# Patient Record
Sex: MALE | Race: WHITE | Employment: OTHER | URBAN - METROPOLITAN AREA
[De-identification: names, ages, dates, MRNs, and addresses within clinical notes are randomized per-mention and may not be internally consistent; named-entity substitution may affect disease eponyms.]

---
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
## 2017-01-03 ENCOUNTER — ANTI-COAG VISIT (OUTPATIENT)
Dept: CARDIOLOGY CLINIC | Age: 72
End: 2017-01-03

## 2017-01-03 LAB
INR BLD: 1.6
INR BLD: 1.6

## 2017-01-06 ENCOUNTER — ANTI-COAG VISIT (OUTPATIENT)
Dept: CARDIOLOGY CLINIC | Age: 72
End: 2017-01-06

## 2017-01-18 ENCOUNTER — ANTI-COAG VISIT (OUTPATIENT)
Dept: CARDIOLOGY CLINIC | Age: 72
End: 2017-01-18

## 2017-01-18 LAB — INR BLD: 1.8

## 2017-02-24 ENCOUNTER — OFFICE VISIT (OUTPATIENT)
Dept: CARDIOLOGY CLINIC | Age: 72
End: 2017-02-24

## 2017-02-24 VITALS
HEIGHT: 70 IN | HEART RATE: 49 BPM | SYSTOLIC BLOOD PRESSURE: 136 MMHG | DIASTOLIC BLOOD PRESSURE: 74 MMHG | WEIGHT: 222 LBS | BODY MASS INDEX: 31.78 KG/M2

## 2017-02-24 DIAGNOSIS — I48.0 PAROXYSMAL ATRIAL FIBRILLATION (HCC): Primary | Chronic | ICD-10-CM

## 2017-02-24 DIAGNOSIS — I10 ESSENTIAL HYPERTENSION: Chronic | ICD-10-CM

## 2017-02-24 DIAGNOSIS — R00.2 PALPITATIONS: ICD-10-CM

## 2017-02-24 DIAGNOSIS — R00.1 BRADYCARDIA: ICD-10-CM

## 2017-02-24 PROCEDURE — 1123F ACP DISCUSS/DSCN MKR DOCD: CPT | Performed by: INTERNAL MEDICINE

## 2017-02-24 PROCEDURE — 1036F TOBACCO NON-USER: CPT | Performed by: INTERNAL MEDICINE

## 2017-02-24 PROCEDURE — 3017F COLORECTAL CA SCREEN DOC REV: CPT | Performed by: INTERNAL MEDICINE

## 2017-02-24 PROCEDURE — G8484 FLU IMMUNIZE NO ADMIN: HCPCS | Performed by: INTERNAL MEDICINE

## 2017-02-24 PROCEDURE — G8598 ASA/ANTIPLAT THER USED: HCPCS | Performed by: INTERNAL MEDICINE

## 2017-02-24 PROCEDURE — 99214 OFFICE O/P EST MOD 30 MIN: CPT | Performed by: INTERNAL MEDICINE

## 2017-02-24 PROCEDURE — 4040F PNEUMOC VAC/ADMIN/RCVD: CPT | Performed by: INTERNAL MEDICINE

## 2017-02-24 PROCEDURE — 93000 ELECTROCARDIOGRAM COMPLETE: CPT | Performed by: INTERNAL MEDICINE

## 2017-02-24 PROCEDURE — G8417 CALC BMI ABV UP PARAM F/U: HCPCS | Performed by: INTERNAL MEDICINE

## 2017-02-24 PROCEDURE — G8427 DOCREV CUR MEDS BY ELIG CLIN: HCPCS | Performed by: INTERNAL MEDICINE

## 2017-02-24 RX ORDER — DOFETILIDE 0.5 MG/1
500 CAPSULE ORAL 2 TIMES DAILY
Qty: 180 CAPSULE | Refills: 2 | Status: SHIPPED | OUTPATIENT
Start: 2017-02-24 | End: 2017-08-28 | Stop reason: SDUPTHER

## 2017-02-24 RX ORDER — NADOLOL 20 MG/1
TABLET ORAL
Qty: 90 TABLET | Refills: 3
Start: 2017-02-24 | End: 2017-12-28 | Stop reason: SDUPTHER

## 2017-03-01 RX ORDER — WARFARIN SODIUM 5 MG/1
5 TABLET ORAL DAILY
Qty: 180 TABLET | Refills: 1 | Status: SHIPPED | OUTPATIENT
Start: 2017-03-01 | End: 2017-08-09 | Stop reason: SDUPTHER

## 2017-03-07 DIAGNOSIS — I25.10 CORONARY ARTERY DISEASE INVOLVING NATIVE CORONARY ARTERY OF NATIVE HEART WITHOUT ANGINA PECTORIS: ICD-10-CM

## 2017-03-07 RX ORDER — ATORVASTATIN CALCIUM 40 MG/1
TABLET, FILM COATED ORAL
Qty: 30 TABLET | Refills: 5 | Status: SHIPPED | OUTPATIENT
Start: 2017-03-07 | End: 2017-09-11 | Stop reason: SDUPTHER

## 2017-03-08 ENCOUNTER — ANTI-COAG VISIT (OUTPATIENT)
Dept: CARDIOLOGY CLINIC | Age: 72
End: 2017-03-08

## 2017-03-08 LAB — INR BLD: 1.5

## 2017-04-06 LAB — INR BLD: 1.6

## 2017-04-06 RX ORDER — LISINOPRIL 20 MG/1
TABLET ORAL
Qty: 90 TABLET | Refills: 3 | Status: SHIPPED | OUTPATIENT
Start: 2017-04-06 | End: 2017-10-20 | Stop reason: SDUPTHER

## 2017-04-07 ENCOUNTER — ANTI-COAG VISIT (OUTPATIENT)
Dept: CARDIOLOGY CLINIC | Age: 72
End: 2017-04-07

## 2017-05-05 ENCOUNTER — ANTI-COAG VISIT (OUTPATIENT)
Dept: CARDIOLOGY CLINIC | Age: 72
End: 2017-05-05

## 2017-05-05 LAB — INR BLD: 2.1

## 2017-05-16 ENCOUNTER — TELEPHONE (OUTPATIENT)
Dept: CARDIOLOGY CLINIC | Age: 72
End: 2017-05-16

## 2017-05-16 DIAGNOSIS — I48.91 ATRIAL FIBRILLATION, UNSPECIFIED TYPE (HCC): Primary | ICD-10-CM

## 2017-06-23 ENCOUNTER — ANTI-COAG VISIT (OUTPATIENT)
Dept: CARDIOLOGY CLINIC | Age: 72
End: 2017-06-23

## 2017-06-23 LAB — INR BLD: 1.8

## 2017-07-25 ENCOUNTER — OFFICE VISIT (OUTPATIENT)
Dept: CARDIOLOGY CLINIC | Age: 72
End: 2017-07-25

## 2017-07-25 VITALS
HEART RATE: 49 BPM | WEIGHT: 225 LBS | DIASTOLIC BLOOD PRESSURE: 70 MMHG | HEIGHT: 70 IN | BODY MASS INDEX: 32.21 KG/M2 | SYSTOLIC BLOOD PRESSURE: 130 MMHG

## 2017-07-25 DIAGNOSIS — I10 ESSENTIAL HYPERTENSION: Chronic | ICD-10-CM

## 2017-07-25 DIAGNOSIS — I48.91 ATRIAL FIBRILLATION, UNSPECIFIED TYPE (HCC): ICD-10-CM

## 2017-07-25 DIAGNOSIS — I48.0 PAROXYSMAL ATRIAL FIBRILLATION (HCC): Primary | Chronic | ICD-10-CM

## 2017-07-25 DIAGNOSIS — R07.9 CHEST PAIN AT REST: ICD-10-CM

## 2017-07-25 DIAGNOSIS — I25.10 CORONARY ARTERY DISEASE INVOLVING NATIVE CORONARY ARTERY OF NATIVE HEART WITHOUT ANGINA PECTORIS: Chronic | ICD-10-CM

## 2017-07-25 DIAGNOSIS — R00.2 PALPITATIONS: ICD-10-CM

## 2017-07-25 PROCEDURE — G8427 DOCREV CUR MEDS BY ELIG CLIN: HCPCS | Performed by: INTERNAL MEDICINE

## 2017-07-25 PROCEDURE — 93000 ELECTROCARDIOGRAM COMPLETE: CPT | Performed by: INTERNAL MEDICINE

## 2017-07-25 PROCEDURE — G8417 CALC BMI ABV UP PARAM F/U: HCPCS | Performed by: INTERNAL MEDICINE

## 2017-07-25 PROCEDURE — 1036F TOBACCO NON-USER: CPT | Performed by: INTERNAL MEDICINE

## 2017-07-25 PROCEDURE — G8598 ASA/ANTIPLAT THER USED: HCPCS | Performed by: INTERNAL MEDICINE

## 2017-07-25 PROCEDURE — 1123F ACP DISCUSS/DSCN MKR DOCD: CPT | Performed by: INTERNAL MEDICINE

## 2017-07-25 PROCEDURE — 4040F PNEUMOC VAC/ADMIN/RCVD: CPT | Performed by: INTERNAL MEDICINE

## 2017-07-25 PROCEDURE — 99213 OFFICE O/P EST LOW 20 MIN: CPT | Performed by: INTERNAL MEDICINE

## 2017-07-25 PROCEDURE — 3017F COLORECTAL CA SCREEN DOC REV: CPT | Performed by: INTERNAL MEDICINE

## 2017-08-07 RX ORDER — WARFARIN SODIUM 5 MG/1
TABLET ORAL
Qty: 180 TABLET | Refills: 1 | Status: CANCELLED | OUTPATIENT
Start: 2017-08-07

## 2017-08-09 ENCOUNTER — TELEPHONE (OUTPATIENT)
Dept: CARDIOLOGY CLINIC | Age: 72
End: 2017-08-09

## 2017-08-09 DIAGNOSIS — I48.0 PAROXYSMAL ATRIAL FIBRILLATION (HCC): Primary | Chronic | ICD-10-CM

## 2017-08-09 DIAGNOSIS — I25.10 CORONARY ARTERY DISEASE INVOLVING NATIVE CORONARY ARTERY OF NATIVE HEART WITHOUT ANGINA PECTORIS: Chronic | ICD-10-CM

## 2017-08-09 RX ORDER — WARFARIN SODIUM 5 MG/1
5 TABLET ORAL DAILY
Qty: 180 TABLET | Refills: 1 | Status: SHIPPED | OUTPATIENT
Start: 2017-08-09 | End: 2017-08-10 | Stop reason: SDUPTHER

## 2017-08-10 DIAGNOSIS — I25.10 CORONARY ARTERY DISEASE INVOLVING NATIVE CORONARY ARTERY OF NATIVE HEART WITHOUT ANGINA PECTORIS: Chronic | ICD-10-CM

## 2017-08-10 DIAGNOSIS — I48.0 PAROXYSMAL ATRIAL FIBRILLATION (HCC): Chronic | ICD-10-CM

## 2017-08-10 RX ORDER — WARFARIN SODIUM 5 MG/1
5 TABLET ORAL DAILY
Qty: 180 TABLET | Refills: 1 | Status: SHIPPED | OUTPATIENT
Start: 2017-08-10 | End: 2017-08-11 | Stop reason: SDUPTHER

## 2017-08-11 ENCOUNTER — TELEPHONE (OUTPATIENT)
Dept: CARDIOLOGY CLINIC | Age: 72
End: 2017-08-11

## 2017-08-11 DIAGNOSIS — I25.10 CORONARY ARTERY DISEASE INVOLVING NATIVE CORONARY ARTERY OF NATIVE HEART WITHOUT ANGINA PECTORIS: Chronic | ICD-10-CM

## 2017-08-11 DIAGNOSIS — I48.0 PAROXYSMAL ATRIAL FIBRILLATION (HCC): Chronic | ICD-10-CM

## 2017-08-11 RX ORDER — WARFARIN SODIUM 5 MG/1
5 TABLET ORAL DAILY
Qty: 180 TABLET | Refills: 1 | Status: SHIPPED | OUTPATIENT
Start: 2017-08-11 | End: 2017-08-11 | Stop reason: SDUPTHER

## 2017-08-11 RX ORDER — WARFARIN SODIUM 5 MG/1
TABLET ORAL
Qty: 180 TABLET | Refills: 1 | Status: SHIPPED | OUTPATIENT
Start: 2017-08-11 | End: 2018-01-11 | Stop reason: SDUPTHER

## 2017-08-15 ENCOUNTER — ANTI-COAG VISIT (OUTPATIENT)
Dept: CARDIOLOGY CLINIC | Age: 72
End: 2017-08-15

## 2017-08-15 LAB
INR BLD: 2.5
INR BLD: 2.5 (ref 0.8–1.2)
INR BLD: ABNORMAL

## 2017-08-28 ENCOUNTER — TELEPHONE (OUTPATIENT)
Dept: CARDIOLOGY CLINIC | Age: 72
End: 2017-08-28

## 2017-08-28 DIAGNOSIS — I25.10 CORONARY ARTERY DISEASE INVOLVING NATIVE CORONARY ARTERY OF NATIVE HEART WITHOUT ANGINA PECTORIS: Chronic | ICD-10-CM

## 2017-08-28 DIAGNOSIS — R07.9 CHEST PAIN, UNSPECIFIED TYPE: Primary | ICD-10-CM

## 2017-08-29 RX ORDER — DOFETILIDE 0.5 MG/1
CAPSULE ORAL
Qty: 180 CAPSULE | Refills: 2 | Status: ON HOLD | OUTPATIENT
Start: 2017-08-29 | End: 2017-10-17 | Stop reason: HOSPADM

## 2017-08-30 ENCOUNTER — HOSPITAL ENCOUNTER (OUTPATIENT)
Dept: NON INVASIVE DIAGNOSTICS | Age: 72
Discharge: HOME | End: 2017-08-30
Attending: INTERNAL MEDICINE | Admitting: INTERNAL MEDICINE

## 2017-08-30 DIAGNOSIS — R07.9 CHEST PAIN: ICD-10-CM

## 2017-08-30 LAB
EKG ATRIAL RATE: 47 BPM
EKG DIAGNOSIS: NORMAL
EKG P AXIS: 62 DEGREES
EKG P-R INTERVAL: 204 MS
EKG Q-T INTERVAL: 428 MS
EKG QRS DURATION: 96 MS
EKG QTC CALCULATION (BAZETT): 378 MS
EKG R AXIS: -66 DEGREES
EKG T AXIS: 55 DEGREES
EKG VENTRICULAR RATE: 47 BPM
LV EF: 50 %
LVEF MODALITY: NORMAL

## 2017-08-30 PROCEDURE — 93010 ELECTROCARDIOGRAM REPORT: CPT | Performed by: INTERNAL MEDICINE

## 2017-09-01 ENCOUNTER — TELEPHONE (OUTPATIENT)
Dept: CARDIOLOGY CLINIC | Age: 72
End: 2017-09-01

## 2017-09-11 DIAGNOSIS — I25.10 CORONARY ARTERY DISEASE INVOLVING NATIVE CORONARY ARTERY OF NATIVE HEART WITHOUT ANGINA PECTORIS: ICD-10-CM

## 2017-09-12 RX ORDER — ATORVASTATIN CALCIUM 40 MG/1
TABLET, FILM COATED ORAL
Qty: 30 TABLET | Refills: 4 | Status: SHIPPED | OUTPATIENT
Start: 2017-09-12 | End: 2017-11-20 | Stop reason: SDUPTHER

## 2017-10-02 ENCOUNTER — ANTI-COAG VISIT (OUTPATIENT)
Dept: CARDIOLOGY CLINIC | Age: 72
End: 2017-10-02

## 2017-10-02 LAB — INR BLD: 2

## 2017-10-04 ENCOUNTER — TELEPHONE (OUTPATIENT)
Dept: CARDIOLOGY CLINIC | Age: 72
End: 2017-10-04

## 2017-10-18 ENCOUNTER — TELEPHONE (OUTPATIENT)
Dept: CARDIOLOGY CLINIC | Age: 72
End: 2017-10-18

## 2017-10-18 NOTE — TELEPHONE ENCOUNTER
I spoke with pt and relayed message per [facility]. H shanda stated that the med was new and that he had mistakenly taken a whole tablet. I advised to take the half tomorrow and keep us apprised of and adverse side effects.

## 2017-10-18 NOTE — TELEPHONE ENCOUNTER
Spoke to patient. He was discharged from hospital yesterday. He woke up this morning feeling fine. He took 60 mg of isosorbide this morning on accident instead of 30 mg. He also took all of his other cardiac medications (lisinopril and corgard) About a half an hr after taking his meds his head started to feel a little funny. (Like a lightheadedness. ) His pulse is regular. /73 and HR 67 sitting. Standing /81 and HR 86. His HR normally runs in the 50's according to him. Only other medication he will be taking later this evening will be dilantin coumadin and lipitor. Explained that his lightheadedness is likely from extra medication. He should lay down and rest with feet elevated. Drink some fluids and recheck BP and HR in 1-1 1/2 hrs. If not feeling better or it worsens give us a call back.

## 2017-10-20 ENCOUNTER — OFFICE VISIT (OUTPATIENT)
Dept: CARDIOLOGY CLINIC | Age: 72
End: 2017-10-20

## 2017-10-20 VITALS
WEIGHT: 214 LBS | SYSTOLIC BLOOD PRESSURE: 108 MMHG | OXYGEN SATURATION: 98 % | DIASTOLIC BLOOD PRESSURE: 64 MMHG | BODY MASS INDEX: 30.64 KG/M2 | HEIGHT: 70 IN | HEART RATE: 64 BPM

## 2017-10-20 DIAGNOSIS — I25.10 CORONARY ARTERY DISEASE INVOLVING NATIVE CORONARY ARTERY OF NATIVE HEART WITHOUT ANGINA PECTORIS: Chronic | ICD-10-CM

## 2017-10-20 DIAGNOSIS — I10 ESSENTIAL HYPERTENSION: Chronic | ICD-10-CM

## 2017-10-20 DIAGNOSIS — I48.0 PAF (PAROXYSMAL ATRIAL FIBRILLATION) (HCC): Primary | Chronic | ICD-10-CM

## 2017-10-20 DIAGNOSIS — I47.29 NSVT (NONSUSTAINED VENTRICULAR TACHYCARDIA): ICD-10-CM

## 2017-10-20 DIAGNOSIS — E78.2 MIXED HYPERLIPIDEMIA: ICD-10-CM

## 2017-10-20 PROCEDURE — 1111F DSCHRG MED/CURRENT MED MERGE: CPT | Performed by: NURSE PRACTITIONER

## 2017-10-20 PROCEDURE — G8417 CALC BMI ABV UP PARAM F/U: HCPCS | Performed by: NURSE PRACTITIONER

## 2017-10-20 PROCEDURE — 1123F ACP DISCUSS/DSCN MKR DOCD: CPT | Performed by: NURSE PRACTITIONER

## 2017-10-20 PROCEDURE — G8598 ASA/ANTIPLAT THER USED: HCPCS | Performed by: NURSE PRACTITIONER

## 2017-10-20 PROCEDURE — G8484 FLU IMMUNIZE NO ADMIN: HCPCS | Performed by: NURSE PRACTITIONER

## 2017-10-20 PROCEDURE — 99213 OFFICE O/P EST LOW 20 MIN: CPT | Performed by: NURSE PRACTITIONER

## 2017-10-20 PROCEDURE — 4040F PNEUMOC VAC/ADMIN/RCVD: CPT | Performed by: NURSE PRACTITIONER

## 2017-10-20 PROCEDURE — G8427 DOCREV CUR MEDS BY ELIG CLIN: HCPCS | Performed by: NURSE PRACTITIONER

## 2017-10-20 PROCEDURE — 3017F COLORECTAL CA SCREEN DOC REV: CPT | Performed by: NURSE PRACTITIONER

## 2017-10-20 PROCEDURE — 1036F TOBACCO NON-USER: CPT | Performed by: NURSE PRACTITIONER

## 2017-10-20 RX ORDER — NITROGLYCERIN 400 UG/1
1 SPRAY ORAL EVERY 5 MIN PRN
Qty: 1 BOTTLE | Refills: 3 | Status: SHIPPED | OUTPATIENT
Start: 2017-10-20 | End: 2022-02-24

## 2017-10-20 RX ORDER — LISINOPRIL 20 MG/1
10 TABLET ORAL DAILY
Qty: 90 TABLET | Refills: 3 | Status: ON HOLD | OUTPATIENT
Start: 2017-10-20 | End: 2017-10-24 | Stop reason: HOSPADM

## 2017-10-20 RX ORDER — LISINOPRIL 20 MG/1
10 TABLET ORAL DAILY
Qty: 90 TABLET | Refills: 3
Start: 2017-10-20 | End: 2017-10-20 | Stop reason: SDUPTHER

## 2017-10-20 RX ORDER — NITROGLYCERIN 400 UG/1
1 SPRAY ORAL EVERY 5 MIN PRN
Qty: 1 BOTTLE | Refills: 3 | Status: SHIPPED | OUTPATIENT
Start: 2017-10-20 | End: 2017-10-20 | Stop reason: SDUPTHER

## 2017-10-20 NOTE — TELEPHONE ENCOUNTER
Pt calling he was just seen today with Jone Mckeon and his meds were sent to wrong pharmacy, resent to correct pharmacy

## 2017-10-20 NOTE — PROGRESS NOTES
[address]     Outpatient Follow Up Note    CHIEF COMPLAINT / HPI: Hospital Follow Up secondary to VF/ AF/ CAD/ HTN/ Hyperlipidemia    Hospital record has been reviewed  Hospital Course progressed as follows:   Hospital Course: Lesly Mathis was admitted With chest pain and ECG concerning for STEMI. He was taken to the cardiac cath lab and noted to have recent appearing occlusion of the SVG to the RCA which was unable to be opened with PCI. He also was noted to have an occluded LIMA. Sequential SVG to DT 1013 was patent with a 50% stenosis. The RCA is collateralized via left-to-right collaterals. During the course of PCI the temporary pacemaker which was placed prior to AngioJet attempted revascularizing the RCA graft triggered malignant ventricular arrhythmia requiring defibrillation. He was seen by electrophysiology with recommendation of LifeVest and probable future EP study. He underwent Myoview stress test which was negative for reversible ischemia. LV systolic function was moderately reduced at the time of cardiac cath but by the time of stress testing had improved into the normal range. Medical management of his CAD was recommended. Mp Blancas is 81 y.o. male who presents today for a routine follow up after a recent hospitalization related to the above mentioned issues. Subjective:   Since the time of discharge, the patient admits their symptoms have improved. Currently the patient denies significant chest pain. There is no associated STEVENS. The patient is not experiencing palpitations. These symptoms are improving over the last few days. Patient is currently wearing a life vest.  With regard to medication therapy the patient has been compliant with prescribed regimen. They have tolerated therapy to date.      Past Medical History:   Diagnosis Date    Arthritis     shoulders and knee    Atrial fib/flut     CAD (coronary artery disease)     Hyperlipidemia     Hypertension  Seizures ([address]     Sinus bradycardia      Social History:    History   Smoking Status    Former Smoker    Packs/day: 2.00    Years: 15.00    Quit date: 8/15/1990   Smokeless Tobacco    Never Used     Current Medications:  Current Outpatient Prescriptions   Medication Sig Dispense Refill    clopidogrel (PLAVIX) 75 MG tablet Take 1 tablet by mouth daily 30 tablet 3    isosorbide mononitrate (IMDUR) 60 MG extended release tablet Take 0.5 tablets by mouth daily 30 tablet 3    atorvastatin (LIPITOR) 40 MG tablet TAKE 1 TABLET BY MOUTH ONE TIME A DAY  30 tablet 4    warfarin (COUMADIN) 5 MG tablet One to two tabs daily as directed (Patient taking differently: Take 7.5 mg by mouth daily One to two tabs daily as directed) 180 tablet 1    nadolol (CORGARD) 20 MG tablet TAKE 1/2 TABLET BY MOUTH DAILY 90 tablet 3    phenytoin (DILANTIN) 100 MG ER capsule Take 1 capsule by mouth 2 times daily. Resume home dose 1 capsule 0    primidone (MYSOLINE) 250 MG tablet Take 250 mg by mouth 2 times daily.  diazepam (VALIUM) 5 MG tablet Take 5 mg by mouth 2 times daily.  nitroGLYCERIN (NITROLINGUAL) 0.4 MG/SPRAY 0.4 mg spray Place 1 spray under the tongue every 5 minutes as needed for Chest pain 1 Bottle 3    lisinopril (PRINIVIL;ZESTRIL) 20 MG tablet Take 0.5 tablets by mouth daily 90 tablet 3     No current facility-administered medications for this visit. REVIEW OF SYSTEMS:   CONSTITUTIONAL: No major weight gain or loss, fatigue, weakness, night sweats or fever. There's been no change in energy level, sleep pattern, or activity level. HEENT: No new vision difficulties or ringing in the ears. RESPIRATORY: No new SOB, PND, orthopnea or cough. CARDIOVASCULAR: See HPI  GI: No nausea, vomiting, diarrhea, constipation, abdominal pain or changes in bowel habits. : No urinary frequency, urgency, incontinence hematuria or dysuria. SKIN: No cyanosis or skin lesions.   MUSCULOSKELETAL: No new muscle or joint pain. NEUROLOGICAL: No syncope or TIA-like symptoms. PSYCHIATRIC: No anxiety, pain, insomnia or depression    Objective:   PHYSICAL EXAM:        VITALS:  /64 (Site: Left Arm, Position: Sitting, Cuff Size: Medium Adult)   Pulse 64   Ht 5' 10\" (1.778 m)   Wt 214 lb (97.1 kg)   SpO2 98%   BMI 30.71 kg/m²     CONSTITUTIONAL: Cooperative, no apparent distress, and appears well nourished / developed  NEUROLOGIC:  Awake and orientated to person, place and time. PSYCH: Calm affect. SKIN: Warm and dry. HEENT: Sclera non-icteric, normocephalic, neck supple, no elevation of JVP, normal carotid pulses with no bruits and thyroid normal size. LUNGS:  No increased work of breathing and clear to auscultation, no crackles or wheezing. CARDIOVASCULAR:  Regular rate and rhythm with no murmurs, gallops, rubs, or abnormal heart sounds, normal PMI. The apical impulses not displaced. Heart tones are crisp and normal. Cervical veins are not engorged                 JVP less than 8 cm H2O                                                                              The carotid upstroke is normal in amplitude and contour without delay or bruit    ABDOMEN:  Normal bowel sounds, non-distended and non-tender to palpation   EXT: No edema, no calf tenderness. Pulses are present bilaterally. Right groin site looks unremarkable.      DATA:    Lab Results   Component Value Date    ALT 14 10/13/2017    AST 15 10/13/2017    ALKPHOS 71 10/13/2017    BILITOT <0.2 10/13/2017     Lab Results   Component Value Date    CREATININE 1.1 10/17/2017    BUN 23 (H) 10/17/2017     (L) 10/17/2017    K 4.2 10/17/2017     10/17/2017    CO2 26 10/17/2017     No results found for: TSH, N4DFVHY, B9XXBQB, THYROIDAB  Lab Results   Component Value Date    WBC 10.3 10/17/2017    HGB 14.4 10/17/2017    HCT 43.0 10/17/2017    MCV 93.4 10/17/2017     10/17/2017     No components found for: CHLPL  Lab Results   Component Value Date into the right coronary artery patient developed malignant ventricular arrhythmia requiring defibrillation. The guide was probably removed due to concern about potential conus branch occlusion but there was recurrent V. fib requiring defibrillation again. The pacemaker was withdrawn. Amiodarone was given. EP was consulted. An attempt to place the temporary pacemaker with adjustments was performed again and unfortunately ventricular fibrillation was induced again. The patient was successfully defibrillated. It was decided to proceed with a jet thrombectomy without pacemaker which the patient tolerated reasonably well there was a period of transient bradycardia which responded without the need for intervention. Unfortunately flow could not be restored to the vein graft. Impression:  1. Severe native CAD  2. Occlusions of the LIMA graft to the LAD as well as the saphenous vein graft to the RCA  3. The LAD is filled via retrograde flow from the diagonal which is grafted  4. Patent sequential SVG to D2 and 0M3 but with moderate disease  5. Moderate LV systolic dysfunction  6. Ventricular arrhythmia which appears to be triggered by the temporary pacemaker possibly due to ischemic/injured myocardium     Plan:  1. Continue aspirin  2. Plavix  3. We'll need to resume anticoagulation with Coumadin at some point but would like EP input regarding that as well as recommendations regarding management of ventricular arrhythmia  4. Attempted medical management of his CAD but in terms of long-term planning will need a plan should failure of the sequential SVG occur     Discussed with multiple cardiologists including EP and also discussed extensively with the patient and family. Assessment:   Assessment and plan:      - VF in the setting of MI, and ischemic cardiomyopathy                        - EF 35% by LV gram.                         - On medical therapy.                          -  Follow up with EP in two weeks. - PAF                        - currently in regular rhythm, currently wearing a life vest.                        - D/C'd Tikosyn due to VT/VF                        - On  Plavix and coumadin. - Coronary artery disease  Cardiac angiogram: 10/13/17 : 1. Severe native CAD,  Occlusions of the LIMA graft to the LAD as well as the saphenous vein graft to the RCA, The LAD is filled via retrograde flow from the diagonal which is grafted, Patent sequential SVG to D2 and 0M3 but with moderate disease,  Moderate LV systolic dysfunction     Denies any chest pain at today's office visit              On Plavix, Lipitor, Imdur, nadolol, and Lisinopril    - Hypertension     Today's B/P- 108/4   continue current medications    - Hyperlipidemia  On  Lipitor  12/15: HDL: 116, LDL: 49    Patient  is stable since hospital discharge. Plan:   Continue current medications  Follow up with EP in two weeks    I have addressed the patient's cardiac risk factors and adjusted pharmacologic treatment as needed. In addition, I have reinforced the need for patient directed risk factor modification. Further evaluation will be based upon the patient's clinical course and testing results. All questions and concerns were addressed to the patient/family. Alternatives to  treatment were discussed. The patient  currently  is not smoking. The risks related to smoking were reviewed with the patient. Recommend maintaining a smoke-free lifestyle. Products available for smoking cessation were discussed. Thank you for allowing to us to participate in the care of Faith Hernandez CNP

## 2017-10-20 NOTE — LETTER
groin site looks unremarkable. DATA:    Lab Results   Component Value Date    ALT 14 10/13/2017    AST 15 10/13/2017    ALKPHOS 71 10/13/2017    BILITOT <0.2 10/13/2017     Lab Results   Component Value Date    CREATININE 1.1 10/17/2017    BUN 23 (H) 10/17/2017     (L) 10/17/2017    K 4.2 10/17/2017     10/17/2017    CO2 26 10/17/2017     No results found for: TSH, Q8DFIAJ, W1SVFPP, THYROIDAB  Lab Results   Component Value Date    WBC 10.3 10/17/2017    HGB 14.4 10/17/2017    HCT 43.0 10/17/2017    MCV 93.4 10/17/2017     10/17/2017     No components found for: CHLPL  Lab Results   Component Value Date    TRIG 116 12/02/2015    TRIG 186 (H) 10/14/2014    TRIG 149 10/07/2013     Lab Results   Component Value Date    HDL 49 (L) 12/02/2015    HDL 34 (L) 10/14/2014    HDL 41 10/07/2013     Lab Results   Component Value Date    LDLCALC 110 12/02/2015    LDLCALC 87 10/14/2014    LDLCALC 158 (H) 12/20/2012     Lab Results   Component Value Date    LABVLDL 37 10/14/2014    LABVLDL 30 12/20/2012    LABVLDL 50 12/13/2011     Radiology Review:  Pertinent images / reports were reviewed as a part of this visit and reveals the following:  Myoview stress test: 10/17/17  Conclusions    Summary  Medium sized anterior fixed defect of moderate intensity consistent with  infarction in the territory of the LAD . Small sized inferior fixed defect of moderate intensity consistent with  infarction in the territory of the RCA . No ischemia. Normal LV function. Overall findings represent a intermediate risk scan. Cardiac Cath 10/13/17:  Anatomy:   LM-normal  LAD-100% mid  D1-50% ostial  Cx-20% mid  OM3- 70% proximal  RCA-100% mid, codominant left to right collaterals  LIMA-LAD: 100% mid  Sequential SVG-D2 and 13:20% proximal, 50% mid.   The LAD fills via retrograde flow from D2  SVG-% proximal with suggestion of recent closure  LVEF- 35-40% with anterior hypokinesis Alternatives to  treatment were discussed. The patient  currently  is not smoking. The risks related to smoking were reviewed with the patient. Recommend maintaining a smoke-free lifestyle. Products available for smoking cessation were discussed. Thank you for allowing to us to participate in the care of Krupa Mejia. [address] [place] [address]         If you have questions, please do not hesitate to call me. I look forward to following Natali Alejandro along with you.     Sincerely,        Chirag Nickerson NP

## 2017-10-22 PROBLEM — R42 DIZZINESS: Status: ACTIVE | Noted: 2017-10-22

## 2017-10-22 NOTE — COMMUNICATION BODY
TRIG 116 12/02/2015    TRIG 186 (H) 10/14/2014    TRIG 149 10/07/2013     Lab Results   Component Value Date    HDL 49 (L) 12/02/2015    HDL 34 (L) 10/14/2014    HDL 41 10/07/2013     Lab Results   Component Value Date    LDLCALC 110 12/02/2015    LDLCALC 87 10/14/2014    LDLCALC 158 (H) 12/20/2012     Lab Results   Component Value Date    LABVLDL 37 10/14/2014    LABVLDL 30 12/20/2012    LABVLDL 50 12/13/2011     Radiology Review:  Pertinent images / reports were reviewed as a part of this visit and reveals the following:  Myoview stress test: 10/17/17  Conclusions    Summary  Medium sized anterior fixed defect of moderate intensity consistent with  infarction in the territory of the LAD . Small sized inferior fixed defect of moderate intensity consistent with  infarction in the territory of the RCA . No ischemia. Normal LV function. Overall findings represent a intermediate risk scan. Cardiac Cath 10/13/17:  Anatomy:   LM-normal  LAD-100% mid  D1-50% ostial  Cx-20% mid  OM3- 70% proximal  RCA-100% mid, codominant left to right collaterals  LIMA-LAD: 100% mid  Sequential SVG-D2 and 13:20% proximal, 50% mid. The LAD fills via retrograde flow from D2  SVG-% proximal with suggestion of recent closure  LVEF- 35-40% with anterior hypokinesis  PCI: SVG-RCA with attempted PCI that was unsuccessful. The graft was engaged him wired Pronto thrombectomy was used but without restoration of flow. This decided to attempt mechanical thrombectomy with AngioJet and therefore the 6 [place] guide was removed and upsized to a 7 [place] system. A 6 [place] sheath was placed percutaneously into the right comfortable vein for temporary pacemaker placement in anticipation of bradycardia with AngioJet particular since the right coronary distribution was suspected.   After placing the temporary pacemaker under fluoroscopy and threshold testing being performed and simultaneously the 7 [place] guider was unintentionally engaged into the right coronary artery patient developed malignant ventricular arrhythmia requiring defibrillation. The guide was probably removed due to concern about potential conus branch occlusion but there was recurrent V. fib requiring defibrillation again. The pacemaker was withdrawn. Amiodarone was given. EP was consulted. An attempt to place the temporary pacemaker with adjustments was performed again and unfortunately ventricular fibrillation was induced again. The patient was successfully defibrillated. It was decided to proceed with a jet thrombectomy without pacemaker which the patient tolerated reasonably well there was a period of transient bradycardia which responded without the need for intervention. Unfortunately flow could not be restored to the vein graft. Impression:  1. Severe native CAD  2. Occlusions of the LIMA graft to the LAD as well as the saphenous vein graft to the RCA  3. The LAD is filled via retrograde flow from the diagonal which is grafted  4. Patent sequential SVG to D2 and 0M3 but with moderate disease  5. Moderate LV systolic dysfunction  6. Ventricular arrhythmia which appears to be triggered by the temporary pacemaker possibly due to ischemic/injured myocardium     Plan:  1. Continue aspirin  2. Plavix  3. We'll need to resume anticoagulation with Coumadin at some point but would like EP input regarding that as well as recommendations regarding management of ventricular arrhythmia  4. Attempted medical management of his CAD but in terms of long-term planning will need a plan should failure of the sequential SVG occur     Discussed with multiple cardiologists including EP and also discussed extensively with the patient and family. Assessment:   Assessment and plan:      - VF in the setting of MI, and ischemic cardiomyopathy                        - EF 35% by LV gram.                         - On medical therapy.                          -   Follow up with EP

## 2017-10-25 ENCOUNTER — TELEPHONE (OUTPATIENT)
Dept: CARDIOLOGY CLINIC | Age: 72
End: 2017-10-25

## 2017-10-25 NOTE — TELEPHONE ENCOUNTER
----- Message from Agata Muñoz MD sent at 10/24/2017  9:47 PM EDT -----  Patient has been seen by me in hospital and need follow up with me in office. I am not sure why he has been scheduled for Dr. Kasey Manriquez. Correct the schedule and let me know the time of appointment and follow up.      Agata Muñoz MD, MPH  [facility]   Office: [phone]

## 2017-10-25 NOTE — TELEPHONE ENCOUNTER
His lisinopril rx that was called in last night when he left the hospital was called to the wrong pharmacy . Can the rx for Lisinopril be cancelled at [facility] in [place] and called to [facility] [address] in [address] [place] [address],[address]?  Also, [facility] [place] off [facility] chart as [facility] and replace it with [facility]

## 2017-10-26 DIAGNOSIS — Z79.01 LONG-TERM (CURRENT) USE OF ANTICOAGULANTS: ICD-10-CM

## 2017-10-26 DIAGNOSIS — I48.91 ATRIAL FIBRILLATION, UNSPECIFIED TYPE (HCC): Primary | ICD-10-CM

## 2017-10-26 RX ORDER — LISINOPRIL 5 MG/1
5 TABLET ORAL DAILY
Qty: 30 TABLET | Refills: 0 | Status: SHIPPED | OUTPATIENT
Start: 2017-10-26 | End: 2017-11-27 | Stop reason: SDUPTHER

## 2017-10-26 NOTE — TELEPHONE ENCOUNTER
meds sent to [facility] in [address] [place] [address],[address] chart[facility]

## 2017-10-27 ENCOUNTER — ANTI-COAG VISIT (OUTPATIENT)
Dept: CARDIOLOGY CLINIC | Age: 72
End: 2017-10-27

## 2017-10-27 LAB — INR BLD: 2.5

## 2017-10-30 ENCOUNTER — TELEPHONE (OUTPATIENT)
Dept: CARDIOLOGY CLINIC | Age: 72
End: 2017-10-30

## 2017-10-30 NOTE — TELEPHONE ENCOUNTER
I spoke with pt and he stated that when he gets the flutter - it will not  the beginning of the flutter. He stated that once he pushes the button it only picks up the tail end of the episode. Wanted to know about a Holter or an event monitor.  He mention short term moitor- is this doable with a life vest?

## 2017-10-30 NOTE — TELEPHONE ENCOUNTER
Holter not needed. Will see him in 6- 8 weeks and will proceed with EP study.      Lee Ann Burroughs MD, MPH  [place] [address]   Office: [phone]

## 2017-11-03 ENCOUNTER — ANTI-COAG VISIT (OUTPATIENT)
Dept: CARDIOLOGY CLINIC | Age: 72
End: 2017-11-03

## 2017-11-03 LAB — INR BLD: 3.2

## 2017-11-07 ENCOUNTER — OFFICE VISIT (OUTPATIENT)
Dept: CARDIOLOGY CLINIC | Age: 72
End: 2017-11-07

## 2017-11-07 VITALS
OXYGEN SATURATION: 97 % | HEIGHT: 70 IN | BODY MASS INDEX: 29.63 KG/M2 | DIASTOLIC BLOOD PRESSURE: 82 MMHG | WEIGHT: 207 LBS | SYSTOLIC BLOOD PRESSURE: 130 MMHG | HEART RATE: 58 BPM

## 2017-11-07 DIAGNOSIS — I50.22 CHRONIC SYSTOLIC HEART FAILURE (HCC): ICD-10-CM

## 2017-11-07 DIAGNOSIS — I25.5 ISCHEMIC CARDIOMYOPATHY: ICD-10-CM

## 2017-11-07 DIAGNOSIS — I25.810 CORONARY ARTERY DISEASE INVOLVING CORONARY BYPASS GRAFT OF NATIVE HEART WITHOUT ANGINA PECTORIS: ICD-10-CM

## 2017-11-07 DIAGNOSIS — I48.0 PAF (PAROXYSMAL ATRIAL FIBRILLATION) (HCC): Primary | Chronic | ICD-10-CM

## 2017-11-07 DIAGNOSIS — I49.01 VF (VENTRICULAR FIBRILLATION) (HCC): ICD-10-CM

## 2017-11-07 DIAGNOSIS — I47.29 NSVT (NONSUSTAINED VENTRICULAR TACHYCARDIA): ICD-10-CM

## 2017-11-07 PROCEDURE — G8417 CALC BMI ABV UP PARAM F/U: HCPCS | Performed by: INTERNAL MEDICINE

## 2017-11-07 PROCEDURE — 99214 OFFICE O/P EST MOD 30 MIN: CPT | Performed by: INTERNAL MEDICINE

## 2017-11-07 PROCEDURE — 1036F TOBACCO NON-USER: CPT | Performed by: INTERNAL MEDICINE

## 2017-11-07 PROCEDURE — G8427 DOCREV CUR MEDS BY ELIG CLIN: HCPCS | Performed by: INTERNAL MEDICINE

## 2017-11-07 PROCEDURE — 3017F COLORECTAL CA SCREEN DOC REV: CPT | Performed by: INTERNAL MEDICINE

## 2017-11-07 PROCEDURE — 93000 ELECTROCARDIOGRAM COMPLETE: CPT | Performed by: INTERNAL MEDICINE

## 2017-11-07 PROCEDURE — 1111F DSCHRG MED/CURRENT MED MERGE: CPT | Performed by: INTERNAL MEDICINE

## 2017-11-07 PROCEDURE — 4040F PNEUMOC VAC/ADMIN/RCVD: CPT | Performed by: INTERNAL MEDICINE

## 2017-11-07 PROCEDURE — 1123F ACP DISCUSS/DSCN MKR DOCD: CPT | Performed by: INTERNAL MEDICINE

## 2017-11-07 PROCEDURE — G8484 FLU IMMUNIZE NO ADMIN: HCPCS | Performed by: INTERNAL MEDICINE

## 2017-11-07 PROCEDURE — G8598 ASA/ANTIPLAT THER USED: HCPCS | Performed by: INTERNAL MEDICINE

## 2017-11-07 NOTE — LETTER
[address] Cardiology [facility]  [address]  [address] [address] [address]  Phone: [phone]  Fax: [phone]    Sasha Oliver MD        November 9, 2017     Gladis Zaman, [address], [address] [place] [address]    Patient: Mery Mathis  MR Number: [id number]  YOB: 1945  Date of Visit: 11/7/2017    Dear Dr. Gladis Zaman:    Below are the relevant portions of my assessment and plan of care. [employer]   Electrophysiology   Date: 11/7/2017  I had the privilege of visiting Emmy Estrada in the office. CC: Cardiomyopathy,   HPI: Mery Mathis is a 81 y.o. history of CAD s/p CABG in the past, paroxysmal atrial fibrillation, recent hospitalization for NSTEMI and elevated troponin on 10/13/2017 and was taken to the cath lab and noted to have occluded vein graft. He had two episodes of VF and had to be defibrillated. Later he had recurrent episodes of NSVT. His ejection fraction was reduced on initial cardiac cath. No options for revascularization. LIMA graft to the LAD as well as the saphenous vein graft to the RCA were occluded and revascularization was not successful.  He was on Tikosyn for PAF which was stopped. He could not tolerate Amiodarone in the past due to bradycardia. [facility], planning for EPS/risk stratification after his acute phase of MI. Interval History: Today he states he has occasional episodes of PAF (fluttering then fast) which lasts a few minutes and resolves spontaneously. He has postural dizziness on occasion.   He denies syncope or shock from his Life Vest.      Past Medical History:   Diagnosis Date    Arthritis     shoulders and knee    Atrial fib/flut     CAD (coronary artery disease)     Hyperlipidemia     Hypertension     Seizures (HCC)     Sinus bradycardia         Past Surgical History:   Procedure Laterality Date    CARDIAC SURGERY      , angioplasty 2007  CORONARY ANGIOPLASTY WITH STENT PLACEMENT      HERNIA REPAIR      WISDOM TOOTH EXTRACTION  April 2012       Allergies:  No Known Allergies    Social History:  Reviewed. reports that he quit smoking about 27 years ago. He has a 30.00 pack-year smoking history. He has never used smokeless tobacco. He reports that he does not drink alcohol or use drugs. Family History:  Reviewed. family history includes Heart Failure in his mother. Review of System:  All other systems reviewed and are negative except for that noted above. Pertinent negatives are:     · General: negative for fever, chills   · Ophthalmic ROS: negative for - eye pain or loss of vision  · ENT ROS: negative for - headaches, sore throat   · Respiratory: negative for - cough, sputum  · Cardiovascular: Reviewed in HPI  · Gastrointestinal: negative for - abdominal pain, diarrhea, N/V  · Hematology: negative for - bleeding, blood clots, bruising or jaundice  · Genito-Urinary:  negative for - Dysuria or incontinence  · Musculoskeletal: negative for - Joint swelling, muscle pain  · Neurological: negative for - confusion, headaches   · Psychiatric: No anxiety, no depression. · Dermatological: negative for - rash    Physical Examination:  Vitals:    11/07/17 0914   BP: 130/82   Pulse: 58   SpO2: 97%        · Constitutional: Oriented. No distress. · Head: Normocephalic and atraumatic. · Mouth/Throat: Oropharynx is clear and moist.   · Eyes: Conjunctivae normal. EOM are normal.   · Neck: Neck supple. No rigidity. No JVD present. · Cardiovascular: Normal rate, regular rhythm, S1&S2. · Pulmonary/Chest: Bilateral respiratory sounds. No wheezes, No rhonchi. · Abdominal: Soft. Bowel sounds present. No distension, No tenderness. · Musculoskeletal: No tenderness. No edema    · Lymphadenopathy: Has no cervical adenopathy. · Neurological: Alert and oriented.  Cranial nerve appears intact, No Gross deficit place the temporary pacemaker with adjustments was performed again and unfortunately ventricular fibrillation was induced again. The patient was successfully defibrillated. It was decided to proceed with a jet thrombectomy without pacemaker which the patient tolerated reasonably well there was a period of transient bradycardia which responded without the need for intervention. Unfortunately flow could not be restored to the vein graft.     Impression:  1. Severe native CAD  2. Occlusions of the LIMA graft to the LAD as well as the saphenous vein graft to the RCA  3. The LAD is filled via retrograde flow from the diagonal which is grafted  4. Patent sequential SVG to D2 and 0M3 but with moderate disease  5. Moderate LV systolic dysfunction  6. Ventricular arrhythmia which appears to be triggered by the temporary pacemaker possibly due to ischemic/injured myocardium     Plan:  1. Continue aspirin  2. Plavix  3. We'll need to resume anticoagulation with Coumadin at some point but would like EP input regarding that as well as recommendations regarding management of ventricular arrhythmia  4.   Attempted medical management of his CAD but in terms of long-term planning will need a plan should failure of the sequential SVG occur    Medication:  Current Outpatient Prescriptions   Medication Sig Dispense Refill    lisinopril (PRINIVIL;ZESTRIL) 5 MG tablet Take 1 tablet by mouth daily 30 tablet 0    diazepam (VALIUM) 5 MG tablet Take 0.5 tablets by mouth daily 15 tablet 0    nitroGLYCERIN (NITROLINGUAL) 0.4 MG/SPRAY 0.4 mg spray Place 1 spray under the tongue every 5 minutes as needed for Chest pain 1 Bottle 3    clopidogrel (PLAVIX) 75 MG tablet Take 1 tablet by mouth daily 30 tablet 3    isosorbide mononitrate (IMDUR) 60 MG extended release tablet Take 0.5 tablets by mouth daily 30 tablet 3    atorvastatin (LIPITOR) 40 MG tablet TAKE 1 TABLET BY MOUTH ONE TIME A DAY  30 tablet 4  warfarin (COUMADIN) 5 MG tablet One to two tabs daily as directed (Patient taking differently: Take 7.5 mg by mouth daily One to two tabs daily as directed) 180 tablet 1    nadolol (CORGARD) 20 MG tablet TAKE 1/2 TABLET BY MOUTH DAILY 90 tablet 3    phenytoin (DILANTIN) 100 MG ER capsule Take 1 capsule by mouth 2 times daily. Resume home dose 1 capsule 0    primidone (MYSOLINE) 250 MG tablet Take 250 mg by mouth daily        No current facility-administered medications for this visit. Assessment and plan:     - Ischemic cardiomyopathy, NSVT and two episodes of VF requiring external defibrillation in the setting of NSTEMI    - Moderate LV dysfunction with EF of 40%. - Extensive CAD with no revascularization option.    - Had recurrent NSVT and had two VF episodes while in hospital.      - He is having LifeVest.      - Treatment options including EPS and possible AICD if VT induced were discussed with patient. Risks, benefits and alternative of each treatment options were explained. All questions answered. - The risks, benefits and alternatives of the procedure and AICD implantation were discussed with the patient. All question were answered. Printed information about AICD and decision aid were given. Patient was encouraged to review information given, and call back with any questions about risks, benefits and alternative of procedure. - Patient also has bradycardia and dizzy spells which limits his therapy for PAF too. If he needs AICD, will implant dual chamber AICD. - Plan for EPS per MUSTT trial protocol and if inducible VT, AICD will be implanted. - Paroxysmal atrial fibrillation               - Remains in sinus rhythm. - Off Tikosyn due to cardiomyopathy, and NSVT    - Could not tolerate Amiodarone before due to braydcardia. - has bradycardia at baseline with dizzy spells. - If recurrent Afib, ablation can be considered.

## 2017-11-07 NOTE — LETTER
[address]  EP Procedure Sheet        Buffy Daniels Day  1945    EP Procedures     Pacemaker implant X EP Study   X ICD implant - possible single chamber ICD  Atrial flutter ablation     Biv implant  Atrial fibrillation ablation    Generator Change  SVT ablation    Lead revision  VT ablation    Lead extraction +/- upgrade  AVN ablation    Loop implant  Cardioversion     Other:   LATESHA     Equipment    X Medtronic   NASIM Mapping System    [place]  [address] [address]  [employer]    [employer]  Laser Lead Extraction    Special Equipment       EP Procedures Scheduling Request    Time Requested  December    Specific Day    Anesthesia    CT surgery backup    Location [facility]     Pre-Procedure Labs / Imaging     PT/INR  Type & cross    CBC  Units PRBC    BMP/Mg  Units FFP    Venogram  CXR    Echo  Cardiac CTA for Pulmonary vein mapping     Patient Instructions    Stop Coumadin 3 days before procedure but continue Plavix. No food or drink for at least 8 hours prior to procedure. Okay to take other morning medications with small sips of water.

## 2017-11-07 NOTE — PATIENT INSTRUCTIONS
EP study possible ICD instructions:  1. Stop Coumadin 3 days before procedure but continue Plavix. 2.  No food or drink for at least 8 hours prior to procedure. 3.  Okay to take other morning medications with small sips of water. Patient Education        Learning About Catheter Ablation for Heart Rhythm Problems  What is catheter ablation? Catheter ablation is a procedure that treats heart rhythm problems. These problems include atrial fibrillation, supraventricular tachycardia (SVT), atrial flutter, and ventricular tachycardia. Your heart should have a strong, steady beat. That beat is controlled by the heart's electrical system. Sometimes that system misfires. This causes a heartbeat that is too fast and isn't steady. Catheter ablation is a way to get into your heart and fix the problem. Ablation is not surgery. How is catheter ablation done? Your doctor inserts thin tubes called catheters into a blood vessel in your groin, arm, or neck. Then your doctor feeds them into the heart. Wires in the catheters help the doctor find the problem areas. Then he or she uses the wires to send energy to destroy the tiny areas of heart tissue that are causing the problems. It may seem like a bad idea to destroy parts of your heart on purpose. But the areas that are destroyed are very tiny. They should not affect your heart's ability to do its job. You may be awake during the procedure. Or you may be asleep. The doctor will give you medicines to help you feel relaxed and to numb the areas where the catheters go in. You may feel a little uncomfortable, but you should not feel pain. This procedure usually takes 2 to 6 hours. In rare cases, it can take longer. You may stay overnight in the hospital. How long you stay in the hospital depends on the type of ablation you have. What can you expect after catheter ablation? Do not exercise hard or lift anything heavy for a week.  You will probably be able to go procedure. So talk to your doctor as soon as you can. · If you have an advance directive, let your doctor know. It may include a living will and a durable power of  for health care. Bring a copy to the hospital. If you don't have one, you may want to prepare one. It lets your doctor and loved ones know your health care wishes. Doctors advise that everyone prepare these papers before any type of surgery or procedure. What happens on the day of the procedure? · Follow the instructions exactly about when to stop eating and drinking. If you don't, your procedure may be canceled. If your doctor told you to take your medicines on the day of the procedure, take them with only a sip of water. · Take a bath or shower before you come in for your procedure. Do not apply lotions, perfumes, deodorants, or nail polish. · Take off all jewelry and piercings. And take out contact lenses, if you wear them. At the hospital or surgery center  · Bring a picture ID. · You will be kept comfortable and safe by your anesthesia provider. The anesthesia may make you sleep. Or it may just numb the area being worked on. · EPS by itself may take 1 to 3 hours. But if you also have ablation, the whole procedure may take 2 to 6 hours. · After the procedure, pressure will be applied to the area where the catheter was put in your blood vessel. Then the area may be covered with a bandage or a compression device. This will prevent bleeding. · Nurses will check your heart rate and blood pressure. The nurse will also check the catheter site for bleeding. · If the catheter was put in your groin, you will need to lie still and keep your leg straight for several hours. The nurse may put a weighted bag on your leg to keep it still. · If the catheter was put in your arm, you may be able to sit up and get out of bed right away. But you will need to keep your arm still for at least 1 hour.   · You may have a bruise or a small lump where the it will take for you to recover. But each person recovers at a different pace. Follow the steps below to get better as quickly as possible. How can you care for yourself at home? Activity  · For 1 week, do not lift anything that would make you strain. This may include heavy grocery bags and milk containers, a heavy briefcase or backpack, cat litter or dog food bags, a vacuum , or a child. · For 1 week, do not exercise hard or do any activity that could strain your blood vessels or the site where the catheters went into your body. · Ask your doctor when it is okay to have sex. · You may shower 24 to 48 hours after the procedure, if your doctor okays it. Pat the incision dry. Do not take a bath for 1 week, or until your doctor tells you it is okay. Diet  · You can eat your normal diet. If your stomach is upset, try bland, low-fat foods like plain rice, broiled chicken, toast, and yogurt. · Drink plenty of fluids (unless your doctor tells you not to). Medicines  · Your doctor will tell you if and when you can restart your medicines. He or she will also give you instructions about taking any new medicines. · If you take blood thinners, such as warfarin (Coumadin), clopidogrel (Plavix), or aspirin, be sure to talk to your doctor. He or she will tell you if and when to start taking those medicines again. Make sure that you understand exactly what your doctor wants you to do. · Ask your doctor if you can take acetaminophen (Tylenol) for pain. Do not take aspirin for 3 days, unless your doctor says it is okay. · Check with your doctor before you take aspirin or anti-inflammatory medicines to reduce pain and swelling. These include ibuprofen (Advil, Motrin) and naproxen (Aleve). · Make sure you know which heart medicines to continue and which ones to stop. Ask your doctor if you are not sure. Catheter site care  · You can remove your bandages the day after the procedure.   Follow-up care is a key part of your treatment and safety. Be sure to make and go to all appointments, and call your doctor if you are having problems. It's also a good idea to know your test results and keep a list of the medicines you take. When should you call for help? Call 911 anytime you think you may need emergency care. For example, call if:  · You passed out (lost consciousness). · You have severe trouble breathing. · You have sudden chest pain and shortness of breath, or you cough up blood. · You have a lot of bleeding from your catheter site. Call your doctor now or seek immediate medical care if:  · You have a fever over 100°F.  · You are bleeding from one of the areas where a catheter was put in.  · You have a fast-growing, painful lump at the catheter site. · You have painful swelling, or bruising larger than a credit card where a catheter was put in.  · You have signs of infection, such as:  ¨ Increased pain, swelling, warmth, or redness. ¨ Red streaks leading from the catheter site. ¨ Pus draining from the catheter site. ¨ A fever. · Your arm or leg is numb or hurts. · Your feet are very cold. · Your heart rhythm problem comes back. · You are dizzy or lightheaded, or you feel like you may faint. Watch closely for any changes in your health, and be sure to contact your doctor if:  · You have questions about the results of your procedure or your treatment plan. Where can you learn more? Go to https://[employer]Science.[employer]-DV. org and sign in to your i[employer] account. Enter [phone] in the [facility] box to learn more about \"Electrophysiology Study and Catheter Ablation: What to Expect at Home. \"     If you do not have an account, please click on the \"Sign Up Now\" link. Current as of: April 3, 2017  Content Version: 11.3  © 5634-8969 [employer]. Care instructions adapted under license by [facility] ([facility]).  If you have questions about a medical condition or this instruction, always ask your healthcare professional. [place] [address] any warranty or liability for your use of this information. Patient Education        Learning About ICDs (Implantable Cardioverter-Defibrillators)  What is an ICD (implantable cardioverter-defibrillator)? An ICD (implantable cardioverter-defibrillator) is a small, battery-powered device. It fixes serious changes in your heartbeat. ICDs are used in people who have had a life-threatening heart rhythm or are at high risk of having one. The ICD is placed inside the chest. It's attached to one or two wires (called leads) that go into the heart through a vein. How does an ICD work? An ICD is always checking your heart rate and rhythm. If the ICD detects a life-threatening, rapid heart rhythm, it tries to slow the rhythm to get it back to normal. If the bad rhythm doesn't stop, the ICD sends an electric shock to the heart. This restores a normal rhythm. The device then goes back to its watchful mode. An ICD also can fix a heart rate that is too fast or too slow. It does this without using a shock. It can send out electrical pulses to speed up a heart rate that is too slow. Or it can slow down a fast heart rate by matching the pace and bringing the heart rate back to normal.  Your doctor will check the ICD regularly to make sure it is working right and isn't causing any problems. Your doctor will also check the battery to see if it needs to be replaced. How is an ICD placed? Your doctor will put the ICD under the skin in your chest during minor surgery. You will likely have medicine to make you feel relaxed and sleepy during the surgery. Your doctor makes a small cut (incision) in your upper chest. He or she puts one or two leads (wires) in a vein and threads them to the heart. Your doctor connects the leads to the ICD and places it in your chest. Then the incision is closed. Your doctor also programs the ICD.   Most people spend the night in the hospital, just to make sure that the device is working and that there are no problems from the surgery. How does it feel to get a shock? The shock from an ICD hurts briefly. People feel it in different ways. It's been described as feeling like a punch in the chest. But the shock is a sign that the ICD is doing its job. It's there to save your life. You won't feel any pain if the ICD uses electrical pulses to fix a heart rate that is too fast or too slow. There's no way to know how often a shock might occur. It might never happen. Not knowing when or if a shock might happen may make you nervous. Knowing what to do if you get shocked can help. Ask your doctor for an action plan. This plan will guide you step-by-step if a shock happens. What else should you know? You can live a normal life with your ICD. Here are a few tips for living well with your ICD. · Avoid strong magnetic and electrical fields. These can keep your device from working right. Most office equipment and home appliances are safe to use. Check with your doctor about which things you should use with caution and which you should stay away from. · Be sure that any doctor, dentist, or other health professional you see knows that you have an ICD. · Always carry a card that tells what kind of device you have. Wear medical alert jewelry that says you have an ICD. You can buy this at most drugstores. · If you do get a shock, follow your action plan for what to do. · You can lead an active life with an ICD. Ask your doctor what sort of activity and intensity is safe for you. As you plan for your future and your end of life, be sure to include plans for your ICD. You can make the decision to turn off your ICD as part of the medical treatment you want at the end of life. Follow-up care is a key part of your treatment and safety. Be sure to make and go to all appointments, and call your doctor if you are having problems.  It's also a good idea to know Cardioverter-Defibrillator Placement: Before Your Procedure. \"     If you do not have an account, please click on the \"Sign Up Now\" link. Current as of: August 8, 2016  Content Version: 11.3  © 2006-2017 [employer]. Care instructions adapted under license by [facility] ([facility]). If you have questions about a medical condition or this instruction, always ask your healthcare professional. [address] any warranty or liability for your use of this information. Patient Education        Implantable Cardioverter-Defibrillator Placement: What to Expect at Home  Your Recovery    Implantable cardioverter-defibrillator (ICD) placement is surgery to put an ICD in your chest. An ICD is a small, battery-powered device that fixes life-threatening changes in your heartbeat. If the ICD detects a life-threatening rapid heart rhythm, it tries to slow the rhythm to get it back to normal. If the dangerous rhythm does not stop, the ICD sends an electric shock to the heart to restore a normal rhythm. The device then goes back to its watchful mode. Your chest may be sore where the doctor made the cut (incision) and put in the ICD. You also may have a bruise and mild swelling. These symptoms usually get better in 1 to 2 weeks. You may feel a hard ridge along the incision. This usually gets softer in the months after surgery. You probably will be able to see and feel the outline of the ICD under your skin. You will probably be able to go back to work or your usual routine 1 to 2 weeks after surgery. Your doctor will talk to you about how often you will need to have the ICD checked. When you have an ICD, it is important to avoid electrical devices that can stop your ICD from working right. Check with your doctor about what you need to stay away from, what you need to use with care, and what is okay to use.  You will need to stay away from things with strong magnetic and electrical fields such as MRI machines, welding equipment, and power generators. You can use a cell phone, but keep it at least 6 inches away from your ICD. You can safely use most household and office electronics such as kitchen appliances, electric power tools, and computers. This care sheet gives you a general idea about how long it will take for you to recover. But each person recovers at a different pace. Follow the steps below to get better as quickly as possible. How can you care for yourself at home? Activity  · Rest when you feel tired. Getting enough sleep will help you recover. · Try to walk each day. Start by walking a little more than you did the day before. Bit by bit, increase the amount you walk. Walking boosts blood flow and helps prevent pneumonia and constipation. · For 4 to 6 weeks:  ¨ Avoid activities that strain your chest or upper arm muscles. This includes pushing a  or vacuum, mopping floors, swimming, or swinging a golf club or tennis racquet. ¨ Do not raise your arm, on the side of your body where the ICD is located, above shoulder level. ¨ Avoid strenuous activities, such as bicycle riding, jogging, weight lifting, or heavy aerobic exercise. ¨ Avoid lifting anything that would make you strain. This may include heavy grocery bags and milk containers, a heavy briefcase or backpack, cat litter or dog food bags, or a child. · Ask your doctor when you can drive again. · You will probably need to take about 1 to 2 weeks off from work. It depends on the type of work you do and how you feel. · Ask your doctor when it is okay for you to have sex. Diet  · You can eat your normal diet. If your stomach is upset, try bland, low-fat foods like plain rice, broiled chicken, toast, and yogurt. · Drink plenty of fluids (unless your doctor tells you not to). Medicines  · Your doctor will tell you if and when you can restart your medicines.  He or she will also give you instructions about taking any new medicines. · If you take blood thinners, such as warfarin (Coumadin), clopidogrel (Plavix), or aspirin, be sure to talk to your doctor. He or she will tell you if and when to start taking those medicines again. Make sure that you understand exactly what your doctor wants you to do. · Take pain medicines exactly as directed. ¨ If the doctor gave you a prescription medicine for pain, take it as prescribed. ¨ If you are not taking a prescription pain medicine, ask your doctor if you can take an over-the-counter medicine. ¨ Do not take aspirin, ibuprofen (Advil, Motrin), naproxen (Aleve), or other nonsteroidal anti-inflammatory drugs (NSAIDs) unless your doctor says it is okay. · If you think your pain medicine is making you sick to your stomach:  ¨ Take your medicine after meals (unless your doctor has told you not to). ¨ Ask your doctor for a different pain medicine. · If your doctor prescribed antibiotics, take them as directed. Do not stop taking them just because you feel better. You need to take the full course of antibiotics. Incision care  · Keep the area clean and dry. · Ask your doctor when you can shower or take a bath. · If you have strips of tape on the incision, leave the tape on for a week or until it falls off. · Wash the area daily with warm, soapy water, and pat it dry. Don't use hydrogen peroxide or alcohol, which can slow healing. You may cover the area with a gauze bandage if it weeps or rubs against clothing. Exercise  · Check with your doctor before you start an exercise program. Your doctor may recommend that you work with a physical therapist to learn how to exercise safely with an ICD. Other instructions  · Carry your ICD identification card with you at all times. The card should include the ICD  and model information. · Wear medical alert jewelry that states you have an ICD. You can buy this at most drugsTabluses.   · Have your ICD checked as often as your doctor as:  ¨ Increased pain, swelling, warmth, or redness. ¨ Red streaks leading from the incision. ¨ Pus draining from the incision. ¨ A fever. · You have signs of a blood clot, such as:  ¨ Pain in your calf, back of the knee, thigh, or groin. ¨ Redness and swelling in your leg or groin. Watch closely for changes in your health, and be sure to contact your doctor if:  · You have any problems with your ICD. Where can you learn more? Go to https://[employer] MicropeCloudTraneb.[employer]. org and sign in to your [facility] ([facility]) account. Enter K184 in the [employer] box to learn more about \"Implantable Cardioverter-Defibrillator Placement: What to Expect at Home. \"     If you do not have an account, please click on the \"Sign Up Now\" link. Current as of: November 11, 2016  Content Version: 11.3  © 9905-7367 [employer]. Care instructions adapted under license by [facility] ([facility]). If you have questions about a medical condition or this instruction, always ask your healthcare professional. [place] [address] any warranty or liability for your use of this information.

## 2017-11-07 NOTE — PROGRESS NOTES
[facility]   Electrophysiology   Date: 11/7/2017  I had the privilege of visiting Mayra Obrien in the office. CC: Cardiomyopathy,   HPI: Shanti Mathis is a 81 y.o. history of CAD s/p CABG in the past, paroxysmal atrial fibrillation, recent hospitalization for NSTEMI and elevated troponin on 10/13/2017 and was taken to the cath lab and noted to have occluded vein graft. He had two episodes of VF and had to be defibrillated. Later he had recurrent episodes of NSVT. His ejection fraction was reduced on initial cardiac cath. No options for revascularization. LIMA graft to the LAD as well as the saphenous vein graft to the RCA were occluded and revascularization was not successful.  He was on Tikosyn for PAF which was stopped. He could not tolerate Amiodarone in the past due to bradycardia. He was discharged home with LifeVest, planning for EPS/risk stratification after his acute phase of MI. Interval History: Today he states he has occasional episodes of PAF (fluttering then fast) which lasts a few minutes and resolves spontaneously. He has postural dizziness on occasion. He denies syncope or shock from his Life Vest.      Past Medical History:   Diagnosis Date    Arthritis     shoulders and knee    Atrial fib/flut     CAD (coronary artery disease)     Hyperlipidemia     Hypertension     Seizures (HCC)     Sinus bradycardia         Past Surgical History:   Procedure Laterality Date    CARDIAC SURGERY      , angioplasty 2007    CORONARY ANGIOPLASTY WITH STENT PLACEMENT      HERNIA REPAIR      WISDOM TOOTH EXTRACTION  April 2012       Allergies:  No Known Allergies    Social History:  Reviewed. reports that he quit smoking about 27 years ago. He has a 30.00 pack-year smoking history. He has never used smokeless tobacco. He reports that he does not drink alcohol or use drugs. Family History:  Reviewed. family history includes Heart Failure in his mother.      Review of System:  All other systems reviewed and are negative except for that noted above. Pertinent negatives are:     · General: negative for fever, chills   · Ophthalmic ROS: negative for - eye pain or loss of vision  · ENT ROS: negative for - headaches, sore throat   · Respiratory: negative for - cough, sputum  · Cardiovascular: Reviewed in HPI  · Gastrointestinal: negative for - abdominal pain, diarrhea, N/V  · Hematology: negative for - bleeding, blood clots, bruising or jaundice  · Genito-Urinary:  negative for - Dysuria or incontinence  · Musculoskeletal: negative for - Joint swelling, muscle pain  · Neurological: negative for - confusion, headaches   · Psychiatric: No anxiety, no depression. · Dermatological: negative for - rash    Physical Examination:  Vitals:    11/07/17 0914   BP: 130/82   Pulse: 58   SpO2: 97%        · Constitutional: Oriented. No distress. · Head: Normocephalic and atraumatic. · Mouth/Throat: Oropharynx is clear and moist.   · Eyes: Conjunctivae normal. EOM are normal.   · Neck: Neck supple. No rigidity. No JVD present. · Cardiovascular: Normal rate, regular rhythm, S1&S2. · Pulmonary/Chest: Bilateral respiratory sounds. No wheezes, No rhonchi. · Abdominal: Soft. Bowel sounds present. No distension, No tenderness. · Musculoskeletal: No tenderness. No edema    · Lymphadenopathy: Has no cervical adenopathy. · Neurological: Alert and oriented. Cranial nerve appears intact, No Gross deficit   · Skin: Skin is warm and dry. No rash noted. · Psychiatric: Has a normal behavior     Labs:  Reviewed. ECG: Sinus bradycardia, anterolateral and inferior MI     Echo: 10/14/2017   -Global left ventricular function is mildly decreased with ejection fraction   estimated from 40 % to 45 %.    -Distal septal and apical hypokinesis noted.   -Mildly dilated left atrium.   -Mild to moderate mitral regurgitation is present.   -E/e'= 7.2 .   There is mild-to-moderate tricuspid regurgitation with RVSP estimated at 34   mmHg. 10/13/2017: Cardiac Cath 10/13/17:  Anatomy:   LM-normal  LAD-100% mid  D1-50% ostial  Cx-20% mid  OM3- 70% proximal  RCA-100% mid, codominant left to right collaterals  LIMA-LAD: 100% mid  Sequential SVG-D2 and 13:20% proximal, 50% mid. The LAD fills via retrograde flow from D2  SVG-% proximal with suggestion of recent closure  LVEF- 35-40% with anterior hypokinesis  PCI: SVG-RCA with attempted PCI that was unsuccessful. The graft was engaged him wired Pronto thrombectomy was used but without restoration of flow. This decided to attempt mechanical thrombectomy with AngioJet and therefore the 6 [place] guide was removed and upsized to a 7 [place] system. A 6 Malay sheath was placed percutaneously into the right comfortable vein for temporary pacemaker placement in anticipation of bradycardia with AngioJet particular since the right coronary distribution was suspected. After placing the temporary pacemaker under fluoroscopy and threshold testing being performed and simultaneously the 7 [place] guider was unintentionally engaged into the right coronary artery patient developed malignant ventricular arrhythmia requiring defibrillation. The guide was probably removed due to concern about potential conus branch occlusion but there was recurrent V. fib requiring defibrillation again. The pacemaker was withdrawn. Amiodarone was given. EP was consulted. An attempt to place the temporary pacemaker with adjustments was performed again and unfortunately ventricular fibrillation was induced again. The patient was successfully defibrillated. It was decided to proceed with a jet thrombectomy without pacemaker which the patient tolerated reasonably well there was a period of transient bradycardia which responded without the need for intervention. Unfortunately flow could not be restored to the vein graft.     Impression:  1. Severe native CAD  2.   Occlusions of the LIMA graft to the LAD as well as Heart [place]   Office: [phone]

## 2017-11-09 NOTE — COMMUNICATION BODY
34   mmHg. 10/13/2017: Cardiac Cath 10/13/17:  Anatomy:   LM-normal  LAD-100% mid  D1-50% ostial  Cx-20% mid  OM3- 70% proximal  RCA-100% mid, codominant left to right collaterals  LIMA-LAD: 100% mid  Sequential SVG-D2 and 13:20% proximal, 50% mid. The LAD fills via retrograde flow from D2  SVG-% proximal with suggestion of recent closure  LVEF- 35-40% with anterior hypokinesis  PCI: SVG-RCA with attempted PCI that was unsuccessful. The graft was engaged him wired Pronto thrombectomy was used but without restoration of flow. This decided to attempt mechanical thrombectomy with AngioJet and therefore the 6 [place] guide was removed and upsized to a 7 [place] system. A 6 Chinese sheath was placed percutaneously into the right comfortable vein for temporary pacemaker placement in anticipation of bradycardia with AngioJet particular since the right coronary distribution was suspected. After placing the temporary pacemaker under fluoroscopy and threshold testing being performed and simultaneously the 7 [place] guider was unintentionally engaged into the right coronary artery patient developed malignant ventricular arrhythmia requiring defibrillation. The guide was probably removed due to concern about potential conus branch occlusion but there was recurrent V. fib requiring defibrillation again. The pacemaker was withdrawn. Amiodarone was given. EP was consulted. An attempt to place the temporary pacemaker with adjustments was performed again and unfortunately ventricular fibrillation was induced again. The patient was successfully defibrillated. It was decided to proceed with a jet thrombectomy without pacemaker which the patient tolerated reasonably well there was a period of transient bradycardia which responded without the need for intervention. Unfortunately flow could not be restored to the vein graft.     Impression:  1. Severe native CAD  2.   Occlusions of the LIMA graft to the LAD as well as two episodes of VF requiring external defibrillation in the setting of NSTEMI    - Moderate LV dysfunction with EF of 40%. - Extensive CAD with no revascularization option.    - Had recurrent NSVT and had two VF episodes while in hospital.      - He is having LifeVest.      - Treatment options including EPS and possible AICD if VT induced were discussed with patient. Risks, benefits and alternative of each treatment options were explained. All questions answered. - The risks, benefits and alternatives of the procedure and AICD implantation were discussed with the patient. All question were answered. Printed information about AICD and decision aid were given. Patient was encouraged to review information given, and call back with any questions about risks, benefits and alternative of procedure. - Patient also has bradycardia and dizzy spells which limits his therapy for PAF too. If he needs AICD, will implant dual chamber AICD. - Plan for EPS per MUSTT trial protocol and if inducible VT, AICD will be implanted. - Paroxysmal atrial fibrillation               - Remains in sinus rhythm. - Off Tikosyn due to cardiomyopathy, and NSVT    - Could not tolerate Amiodarone before due to braydcardia. - has bradycardia at baseline with dizzy spells. - If recurrent Afib, ablation can be considered. - He is on Dilantin which interact with Xarelto. - CAD: history of MI. Recent cardiac cath with no revascularization.    - On Plavix and coumadin.    - On BB and ACE-I    - Lipitor. Thank you for allowing me to participate in the care of Surendra Later. Further evaluation will be based upon the patient's clinical course and testing results. All questions and concerns were addressed to the patient/family. Alternatives to my treatment were discussed. I have discussed the above stated plan and the patient verbalized understanding and agreed with the plan.     Naga Lopez MD, MPH  [facility]

## 2017-11-10 ENCOUNTER — ANTI-COAG VISIT (OUTPATIENT)
Dept: CARDIOLOGY CLINIC | Age: 72
End: 2017-11-10

## 2017-11-10 LAB — INR BLD: 2.1

## 2017-11-20 ENCOUNTER — OFFICE VISIT (OUTPATIENT)
Dept: CARDIOLOGY CLINIC | Age: 72
End: 2017-11-20

## 2017-11-20 VITALS
SYSTOLIC BLOOD PRESSURE: 134 MMHG | BODY MASS INDEX: 28.92 KG/M2 | HEART RATE: 56 BPM | WEIGHT: 202 LBS | DIASTOLIC BLOOD PRESSURE: 56 MMHG | HEIGHT: 70 IN

## 2017-11-20 DIAGNOSIS — I25.810 CORONARY ARTERY DISEASE INVOLVING CORONARY BYPASS GRAFT OF NATIVE HEART WITHOUT ANGINA PECTORIS: ICD-10-CM

## 2017-11-20 DIAGNOSIS — I48.0 PAF (PAROXYSMAL ATRIAL FIBRILLATION) (HCC): Primary | ICD-10-CM

## 2017-11-20 DIAGNOSIS — G47.33 OSA ON CPAP: ICD-10-CM

## 2017-11-20 DIAGNOSIS — Z99.89 OSA ON CPAP: ICD-10-CM

## 2017-11-20 DIAGNOSIS — E78.2 MIXED HYPERLIPIDEMIA: ICD-10-CM

## 2017-11-20 DIAGNOSIS — I25.10 CORONARY ARTERY DISEASE INVOLVING NATIVE CORONARY ARTERY OF NATIVE HEART WITHOUT ANGINA PECTORIS: ICD-10-CM

## 2017-11-20 DIAGNOSIS — I10 ESSENTIAL HYPERTENSION: ICD-10-CM

## 2017-11-20 PROCEDURE — 4040F PNEUMOC VAC/ADMIN/RCVD: CPT | Performed by: INTERNAL MEDICINE

## 2017-11-20 PROCEDURE — 99214 OFFICE O/P EST MOD 30 MIN: CPT | Performed by: INTERNAL MEDICINE

## 2017-11-20 PROCEDURE — 1036F TOBACCO NON-USER: CPT | Performed by: INTERNAL MEDICINE

## 2017-11-20 PROCEDURE — G8427 DOCREV CUR MEDS BY ELIG CLIN: HCPCS | Performed by: INTERNAL MEDICINE

## 2017-11-20 PROCEDURE — 1111F DSCHRG MED/CURRENT MED MERGE: CPT | Performed by: INTERNAL MEDICINE

## 2017-11-20 PROCEDURE — G8417 CALC BMI ABV UP PARAM F/U: HCPCS | Performed by: INTERNAL MEDICINE

## 2017-11-20 PROCEDURE — 1123F ACP DISCUSS/DSCN MKR DOCD: CPT | Performed by: INTERNAL MEDICINE

## 2017-11-20 PROCEDURE — G8598 ASA/ANTIPLAT THER USED: HCPCS | Performed by: INTERNAL MEDICINE

## 2017-11-20 PROCEDURE — G8484 FLU IMMUNIZE NO ADMIN: HCPCS | Performed by: INTERNAL MEDICINE

## 2017-11-20 PROCEDURE — 3017F COLORECTAL CA SCREEN DOC REV: CPT | Performed by: INTERNAL MEDICINE

## 2017-11-20 RX ORDER — ATORVASTATIN CALCIUM 80 MG/1
80 TABLET, FILM COATED ORAL DAILY
Qty: 90 TABLET | Refills: 3 | Status: SHIPPED | OUTPATIENT
Start: 2017-11-20 | End: 2018-01-09 | Stop reason: SDUPTHER

## 2017-11-20 NOTE — PROGRESS NOTES
MG tablet TAKE 1 TABLET BY MOUTH ONE TIME A DAY  30 tablet 4    warfarin (COUMADIN) 5 MG tablet One to two tabs daily as directed (Patient taking differently: Take 7.5 mg by mouth daily One to two tabs daily as directed) 180 tablet 1    nadolol (CORGARD) 20 MG tablet TAKE 1/2 TABLET BY MOUTH DAILY 90 tablet 3    phenytoin (DILANTIN) 100 MG ER capsule Take 1 capsule by mouth 2 times daily. Resume home dose 1 capsule 0    primidone (MYSOLINE) 250 MG tablet Take 250 mg by mouth daily        No current facility-administered medications for this visit. Allergies:  Review of patient's allergies indicates no known allergies. DATA:  Labs reviewed  Lab Results   Component Value Date    ALT 21 10/22/2017    AST 21 10/22/2017    ALKPHOS 94 10/22/2017    BILITOT 0.3 10/22/2017     Lab Results   Component Value Date    CREATININE 1.0 10/23/2017    BUN 14 10/23/2017     10/23/2017    K 4.3 10/23/2017     10/23/2017    CO2 25 10/23/2017     No results found for: TSH  Lab Results   Component Value Date    WBC 7.5 10/23/2017    HGB 15.0 10/23/2017    HCT 43.6 10/23/2017    MCV 91.2 10/23/2017     10/23/2017     No components found for: CHLPL  Lab Results   Component Value Date    TRIG 116 12/02/2015    TRIG 186 (H) 10/14/2014    TRIG 149 10/07/2013     Lab Results   Component Value Date    HDL 49 (L) 12/02/2015    HDL 34 (L) 10/14/2014    HDL 41 10/07/2013     Lab Results   Component Value Date    LDLCALC 110 12/02/2015    LDLCALC 87 10/14/2014    LDLCALC 158 (H) 12/20/2012     Lab Results   Component Value Date    LABVLDL 37 10/14/2014    LABVLDL 30 12/20/2012    LABVLDL 50 12/13/2011       Review of Systems:   · Constitutional: there has been no unanticipated weight loss. There's been no change in energy level, sleep pattern, or activity level. · Eyes: No visual changes or diplopia. No scleral icterus. · ENT: No Headaches or vertigo. No mouth sores or sore throat.     · Cardiovascular: Reviewed in HPI  · Respiratory: No cough or wheezing, no sputum production. No hemoptysis. · Gastrointestinal: No abdominal pain, appetite loss, blood in stools. No change in bowel or bladder habits. No hematemesis. · Genitourinary: No dysuria, trouble voiding, or hematuria. · Musculoskeletal:  No gait disturbance, weakness. · Integumentary: No rash or pruritis. · Neurological: No headache, diplopia, change in muscle strength, numbness or tingling. No change in gait, balance, coordination, mood, affect, memory, mentation, behavior. · Psychiatric: No anxiety, no depression. · Endocrine: No malaise, fatigue or temperature intolerance. No excessive thirst, fluid intake, or urination. No tremor. · Hematologic/Lymphatic: No abnormal bruising or bleeding, blood clots or swollen lymph nodes. Physical Examination:    VITALS:    BP (!) 134/56 (Site: Right Arm, Position: Sitting, Cuff Size: Large Adult)   Pulse 56   Ht 5' 10\" (1.778 m)   Wt 202 lb (91.6 kg)   BMI 28.98 kg/m²     CONSTITUTIONAL: Cooperative, no apparent distress, patient is Obese  NEUROLOGIC:  Awake and orientated to person, place and time. PSYCH: Calm affect. SKIN: Warm and dry. HEENT: Sclera non-icteric, normocephalic  NECK:  neck supple, no elevation of JVP, normal carotid pulses with no bruits and thyroid normal size. LUNGS:  No increased work of breathing and clear to auscultation, no crackles or wheezing  CARDIOVASCULAR:  Regular rate, irregular rhythm with no murmurs, gallops or rubs. Normal PMI. ABDOMEN:  Normal bowel sounds, non-distended and non-tender to palpation  EXT: No edema, no cyanosis. NM stress test 7/13/2016  Summary    Moderate sized anteroapical fixed defect consistent with infarction in the    territory of the LAD . No ischemia. Assessment:     1. Atrial fibrillation: Usually well controlled on Tikosyn. On Coumadin, checked thru the [facility] lab, dosed through our office. INR 2 12/2015.    Standing order

## 2017-11-20 NOTE — PATIENT INSTRUCTIONS
Increase lipitor to 80 mg daily  Fasting lipid liver panel 3 months  Follow up with Dr Juan Jose Garcia  Follow up with me in 6 months

## 2017-11-27 NOTE — TELEPHONE ENCOUNTER
Medication Refill    When was your last appointment with cardiology? 11/20/17  (if 1year or longer, please schedule an appointment)    Medication needing refilled:lisinopril (PRINIVIL;ZESTRIL) 5 MG tablet    Doseage of the medication: 5 mg    How are you taking this medication (QD, BID, TID, QID, PRN):Take 1 tablet by mouth daily - Oral    Patient want a 30 or 90 day supply called in:90    Which Pharmacy are we sending the medication to:   [facility] in Pending sale to [email or web address] number:     Pharmacy Fax number:

## 2017-11-28 RX ORDER — LISINOPRIL 5 MG/1
5 TABLET ORAL DAILY
Qty: 90 TABLET | Refills: 3 | Status: SHIPPED | OUTPATIENT
Start: 2017-11-28 | End: 2018-01-09 | Stop reason: SDUPTHER

## 2017-11-28 RX ORDER — LISINOPRIL 20 MG/1
TABLET ORAL
Qty: 90 TABLET | Refills: 0 | Status: ON HOLD | OUTPATIENT
Start: 2017-11-28 | End: 2017-12-18 | Stop reason: CLARIF

## 2017-12-01 ENCOUNTER — ANTI-COAG VISIT (OUTPATIENT)
Dept: CARDIOLOGY CLINIC | Age: 72
End: 2017-12-01

## 2017-12-01 LAB
INR BLD: 1.8
INR BLD: 1.8 (ref 0.8–1.2)
PROTHROMBIN TIME: 21.1 SECONDS (ref 11.7–14.2)

## 2017-12-04 ENCOUNTER — TELEPHONE (OUTPATIENT)
Dept: CARDIOLOGY CLINIC | Age: 72
End: 2017-12-04

## 2017-12-07 ENCOUNTER — TELEPHONE (OUTPATIENT)
Dept: CARDIOLOGY | Age: 72
End: 2017-12-07

## 2017-12-07 NOTE — TELEPHONE ENCOUNTER
I contacted Natali Peng and let him know that Dr Raúl Meade had reviewed his films and felt that they would best be performed at a high risk  Program  He suggested [facility] as the closest program.  Natali Alejandro would like the names and contact infor at [facility] and is meeting with Dr Cathy Galarza next week to consider redo surgery. I will obtain contact info from Dr Raúl Meade and relay it to the patient. PLEASE CANCEL THE 12/11/17 APPT BOB PENG HAS WITH DR ALLISON AND LET ME KNOW WHEN THIS IS DONE.

## 2017-12-08 NOTE — TELEPHONE ENCOUNTER
Pt calling stating he is upset that he has called with s/s and no response. He stated that Tracey Wilkes was telling him that he is in a-fib. I spoke with Dr Dali Salamacna  And he stated that alll he can do is sit. I told pt that if his s/s are that severe he may want to go to the ER. [address] [place] [address] advised that as long as pt is on anticoagulation he should be fine.  He stated that pt has an upcoming procedure and told me to forward message to [facility]

## 2017-12-11 NOTE — TELEPHONE ENCOUNTER
Spoke to Dr. Cherlynn Nissen about patient being in Atrial fibrillation. Dr. Cherlynn Nissen wants patient to continue warfarin (do not hold prior to EP study as previously discussed) as Dr. Cherlynn Nissen may also perform cardioversion before EP study if patient is still in Atrial fib. Patient verbalized understanding of information discussed.

## 2017-12-15 LAB
ALBUMIN SERPL-MCNC: 4.1 G/DL (ref 3.5–5)
ALBUMIN SERPL-MCNC: 4.1 G/DL (ref 3.5–5)
ALP BLD-CCNC: 81 IU/L (ref 40–129)
ALT SERPL-CCNC: 41 IU/L (ref 10–35)
AST SERPL-CCNC: 30 IU/L (ref 10–50)
BILIRUB SERPL-MCNC: 0.4 MG/DL (ref 0–1)
BILIRUBIN DIRECT: <0.2 MG/DL (ref 0–0.2)
BUN BLDV-MCNC: 13 MG/DL (ref 8–26)
CALCIUM SERPL-MCNC: 9 MG/DL (ref 8.8–10.1)
CHLORIDE BLD-SCNC: 101 MEQ/L (ref 101–111)
CHOLESTEROL, TOTAL: 144 MG/DL
CHOLESTEROL/HDL RATIO: 4.8 (ref 1.9–4.2)
CO2: 24 MEQ/L (ref 22–29)
CREAT SERPL-MCNC: 0.99 MG/DL (ref 0.64–1.27)
GFR AFRICAN AMERICAN: >60 ML/MIN/1.73 SQ METER
GFR NON-AFRICAN AMERICAN: >60 ML/MIN/1.73 SQ METER
GLUCOSE BLD-MCNC: 100 MG/DL (ref 70–99)
HDLC SERPL-MCNC: 30 MG/DL
LDL CHOLESTEROL CALCULATED: 90 MG/DL
LDL/HDL RATIO: 3 (ref 1–4)
PHOSPHORUS: 3.5 MG/DL (ref 2.7–4.5)
POTASSIUM SERPL-SCNC: 4.2 MEQ/L (ref 3.6–5.1)
PROSTATE SPECIFIC ANTIGEN: NORMAL NG/ML
SODIUM BLD-SCNC: 137 MEQ/L (ref 135–145)
TOTAL PROTEIN: 6.8 G/DL (ref 6.6–8.7)
TRIGL SERPL-MCNC: 121 MG/DL

## 2017-12-18 PROBLEM — I47.20 VT (VENTRICULAR TACHYCARDIA): Status: ACTIVE | Noted: 2017-12-18

## 2017-12-28 RX ORDER — NADOLOL 20 MG/1
TABLET ORAL
Qty: 90 TABLET | Refills: 3 | Status: SHIPPED | OUTPATIENT
Start: 2017-12-28 | End: 2018-01-09 | Stop reason: SDUPTHER

## 2017-12-28 RX ORDER — NADOLOL 20 MG/1
TABLET ORAL
Qty: 90 TABLET | Refills: 0 | OUTPATIENT
Start: 2017-12-28

## 2017-12-28 RX ORDER — NADOLOL 20 MG/1
TABLET ORAL
Qty: 90 TABLET | Refills: 3 | Status: SHIPPED | OUTPATIENT
Start: 2017-12-28 | End: 2018-01-09 | Stop reason: CLARIF

## 2017-12-28 NOTE — TELEPHONE ENCOUNTER
Medication Refill    When was your last appointment with cardiology? 11/20/17  (if 1year or longer, please schedule an appointment)    Medication needing refilled:nadolol (CORGARD) 20 MG tablet    Doseage of the medication:20 mg    How are you taking this medication (QD, BID, TID, QID, PRN):TAKE 1/2 TABLET BY MOUTH DAILY    Patient want a 30 or 90 day supply called in:90    Which Pharmacy are we sending the medication to:   [facility] number:  [phone]  Pharmacy Fax number:

## 2018-01-09 ENCOUNTER — PROCEDURE VISIT (OUTPATIENT)
Dept: CARDIOLOGY CLINIC | Age: 73
End: 2018-01-09

## 2018-01-09 ENCOUNTER — TELEPHONE (OUTPATIENT)
Dept: CARDIOLOGY CLINIC | Age: 73
End: 2018-01-09

## 2018-01-09 ENCOUNTER — OFFICE VISIT (OUTPATIENT)
Dept: CARDIOLOGY CLINIC | Age: 73
End: 2018-01-09

## 2018-01-09 VITALS
HEIGHT: 70 IN | BODY MASS INDEX: 27.06 KG/M2 | HEART RATE: 66 BPM | SYSTOLIC BLOOD PRESSURE: 102 MMHG | DIASTOLIC BLOOD PRESSURE: 62 MMHG | WEIGHT: 189 LBS

## 2018-01-09 DIAGNOSIS — I10 ESSENTIAL HYPERTENSION: ICD-10-CM

## 2018-01-09 DIAGNOSIS — I25.810 CORONARY ARTERY DISEASE INVOLVING CORONARY BYPASS GRAFT OF NATIVE HEART WITHOUT ANGINA PECTORIS: ICD-10-CM

## 2018-01-09 DIAGNOSIS — Z95.810 ICD (IMPLANTABLE CARDIOVERTER-DEFIBRILLATOR) IN PLACE: Primary | ICD-10-CM

## 2018-01-09 DIAGNOSIS — E78.2 MIXED HYPERLIPIDEMIA: ICD-10-CM

## 2018-01-09 DIAGNOSIS — I25.10 CORONARY ARTERY DISEASE INVOLVING NATIVE CORONARY ARTERY OF NATIVE HEART WITHOUT ANGINA PECTORIS: ICD-10-CM

## 2018-01-09 DIAGNOSIS — I25.5 ISCHEMIC CARDIOMYOPATHY: ICD-10-CM

## 2018-01-09 DIAGNOSIS — I48.0 PAROXYSMAL ATRIAL FIBRILLATION (HCC): Chronic | ICD-10-CM

## 2018-01-09 DIAGNOSIS — I25.5 ISCHEMIC CARDIOMYOPATHY: Primary | ICD-10-CM

## 2018-01-09 DIAGNOSIS — I48.0 PAF (PAROXYSMAL ATRIAL FIBRILLATION) (HCC): Primary | Chronic | ICD-10-CM

## 2018-01-09 PROCEDURE — 1036F TOBACCO NON-USER: CPT | Performed by: NURSE PRACTITIONER

## 2018-01-09 PROCEDURE — 93283 PRGRMG EVAL IMPLANTABLE DFB: CPT | Performed by: INTERNAL MEDICINE

## 2018-01-09 PROCEDURE — G8484 FLU IMMUNIZE NO ADMIN: HCPCS | Performed by: NURSE PRACTITIONER

## 2018-01-09 PROCEDURE — 99214 OFFICE O/P EST MOD 30 MIN: CPT | Performed by: NURSE PRACTITIONER

## 2018-01-09 PROCEDURE — 1123F ACP DISCUSS/DSCN MKR DOCD: CPT | Performed by: NURSE PRACTITIONER

## 2018-01-09 PROCEDURE — G8598 ASA/ANTIPLAT THER USED: HCPCS | Performed by: NURSE PRACTITIONER

## 2018-01-09 PROCEDURE — G8427 DOCREV CUR MEDS BY ELIG CLIN: HCPCS | Performed by: NURSE PRACTITIONER

## 2018-01-09 PROCEDURE — G8417 CALC BMI ABV UP PARAM F/U: HCPCS | Performed by: NURSE PRACTITIONER

## 2018-01-09 PROCEDURE — 3017F COLORECTAL CA SCREEN DOC REV: CPT | Performed by: NURSE PRACTITIONER

## 2018-01-09 PROCEDURE — 4040F PNEUMOC VAC/ADMIN/RCVD: CPT | Performed by: NURSE PRACTITIONER

## 2018-01-09 NOTE — COMMUNICATION BODY
[address]     Outpatient Follow Up Note    Karlos Mathis is 81 y.o. male who presents today with a history of CAD s/p CABG, NSTEMI Oct '17 with occl LIMA > LAD & SVG > RCA by cath (unsuccessful revascularization), VF x 2 requiring defibrillation with recurrence of NSVT (disch with Life Vest); PAF, HTN and hyperlipidemia. Interval hx: EP study with inducible VT/VF requiring external rescue defibrillation, s/p ICD placement as elective procedure for VF Oct '17    CHIEF COMPLAINT / HPI:  Follow Up secondary to coronary artery disease    Subjective:   He denies (d) significant chest pain. He has pain, sharp that comes and goes quickly. His anginal equivalent was nausea and a funny feeling in his head/dizzy. He continues to have similar feelings quite a bit everyday. There is no SOB/STEVENS but he hasn't done much. He walks around some but not quite as much as he once had (August '17). The patient denies orthopnea/PND. He sleeps using a CPAP. The patient does not have swelling. The patients weight is down 30# over the past couple of months at 189# a few days ago . The patient is experiencing palpitations feeling like he's going out of rhythm. Some days there are more than others lasting 3-4 minutes. It never last.  These symptoms show no change since the last OV. His BP runs 80/60 to 130/90. With regard to medication therapy the patient has been compliant with prescribed regimen. They have tolerated therapy to date.      Current Outpatient Prescriptions   Medication Sig Dispense Refill    nadolol (CORGARD) 20 MG tablet TAKE 1/2 TABLET BY MOUTH DAILY 90 tablet 3    lisinopril (PRINIVIL;ZESTRIL) 5 MG tablet Take 1 tablet by mouth daily 90 tablet 3    atorvastatin (LIPITOR) 80 MG tablet Take 1 tablet by mouth daily 90 tablet 3    diazepam (VALIUM) 5 MG tablet Take 0.5 tablets by mouth daily 15 tablet 0    clopidogrel (PLAVIX) 75 MG tablet Take 1 tablet by mouth daily 30 tablet 3    isosorbide mononitrate (IMDUR) 60 MG extended release tablet Take 0.5 tablets by mouth daily 30 tablet 3    warfarin (COUMADIN) 5 MG tablet One to two tabs daily as directed (Patient taking differently: Take 7.5 mg by mouth daily One to two tabs daily as directed) 180 tablet 1    phenytoin (DILANTIN) 100 MG ER capsule Take 1 capsule by mouth 2 times daily. Resume home dose (Patient taking differently: Take 100 mg by mouth 2 times daily ) 1 capsule 0    primidone (MYSOLINE) 250 MG tablet Take 250 mg by mouth daily       nitroGLYCERIN (NITROLINGUAL) 0.4 MG/SPRAY 0.4 mg spray Place 1 spray under the tongue every 5 minutes as needed for Chest pain 1 Bottle 3       Objective:   PHYSICAL EXAM:        Vitals:    01/09/18 1119 01/09/18 1141   BP: 86/62 102/62   Site: Right Arm Right Arm   Position: Sitting    Cuff Size: Medium Adult Medium Adult   Pulse: 66    Weight: 189 lb (85.7 kg)    Height: 5' 10\" (1.778 m)        VITALS:  BP 86/62 (Site: Right Arm, Position: Sitting, Cuff Size: Medium Adult)   Pulse 66   Ht 5' 10\" (1.778 m)   Wt 189 lb (85.7 kg)   BMI 27.12 kg/m²    CONSTITUTIONAL: Cooperative, no apparent distress, and appears well nourished / developed  NEUROLOGIC:  Awake and orientated to person, place and time. PSYCH: Calm affect. SKIN: Warm and dry; left upper chest inc unremarkable. HEENT: Sclera non-icteric, normocephalic, neck supple, no elevation of JVP, normal carotid pulses with no bruits and thyroid normal size. LUNGS:  No increased work of breathing and clear to auscultation, no crackles or wheezing  CARDIOVASCULAR:  Regular rate 72 and rhythm with no murmurs, gallops, rubs, or abnormal heart sounds, normal PMI. The apical impulses not displaced  JVP less than 8 cm H2O  Heart tones are crisp and normal  Cervical veins are not engorged  The carotid upstroke is normal in amplitude and contour without delay or bruit  JVP is not elevated  ABDOMEN:  Normal bowel sounds, non-distended and non-tender to palpation  EXT: No

## 2018-01-09 NOTE — PROGRESS NOTES
tablet TAKE 1/2 TABLET BY MOUTH DAILY 90 tablet 3    lisinopril (PRINIVIL;ZESTRIL) 5 MG tablet Take 1 tablet by mouth daily 90 tablet 3    atorvastatin (LIPITOR) 80 MG tablet Take 1 tablet by mouth daily 90 tablet 3    diazepam (VALIUM) 5 MG tablet Take 0.5 tablets by mouth daily 15 tablet 0    clopidogrel (PLAVIX) 75 MG tablet Take 1 tablet by mouth daily 30 tablet 3    isosorbide mononitrate (IMDUR) 60 MG extended release tablet Take 0.5 tablets by mouth daily 30 tablet 3    warfarin (COUMADIN) 5 MG tablet One to two tabs daily as directed (Patient taking differently: Take 7.5 mg by mouth daily One to two tabs daily as directed) 180 tablet 1    phenytoin (DILANTIN) 100 MG ER capsule Take 1 capsule by mouth 2 times daily. Resume home dose (Patient taking differently: Take 100 mg by mouth 2 times daily ) 1 capsule 0    primidone (MYSOLINE) 250 MG tablet Take 250 mg by mouth daily       nitroGLYCERIN (NITROLINGUAL) 0.4 MG/SPRAY 0.4 mg spray Place 1 spray under the tongue every 5 minutes as needed for Chest pain 1 Bottle 3     No current facility-administered medications for this visit. REVIEW OF SYSTEMS:    CONSTITUTIONAL: No major weight gain or loss, fatigue, weakness, night sweats or fever. HEENT: No new vision difficulties or ringing in the ears. RESPIRATORY: No new SOB, PND, orthopnea or cough. CARDIOVASCULAR: See HPI  GI: No nausea, vomiting, diarrhea, constipation, abdominal pain or changes in bowel habits. : No urinary frequency, urgency, incontinence hematuria or dysuria. SKIN: No cyanosis or skin lesions. MUSCULOSKELETAL: No new muscle or joint pain. NEUROLOGICAL: No syncope or TIA-like symptoms.   PSYCHIATRIC: No anxiety, pain, insomnia or depression    Objective:   PHYSICAL EXAM:        Vitals:    01/09/18 1119 01/09/18 1141   BP: 86/62 102/62   Site: Right Arm Right Arm   Position: Sitting    Cuff Size: Medium Adult Medium Adult   Pulse: 66    Weight: 189 lb (85.7 kg)    Height: 5' 10\" (1.778 m)        VITALS:  BP 86/62 (Site: Right Arm, Position: Sitting, Cuff Size: Medium Adult)   Pulse 66   Ht 5' 10\" (1.778 m)   Wt 189 lb (85.7 kg)   BMI 27.12 kg/m²   CONSTITUTIONAL: Cooperative, no apparent distress, and appears well nourished / developed  NEUROLOGIC:  Awake and orientated to person, place and time. PSYCH: Calm affect. SKIN: Warm and dry; left upper chest inc unremarkable. HEENT: Sclera non-icteric, normocephalic, neck supple, no elevation of JVP, normal carotid pulses with no bruits and thyroid normal size. LUNGS:  No increased work of breathing and clear to auscultation, no crackles or wheezing  CARDIOVASCULAR:  Regular rate 72 and rhythm with no murmurs, gallops, rubs, or abnormal heart sounds, normal PMI. The apical impulses not displaced  JVP less than 8 cm H2O  Heart tones are crisp and normal  Cervical veins are not engorged  The carotid upstroke is normal in amplitude and contour without delay or bruit  JVP is not elevated  ABDOMEN:  Normal bowel sounds, non-distended and non-tender to palpation  EXT: No edema, no calf tenderness. Pulses are present bilaterally.     DATA:    Lab Results   Component Value Date    ALT 41 (H) 12/15/2017    AST 30 12/15/2017    ALKPHOS 81 12/15/2017    BILITOT 0.4 12/15/2017     Lab Results   Component Value Date    CREATININE 0.8 12/19/2017    BUN 24 (H) 12/19/2017     12/19/2017    K 3.9 12/19/2017     12/19/2017    CO2 24 12/19/2017     No results found for: TSH, V6SKERA, D2NDBOK, THYROIDAB  Lab Results   Component Value Date    WBC 9.4 12/19/2017    HGB 14.4 12/19/2017    HCT 43.5 12/19/2017    MCV 91.5 12/19/2017     12/19/2017     No components found for: CHLPL  Lab Results   Component Value Date    TRIG 121 12/15/2017    TRIG 116 12/02/2015    TRIG 186 (H) 10/14/2014     Lab Results   Component Value Date    HDL 30 (L) 12/15/2017    HDL 49 (L) 12/02/2015    HDL 34 (L) 10/14/2014     Lab Results   Component Value Date APRN    Documentation of today's visit sent to PCP

## 2018-01-10 RX ORDER — ATORVASTATIN CALCIUM 80 MG/1
80 TABLET, FILM COATED ORAL DAILY
Qty: 90 TABLET | Refills: 3 | Status: SHIPPED | OUTPATIENT
Start: 2018-01-10 | End: 2019-05-22 | Stop reason: SDUPTHER

## 2018-01-10 RX ORDER — ISOSORBIDE MONONITRATE 60 MG/1
30 TABLET, EXTENDED RELEASE ORAL DAILY
Qty: 45 TABLET | Refills: 3 | Status: ON HOLD | OUTPATIENT
Start: 2018-01-10 | End: 2018-02-17

## 2018-01-10 RX ORDER — CLOPIDOGREL BISULFATE 75 MG/1
75 TABLET ORAL DAILY
Qty: 90 TABLET | Refills: 3 | Status: SHIPPED | OUTPATIENT
Start: 2018-01-10 | End: 2018-12-13 | Stop reason: SDUPTHER

## 2018-01-10 RX ORDER — LISINOPRIL 5 MG/1
5 TABLET ORAL DAILY
Qty: 90 TABLET | Refills: 3 | Status: ON HOLD | OUTPATIENT
Start: 2018-01-10 | End: 2018-02-17

## 2018-01-10 RX ORDER — NADOLOL 20 MG/1
TABLET ORAL
Qty: 45 TABLET | Refills: 3 | Status: ON HOLD | OUTPATIENT
Start: 2018-01-10 | End: 2018-02-17 | Stop reason: HOSPADM

## 2018-01-11 ENCOUNTER — OFFICE VISIT (OUTPATIENT)
Dept: CARDIOTHORACIC SURGERY | Age: 73
End: 2018-01-11

## 2018-01-11 ENCOUNTER — ANTI-COAG VISIT (OUTPATIENT)
Dept: CARDIOLOGY CLINIC | Age: 73
End: 2018-01-11

## 2018-01-11 ENCOUNTER — TELEPHONE (OUTPATIENT)
Dept: CARDIOLOGY CLINIC | Age: 73
End: 2018-01-11

## 2018-01-11 VITALS
SYSTOLIC BLOOD PRESSURE: 102 MMHG | TEMPERATURE: 98.2 F | OXYGEN SATURATION: 96 % | WEIGHT: 190 LBS | BODY MASS INDEX: 27.2 KG/M2 | DIASTOLIC BLOOD PRESSURE: 72 MMHG | HEART RATE: 66 BPM | HEIGHT: 70 IN

## 2018-01-11 DIAGNOSIS — I25.10 CORONARY ARTERY DISEASE INVOLVING NATIVE CORONARY ARTERY OF NATIVE HEART WITHOUT ANGINA PECTORIS: Chronic | ICD-10-CM

## 2018-01-11 DIAGNOSIS — I25.118 CORONARY ARTERY DISEASE OF NATIVE HEART WITH STABLE ANGINA PECTORIS, UNSPECIFIED VESSEL OR LESION TYPE (HCC): Primary | ICD-10-CM

## 2018-01-11 DIAGNOSIS — I48.0 PAROXYSMAL ATRIAL FIBRILLATION (HCC): Chronic | ICD-10-CM

## 2018-01-11 LAB
INR BLD: 2.2
INR BLD: 2.2 (ref 0.8–1.2)
INR BLD: ABNORMAL

## 2018-01-11 PROCEDURE — 99202 OFFICE O/P NEW SF 15 MIN: CPT | Performed by: THORACIC SURGERY (CARDIOTHORACIC VASCULAR SURGERY)

## 2018-01-11 RX ORDER — WARFARIN SODIUM 5 MG/1
7.5 TABLET ORAL DAILY
Qty: 180 TABLET | Refills: 0 | Status: SHIPPED | OUTPATIENT
Start: 2018-01-11 | End: 2018-04-09 | Stop reason: SDUPTHER

## 2018-01-11 NOTE — PROGRESS NOTES
Mr. Fito Dan is here to discuss options for CAD, s/p CABG 2007 by Dr. Dipak Donald at [facility]    August 2017, dizzy, nauseated,  Underwent stress test.  Oct 2017 woke with CP, attempt to open reoccluded vessel, went into V-Fib  IVCD 12/18/17    Cardiologsit:  Dr. Chen Orellana + Dr. Luna Fabian,  Dr. Marylu Rosenberg  PCP: Dr. Edward Bravo    Review of Systems:  Constitutional:  No night sweats no, headaches, weight loss 30 lbs in last 3 months via eating healthier  Eyes:  No glaucoma, cataracts. + for glasses  ENMT:  No nosebleeds, deviated septum. [place] has hearing aids, goes twice per year  Cardiac:  + chronic A-fib,  + NSTEMI  Vascular:  No claudication, varicosities. GI:  No PUD, heartburn. :  Passed kidney stones, frequent UTIs  Musculoskeletal:  No arthritis shoulders and knees, gout. Respiratory:  No SOB, emphysema, asthma.sleep apnea uses CPAP at night  Integumentary:  No dermatitis, itching, rash. Neurological:  No stroke, TIAs, hx  seizures. Psychiatric:  No depression, anxiety. Endocrine: No diabetes, thyroid issues. Hematologic:  No bleeding, easy bruising due to warfarin  Immunologic:  No known cancer, steroid therapies.

## 2018-01-11 NOTE — TELEPHONE ENCOUNTER
Medication Refill    When was your last appointment with cardiology?01/11/18   (if 1year or longer, please schedule an appointment)    Medication needing refilled:warfarin (COUMADIN) 5 MG tablet    Doseage of the medication:    How are you taking this medication (QD, BID, TID, QID, PRN):    Patient want a 30 or 90 day supply called in:90    Which Pharmacy are we sending the medication to: [address] [place] [address], [address] [facility] [phone] [facility] [phone]    Pharmacy Phone number:    Pharmacy Fax number:

## 2018-01-29 ENCOUNTER — TELEPHONE (OUTPATIENT)
Dept: CARDIOLOGY CLINIC | Age: 73
End: 2018-01-29

## 2018-01-29 NOTE — TELEPHONE ENCOUNTER
Pt calling, tried to call to do transmission and was told he is not \"set up\" needs to know what to do?  Pls call to advise Thank you

## 2018-02-08 ENCOUNTER — TELEPHONE (OUTPATIENT)
Dept: CARDIOLOGY CLINIC | Age: 73
End: 2018-02-08

## 2018-02-08 NOTE — TELEPHONE ENCOUNTER
Patient would like to get an order for a lipid test faxed to the [facility] [place] [facility] lab on [facility] . Please call pt to let him know this was done .

## 2018-02-09 NOTE — TELEPHONE ENCOUNTER
Left message that he had lipids done on 12/15, and does not need them at this time.  Instructed to call back if he has any further quiestions

## 2018-02-15 PROBLEM — I48.91 ATRIAL FIBRILLATION WITH RAPID VENTRICULAR RESPONSE (HCC): Status: ACTIVE | Noted: 2018-02-15

## 2018-02-19 ENCOUNTER — TELEPHONE (OUTPATIENT)
Dept: CARDIOLOGY CLINIC | Age: 73
End: 2018-02-19

## 2018-02-19 NOTE — TELEPHONE ENCOUNTER
Pt returned my call. Requests that notes from 8/2017 to Oct 2017 be faxed as well.  33 pages faxed to [phone] ; fax conf # [phone] @ [phone]

## 2018-02-19 NOTE — TELEPHONE ENCOUNTER
LM for patient to call to move appt sooner than end of march per [facility] .  Make appt end of feb first of march

## 2018-02-19 NOTE — TELEPHONE ENCOUNTER
Printed off pertinent info x 42 pgs to [phone];fax conf # [phone] @ [phone]  lmor with spouse as above. To call if any questions.

## 2018-02-19 NOTE — TELEPHONE ENCOUNTER
Has appt this Thursday at the [facility] and patient is asking that records be faxed to [phone].  His case number needs to be on the records #[id number]

## 2018-02-20 ENCOUNTER — TELEPHONE (OUTPATIENT)
Dept: CARDIOLOGY CLINIC | Age: 73
End: 2018-02-20

## 2018-03-06 ENCOUNTER — OFFICE VISIT (OUTPATIENT)
Dept: CARDIOLOGY CLINIC | Age: 73
End: 2018-03-06

## 2018-03-06 ENCOUNTER — PROCEDURE VISIT (OUTPATIENT)
Dept: CARDIOLOGY CLINIC | Age: 73
End: 2018-03-06

## 2018-03-06 VITALS
HEIGHT: 70 IN | HEART RATE: 65 BPM | OXYGEN SATURATION: 95 % | WEIGHT: 185 LBS | BODY MASS INDEX: 26.48 KG/M2 | DIASTOLIC BLOOD PRESSURE: 72 MMHG | SYSTOLIC BLOOD PRESSURE: 120 MMHG

## 2018-03-06 DIAGNOSIS — Z95.810 ICD (IMPLANTABLE CARDIOVERTER-DEFIBRILLATOR) IN PLACE: ICD-10-CM

## 2018-03-06 DIAGNOSIS — R06.02 SOB (SHORTNESS OF BREATH): ICD-10-CM

## 2018-03-06 DIAGNOSIS — I25.5 ISCHEMIC CARDIOMYOPATHY: ICD-10-CM

## 2018-03-06 DIAGNOSIS — I10 ESSENTIAL HYPERTENSION: ICD-10-CM

## 2018-03-06 DIAGNOSIS — I25.5 ISCHEMIC CARDIOMYOPATHY: Primary | ICD-10-CM

## 2018-03-06 DIAGNOSIS — I48.0 PAF (PAROXYSMAL ATRIAL FIBRILLATION) (HCC): Chronic | ICD-10-CM

## 2018-03-06 DIAGNOSIS — I25.10 CORONARY ARTERY DISEASE INVOLVING NATIVE CORONARY ARTERY OF NATIVE HEART WITHOUT ANGINA PECTORIS: ICD-10-CM

## 2018-03-06 PROCEDURE — 3017F COLORECTAL CA SCREEN DOC REV: CPT | Performed by: NURSE PRACTITIONER

## 2018-03-06 PROCEDURE — 99214 OFFICE O/P EST MOD 30 MIN: CPT | Performed by: NURSE PRACTITIONER

## 2018-03-06 PROCEDURE — 1111F DSCHRG MED/CURRENT MED MERGE: CPT | Performed by: NURSE PRACTITIONER

## 2018-03-06 PROCEDURE — 93283 PRGRMG EVAL IMPLANTABLE DFB: CPT | Performed by: INTERNAL MEDICINE

## 2018-03-06 PROCEDURE — G8598 ASA/ANTIPLAT THER USED: HCPCS | Performed by: NURSE PRACTITIONER

## 2018-03-06 PROCEDURE — 4040F PNEUMOC VAC/ADMIN/RCVD: CPT | Performed by: NURSE PRACTITIONER

## 2018-03-06 PROCEDURE — G8417 CALC BMI ABV UP PARAM F/U: HCPCS | Performed by: NURSE PRACTITIONER

## 2018-03-06 PROCEDURE — 1036F TOBACCO NON-USER: CPT | Performed by: NURSE PRACTITIONER

## 2018-03-06 PROCEDURE — G8484 FLU IMMUNIZE NO ADMIN: HCPCS | Performed by: NURSE PRACTITIONER

## 2018-03-06 PROCEDURE — G8427 DOCREV CUR MEDS BY ELIG CLIN: HCPCS | Performed by: NURSE PRACTITIONER

## 2018-03-06 PROCEDURE — 1123F ACP DISCUSS/DSCN MKR DOCD: CPT | Performed by: NURSE PRACTITIONER

## 2018-03-06 NOTE — PROGRESS NOTES
[facility]     Outpatient Follow Up Note    Madison Mathis is 81 y.o. male who presents today with a history of CAD s/p CABG, NSTEMI Oct '17 with occl LIMA > LAD & SVG > RCA by cath (unsuccessful revascularization), VF x 2 requiring defibrillation with recurrence of NSVT (disch with Life Vest); PAF, LV dysfunction EF 40-45%, HTN and hyperlipidemia. EP study with inducible VT/VF requiring external rescue defibrillation, s/p ICD placement as elective procedure for VF Oct '17    Interval hx: 2/15/18: admitted for Atrial fibrillation with RVR s/p DCCV. He was seen by electrophysiology, and his pacemaker/ICD were adjusted. He was restarted on sotalol. He had some hypotension with his multiple cardiac medications, which were adjusted, and he was observed overnight for improvement in his blood pressure. He was discharged home in stable condition. Interval hx: 2/22/18: evaluated by Dr. Nishant Clark / [facility]. A PET/stress to assess for ischemia and if large areas will need to re-evaluate for revascularization (scheduled for 3/28). Films to be reviewed and f/u in 3 months    CHIEF COMPLAINT / HPI:  Follow Up secondary to coronary artery disease    Subjective:   He expresses concerns with his pacemaker rate setting. Sitting quietly or driving his HR can go up to 90 (noticed after going over a few bumps in the road). He can brush his teeth using either arm and his HR increases and is different bilaterally, ie 60 in one arm and 90 in the other. Every once in a while his heart feels tight which he attributes to changes in his heart rate. He hasn't been doing much since his heart rate goes high. His angina equivalent was nausea and has a pressure in his head. He's had recurrences 4-5 x / week sometimes worse than others. It lasts 1-4 minutes. It comes on irregardless of activities. He's trying to exercise using a stationary bike. He takes his time walking. At times he needs to take a deep breath.  When capsule Take 1 capsule by mouth 2 times daily. Resume home dose (Patient taking differently: Take 100 mg by mouth 2 times daily ) 1 capsule 0    primidone (MYSOLINE) 250 MG tablet Take 250 mg by mouth daily       lisinopril (PRINIVIL;ZESTRIL) 5 MG tablet Take 1 tablet by mouth daily Do not take if blood pressure less than 115 90 tablet 3     No current facility-administered medications for this visit. REVIEW OF SYSTEMS:    CONSTITUTIONAL: No major weight gain or loss, fatigue, weakness, night sweats or fever. HEENT: No new vision difficulties or ringing in the ears. RESPIRATORY: No new SOB, PND, orthopnea or cough. CARDIOVASCULAR: See HPI  GI: No nausea, vomiting, diarrhea, constipation, abdominal pain or changes in bowel habits. : No urinary frequency, urgency, incontinence hematuria or dysuria. SKIN: No cyanosis or skin lesions. MUSCULOSKELETAL: No new muscle or joint pain. NEUROLOGICAL: No syncope or TIA-like symptoms. PSYCHIATRIC: No anxiety, pain, insomnia or depression    Objective:   PHYSICAL EXAM:        Vitals:    03/06/18 1309 03/06/18 1355   BP: 124/82 120/72   Pulse: 65    SpO2: 95%    Weight: 185 lb (83.9 kg)    Height: 5' 10\" (1.778 m)        VITALS:  /82   Pulse 65   Ht 5' 10\" (1.778 m)   Wt 185 lb (83.9 kg)   SpO2 95%   BMI 26.54 kg/m²   CONSTITUTIONAL: Cooperative, no apparent distress, and appears well nourished / developed  NEUROLOGIC:  Awake and orientated to person, place and time. PSYCH: Calm affect. SKIN: Warm and dry. HEENT: Sclera non-icteric, normocephalic, neck supple, no elevation of JVP, normal carotid pulses with no bruits and thyroid normal size. LUNGS:  No increased work of breathing and clear to auscultation, no crackles or wheezing  CARDIOVASCULAR:  Regular rate 72 and rhythm with no murmurs, gallops, rubs, or abnormal heart sounds, normal PMI. The apical impulses not displaced  JVP less than 8 cm H2O  Heart tones are crisp and normal  Cervical veins are not engorged  The carotid upstroke is normal in amplitude and contour without delay or bruit  JVP is not elevated  ABDOMEN:  Normal bowel sounds, non-distended and non-tender to palpation  EXT: No edema, no calf tenderness. Pulses are present bilaterally. DATA:    Lab Results   Component Value Date    CREATININE 0.9 02/16/2018    BUN 24 (H) 02/16/2018     02/16/2018    K 4.1 02/16/2018     02/16/2018    CO2 26 02/16/2018     No components found for: CHLPL  Lab Results   Component Value Date    TRIG 121 12/15/2017    TRIG 116 12/02/2015    TRIG 186 (H) 10/14/2014     Lab Results   Component Value Date    HDL 30 (L) 12/15/2017    HDL 49 (L) 12/02/2015    HDL 34 (L) 10/14/2014     Lab Results   Component Value Date    LDLCALC 90 12/15/2017    [address] 110 12/02/2015    [address] 87 10/14/2014      Radiology Review:  Pertinent images / reports were reviewed as a part of this visit and reveals the following:    Last Echo: Oct '17:  Summary   -Global left ventricular function is mildly decreased with ejection fraction estimated from 40 % to 45 %.  -Distal septal and apical hypokinesis noted.   -Mildly dilated left atrium.   -Mild to moderate mitral regurgitation is present.   -E/e'= 7.2 .   There is mild-to-moderate tricuspid regurgitation with RVSP estimated at 34 mmHg. Last Stress Test: Oct '17:  Summary    Medium sized anterior fixed defect of moderate intensity consistent with    infarction in the territory of the LAD .    Small sized inferior fixed defect of moderate intensity consistent with    infarction in the territory of the RCA .    No ischemia.    Normal LV function.    Overall findings represent a intermediate risk scan.       Stress Protocols        Resting ECG    Sinus bradycardia. Cardiac cath: Oct '17:  Impression:  1. Severe native CAD  2. Occlusions of the LIMA graft to the LAD as well as the saphenous vein graft to the RCA  3.   The LAD is filled via retrograde flow from the

## 2018-03-09 LAB
ALBUMIN SERPL-MCNC: 4.2 G/DL (ref 3.5–5)
ALP BLD-CCNC: 107 IU/L (ref 40–129)
ALT SERPL-CCNC: 41 IU/L (ref 10–50)
AST SERPL-CCNC: 31 IU/L (ref 10–50)
B-TYPE NATRIURETIC PEPTIDE: 252 PG/ML (ref 0–72)
BILIRUB SERPL-MCNC: 0.4 MG/DL (ref 0–1)
BUN BLDV-MCNC: 17 MG/DL (ref 8–26)
CALCIUM SERPL-MCNC: 9.2 MG/DL (ref 8.8–10.1)
CHLORIDE BLD-SCNC: 102 MEQ/L (ref 101–111)
CO2: 30 MEQ/L (ref 22–29)
CREAT SERPL-MCNC: 0.93 MG/DL (ref 0.64–1.27)
GFR AFRICAN AMERICAN: >60 ML/MIN/1.73 SQ METER
GFR NON-AFRICAN AMERICAN: >60 ML/MIN/1.73 SQ METER
GLUCOSE BLD-MCNC: 96 MG/DL (ref 70–99)
INR BLD: 2
POTASSIUM SERPL-SCNC: 3.8 MEQ/L (ref 3.6–5.1)
SODIUM BLD-SCNC: 140 MEQ/L (ref 135–145)
TOTAL PROTEIN: 6.9 G/DL (ref 6.6–8.7)

## 2018-03-12 ENCOUNTER — TELEPHONE (OUTPATIENT)
Dept: CARDIOLOGY CLINIC | Age: 73
End: 2018-03-12

## 2018-03-15 ENCOUNTER — ANTI-COAG VISIT (OUTPATIENT)
Dept: CARDIOLOGY CLINIC | Age: 73
End: 2018-03-15

## 2018-03-15 ENCOUNTER — TELEPHONE (OUTPATIENT)
Dept: CARDIOLOGY CLINIC | Age: 73
End: 2018-03-15

## 2018-03-19 ENCOUNTER — TELEPHONE (OUTPATIENT)
Dept: CARDIOLOGY CLINIC | Age: 73
End: 2018-03-19

## 2018-03-19 DIAGNOSIS — E78.2 MIXED HYPERLIPIDEMIA: ICD-10-CM

## 2018-03-19 DIAGNOSIS — I25.10 CORONARY ARTERY DISEASE INVOLVING NATIVE CORONARY ARTERY OF NATIVE HEART WITHOUT ANGINA PECTORIS: ICD-10-CM

## 2018-03-19 DIAGNOSIS — I48.0 PAF (PAROXYSMAL ATRIAL FIBRILLATION) (HCC): Chronic | ICD-10-CM

## 2018-03-19 RX ORDER — ISOSORBIDE MONONITRATE 30 MG/1
TABLET, EXTENDED RELEASE ORAL
Qty: 90 TABLET | Refills: 3 | Status: SHIPPED | OUTPATIENT
Start: 2018-03-19 | End: 2019-01-07 | Stop reason: SDUPTHER

## 2018-03-19 RX ORDER — ISOSORBIDE MONONITRATE 30 MG/1
30 TABLET, EXTENDED RELEASE ORAL DAILY
Qty: 90 TABLET | Refills: 3 | Status: SHIPPED | OUTPATIENT
Start: 2018-03-19 | End: 2018-03-19 | Stop reason: SDUPTHER

## 2018-03-19 RX ORDER — SOTALOL HYDROCHLORIDE 80 MG/1
80 TABLET ORAL 2 TIMES DAILY
Qty: 180 TABLET | Refills: 3 | Status: SHIPPED | OUTPATIENT
Start: 2018-03-19 | End: 2019-01-07 | Stop reason: SDUPTHER

## 2018-03-19 RX ORDER — SOTALOL HYDROCHLORIDE 80 MG/1
80 TABLET ORAL 2 TIMES DAILY
Qty: 60 TABLET | Refills: 5 | Status: SHIPPED | OUTPATIENT
Start: 2018-03-19 | End: 2018-03-19 | Stop reason: SDUPTHER

## 2018-03-19 NOTE — TELEPHONE ENCOUNTER
Left a message for the pt-the Sotalol 80 mg, # 60, one tab po BID, has been e-prescribed into [facility], [phone].

## 2018-03-19 NOTE — TELEPHONE ENCOUNTER
Medication Refill    When was your last appointment with cardiology?  (if 1year or longer, please schedule an appointment)    Medication needing refilled: sotalol (BETAPACE) 80 MG tablet     Doseage of the medication:    How are you taking this medication (QD, BID, TID, QID, PRN):  Take 1 tablet by mouth 2 times daily Do not take if your blood pressure is less than 90. Nadeem Poole  Patient taking differently: Take 80 mg by mouth 2 times daily     Patient want a 30 or 90 day supply called in:    Which Pharmacy are we sending the medication to: [id number] [facility] Phone number:    Pharmacy Fax number:

## 2018-04-02 ENCOUNTER — TELEPHONE (OUTPATIENT)
Dept: CARDIOLOGY CLINIC | Age: 73
End: 2018-04-02

## 2018-04-02 DIAGNOSIS — I25.10 CORONARY ARTERY DISEASE INVOLVING NATIVE CORONARY ARTERY OF NATIVE HEART WITHOUT ANGINA PECTORIS: Primary | Chronic | ICD-10-CM

## 2018-04-03 ENCOUNTER — OFFICE VISIT (OUTPATIENT)
Dept: CARDIOLOGY CLINIC | Age: 73
End: 2018-04-03

## 2018-04-03 ENCOUNTER — HOSPITAL ENCOUNTER (OUTPATIENT)
Dept: VASCULAR LAB | Age: 73
Discharge: HOME | End: 2018-04-03
Attending: INTERNAL MEDICINE | Admitting: INTERNAL MEDICINE

## 2018-04-03 VITALS
BODY MASS INDEX: 26.2 KG/M2 | HEIGHT: 70 IN | DIASTOLIC BLOOD PRESSURE: 54 MMHG | HEART RATE: 60 BPM | WEIGHT: 183 LBS | SYSTOLIC BLOOD PRESSURE: 122 MMHG

## 2018-04-03 DIAGNOSIS — L53.9 ERYTHEMA: ICD-10-CM

## 2018-04-03 DIAGNOSIS — Z95.810 ICD (IMPLANTABLE CARDIOVERTER-DEFIBRILLATOR) IN PLACE: ICD-10-CM

## 2018-04-03 DIAGNOSIS — I10 ESSENTIAL HYPERTENSION: ICD-10-CM

## 2018-04-03 DIAGNOSIS — R42 DIZZINESS: ICD-10-CM

## 2018-04-03 DIAGNOSIS — I25.10 CORONARY ARTERY DISEASE INVOLVING NATIVE CORONARY ARTERY OF NATIVE HEART WITHOUT ANGINA PECTORIS: Chronic | ICD-10-CM

## 2018-04-03 DIAGNOSIS — I48.0 PAF (PAROXYSMAL ATRIAL FIBRILLATION) (HCC): ICD-10-CM

## 2018-04-03 DIAGNOSIS — I25.10 ATHEROSCLEROTIC HEART DISEASE OF NATIVE CORONARY ARTERY WITHOUT ANGINA PECTORIS: ICD-10-CM

## 2018-04-03 DIAGNOSIS — I25.10 CORONARY ARTERY DISEASE INVOLVING NATIVE CORONARY ARTERY OF NATIVE HEART WITHOUT ANGINA PECTORIS: Primary | ICD-10-CM

## 2018-04-03 DIAGNOSIS — I25.5 ISCHEMIC CARDIOMYOPATHY: ICD-10-CM

## 2018-04-03 DIAGNOSIS — I87.2 VENOUS INSUFFICIENCY: ICD-10-CM

## 2018-04-03 DIAGNOSIS — R11.0 NAUSEA: ICD-10-CM

## 2018-04-03 DIAGNOSIS — L53.9 ERYTHEMA: Primary | ICD-10-CM

## 2018-04-03 DIAGNOSIS — R06.02 SOB (SHORTNESS OF BREATH): ICD-10-CM

## 2018-04-03 DIAGNOSIS — E78.2 MIXED HYPERLIPIDEMIA: ICD-10-CM

## 2018-04-03 PROBLEM — I48.91 ATRIAL FIBRILLATION WITH RVR (HCC): Chronic | Status: ACTIVE | Noted: 2018-02-15

## 2018-04-03 PROBLEM — I47.20 VT (VENTRICULAR TACHYCARDIA) (HCC): Chronic | Status: ACTIVE | Noted: 2017-12-18

## 2018-04-03 PROCEDURE — G8417 CALC BMI ABV UP PARAM F/U: HCPCS | Performed by: INTERNAL MEDICINE

## 2018-04-03 PROCEDURE — 99214 OFFICE O/P EST MOD 30 MIN: CPT | Performed by: INTERNAL MEDICINE

## 2018-04-03 PROCEDURE — 4040F PNEUMOC VAC/ADMIN/RCVD: CPT | Performed by: INTERNAL MEDICINE

## 2018-04-03 PROCEDURE — 1123F ACP DISCUSS/DSCN MKR DOCD: CPT | Performed by: INTERNAL MEDICINE

## 2018-04-03 PROCEDURE — 3017F COLORECTAL CA SCREEN DOC REV: CPT | Performed by: INTERNAL MEDICINE

## 2018-04-03 PROCEDURE — 1036F TOBACCO NON-USER: CPT | Performed by: INTERNAL MEDICINE

## 2018-04-03 PROCEDURE — G8598 ASA/ANTIPLAT THER USED: HCPCS | Performed by: INTERNAL MEDICINE

## 2018-04-03 PROCEDURE — G8427 DOCREV CUR MEDS BY ELIG CLIN: HCPCS | Performed by: INTERNAL MEDICINE

## 2018-04-04 ENCOUNTER — TELEPHONE (OUTPATIENT)
Dept: CARDIOLOGY CLINIC | Age: 73
End: 2018-04-04

## 2018-04-06 ENCOUNTER — ANTI-COAG VISIT (OUTPATIENT)
Dept: CARDIOLOGY CLINIC | Age: 73
End: 2018-04-06

## 2018-04-06 LAB
INR BLD: 2.1
INR BLD: 2.1 (ref 0.8–1.2)
INR BLD: ABNORMAL

## 2018-04-09 DIAGNOSIS — I48.0 PAROXYSMAL ATRIAL FIBRILLATION (HCC): Chronic | ICD-10-CM

## 2018-04-09 DIAGNOSIS — I25.10 CORONARY ARTERY DISEASE INVOLVING NATIVE CORONARY ARTERY OF NATIVE HEART WITHOUT ANGINA PECTORIS: Chronic | ICD-10-CM

## 2018-04-10 ENCOUNTER — PROCEDURE VISIT (OUTPATIENT)
Dept: CARDIOLOGY CLINIC | Age: 73
End: 2018-04-10

## 2018-04-10 DIAGNOSIS — Z95.810 ICD (IMPLANTABLE CARDIOVERTER-DEFIBRILLATOR) IN PLACE: ICD-10-CM

## 2018-04-10 DIAGNOSIS — I25.5 ISCHEMIC CARDIOMYOPATHY: Chronic | ICD-10-CM

## 2018-04-10 PROCEDURE — 93283 PRGRMG EVAL IMPLANTABLE DFB: CPT | Performed by: INTERNAL MEDICINE

## 2018-04-10 RX ORDER — WARFARIN SODIUM 5 MG/1
TABLET ORAL
Qty: 180 TABLET | Refills: 0 | Status: SHIPPED | OUTPATIENT
Start: 2018-04-10 | End: 2018-04-11 | Stop reason: SDUPTHER

## 2018-04-11 DIAGNOSIS — I25.10 CORONARY ARTERY DISEASE INVOLVING NATIVE CORONARY ARTERY OF NATIVE HEART WITHOUT ANGINA PECTORIS: Chronic | ICD-10-CM

## 2018-04-11 DIAGNOSIS — I48.0 PAROXYSMAL ATRIAL FIBRILLATION (HCC): Chronic | ICD-10-CM

## 2018-04-11 RX ORDER — WARFARIN SODIUM 5 MG/1
5 TABLET ORAL DAILY
Qty: 180 TABLET | Refills: 3 | Status: ON HOLD | OUTPATIENT
Start: 2018-04-11 | End: 2019-01-25 | Stop reason: HOSPADM

## 2018-04-27 ENCOUNTER — TELEPHONE (OUTPATIENT)
Dept: CARDIOLOGY CLINIC | Age: 73
End: 2018-04-27

## 2018-04-27 DIAGNOSIS — E78.2 MIXED HYPERLIPIDEMIA: Chronic | ICD-10-CM

## 2018-04-27 DIAGNOSIS — I25.10 CORONARY ARTERY DISEASE INVOLVING NATIVE CORONARY ARTERY OF NATIVE HEART WITHOUT ANGINA PECTORIS: Primary | Chronic | ICD-10-CM

## 2018-05-04 ENCOUNTER — ANTI-COAG VISIT (OUTPATIENT)
Dept: CARDIOLOGY CLINIC | Age: 73
End: 2018-05-04

## 2018-05-04 LAB
CHOLESTEROL, TOTAL: 143 MG/DL
CHOLESTEROL/HDL RATIO: 3.7 (ref 1.9–4.2)
HDLC SERPL-MCNC: 39 MG/DL
INR BLD: 2.1
INR BLD: 2.1 (ref 0.8–1.2)
INR BLD: ABNORMAL
LDL CHOLESTEROL CALCULATED: 85 MG/DL
LDL/HDL RATIO: 2.2 (ref 1–4)
TRIGL SERPL-MCNC: 96 MG/DL

## 2018-05-18 ENCOUNTER — TELEPHONE (OUTPATIENT)
Dept: CARDIOLOGY CLINIC | Age: 73
End: 2018-05-18

## 2018-05-24 ENCOUNTER — OFFICE VISIT (OUTPATIENT)
Dept: CARDIOLOGY CLINIC | Age: 73
End: 2018-05-24

## 2018-05-24 VITALS
SYSTOLIC BLOOD PRESSURE: 120 MMHG | HEIGHT: 70 IN | DIASTOLIC BLOOD PRESSURE: 70 MMHG | WEIGHT: 177 LBS | BODY MASS INDEX: 25.34 KG/M2 | HEART RATE: 45 BPM

## 2018-05-24 DIAGNOSIS — I10 ESSENTIAL HYPERTENSION: ICD-10-CM

## 2018-05-24 DIAGNOSIS — I25.10 CORONARY ARTERY DISEASE INVOLVING NATIVE CORONARY ARTERY OF NATIVE HEART WITHOUT ANGINA PECTORIS: ICD-10-CM

## 2018-05-24 DIAGNOSIS — I48.0 PAF (PAROXYSMAL ATRIAL FIBRILLATION) (HCC): Primary | Chronic | ICD-10-CM

## 2018-05-24 PROCEDURE — 1123F ACP DISCUSS/DSCN MKR DOCD: CPT | Performed by: NURSE PRACTITIONER

## 2018-05-24 PROCEDURE — 99214 OFFICE O/P EST MOD 30 MIN: CPT | Performed by: NURSE PRACTITIONER

## 2018-05-24 PROCEDURE — G8417 CALC BMI ABV UP PARAM F/U: HCPCS | Performed by: NURSE PRACTITIONER

## 2018-05-24 PROCEDURE — 93000 ELECTROCARDIOGRAM COMPLETE: CPT | Performed by: NURSE PRACTITIONER

## 2018-05-24 PROCEDURE — G8598 ASA/ANTIPLAT THER USED: HCPCS | Performed by: NURSE PRACTITIONER

## 2018-05-24 PROCEDURE — 1036F TOBACCO NON-USER: CPT | Performed by: NURSE PRACTITIONER

## 2018-05-24 PROCEDURE — 4040F PNEUMOC VAC/ADMIN/RCVD: CPT | Performed by: NURSE PRACTITIONER

## 2018-05-24 PROCEDURE — G8427 DOCREV CUR MEDS BY ELIG CLIN: HCPCS | Performed by: NURSE PRACTITIONER

## 2018-05-24 PROCEDURE — 3017F COLORECTAL CA SCREEN DOC REV: CPT | Performed by: NURSE PRACTITIONER

## 2018-06-11 ENCOUNTER — ANTI-COAG VISIT (OUTPATIENT)
Dept: CARDIOLOGY CLINIC | Age: 73
End: 2018-06-11

## 2018-06-11 DIAGNOSIS — I48.91 ATRIAL FIBRILLATION, UNSPECIFIED TYPE (HCC): ICD-10-CM

## 2018-06-11 LAB — INR BLD: 1.9

## 2018-06-18 DIAGNOSIS — I48.0 PAROXYSMAL ATRIAL FIBRILLATION (HCC): Chronic | ICD-10-CM

## 2018-06-18 DIAGNOSIS — I25.10 CORONARY ARTERY DISEASE INVOLVING NATIVE CORONARY ARTERY OF NATIVE HEART WITHOUT ANGINA PECTORIS: Chronic | ICD-10-CM

## 2018-06-19 RX ORDER — WARFARIN SODIUM 5 MG/1
TABLET ORAL
Qty: 180 TABLET | Refills: 0 | Status: SHIPPED | OUTPATIENT
Start: 2018-06-19 | End: 2020-01-21 | Stop reason: SDUPTHER

## 2018-07-10 ENCOUNTER — NURSE ONLY (OUTPATIENT)
Dept: CARDIOLOGY CLINIC | Age: 73
End: 2018-07-10

## 2018-07-10 DIAGNOSIS — Z95.810 ICD (IMPLANTABLE CARDIOVERTER-DEFIBRILLATOR) IN PLACE: Primary | ICD-10-CM

## 2018-07-10 DIAGNOSIS — I49.01 VF (VENTRICULAR FIBRILLATION) (HCC): ICD-10-CM

## 2018-07-10 PROCEDURE — 93295 DEV INTERROG REMOTE 1/2/MLT: CPT | Performed by: INTERNAL MEDICINE

## 2018-07-10 PROCEDURE — 93296 REM INTERROG EVL PM/IDS: CPT | Performed by: INTERNAL MEDICINE

## 2018-07-17 ENCOUNTER — ANTI-COAG VISIT (OUTPATIENT)
Dept: CARDIOLOGY CLINIC | Age: 73
End: 2018-07-17

## 2018-07-17 DIAGNOSIS — I48.91 ATRIAL FIBRILLATION, UNSPECIFIED TYPE (HCC): ICD-10-CM

## 2018-07-17 LAB — INR BLD: 1.8

## 2018-07-26 ENCOUNTER — TELEPHONE (OUTPATIENT)
Dept: CARDIOLOGY CLINIC | Age: 73
End: 2018-07-26

## 2018-07-26 NOTE — TELEPHONE ENCOUNTER
CARDIAC CLEARANCE     What type of procedure are you having? Angiogram     Which physician is performing your procedure? DR. Susana Russell    When is your procedure scheduled for? 8/1/2018    Where are you having this procedure? [facility]     Are you taking Blood Thinners? If so what? (Name/dose/frequesncy)warfarin (COUMADIN) 5 MG tablet   Take 1 tablet by mouth daily       Does the surgeon want you to stop your blood thinner? If so for how long?  3 days before procedure     Phone Number and Contact Name for Physicians office: [phone]    Fax number to send information:

## 2018-08-03 ENCOUNTER — OFFICE VISIT (OUTPATIENT)
Dept: CARDIOLOGY CLINIC | Age: 73
End: 2018-08-03

## 2018-08-03 VITALS
HEIGHT: 70 IN | BODY MASS INDEX: 24.05 KG/M2 | HEART RATE: 48 BPM | SYSTOLIC BLOOD PRESSURE: 134 MMHG | DIASTOLIC BLOOD PRESSURE: 80 MMHG | WEIGHT: 168 LBS

## 2018-08-03 DIAGNOSIS — I10 ESSENTIAL HYPERTENSION: Chronic | ICD-10-CM

## 2018-08-03 DIAGNOSIS — I25.5 ISCHEMIC CARDIOMYOPATHY: Chronic | ICD-10-CM

## 2018-08-03 DIAGNOSIS — I47.29 NSVT (NONSUSTAINED VENTRICULAR TACHYCARDIA): ICD-10-CM

## 2018-08-03 DIAGNOSIS — I48.91 ATRIAL FIBRILLATION, UNSPECIFIED TYPE (HCC): Chronic | ICD-10-CM

## 2018-08-03 DIAGNOSIS — G47.33 OSA ON CPAP: ICD-10-CM

## 2018-08-03 DIAGNOSIS — Z99.89 OSA ON CPAP: ICD-10-CM

## 2018-08-03 DIAGNOSIS — R06.02 SHORTNESS OF BREATH: Chronic | ICD-10-CM

## 2018-08-03 DIAGNOSIS — I25.10 CORONARY ARTERY DISEASE INVOLVING NATIVE CORONARY ARTERY OF NATIVE HEART WITHOUT ANGINA PECTORIS: Chronic | ICD-10-CM

## 2018-08-03 DIAGNOSIS — I47.20 VT (VENTRICULAR TACHYCARDIA): Primary | ICD-10-CM

## 2018-08-03 DIAGNOSIS — E78.2 MIXED HYPERLIPIDEMIA: Chronic | ICD-10-CM

## 2018-08-03 DIAGNOSIS — I25.810 CORONARY ARTERY DISEASE INVOLVING AUTOLOGOUS ARTERY CORONARY BYPASS GRAFT WITHOUT ANGINA PECTORIS: ICD-10-CM

## 2018-08-03 PROCEDURE — 3017F COLORECTAL CA SCREEN DOC REV: CPT | Performed by: INTERNAL MEDICINE

## 2018-08-03 PROCEDURE — 1101F PT FALLS ASSESS-DOCD LE1/YR: CPT | Performed by: INTERNAL MEDICINE

## 2018-08-03 PROCEDURE — 1036F TOBACCO NON-USER: CPT | Performed by: INTERNAL MEDICINE

## 2018-08-03 PROCEDURE — G8420 CALC BMI NORM PARAMETERS: HCPCS | Performed by: INTERNAL MEDICINE

## 2018-08-03 PROCEDURE — 1123F ACP DISCUSS/DSCN MKR DOCD: CPT | Performed by: INTERNAL MEDICINE

## 2018-08-03 PROCEDURE — 99214 OFFICE O/P EST MOD 30 MIN: CPT | Performed by: INTERNAL MEDICINE

## 2018-08-03 PROCEDURE — G8598 ASA/ANTIPLAT THER USED: HCPCS | Performed by: INTERNAL MEDICINE

## 2018-08-03 PROCEDURE — G8427 DOCREV CUR MEDS BY ELIG CLIN: HCPCS | Performed by: INTERNAL MEDICINE

## 2018-08-03 PROCEDURE — 4040F PNEUMOC VAC/ADMIN/RCVD: CPT | Performed by: INTERNAL MEDICINE

## 2018-08-03 RX ORDER — EZETIMIBE 10 MG/1
10 TABLET ORAL DAILY
Qty: 30 TABLET | Refills: 11 | Status: SHIPPED | OUTPATIENT
Start: 2018-08-03 | End: 2018-08-03 | Stop reason: SDUPTHER

## 2018-08-03 RX ORDER — EZETIMIBE 10 MG/1
10 TABLET ORAL DAILY
Qty: 30 TABLET | Refills: 11 | Status: SHIPPED | OUTPATIENT
Start: 2018-08-03 | End: 2019-01-07 | Stop reason: SDUPTHER

## 2018-08-03 NOTE — PROGRESS NOTES
12/02/2015     Lab Results   Component Value Date    HDL 39 (L) 05/04/2018    HDL 30 (L) 12/15/2017    HDL 49 (L) 12/02/2015     Lab Results   Component Value Date    LDLCALC 85 05/04/2018    LDLCALC 90 12/15/2017    LDLCALC 110 12/02/2015     Lab Results   Component Value Date    LABVLDL 37 10/14/2014    LABVLDL 30 12/20/2012    LABVLDL 50 12/13/2011       Review of Systems:   · Constitutional: there has been no unanticipated weight loss. There's been no change in energy level, sleep pattern, or activity level. · Eyes: No visual changes or diplopia. No scleral icterus. · ENT: No Headaches or vertigo. No mouth sores or sore throat. · Cardiovascular: Reviewed in HPI  · Respiratory: No cough or wheezing, no sputum production. No hemoptysis. · Gastrointestinal: No abdominal pain, appetite loss, blood in stools. No change in bowel or bladder habits. No hematemesis. · Genitourinary: No dysuria, trouble voiding, or hematuria. · Musculoskeletal:  No gait disturbance, weakness. · Integumentary: No rash or pruritis. · Neurological: No headache, diplopia, change in muscle strength, numbness or tingling. No change in gait, balance, coordination, mood, affect, memory, mentation, behavior. · Psychiatric: No anxiety, no depression. · Endocrine: No malaise, fatigue or temperature intolerance. No excessive thirst, fluid intake, or urination. No tremor. · Hematologic/Lymphatic: No abnormal bruising or bleeding, blood clots or swollen lymph nodes. Physical Examination:    VITALS:    /80 (Site: Left Arm, Position: Sitting, Cuff Size: Medium Adult)   Pulse (!) 48   Ht 5' 10\" (1.778 m)   Wt 168 lb (76.2 kg)   BMI 24.11 kg/m²     CONSTITUTIONAL: Cooperative, no apparent distress, patient is Obese  NEUROLOGIC:  Awake and orientated to person, place and time. PSYCH: Calm affect. SKIN: Warm and dry.   HEENT: Sclera non-icteric, normocephalic  NECK:  neck supple, no elevation of JVP, normal carotid pulses months  Follow  Up in 3-4 months      Thank you for allowing me to participate in the care of Juan A Mathis. Festus Carter M.D., [facility]      Scribe Attestation:  I, Elizabeth Viveros, am scribing for and in the presence of Jay Cornejo MD.   Loretta Mariscal 08/03/18 2:49 PM   Provider Mat Oconnell is working as a scribe for and in the presence of cydney Cornejo MD). Working as a scribe, Elizabeth Viveros may have prepopulated components of this note with my historical  intellectual property under my direct supervision. Any additions to this intellectual property were performed in my presence and at my direction. Furthermore, the content and accuracy of this note have been reviewed by cydney Cornejo MD).

## 2018-08-15 ENCOUNTER — TELEPHONE (OUTPATIENT)
Dept: CARDIOLOGY CLINIC | Age: 73
End: 2018-08-15

## 2018-08-15 NOTE — TELEPHONE ENCOUNTER
Pt calling would like a pacemaker summary for the [facility]. Please fax to [phone]!

## 2018-09-10 ENCOUNTER — ANTI-COAG VISIT (OUTPATIENT)
Dept: CARDIOLOGY CLINIC | Age: 73
End: 2018-09-10

## 2018-09-10 LAB — INR BLD: 1.8

## 2018-09-14 ENCOUNTER — TELEPHONE (OUTPATIENT)
Dept: CARDIOLOGY CLINIC | Age: 73
End: 2018-09-14

## 2018-09-19 DIAGNOSIS — I25.10 CORONARY ARTERY DISEASE INVOLVING NATIVE CORONARY ARTERY OF NATIVE HEART WITHOUT ANGINA PECTORIS: Chronic | ICD-10-CM

## 2018-09-19 DIAGNOSIS — I48.0 PAROXYSMAL ATRIAL FIBRILLATION (HCC): Chronic | ICD-10-CM

## 2018-09-19 RX ORDER — WARFARIN SODIUM 5 MG/1
TABLET ORAL
Qty: 180 TABLET | Refills: 3 | Status: SHIPPED | OUTPATIENT
Start: 2018-09-19 | End: 2019-01-07 | Stop reason: SDUPTHER

## 2018-09-25 ENCOUNTER — ANTI-COAG VISIT (OUTPATIENT)
Dept: CARDIOLOGY CLINIC | Age: 73
End: 2018-09-25

## 2018-09-25 DIAGNOSIS — I48.91 ATRIAL FIBRILLATION, UNSPECIFIED TYPE (HCC): ICD-10-CM

## 2018-10-16 ENCOUNTER — NURSE ONLY (OUTPATIENT)
Dept: CARDIOLOGY CLINIC | Age: 73
End: 2018-10-16
Payer: MEDICARE

## 2018-10-16 DIAGNOSIS — I25.5 ISCHEMIC CARDIOMYOPATHY: Chronic | ICD-10-CM

## 2018-10-16 DIAGNOSIS — Z95.810 ICD (IMPLANTABLE CARDIOVERTER-DEFIBRILLATOR) IN PLACE: ICD-10-CM

## 2018-10-16 PROCEDURE — 93296 REM INTERROG EVL PM/IDS: CPT | Performed by: INTERNAL MEDICINE

## 2018-10-16 PROCEDURE — 93295 DEV INTERROG REMOTE 1/2/MLT: CPT | Performed by: INTERNAL MEDICINE

## 2018-11-05 DIAGNOSIS — I25.10 CORONARY ARTERY DISEASE INVOLVING NATIVE CORONARY ARTERY OF NATIVE HEART WITHOUT ANGINA PECTORIS: ICD-10-CM

## 2018-11-05 RX ORDER — ATORVASTATIN CALCIUM 80 MG/1
TABLET, FILM COATED ORAL
Qty: 90 TABLET | Refills: 2 | Status: ON HOLD | OUTPATIENT
Start: 2018-11-05 | End: 2019-01-25 | Stop reason: HOSPADM

## 2018-11-09 ENCOUNTER — ANTI-COAG VISIT (OUTPATIENT)
Dept: CARDIOLOGY CLINIC | Age: 73
End: 2018-11-09

## 2018-11-09 LAB — INR BLD: 3.1

## 2018-11-12 ENCOUNTER — ANTI-COAG VISIT (OUTPATIENT)
Dept: CARDIOLOGY CLINIC | Age: 73
End: 2018-11-12

## 2018-11-12 ENCOUNTER — TELEPHONE (OUTPATIENT)
Dept: CARDIOLOGY CLINIC | Age: 73
End: 2018-11-12

## 2018-11-12 NOTE — TELEPHONE ENCOUNTER
Pt calling went out of rhythm over the weekend and wants to have a CV done today if possible? Pt INR on Fri was 3.1.  Pls call to advise Thank you

## 2018-11-13 ENCOUNTER — HOSPITAL ENCOUNTER (OUTPATIENT)
Dept: CARDIAC CATH/INVASIVE PROCEDURES | Age: 73
Discharge: HOME | End: 2018-11-13
Attending: INTERNAL MEDICINE | Admitting: INTERNAL MEDICINE
Payer: MEDICARE

## 2018-11-13 VITALS — HEIGHT: 70 IN | BODY MASS INDEX: 24.34 KG/M2 | WEIGHT: 170 LBS

## 2018-11-13 LAB
EKG ATRIAL RATE: 51 BPM
EKG ATRIAL RATE: 86 BPM
EKG ATRIAL RATE: 97 BPM
EKG DIAGNOSIS: NORMAL
EKG P AXIS: 48 DEGREES
EKG P AXIS: 98 DEGREES
EKG P-R INTERVAL: 182 MS
EKG P-R INTERVAL: 188 MS
EKG Q-T INTERVAL: 342 MS
EKG Q-T INTERVAL: 410 MS
EKG Q-T INTERVAL: 446 MS
EKG QRS DURATION: 82 MS
EKG QRS DURATION: 82 MS
EKG QRS DURATION: 86 MS
EKG QTC CALCULATION (BAZETT): 411 MS
EKG QTC CALCULATION (BAZETT): 419 MS
EKG QTC CALCULATION (BAZETT): 434 MS
EKG R AXIS: -1 DEGREES
EKG R AXIS: -32 DEGREES
EKG R AXIS: -36 DEGREES
EKG T AXIS: 29 DEGREES
EKG T AXIS: 33 DEGREES
EKG T AXIS: 81 DEGREES
EKG VENTRICULAR RATE: 51 BPM
EKG VENTRICULAR RATE: 63 BPM
EKG VENTRICULAR RATE: 97 BPM
GFR AFRICAN AMERICAN: >60
GFR NON-AFRICAN AMERICAN: >60
GLUCOSE BLD-MCNC: 94 MG/DL (ref 70–99)
INR BLD: 3 (ref 0.85–1.15)
PERFORMED ON: ABNORMAL
POC BUN: 23 MG/DL (ref 7–18)
POC CREATININE: 1 MG/DL (ref 0.8–1.3)
POC HEMATOCRIT: 42 % (ref 41–53)
POC POTASSIUM: 4.3 MMOL/L (ref 3.5–5.1)
POC SAMPLE TYPE: ABNORMAL
POC SODIUM: 141 MMOL/L (ref 136–145)

## 2018-11-13 PROCEDURE — 7100000010 HC PHASE II RECOVERY - FIRST 15 MIN

## 2018-11-13 PROCEDURE — 2500000003 HC RX 250 WO HCPCS

## 2018-11-13 PROCEDURE — 84520 ASSAY OF UREA NITROGEN: CPT

## 2018-11-13 PROCEDURE — 7100000011 HC PHASE II RECOVERY - ADDTL 15 MIN

## 2018-11-13 PROCEDURE — 92960 CARDIOVERSION ELECTRIC EXT: CPT | Performed by: INTERNAL MEDICINE

## 2018-11-13 PROCEDURE — 85014 HEMATOCRIT: CPT

## 2018-11-13 PROCEDURE — 93010 ELECTROCARDIOGRAM REPORT: CPT | Performed by: INTERNAL MEDICINE

## 2018-11-13 PROCEDURE — 85610 PROTHROMBIN TIME: CPT

## 2018-11-13 PROCEDURE — 84295 ASSAY OF SERUM SODIUM: CPT

## 2018-11-13 PROCEDURE — 82947 ASSAY GLUCOSE BLOOD QUANT: CPT

## 2018-11-13 PROCEDURE — 84132 ASSAY OF SERUM POTASSIUM: CPT

## 2018-11-13 PROCEDURE — 93005 ELECTROCARDIOGRAM TRACING: CPT | Performed by: INTERNAL MEDICINE

## 2018-11-13 PROCEDURE — 82565 ASSAY OF CREATININE: CPT

## 2018-11-13 NOTE — H&P
[address]   Electrophysiology   Date: 11/13/2018  I had the privilege of visiting Neva Bence Day in the office. CC: Cardiomyopathy,   HPI: Neva Bence Day is a 72 y.o. history of CAD s/p CABG in the past, paroxysmal atrial fibrillation, recent hospitalization for NSTEMI and elevated troponin on 10/13/2017 and was taken to the cath lab and noted to have occluded vein graft. He had two episodes of VF and had to be defibrillated. Later he had recurrent episodes of NSVT. His ejection fraction was reduced on initial cardiac cath. No options for revascularization. LIMA graft to the LAD as well as the saphenous vein graft to the RCA were occluded and revascularization was not successful.  He was on Tikosyn for PAF which was stopped. He could not tolerate Amiodarone in the past due to bradycardia. [facility], planning for EPS/risk stratification after his acute phase of MI. He is now following with [facility] and plan is for CABG there. He is here for cardioversion. Has been in afib for the past few days. Past Medical History:   Diagnosis Date    Arthritis     shoulders and knee    Atrial fib/flut     CAD (coronary artery disease)     Hyperlipidemia     Hypertension     Seizures ([address]     last seizures many years ago, 10 years ago    Sinus bradycardia         Past Surgical History:   Procedure Laterality Date    CARDIAC SURGERY      , angioplasty 2007    CORONARY ANGIOPLASTY WITH STENT PLACEMENT  1997    HERNIA REPAIR Right     done 72 sam ago    PACEMAKER PLACEMENT  12/18/2017    WISDOM TOOTH EXTRACTION  April 2012       Allergies:  No Known Allergies    Social History:  Reviewed. reports that he quit smoking about 28 years ago. He has a 30.00 pack-year smoking history. He has never used smokeless tobacco. He reports that he does not use drugs. Family History:  Reviewed. family history includes Heart Failure in his mother.      Review of System:  All other the saphenous vein graft to the RCA  3. The LAD is filled via retrograde flow from the diagonal which is grafted  4. Patent sequential SVG to D2 and 0M3 but with moderate disease  5. Moderate LV systolic dysfunction  6. Ventricular arrhythmia which appears to be triggered by the temporary pacemaker possibly due to ischemic/injured myocardium     Plan:  1. Continue aspirin  2. Plavix  3. We'll need to resume anticoagulation with Coumadin at some point but would like EP input regarding that as well as recommendations regarding management of ventricular arrhythmia  4. Attempted medical management of his CAD but in terms of long-term planning will need a plan should failure of the sequential SVG occur    Medication:  No current facility-administered medications for this encounter. Assessment and plan:     - Persistent atrial fibrillation:    Discussed cardioversion with patient. Risks, benefits and alternative of procedure were explained. All questions answered. Patient understood and would like to proceed. Would like to proceed. - Ischemic cardiomyopathy   S/p  ICD       - CAD: history of MI. Recent cardiac cath with no revascularization.    - On Plavix and coumadin.    - On BB and ACE-I    - Lipitor. Thank you for allowing me to participate in the care of Ofelia Baum. Further evaluation will be based upon the patient's clinical course and testing results. All questions and concerns were addressed to the patient/family. Alternatives to my treatment were discussed. I have discussed the above stated plan and the patient verbalized understanding and agreed with the plan.     Dena Paredes MD, MPH  [facility]   Office: [phone]

## 2018-11-13 NOTE — PRE SEDATION
Sedation Pre-Procedure Note    Patient Name: Sharma Holstein Day   YOB: 1945  Room/Bed: Cath/NONE  Medical Record Number: [id number]  Date: 11/13/2018   Time: 1:11 PM       Indication:  Atrial fibrillation     Consent: I have discussed with the patient and/or the patient representative the indication, alternatives, and the possible risks and/or complications of the planned procedure and the anesthesia methods. The patient and/or patient representative appear to understand and agree to proceed. Vital Signs: There were no vitals filed for this visit. Past Medical History:   has a past medical history of Arthritis; Atrial fib/flut; CAD (coronary artery disease); Hyperlipidemia; Hypertension; Seizures ([facility] [address] and Sinus bradycardia. Past Surgical History:   has a past surgical history that includes hernia repair (Right); Coronary angioplasty with stent (1997); [place] tooth extraction (April 2012); Cardiac surgery; and pacemaker placement (12/18/2017). Medications:   Scheduled Meds:   Continuous Infusions:   PRN Meds:   Home Meds:   Prior to Admission medications    Medication Sig Start Date End Date Taking?  Authorizing Provider   ezetimibe (ZETIA) 10 MG tablet Take 1 tablet by mouth daily 8/3/18  Yes Yan Das MD   warfarin (COUMADIN) 5 MG tablet Take 1 tablet by mouth daily 4/11/18  Yes Yan Das MD   sotalol (BETAPACE) 80 MG tablet Take 1 tablet by mouth 2 times daily 3/19/18  Yes Yan Das MD   isosorbide mononitrate (IMDUR) 30 MG extended release tablet If BP <105, do not take, otherwise one QD. 3/19/18  Yes Yan Das MD   atorvastatin (LIPITOR) 80 MG tablet Take 1 tablet by mouth daily 1/10/18  Yes BETO Freeman CNP   clopidogrel (PLAVIX) 75 MG tablet Take 1 tablet by mouth daily 1/10/18  Yes BETO Freeman CNP   atorvastatin (LIPITOR) 80 MG tablet TAKE 1 TABLET BY MOUTH ONE TIME DAILY  11/5/18   Yan Das MD   warfarin (COUMADIN) 5 MG tablet

## 2018-11-21 ENCOUNTER — TELEPHONE (OUTPATIENT)
Dept: CARDIOLOGY CLINIC | Age: 73
End: 2018-11-21

## 2018-11-21 DIAGNOSIS — I48.0 PAF (PAROXYSMAL ATRIAL FIBRILLATION) (HCC): Primary | Chronic | ICD-10-CM

## 2018-11-21 DIAGNOSIS — I47.29 NSVT (NONSUSTAINED VENTRICULAR TACHYCARDIA): ICD-10-CM

## 2018-11-21 DIAGNOSIS — I48.91 ATRIAL FIBRILLATION, UNSPECIFIED TYPE (HCC): Chronic | ICD-10-CM

## 2018-11-21 NOTE — TELEPHONE ENCOUNTER
Pt calling needs INR lab orders faxed to [facility] [phone].  Pls call to advise Thank you

## 2018-11-26 ENCOUNTER — ANTI-COAG VISIT (OUTPATIENT)
Dept: CARDIOLOGY CLINIC | Age: 73
End: 2018-11-26

## 2018-11-26 LAB — INR BLD: 2.3

## 2018-12-11 ENCOUNTER — TELEPHONE (OUTPATIENT)
Dept: CARDIOLOGY CLINIC | Age: 73
End: 2018-12-11

## 2018-12-11 DIAGNOSIS — I25.10 CORONARY ARTERY DISEASE INVOLVING NATIVE CORONARY ARTERY OF NATIVE HEART WITHOUT ANGINA PECTORIS: Chronic | ICD-10-CM

## 2018-12-11 DIAGNOSIS — I48.0 PAF (PAROXYSMAL ATRIAL FIBRILLATION) (HCC): Chronic | ICD-10-CM

## 2018-12-11 DIAGNOSIS — E78.2 MIXED HYPERLIPIDEMIA: Chronic | ICD-10-CM

## 2018-12-11 DIAGNOSIS — I25.5 ISCHEMIC CARDIOMYOPATHY: Primary | Chronic | ICD-10-CM

## 2018-12-11 DIAGNOSIS — I10 ESSENTIAL HYPERTENSION: Chronic | ICD-10-CM

## 2018-12-13 DIAGNOSIS — I25.10 CORONARY ARTERY DISEASE INVOLVING NATIVE CORONARY ARTERY OF NATIVE HEART WITHOUT ANGINA PECTORIS: ICD-10-CM

## 2018-12-13 DIAGNOSIS — E78.2 MIXED HYPERLIPIDEMIA: ICD-10-CM

## 2018-12-13 DIAGNOSIS — I48.0 PAF (PAROXYSMAL ATRIAL FIBRILLATION) (HCC): Chronic | ICD-10-CM

## 2018-12-17 ENCOUNTER — TELEPHONE (OUTPATIENT)
Dept: CARDIOLOGY CLINIC | Age: 73
End: 2018-12-17

## 2018-12-17 DIAGNOSIS — I25.10 CORONARY ARTERY DISEASE INVOLVING NATIVE CORONARY ARTERY OF NATIVE HEART WITHOUT ANGINA PECTORIS: Primary | Chronic | ICD-10-CM

## 2018-12-17 DIAGNOSIS — E78.2 MIXED HYPERLIPIDEMIA: Chronic | ICD-10-CM

## 2018-12-17 RX ORDER — CLOPIDOGREL BISULFATE 75 MG/1
TABLET ORAL
Qty: 90 TABLET | Refills: 3 | Status: SHIPPED | OUTPATIENT
Start: 2018-12-17 | End: 2019-02-14 | Stop reason: ALTCHOICE

## 2019-01-07 DIAGNOSIS — I48.0 PAROXYSMAL ATRIAL FIBRILLATION (HCC): Chronic | ICD-10-CM

## 2019-01-07 DIAGNOSIS — E78.2 MIXED HYPERLIPIDEMIA: ICD-10-CM

## 2019-01-07 DIAGNOSIS — I48.0 PAF (PAROXYSMAL ATRIAL FIBRILLATION) (HCC): Chronic | ICD-10-CM

## 2019-01-07 DIAGNOSIS — I25.10 CORONARY ARTERY DISEASE INVOLVING NATIVE CORONARY ARTERY OF NATIVE HEART WITHOUT ANGINA PECTORIS: Chronic | ICD-10-CM

## 2019-01-07 RX ORDER — ISOSORBIDE MONONITRATE 30 MG/1
TABLET, EXTENDED RELEASE ORAL
Qty: 90 TABLET | Refills: 3 | Status: ON HOLD | OUTPATIENT
Start: 2019-01-07 | End: 2019-01-25 | Stop reason: HOSPADM

## 2019-01-07 RX ORDER — EZETIMIBE 10 MG/1
10 TABLET ORAL DAILY
Qty: 30 TABLET | Refills: 11 | Status: SHIPPED | OUTPATIENT
Start: 2019-01-07 | End: 2019-05-22 | Stop reason: SDUPTHER

## 2019-01-07 RX ORDER — WARFARIN SODIUM 5 MG/1
TABLET ORAL
Qty: 180 TABLET | Refills: 3 | Status: ON HOLD | OUTPATIENT
Start: 2019-01-07 | End: 2019-01-25 | Stop reason: HOSPADM

## 2019-01-07 RX ORDER — SOTALOL HYDROCHLORIDE 80 MG/1
80 TABLET ORAL 2 TIMES DAILY
Qty: 180 TABLET | Refills: 3 | Status: ON HOLD | OUTPATIENT
Start: 2019-01-07 | End: 2019-01-25 | Stop reason: HOSPADM

## 2019-01-21 ENCOUNTER — ANTI-COAG VISIT (OUTPATIENT)
Dept: CARDIOLOGY CLINIC | Age: 74
End: 2019-01-21

## 2019-01-21 LAB — INR BLD: 3.5

## 2019-01-22 ENCOUNTER — NURSE ONLY (OUTPATIENT)
Dept: CARDIOLOGY CLINIC | Age: 74
End: 2019-01-22
Payer: MEDICARE

## 2019-01-22 DIAGNOSIS — Z95.810 ICD (IMPLANTABLE CARDIOVERTER-DEFIBRILLATOR) IN PLACE: ICD-10-CM

## 2019-01-22 DIAGNOSIS — I25.5 ISCHEMIC CARDIOMYOPATHY: Chronic | ICD-10-CM

## 2019-01-22 PROCEDURE — 93295 DEV INTERROG REMOTE 1/2/MLT: CPT | Performed by: INTERNAL MEDICINE

## 2019-01-22 PROCEDURE — 93296 REM INTERROG EVL PM/IDS: CPT | Performed by: INTERNAL MEDICINE

## 2019-01-24 ENCOUNTER — HOSPITAL ENCOUNTER (OUTPATIENT)
Age: 74
Setting detail: OBSERVATION
LOS: 1 days | Discharge: HOME | End: 2019-01-25
Attending: EMERGENCY MEDICINE | Admitting: INTERNAL MEDICINE
Payer: MEDICARE

## 2019-01-24 ENCOUNTER — APPOINTMENT (OUTPATIENT)
Dept: GENERAL RADIOLOGY | Age: 74
End: 2019-01-24
Payer: MEDICARE

## 2019-01-24 DIAGNOSIS — I48.91 ATRIAL FIBRILLATION, UNSPECIFIED TYPE (HCC): ICD-10-CM

## 2019-01-24 DIAGNOSIS — I25.10 CORONARY ARTERY DISEASE INVOLVING NATIVE CORONARY ARTERY OF NATIVE HEART WITHOUT ANGINA PECTORIS: Chronic | ICD-10-CM

## 2019-01-24 DIAGNOSIS — I48.0 PAROXYSMAL ATRIAL FIBRILLATION (HCC): Chronic | ICD-10-CM

## 2019-01-24 DIAGNOSIS — J90 PLEURAL EFFUSION: ICD-10-CM

## 2019-01-24 DIAGNOSIS — R77.8 ELEVATED TROPONIN: ICD-10-CM

## 2019-01-24 DIAGNOSIS — R00.2 PALPITATIONS: Primary | ICD-10-CM

## 2019-01-24 PROCEDURE — 99285 EMERGENCY DEPT VISIT HI MDM: CPT

## 2019-01-24 PROCEDURE — 93005 ELECTROCARDIOGRAM TRACING: CPT | Performed by: PHYSICIAN ASSISTANT

## 2019-01-24 PROCEDURE — 71045 X-RAY EXAM CHEST 1 VIEW: CPT

## 2019-01-24 RX ORDER — FUROSEMIDE 20 MG/1
20 TABLET ORAL DAILY
COMMUNITY
End: 2019-02-04 | Stop reason: SDUPTHER

## 2019-01-24 RX ORDER — AMIODARONE HYDROCHLORIDE 200 MG/1
200 TABLET ORAL 3 TIMES DAILY
Status: ON HOLD | COMMUNITY
End: 2019-01-25 | Stop reason: HOSPADM

## 2019-01-24 RX ORDER — SENNA AND DOCUSATE SODIUM 50; 8.6 MG/1; MG/1
1 TABLET, FILM COATED ORAL DAILY
COMMUNITY
End: 2019-02-13 | Stop reason: SDUPTHER

## 2019-01-24 RX ORDER — POTASSIUM CITRATE 10 MEQ/1
10 TABLET, EXTENDED RELEASE ORAL
COMMUNITY
End: 2019-02-14 | Stop reason: ALTCHOICE

## 2019-01-25 VITALS
RESPIRATION RATE: 18 BRPM | TEMPERATURE: 98.5 F | WEIGHT: 175 LBS | BODY MASS INDEX: 25.05 KG/M2 | DIASTOLIC BLOOD PRESSURE: 64 MMHG | HEIGHT: 70 IN | SYSTOLIC BLOOD PRESSURE: 103 MMHG | OXYGEN SATURATION: 96 % | HEART RATE: 73 BPM

## 2019-01-25 LAB
A/G RATIO: 1.2 (ref 1.1–2.2)
ALBUMIN SERPL-MCNC: 3.5 G/DL (ref 3.4–5)
ALP BLD-CCNC: 122 U/L (ref 40–129)
ALT SERPL-CCNC: 57 U/L (ref 10–40)
ANION GAP SERPL CALCULATED.3IONS-SCNC: 11 MMOL/L (ref 3–16)
AST SERPL-CCNC: 33 U/L (ref 15–37)
BASOPHILS ABSOLUTE: 0 K/UL (ref 0–0.2)
BASOPHILS RELATIVE PERCENT: 0.5 %
BILIRUB SERPL-MCNC: 0.3 MG/DL (ref 0–1)
BUN BLDV-MCNC: 25 MG/DL (ref 7–20)
CALCIUM SERPL-MCNC: 8.6 MG/DL (ref 8.3–10.6)
CHLORIDE BLD-SCNC: 102 MMOL/L (ref 99–110)
CO2: 24 MMOL/L (ref 21–32)
CREAT SERPL-MCNC: 1 MG/DL (ref 0.8–1.3)
EKG ATRIAL RATE: 92 BPM
EKG DIAGNOSIS: NORMAL
EKG Q-T INTERVAL: 300 MS
EKG QRS DURATION: 86 MS
EKG QTC CALCULATION (BAZETT): 385 MS
EKG R AXIS: -4 DEGREES
EKG T AXIS: 167 DEGREES
EKG VENTRICULAR RATE: 99 BPM
EOSINOPHILS ABSOLUTE: 0.3 K/UL (ref 0–0.6)
EOSINOPHILS RELATIVE PERCENT: 2.9 %
GFR AFRICAN AMERICAN: >60
GFR NON-AFRICAN AMERICAN: >60
GLOBULIN: 3 G/DL
GLUCOSE BLD-MCNC: 112 MG/DL (ref 70–99)
HCT VFR BLD CALC: 36.8 % (ref 40.5–52.5)
HEMOGLOBIN: 12 G/DL (ref 13.5–17.5)
INR BLD: 3.42 (ref 0.86–1.14)
INR BLD: 3.6 (ref 0.86–1.14)
LYMPHOCYTES ABSOLUTE: 1.3 K/UL (ref 1–5.1)
LYMPHOCYTES RELATIVE PERCENT: 13 %
MCH RBC QN AUTO: 30.2 PG (ref 26–34)
MCHC RBC AUTO-ENTMCNC: 32.5 G/DL (ref 31–36)
MCV RBC AUTO: 92.9 FL (ref 80–100)
MONOCYTES ABSOLUTE: 0.9 K/UL (ref 0–1.3)
MONOCYTES RELATIVE PERCENT: 8.9 %
NEUTROPHILS ABSOLUTE: 7.6 K/UL (ref 1.7–7.7)
NEUTROPHILS RELATIVE PERCENT: 74.7 %
PDW BLD-RTO: 14.3 % (ref 12.4–15.4)
PLATELET # BLD: 604 K/UL (ref 135–450)
PMV BLD AUTO: 7.3 FL (ref 5–10.5)
POTASSIUM REFLEX MAGNESIUM: 4.7 MMOL/L (ref 3.5–5.1)
PRO-BNP: 1224 PG/ML (ref 0–124)
PROTHROMBIN TIME: 39 SEC (ref 9.8–13)
PROTHROMBIN TIME: 41 SEC (ref 9.8–13)
RBC # BLD: 3.96 M/UL (ref 4.2–5.9)
SODIUM BLD-SCNC: 137 MMOL/L (ref 136–145)
TOTAL PROTEIN: 6.5 G/DL (ref 6.4–8.2)
TROPONIN: 0.05 NG/ML
WBC # BLD: 10.1 K/UL (ref 4–11)

## 2019-01-25 PROCEDURE — 93010 ELECTROCARDIOGRAM REPORT: CPT | Performed by: INTERNAL MEDICINE

## 2019-01-25 PROCEDURE — 2500000003 HC RX 250 WO HCPCS

## 2019-01-25 PROCEDURE — 80053 COMPREHEN METABOLIC PANEL: CPT

## 2019-01-25 PROCEDURE — 83880 ASSAY OF NATRIURETIC PEPTIDE: CPT

## 2019-01-25 PROCEDURE — 2580000003 HC RX 258: Performed by: INTERNAL MEDICINE

## 2019-01-25 PROCEDURE — 85610 PROTHROMBIN TIME: CPT

## 2019-01-25 PROCEDURE — 92960 CARDIOVERSION ELECTRIC EXT: CPT | Performed by: INTERNAL MEDICINE

## 2019-01-25 PROCEDURE — 6370000000 HC RX 637 (ALT 250 FOR IP): Performed by: INTERNAL MEDICINE

## 2019-01-25 PROCEDURE — G0378 HOSPITAL OBSERVATION PER HR: HCPCS

## 2019-01-25 PROCEDURE — 85025 COMPLETE CBC W/AUTO DIFF WBC: CPT

## 2019-01-25 PROCEDURE — 6370000000 HC RX 637 (ALT 250 FOR IP): Performed by: EMERGENCY MEDICINE

## 2019-01-25 PROCEDURE — 84484 ASSAY OF TROPONIN QUANT: CPT

## 2019-01-25 PROCEDURE — 6360000002 HC RX W HCPCS: Performed by: INTERNAL MEDICINE

## 2019-01-25 RX ORDER — METOPROLOL SUCCINATE 25 MG/1
25 TABLET, EXTENDED RELEASE ORAL DAILY
Qty: 30 TABLET | Refills: 3 | Status: SHIPPED | OUTPATIENT
Start: 2019-01-26 | End: 2019-02-20 | Stop reason: SDUPTHER

## 2019-01-25 RX ORDER — SODIUM CHLORIDE 0.9 % (FLUSH) 0.9 %
10 SYRINGE (ML) INJECTION EVERY 12 HOURS SCHEDULED
Status: DISCONTINUED | OUTPATIENT
Start: 2019-01-25 | End: 2019-01-25 | Stop reason: HOSPADM

## 2019-01-25 RX ORDER — ASPIRIN 81 MG/1
324 TABLET, CHEWABLE ORAL ONCE
Status: COMPLETED | OUTPATIENT
Start: 2019-01-25 | End: 2019-01-25

## 2019-01-25 RX ORDER — MORPHINE SULFATE 4 MG/ML
4 INJECTION, SOLUTION INTRAMUSCULAR; INTRAVENOUS ONCE
Status: DISCONTINUED | OUTPATIENT
Start: 2019-01-25 | End: 2019-01-25

## 2019-01-25 RX ORDER — DIAZEPAM 5 MG/1
2.5 TABLET ORAL DAILY
Status: DISCONTINUED | OUTPATIENT
Start: 2019-01-25 | End: 2019-01-25 | Stop reason: HOSPADM

## 2019-01-25 RX ORDER — POTASSIUM CITRATE 10 MEQ/1
10 TABLET, EXTENDED RELEASE ORAL
Status: DISCONTINUED | OUTPATIENT
Start: 2019-01-25 | End: 2019-01-25 | Stop reason: HOSPADM

## 2019-01-25 RX ORDER — AMIODARONE HYDROCHLORIDE 200 MG/1
200 TABLET ORAL DAILY
Status: DISCONTINUED | OUTPATIENT
Start: 2019-01-25 | End: 2019-01-25 | Stop reason: HOSPADM

## 2019-01-25 RX ORDER — SODIUM CHLORIDE 0.9 % (FLUSH) 0.9 %
10 SYRINGE (ML) INJECTION PRN
Status: DISCONTINUED | OUTPATIENT
Start: 2019-01-25 | End: 2019-01-25 | Stop reason: HOSPADM

## 2019-01-25 RX ORDER — AMIODARONE HYDROCHLORIDE 200 MG/1
200 TABLET ORAL DAILY
Qty: 30 TABLET | Refills: 3 | Status: SHIPPED | OUTPATIENT
Start: 2019-01-26 | End: 2019-02-20 | Stop reason: SDUPTHER

## 2019-01-25 RX ORDER — FUROSEMIDE 20 MG/1
20 TABLET ORAL DAILY
Status: DISCONTINUED | OUTPATIENT
Start: 2019-01-25 | End: 2019-01-25 | Stop reason: HOSPADM

## 2019-01-25 RX ORDER — WARFARIN SODIUM 2.5 MG/1
2.5 TABLET ORAL
Status: DISCONTINUED | OUTPATIENT
Start: 2019-01-28 | End: 2019-01-25 | Stop reason: HOSPADM

## 2019-01-25 RX ORDER — ONDANSETRON 2 MG/ML
4 INJECTION INTRAMUSCULAR; INTRAVENOUS EVERY 6 HOURS PRN
Status: DISCONTINUED | OUTPATIENT
Start: 2019-01-25 | End: 2019-01-25

## 2019-01-25 RX ORDER — METOPROLOL SUCCINATE 25 MG/1
25 TABLET, EXTENDED RELEASE ORAL DAILY
Status: DISCONTINUED | OUTPATIENT
Start: 2019-01-25 | End: 2019-01-25 | Stop reason: HOSPADM

## 2019-01-25 RX ORDER — PHENYTOIN SODIUM 100 MG/1
100 CAPSULE, EXTENDED RELEASE ORAL 2 TIMES DAILY
Status: DISCONTINUED | OUTPATIENT
Start: 2019-01-25 | End: 2019-01-25 | Stop reason: HOSPADM

## 2019-01-25 RX ORDER — WARFARIN SODIUM 5 MG/1
5 TABLET ORAL
Status: DISCONTINUED | OUTPATIENT
Start: 2019-01-26 | End: 2019-01-25 | Stop reason: HOSPADM

## 2019-01-25 RX ORDER — ATORVASTATIN CALCIUM 80 MG/1
80 TABLET, FILM COATED ORAL NIGHTLY
Status: DISCONTINUED | OUTPATIENT
Start: 2019-01-25 | End: 2019-01-25 | Stop reason: HOSPADM

## 2019-01-25 RX ADMIN — DIAZEPAM 2.5 MG: 5 TABLET ORAL at 09:44

## 2019-01-25 RX ADMIN — AMIODARONE HYDROCHLORIDE 150 MG: 50 INJECTION, SOLUTION INTRAVENOUS at 01:13

## 2019-01-25 RX ADMIN — POTASSIUM CITRATE 10 MEQ: 10 TABLET ORAL at 13:09

## 2019-01-25 RX ADMIN — ASPIRIN 81 MG 324 MG: 81 TABLET ORAL at 01:05

## 2019-01-25 RX ADMIN — AMIODARONE HYDROCHLORIDE 1 MG/MIN: 50 INJECTION, SOLUTION INTRAVENOUS at 01:29

## 2019-01-25 RX ADMIN — FUROSEMIDE 20 MG: 20 TABLET ORAL at 13:08

## 2019-01-25 RX ADMIN — Medication 10 ML: at 09:44

## 2019-01-25 RX ADMIN — METOPROLOL SUCCINATE 25 MG: 25 TABLET, FILM COATED, EXTENDED RELEASE ORAL at 09:42

## 2019-01-25 RX ADMIN — PHENYTOIN SODIUM 100 MG: 100 CAPSULE ORAL at 09:42

## 2019-01-25 RX ADMIN — AMIODARONE HYDROCHLORIDE 200 MG: 200 TABLET ORAL at 09:42

## 2019-01-25 ASSESSMENT — ENCOUNTER SYMPTOMS
SHORTNESS OF BREATH: 0
VOMITING: 0
COUGH: 0
BACK PAIN: 0
CONSTIPATION: 0
NAUSEA: 0
COLOR CHANGE: 0
DIARRHEA: 0
ABDOMINAL PAIN: 0

## 2019-01-25 ASSESSMENT — PAIN SCALES - GENERAL: PAINLEVEL_OUTOF10: 0

## 2019-01-28 ENCOUNTER — TELEPHONE (OUTPATIENT)
Dept: CARDIOLOGY CLINIC | Age: 74
End: 2019-01-28

## 2019-01-28 ENCOUNTER — ANTI-COAG VISIT (OUTPATIENT)
Dept: CARDIOLOGY CLINIC | Age: 74
End: 2019-01-28

## 2019-01-28 LAB — INR BLD: 4.1

## 2019-01-31 ENCOUNTER — TELEPHONE (OUTPATIENT)
Dept: CARDIOLOGY CLINIC | Age: 74
End: 2019-01-31

## 2019-01-31 DIAGNOSIS — I25.10 CORONARY ARTERY DISEASE INVOLVING NATIVE CORONARY ARTERY OF NATIVE HEART WITHOUT ANGINA PECTORIS: Chronic | ICD-10-CM

## 2019-01-31 DIAGNOSIS — R00.2 PALPITATIONS: ICD-10-CM

## 2019-01-31 DIAGNOSIS — Z95.810 S/P IMPLANTATION OF AUTOMATIC CARDIOVERTER/DEFIBRILLATOR (AICD): Chronic | ICD-10-CM

## 2019-01-31 DIAGNOSIS — I48.0 PAF (PAROXYSMAL ATRIAL FIBRILLATION) (HCC): Primary | Chronic | ICD-10-CM

## 2019-02-01 ENCOUNTER — ANTI-COAG VISIT (OUTPATIENT)
Dept: CARDIOLOGY CLINIC | Age: 74
End: 2019-02-01

## 2019-02-01 LAB — INR BLD: 3.7

## 2019-02-04 ENCOUNTER — TELEPHONE (OUTPATIENT)
Dept: CARDIOLOGY CLINIC | Age: 74
End: 2019-02-04

## 2019-02-04 ENCOUNTER — OFFICE VISIT (OUTPATIENT)
Dept: CARDIOLOGY CLINIC | Age: 74
End: 2019-02-04
Payer: MEDICARE

## 2019-02-04 ENCOUNTER — NURSE ONLY (OUTPATIENT)
Dept: CARDIOLOGY CLINIC | Age: 74
End: 2019-02-04
Payer: MEDICARE

## 2019-02-04 VITALS
HEIGHT: 70 IN | SYSTOLIC BLOOD PRESSURE: 130 MMHG | DIASTOLIC BLOOD PRESSURE: 66 MMHG | BODY MASS INDEX: 24.62 KG/M2 | HEART RATE: 86 BPM | WEIGHT: 172 LBS

## 2019-02-04 DIAGNOSIS — I25.5 ISCHEMIC CARDIOMYOPATHY: Primary | Chronic | ICD-10-CM

## 2019-02-04 DIAGNOSIS — I25.810 CORONARY ARTERY DISEASE INVOLVING CORONARY BYPASS GRAFT OF NATIVE HEART WITHOUT ANGINA PECTORIS: ICD-10-CM

## 2019-02-04 DIAGNOSIS — I48.19 PERSISTENT ATRIAL FIBRILLATION (HCC): Chronic | ICD-10-CM

## 2019-02-04 DIAGNOSIS — E78.2 MIXED HYPERLIPIDEMIA: Chronic | ICD-10-CM

## 2019-02-04 DIAGNOSIS — I25.5 ISCHEMIC CARDIOMYOPATHY: Chronic | ICD-10-CM

## 2019-02-04 DIAGNOSIS — I10 ESSENTIAL HYPERTENSION: Chronic | ICD-10-CM

## 2019-02-04 DIAGNOSIS — Z95.810 ICD (IMPLANTABLE CARDIOVERTER-DEFIBRILLATOR) IN PLACE: ICD-10-CM

## 2019-02-04 DIAGNOSIS — R06.02 SHORTNESS OF BREATH: Chronic | ICD-10-CM

## 2019-02-04 PROCEDURE — G8598 ASA/ANTIPLAT THER USED: HCPCS | Performed by: NURSE PRACTITIONER

## 2019-02-04 PROCEDURE — 93000 ELECTROCARDIOGRAM COMPLETE: CPT | Performed by: NURSE PRACTITIONER

## 2019-02-04 PROCEDURE — G8427 DOCREV CUR MEDS BY ELIG CLIN: HCPCS | Performed by: NURSE PRACTITIONER

## 2019-02-04 PROCEDURE — 1123F ACP DISCUSS/DSCN MKR DOCD: CPT | Performed by: NURSE PRACTITIONER

## 2019-02-04 PROCEDURE — G8420 CALC BMI NORM PARAMETERS: HCPCS | Performed by: NURSE PRACTITIONER

## 2019-02-04 PROCEDURE — 99214 OFFICE O/P EST MOD 30 MIN: CPT | Performed by: NURSE PRACTITIONER

## 2019-02-04 PROCEDURE — 93283 PRGRMG EVAL IMPLANTABLE DFB: CPT | Performed by: INTERNAL MEDICINE

## 2019-02-04 PROCEDURE — 1101F PT FALLS ASSESS-DOCD LE1/YR: CPT | Performed by: NURSE PRACTITIONER

## 2019-02-04 PROCEDURE — 1111F DSCHRG MED/CURRENT MED MERGE: CPT | Performed by: NURSE PRACTITIONER

## 2019-02-04 PROCEDURE — G8484 FLU IMMUNIZE NO ADMIN: HCPCS | Performed by: NURSE PRACTITIONER

## 2019-02-04 PROCEDURE — 3017F COLORECTAL CA SCREEN DOC REV: CPT | Performed by: NURSE PRACTITIONER

## 2019-02-04 PROCEDURE — 1036F TOBACCO NON-USER: CPT | Performed by: NURSE PRACTITIONER

## 2019-02-04 PROCEDURE — 4040F PNEUMOC VAC/ADMIN/RCVD: CPT | Performed by: NURSE PRACTITIONER

## 2019-02-04 RX ORDER — FUROSEMIDE 20 MG/1
20 TABLET ORAL DAILY
Qty: 60 TABLET | Refills: 3 | Status: SHIPPED | OUTPATIENT
Start: 2019-02-04 | End: 2019-03-04 | Stop reason: SDUPTHER

## 2019-02-04 RX ORDER — POTASSIUM CHLORIDE 20 MEQ/1
20 TABLET, EXTENDED RELEASE ORAL DAILY
Qty: 30 TABLET | Refills: 3 | Status: SHIPPED | OUTPATIENT
Start: 2019-02-04 | End: 2019-05-22

## 2019-02-07 ENCOUNTER — ANTI-COAG VISIT (OUTPATIENT)
Dept: CARDIOLOGY CLINIC | Age: 74
End: 2019-02-07

## 2019-02-07 LAB — INR BLD: 3

## 2019-02-13 RX ORDER — SENNA AND DOCUSATE SODIUM 50; 8.6 MG/1; MG/1
1 TABLET, FILM COATED ORAL DAILY
Qty: 90 TABLET | Refills: 3 | Status: SHIPPED | OUTPATIENT
Start: 2019-02-13 | End: 2019-08-08 | Stop reason: ALTCHOICE

## 2019-02-14 ENCOUNTER — OFFICE VISIT (OUTPATIENT)
Dept: CARDIOLOGY CLINIC | Age: 74
End: 2019-02-14
Payer: MEDICARE

## 2019-02-14 VITALS
HEIGHT: 70 IN | DIASTOLIC BLOOD PRESSURE: 72 MMHG | HEART RATE: 63 BPM | SYSTOLIC BLOOD PRESSURE: 122 MMHG | WEIGHT: 170 LBS | OXYGEN SATURATION: 94 % | BODY MASS INDEX: 24.34 KG/M2

## 2019-02-14 DIAGNOSIS — E78.2 MIXED HYPERLIPIDEMIA: Chronic | ICD-10-CM

## 2019-02-14 DIAGNOSIS — I25.84 CORONARY ATHEROSCLEROSIS DUE TO CALCIFIED CORONARY LESION (CODE): ICD-10-CM

## 2019-02-14 DIAGNOSIS — I25.810 CORONARY ARTERY DISEASE INVOLVING AUTOLOGOUS ARTERY CORONARY BYPASS GRAFT WITHOUT ANGINA PECTORIS: Primary | ICD-10-CM

## 2019-02-14 DIAGNOSIS — I50.9 CONGESTIVE HEART FAILURE, UNSPECIFIED HF CHRONICITY, UNSPECIFIED HEART FAILURE TYPE (HCC): ICD-10-CM

## 2019-02-14 DIAGNOSIS — I10 ESSENTIAL HYPERTENSION: Chronic | ICD-10-CM

## 2019-02-14 DIAGNOSIS — I48.0 PAF (PAROXYSMAL ATRIAL FIBRILLATION) (HCC): Chronic | ICD-10-CM

## 2019-02-14 PROCEDURE — G8484 FLU IMMUNIZE NO ADMIN: HCPCS | Performed by: INTERNAL MEDICINE

## 2019-02-14 PROCEDURE — 3017F COLORECTAL CA SCREEN DOC REV: CPT | Performed by: INTERNAL MEDICINE

## 2019-02-14 PROCEDURE — G8598 ASA/ANTIPLAT THER USED: HCPCS | Performed by: INTERNAL MEDICINE

## 2019-02-14 PROCEDURE — 1036F TOBACCO NON-USER: CPT | Performed by: INTERNAL MEDICINE

## 2019-02-14 PROCEDURE — 1111F DSCHRG MED/CURRENT MED MERGE: CPT | Performed by: INTERNAL MEDICINE

## 2019-02-14 PROCEDURE — G8420 CALC BMI NORM PARAMETERS: HCPCS | Performed by: INTERNAL MEDICINE

## 2019-02-14 PROCEDURE — 99214 OFFICE O/P EST MOD 30 MIN: CPT | Performed by: INTERNAL MEDICINE

## 2019-02-14 PROCEDURE — 1101F PT FALLS ASSESS-DOCD LE1/YR: CPT | Performed by: INTERNAL MEDICINE

## 2019-02-14 PROCEDURE — 1123F ACP DISCUSS/DSCN MKR DOCD: CPT | Performed by: INTERNAL MEDICINE

## 2019-02-14 PROCEDURE — G8427 DOCREV CUR MEDS BY ELIG CLIN: HCPCS | Performed by: INTERNAL MEDICINE

## 2019-02-14 PROCEDURE — 4040F PNEUMOC VAC/ADMIN/RCVD: CPT | Performed by: INTERNAL MEDICINE

## 2019-02-15 ENCOUNTER — ANTI-COAG VISIT (OUTPATIENT)
Dept: CARDIOLOGY CLINIC | Age: 74
End: 2019-02-15

## 2019-02-15 LAB
ALBUMIN SERPL-MCNC: 3.9 G/DL (ref 3.5–5)
ANION GAP SERPL CALCULATED.3IONS-SCNC: 13 MMOL/L (ref 6–18)
BUN BLDV-MCNC: 24 MG/DL (ref 8–26)
CALCIUM SERPL-MCNC: 9.2 MG/DL (ref 8.8–10.1)
CHLORIDE BLD-SCNC: 102 MEQ/L (ref 101–111)
CO2: 29 MMOL/L (ref 22–29)
CREAT SERPL-MCNC: 1.03 MG/DL (ref 0.64–1.27)
GFR AFRICAN AMERICAN: 82 ML/MIN/1.73 M2
GFR NON-AFRICAN AMERICAN: 71 ML/MIN/1.73 M2
GLUCOSE BLD-MCNC: 82 MG/DL (ref 70–99)
INR BLD: 3.8
PHOSPHORUS: 3.8 MG/DL (ref 2.7–4.5)
POTASSIUM SERPL-SCNC: 4.5 MEQ/L (ref 3.6–5.1)
SODIUM BLD-SCNC: 139 MEQ/L (ref 135–145)

## 2019-02-21 RX ORDER — AMIODARONE HYDROCHLORIDE 200 MG/1
200 TABLET ORAL DAILY
Qty: 90 TABLET | Refills: 3 | Status: SHIPPED | OUTPATIENT
Start: 2019-02-21 | End: 2019-05-21

## 2019-02-21 RX ORDER — METOPROLOL SUCCINATE 25 MG/1
25 TABLET, EXTENDED RELEASE ORAL DAILY
Qty: 90 TABLET | Refills: 3 | Status: SHIPPED | OUTPATIENT
Start: 2019-02-21 | End: 2019-05-22 | Stop reason: SDUPTHER

## 2019-02-22 ENCOUNTER — ANTI-COAG VISIT (OUTPATIENT)
Dept: CARDIOLOGY CLINIC | Age: 74
End: 2019-02-22

## 2019-02-22 LAB — INR BLD: 3.7

## 2019-02-28 ENCOUNTER — TELEPHONE (OUTPATIENT)
Dept: CARDIOLOGY CLINIC | Age: 74
End: 2019-02-28

## 2019-02-28 ENCOUNTER — HOSPITAL ENCOUNTER (OUTPATIENT)
Dept: GENERAL RADIOLOGY | Age: 74
Discharge: HOME | End: 2019-02-28
Payer: MEDICARE

## 2019-02-28 ENCOUNTER — HOSPITAL ENCOUNTER (OUTPATIENT)
Age: 74
Discharge: HOME | End: 2019-02-28
Payer: MEDICARE

## 2019-02-28 DIAGNOSIS — I50.9 CONGESTIVE HEART FAILURE, UNSPECIFIED HF CHRONICITY, UNSPECIFIED HEART FAILURE TYPE (HCC): ICD-10-CM

## 2019-02-28 PROCEDURE — 71046 X-RAY EXAM CHEST 2 VIEWS: CPT

## 2019-03-01 ENCOUNTER — ANTI-COAG VISIT (OUTPATIENT)
Dept: CARDIOLOGY CLINIC | Age: 74
End: 2019-03-01

## 2019-03-01 LAB
INR BLD: 3.3
INR BLD: 3.3 (ref 0.8–1.2)
INR BLD: ABNORMAL

## 2019-03-04 ENCOUNTER — TELEPHONE (OUTPATIENT)
Dept: CARDIOLOGY CLINIC | Age: 74
End: 2019-03-04

## 2019-03-04 RX ORDER — FUROSEMIDE 20 MG/1
20 TABLET ORAL 2 TIMES DAILY
Qty: 180 TABLET | Refills: 1 | Status: SHIPPED | OUTPATIENT
Start: 2019-03-04 | End: 2019-05-22

## 2019-03-07 ENCOUNTER — HOSPITAL ENCOUNTER (OUTPATIENT)
Dept: NON INVASIVE DIAGNOSTICS | Age: 74
Discharge: HOME | End: 2019-03-07
Payer: MEDICARE

## 2019-03-07 PROCEDURE — 93017 CV STRESS TEST TRACING ONLY: CPT | Performed by: INTERNAL MEDICINE

## 2019-03-08 ENCOUNTER — ANTI-COAG VISIT (OUTPATIENT)
Dept: CARDIOLOGY CLINIC | Age: 74
End: 2019-03-08

## 2019-03-08 LAB
INR BLD: 3.6
INR BLD: 3.6 (ref 0.8–1.2)
INR BLD: ABNORMAL

## 2019-03-18 ENCOUNTER — ANTI-COAG VISIT (OUTPATIENT)
Dept: CARDIOLOGY CLINIC | Age: 74
End: 2019-03-18

## 2019-03-18 LAB
INR BLD: 3.2 (ref 0.8–1.2)
INR BLD: ABNORMAL

## 2019-03-22 ENCOUNTER — ANTI-COAG VISIT (OUTPATIENT)
Dept: CARDIOLOGY CLINIC | Age: 74
End: 2019-03-22

## 2019-03-22 LAB
INR BLD: 2.6
INR BLD: 2.6 (ref 0.8–1.2)
INR BLD: ABNORMAL

## 2019-03-27 ENCOUNTER — TELEPHONE (OUTPATIENT)
Dept: CARDIOLOGY CLINIC | Age: 74
End: 2019-03-27

## 2019-03-27 DIAGNOSIS — Z79.01 LONG TERM (CURRENT) USE OF ANTICOAGULANTS: ICD-10-CM

## 2019-03-27 DIAGNOSIS — I48.91 ATRIAL FIBRILLATION, UNSPECIFIED TYPE (HCC): Primary | ICD-10-CM

## 2019-03-28 RX ORDER — WARFARIN SODIUM 2 MG/1
2 TABLET ORAL DAILY
Qty: 90 TABLET | Refills: 3 | Status: SHIPPED | OUTPATIENT
Start: 2019-03-28 | End: 2019-08-08 | Stop reason: DRUGHIGH

## 2019-04-05 ENCOUNTER — ANTI-COAG VISIT (OUTPATIENT)
Dept: CARDIOLOGY CLINIC | Age: 74
End: 2019-04-05

## 2019-04-05 LAB
INR BLD: 1.5
INR BLD: 1.5 (ref 0.8–1.2)
INR BLD: 1.6 (ref 0.8–1.2)
INR BLD: ABNORMAL
INR BLD: ABNORMAL

## 2019-04-19 ENCOUNTER — ANTI-COAG VISIT (OUTPATIENT)
Dept: CARDIOLOGY CLINIC | Age: 74
End: 2019-04-19

## 2019-04-19 LAB
INR BLD: 1.9
INR BLD: 1.9 (ref 0.8–1.2)
INR BLD: ABNORMAL

## 2019-05-03 ENCOUNTER — ANTI-COAG VISIT (OUTPATIENT)
Dept: CARDIOLOGY CLINIC | Age: 74
End: 2019-05-03

## 2019-05-03 ENCOUNTER — TELEPHONE (OUTPATIENT)
Dept: CARDIOLOGY CLINIC | Age: 74
End: 2019-05-03

## 2019-05-03 DIAGNOSIS — I48.91 ATRIAL FIBRILLATION, UNSPECIFIED TYPE (HCC): ICD-10-CM

## 2019-05-03 LAB
INR BLD: 1.5
INR BLD: 1.5
INR BLD: 1.5 (ref 0.8–1.2)
INR BLD: ABNORMAL

## 2019-05-03 NOTE — TELEPHONE ENCOUNTER
Pt calling, he feels like he is building up with fluid and wants to have a chest xray and also wants lab work to check his potassium level. Pls call to advise. Thank you.

## 2019-05-03 NOTE — TELEPHONE ENCOUNTER
Call placed to patient who was not available to speak with.  When he call back we will need to get his weigh, and which part of the body is swelling legs, feet etc.

## 2019-05-06 ENCOUNTER — TELEPHONE (OUTPATIENT)
Dept: CARDIOLOGY CLINIC | Age: 74
End: 2019-05-06

## 2019-05-06 NOTE — TELEPHONE ENCOUNTER
Patient called back he denies any swelling than the norm. He denies any chest pain or sob. His current weight is 176lb. Please advise for he did ask about having an Xray.

## 2019-05-16 ENCOUNTER — HOSPITAL ENCOUNTER (OUTPATIENT)
Age: 74
Discharge: HOME | End: 2019-05-16
Payer: MEDICARE

## 2019-05-16 DIAGNOSIS — I48.91 ATRIAL FIBRILLATION, UNSPECIFIED TYPE (HCC): ICD-10-CM

## 2019-05-16 DIAGNOSIS — Z79.01 LONG TERM (CURRENT) USE OF ANTICOAGULANTS: ICD-10-CM

## 2019-05-16 LAB
INR BLD: 1.8 (ref 0.86–1.14)
PROTHROMBIN TIME: 20.5 SEC (ref 9.8–13)

## 2019-05-16 PROCEDURE — 36415 COLL VENOUS BLD VENIPUNCTURE: CPT

## 2019-05-16 PROCEDURE — 85610 PROTHROMBIN TIME: CPT

## 2019-05-20 NOTE — PROGRESS NOTES
[address]   Electrophysiology   Date: 5/21/2019  I had the privilege of visiting Halie Mathis in the office. CC: Cardiomyopathy,   HPI: Halie Mathis is a 72 y.o. history of CAD s/p CABG in the past, paroxysmal atrial fibrillation, NSTEMI 10/13/2017     He was on Tikosyn for PAF which was stopped. He could not tolerate Amiodarone in the past due to bradycardia. 12/18/17 EPS with inducible VT/VF then implantation of AICD    Redo CABG at [facility] with 1/8/2019: Redo CABGx4 (free IAN-LAD, SVG-Diag, SVG-OM, SVG-PDA), LA maze, [place] Ligation,     OR course noted to have RV dysfunction requiring multiple pressors and also developed coagulopathy, eventually discharged. He was started back on Amiodarone at [facility]. Multiple cardioversion in the past for atrial fibrillation. Hospitalized with afib and had cardioversion on 1/25/2019. Interval History: Today Davey Huffman says he is feeling well from a cardiac standpoint. He has not had any episodes of atrial fibrillation since February 11th 2019. Past Medical History:   Diagnosis Date    Arthritis     shoulders and knee    Atrial fib/flut     CAD (coronary artery disease)     Hyperlipidemia     Hypertension     Seizures ([address]     last seizures many years ago, 10 years ago    Sinus bradycardia         Past Surgical History:   Procedure Laterality Date    CARDIAC SURGERY      , angioplasty 2007    CORONARY ANGIOPLASTY WITH STENT PLACEMENT  1997    HERNIA REPAIR Right     done 72 sam ago    PACEMAKER PLACEMENT  12/18/2017    WISDOM TOOTH EXTRACTION  April 2012       Allergies:  No Known Allergies    Social History:  Reviewed. reports that he quit smoking about 28 years ago. He has a 30.00 pack-year smoking history. He has never used smokeless tobacco. He reports that he does not drink alcohol or use drugs. Family History:  Reviewed. family history includes Heart Failure in his mother.      Review of System:  All other systems reviewed and are negative except for that noted above. Pertinent negatives are:     · General: negative for fever, chills   · Ophthalmic ROS: negative for - eye pain or loss of vision  · ENT ROS: negative for - headaches, sore throat   · Respiratory: negative for - cough, sputum  · Cardiovascular: Reviewed in HPI  · Gastrointestinal: negative for - abdominal pain, diarrhea, N/V  · Hematology: negative for - bleeding, blood clots, bruising or jaundice  · Genito-Urinary:  negative for - Dysuria or incontinence  · Musculoskeletal: negative for - Joint swelling, muscle pain  · Neurological: negative for - confusion, headaches   · Psychiatric: No anxiety, no depression. · Dermatological: negative for - rash    Physical Examination:  Vitals:    19 1314   BP: 102/61   Pulse: 52      · Constitutional: Oriented. No distress. · Head: Normocephalic and atraumatic. · Mouth/Throat: Oropharynx is clear and moist.   · Eyes: Conjunctivae normal. EOM are normal.   · Neck: Neck supple. No JVD present. · Cardiovascular: Normal rate, regular rhythm, S1&S2. · Pulmonary/Chest: Bilateral respiratory sounds. No rhonchi. · Abdominal: Soft. No tenderness. · Musculoskeletal: No tenderness. No edema    · Lymphadenopathy: Has no cervical adenopathy. · Neurological: Alert and oriented. Follows command, No Gross deficit   · Skin: Skin is warm, No rash noted. · Psychiatric: Has a normal behavior     Labs:  Reviewed. Lab Results   Component Value Date    TSHREFLEX 1.69 2018    CREATININE 1.03 02/15/2019    CREATININE 1.0 2019    AST 33 2019    ALT 57 2019       EC19  Sinus Bradycardia     Echo: 10/14/2017   -Global left ventricular function is mildly decreased with ejection fraction   estimated from 40 % to 45 %.    -Distal septal and apical hypokinesis noted.   -Mildly dilated left atrium.   -Mild to moderate mitral regurgitation is present.   -E/e'= 7.2 .   There is mild-to-moderate tricuspid regurgitation with RVSP estimated at 34   mmHg. 10/13/2017: Cardiac Cath 10/13/17:  Anatomy:   LM-normal  LAD-100% mid  D1-50% ostial  Cx-20% mid  OM3- 70% proximal  RCA-100% mid, codominant left to right collaterals  LIMA-LAD: 100% mid  Sequential SVG-D2 and 13:20% proximal, 50% mid. The LAD fills via retrograde flow from D2  SVG-% proximal with suggestion of recent closure  LVEF- 35-40% with anterior hypokinesis  PCI: SVG-RCA with attempted PCI that was unsuccessful. The graft was engaged him wired Pronto thrombectomy was used but without restoration of flow. This decided to attempt mechanical thrombectomy with AngioJet and therefore the 6 [place] guide was removed and upsized to a 7 [place] system. A 6 [place] sheath was placed percutaneously into the right comfortable vein for temporary pacemaker placement in anticipation of bradycardia with AngioJet particular since the right coronary distribution was suspected. After placing the temporary pacemaker under fluoroscopy and threshold testing being performed and simultaneously the 7 [place] guider was unintentionally engaged into the right coronary artery patient developed malignant ventricular arrhythmia requiring defibrillation. The guide was probably removed due to concern about potential conus branch occlusion but there was recurrent V. fib requiring defibrillation again. The pacemaker was withdrawn. Amiodarone was given. EP was consulted. An attempt to place the temporary pacemaker with adjustments was performed again and unfortunately ventricular fibrillation was induced again. The patient was successfully defibrillated. It was decided to proceed with a jet thrombectomy without pacemaker which the patient tolerated reasonably well there was a period of transient bradycardia which responded without the need for intervention. Unfortunately flow could not be restored to the vein graft.     Impression:  1.   Severe needed for Chest pain 1 Bottle 3    phenytoin (DILANTIN) 100 MG ER capsule Take 1 capsule by mouth 2 times daily. Resume home dose 1 capsule 0    primidone (MYSOLINE) 250 MG tablet Take 250 mg by mouth daily        No current facility-administered medications for this visit. Assessment and plan:     - Paroxysmal atrial fibrillation:    Recurrent episodes of paroxysmal atrial fibrillation. Today he is in sinus rhythm. Tolerates amiodarone well. He is a status post maze surgery and left atrial appendage ligation at [facility] om 1/8/2019      Would like to discontinue his amiodarone therapy. Discussed alternative therapies including ablation of atrial fibrillation. Reduce amiodarone 100 mg daily. Follow-up AST/ALT/TSH. I have discussed side effects of amiodarone with him in detail. If recurrent atrial fibrillation he will consider ablation. Afib burden 5.6%     - Ischemic cardiomyopathy, NSVT and two episodes of VF    12/18/17 EPS with inducible VT/VF then implantation of AICD   Aggressive medical therapy   Lipitor 80 mg daily   Toprol-XL 25 g daily       -AICD   The CIED was interrogated and programmed and I supervised and reviewed all the data. All findings and changes are in device interrogation sheat and reflect my personal interpretation and changes and is scanned to [employer]. Todays interrogation     Apaced 1.1%   Follow up with device clinic.          - CAD: history of MI. S/p re-do CABG [facility]    - On BB and Lipitor. Thank you for allowing me to participate in the care of Elizabethann Curling. Further evaluation will be based upon the patient's clinical course and testing results. All questions and concerns were addressed to the patient/family. Alternatives to my treatment were discussed. I have discussed the above stated plan and the patient verbalized understanding and agreed with the plan.     Kristal Madrid MD, MPH  [facility]   Office: ([phone]) Trace attestation: This note was scribed in the presence of Azeb Mayo MD by Leny Ruggiero RN  Physician Attestation: I, Dr. Azeb Mayo, confirm that the scribe's documentation has been prepared under my direction and personally reviewed by me in its entirety. I also confirm that the note above accurately reflects all work, treatment, procedures, and medical decision making performed by me.

## 2019-05-21 ENCOUNTER — PROCEDURE VISIT (OUTPATIENT)
Dept: CARDIOLOGY CLINIC | Age: 74
End: 2019-05-21
Payer: MEDICARE

## 2019-05-21 ENCOUNTER — OFFICE VISIT (OUTPATIENT)
Dept: CARDIOLOGY CLINIC | Age: 74
End: 2019-05-21
Payer: MEDICARE

## 2019-05-21 VITALS
HEART RATE: 52 BPM | HEIGHT: 70 IN | WEIGHT: 178.12 LBS | BODY MASS INDEX: 25.5 KG/M2 | DIASTOLIC BLOOD PRESSURE: 61 MMHG | SYSTOLIC BLOOD PRESSURE: 102 MMHG

## 2019-05-21 DIAGNOSIS — I25.5 ISCHEMIC CARDIOMYOPATHY: Chronic | ICD-10-CM

## 2019-05-21 DIAGNOSIS — R00.2 PALPITATIONS: ICD-10-CM

## 2019-05-21 DIAGNOSIS — I25.10 CORONARY ARTERY DISEASE INVOLVING NATIVE CORONARY ARTERY OF NATIVE HEART WITHOUT ANGINA PECTORIS: Chronic | ICD-10-CM

## 2019-05-21 DIAGNOSIS — Z95.810 S/P IMPLANTATION OF AUTOMATIC CARDIOVERTER/DEFIBRILLATOR (AICD): Chronic | ICD-10-CM

## 2019-05-21 DIAGNOSIS — Z95.810 ICD (IMPLANTABLE CARDIOVERTER-DEFIBRILLATOR) IN PLACE: ICD-10-CM

## 2019-05-21 DIAGNOSIS — I48.0 PAF (PAROXYSMAL ATRIAL FIBRILLATION) (HCC): Primary | Chronic | ICD-10-CM

## 2019-05-21 DIAGNOSIS — R00.1 BRADYCARDIA: ICD-10-CM

## 2019-05-21 PROCEDURE — 93283 PRGRMG EVAL IMPLANTABLE DFB: CPT | Performed by: INTERNAL MEDICINE

## 2019-05-21 PROCEDURE — G8427 DOCREV CUR MEDS BY ELIG CLIN: HCPCS | Performed by: INTERNAL MEDICINE

## 2019-05-21 PROCEDURE — 3017F COLORECTAL CA SCREEN DOC REV: CPT | Performed by: INTERNAL MEDICINE

## 2019-05-21 PROCEDURE — G8417 CALC BMI ABV UP PARAM F/U: HCPCS | Performed by: INTERNAL MEDICINE

## 2019-05-21 PROCEDURE — 99214 OFFICE O/P EST MOD 30 MIN: CPT | Performed by: INTERNAL MEDICINE

## 2019-05-21 PROCEDURE — G8598 ASA/ANTIPLAT THER USED: HCPCS | Performed by: INTERNAL MEDICINE

## 2019-05-21 PROCEDURE — 1036F TOBACCO NON-USER: CPT | Performed by: INTERNAL MEDICINE

## 2019-05-21 PROCEDURE — 1123F ACP DISCUSS/DSCN MKR DOCD: CPT | Performed by: INTERNAL MEDICINE

## 2019-05-21 PROCEDURE — 93000 ELECTROCARDIOGRAM COMPLETE: CPT | Performed by: INTERNAL MEDICINE

## 2019-05-21 PROCEDURE — 4040F PNEUMOC VAC/ADMIN/RCVD: CPT | Performed by: INTERNAL MEDICINE

## 2019-05-21 RX ORDER — AMIODARONE HYDROCHLORIDE 200 MG/1
100 TABLET ORAL DAILY
Qty: 90 TABLET | Refills: 3
Start: 2019-05-21 | End: 2020-01-16 | Stop reason: SDUPTHER

## 2019-05-22 ENCOUNTER — OFFICE VISIT (OUTPATIENT)
Dept: CARDIOLOGY CLINIC | Age: 74
End: 2019-05-22
Payer: MEDICARE

## 2019-05-22 VITALS
OXYGEN SATURATION: 98 % | WEIGHT: 176.5 LBS | HEART RATE: 60 BPM | BODY MASS INDEX: 25.27 KG/M2 | DIASTOLIC BLOOD PRESSURE: 72 MMHG | SYSTOLIC BLOOD PRESSURE: 122 MMHG | HEIGHT: 70 IN

## 2019-05-22 DIAGNOSIS — E78.2 MIXED HYPERLIPIDEMIA: ICD-10-CM

## 2019-05-22 DIAGNOSIS — I50.22 CHRONIC SYSTOLIC HEART FAILURE (HCC): ICD-10-CM

## 2019-05-22 DIAGNOSIS — I48.0 PAF (PAROXYSMAL ATRIAL FIBRILLATION) (HCC): Chronic | ICD-10-CM

## 2019-05-22 DIAGNOSIS — I48.91 ATRIAL FIBRILLATION, UNSPECIFIED TYPE (HCC): ICD-10-CM

## 2019-05-22 DIAGNOSIS — I25.10 CORONARY ARTERY DISEASE INVOLVING NATIVE CORONARY ARTERY OF NATIVE HEART WITHOUT ANGINA PECTORIS: Primary | ICD-10-CM

## 2019-05-22 DIAGNOSIS — E78.5 HYPERLIPIDEMIA, UNSPECIFIED HYPERLIPIDEMIA TYPE: ICD-10-CM

## 2019-05-22 DIAGNOSIS — I10 ESSENTIAL HYPERTENSION: ICD-10-CM

## 2019-05-22 DIAGNOSIS — I47.20 VT (VENTRICULAR TACHYCARDIA): ICD-10-CM

## 2019-05-22 PROCEDURE — G8598 ASA/ANTIPLAT THER USED: HCPCS | Performed by: INTERNAL MEDICINE

## 2019-05-22 PROCEDURE — G8427 DOCREV CUR MEDS BY ELIG CLIN: HCPCS | Performed by: INTERNAL MEDICINE

## 2019-05-22 PROCEDURE — G8417 CALC BMI ABV UP PARAM F/U: HCPCS | Performed by: INTERNAL MEDICINE

## 2019-05-22 PROCEDURE — 1036F TOBACCO NON-USER: CPT | Performed by: INTERNAL MEDICINE

## 2019-05-22 PROCEDURE — 4040F PNEUMOC VAC/ADMIN/RCVD: CPT | Performed by: INTERNAL MEDICINE

## 2019-05-22 PROCEDURE — 3017F COLORECTAL CA SCREEN DOC REV: CPT | Performed by: INTERNAL MEDICINE

## 2019-05-22 PROCEDURE — 1123F ACP DISCUSS/DSCN MKR DOCD: CPT | Performed by: INTERNAL MEDICINE

## 2019-05-22 PROCEDURE — 99213 OFFICE O/P EST LOW 20 MIN: CPT | Performed by: INTERNAL MEDICINE

## 2019-05-22 RX ORDER — POTASSIUM CHLORIDE 20 MEQ/1
20 TABLET, EXTENDED RELEASE ORAL PRN
Qty: 30 TABLET | Refills: 3 | Status: SHIPPED | OUTPATIENT
Start: 2019-05-22 | End: 2019-08-08 | Stop reason: ALTCHOICE

## 2019-05-22 RX ORDER — AMOXICILLIN 500 MG/1
500 CAPSULE ORAL SEE ADMIN INSTRUCTIONS
Qty: 20 CAPSULE | Refills: 0 | Status: ON HOLD | OUTPATIENT
Start: 2019-05-22 | End: 2019-08-10 | Stop reason: HOSPADM

## 2019-05-22 RX ORDER — ATORVASTATIN CALCIUM 80 MG/1
80 TABLET, FILM COATED ORAL DAILY
Qty: 90 TABLET | Refills: 3 | Status: SHIPPED | OUTPATIENT
Start: 2019-05-22 | End: 2019-11-27 | Stop reason: SDUPTHER

## 2019-05-22 RX ORDER — METOPROLOL SUCCINATE 25 MG/1
25 TABLET, EXTENDED RELEASE ORAL DAILY
Qty: 90 TABLET | Refills: 3 | Status: ON HOLD | OUTPATIENT
Start: 2019-05-22 | End: 2019-08-10 | Stop reason: HOSPADM

## 2019-05-22 RX ORDER — FUROSEMIDE 20 MG/1
20 TABLET ORAL SEE ADMIN INSTRUCTIONS
Qty: 30 TABLET | Refills: 3 | Status: SHIPPED | OUTPATIENT
Start: 2019-05-22 | End: 2019-08-08 | Stop reason: ALTCHOICE

## 2019-05-22 RX ORDER — EZETIMIBE 10 MG/1
10 TABLET ORAL DAILY
Qty: 90 TABLET | Refills: 3 | Status: SHIPPED | OUTPATIENT
Start: 2019-05-22 | End: 2020-01-03

## 2019-05-22 NOTE — PROGRESS NOTES
[facility]   Cardiac Followup    Referring Provider:  No primary care provider on file. Chief Complaint   Patient presents with    Atrial Fibrillation    Coronary Artery Disease    Cardiomyopathy    Palpitations       History of Present Illness:  Mr Mathis is seen today as a follow up for known CAD, htn,hchol, and AF. He is here today with his wife. He has no exertional or resting chest pain,  Sob palpitations or dizziness. No syncope. Currently laying pavers, without change in exercise tolerance. Would like to come off of lasix if possible    Patient is compliant  with medication and is tolerating them well without side effects      Past Medical History:   has a past medical history of Arthritis, Atrial fib/flut, CAD (coronary artery disease), Hyperlipidemia, Hypertension, Seizures ([address] and Sinus bradycardia. Surgical History:   has a past surgical history that includes hernia repair (Right); Coronary angioplasty with stent (1997); [place] tooth extraction (April 2012); Cardiac surgery; and pacemaker placement (12/18/2017). Social History:   reports that he quit smoking about 28 years ago. He has a 30.00 pack-year smoking history. He has never used smokeless tobacco. He reports that he does not drink alcohol or use drugs. Family History:  family history includes Heart Failure in his mother.      Current Outpatient Medications   Medication Sig Dispense Refill    amiodarone (CORDARONE) 200 MG tablet Take 0.5 tablets by mouth daily 90 tablet 3    warfarin (COUMADIN) 2 MG tablet Take 1 tablet by mouth daily 90 tablet 3    furosemide (LASIX) 20 MG tablet Take 1 tablet by mouth 2 times daily 180 tablet 1    metoprolol succinate (TOPROL XL) 25 MG extended release tablet Take 1 tablet by mouth daily 90 tablet 3    sennosides-docusate sodium (SENOKOT-S) 8.6-50 MG tablet Take 1 tablet by mouth daily 90 tablet 3    potassium chloride (KLOR-CON M) 20 MEQ extended release tablet Take 1 tablet by mouth daily 30 tablet 3    ezetimibe (ZETIA) 10 MG tablet Take 1 tablet by mouth daily 30 tablet 11    warfarin (COUMADIN) 5 MG tablet TAKE ONE TO TWO TABLETS BY MOUTH ONCE DAILY AS DIRECTED 180 tablet 0    atorvastatin (LIPITOR) 80 MG tablet Take 1 tablet by mouth daily 90 tablet 3    diazepam (VALIUM) 5 MG tablet Take 0.5 tablets by mouth daily (Patient taking differently: Take 2.5 mg by mouth 2 times daily. ) 15 tablet 0    nitroGLYCERIN (NITROLINGUAL) 0.4 MG/SPRAY 0.4 mg spray Place 1 spray under the tongue every 5 minutes as needed for Chest pain 1 Bottle 3    phenytoin (DILANTIN) 100 MG ER capsule Take 1 capsule by mouth 2 times daily. Resume home dose 1 capsule 0    primidone (MYSOLINE) 250 MG tablet Take 250 mg by mouth daily        No current facility-administered medications for this visit. Allergies:  Patient has no known allergies.      DATA:  Labs reviewed  Lab Results   Component Value Date    ALT 57 (H) 01/25/2019    AST 33 01/25/2019    ALKPHOS 122 01/25/2019    BILITOT 0.3 01/25/2019     Lab Results   Component Value Date    CREATININE 1.03 02/15/2019    BUN 24 02/15/2019     02/15/2019    K 4.5 02/15/2019     02/15/2019    CO2 29 02/15/2019     No results found for: TSH  Lab Results   Component Value Date    WBC 10.1 01/25/2019    HGB 12.0 (L) 01/25/2019    HCT 36.8 (L) 01/25/2019    MCV 92.9 01/25/2019     (H) 01/25/2019     No components found for: CHLPL  Lab Results   Component Value Date    TRIG 63 12/12/2018    TRIG 75 09/25/2018    TRIG 96 05/04/2018     Lab Results   Component Value Date    HDL 42 (L) 12/12/2018    HDL 41 (L) 09/25/2018    HDL 39 (L) 05/04/2018     Lab Results   Component Value Date    LDLCALC 69 12/12/2018    LDLCALC 67 09/25/2018    LDLCALC 85 05/04/2018     Lab Results   Component Value Date    LABVLDL 37 10/14/2014    LABVLDL 30 12/20/2012    LABVLDL 50 12/13/2011       Review of Systems:   · Constitutional: there has been no Noted  Neurological/Psychiatric:  Alert and oriented in all spheres  Moves all extremities well  Exhibits normal gait balance and coordination  No abnormalities of mood, affect, memory    All testing and labs listed below were personally reviewed. PFT--mild obstruction  2/6/16 ct of abd--no AAA  6/12   Carotid dopplers--1-15% bilateral    1/16/19 echo  Exam indication: Sustained atrial fibrillation  - The left ventricle is normal in size. Left ventricular systolic function is   mildly decreased. EF = 50 ± 5% (visual est.)  - The right ventricle is normal in size. Right ventricular systolic function is   normal.  - The left atrial cavity is dilated. The left atrial appendage is s/p surgical   ligation. There is no residual thrombus. - There is mild (1+ - 2+) late systolic mitral valve regurgitation.  - There is a inconsequential residual post operative hematoma in the anterior   pericardial space adjacent to the RV.  - Exam was compared with the prior  echocardiographic exam performed on   1/8/2019. Mitral regurgitation is more prominent on today's exam. TR less   prominent. Otherwise similar findings rpeorted. 2/7/19 chest xray--Stable moderate left and small right pleural effusion with underlying atelectasis plus/minus pneumonia.     Assessment:   CAD-  s/p CABG (x4 '07 LIMA to LAD, SVG to RCA, SVG to D2, SVG to OM3),   1/8/19 redo CABG at [facility]  (free IAN-LAD, SVG-Diag, SVG-OM, SVG-PDA  Stable  Tolerated bb asa statin      htn-  -/72 (Site: Left Upper Arm, Position: Sitting, Cuff Size: Large Adult)   Pulse 60   Ht 5' 10\" (1.778 m)   Wt 176 lb 8 oz (80.1 kg)   SpO2 98%   BMI 25.33 kg/m²    Stable, tolerates bb    hchol-  -12/12/18 tc 124   hdl 42 ldl 69  Tri 63  Alt 57 ast 33  Will repeat  tolerate lipitor 80 mg and zetia 10 mg On 400   mg coenzyme  q 10     chf-  -  9/14/18 LATESHA EF 50%  1/16/19 LATESHA EF 50%  1/25/19 BNP 1224  No edema  Can take lasix and potassium only as needed for

## 2019-05-31 ENCOUNTER — ANTI-COAG VISIT (OUTPATIENT)
Dept: CARDIOLOGY CLINIC | Age: 74
End: 2019-05-31

## 2019-05-31 LAB
INR BLD: 1.8
INR BLD: 1.8 (ref 0.8–1.2)
INR BLD: ABNORMAL

## 2019-06-03 ENCOUNTER — ANTI-COAG VISIT (OUTPATIENT)
Dept: CARDIOLOGY CLINIC | Age: 74
End: 2019-06-03

## 2019-06-03 DIAGNOSIS — I48.91 ATRIAL FIBRILLATION, UNSPECIFIED TYPE (HCC): ICD-10-CM

## 2019-06-03 LAB — INR BLD: 1.8

## 2019-06-07 LAB
INR BLD: 2.8
INR BLD: 2.8 (ref 0.8–1.2)
INR BLD: ABNORMAL

## 2019-06-10 ENCOUNTER — ANTI-COAG VISIT (OUTPATIENT)
Dept: CARDIOLOGY CLINIC | Age: 74
End: 2019-06-10

## 2019-07-10 ENCOUNTER — HOSPITAL ENCOUNTER (OUTPATIENT)
Age: 74
Discharge: HOME | End: 2019-07-10
Payer: MEDICARE

## 2019-07-10 DIAGNOSIS — E78.2 MIXED HYPERLIPIDEMIA: Chronic | ICD-10-CM

## 2019-07-10 DIAGNOSIS — I25.10 CORONARY ARTERY DISEASE INVOLVING NATIVE CORONARY ARTERY OF NATIVE HEART WITHOUT ANGINA PECTORIS: Chronic | ICD-10-CM

## 2019-07-10 LAB
ALBUMIN SERPL-MCNC: 4.1 G/DL (ref 3.4–5)
ALP BLD-CCNC: 81 U/L (ref 40–129)
ALT SERPL-CCNC: 49 U/L (ref 10–40)
AST SERPL-CCNC: 39 U/L (ref 15–37)
BILIRUB SERPL-MCNC: 0.4 MG/DL (ref 0–1)
BILIRUBIN DIRECT: <0.2 MG/DL (ref 0–0.3)
BILIRUBIN, INDIRECT: ABNORMAL MG/DL (ref 0–1)
TOTAL PROTEIN: 6.5 G/DL (ref 6.4–8.2)

## 2019-07-10 PROCEDURE — 36415 COLL VENOUS BLD VENIPUNCTURE: CPT

## 2019-07-10 PROCEDURE — 80076 HEPATIC FUNCTION PANEL: CPT

## 2019-07-12 ENCOUNTER — TELEPHONE (OUTPATIENT)
Dept: CARDIOLOGY CLINIC | Age: 74
End: 2019-07-12

## 2019-07-12 LAB
INR BLD: 2.1
INR BLD: 2.1 (ref 0.8–1.2)
PROTHROMBIN TIME: 23.8 SECONDS (ref 11.7–14.2)

## 2019-07-15 ENCOUNTER — ANTI-COAG VISIT (OUTPATIENT)
Dept: CARDIOLOGY CLINIC | Age: 74
End: 2019-07-15

## 2019-08-08 ENCOUNTER — APPOINTMENT (OUTPATIENT)
Dept: GENERAL RADIOLOGY | Age: 74
End: 2019-08-08
Payer: MEDICARE

## 2019-08-08 ENCOUNTER — HOSPITAL ENCOUNTER (OUTPATIENT)
Age: 74
Setting detail: OBSERVATION
Discharge: HOME | End: 2019-08-10
Attending: EMERGENCY MEDICINE | Admitting: INTERNAL MEDICINE
Payer: MEDICARE

## 2019-08-08 DIAGNOSIS — Z91.89 CARDIOVASCULAR RISK FACTOR: ICD-10-CM

## 2019-08-08 DIAGNOSIS — R06.02 SHORTNESS OF BREATH: Primary | ICD-10-CM

## 2019-08-08 PROBLEM — I20.1 VARIANT ANGINA (HCC): Status: ACTIVE | Noted: 2019-08-08

## 2019-08-08 LAB
A/G RATIO: 1.5 (ref 1.1–2.2)
ALBUMIN SERPL-MCNC: 4.1 G/DL (ref 3.4–5)
ALP BLD-CCNC: 93 U/L (ref 40–129)
ALT SERPL-CCNC: 30 U/L (ref 10–40)
ANION GAP SERPL CALCULATED.3IONS-SCNC: 9 MMOL/L (ref 3–16)
APTT: 38.1 SEC (ref 26–36)
AST SERPL-CCNC: 24 U/L (ref 15–37)
BASOPHILS ABSOLUTE: 0.1 K/UL (ref 0–0.2)
BASOPHILS RELATIVE PERCENT: 1.2 %
BILIRUB SERPL-MCNC: 0.3 MG/DL (ref 0–1)
BUN BLDV-MCNC: 19 MG/DL (ref 7–20)
CALCIUM SERPL-MCNC: 8.8 MG/DL (ref 8.3–10.6)
CHLORIDE BLD-SCNC: 105 MMOL/L (ref 99–110)
CO2: 27 MMOL/L (ref 21–32)
CREAT SERPL-MCNC: 1.1 MG/DL (ref 0.8–1.3)
EKG ATRIAL RATE: 61 BPM
EKG DIAGNOSIS: NORMAL
EKG P AXIS: 89 DEGREES
EKG P-R INTERVAL: 212 MS
EKG Q-T INTERVAL: 400 MS
EKG QRS DURATION: 90 MS
EKG QTC CALCULATION (BAZETT): 402 MS
EKG R AXIS: -46 DEGREES
EKG T AXIS: 57 DEGREES
EKG VENTRICULAR RATE: 61 BPM
EOSINOPHILS ABSOLUTE: 0.4 K/UL (ref 0–0.6)
EOSINOPHILS RELATIVE PERCENT: 5.6 %
GFR AFRICAN AMERICAN: >60
GFR NON-AFRICAN AMERICAN: >60
GLOBULIN: 2.7 G/DL
GLUCOSE BLD-MCNC: 95 MG/DL (ref 70–99)
HCT VFR BLD CALC: 42.8 % (ref 40.5–52.5)
HEMOGLOBIN: 14.1 G/DL (ref 13.5–17.5)
INR BLD: 1.98 (ref 0.86–1.14)
LIPASE: 28 U/L (ref 13–60)
LYMPHOCYTES ABSOLUTE: 1 K/UL (ref 1–5.1)
LYMPHOCYTES RELATIVE PERCENT: 15.7 %
MCH RBC QN AUTO: 30.4 PG (ref 26–34)
MCHC RBC AUTO-ENTMCNC: 32.9 G/DL (ref 31–36)
MCV RBC AUTO: 92.2 FL (ref 80–100)
MONOCYTES ABSOLUTE: 0.5 K/UL (ref 0–1.3)
MONOCYTES RELATIVE PERCENT: 7.1 %
NEUTROPHILS ABSOLUTE: 4.6 K/UL (ref 1.7–7.7)
NEUTROPHILS RELATIVE PERCENT: 70.4 %
PDW BLD-RTO: 14.5 % (ref 12.4–15.4)
PLATELET # BLD: 252 K/UL (ref 135–450)
PMV BLD AUTO: 8.5 FL (ref 5–10.5)
POTASSIUM REFLEX MAGNESIUM: 4.1 MMOL/L (ref 3.5–5.1)
PRO-BNP: 257 PG/ML (ref 0–124)
PROTHROMBIN TIME: 22.6 SEC (ref 9.8–13)
RBC # BLD: 4.65 M/UL (ref 4.2–5.9)
SODIUM BLD-SCNC: 141 MMOL/L (ref 136–145)
TOTAL PROTEIN: 6.8 G/DL (ref 6.4–8.2)
TROPONIN: <0.01 NG/ML
TROPONIN: <0.01 NG/ML
WBC # BLD: 6.6 K/UL (ref 4–11)

## 2019-08-08 PROCEDURE — 80299 QUANTITATIVE ASSAY DRUG: CPT

## 2019-08-08 PROCEDURE — G0378 HOSPITAL OBSERVATION PER HR: HCPCS

## 2019-08-08 PROCEDURE — 36415 COLL VENOUS BLD VENIPUNCTURE: CPT

## 2019-08-08 PROCEDURE — 93010 ELECTROCARDIOGRAM REPORT: CPT | Performed by: INTERNAL MEDICINE

## 2019-08-08 PROCEDURE — 80053 COMPREHEN METABOLIC PANEL: CPT

## 2019-08-08 PROCEDURE — 93005 ELECTROCARDIOGRAM TRACING: CPT | Performed by: EMERGENCY MEDICINE

## 2019-08-08 PROCEDURE — 83880 ASSAY OF NATRIURETIC PEPTIDE: CPT

## 2019-08-08 PROCEDURE — 6370000000 HC RX 637 (ALT 250 FOR IP): Performed by: INTERNAL MEDICINE

## 2019-08-08 PROCEDURE — 84484 ASSAY OF TROPONIN QUANT: CPT

## 2019-08-08 PROCEDURE — 85025 COMPLETE CBC W/AUTO DIFF WBC: CPT

## 2019-08-08 PROCEDURE — 85730 THROMBOPLASTIN TIME PARTIAL: CPT

## 2019-08-08 PROCEDURE — 99285 EMERGENCY DEPT VISIT HI MDM: CPT

## 2019-08-08 PROCEDURE — 85610 PROTHROMBIN TIME: CPT

## 2019-08-08 PROCEDURE — 83690 ASSAY OF LIPASE: CPT

## 2019-08-08 PROCEDURE — 71045 X-RAY EXAM CHEST 1 VIEW: CPT

## 2019-08-08 RX ORDER — PRIMIDONE 250 MG/1
250 TABLET ORAL EVERY EVENING
Status: DISCONTINUED | OUTPATIENT
Start: 2019-08-08 | End: 2019-08-10 | Stop reason: HOSPADM

## 2019-08-08 RX ORDER — EZETIMIBE 10 MG/1
10 TABLET ORAL DAILY
Status: DISCONTINUED | OUTPATIENT
Start: 2019-08-08 | End: 2019-08-10 | Stop reason: RX

## 2019-08-08 RX ORDER — ATORVASTATIN CALCIUM 80 MG/1
80 TABLET, FILM COATED ORAL EVERY EVENING
Status: DISCONTINUED | OUTPATIENT
Start: 2019-08-08 | End: 2019-08-10 | Stop reason: HOSPADM

## 2019-08-08 RX ORDER — ASPIRIN 81 MG/1
81 TABLET, CHEWABLE ORAL DAILY
Status: DISCONTINUED | OUTPATIENT
Start: 2019-08-09 | End: 2019-08-10 | Stop reason: HOSPADM

## 2019-08-08 RX ORDER — PHENYTOIN SODIUM 100 MG/1
100 CAPSULE, EXTENDED RELEASE ORAL 2 TIMES DAILY
Status: DISCONTINUED | OUTPATIENT
Start: 2019-08-08 | End: 2019-08-10 | Stop reason: HOSPADM

## 2019-08-08 RX ORDER — ASPIRIN 81 MG/1
81 TABLET, CHEWABLE ORAL DAILY
COMMUNITY
End: 2022-02-24 | Stop reason: SDUPTHER

## 2019-08-08 RX ORDER — NITROGLYCERIN 0.4 MG/1
0.4 TABLET SUBLINGUAL EVERY 5 MIN PRN
Status: DISCONTINUED | OUTPATIENT
Start: 2019-08-08 | End: 2019-08-10 | Stop reason: HOSPADM

## 2019-08-08 RX ORDER — AMOXICILLIN 250 MG/1
500 CAPSULE ORAL SEE ADMIN INSTRUCTIONS
Status: DISCONTINUED | OUTPATIENT
Start: 2019-08-08 | End: 2019-08-08 | Stop reason: ALTCHOICE

## 2019-08-08 RX ORDER — DIAZEPAM 5 MG/1
5 TABLET ORAL 2 TIMES DAILY PRN
Status: DISCONTINUED | OUTPATIENT
Start: 2019-08-08 | End: 2019-08-10 | Stop reason: HOSPADM

## 2019-08-08 RX ORDER — WARFARIN SODIUM 5 MG/1
5 TABLET ORAL
Status: DISCONTINUED | OUTPATIENT
Start: 2019-08-09 | End: 2019-08-10 | Stop reason: HOSPADM

## 2019-08-08 RX ORDER — METOPROLOL SUCCINATE 25 MG/1
25 TABLET, EXTENDED RELEASE ORAL DAILY
Status: DISCONTINUED | OUTPATIENT
Start: 2019-08-09 | End: 2019-08-10

## 2019-08-08 RX ORDER — WARFARIN SODIUM 2.5 MG/1
2.5 TABLET ORAL
Status: DISCONTINUED | OUTPATIENT
Start: 2019-08-08 | End: 2019-08-10 | Stop reason: HOSPADM

## 2019-08-08 RX ORDER — DIAZEPAM 5 MG/1
5 TABLET ORAL 2 TIMES DAILY
COMMUNITY

## 2019-08-08 RX ORDER — AMIODARONE HYDROCHLORIDE 200 MG/1
100 TABLET ORAL DAILY
Status: DISCONTINUED | OUTPATIENT
Start: 2019-08-09 | End: 2019-08-10 | Stop reason: HOSPADM

## 2019-08-08 RX ADMIN — DIAZEPAM 5 MG: 5 TABLET ORAL at 17:03

## 2019-08-08 RX ADMIN — ATORVASTATIN CALCIUM 80 MG: 80 TABLET, FILM COATED ORAL at 17:06

## 2019-08-08 RX ADMIN — PHENYTOIN SODIUM 100 MG: 100 CAPSULE ORAL at 20:14

## 2019-08-08 RX ADMIN — PRIMIDONE 250 MG: 250 TABLET ORAL at 17:03

## 2019-08-08 RX ADMIN — WARFARIN SODIUM 2.5 MG: 2.5 TABLET ORAL at 18:00

## 2019-08-08 ASSESSMENT — ENCOUNTER SYMPTOMS
SHORTNESS OF BREATH: 1
DIARRHEA: 0
ABDOMINAL PAIN: 0
NAUSEA: 0
VOMITING: 0
BLOOD IN STOOL: 0
BACK PAIN: 0

## 2019-08-08 ASSESSMENT — PAIN SCALES - GENERAL
PAINLEVEL_OUTOF10: 0
PAINLEVEL_OUTOF10: 0

## 2019-08-08 NOTE — ED NOTES
Kashif Tracey RN from 3A @ bedside. Report given. Pt transferred to floor via wheelchair.      Golden Buerger, RN  08/08/19 [id number]

## 2019-08-08 NOTE — ED NOTES
Pharmacy Medication History Note      List of current medications patient is taking is complete. Source of information: patient    Changes made to medication list:  Medications flagged for removal (include reason, ex. noncompliance):  N/A    Medications removed (include reason, ex. therapy complete or physician discontinued):  Diazepam- dose adjustment  Furosemide- discontinued  Potasium- discontinued    Medications added/doses adjusted:  Diazepam 5mg- BID prn  Aspirin 81mg- QD    Other notes (ex. Recent course of antibiotics, Coumadin dosing):  INR 2.5 3 weeks ago taking 5mg Monday Wednesday Friday 2.5mg all other days, monitored by [employer]  Denies use of other OTC or herbal medications. Last dose times updated. Ariana S[facility]    No current facility-administered medications on file prior to encounter. Current Outpatient Medications on File Prior to Encounter   Medication Sig Dispense Refill    aspirin 81 MG chewable tablet Take 81 mg by mouth daily      diazepam (VALIUM) 5 MG tablet Take 5 mg by mouth 2 times daily as needed for Anxiety. metoprolol succinate (TOPROL XL) 25 MG extended release tablet Take 1 tablet by mouth daily 90 tablet 3    ezetimibe (ZETIA) 10 MG tablet Take 1 tablet by mouth daily 90 tablet 3    atorvastatin (LIPITOR) 80 MG tablet Take 1 tablet by mouth daily 90 tablet 3    amoxicillin (AMOXIL) 500 MG capsule Take 1 capsule by mouth See Admin Instructions for 4 doses 4 tabs one hour prior dental work 20 capsule 0    amiodarone (CORDARONE) 200 MG tablet Take 0.5 tablets by mouth daily 90 tablet 3    warfarin (COUMADIN) 5 MG tablet TAKE ONE TO TWO TABLETS BY MOUTH ONCE DAILY AS DIRECTED 180 tablet 0    nitroGLYCERIN (NITROLINGUAL) 0.4 MG/SPRAY 0.4 mg spray Place 1 spray under the tongue every 5 minutes as needed for Chest pain 1 Bottle 3    phenytoin (DILANTIN) 100 MG ER capsule Take 1 capsule by mouth 2 times daily.  Resume home dose 1 capsule 0    primidone (MYSOLINE)

## 2019-08-08 NOTE — ED PROVIDER NOTES
hyperlipidemia      amoxicillin (AMOXIL) 500 MG capsule Take 1 capsule by mouth See Admin Instructions for 4 doses 4 tabs one hour prior dental work  Qty: 20 capsule, Refills: 0      amiodarone (CORDARONE) 200 MG tablet Take 0.5 tablets by mouth daily  Qty: 90 tablet, Refills: 3      warfarin (COUMADIN) 5 MG tablet TAKE ONE TO TWO TABLETS BY MOUTH ONCE DAILY AS DIRECTED  Qty: 180 tablet, Refills: 0    Associated Diagnoses: Paroxysmal atrial fibrillation ([address] Coronary artery disease involving native coronary artery of native heart without angina pectoris      nitroGLYCERIN (NITROLINGUAL) 0.4 MG/SPRAY 0.4 mg spray Place 1 spray under the tongue every 5 minutes as needed for Chest pain  Qty: 1 Bottle, Refills: 3      phenytoin (DILANTIN) 100 MG ER capsule Take 1 capsule by mouth 2 times daily. Resume home dose  Qty: 1 capsule, Refills: 0      primidone (MYSOLINE) 250 MG tablet Take 250 mg by mouth daily                ALLERGIES     Patient has no known allergies.     FAMILYHISTORY       Family History   Problem Relation Age of Onset    Heart Failure Mother     Heart Disease Neg Hx           SOCIAL HISTORY       Social History     Socioeconomic History    Marital status:      Spouse name: None    Number of children: None    Years of education: None    Highest education level: None   Occupational History    None   Social Needs    Financial resource strain: None    Food insecurity:     Worry: None     Inability: None    Transportation needs:     Medical: None     Non-medical: None   Tobacco Use    Smoking status: Former Smoker     Packs/day: 2.00     Years: 15.00     Pack years: 30.00     Last attempt to quit: 8/15/1990     Years since quittin.0    Smokeless tobacco: Never Used   Substance and Sexual Activity    Alcohol use: No    Drug use: No    Sexual activity: None   Lifestyle    Physical activity:     Days per week: None     Minutes per session: None    Stress: None   Relationships    Social connections:     Talks on phone: None     Gets together: None     Attends Denominational service: None     Active member of club or organization: None     Attends meetings of clubs or organizations: None     Relationship status: None    Intimate partner violence:     Fear of current or ex partner: None     Emotionally abused: None     Physically abused: None     Forced sexual activity: None   Other Topics Concern    None   Social History Narrative    None       SCREENINGS             PHYSICAL EXAM    (up to 7 for level 4, 8 or more for level 5)     ED Triage Vitals   BP Temp Temp src Pulse Resp SpO2 Height Weight   -- -- -- -- -- -- -- --       Physical Exam   Constitutional: He is oriented to person, place, and time. He appears well-developed and well-nourished. HENT:   Head: Atraumatic. Eyes: Right eye exhibits no discharge. Left eye exhibits no discharge. Neck: Normal range of motion. Cardiovascular: Regular rhythm and normal heart sounds. Exam reveals no gallop and no friction rub. No murmur heard. Slightly bradycardic   Pulmonary/Chest: Effort normal and breath sounds normal. No stridor. No respiratory distress. He has no wheezes. He has no rales. Abdominal: Soft. Bowel sounds are normal. He exhibits no distension and no mass. There is no tenderness. There is no rebound and no guarding. No hernia. Musculoskeletal: Normal range of motion. He exhibits no edema, tenderness or deformity. Neurological: He is alert and oriented to person, place, and time. No cranial nerve deficit. Skin: Skin is warm and dry. No rash noted. He is not diaphoretic. No erythema. Psychiatric: He has a normal mood and affect. His behavior is normal.   Nursing note and vitals reviewed.       DIAGNOSTIC RESULTS   LABS:    Labs Reviewed   BRAIN NATRIURETIC PEPTIDE - Abnormal; Notable for the following components:       Result Value    Pro- (*)     All other components within normal limits    Narrative: Performed at:  [facility] [address] [place], [address]   Phone [phone]   PROTIME-INR - Abnormal; Notable for the following components:    Protime 22.6 (*)     INR 1.98 (*)     All other components within normal limits    Narrative:     Performed at:  [facility] [address] [place], [address]   Phone [phone]   APTT - Abnormal; Notable for the following components:    aPTT 38.1 (*)     All other components within normal limits    Narrative:     Performed at:  [facility] [address], [address]   Phone [phone]   CBC WITH AUTO DIFFERENTIAL    Narrative:     Performed at:  [facility] [address] [employer], [address]   Phone [phone]   COMPREHENSIVE METABOLIC PANEL W/ REFLEX TO MG FOR LOW K    Narrative:     Performed at:  [facility] [address], [place]   Phone [phone]   LIPASE    Narrative:     Performed at:  [facility] [address], [place]   Phone [phone]   TROPONIN    Narrative:     Performed at:  [facility] [address] [employer], [place]   Phone [phone]   AMIODARONE LEVEL   TROPONIN   TROPONIN       All other labs were within normal range or not returned as of this dictation. EKG: All EKG's are interpreted by the Emergency Department Physician in the absence of a cardiologist.  Please see their note for interpretation of EKG.       RADIOLOGY:   Non-plain film images such as CT, Ultrasound and MRI are read by the radiologist. Plain radiographic images are visualized andpreliminarily interpreted by the  ED Provider with the below findings:        Interpretation perthe Radiologist below, if available at the time of this note:    XR CHEST PORTABLE   Final Result administration in time range)   primidone (MYSOLINE) tablet 250 mg (250 mg Oral Given 8/8/19 1703)   warfarin (COUMADIN) tablet 5 mg (has no administration in time range)   warfarin (COUMADIN) tablet 2.5 mg (has no administration in time range)       Patient presented with some shortness of breath with a heart rate in the 120s. he is anticoagulated on Coumadin he is anticoagulated on Coumadin with an INR of 1.98. Laboratory testing is unremarkable. Patient has a significant risk factors with his shortness of breath. His pacemaker was interrogated that did not show any events. Unsure of what caused his heart rate to go into the 120s. He also states that his pulse ox was in the 70s at home but he is not hypoxic here his entire stay here in the emergency department. Low suspicion for pulmonary embolus, aortic dissection, pneumonia, pneumothorax, acute coronary syndrome or other emergent etiology. Attending did discuss this with internal medicine. Patient was stable time of admission. FINAL IMPRESSION      1. Shortness of breath    2. Cardiovascular risk factor          DISPOSITION/PLAN   DISPOSITION Admitted 08/08/2019 02:51:16 PM      PATIENT REFERREDTO:  No follow-up provider specified.     DISCHARGE MEDICATIONS:  Current Discharge Medication List          DISCONTINUED MEDICATIONS:  Current Discharge Medication List                 (Please note that portions ofthis note were completed with a voice recognition program.  Efforts were made to edit the dictations but occasionally words are mis-transcribed.)    Lydia Varghese PA-C (electronically signed)           Lydia Varghese PA-C  08/08/19 [phone]

## 2019-08-09 ENCOUNTER — APPOINTMENT (OUTPATIENT)
Dept: CT IMAGING | Age: 74
End: 2019-08-09
Payer: MEDICARE

## 2019-08-09 PROBLEM — I48.91 ATRIAL FIBRILLATION WITH RVR (HCC): Status: RESOLVED | Noted: 2018-02-15 | Resolved: 2019-08-09

## 2019-08-09 LAB
APTT: 42 SEC (ref 26–36)
APTT: 61.1 SEC (ref 26–36)
D DIMER: 1204 NG/ML DDU (ref 0–229)
HCT VFR BLD CALC: 43.5 % (ref 40.5–52.5)
HEMOGLOBIN: 14.1 G/DL (ref 13.5–17.5)
INR BLD: 2.48 (ref 0.86–1.14)
LV EF: 35 %
LVEF MODALITY: NORMAL
MCH RBC QN AUTO: 30.1 PG (ref 26–34)
MCHC RBC AUTO-ENTMCNC: 32.4 G/DL (ref 31–36)
MCV RBC AUTO: 92.8 FL (ref 80–100)
PDW BLD-RTO: 14.6 % (ref 12.4–15.4)
PLATELET # BLD: 242 K/UL (ref 135–450)
PMV BLD AUTO: 8.6 FL (ref 5–10.5)
PROTHROMBIN TIME: 28.3 SEC (ref 9.8–13)
RBC # BLD: 4.7 M/UL (ref 4.2–5.9)
REASON FOR REJECTION: NORMAL
REJECTED TEST: NORMAL
TROPONIN: <0.01 NG/ML
TSH REFLEX: 4.11 UIU/ML (ref 0.27–4.2)
WBC # BLD: 8.7 K/UL (ref 4–11)

## 2019-08-09 PROCEDURE — 36415 COLL VENOUS BLD VENIPUNCTURE: CPT

## 2019-08-09 PROCEDURE — 96376 TX/PRO/DX INJ SAME DRUG ADON: CPT

## 2019-08-09 PROCEDURE — 85379 FIBRIN DEGRADATION QUANT: CPT

## 2019-08-09 PROCEDURE — 85730 THROMBOPLASTIN TIME PARTIAL: CPT

## 2019-08-09 PROCEDURE — 6370000000 HC RX 637 (ALT 250 FOR IP): Performed by: INTERNAL MEDICINE

## 2019-08-09 PROCEDURE — 6360000004 HC RX CONTRAST MEDICATION: Performed by: INTERNAL MEDICINE

## 2019-08-09 PROCEDURE — G0378 HOSPITAL OBSERVATION PER HR: HCPCS

## 2019-08-09 PROCEDURE — 6360000002 HC RX W HCPCS: Performed by: INTERNAL MEDICINE

## 2019-08-09 PROCEDURE — 85610 PROTHROMBIN TIME: CPT

## 2019-08-09 PROCEDURE — 93306 TTE W/DOPPLER COMPLETE: CPT

## 2019-08-09 PROCEDURE — 85027 COMPLETE CBC AUTOMATED: CPT

## 2019-08-09 PROCEDURE — 99215 OFFICE O/P EST HI 40 MIN: CPT | Performed by: INTERNAL MEDICINE

## 2019-08-09 PROCEDURE — 94760 N-INVAS EAR/PLS OXIMETRY 1: CPT

## 2019-08-09 PROCEDURE — 71260 CT THORAX DX C+: CPT

## 2019-08-09 PROCEDURE — 93017 CV STRESS TEST TRACING ONLY: CPT | Performed by: INTERNAL MEDICINE

## 2019-08-09 PROCEDURE — 96365 THER/PROPH/DIAG IV INF INIT: CPT

## 2019-08-09 PROCEDURE — 84443 ASSAY THYROID STIM HORMONE: CPT

## 2019-08-09 RX ORDER — HEPARIN SODIUM 10000 [USP'U]/100ML
18 INJECTION, SOLUTION INTRAVENOUS CONTINUOUS
Status: DISCONTINUED | OUTPATIENT
Start: 2019-08-09 | End: 2019-08-09

## 2019-08-09 RX ORDER — HEPARIN SODIUM 1000 [USP'U]/ML
80 INJECTION, SOLUTION INTRAVENOUS; SUBCUTANEOUS ONCE
Status: COMPLETED | OUTPATIENT
Start: 2019-08-09 | End: 2019-08-09

## 2019-08-09 RX ORDER — HEPARIN SODIUM 1000 [USP'U]/ML
40 INJECTION, SOLUTION INTRAVENOUS; SUBCUTANEOUS PRN
Status: DISCONTINUED | OUTPATIENT
Start: 2019-08-09 | End: 2019-08-09

## 2019-08-09 RX ORDER — DOCUSATE SODIUM 100 MG/1
100 CAPSULE, LIQUID FILLED ORAL 2 TIMES DAILY PRN
Status: DISCONTINUED | OUTPATIENT
Start: 2019-08-09 | End: 2019-08-10 | Stop reason: HOSPADM

## 2019-08-09 RX ORDER — HEPARIN SODIUM 1000 [USP'U]/ML
80 INJECTION, SOLUTION INTRAVENOUS; SUBCUTANEOUS PRN
Status: DISCONTINUED | OUTPATIENT
Start: 2019-08-09 | End: 2019-08-09

## 2019-08-09 RX ADMIN — IOPAMIDOL 75 ML: 755 INJECTION, SOLUTION INTRAVENOUS at 11:31

## 2019-08-09 RX ADMIN — ATORVASTATIN CALCIUM 80 MG: 80 TABLET, FILM COATED ORAL at 17:39

## 2019-08-09 RX ADMIN — AMIODARONE HYDROCHLORIDE 100 MG: 200 TABLET ORAL at 09:29

## 2019-08-09 RX ADMIN — ASPIRIN 81 MG 81 MG: 81 TABLET ORAL at 09:29

## 2019-08-09 RX ADMIN — HEPARIN SODIUM 6380 UNITS: 1000 INJECTION, SOLUTION INTRAVENOUS; SUBCUTANEOUS at 12:43

## 2019-08-09 RX ADMIN — DIAZEPAM 5 MG: 5 TABLET ORAL at 17:39

## 2019-08-09 RX ADMIN — DOCUSATE SODIUM 100 MG: 100 CAPSULE, LIQUID FILLED ORAL at 22:43

## 2019-08-09 RX ADMIN — HEPARIN SODIUM AND DEXTROSE 18 UNITS/KG/HR: 10000; 5 INJECTION INTRAVENOUS at 12:43

## 2019-08-09 RX ADMIN — WARFARIN SODIUM 5 MG: 5 TABLET ORAL at 17:39

## 2019-08-09 RX ADMIN — PRIMIDONE 250 MG: 250 TABLET ORAL at 17:39

## 2019-08-09 RX ADMIN — METOPROLOL SUCCINATE 25 MG: 25 TABLET, FILM COATED, EXTENDED RELEASE ORAL at 09:29

## 2019-08-09 RX ADMIN — PHENYTOIN SODIUM 100 MG: 100 CAPSULE ORAL at 21:40

## 2019-08-09 RX ADMIN — PHENYTOIN SODIUM 100 MG: 100 CAPSULE ORAL at 09:29

## 2019-08-09 ASSESSMENT — PAIN SCALES - GENERAL
PAINLEVEL_OUTOF10: 0
PAINLEVEL_OUTOF10: 0

## 2019-08-09 NOTE — PROGRESS NOTES
Patient instructed on Salazar Protocol Stress Test Procedure including possible side effects and adverse reactions. Verbalizes knowledge and understanding and denies having any questions. Patient unable to reach target heart on treadmill with Salazar Protocol and unable to go any further or any faster per Patient.

## 2019-08-09 NOTE — CONSULTS
[address]   Cardiac Consultation    Referring Provider:  No primary care provider on file. Chief Complaint   Patient presents with    Shortness of Breath     pt brought in by squad with c/o shortness of breath that started this morning. pt noted that his heart rate was up in the 120's when it happened. History of Present Illness:   73 yo with hx of diffuse CAD s/p redo CABG 1/2019 at [facility] presents with rapid HR and SOB for evaluation. Denies chest discomfort, SOB, change in exercise tolerance, palpitations or syncope. Has noted improvement in function since his redo surgery. On presentation he was in recurrent AF with a rate of 99 and subtherapeutic INR. Generally has a slow HR and is frequently following his pulse at home. He has not had recent PFTs or Echo. He states that his O2 sat was down to 70% at home though this could not be replicated in the ER. He is ion Amiodarone for control of his arrythmia. Past Medical History:   has a past medical history of Arthritis, Atrial fib/flut, CAD (coronary artery disease), Hyperlipidemia, Hypertension, Seizures ([address] and Sinus bradycardia. Surgical History:   has a past surgical history that includes hernia repair (Right); Coronary angioplasty with stent (1997); Fithian tooth extraction (April 2012); Cardiac surgery; and pacemaker placement (12/18/2017). Social History:   reports that he quit smoking about 29 years ago. He has a 30.00 pack-year smoking history. He has never used smokeless tobacco. He reports that he does not drink alcohol or use drugs. Family History:  family history includes Heart Failure in his mother. Home Medications:  See List    Allergies:  Patient has no known allergies. Review of Systems:   · Constitutional: there has been no unanticipated weight loss. There's been no change in energy level, sleep pattern, or activity level. · Eyes: No visual changes or diplopia.  No scleral

## 2019-08-09 NOTE — H&P
[address], [address]                              HISTORY AND PHYSICAL    PATIENT NAME: Griffin Olivia                        :        1945  MED REC NO:   [id number]                          ROOM:       3322  ACCOUNT NO:   [de-identified]                           ADMIT DATE: 2019  PROVIDER:     Joel Schultz MD    HISTORY OF PRESENT ILLNESS:  The patient is a 81-year-old white man came  to the emergency room with history of shortness of breath, palpitation,  and some chest tightness. This has been progressive over the last  couple days. No loss of consciousness. The patient has a known cardiac  disease. The patient had aortocoronary bypass just five months ago. PAST MEDICAL HISTORY:  Pertinent for paroxysmal atrial fibrillation,  coronary artery disease, essential hypertension, atrial fibrillation,  hyperlipidemia, ischemic cardiomyopathy, implantable ICD device,  shortness of breath, seizure disorder, recurrent palpitation, ST  elevation myocardial infarction, fairly recurrent and diffuse coronary  artery disease, vertebrobasilar insufficiency, obstructive sleep apnea,  ventricular tachyarrhythmia and ventricular fibrillation, chronic  systolic heart failure. PAST SURGICAL HISTORY:  Coronary angioplasty, wisdom tooth extraction,  pacemaker placement, ICD device placement, hernia repair. FAMILY HISTORY:  Mother  of heart failure. Father  of  natural causes. SOCIAL HISTORY:  He is a  man with four children. He quit  smoking 31 years ago after 20 years of smoking. He quit drinking also  30 plus years ago after several years. He was a heavy equipment  . No history of drug abuse. MEDICATIONS:  Amiodarone, amoxicillin, aspirin, Lipitor, thallium,  Zetia, Toprol, Nitrolingual spray, Dilantin, penicillin, Coumadin. ALLERGIES:  The patient has no known allergies.     REVIEW OF SYSTEMS:  Negative for loss of consciousness. No hematemesis. No melena. Denies any typical exertional angina, but did have some  vague chest tightness and palpitation with associated shortness of  breath. Denies any abdominal pain. No intermittent claudication. Does  have chronic musculoskeletal pain. No CNS symptoms. PHYSICAL EXAMINATION:  GENERAL:  Alert, awake, oriented x89-year-old white man looking  consistent with his stated age of somewhat younger. VITAL SIGNS:  Temperature 98.2, blood pressure 122/71, respirations 10,  heart rate 50. HEENT:  Oral mucosa dry. SKIN:  Warm and dry. NECK:  Supple. Faint carotid bruit. No jugular venous distention. No  lymphadenopathy. No thyromegaly. LUNGS:  Vesicular breath sounds. Fairly clear to auscultation. HEART:  Regular rate and rhythm. S1, S2, without any S3 or S4 gallop. ABDOMEN:  Soft and nontender. Bowel sounds present. EXTREMITIES:  Shows no cyanosis or edema. Distal pulsations are normal.  NEUROLOGIC:  There are no acute focal deficits. LABORATORY EVALUATION:  Shows sodium 141, potassium 4.1, chloride 105,  CO2 27, BUN 19, creatinine 1.1, anion gap is 9, blood sugar is 95,  calcium is 8.8. Total protein 8.8. ProBNP level 257. Troponin by the  time I am dictating this at least two sets are negative. Albumin is  4.1, globulin 2.7, alkaline phosphatase 93, AST and ALT 24 and 30. Hemoglobin and hematocrit is 14.1 and 42.8, platelet count _____. Coagulation profile within normal limits. PT of 22.6 and INR 1.98 which  is therapeutic. DIAGNOSTIC DATA:  EKG is normal sinus rhythm, no acute ischemic changes. Chest x-ray, no history of acute cardiopulmonary disease. ASSESSMENT:  Variant angina, palpitation, atherosclerotic heart disease,  hypertension, dizziness, chronic systolic heart failure, coronary artery  disease, atrial fibrillation, history of ventricular tachycardia,  obstructive sleep apnea, seizure disorder.     PLAN:

## 2019-08-10 VITALS
OXYGEN SATURATION: 98 % | HEIGHT: 70 IN | HEART RATE: 52 BPM | BODY MASS INDEX: 25.2 KG/M2 | WEIGHT: 176 LBS | DIASTOLIC BLOOD PRESSURE: 68 MMHG | TEMPERATURE: 97.3 F | SYSTOLIC BLOOD PRESSURE: 117 MMHG | RESPIRATION RATE: 18 BRPM

## 2019-08-10 LAB
INR BLD: 2.96 (ref 0.86–1.14)
PROTHROMBIN TIME: 33.8 SEC (ref 9.8–13)

## 2019-08-10 PROCEDURE — 36415 COLL VENOUS BLD VENIPUNCTURE: CPT

## 2019-08-10 PROCEDURE — 6370000000 HC RX 637 (ALT 250 FOR IP): Performed by: INTERNAL MEDICINE

## 2019-08-10 PROCEDURE — 85610 PROTHROMBIN TIME: CPT

## 2019-08-10 PROCEDURE — 99214 OFFICE O/P EST MOD 30 MIN: CPT | Performed by: INTERNAL MEDICINE

## 2019-08-10 PROCEDURE — G0378 HOSPITAL OBSERVATION PER HR: HCPCS

## 2019-08-10 RX ORDER — METOPROLOL SUCCINATE 50 MG/1
50 TABLET, EXTENDED RELEASE ORAL DAILY
Qty: 30 TABLET | Refills: 3 | Status: SHIPPED | OUTPATIENT
Start: 2019-08-11 | End: 2019-11-27 | Stop reason: SDUPTHER

## 2019-08-10 RX ORDER — PSEUDOEPHEDRINE HCL 30 MG
100 TABLET ORAL 2 TIMES DAILY PRN
COMMUNITY
Start: 2019-08-10 | End: 2022-02-24

## 2019-08-10 RX ORDER — METOPROLOL SUCCINATE 50 MG/1
50 TABLET, EXTENDED RELEASE ORAL DAILY
Status: DISCONTINUED | OUTPATIENT
Start: 2019-08-10 | End: 2019-08-10 | Stop reason: HOSPADM

## 2019-08-10 RX ADMIN — DOCUSATE SODIUM 100 MG: 100 CAPSULE, LIQUID FILLED ORAL at 08:54

## 2019-08-10 RX ADMIN — PHENYTOIN SODIUM 100 MG: 100 CAPSULE ORAL at 08:54

## 2019-08-10 RX ADMIN — AMIODARONE HYDROCHLORIDE 100 MG: 200 TABLET ORAL at 08:54

## 2019-08-10 RX ADMIN — METOPROLOL SUCCINATE 50 MG: 50 TABLET, FILM COATED, EXTENDED RELEASE ORAL at 08:54

## 2019-08-10 RX ADMIN — ASPIRIN 81 MG 81 MG: 81 TABLET ORAL at 08:54

## 2019-08-10 ASSESSMENT — PAIN SCALES - GENERAL
PAINLEVEL_OUTOF10: 0
PAINLEVEL_OUTOF10: 0

## 2019-08-10 NOTE — PROGRESS NOTES
Data- discharge order received, pt verbalized agreement to discharge, disposition to previous residence, no needs for HHC/DME. Action- discharge instructions prepared and given to pt, pt verbalized understanding. Medication information packet given r/t NEW and/or CHANGED prescriptions emphasizing name/purpose/side effects, pt verbalized understanding. Discharge instruction summary: Diet- cardiac, Activity- as debo, Primary Care Physician as follows: No primary care provider on file. None f/u appointment . Pt given contact info to Dr. Douglas Rivero, immunizations reviewed , prescription medications filled @ pts own pharmacy. Response- Pt belongings gathered, IV removed. Disposition is home (no HHC/DME needs), transported with son, taken to lobby via w/c w/ this RN, no complications.

## 2019-08-11 DIAGNOSIS — I25.10 CORONARY ARTERY DISEASE INVOLVING NATIVE CORONARY ARTERY OF NATIVE HEART WITHOUT ANGINA PECTORIS: ICD-10-CM

## 2019-08-11 LAB
AMIODARONE LEVEL: <0.3 UG/ML (ref 0.5–2)
DES-AMIOD: <0.3 UG/ML

## 2019-08-12 RX ORDER — POTASSIUM CHLORIDE 1500 MG/1
TABLET, EXTENDED RELEASE ORAL
Qty: 90 TABLET | Refills: 1 | OUTPATIENT
Start: 2019-08-12

## 2019-08-12 RX ORDER — FUROSEMIDE 20 MG/1
TABLET ORAL
Qty: 180 TABLET | Refills: 1 | OUTPATIENT
Start: 2019-08-12

## 2019-08-12 RX ORDER — ATORVASTATIN CALCIUM 80 MG/1
TABLET, FILM COATED ORAL
Qty: 90 TABLET | Refills: 1 | Status: SHIPPED | OUTPATIENT
Start: 2019-08-12 | End: 2020-01-16 | Stop reason: SDUPTHER

## 2019-08-27 ENCOUNTER — NURSE ONLY (OUTPATIENT)
Dept: CARDIOLOGY CLINIC | Age: 74
End: 2019-08-27
Payer: MEDICARE

## 2019-08-27 DIAGNOSIS — Z95.810 ICD (IMPLANTABLE CARDIOVERTER-DEFIBRILLATOR) IN PLACE: ICD-10-CM

## 2019-08-27 DIAGNOSIS — I25.5 ISCHEMIC CARDIOMYOPATHY: Chronic | ICD-10-CM

## 2019-08-27 DIAGNOSIS — I50.22 CHRONIC SYSTOLIC HEART FAILURE (HCC): ICD-10-CM

## 2019-08-27 PROCEDURE — 93296 REM INTERROG EVL PM/IDS: CPT | Performed by: INTERNAL MEDICINE

## 2019-08-27 PROCEDURE — 93295 DEV INTERROG REMOTE 1/2/MLT: CPT | Performed by: INTERNAL MEDICINE

## 2019-08-27 PROCEDURE — 93297 REM INTERROG DEV EVAL ICPMS: CPT | Performed by: INTERNAL MEDICINE

## 2019-08-29 ENCOUNTER — ANTI-COAG VISIT (OUTPATIENT)
Dept: CARDIOLOGY CLINIC | Age: 74
End: 2019-08-29

## 2019-08-29 LAB
INR BLD: 2.5
INR BLD: 2.5 (ref 0.8–1.2)
INR BLD: ABNORMAL

## 2019-09-09 RX ORDER — AMOXICILLIN 500 MG/1
CAPSULE ORAL
Qty: 20 CAPSULE | Refills: 0 | Status: SHIPPED | OUTPATIENT
Start: 2019-09-09 | End: 2020-10-05 | Stop reason: ALTCHOICE

## 2019-09-11 NOTE — PROGRESS NOTES
Patient seen , discharge dictated scripts given , arrangements made , LATOYA completed .  Discussed with nursing staff  And   If applicable ,  Discussed with  Patient's family , all questions answered and concerns addressed  When applicable

## 2019-10-02 RX ORDER — POTASSIUM CHLORIDE 1500 MG/1
TABLET, EXTENDED RELEASE ORAL
Qty: 90 TABLET | Refills: 0 | Status: SHIPPED | OUTPATIENT
Start: 2019-10-02 | End: 2020-01-15

## 2019-10-03 LAB — INR BLD: 1.8

## 2019-10-04 ENCOUNTER — ANTI-COAG VISIT (OUTPATIENT)
Dept: CARDIOLOGY CLINIC | Age: 74
End: 2019-10-04

## 2019-10-29 ENCOUNTER — ANTI-COAG VISIT (OUTPATIENT)
Dept: CARDIOLOGY CLINIC | Age: 74
End: 2019-10-29

## 2019-10-29 DIAGNOSIS — I48.91 ATRIAL FIBRILLATION, UNSPECIFIED TYPE (HCC): ICD-10-CM

## 2019-10-30 LAB — INR BLD: 1.4

## 2019-11-06 ENCOUNTER — TELEPHONE (OUTPATIENT)
Dept: CARDIOLOGY CLINIC | Age: 74
End: 2019-11-06

## 2019-11-06 ENCOUNTER — ANTI-COAG VISIT (OUTPATIENT)
Dept: CARDIOLOGY CLINIC | Age: 74
End: 2019-11-06

## 2019-11-06 LAB — INR BLD: 1.7

## 2019-11-11 RX ORDER — TAMSULOSIN HYDROCHLORIDE 0.4 MG/1
0.4 CAPSULE ORAL NIGHTLY
COMMUNITY
End: 2020-01-21

## 2019-11-13 ENCOUNTER — HOSPITAL ENCOUNTER (OUTPATIENT)
Age: 74
Setting detail: OUTPATIENT SURGERY
Discharge: HOME | End: 2019-11-13
Attending: UROLOGY | Admitting: UROLOGY
Payer: MEDICARE

## 2019-11-13 VITALS
SYSTOLIC BLOOD PRESSURE: 130 MMHG | HEART RATE: 49 BPM | WEIGHT: 182 LBS | DIASTOLIC BLOOD PRESSURE: 79 MMHG | TEMPERATURE: 98 F | BODY MASS INDEX: 26.05 KG/M2 | RESPIRATION RATE: 16 BRPM | HEIGHT: 70 IN | OXYGEN SATURATION: 99 %

## 2019-11-13 PROCEDURE — 6370000000 HC RX 637 (ALT 250 FOR IP): Performed by: UROLOGY

## 2019-11-13 PROCEDURE — 7100000010 HC PHASE II RECOVERY - FIRST 15 MIN: Performed by: UROLOGY

## 2019-11-13 PROCEDURE — 3600000014 HC SURGERY LEVEL 4 ADDTL 15MIN: Performed by: UROLOGY

## 2019-11-13 PROCEDURE — 3600000004 HC SURGERY LEVEL 4 BASE: Performed by: UROLOGY

## 2019-11-13 PROCEDURE — 2709999900 HC NON-CHARGEABLE SUPPLY: Performed by: UROLOGY

## 2019-11-13 PROCEDURE — 2580000003 HC RX 258: Performed by: UROLOGY

## 2019-11-13 RX ORDER — LIDOCAINE HYDROCHLORIDE 20 MG/ML
JELLY TOPICAL
Status: COMPLETED | OUTPATIENT
Start: 2019-11-13 | End: 2019-11-13

## 2019-11-13 RX ORDER — CEPHALEXIN 250 MG/1
500 CAPSULE ORAL ONCE
Status: COMPLETED | OUTPATIENT
Start: 2019-11-13 | End: 2019-11-13

## 2019-11-13 RX ORDER — MAGNESIUM HYDROXIDE 1200 MG/15ML
LIQUID ORAL
Status: COMPLETED | OUTPATIENT
Start: 2019-11-13 | End: 2019-11-13

## 2019-11-13 RX ADMIN — CEPHALEXIN 500 MG: 250 CAPSULE ORAL at 09:40

## 2019-11-14 ENCOUNTER — ANTI-COAG VISIT (OUTPATIENT)
Dept: CARDIOLOGY CLINIC | Age: 74
End: 2019-11-14

## 2019-11-14 LAB — INR BLD: 2.2

## 2019-11-20 ENCOUNTER — ANTI-COAG VISIT (OUTPATIENT)
Dept: CARDIOLOGY CLINIC | Age: 74
End: 2019-11-20

## 2019-11-20 LAB — INR BLD: 1.8

## 2019-11-27 ENCOUNTER — NURSE ONLY (OUTPATIENT)
Dept: CARDIOLOGY CLINIC | Age: 74
End: 2019-11-27
Payer: MEDICARE

## 2019-11-27 ENCOUNTER — OFFICE VISIT (OUTPATIENT)
Dept: CARDIOLOGY CLINIC | Age: 74
End: 2019-11-27
Payer: MEDICARE

## 2019-11-27 VITALS
OXYGEN SATURATION: 96 % | BODY MASS INDEX: 25.53 KG/M2 | SYSTOLIC BLOOD PRESSURE: 116 MMHG | WEIGHT: 178.3 LBS | DIASTOLIC BLOOD PRESSURE: 80 MMHG | HEIGHT: 70 IN | HEART RATE: 70 BPM

## 2019-11-27 DIAGNOSIS — Z95.810 S/P IMPLANTATION OF AUTOMATIC CARDIOVERTER/DEFIBRILLATOR (AICD): Chronic | ICD-10-CM

## 2019-11-27 DIAGNOSIS — I48.0 PAF (PAROXYSMAL ATRIAL FIBRILLATION) (HCC): Chronic | ICD-10-CM

## 2019-11-27 DIAGNOSIS — R00.2 PALPITATIONS: ICD-10-CM

## 2019-11-27 DIAGNOSIS — I25.5 ISCHEMIC CARDIOMYOPATHY: Chronic | ICD-10-CM

## 2019-11-27 DIAGNOSIS — I48.91 ATRIAL FIBRILLATION, UNSPECIFIED TYPE (HCC): Chronic | ICD-10-CM

## 2019-11-27 DIAGNOSIS — I10 ESSENTIAL HYPERTENSION: Chronic | ICD-10-CM

## 2019-11-27 DIAGNOSIS — E78.2 MIXED HYPERLIPIDEMIA: Chronic | ICD-10-CM

## 2019-11-27 DIAGNOSIS — I47.29 NSVT (NONSUSTAINED VENTRICULAR TACHYCARDIA): ICD-10-CM

## 2019-11-27 DIAGNOSIS — R06.02 SHORTNESS OF BREATH: Primary | Chronic | ICD-10-CM

## 2019-11-27 PROCEDURE — G8484 FLU IMMUNIZE NO ADMIN: HCPCS | Performed by: INTERNAL MEDICINE

## 2019-11-27 PROCEDURE — G8417 CALC BMI ABV UP PARAM F/U: HCPCS | Performed by: INTERNAL MEDICINE

## 2019-11-27 PROCEDURE — 1036F TOBACCO NON-USER: CPT | Performed by: INTERNAL MEDICINE

## 2019-11-27 PROCEDURE — G8427 DOCREV CUR MEDS BY ELIG CLIN: HCPCS | Performed by: INTERNAL MEDICINE

## 2019-11-27 PROCEDURE — G8598 ASA/ANTIPLAT THER USED: HCPCS | Performed by: INTERNAL MEDICINE

## 2019-11-27 PROCEDURE — 4040F PNEUMOC VAC/ADMIN/RCVD: CPT | Performed by: INTERNAL MEDICINE

## 2019-11-27 PROCEDURE — 3017F COLORECTAL CA SCREEN DOC REV: CPT | Performed by: INTERNAL MEDICINE

## 2019-11-27 PROCEDURE — 1123F ACP DISCUSS/DSCN MKR DOCD: CPT | Performed by: INTERNAL MEDICINE

## 2019-11-27 PROCEDURE — 99214 OFFICE O/P EST MOD 30 MIN: CPT | Performed by: INTERNAL MEDICINE

## 2019-11-27 RX ORDER — METOPROLOL SUCCINATE 50 MG/1
75 TABLET, EXTENDED RELEASE ORAL DAILY
Qty: 130 TABLET | Refills: 3 | Status: SHIPPED | OUTPATIENT
Start: 2019-11-27 | End: 2020-01-16 | Stop reason: SDUPTHER

## 2019-12-06 ENCOUNTER — ANTI-COAG VISIT (OUTPATIENT)
Dept: CARDIOLOGY CLINIC | Age: 74
End: 2019-12-06

## 2019-12-06 LAB — INR BLD: 2.1

## 2019-12-31 PROCEDURE — 93228 REMOTE 30 DAY ECG REV/REPORT: CPT | Performed by: INTERNAL MEDICINE

## 2020-01-03 RX ORDER — EZETIMIBE 10 MG/1
TABLET ORAL
Qty: 30 TABLET | Refills: 1 | Status: SHIPPED | OUTPATIENT
Start: 2020-01-03 | End: 2020-01-16 | Stop reason: SDUPTHER

## 2020-01-10 ENCOUNTER — ANTI-COAG VISIT (OUTPATIENT)
Dept: CARDIOLOGY CLINIC | Age: 75
End: 2020-01-10

## 2020-01-10 LAB
ALBUMIN SERPL-MCNC: 4 G/DL (ref 3.5–5)
ALBUMIN SERPL-MCNC: 4 G/DL (ref 3.5–5)
ALP BLD-CCNC: 100 IU/L (ref 40–129)
ALT SERPL-CCNC: 41 IU/L (ref 10–35)
ANION GAP SERPL CALCULATED.3IONS-SCNC: 11 MMOL/L (ref 6–18)
AST SERPL-CCNC: 29 IU/L (ref 10–50)
BILIRUB SERPL-MCNC: 0.3 MG/DL (ref 0–1)
BILIRUBIN DIRECT: <0.2 MG/DL (ref 0–0.2)
BUN BLDV-MCNC: 25 MG/DL (ref 8–26)
CALCIUM SERPL-MCNC: 9 MG/DL (ref 8.8–10.1)
CHLORIDE BLD-SCNC: 104 MEQ/L (ref 101–111)
CO2: 23 MMOL/L (ref 22–29)
CREAT SERPL-MCNC: 1.13 MG/DL (ref 0.64–1.27)
GFR AFRICAN AMERICAN: 73 ML/MIN/1.73 M2
GFR NON-AFRICAN AMERICAN: 63 ML/MIN/1.73 M2
GLUCOSE BLD-MCNC: 85 MG/DL (ref 70–99)
INR BLD: 2.6
MAGNESIUM: 1.9 MG/DL (ref 1.7–2.5)
PHOSPHORUS: 3.6 MG/DL (ref 2.7–4.5)
POTASSIUM SERPL-SCNC: 4.5 MEQ/L (ref 3.6–5.1)
SODIUM BLD-SCNC: 138 MEQ/L (ref 135–145)
TOTAL PROTEIN: 6.9 G/DL (ref 6.6–8.7)
TSH ULTRASENSITIVE: 3.26 (ref 0.27–4.2)

## 2020-01-13 ENCOUNTER — TELEPHONE (OUTPATIENT)
Dept: CARDIOLOGY CLINIC | Age: 75
End: 2020-01-13

## 2020-01-15 RX ORDER — POTASSIUM CHLORIDE 1500 MG/1
TABLET, EXTENDED RELEASE ORAL
Qty: 90 TABLET | Refills: 0 | Status: SHIPPED | OUTPATIENT
Start: 2020-01-15 | End: 2020-01-16 | Stop reason: SDUPTHER

## 2020-01-16 ENCOUNTER — TELEPHONE (OUTPATIENT)
Dept: CARDIOLOGY CLINIC | Age: 75
End: 2020-01-16

## 2020-01-16 NOTE — TELEPHONE ENCOUNTER
Update Pharmacy Change  Medication Refill    Medication needing refilled:  warfarin (COUMADIN) 5 MG tablet    ezetimibe (ZETIA) 10 MG tablet     amiodarone (CORDARONE) 200 MG tablet     metoprolol succinate (TOPROL XL) 50 MG extended release tablet     furosemide (LASIX) 20 MG tablet [[id number]]  DISCONTINUED-Says D/C'd but the pt is still taking. Please call to advise    atorvastatin (LIPITOR) 80 MG tablet     KLOR-CON M20 20 MEQ extended release tablet . This prescription was originally sent to [facility], but the pt's pharmacy changed. Doseage of the medication:     How are you taking this medication (QD, BID, TID, QID, PRN):    30 or 90 day supply called in: Pt requesting Qty 90 for all medications. Which Pharmacy are we sending the medication to?: [facility] DR JON SINGH [address] [facility]  [phone]      Medication Question/Concern    What is the name of the medication you need to speak with someone about? Doseage of the medication:    How are you taking this medication:    What issues/concerns are you having with this medication:      Medication Samples    Medication:    Doseage of the medication:    How are you taking this medication (QD, BID, TID, QID, PRN):     in the office or Mail to your home?

## 2020-01-17 RX ORDER — POTASSIUM CHLORIDE 20 MEQ/1
TABLET, EXTENDED RELEASE ORAL
Qty: 90 TABLET | Refills: 1 | Status: SHIPPED | OUTPATIENT
Start: 2020-01-17 | End: 2020-03-25 | Stop reason: SDUPTHER

## 2020-01-17 RX ORDER — AMIODARONE HYDROCHLORIDE 200 MG/1
100 TABLET ORAL DAILY
Qty: 90 TABLET | Refills: 1 | Status: SHIPPED | OUTPATIENT
Start: 2020-01-17 | End: 2020-08-21 | Stop reason: SDUPTHER

## 2020-01-17 RX ORDER — EZETIMIBE 10 MG/1
TABLET ORAL
Qty: 90 TABLET | Refills: 1 | Status: SHIPPED | OUTPATIENT
Start: 2020-01-17 | End: 2020-06-29 | Stop reason: SDUPTHER

## 2020-01-17 RX ORDER — ATORVASTATIN CALCIUM 80 MG/1
TABLET, FILM COATED ORAL
Qty: 90 TABLET | Refills: 1 | Status: SHIPPED | OUTPATIENT
Start: 2020-01-17 | End: 2020-06-29 | Stop reason: SDUPTHER

## 2020-01-17 RX ORDER — METOPROLOL SUCCINATE 50 MG/1
75 TABLET, EXTENDED RELEASE ORAL DAILY
Qty: 135 TABLET | Refills: 1 | Status: SHIPPED | OUTPATIENT
Start: 2020-01-17 | End: 2020-06-26 | Stop reason: SDUPTHER

## 2020-01-17 NOTE — PROGRESS NOTES
[address]   Electrophysiology   Date: 1/21/2020  I had the privilege of visiting Dee Mathis in the office. CC: Cardiomyopathy,   HPI: Dee Mathis is a 69 y.o. history of CAD s/p CABG in the past, paroxysmal atrial fibrillation, NSTEMI 10/13/2017     He was on Tikosyn for PAF which was stopped. He could not tolerate Amiodarone in the past due to bradycardia. 12/18/17 EPS with inducible VT/VF then implantation of AICD    Redo CABG at [facility] with 1/8/2019: Redo CABGx4 (free IAN-LAD, SVG-Diag, SVG-OM, SVG-PDA), LA maze, WESTON Ligation,     OR course noted to have RV dysfunction requiring multiple pressors and also developed coagulopathy, eventually discharged. He was started back on Amiodarone at [facility]. Multiple cardioversion in the past for atrial fibrillation. Hospitalized with afib and had cardioversion on 1/25/2019. Interval History: He felt like his HR was elevated with a rate of 120 a while ago. He has not had atrial fib since 4/2019 by device interrogation. He feels PVC's or skipping beats recently. He states his heart quivers at times, but no atrial fib present. He continues on Warfarin. He reported atrial fib during his hospitalization in August but was not rapid enough to be detected on his device.         Past Medical History:   Diagnosis Date    Arthritis     shoulders and knee    Atrial fib/flut     CAD (coronary artery disease)     Hyperlipidemia     Hypertension     Seizures ([address]     last seizures many years ago,     Sinus bradycardia         Past Surgical History:   Procedure Laterality Date    CARDIAC SURGERY      , angioplasty 2007    CORONARY ANGIOPLASTY WITH STENT PLACEMENT  1997    CORONARY ARTERY [facility]PASS GRAFT  01/2019    [facility]    CYSTOSCOPY N/A 11/13/2019    FLEXIBLE CYSTOSCOPY performed by Andrew Hamilton MD at [facility] 36 Right     done 65 sam ago   [facility]  12/18/2017    WISDOM TOOTH EXTRACTION  April 2012       Allergies:  No Known Allergies    Social History:  Reviewed. reports that he quit smoking about 31 years ago. He has a 30.00 pack-year smoking history. He has never used smokeless tobacco. He reports that he does not drink alcohol or use drugs. Family History:  Reviewed. family history includes Heart Failure in his mother. Review of System:  All other systems reviewed and are negative except for that noted above. Pertinent negatives are:     · General: negative for fever, chills   · Ophthalmic ROS: negative for - eye pain or loss of vision  · ENT ROS: negative for - headaches, sore throat   · Respiratory: negative for - cough, sputum  · Cardiovascular: Reviewed in HPI  · Gastrointestinal: negative for - abdominal pain, diarrhea, N/V  · Hematology: negative for - bleeding, blood clots, bruising or jaundice  · Genito-Urinary:  negative for - Dysuria or incontinence  · Musculoskeletal: negative for - Joint swelling, muscle pain  · Neurological: negative for - confusion, headaches   · Psychiatric: No anxiety, no depression. · Dermatological: negative for - rash    Physical Examination:  Vitals:    01/21/20 1429   BP: (!) 152/88   Pulse:    ·     · Constitutional: Oriented. No distress. · Head: Normocephalic and atraumatic. · Mouth/Throat: Oropharynx is clear and moist.   · Eyes: Conjunctivae normal. EOM are normal.   · Neck: Neck supple. No JVD present. · Cardiovascular: Normal rate, regular rhythm, S1&S2. · Pulmonary/Chest: Bilateral respiratory sounds. No rhonchi. · Abdominal: Soft. No tenderness. · Musculoskeletal: No tenderness. No edema    · Lymphadenopathy: Has no cervical adenopathy. · Neurological: Alert and oriented. Follows command, No Gross deficit   · Skin: Skin is warm, No rash noted. · Psychiatric: Has a normal behavior     Labs:  Reviewed.    Lab Results   Component Value Date    TSHREFLEX 4.11 08/09/2019    TSH 3.26 01/10/2020    CREATININE 1.13 pacemaker with adjustments was performed again and unfortunately ventricular fibrillation was induced again. The patient was successfully defibrillated. It was decided to proceed with a jet thrombectomy without pacemaker which the patient tolerated reasonably well there was a period of transient bradycardia which responded without the need for intervention. Unfortunately flow could not be restored to the vein graft.     Impression:  1. Severe native CAD  2. Occlusions of the LIMA graft to the LAD as well as the saphenous vein graft to the RCA  3. The LAD is filled via retrograde flow from the diagonal which is grafted  4. Patent sequential SVG to D2 and 0M3 but with moderate disease  5. Moderate LV systolic dysfunction  6. Ventricular arrhythmia which appears to be triggered by the temporary pacemaker possibly due to ischemic/injured myocardium     Plan:  1. Continue aspirin  2. Plavix  3. We'll need to resume anticoagulation with Coumadin at some point but would like EP input regarding that as well as recommendations regarding management of ventricular arrhythmia  4.   Attempted medical management of his CAD but in terms of long-term planning will need a plan should failure of the sequential SVG occur    Medication:  Current Outpatient Medications   Medication Sig Dispense Refill    furosemide (LASIX) 20 MG tablet Take 20 mg by mouth daily as needed (Edema)      potassium chloride (KLOR-CON M20) 20 MEQ extended release tablet TAKE 1 TABLET BY MOUTH AS NEEDED (AS NEEDED WITH LASIX FOR SWELLING) 90 tablet 1    ezetimibe (ZETIA) 10 MG tablet TAKE 1 TABLET BY MOUTH ONCE DAILY 90 tablet 1    metoprolol succinate (TOPROL XL) 50 MG extended release tablet Take 1.5 tablets by mouth daily 135 tablet 1    atorvastatin (LIPITOR) 80 MG tablet TAKE 1 TABLET BY MOUTH ONE TIME A DAY 90 tablet 1    amiodarone (CORDARONE) 200 MG tablet Take 0.5 tablets by mouth daily 90 tablet 1    amoxicillin (AMOXIL) 500 MG daily       -AICD   The CIED was interrogated and programmed and I supervised and reviewed all the data. All findings and changes are in device interrogation sheat and reflect my personal interpretation and changes and is scanned to Epic. - CAD: history of MI. S/p re-do CABG [facility]    - On BB and Lipitor. Decrease Amiodarone to 100mg QOD for 4 weeks then stop  PFT   Follow up in 6 months    Thank you for allowing me to participate in the care of Jesse Valles. Further evaluation will be based upon the patient's clinical course and testing results. All questions and concerns were addressed to the patient/family. Alternatives to my treatment were discussed. I have discussed the above stated plan and the patient verbalized understanding and agreed with the plan. Rhea Waldrop MD, MPH  [place] [address]   Office: [phone]       Physician Attestation: I, Dr. Rhea Waldrop, confirm that the scribe's documentation has been prepared under my direction and personally reviewed by me in its entirety. I also confirm that the note above accurately reflects all work, treatment, procedures, and medical decision making performed by me.

## 2020-01-21 ENCOUNTER — OFFICE VISIT (OUTPATIENT)
Dept: CARDIOLOGY CLINIC | Age: 75
End: 2020-01-21
Payer: MEDICARE

## 2020-01-21 ENCOUNTER — NURSE ONLY (OUTPATIENT)
Dept: CARDIOLOGY CLINIC | Age: 75
End: 2020-01-21
Payer: MEDICARE

## 2020-01-21 VITALS
BODY MASS INDEX: 26.48 KG/M2 | HEIGHT: 70 IN | DIASTOLIC BLOOD PRESSURE: 88 MMHG | WEIGHT: 185 LBS | HEART RATE: 58 BPM | SYSTOLIC BLOOD PRESSURE: 152 MMHG

## 2020-01-21 PROCEDURE — G8417 CALC BMI ABV UP PARAM F/U: HCPCS | Performed by: INTERNAL MEDICINE

## 2020-01-21 PROCEDURE — 93000 ELECTROCARDIOGRAM COMPLETE: CPT | Performed by: INTERNAL MEDICINE

## 2020-01-21 PROCEDURE — 93283 PRGRMG EVAL IMPLANTABLE DFB: CPT | Performed by: INTERNAL MEDICINE

## 2020-01-21 PROCEDURE — 99214 OFFICE O/P EST MOD 30 MIN: CPT | Performed by: INTERNAL MEDICINE

## 2020-01-21 PROCEDURE — 3017F COLORECTAL CA SCREEN DOC REV: CPT | Performed by: INTERNAL MEDICINE

## 2020-01-21 PROCEDURE — 1036F TOBACCO NON-USER: CPT | Performed by: INTERNAL MEDICINE

## 2020-01-21 PROCEDURE — G8427 DOCREV CUR MEDS BY ELIG CLIN: HCPCS | Performed by: INTERNAL MEDICINE

## 2020-01-21 PROCEDURE — G8484 FLU IMMUNIZE NO ADMIN: HCPCS | Performed by: INTERNAL MEDICINE

## 2020-01-21 PROCEDURE — 4040F PNEUMOC VAC/ADMIN/RCVD: CPT | Performed by: INTERNAL MEDICINE

## 2020-01-21 PROCEDURE — 1123F ACP DISCUSS/DSCN MKR DOCD: CPT | Performed by: INTERNAL MEDICINE

## 2020-01-21 RX ORDER — FUROSEMIDE 20 MG/1
20 TABLET ORAL DAILY PRN
Qty: 90 TABLET | Refills: 0 | Status: SHIPPED | OUTPATIENT
Start: 2020-01-21 | End: 2020-03-25 | Stop reason: SDUPTHER

## 2020-01-21 RX ORDER — WARFARIN SODIUM 5 MG/1
5 TABLET ORAL DAILY
Qty: 90 TABLET | Refills: 0 | Status: SHIPPED | OUTPATIENT
Start: 2020-01-21 | End: 2020-03-25 | Stop reason: SDUPTHER

## 2020-01-21 RX ORDER — FUROSEMIDE 20 MG/1
20 TABLET ORAL DAILY PRN
COMMUNITY
End: 2020-01-21 | Stop reason: SDUPTHER

## 2020-01-28 ENCOUNTER — HOSPITAL ENCOUNTER (OUTPATIENT)
Dept: CT IMAGING | Age: 75
Discharge: HOME | End: 2020-01-28
Payer: MEDICARE

## 2020-01-28 PROCEDURE — 6360000004 HC RX CONTRAST MEDICATION: Performed by: UROLOGY

## 2020-01-28 PROCEDURE — 74178 CT ABD&PLV WO CNTR FLWD CNTR: CPT

## 2020-01-28 RX ADMIN — IOPAMIDOL 120 ML: 755 INJECTION, SOLUTION INTRAVENOUS at 12:17

## 2020-01-29 ENCOUNTER — TELEPHONE (OUTPATIENT)
Dept: CARDIOLOGY CLINIC | Age: 75
End: 2020-01-29

## 2020-01-29 LAB
ALBUMIN SERPL-MCNC: 3.9 G/DL (ref 3.5–5)
ALP BLD-CCNC: 97 IU/L (ref 40–129)
ALT SERPL-CCNC: 36 IU/L (ref 10–35)
AST SERPL-CCNC: 27 IU/L (ref 10–50)
BILIRUB SERPL-MCNC: 0.3 MG/DL (ref 0–1)
BILIRUBIN DIRECT: <0.2 MG/DL (ref 0–0.2)
TOTAL PROTEIN: 6.6 G/DL (ref 6.6–8.7)

## 2020-01-29 NOTE — TELEPHONE ENCOUNTER
Needs order for INR faxed to [facility] (where he has been going the past few yrs) .  Please fax to [phone]

## 2020-01-29 NOTE — TELEPHONE ENCOUNTER
Spoke with patient states that he has been getting his PT/INR checked at the [place] urgent clinic the past 3 years and that this office has been dosing his coumadin last 1 was done on 1/10/20

## 2020-01-29 NOTE — TELEPHONE ENCOUNTER
Please advise - didn't see a standing order on file - pended order - please fill in and I will fax to number provided once signed off     Kate Delaney please advise - will you follow

## 2020-02-07 ENCOUNTER — TELEPHONE (OUTPATIENT)
Dept: CARDIOLOGY CLINIC | Age: 75
End: 2020-02-07

## 2020-02-07 NOTE — TELEPHONE ENCOUNTER
----- Message from Keily Nelson MD sent at 2/7/2020  1:38 PM EST -----  Schedule repeat liver enzymes in 6 months.

## 2020-02-07 NOTE — TELEPHONE ENCOUNTER
Call placed to patient who was not available for comment. [facility]. Patient encouraged to call with any concerns.

## 2020-02-13 ENCOUNTER — ANTI-COAG VISIT (OUTPATIENT)
Dept: CARDIOLOGY CLINIC | Age: 75
End: 2020-02-13

## 2020-02-13 LAB
INR BLD: 2.4
INR BLD: 2.4 (ref 0.8–1.2)
INR BLD: ABNORMAL

## 2020-03-25 RX ORDER — POTASSIUM CHLORIDE 20 MEQ/1
TABLET, EXTENDED RELEASE ORAL
Qty: 90 TABLET | Refills: 0 | Status: SHIPPED | OUTPATIENT
Start: 2020-03-25 | End: 2020-03-25 | Stop reason: SDUPTHER

## 2020-03-25 RX ORDER — WARFARIN SODIUM 5 MG/1
5 TABLET ORAL DAILY
Qty: 90 TABLET | Refills: 0 | Status: SHIPPED | OUTPATIENT
Start: 2020-03-25 | End: 2020-06-26 | Stop reason: SDUPTHER

## 2020-03-25 RX ORDER — WARFARIN SODIUM 5 MG/1
5 TABLET ORAL DAILY
Qty: 90 TABLET | Refills: 0 | Status: SHIPPED | OUTPATIENT
Start: 2020-03-25 | End: 2020-03-25 | Stop reason: SDUPTHER

## 2020-03-25 RX ORDER — POTASSIUM CHLORIDE 20 MEQ/1
TABLET, EXTENDED RELEASE ORAL
Qty: 90 TABLET | Refills: 0 | Status: SHIPPED | OUTPATIENT
Start: 2020-03-25 | End: 2020-06-29 | Stop reason: SDUPTHER

## 2020-03-25 RX ORDER — FUROSEMIDE 20 MG/1
20 TABLET ORAL DAILY PRN
Qty: 90 TABLET | Refills: 0 | Status: SHIPPED | OUTPATIENT
Start: 2020-03-25 | End: 2020-06-26 | Stop reason: SDUPTHER

## 2020-03-25 RX ORDER — FUROSEMIDE 20 MG/1
20 TABLET ORAL DAILY PRN
Qty: 90 TABLET | Refills: 0 | Status: SHIPPED | OUTPATIENT
Start: 2020-03-25 | End: 2020-03-25 | Stop reason: SDUPTHER

## 2020-03-25 NOTE — TELEPHONE ENCOUNTER
Pt called back stating these medications were sent to the wrong pharmacy. E prescribed all 3 meds to [facility] in [place].

## 2020-03-25 NOTE — TELEPHONE ENCOUNTER
Medication Refill    Medication needing refilled:  Lasix,  Warfarin,  potassium    Doseage of the medication:    How are you taking this medication (QD, BID, TID, QID, PRN):    30 or 90 day supply called in:   90 + refills    Which Pharmacy are we sending the [facility]

## 2020-04-17 ENCOUNTER — TELEPHONE (OUTPATIENT)
Dept: CARDIOLOGY CLINIC | Age: 75
End: 2020-04-17

## 2020-04-17 ENCOUNTER — ANTI-COAG VISIT (OUTPATIENT)
Dept: CARDIOLOGY CLINIC | Age: 75
End: 2020-04-17

## 2020-04-28 NOTE — TELEPHONE ENCOUNTER
Patient is having his PT/INR checked at a clinic in [facility]. Was in [place] until the end of march. Did not have it checked in [place]. Patient is very nervous about going to the clinic to have his PT/INR checked due to being around sick individuals. Our office does the dosing. He will call and see if he can get in to have it checked.

## 2020-05-11 ENCOUNTER — NURSE ONLY (OUTPATIENT)
Dept: CARDIOLOGY CLINIC | Age: 75
End: 2020-05-11
Payer: MEDICARE

## 2020-05-11 PROCEDURE — 93296 REM INTERROG EVL PM/IDS: CPT | Performed by: INTERNAL MEDICINE

## 2020-05-11 PROCEDURE — 93295 DEV INTERROG REMOTE 1/2/MLT: CPT | Performed by: INTERNAL MEDICINE

## 2020-05-22 LAB — INR BLD: 2.6

## 2020-05-26 ENCOUNTER — ANTI-COAG VISIT (OUTPATIENT)
Dept: CARDIOLOGY CLINIC | Age: 75
End: 2020-05-26
Payer: MEDICARE

## 2020-05-26 PROCEDURE — 93793 ANTICOAG MGMT PT WARFARIN: CPT | Performed by: NURSE PRACTITIONER

## 2020-05-26 NOTE — PROGRESS NOTES
ANTICOAGULATION MONITORING    Angi Bellow Day, 1945      Anticoagulation Indication(s):  Afib  ; hx of DCCV, s/p WESTON ligation  Referring Physician:   Dr. Tyler Miranda / Conner Kerr  Goal INR Range:  2.0 - 3.0  Duration of Anticoagulation Therapy:  Long term  Home or Lab Draw: Lab--[facility]  Product patient has at home: warfarin 5 mg    Recent INR Results:  Lab Results   Component Value Date    INR 2.6 (H) 05/22/2020    INR 2.60 05/22/2020    INR 2.4 (H) 02/13/2020    INR SEE BELOW 02/13/2020       INR Summary                          Warfarin regimen (mg)  Date INR A/P Sun Mon Tue Wed Thu Fri Sat Mg/wk                                                                                                                                                  5/22/20  2.6  at goal 5 5 5 5 5 5 5 35         Assessment/Plan:    Recent hospitalizations/HC visits None reported   Recent medication changes None reported   Medications taken regularly that may interact with warfarin or alter INR No significant drug interactions identified   Warfarin dose taken as prescribed Yes   Signs/symptoms of bleeding History of bleeding? no   Vitamin K intake Consistency of servings of green, leafy vegetables per week ? Yes   Recent vomiting/diarrhea/fever None reported   Changes in weight, activity, stress None reported   alcohol use Patient reports having 0 drinks per day   Upcoming surgeries or procedures None reported     Patient's INR was 2.6 range today, so patient was instructed to continue current regimen as above OR modify warfarin dose as above  Patient was also reminded to maintain consistent vitamin K intake and call with any bleeding, medication changes, or fever/vomiting/diarrhea.     Next INR check:  6/23/20    Laren Councilman, APRN-CNP

## 2020-06-26 RX ORDER — FUROSEMIDE 20 MG/1
20 TABLET ORAL DAILY PRN
Qty: 90 TABLET | Refills: 0 | Status: SHIPPED | OUTPATIENT
Start: 2020-06-26 | End: 2020-06-29 | Stop reason: SDUPTHER

## 2020-06-26 RX ORDER — METOPROLOL SUCCINATE 50 MG/1
75 TABLET, EXTENDED RELEASE ORAL DAILY
Qty: 135 TABLET | Refills: 1 | Status: SHIPPED | OUTPATIENT
Start: 2020-06-26 | End: 2020-06-29 | Stop reason: SDUPTHER

## 2020-06-26 RX ORDER — WARFARIN SODIUM 5 MG/1
5 TABLET ORAL DAILY
Qty: 90 TABLET | Refills: 0 | Status: SHIPPED | OUTPATIENT
Start: 2020-06-26 | End: 2020-06-29 | Stop reason: SDUPTHER

## 2020-06-26 NOTE — TELEPHONE ENCOUNTER
RX APPROVAL:      Refill:   Requested Prescriptions      No prescriptions requested or ordered in this encounter      Last OV: 1/21/2020   Last EKG:   Last Labs:   Lab Results   Component Value Date    GLUCOSE 85 01/10/2020    BUN 25 01/10/2020    CREATININE 1.13 01/10/2020    LABGLOM 63 01/10/2020    BCR 20.9 10/07/2013     01/10/2020    K 4.5 01/10/2020    K 4.1 08/08/2019     01/10/2020    CO2 23 01/10/2020    CALCIUM 9.0 01/10/2020     Lab Results   Component Value Date     01/10/2020     01/10/2020    CO2 23 01/10/2020    ANIONGAP 11 01/10/2020    GLUCOSE 85 01/10/2020    BUN 25 01/10/2020    CREATININE 1.13 01/10/2020    LABGLOM 63 01/10/2020    GFRAA 73 01/10/2020    GFRAA >60 12/21/2012    CALCIUM 9.0 01/10/2020    PROT 6.6 01/29/2020    LABALBU 3.9 01/29/2020    BILITOT 0.3 01/29/2020    ALKPHOS 97 01/29/2020    AST 27 01/29/2020    ALT 36 01/29/2020    GLOB 2.7 08/08/2019     Lab Results   Component Value Date    ALT 36 01/29/2020    AST 27 01/29/2020     Lab Results   Component Value Date    K 4.5 01/10/2020    K 4.1 08/08/2019       Plan and labs reviewed

## 2020-06-29 ENCOUNTER — TELEPHONE (OUTPATIENT)
Dept: CARDIOLOGY CLINIC | Age: 75
End: 2020-06-29

## 2020-06-29 RX ORDER — EZETIMIBE 10 MG/1
TABLET ORAL
Qty: 90 TABLET | Refills: 1 | Status: SHIPPED | OUTPATIENT
Start: 2020-06-29 | End: 2020-06-29 | Stop reason: SDUPTHER

## 2020-06-29 RX ORDER — FUROSEMIDE 20 MG/1
20 TABLET ORAL DAILY PRN
Qty: 90 TABLET | Refills: 0 | Status: SHIPPED | OUTPATIENT
Start: 2020-06-29 | End: 2020-09-17

## 2020-06-29 RX ORDER — EZETIMIBE 10 MG/1
TABLET ORAL
Qty: 90 TABLET | Refills: 1 | Status: SHIPPED | OUTPATIENT
Start: 2020-06-29 | End: 2020-12-15 | Stop reason: SDUPTHER

## 2020-06-29 RX ORDER — FUROSEMIDE 20 MG/1
20 TABLET ORAL DAILY PRN
Qty: 90 TABLET | Refills: 0 | Status: SHIPPED | OUTPATIENT
Start: 2020-06-29 | End: 2020-06-29 | Stop reason: SDUPTHER

## 2020-06-29 RX ORDER — WARFARIN SODIUM 5 MG/1
5 TABLET ORAL DAILY
Qty: 90 TABLET | Refills: 0 | Status: SHIPPED | OUTPATIENT
Start: 2020-06-29 | End: 2020-06-29 | Stop reason: SDUPTHER

## 2020-06-29 RX ORDER — ATORVASTATIN CALCIUM 80 MG/1
TABLET, FILM COATED ORAL
Qty: 90 TABLET | Refills: 1 | Status: SHIPPED | OUTPATIENT
Start: 2020-06-29 | End: 2020-12-15 | Stop reason: SDUPTHER

## 2020-06-29 RX ORDER — METOPROLOL SUCCINATE 50 MG/1
75 TABLET, EXTENDED RELEASE ORAL DAILY
Qty: 135 TABLET | Refills: 1 | Status: SHIPPED | OUTPATIENT
Start: 2020-06-29 | End: 2020-12-15 | Stop reason: SDUPTHER

## 2020-06-29 RX ORDER — POTASSIUM CHLORIDE 20 MEQ/1
TABLET, EXTENDED RELEASE ORAL
Qty: 90 TABLET | Refills: 0 | Status: SHIPPED | OUTPATIENT
Start: 2020-06-29 | End: 2020-12-15 | Stop reason: SDUPTHER

## 2020-06-29 RX ORDER — METOPROLOL SUCCINATE 50 MG/1
75 TABLET, EXTENDED RELEASE ORAL DAILY
Qty: 135 TABLET | Refills: 1 | Status: SHIPPED | OUTPATIENT
Start: 2020-06-29 | End: 2020-06-29 | Stop reason: SDUPTHER

## 2020-06-29 RX ORDER — WARFARIN SODIUM 5 MG/1
5 TABLET ORAL DAILY
Qty: 90 TABLET | Refills: 0 | Status: SHIPPED | OUTPATIENT
Start: 2020-06-29 | End: 2020-09-17 | Stop reason: SDUPTHER

## 2020-07-14 ENCOUNTER — ANTI-COAG VISIT (OUTPATIENT)
Dept: CARDIOLOGY CLINIC | Age: 75
End: 2020-07-14
Payer: MEDICARE

## 2020-07-14 LAB — INR BLD: 2.4

## 2020-07-14 PROCEDURE — 93793 ANTICOAG MGMT PT WARFARIN: CPT | Performed by: NURSE PRACTITIONER

## 2020-07-14 NOTE — PROGRESS NOTES
ANTICOAGULATION MONITORING    [place] Twyla Day, 1945      Anticoagulation Indication(s):  Afib  ; hx of DCCV, s/p WESTON ligation  Referring Physician:   Dr. Yanely Saravia / Suzette Sheets  Goal INR Range:  2.0 - 3.0  Duration of Anticoagulation Therapy:  Long term  Home or Lab Draw: Lab--[facility]  Product patient has at home: warfarin 5 mg    Recent INR Results:  Lab Results   Component Value Date    INR 2.4 (H) 07/14/2020    INR SEE BELOW 07/14/2020    INR 2.40 07/14/2020    INR 2.6 (H) 05/22/2020       INR Summary                          Warfarin regimen (mg)  Date INR A/P Sun Mon Tue Wed Thu Fri Sat Mg/wk                                                                                                                                     7/14/20 2.4 At goal 5 5 5 5 5 5 5 35   5/22/20  2.6  at goal 5 5 5 5 5 5 5 35         Assessment/Plan:    Recent hospitalizations/HC visits None reported   Recent medication changes None reported   Medications taken regularly that may interact with warfarin or alter INR No significant drug interactions identified   Warfarin dose taken as prescribed Yes   Signs/symptoms of bleeding History of bleeding? no   Vitamin K intake Consistency of servings of green, leafy vegetables per week ? Yes   Recent vomiting/diarrhea/fever None reported   Changes in weight, activity, stress None reported   alcohol use Patient reports having 0 drinks per day   Upcoming surgeries or procedures None reported     Patient's INR was in range today, so patient was instructed to continue current regimen as above OR modify warfarin dose as above  Patient was also reminded to maintain consistent vitamin K intake and call with any bleeding, medication changes, or fever/vomiting/diarrhea.     Next INR check:  4 weeks    BETO Fletcher-CNP

## 2020-08-10 ENCOUNTER — TELEPHONE (OUTPATIENT)
Dept: CARDIOLOGY CLINIC | Age: 75
End: 2020-08-10

## 2020-08-10 NOTE — TELEPHONE ENCOUNTER
We received remote transmission from patient's monitor at home. Transmission shows normal sensing and pacing function. Battery life 7.6 years. AP 22.3 %,  0.2 %. 1 AF episode noted on 8/7/2020 lasting 11 hours. See interrogation under cardiology tab in the [address] [place] [address] [address] field for more details.

## 2020-08-10 NOTE — TELEPHONE ENCOUNTER
Called pt. We reviewed his device interrogation. He has daily symptoms, which he describes as skipped beats. Pt has PVCs on his device check, but he also had symptoms when he was in AF for 11 hours on 8/7/20. He remains on amiodarone 100 mg daily. He never stopped taking it after his last OV with Dr. Lianna Castanon in January. Will increase amiodarone to 200 mg BID x2 weeks, then reduce to 200 mg daily to see if it helps his symptoms. Will schedule OV follow up with Dr. Lianna Castanon in next 2 months to discuss long term plan for PVC/AF.

## 2020-08-21 ENCOUNTER — TELEPHONE (OUTPATIENT)
Dept: CARDIOLOGY CLINIC | Age: 75
End: 2020-08-21

## 2020-08-21 RX ORDER — AMIODARONE HYDROCHLORIDE 200 MG/1
200 TABLET ORAL DAILY
Qty: 90 TABLET | Refills: 0 | Status: SHIPPED | OUTPATIENT
Start: 2020-08-21 | End: 2020-11-16 | Stop reason: SDUPTHER

## 2020-08-21 NOTE — TELEPHONE ENCOUNTER
His amiodarone was increased from 100mg QD to 400mg QD . Please call new rx with new directions [facility] [address] in [place] .  He is out of med

## 2020-08-21 NOTE — TELEPHONE ENCOUNTER
Spoke to the pt. He has three more days of 200 mg, BID. He then drops to 200 mg QD.  The script will say one QD, pt is aware how to take the Amiodarone

## 2020-08-21 NOTE — TELEPHONE ENCOUNTER
Called and left message on machine  With message below, Patient was told to call back if he has any questions.

## 2020-09-04 ENCOUNTER — ANTI-COAG VISIT (OUTPATIENT)
Dept: CARDIOLOGY CLINIC | Age: 75
End: 2020-09-04
Payer: MEDICARE

## 2020-09-04 ENCOUNTER — TELEPHONE (OUTPATIENT)
Dept: CARDIOLOGY CLINIC | Age: 75
End: 2020-09-04

## 2020-09-04 LAB — INR BLD: 5.2

## 2020-09-04 PROCEDURE — 93793 ANTICOAG MGMT PT WARFARIN: CPT | Performed by: NURSE PRACTITIONER

## 2020-09-04 NOTE — PROGRESS NOTES
ANTICOAGULATION MONITORING    Ether Shanti Day, 1945      Anticoagulation Indication(s):  Afib  ; hx of DCCV, s/p WESTON ligation  Referring Physician:   Dr. Shiela Castro / Adriana Tinsley  Goal INR Range:  2.0 - 3.0  Duration of Anticoagulation Therapy:  Long term  Home or Lab Draw: Lab--Cheo Davis  Product patient has at home: warfarin 5 mg    Recent INR Results:  Lab Results   Component Value Date    INR 5.20 09/04/2020    INR 2.4 (H) 07/14/2020    INR SEE BELOW 07/14/2020    INR 2.40 07/14/2020       INR Summary                          Warfarin regimen (mg)  Date INR A/P Sun Mon Tue Wed Thu Fri Sat Mg/wk                                                                                                                        9/4/20 5.2 High 2.5 2.5 5 5 5 Hold Hold 20   7/14/20 2.4 At goal 5 5 5 5 5 5 5 35   5/22/20  2.6  at goal 5 5 5 5 5 5 5 35         Assessment/Plan:    Recent hospitalizations/HC visits None reported   Recent medication changes Amiodarone increased   Medications taken regularly that may interact with warfarin or alter INR No significant drug interactions identified   Warfarin dose taken as prescribed Yes   Signs/symptoms of bleeding History of bleeding? no   Vitamin K intake Consistency of servings of green, leafy vegetables per week ? Yes   Recent vomiting/diarrhea/fever None reported   Changes in weight, activity, stress None reported   alcohol use Patient reports having 0 drinks per day   Upcoming surgeries or procedures None reported     Patient's INR was out of range today, so patient was instructed to hold coumadin 09/04 and 09/05, resume at 2.5mg on 09/06 and 09/07. Recheck INR 09/08  Patient was also reminded to maintain consistent vitamin K intake and call with any bleeding, medication changes, or fever/vomiting/diarrhea. Next INR check:  4 days      Addendum:  9/4/20  Reviewed assessment and plan.    INR is above goal, change current dose by holding coumadin 9/4 and 9/5 and resume at 2.5 mg Repeat INR in 4 days .    Patient/family instructed    Rose Mary Burton CNP

## 2020-09-04 NOTE — TELEPHONE ENCOUNTER
Coumadin/PT/INR    PT: 48.3    INR: 5.2      What dosage do you take daily? Person calling in results: Heriberto Ferreirafredis     Call back number?  [phone]    Where do you have your blood drawn?    (lab/HHRN/Home Monitor)

## 2020-09-04 NOTE — PATIENT INSTRUCTIONS
ANTICOAGULATION MONITORING    Ortiz Loosen Day, 1945      Anticoagulation Indication(s):  Afib  ; hx of DCCV, s/p WESTON ligation  Referring Physician:   Dr. Shanti Tao / Douglas Frye  Goal INR Range:  2.0 - 3.0  Duration of Anticoagulation Therapy:  Long term  Home or Lab Draw: Lab--Cheo Davis  Product patient has at home: warfarin 5 mg    Recent INR Results:  Lab Results   Component Value Date    INR 5.20 09/04/2020    INR 2.4 (H) 07/14/2020    INR SEE BELOW 07/14/2020    INR 2.40 07/14/2020       INR Summary                          Warfarin regimen (mg)  Date INR A/P Sun Mon Tue Wed Thu Fri Sat Mg/wk                                                                                                                        9/4/20 5.2 High           7/14/20 2.4 At goal 5 5 5 5 5 5 5 35   5/22/20  2.6  at goal 5 5 5 5 5 5 5 35         Assessment/Plan:    Recent hospitalizations/HC visits None reported   Recent medication changes Increase in Amiodarone   Medications taken regularly that may interact with warfarin or alter INR No significant drug interactions identified   Warfarin dose taken as prescribed Yes   Signs/symptoms of bleeding History of bleeding? no   Vitamin K intake Consistency of servings of green, leafy vegetables per week ? Yes   Recent vomiting/diarrhea/fever None reported   Changes in weight, activity, stress None reported   alcohol use Patient reports having 0 drinks per day   Upcoming surgeries or procedures None reported     Patient's INR was in range today, so patient was instructed to continue current regimen as above OR modify warfarin dose as above  Patient was also reminded to maintain consistent vitamin K intake and call with any bleeding, medication changes, or fever/vomiting/diarrhea.     Next INR check:  4 weeks    BETO Bond-CNP

## 2020-09-08 ENCOUNTER — ANTI-COAG VISIT (OUTPATIENT)
Dept: CARDIOLOGY CLINIC | Age: 75
End: 2020-09-08
Payer: MEDICARE

## 2020-09-08 PROCEDURE — 93793 ANTICOAG MGMT PT WARFARIN: CPT | Performed by: NURSE PRACTITIONER

## 2020-09-09 ENCOUNTER — OFFICE VISIT (OUTPATIENT)
Dept: CARDIOLOGY CLINIC | Age: 75
End: 2020-09-09
Payer: MEDICARE

## 2020-09-09 VITALS
SYSTOLIC BLOOD PRESSURE: 124 MMHG | TEMPERATURE: 97.7 F | HEART RATE: 56 BPM | WEIGHT: 184.8 LBS | HEIGHT: 70 IN | BODY MASS INDEX: 26.45 KG/M2 | OXYGEN SATURATION: 97 % | DIASTOLIC BLOOD PRESSURE: 64 MMHG

## 2020-09-09 PROCEDURE — 1036F TOBACCO NON-USER: CPT | Performed by: INTERNAL MEDICINE

## 2020-09-09 PROCEDURE — 99213 OFFICE O/P EST LOW 20 MIN: CPT | Performed by: INTERNAL MEDICINE

## 2020-09-09 PROCEDURE — 3017F COLORECTAL CA SCREEN DOC REV: CPT | Performed by: INTERNAL MEDICINE

## 2020-09-09 PROCEDURE — G8427 DOCREV CUR MEDS BY ELIG CLIN: HCPCS | Performed by: INTERNAL MEDICINE

## 2020-09-09 PROCEDURE — 4040F PNEUMOC VAC/ADMIN/RCVD: CPT | Performed by: INTERNAL MEDICINE

## 2020-09-09 PROCEDURE — 1123F ACP DISCUSS/DSCN MKR DOCD: CPT | Performed by: INTERNAL MEDICINE

## 2020-09-09 PROCEDURE — G8417 CALC BMI ABV UP PARAM F/U: HCPCS | Performed by: INTERNAL MEDICINE

## 2020-09-09 NOTE — PROGRESS NOTES
[facility]   Cardiac Followup    Referring Provider:  Corazon Ayala MD     Chief Complaint   Patient presents with    Coronary Artery Disease    6 Month Follow-Up    Other     skipped beats- unchanged       History of Present Illness:  Mr Mathis is seen today as a follow up for known CAD, htn,hchol, and AF. On 8/10 he had PVC's on his device check. Amiodarone was increased to 200 bid x 2 weeks then reduced to 200 mg daily. He is scheduled to see Dr Lianna Castanon in October  Today, he states he is feeling better. He has had no chest pain, palpitations dizziness, or syncope. He has unchanged exertional sob alleviated with rest. He has no change in exercise tolerance. Patient is compliant  with medication and is tolerating them well without side effects    Past Medical History:   has a past medical history of Arthritis, Atrial fib/flut, CAD (coronary artery disease), Hyperlipidemia, Hypertension, Seizures ([place]), and Sinus bradycardia. Surgical History:   has a past surgical history that includes hernia repair (Right); Coronary angioplasty with stent (1997); Capron tooth extraction (April 2012); pacemaker placement (12/18/2017); Coronary artery bypass graft (01/2019); Cardiac surgery; and Cystoscopy (N/A, 11/13/2019). Social History:   reports that he quit smoking about 32 years ago. He has a 30.00 pack-year smoking history. He has never used smokeless tobacco. He reports that he does not drink alcohol or use drugs. Family History:  family history includes Heart Failure in his mother.      Current Outpatient Medications   Medication Sig Dispense Refill    amiodarone (CORDARONE) 200 MG tablet Take 1 tablet by mouth daily 90 tablet 0    metoprolol succinate (TOPROL XL) 50 MG extended release tablet Take 1.5 tablets by mouth daily 135 tablet 1    warfarin (COUMADIN) 5 MG tablet Take 1 tablet by mouth daily 90 tablet 0    furosemide (LASIX) 20 MG tablet Take 1 tablet by mouth daily as needed 09/25/2018    HDL 39 (L) 05/04/2018     Lab Results   Component Value Date    LDLCALC 69 12/12/2018    LDLCALC 67 09/25/2018    LDLCALC 85 05/04/2018     Lab Results   Component Value Date    LABVLDL 37 10/14/2014    LABVLDL 30 12/20/2012    LABVLDL 50 12/13/2011       Review of Systems:   · Constitutional: there has been no unanticipated weight loss. There's been no change in energy level, sleep pattern, or activity level. · Eyes: No visual changes or diplopia. No scleral icterus. · ENT: No Headaches or vertigo. No mouth sores or sore throat. · Cardiovascular: Reviewed in HPI  · Respiratory: No cough or wheezing, no sputum production. No hemoptysis. · Gastrointestinal: No abdominal pain, appetite loss, blood in stools. No change in bowel or bladder habits. No hematemesis. · Genitourinary: No dysuria, trouble voiding, or hematuria. · Musculoskeletal:  No gait disturbance, weakness. · Integumentary: No rash or pruritis. · Neurological: No headache, diplopia, change in muscle strength, numbness or tingling. No change in gait, balance, coordination, mood, affect, memory, mentation, behavior. · Psychiatric: No anxiety, no depression. · Endocrine: No malaise, fatigue or temperature intolerance. No excessive thirst, fluid intake, or urination. No tremor. · Hematologic/Lymphatic: No abnormal bruising or bleeding, blood clots or swollen lymph nodes. Physical Examination:    VITALS:    /64 (Site: Left Upper Arm, Position: Sitting, Cuff Size: Medium Adult)   Pulse 56   Temp 97.7 °F (36.5 °C)   Ht 5' 10\" (1.778 m)   Wt 184 lb 12.8 oz (83.8 kg)   SpO2 97%   BMI 26.52 kg/m²   Constitutional and General Appearance:   . NAD   SKIN:  .     Warm and dry  EYES:    .     EOMI  Neck:   . Normal carotid contour  Respiratory:  Normal excursion and expansion without use of accessory muscles  Resp Auscultation: Normal breath sounds without dullness  Cardiovascular:   The apical impulses not displaced  Heart tones are crisp and normal  Cervical veins are not engorged  Normal S1S2, No S3, No Murmur  Peripheral pulses are symmetrical and full  Extremities: There is no clubbing, cyanosis of the extremities. No edema  Femoral Arteries: 2+ and equal  Pedal Pulses: 2+ and equal   Abdomen:  No masses or tenderness  Liver/Spleen: No Abnormalities Noted  Neurological/Psychiatric:  Alert and oriented in all spheres  Moves all extremities well  No abnormalities of mood, affect, memory    All testing and labs listed below were personally reviewed.     PFT--mild obstruction  2/6/16 ct of abd--no AAA  6/12   Carotid dopplers--1-15% bilateral  Assessment:   CAD-  s/p CABG (x4 '07 LIMA to LAD, SVG to RCA, SVG to D2, SVG to OM3),   1/8/19 redo CABG at [facility]  (free IAN-LAD, SVG-Diag, SVG-OM, SVG-PDA    Tolerated bb asa statin, no excess bleeding or bruising    htn-  -/64 (Site: Left Upper Arm, Position: Sitting, Cuff Size: Medium Adult)   Pulse 56   Temp 97.7 °F (36.5 °C)   Ht 5' 10\" (1.778 m)   Wt 184 lb 12.8 oz (83.8 kg)   SpO2 97%   BMI 26.52 kg/m²    Stable, tolerates bb    hchol-  12/12/18 tc 124   hdl 42 ldl 69  Tri 63  Alt 57 ast 33  1/29/20  Alt 36  ast 27  tolerate lipitor 80 mg and zetia 10 mg On 400mg coenzyme  q 10   Will repeat LLL    CHF  9/14/18 LATESHA EF 50%  1/16/19 LATESHA EF 50%  1/25/19 BNP 1224  8/8/19   BUN 19 creat 1.1 k 4.1 bnp 257  Can take lasix and potassium only as needed for swelling  Stable, no signs of chf  Will repeat echo in one year    AF  1/8/19  S/p maze surgery and WESTON ligation at [facility]  cardioversion 1/25/19  Tolerates coumadin amiodarone  On coumadin-with INR managed by our office  Repeat echo in one year    NSVT  NSVT and two episodes of VF   12/18/17 EPS with inducible VT/VF then implantation of AICD      VT-  12/18/17  EPS with inducible VT/VF with implantation of AICD  Follows with Dr Howard Thakur    JOSEPH  On cpap nightly    Plan:  Recommend daily exercise  Continue all

## 2020-09-09 NOTE — PATIENT INSTRUCTIONS
Recommend daily exercise  Continue all medications  Call for any changes  Fasting LLL  Follow up with Dr Laurie Cedeño with echo

## 2020-09-16 ENCOUNTER — ANTI-COAG VISIT (OUTPATIENT)
Dept: CARDIOLOGY CLINIC | Age: 75
End: 2020-09-16
Payer: MEDICARE

## 2020-09-16 LAB
INR BLD: 2
INR BLD: 2 (ref 0.8–1.2)
PROTHROMBIN TIME: 22.3 SECONDS (ref 11.7–14.2)

## 2020-09-16 PROCEDURE — 93793 ANTICOAG MGMT PT WARFARIN: CPT | Performed by: NURSE PRACTITIONER

## 2020-09-17 RX ORDER — WARFARIN SODIUM 5 MG/1
5 TABLET ORAL DAILY
Qty: 90 TABLET | Refills: 0 | Status: SHIPPED | OUTPATIENT
Start: 2020-09-17 | End: 2020-09-17 | Stop reason: SDUPTHER

## 2020-09-17 RX ORDER — WARFARIN SODIUM 5 MG/1
5 TABLET ORAL DAILY
Qty: 90 TABLET | Refills: 0 | Status: SHIPPED | OUTPATIENT
Start: 2020-09-17 | End: 2020-12-15 | Stop reason: SDUPTHER

## 2020-09-17 NOTE — TELEPHONE ENCOUNTER
Medication Refill    Medication needing refilled:  furosemide (LASIX) 20 MG tablet    warfarin (COUMADIN) 5 MG tablet    Doseage of the medication:    How are you taking this medication (QD, BID, TID, QID, PRN):    30 or 90 day supply called in: 80    Which [facility] are we sending the medication to?:    [facility] [address] [place] [address]

## 2020-10-05 ENCOUNTER — NURSE ONLY (OUTPATIENT)
Dept: CARDIOLOGY CLINIC | Age: 75
End: 2020-10-05
Payer: MEDICARE

## 2020-10-05 ENCOUNTER — OFFICE VISIT (OUTPATIENT)
Dept: CARDIOLOGY CLINIC | Age: 75
End: 2020-10-05
Payer: MEDICARE

## 2020-10-05 ENCOUNTER — HOSPITAL ENCOUNTER (OUTPATIENT)
Age: 75
Discharge: HOME | End: 2020-10-05
Payer: MEDICARE

## 2020-10-05 VITALS
WEIGHT: 185.12 LBS | RESPIRATION RATE: 18 BRPM | HEIGHT: 70 IN | BODY MASS INDEX: 26.5 KG/M2 | HEART RATE: 50 BPM | SYSTOLIC BLOOD PRESSURE: 129 MMHG | DIASTOLIC BLOOD PRESSURE: 82 MMHG

## 2020-10-05 LAB
ALBUMIN SERPL-MCNC: 4 G/DL (ref 3.4–5)
ALP BLD-CCNC: 121 U/L (ref 40–129)
ALT SERPL-CCNC: 54 U/L (ref 10–40)
AST SERPL-CCNC: 50 U/L (ref 15–37)
BILIRUB SERPL-MCNC: <0.2 MG/DL (ref 0–1)
BILIRUBIN DIRECT: <0.2 MG/DL (ref 0–0.3)
BILIRUBIN, INDIRECT: ABNORMAL MG/DL (ref 0–1)
INR BLD: 2.74 (ref 0.86–1.14)
PROTHROMBIN TIME: 32.1 SEC (ref 10–13.2)
TOTAL PROTEIN: 6.8 G/DL (ref 6.4–8.2)

## 2020-10-05 PROCEDURE — 93283 PRGRMG EVAL IMPLANTABLE DFB: CPT | Performed by: INTERNAL MEDICINE

## 2020-10-05 PROCEDURE — G8417 CALC BMI ABV UP PARAM F/U: HCPCS | Performed by: INTERNAL MEDICINE

## 2020-10-05 PROCEDURE — G8427 DOCREV CUR MEDS BY ELIG CLIN: HCPCS | Performed by: INTERNAL MEDICINE

## 2020-10-05 PROCEDURE — G8484 FLU IMMUNIZE NO ADMIN: HCPCS | Performed by: INTERNAL MEDICINE

## 2020-10-05 PROCEDURE — 3017F COLORECTAL CA SCREEN DOC REV: CPT | Performed by: INTERNAL MEDICINE

## 2020-10-05 PROCEDURE — 99214 OFFICE O/P EST MOD 30 MIN: CPT | Performed by: INTERNAL MEDICINE

## 2020-10-05 PROCEDURE — 4040F PNEUMOC VAC/ADMIN/RCVD: CPT | Performed by: INTERNAL MEDICINE

## 2020-10-05 PROCEDURE — 93290 INTERROG DEV EVAL ICPMS IP: CPT | Performed by: INTERNAL MEDICINE

## 2020-10-05 PROCEDURE — 1036F TOBACCO NON-USER: CPT | Performed by: INTERNAL MEDICINE

## 2020-10-05 PROCEDURE — 93000 ELECTROCARDIOGRAM COMPLETE: CPT | Performed by: INTERNAL MEDICINE

## 2020-10-05 PROCEDURE — 36415 COLL VENOUS BLD VENIPUNCTURE: CPT

## 2020-10-05 PROCEDURE — 85610 PROTHROMBIN TIME: CPT

## 2020-10-05 PROCEDURE — 80076 HEPATIC FUNCTION PANEL: CPT

## 2020-10-05 PROCEDURE — 1123F ACP DISCUSS/DSCN MKR DOCD: CPT | Performed by: INTERNAL MEDICINE

## 2020-10-05 NOTE — PROGRESS NOTES
Patient comes in for programming evaluation for his defibrillator. All sensing and pacing parameters are within normal range. Battery life 7.4 years. AP 20%.  0.1%. 2 treated AF episodes noted. Last on 8/7/2020, longest 17 hours. Patient remains on warfarin, amiodarone and metoprolol. No changes need to be made at this time. Please see interrogation for more detail. Optivol is slightly elevated-· Possible OptiVol fluid accumulation: 21-Sep-2020 -- ongoing. Patient will see Dr. Sanna Melendez today and follow up in 3 months in office or remotely.

## 2020-10-05 NOTE — PATIENT INSTRUCTIONS
After 1 month, start taking amiodarone to a half tablet (100 mg). Continue all medications.  Schedule pulmonary function test.

## 2020-10-05 NOTE — PROGRESS NOTES
[address]   Electrophysiology   Date: 10/5/2020    CC: Skipped beats   HPI: Nomi Mathis is a 70 y.o.  history of CAD s/p CABG in the past, paroxysmal atrial fibrillation, NSTEMI 10/13/2017     He was on Tikosyn for PAF which was stopped. He could not tolerate Amiodarone in the past due to bradycardia. 12/18/17 EPS with inducible VT/VF then implantation of AICD    Redo CABG at [facility] with 1/8/2019: Redo CABGx4 (free IAN-LAD, SVG-Diag, SVG-OM, SVG-PDA), LA maze, WESTON Ligation     OR course noted to have RV dysfunction requiring multiple pressors and also developed coagulopathy, eventually discharged. He was started back on Amiodarone at [facility]. Multiple cardioversion in the past for atrial fibrillation. Hospitalized with afib and had cardioversion on 1/25/2019. He felt like his HR was elevated with a rate of 120 a while ago. He has not had atrial fib since 4/2019 by device interrogation. He feels PVC's or skipping beats recently. He states his heart quivers at times, but no atrial fib present. He continues on Warfarin. He reported atrial fib during his hospitalization in August but was not rapid enough to be detected on his device    Today, he has been having a lot of skipped beats. Since he increased the dose of the amiodarone, he has had less skipped beats. Assessment and plan:   1. Paroxysmal atrial fibrillation:    Continues having episodes of atrial fibrillation. Last episode of atrial fibrillation was on August 7, 2020. Longest episode was 17 hours. We have discussed ablation in the past and multiple occasions. He was not ready for it. Last office visit we try to wean him off the amiodarone however he had recurrent episodes of atrial fibrillation and has increased his dose to 200 mg daily now     Again discussed the amiodarone and toxicity and the need for monitoring. PFT has been ordered but has not been done again encouraged to get PFT.   Will check AST ALT and TSH for follow up. Reduce the dose to 200 mg daily    He is a status post maze surgery and left atrial appendage ligation at [facility] clinic om 1/8/2019     Ischemic cardiomyopathy, NSVT and two episodes of VF    12/18/17 EPS with inducible VT/VF then implantation of AICD   Aggressive medical therapy   Lipitor 80 mg daily   Toprol-XL 25 g daily     AICD   The CIED was interrogated and programmed and I supervised and reviewed all the data. All findings and changes are in device interrogation sheat and reflect my personal interpretation and changes and is scanned to Epic. CAD: history of MI. S/p re-do CABG [facility]    - On BB and Lipitor. Diagnostic and imaging results reviewed. ECG:  10/5/20 Sinus bradycardia     Echo: Very technically difficult study. There is moderate LV dysfunction with distal septal and apical hypokinesis. Ejection fraction is estimated at 35%. There is concentric left ventricular hypertrophy. Grade II diastolic dysfunction with elevated LV filling pressures. Mild mitral annular calcification is present. Mild mitral regurgitation. The left atrium is dilated. Aortic valve appears sclerotic but opens adequately. Mild to moderate tricuspid regurgitation with PASP of 44 mmHg. Mild pulmonic regurgitation present. Pacer / ICD wire is visualized in the right ventricle  Cath 10/13/17:   LM-normal  LAD-100% mid  D1-50% ostial  Cx-20% mid  OM3- 70% proximal  RCA-100% mid, codominant left to right collaterals  LIMA-LAD: 100% mid  Sequential SVG-D2 and 13:20% proximal, 50% mid. The LAD fills via retrograde flow from D2  SVG-% proximal with suggestion of recent closure  LVEF- 35-40% with anterior hypokinesis  PCI: SVG-RCA with attempted PCI that was unsuccessful. Impression:  1. Severe native CAD  2. Occlusions of the LIMA graft to the LAD as well as the saphenous vein graft to the RCA  3.   The LAD is filled via retrograde flow from the diagonal which is grafted  4. Patent sequential SVG to D2 and 0M3 but with moderate disease  5. Moderate LV systolic dysfunction  6. Ventricular arrhythmia which appears to be triggered by the temporary pacemaker possibly due to ischemic/injured myocardium    Past Medical History:   Diagnosis Date    Arthritis     shoulders and knee    Atrial fib/flut     CAD (coronary artery disease)     Hyperlipidemia     Hypertension     Seizures ([address]     last seizures many years ago,     Sinus bradycardia         Past Surgical History:   Procedure Laterality Date    CARDIAC SURGERY      , angioplasty 2007    CORONARY ANGIOPLASTY WITH STENT PLACEMENT  1997    CORONARY [facility]ERY [facility] GRAFT  [facility]    CYSTOSCOPY N/A 11/13/2019    FLEXIBLE CYSTOSCOPY performed by Susie Manuel MD at [facility] Right     done 72 sam ago   [facility]  12/18/2017    WISDOM TOOTH EXTRACTION  April 2012       Allergies:  No Known Allergies    Social History:   reports that he quit smoking about 32 years ago. He has a 30.00 pack-year smoking history. He has never used smokeless tobacco. He reports that he does not drink alcohol or use drugs. Family History:  family history includes Heart Failure in his mother. Reviewed. Denies family history of sudden cardiac death, arrhythmia, premature CAD    Review of System:  All other systems reviewed except for that noted above.  Pertinent negatives and positives are:       General: negative for fever, chills    Ophthalmic ROS: negative for - eye pain or loss of vision   ENT ROS: negative for - headaches, sore throat    Respiratory: negative for - cough, sputum   Cardiovascular: Reviewed in HPI   Gastrointestinal: negative for - abdominal pain, diarrhea, N/V   Hematology: negative for - bleeding, blood clots, bruising or jaundice   Genito-Urinary:  negative for - Dysuria or incontinence   Musculoskeletal: negative for - Joint swelling, muscle pain   Neurological: negative for - confusion, dizziness, headaches    Psychiatric: No anxiety, no depression.  Dermatological: negative for - rash    Physical Examination:  Vitals:    10/05/20 1347   BP: 129/82   Pulse: 50   Resp: 18      Wt Readings from Last 3 Encounters:   10/05/20 185 lb 1.9 oz (84 kg)   09/09/20 184 lb 12.8 oz (83.8 kg)   01/21/20 185 lb (83.9 kg)       Labs  Lab Results   Component Value Date    TSHREFLEX 4.11 08/09/2019    TSH 3.26 01/10/2020    CREATININE 1.13 01/10/2020    CREATININE 1.1 08/08/2019    AMIOD <0.3 08/08/2019    AST 27 01/29/2020    ALT 36 01/29/2020        Medication:  Prior to Admission medications    Medication Sig Start Date End Date Taking? Authorizing Provider   furosemide (LASIX) 20 MG tablet TAKE 1 TABLET BY MOUTH ONCE DAILY AS NEEDED FOR EDEMA 9/17/20  Yes Garett Rutledge MD   warfarin (COUMADIN) 5 MG tablet Take 1 tablet by mouth daily 9/17/20  Yes Garett Rutledge MD   amiodarone (CORDARONE) 200 MG tablet Take 1 tablet by mouth daily 8/21/20  Yes BETO Bird - CNP   metoprolol succinate (TOPROL XL) 50 MG extended release tablet Take 1.5 tablets by mouth daily 6/29/20  Yes Garett Rutledge MD   ezetimibe (ZETIA) 10 MG tablet TAKE 1 TABLET BY MOUTH ONCE DAILY 6/29/20  Yes Garett Rutledge MD   potassium chloride (KLOR-CON M20) 20 MEQ extended release tablet TAKE 1 TABLET BY MOUTH AS NEEDED (AS NEEDED WITH LASIX FOR SWELLING) 6/29/20  Yes Garett Rutledge MD   atorvastatin (LIPITOR) 80 MG tablet TAKE 1 TABLET BY MOUTH ONE TIME A DAY 6/29/20  Yes Garett Rutledge MD   docusate sodium (COLACE, DULCOLAX) 100 MG CAPS Take 100 mg by mouth 2 times daily as needed for Constipation 8/10/19  Yes Nate Davidson MD   aspirin 81 MG chewable tablet Take 81 mg by mouth daily   Yes Historical Provider, MD   diazepam (VALIUM) 5 MG tablet Take 5 mg by mouth 2 times daily.     Yes Historical Provider, MD   nitroGLYCERIN (NITROLINGUAL) 0.4 MG/SPRAY 0.4 mg spray Place 1 spray under the tongue every 5 minutes as needed for Chest pain 10/20/17  Yes Rosalia Dominique, APRN - CNP   phenytoin (DILANTIN) 100 MG ER capsule Take 1 capsule by mouth 2 times daily. Resume home dose 12/21/12  Yes Lisset Simmons, APRN - CNP   primidone (MYSOLINE) 250 MG tablet Take 250 mg by mouth 2 times daily    Yes Historical Provider, MD     · Constitutional: Oriented. No distress. · Head: Normocephalic and atraumatic. · Mouth/Throat: Oropharynx is clear and moist.   · Eyes: Conjunctivae normal. EOM are normal.   · Neck: Neck supple. No rigidity. No JVD present. · Cardiovascular: Bradcyardic rate, regular rhythm, S1&S2. · Pulmonary/Chest: Bilateral respiratory sounds. No wheezes, No rhonchi. · Abdominal: Soft. Bowel sounds present. No distension, No tenderness. · Musculoskeletal: No tenderness. No edema    · Lymphadenopathy: Has no cervical adenopathy. · Neurological: Alert and oriented. Cranial nerve appears intact, No Gross deficit   · Skin: Skin is warm and dry. No rash noted. · Psychiatric: Has a normal behavior           Thank you for allowing me to participate in the care of Ginette Truong. Further evaluation will be based upon the patient's clinical course and testing results. All questions and concerns were addressed to the patient/family. Alternatives to my treatment were discussed. I have discussed the above stated plan and the patient verbalized understanding and agreed with the plan. NOTE: This report was transcribed using voice recognition software. Every effort was made to ensure accuracy, however, inadvertent computerized transcription errors may be present.        Marie Billings MD, MPH  [facility]   Office: [phone]     [facility] [place] Eitan Kwong, am scribing for and in the presence of Marie Billings MD.   Eitan Muniz 10/05/20 2:19 PM     Physician Attestation: I, Dr. Marie Billings, confirm that the scribe's documentation has been prepared under my direction and personally reviewed by me in its entirety. I also confirm that the note above accurately reflects all work, treatment, procedures, and medical decision making performed by me.

## 2020-10-30 ENCOUNTER — ANTI-COAG VISIT (OUTPATIENT)
Dept: CARDIOLOGY CLINIC | Age: 75
End: 2020-10-30
Payer: MEDICARE

## 2020-10-30 LAB
INR BLD: 2.9
INR BLD: 2.9 (ref 0.8–1.2)
PROTHROMBIN TIME: 30.1 SECONDS (ref 11.7–14.2)

## 2020-10-30 PROCEDURE — 93793 ANTICOAG MGMT PT WARFARIN: CPT | Performed by: NURSE PRACTITIONER

## 2020-10-30 NOTE — PROGRESS NOTES
ANTICOAGULATION MONITORING    Malachy Necessary Day, 1945    Anticoagulation Indication(s):  Afib  ; hx of DCCV, s/p WESTON ligation  Referring Physician:   Dr. Shantell Villagran / Suzy Santos  Goal INR Range:  2.0 - 3.0  Duration of Anticoagulation Therapy:  Long term  Home or Lab Draw: Lab--Cheo Davis  Product patient has at home: warfarin 5 mg    Recent INR Results:  Lab Results   Component Value Date    INR 2.9 (H) 10/30/2020    INR 2.90 10/30/2020    INR 2.74 (H) 10/05/2020    INR 2.0 (H) 09/16/2020       INR Summary                          Warfarin regimen (mg)  Date INR A/P Sun Mon Tue Wed Thu Fri Sat Mg/wk                                                                                 10/30/20 2.9 At goal 5 5 5 5 5 5 5 35   9/16/20 2.0 At goal 5 5 5 5 5 5 5 35   9/8/20 1.6 Below goal 5 5 5 5 5 5 5 35   9/4/20 5.2 High 2.5 2.5 5 5 5 Hold Hold 20 7/14/20 2.4 At goal 5 5 5 5 5 5 5 35   5/22/20  2.6  at goal 5 5 5 5 5 5 5 35         Assessment/Plan:    Recent hospitalizations/HC visits None reported   Recent medication changes Amiodarone resumed at 200 mg daily   Medications taken regularly that may interact with warfarin or alter INR No significant drug interactions identified   Warfarin dose taken as prescribed Yes   Signs/symptoms of bleeding History of bleeding? no   Vitamin K intake Consistency of servings of green, leafy vegetables per week ? Yes   Recent vomiting/diarrhea/fever None reported   Changes in weight, activity, stress None reported   alcohol use Patient reports having 0 drinks per day   Upcoming surgeries or procedures None reported     Patient's INR was in range today, continue with schedule. Patient was also reminded to maintain consistent vitamin K intake and call with any bleeding, medication changes, or fever/vomiting/diarrhea. Next INR check: 11/6/20    Addendum:  10/30/20  Reviewed assessment and plan. Pt will continue 5 mg daily Repeat INR in one week.    Patient/family instructed    Alberto Vicente

## 2020-11-03 PROBLEM — R42 DIZZINESS: Status: RESOLVED | Noted: 2017-10-22 | Resolved: 2020-11-03

## 2020-11-16 RX ORDER — AMIODARONE HYDROCHLORIDE 200 MG/1
200 TABLET ORAL DAILY
Qty: 90 TABLET | Refills: 1 | Status: SHIPPED | OUTPATIENT
Start: 2020-11-16 | End: 2020-12-15 | Stop reason: SDUPTHER

## 2020-11-16 NOTE — TELEPHONE ENCOUNTER
Medication Refill    Medication needing refilled:amiodarone (CORDARONE) 200 MG tablet     Dosage of the medication:    How are you taking this medication (QD, BID, TID, QID, PRN): Take 1 tablet by mouth daily     30 or 90 day supply called in: 80     Which Pharmacy are we sending the medication to?:[facility] [address] [facility], [facility]

## 2020-12-10 ENCOUNTER — TELEPHONE (OUTPATIENT)
Dept: CARDIOLOGY CLINIC | Age: 75
End: 2020-12-10

## 2020-12-10 ENCOUNTER — ANTI-COAG VISIT (OUTPATIENT)
Dept: CARDIOLOGY CLINIC | Age: 75
End: 2020-12-10
Payer: MEDICARE

## 2020-12-10 ENCOUNTER — NURSE ONLY (OUTPATIENT)
Dept: CARDIOLOGY CLINIC | Age: 75
End: 2020-12-10
Payer: MEDICARE

## 2020-12-10 ENCOUNTER — HOSPITAL ENCOUNTER (OUTPATIENT)
Age: 75
Discharge: HOME | End: 2020-12-10
Payer: MEDICARE

## 2020-12-10 LAB
ALBUMIN SERPL-MCNC: 4 G/DL (ref 3.4–5)
ALP BLD-CCNC: 138 U/L (ref 40–129)
ALT SERPL-CCNC: 34 U/L (ref 10–40)
AST SERPL-CCNC: 30 U/L (ref 15–37)
BILIRUB SERPL-MCNC: 0.3 MG/DL (ref 0–1)
BILIRUBIN DIRECT: <0.2 MG/DL (ref 0–0.3)
BILIRUBIN, INDIRECT: ABNORMAL MG/DL (ref 0–1)
INR BLD: 1.86 (ref 0.86–1.14)
PROTHROMBIN TIME: 21.7 SEC (ref 10–13.2)
TOTAL PROTEIN: 6.9 G/DL (ref 6.4–8.2)

## 2020-12-10 PROCEDURE — 80076 HEPATIC FUNCTION PANEL: CPT

## 2020-12-10 PROCEDURE — 93000 ELECTROCARDIOGRAM COMPLETE: CPT | Performed by: INTERNAL MEDICINE

## 2020-12-10 PROCEDURE — 85610 PROTHROMBIN TIME: CPT

## 2020-12-10 PROCEDURE — 93793 ANTICOAG MGMT PT WARFARIN: CPT | Performed by: NURSE PRACTITIONER

## 2020-12-10 PROCEDURE — 36415 COLL VENOUS BLD VENIPUNCTURE: CPT

## 2020-12-10 NOTE — PROGRESS NOTES
Patient presents to the office with complaints of palpitations. He doesn't have any other cardiac complaints. Continued amiodarone 200 mg daily did not decrease to 100 mg. When he decreased his amiodarone his palpitations increased.

## 2020-12-15 ENCOUNTER — TELEPHONE (OUTPATIENT)
Dept: CARDIOLOGY CLINIC | Age: 75
End: 2020-12-15

## 2020-12-15 RX ORDER — FUROSEMIDE 20 MG/1
TABLET ORAL
Qty: 90 TABLET | Refills: 1 | Status: SHIPPED | OUTPATIENT
Start: 2020-12-15 | End: 2021-05-28 | Stop reason: SDUPTHER

## 2020-12-15 RX ORDER — EZETIMIBE 10 MG/1
TABLET ORAL
Qty: 90 TABLET | Refills: 1 | Status: CANCELLED | OUTPATIENT
Start: 2020-12-15

## 2020-12-15 RX ORDER — AMIODARONE HYDROCHLORIDE 200 MG/1
200 TABLET ORAL DAILY
Qty: 90 TABLET | Refills: 1 | Status: SHIPPED | OUTPATIENT
Start: 2020-12-15 | End: 2021-07-08 | Stop reason: SDUPTHER

## 2020-12-15 RX ORDER — EZETIMIBE 10 MG/1
TABLET ORAL
Qty: 90 TABLET | Refills: 1 | Status: SHIPPED | OUTPATIENT
Start: 2020-12-15 | End: 2021-05-28 | Stop reason: SDUPTHER

## 2020-12-15 RX ORDER — METOPROLOL SUCCINATE 50 MG/1
75 TABLET, EXTENDED RELEASE ORAL DAILY
Qty: 135 TABLET | Refills: 1 | Status: SHIPPED | OUTPATIENT
Start: 2020-12-15 | End: 2020-12-28

## 2020-12-15 RX ORDER — POTASSIUM CHLORIDE 20 MEQ/1
TABLET, EXTENDED RELEASE ORAL
Qty: 90 TABLET | Refills: 1 | Status: SHIPPED | OUTPATIENT
Start: 2020-12-15 | End: 2021-05-28 | Stop reason: SDUPTHER

## 2020-12-15 RX ORDER — ATORVASTATIN CALCIUM 80 MG/1
TABLET, FILM COATED ORAL
Qty: 90 TABLET | Refills: 1 | Status: SHIPPED | OUTPATIENT
Start: 2020-12-15 | End: 2021-05-28 | Stop reason: SDUPTHER

## 2020-12-15 RX ORDER — WARFARIN SODIUM 5 MG/1
5 TABLET ORAL DAILY
Qty: 90 TABLET | Refills: 0 | Status: SHIPPED | OUTPATIENT
Start: 2020-12-15 | End: 2021-03-18 | Stop reason: SDUPTHER

## 2020-12-15 NOTE — TELEPHONE ENCOUNTER
Medication Refill    Medication needing refilled: furosemide (LASIX) 20 MG tablet, warfarin (COUMADIN) 5 MG tablet, metoprolol succinate (TOPROL XL) 50 MG extended release tablet, potassium chloride (KLOR-CON M20) 20 MEQ extended release tablet, atorvastatin (LIPITOR) 80 MG tablet, amiodarone (CORDARONE) 200 MG tablet     Dosage of the medication:    How are you taking this medication (QD, BID, TID, QID, PRN):    30 or 90 day supply called in: 80    Which Pharmacy are we sending the medication to?: [facility], [address] [place] [address], [address] [place] [address],[address] [id number]   Phone:  [phone]  Fax:  [phone]

## 2020-12-15 NOTE — TELEPHONE ENCOUNTER
From: Jad Troy  To: Francesca Shi MD  Sent: 1/3/2017 3:54 PM CST  Subject: Other    Hi Dr Sunitha Og. I contacted [facility] to get some help with finding a orthopedic doctor and they gave me a Dr Marisel Fleming.  I contacted the doctors office and i wa Last OV: 10/05/20  Last Labs: Lipids: 12/12/18  Next appt: 02/23/21

## 2020-12-28 ENCOUNTER — TELEPHONE (OUTPATIENT)
Dept: CARDIOLOGY CLINIC | Age: 75
End: 2020-12-28

## 2020-12-28 RX ORDER — METOPROLOL SUCCINATE 100 MG/1
100 TABLET, EXTENDED RELEASE ORAL DAILY
Qty: 90 TABLET | Refills: 1 | Status: SHIPPED | OUTPATIENT
Start: 2020-12-28 | End: 2021-01-18 | Stop reason: SDUPTHER

## 2020-12-28 RX ORDER — METOPROLOL SUCCINATE 50 MG/1
100 TABLET, EXTENDED RELEASE ORAL DAILY
Qty: 135 TABLET | Refills: 1 | Status: SHIPPED | OUTPATIENT
Start: 2020-12-28 | End: 2020-12-28

## 2020-12-28 NOTE — TELEPHONE ENCOUNTER
Please call pt and discuss heart monitor ? He has a device (ICD) so an alternative monitor is not needed. If his palpitations have worsened, please increase his Toprol XL to 100 mg daily.     BETO Watson-CNP

## 2020-12-28 NOTE — TELEPHONE ENCOUNTER
Spoke with Brian Kemp. He states he is concerned about how many palpitations he is having. Instructed to increase toprol XL to 100 mg daily per NPSR Instructed to send in remote transmission from his device. Patient states he is scheduled to have an external monitor placed. Explained that his ICD  Continuously monitors his rhythm. PT verbalized understanding  Continues to request external monitor.

## 2021-01-04 ENCOUNTER — NURSE ONLY (OUTPATIENT)
Dept: CARDIOLOGY CLINIC | Age: 76
End: 2021-01-04
Payer: MEDICARE

## 2021-01-04 DIAGNOSIS — I50.22 CHRONIC SYSTOLIC HEART FAILURE (HCC): ICD-10-CM

## 2021-01-04 DIAGNOSIS — R07.9 CHEST PAIN AT REST: ICD-10-CM

## 2021-01-04 DIAGNOSIS — Z95.810 ICD (IMPLANTABLE CARDIOVERTER-DEFIBRILLATOR) IN PLACE: ICD-10-CM

## 2021-01-04 DIAGNOSIS — I25.5 ISCHEMIC CARDIOMYOPATHY: Chronic | ICD-10-CM

## 2021-01-04 PROCEDURE — 93295 DEV INTERROG REMOTE 1/2/MLT: CPT | Performed by: INTERNAL MEDICINE

## 2021-01-04 PROCEDURE — 93297 REM INTERROG DEV EVAL ICPMS: CPT | Performed by: INTERNAL MEDICINE

## 2021-01-04 PROCEDURE — 93296 REM INTERROG EVL PM/IDS: CPT | Performed by: INTERNAL MEDICINE

## 2021-01-04 NOTE — LETTER
[address] [phone]  Luige Efrain [address] [facility] [phone]    Pacemaker/Defibrillator Clinic          01/04/21        Kranthi Mathis  [address] [place] [id number]        Dear Kranthi Mathis    This letter is to inform you that we received the transmission from your monitor at home that checks your implanted heart device. The next date your monitor will automatically transmit will be 5-25-21. If your report needs attention we will notify you. Your device and monitor are wireless and most transmit cellularly, but please periodically check your monitor is still plugged in to the electrical outlet. If you still use the telephone land line to send please ensure the connection to the phone celestine is secure. This will help to ensure successful automatic transmissions in the future. Also, the monitor needs to be close to you while sleeping at night. Please be aware that the remote device transmission sites are periodically monitored only during regular business hours during which simultaneous in-office device clinics are being run. If your transmission requires attention, we will contact you as soon as possible. Thank you.             [facility]

## 2021-01-04 NOTE — PROGRESS NOTES
We received remote transmission from patient's monitor at home. Transmission shows normal sensing and pacing function. EP physician will review. See interrogation under cardiology tab in the [facility] [address] field for more details. Optivol is within normal range.

## 2021-01-06 LAB
INR BLD: 2.6 (ref 0.8–1.2)
PROTHROMBIN TIME: 27.8 SECONDS (ref 11.7–14.2)

## 2021-01-07 ENCOUNTER — ANTI-COAG VISIT (OUTPATIENT)
Dept: CARDIOLOGY CLINIC | Age: 76
End: 2021-01-07
Payer: MEDICARE

## 2021-01-07 DIAGNOSIS — I48.0 PAROXYSMAL ATRIAL FIBRILLATION (HCC): ICD-10-CM

## 2021-01-07 PROCEDURE — 93793 ANTICOAG MGMT PT WARFARIN: CPT | Performed by: NURSE PRACTITIONER

## 2021-01-07 NOTE — PROGRESS NOTES
ANTICOAGULATION MONITORING    Hilaria Cyndy Day, 1945    Anticoagulation Indication(s):  Afib  ; hx of DCCV, s/p WESTON ligation  Referring Physician:   Dr. Homer Sanchez / Luc Olivia  Goal INR Range:  2.0 - 3.0  Duration of Anticoagulation Therapy:  Long term  Home or Lab Draw: Lab--Cheo Davis  Product patient has at home: warfarin 5 mg    Recent INR Results:  Lab Results   Component Value Date    INR 2.6 (H) 01/06/2021    INR 1.86 (H) 12/10/2020    INR 2.9 (H) 10/30/2020    INR 2.90 10/30/2020       INR Summary                          Warfarin regimen (mg)  Date INR A/P Sun Mon Tue Wed Thu Fri Sat Mg/wk                                                       1/6/21 2.6 At goal 5 5 5 5 5 5 5 35   12/10/20 1.86 Below goal 5 5 5 5 5 5 5 35   10/30/20 2.9 At goal 5 5 5 5 5 5 5 35   9/16/20 2.0 At goal 5 5 5 5 5 5 5 35   9/8/20 1.6 Below goal 5 5 5 5 5 5 5 35   9/4/20 5.2 High 2.5 2.5 5 5 5 Hold Hold 20   7/14/20 2.4 At goal 5 5 5 5 5 5 5 35   5/22/20  2.6  at goal 5 5 5 5 5 5 5 35         Assessment/Plan:    Recent hospitalizations/HC visits None reported   Recent medication changes Amiodarone resumed at 200 mg daily   Medications taken regularly that may interact with warfarin or alter INR No significant drug interactions identified   Warfarin dose taken as prescribed Yes   Signs/symptoms of bleeding History of bleeding? no   Vitamin K intake Consistency of servings of green, leafy vegetables per week ? Yes   Recent vomiting/diarrhea/fever None reported   Changes in weight, activity, stress Weight loss   alcohol use Patient reports having 0 drinks per day   Upcoming surgeries or procedures None reported     Patient's INR was in range today, continue with schedule. Patient was also reminded to maintain consistent vitamin K intake and call with any bleeding, medication changes, or fever/vomiting/diarrhea. Next INR check: 2/6/21    Addendum:  1/7/21  Reviewed assessment and plan.    Pt will continue 5 mg daily Repeat INR in one

## 2021-01-11 ENCOUNTER — TELEPHONE (OUTPATIENT)
Dept: CARDIOLOGY CLINIC | Age: 76
End: 2021-01-11

## 2021-01-11 NOTE — TELEPHONE ENCOUNTER
Afib is progressing and likely becoming more persistent. Ablation has been discussed on multiple occasions per RMM note. If he would like to discuss this again in office a sooner appt can be arranged with NP or if openings with RMM if he prefers, but not necessary. Please call to advise.      BETO Barlow-CNP

## 2021-01-11 NOTE — TELEPHONE ENCOUNTER
Patient is scheduled for an event monitor on the cardio schedule today. There is no order or message stating that patient needs event monitor and also noticed patient has dual chamber ICD. Spoke to [facility], she states he does not need a monitor as his ICD serves the same purpose. See telephone encounters from 12/28/20, this was explained to patient. Spoke to patient notifying him that he did not need to come to appointment to have monitor placed as it is not needed due to ICD. Patient states he called and made the appointment because he feels like the ICD is not catching anything and this is why he wants event monitor placed. Patient has several questions about ICD, routed to [facility] to call and talk to patient about the questions he has regarding ICD. Cancelled appointment for event monitor.

## 2021-01-12 NOTE — TELEPHONE ENCOUNTER
Spoke to the pt-gave instructions per Dior Palomares CNP. Please call pt to schedule with an EP CNP.

## 2021-01-15 ENCOUNTER — OFFICE VISIT (OUTPATIENT)
Dept: CARDIOLOGY CLINIC | Age: 76
End: 2021-01-15
Payer: MEDICARE

## 2021-01-15 ENCOUNTER — NURSE ONLY (OUTPATIENT)
Dept: CARDIOLOGY CLINIC | Age: 76
End: 2021-01-15
Payer: MEDICARE

## 2021-01-15 VITALS
DIASTOLIC BLOOD PRESSURE: 78 MMHG | BODY MASS INDEX: 25.08 KG/M2 | HEIGHT: 70 IN | OXYGEN SATURATION: 98 % | WEIGHT: 175.2 LBS | SYSTOLIC BLOOD PRESSURE: 124 MMHG | HEART RATE: 70 BPM

## 2021-01-15 DIAGNOSIS — I48.0 PAF (PAROXYSMAL ATRIAL FIBRILLATION) (HCC): Chronic | ICD-10-CM

## 2021-01-15 DIAGNOSIS — I49.01 VF (VENTRICULAR FIBRILLATION) (HCC): ICD-10-CM

## 2021-01-15 DIAGNOSIS — I25.5 ISCHEMIC CARDIOMYOPATHY: Chronic | ICD-10-CM

## 2021-01-15 DIAGNOSIS — I48.0 PAROXYSMAL ATRIAL FIBRILLATION (HCC): Chronic | ICD-10-CM

## 2021-01-15 DIAGNOSIS — Z95.810 ICD (IMPLANTABLE CARDIOVERTER-DEFIBRILLATOR) IN PLACE: ICD-10-CM

## 2021-01-15 DIAGNOSIS — I47.20 VT (VENTRICULAR TACHYCARDIA): ICD-10-CM

## 2021-01-15 DIAGNOSIS — I48.91 ATRIAL FIBRILLATION, UNSPECIFIED TYPE (HCC): Primary | ICD-10-CM

## 2021-01-15 DIAGNOSIS — R00.1 BRADYCARDIA: ICD-10-CM

## 2021-01-15 DIAGNOSIS — I50.22 CHRONIC SYSTOLIC HEART FAILURE (HCC): ICD-10-CM

## 2021-01-15 PROCEDURE — G8484 FLU IMMUNIZE NO ADMIN: HCPCS | Performed by: NURSE PRACTITIONER

## 2021-01-15 PROCEDURE — 93290 INTERROG DEV EVAL ICPMS IP: CPT | Performed by: INTERNAL MEDICINE

## 2021-01-15 PROCEDURE — 93000 ELECTROCARDIOGRAM COMPLETE: CPT | Performed by: NURSE PRACTITIONER

## 2021-01-15 PROCEDURE — G8427 DOCREV CUR MEDS BY ELIG CLIN: HCPCS | Performed by: NURSE PRACTITIONER

## 2021-01-15 PROCEDURE — 1036F TOBACCO NON-USER: CPT | Performed by: NURSE PRACTITIONER

## 2021-01-15 PROCEDURE — 93283 PRGRMG EVAL IMPLANTABLE DFB: CPT | Performed by: INTERNAL MEDICINE

## 2021-01-15 PROCEDURE — G8417 CALC BMI ABV UP PARAM F/U: HCPCS | Performed by: NURSE PRACTITIONER

## 2021-01-15 PROCEDURE — 3017F COLORECTAL CA SCREEN DOC REV: CPT | Performed by: NURSE PRACTITIONER

## 2021-01-15 PROCEDURE — 1123F ACP DISCUSS/DSCN MKR DOCD: CPT | Performed by: NURSE PRACTITIONER

## 2021-01-15 PROCEDURE — 4040F PNEUMOC VAC/ADMIN/RCVD: CPT | Performed by: NURSE PRACTITIONER

## 2021-01-15 PROCEDURE — 99214 OFFICE O/P EST MOD 30 MIN: CPT | Performed by: NURSE PRACTITIONER

## 2021-01-15 NOTE — PROGRESS NOTES
[facility]   Electrophysiology  Cristina Hugo, APRN-CNP  Attending EP: Dr. Ritchie Kovacs   Date: 1/15/2021  I had the privilege of visiting Maryclare Klinefelter Day in the office. Chief Complaint:   Chief Complaint   Patient presents with    Atrial Fibrillation     History of Present Illness: History obtained from patient and medical record. Jalen De La Garza is 70 y.o. male with a past medical history of HTN, HLD, CAD s/p CABG (redo 2019), seizures, bradycardia, and atrial fibrillation. NSTEMI 10/13/2017. Has been treated with Tikosyn in the past for afib. DId not tolerate amiodarone due to bradycardia. S/p redo CABGx4 at [facility], WESTON ligation, and LA maze 1/8/2019. Interval history: Today, Maryclare Klinefelter Day is being seen for atrial fibrillation and HTN. He felt himself go back into afib on Saturday and has been persistent since then. He is symptomatic with it and has weakness and fatigue that effect his activity level. Denies CP or SOB. He has palpitations that are chronic and related to his PVCs. Device interrogation shows he has been in afib since January 9th. PVC rate has also increased to 250/hour, he has palpitations with these. Recently his metoprolol was increased and he has had episodes of bradycardia to the 40's at home. Denies lightheadedness, dizziness or syncope. HE wears CPAP nightly. He has been treated with Tikosyn in the past and would like to consider resuming this. He has not had PFTs done. Denies weight gain or swelling. Denies having chest pain, palpitations, shortness of breath, orthopnea/PND, cough, or dizziness at the time of this visit. With regard to medication therapy the patient has been compliant with prescribed regimen. He has tolerated therapy to date.      Assessment:  Paroxysmal Atrial Fibrillation  - ECG today shows  underlying atrial fibrillation.  - On amiodarone 200 mg daily and Toprol 100 mg daily   - Fatigue and weakness in afib - [id number] score: 4 (age, HTN, CAD, CMP) ; [id number] Vasc score and anticoagulation discussed. High risk for stroke and thromboembolism. Anticoagulation is recommended.   ~ On Warfarin last INR 2.6, denies bleeding  ~ S/p WESTON ligation, will do LATESHA to assess for remnant and then cn stop warfarin if indicated  - Afib risk factors including age, HTN, obesity, inactivity and JOSEPH were discussed with patient. Risk factor modification recommended   ~ TSH 3.26 (1/10/2020)     - Treatment options including cardioversion, rate control strategy, antiarrhythmics, anticoagulation and possible ablation were discussed with patient. Risks, benefits and alternative of each treatment options were explained. All questions answered    ~ Ablation has been discussed in the past and he was not ready for it  Ischemic Cardiomyopathy/NSVT/Implantable device   - S/p AICD   - The CIED was interrogated and I reviewed all data    - Device interrogation today shows RIDDHI 7.6 yrs, AT/AF 5.8%, AP 33.6%,  64.2%   - Continue GDMT   - Appears Compensated  CAD   - Hx of MI s/p re-do CABG 2019 at [facility]   - No reports of angina   - Continue medical therapy   HTN-goal <130/80   - Controlled   - Continue current medicaitons   - Encouraged patient to check BP at home, log and bring to office visits  - Discussed lifestyle modifications, weight loss, low sodium diet  Plan  - Continue current medications  - Afib ablation  - LATESHA/DCCV  - Call if symptoms worsen    F/U: Follow-up with EP in 6 weeks NPAL  -Follow up with device clinic as scheduled  -Call [facility] at [phone] with any questions    Allergies:  No Known Allergies  Home Medications:  Prior to Visit Medications    Medication Sig Taking?  Authorizing Provider   metoprolol succinate (TOPROL XL) 100 MG extended release tablet Take 1 tablet by mouth daily  Meryle Pride, APRN - CNP   ezetimibe (ZETIA) 10 MG tablet TAKE 1 TABLET BY MOUTH ONCE DAILY  Dina Snowden MD Lab Results   Component Value Date    WBC 8.7 2019    HGB 14.1 2019    HCT 43.5 2019    MCV 92.8 2019     2019     BMP:   Lab Results   Component Value Date    CREATININE 1.13 01/10/2020    BUN 25 01/10/2020     01/10/2020    K 4.5 01/10/2020     01/10/2020    CO2 23 01/10/2020     CrCl cannot be calculated (Patient's most recent lab result is older than the maximum 120 days allowed. ). Lab Results   Component Value Date     2018    BNP <15 2012    BNP 24 08/15/2012       Thyroid:   Lab Results   Component Value Date    TSH 3.26 01/10/2020     Lipid Panel:   Lab Results   Component Value Date    CHOL 124 2018    CHOL 82 2018    HDL 42 2018    HDL 47 2011    TRIG 63 2018     LFTs:  Lab Results   Component Value Date    ALT 34 12/10/2020    AST 30 12/10/2020    ALKPHOS 138 (H) 12/10/2020    BILITOT 0.3 12/10/2020     Coags:   Lab Results   Component Value Date    PROTIME 27.8 (H) 2021    INR 2.6 (H) 2021    APTT 42.0 (H) 2019     EC2021  underlying afib HR 60, QRS 92, QTc 384    Echo: 2019  Summary   -Very technically difficult study.   -There is moderate LV dysfunction with distal septal and apical hypokinesis.   -Ejection fraction is estimated at 35%. -There is concentric left ventricular hypertrophy. -Grade II diastolic dysfunction with elevated LV filling pressures. -Mild mitral annular calcification is present. Mild mitral regurgitation.   -The left atrium is dilated. -Aortic valve appears sclerotic but opens adequately. -Mild to moderate tricuspid regurgitation with PASP of 44 mmHg. -Mild pulmonic regurgitation present.   -Pacer / ICD wire is visualized in the right ventricle. GXT: 10/17/2017  Summary    Medium sized anterior fixed defect of moderate intensity consistent with    infarction in the territory of the LAD .

## 2021-01-15 NOTE — PATIENT INSTRUCTIONS
-Our  will be contacting you from [phone] to schedule your procedure.   - Continue current medications  - Cardiac ablation has been recommended as best therapy for treating afib  - Call if symptoms worsen  - Follow up in 6 weeks

## 2021-01-18 RX ORDER — METOPROLOL SUCCINATE 100 MG/1
100 TABLET, EXTENDED RELEASE ORAL DAILY
Qty: 90 TABLET | Refills: 3 | Status: SHIPPED | OUTPATIENT
Start: 2021-01-18 | End: 2021-07-27 | Stop reason: SDUPTHER

## 2021-01-18 NOTE — TELEPHONE ENCOUNTER
Medication Refill    Medication needing refilled:metoprolol succinate (TOPROL XL)- PT STATE DOSE WAS INCREASED AND IS RUNNING OUT SOONER.  PLEASE SEND TO LOCAL [facility]        Dosage of the medication: 100 mg    How are you taking this medication (QD, BID, TID, QID, PRN): 1 tablet by mouth daily    30 or 90 day supply called in: 90 day supply    When will you run out of your medication:    Which Pharmacy are we sending the medication to?: [facility], [address] [place] [address], [address] [place] [address],[address] [address]   Phone:  [phone]  Fax:  [phone]

## 2021-01-26 ENCOUNTER — OFFICE VISIT (OUTPATIENT)
Dept: PRIMARY CARE CLINIC | Age: 76
End: 2021-01-26
Payer: MEDICARE

## 2021-01-26 ENCOUNTER — HOSPITAL ENCOUNTER (OUTPATIENT)
Age: 76
Discharge: HOME | End: 2021-01-26
Payer: MEDICARE

## 2021-01-26 DIAGNOSIS — R74.8 ELEVATED LIVER ENZYMES: ICD-10-CM

## 2021-01-26 DIAGNOSIS — I48.0 PAF (PAROXYSMAL ATRIAL FIBRILLATION) (HCC): Chronic | ICD-10-CM

## 2021-01-26 DIAGNOSIS — Z20.828 EXPOSURE TO SARS-ASSOCIATED CORONAVIRUS: Primary | ICD-10-CM

## 2021-01-26 LAB
ALBUMIN SERPL-MCNC: 3.8 G/DL (ref 3.4–5)
ALP BLD-CCNC: 102 U/L (ref 40–129)
ALT SERPL-CCNC: 48 U/L (ref 10–40)
AST SERPL-CCNC: 28 U/L (ref 15–37)
BILIRUB SERPL-MCNC: 0.3 MG/DL (ref 0–1)
BILIRUBIN DIRECT: <0.2 MG/DL (ref 0–0.3)
BILIRUBIN, INDIRECT: ABNORMAL MG/DL (ref 0–1)
INR BLD: 2.76 (ref 0.86–1.14)
PROTHROMBIN TIME: 32.3 SEC (ref 10–13.2)
SARS-COV-2: NOT DETECTED
TOTAL PROTEIN: 6.7 G/DL (ref 6.4–8.2)

## 2021-01-26 PROCEDURE — G8428 CUR MEDS NOT DOCUMENT: HCPCS | Performed by: NURSE PRACTITIONER

## 2021-01-26 PROCEDURE — 85610 PROTHROMBIN TIME: CPT

## 2021-01-26 PROCEDURE — 99211 OFF/OP EST MAY X REQ PHY/QHP: CPT | Performed by: NURSE PRACTITIONER

## 2021-01-26 PROCEDURE — G8417 CALC BMI ABV UP PARAM F/U: HCPCS | Performed by: NURSE PRACTITIONER

## 2021-01-26 PROCEDURE — 36415 COLL VENOUS BLD VENIPUNCTURE: CPT

## 2021-01-26 PROCEDURE — 80076 HEPATIC FUNCTION PANEL: CPT

## 2021-01-26 NOTE — PROGRESS NOTES
Carlos Enrique Christina Mathis received a viral test for COVID-19. They were educated on isolation and quarantine as appropriate. For any symptoms, they were directed to seek care from their PCP, given contact information to establish with a doctor, directed to an urgent care or the emergency room.

## 2021-01-26 NOTE — PATIENT INSTRUCTIONS
You have received a viral test for COVID-19. Below is education on quarantine per the CDC guidelines. For any symptoms, seek care from your PCP, call [phone] to establish care with a doctor, or go directly to an urgent care or the emergency room. Test results will take 2-7 days and will be sent to you in your [facility] account. If you test positive, you will be contacted via phone. If you test negative, the ONLY communication will be through [address] GO TO [employer] AND SIGN UP FOR [facility]  (LOWER LEFT OF THE HOME PAGE)  No test is 100%. If you have symptoms, you should follow the guidance of quarantine as previously stated. You can still be contagious if you have symptoms. Your [facility] will reach out to you if you have a positive result. They will provide you with a return to work date and note. If you were tested for a pre-op, then you should remain in quarantine until your procedure. How do I know if I need to be in quarantine? If you live in a community where COVID-19 is or might be spreading (currently, that is virtually everywhere in the [place])  Be alert for symptoms. Watch for fever, cough, shortness of breath, or other symptoms of COVID-19.  ? Take your temperature if symptoms develop. ? Practice social distancing. Maintain 6 feet of distance from others and stay out of crowded places. ? Follow CDC guidance if symptoms develop. If you feel healthy but:  ? Recently had close contact with a person with COVID-19 you need to Quarantine:  ? Stay home until 14 days after your last exposure. ? Check your temperature twice a day and watch for symptoms of COVID-19.  ? If possible, stay away from people who are at higher-risk for getting very sick from COVID-19. Stay Home and Monitor Your Health if you:  ? Have been diagnosed with COVID-19, or  ? Are waiting for test results, or  ?  Have cough, fever, or shortness of breath, or symptoms of COVID-19 When You Can be Around Others After You Had or Likely Had COVID-19     If you have or think you might have COVID-19, it is important to stay home and away from other people. Staying away from others helps stop the spread of COVID-19. If you have an emergency warning sign (including trouble breathing), get emergency medical care immediately. When you can be around others (end home isolation) depends on different factors for different situations. Find CDC's recommendations for your situation below. I think or know I had COVID-19, and I had symptoms  You can be with others after  ? 3 days with no fever and  ? Respiratory symptoms have improved (e.g. cough, shortness of breath) and  ? 10 days since symptoms first appeared  Depending on your healthcare provider's advice and availability of testing, you might get tested to see if you still have COVID-19. If you will be tested, you can be around others when you have no fever, respiratory symptoms have improved, and you receive two negative test results in a row, at least 24 hours apart. I tested positive for COVID-19 but had no symptoms  If you continue to have no symptoms, you can be with others after:  ? 10 days have passed since test or 14 days since your exposure test   Depending on your healthcare provider's advice and availability of testing, you might get tested to see if you still have COVID-19. If you will be tested, you can be around others after you receive two negative test results in a row, at least 24 hours apart. If you develop symptoms after testing positive, follow the guidance above for I think or know I had COVID, and I had symptoms.   For Anyone Who Has Been Around a Person with COVID-19  It is important to remember that anyone who has close contact with someone with COVID-19 should stay home for 14 days after exposure based on the time it takes to develop illness. Testing is not necessary.     www.cdc.gov/coronavirus/2019-ncov/index.html

## 2021-01-29 ENCOUNTER — NURSE ONLY (OUTPATIENT)
Dept: CARDIOLOGY CLINIC | Age: 76
End: 2021-01-29
Payer: MEDICARE

## 2021-01-29 ENCOUNTER — HOSPITAL ENCOUNTER (OUTPATIENT)
Dept: CARDIAC CATH/INVASIVE PROCEDURES | Age: 76
Discharge: HOME | End: 2021-01-29
Attending: INTERNAL MEDICINE | Admitting: INTERNAL MEDICINE
Payer: MEDICARE

## 2021-01-29 VITALS
OXYGEN SATURATION: 99 % | SYSTOLIC BLOOD PRESSURE: 140 MMHG | HEIGHT: 69 IN | DIASTOLIC BLOOD PRESSURE: 90 MMHG | HEART RATE: 64 BPM | RESPIRATION RATE: 16 BRPM | TEMPERATURE: 98.4 F | WEIGHT: 175 LBS | BODY MASS INDEX: 25.92 KG/M2

## 2021-01-29 DIAGNOSIS — Z95.810 ICD (IMPLANTABLE CARDIOVERTER-DEFIBRILLATOR) IN PLACE: ICD-10-CM

## 2021-01-29 DIAGNOSIS — I25.5 ISCHEMIC CARDIOMYOPATHY: Chronic | ICD-10-CM

## 2021-01-29 DIAGNOSIS — I50.22 CHRONIC SYSTOLIC HEART FAILURE (HCC): ICD-10-CM

## 2021-01-29 DIAGNOSIS — I49.01 VF (VENTRICULAR FIBRILLATION) (HCC): ICD-10-CM

## 2021-01-29 DIAGNOSIS — I47.20 VT (VENTRICULAR TACHYCARDIA): ICD-10-CM

## 2021-01-29 DIAGNOSIS — R00.1 BRADYCARDIA: ICD-10-CM

## 2021-01-29 DIAGNOSIS — I48.0 PAROXYSMAL ATRIAL FIBRILLATION (HCC): Chronic | ICD-10-CM

## 2021-01-29 LAB
EKG ATRIAL RATE: 150 BPM
EKG ATRIAL RATE: 80 BPM
EKG DIAGNOSIS: NORMAL
EKG DIAGNOSIS: NORMAL
EKG P AXIS: 105 DEGREES
EKG P-R INTERVAL: 176 MS
EKG Q-T INTERVAL: 382 MS
EKG Q-T INTERVAL: 454 MS
EKG QRS DURATION: 192 MS
EKG QRS DURATION: 88 MS
EKG QTC CALCULATION (BAZETT): 384 MS
EKG QTC CALCULATION (BAZETT): 546 MS
EKG R AXIS: -45 DEGREES
EKG R AXIS: -81 DEGREES
EKG T AXIS: 33 DEGREES
EKG T AXIS: 90 DEGREES
EKG VENTRICULAR RATE: 61 BPM
EKG VENTRICULAR RATE: 87 BPM
INR BLD: 2.8 (ref 0.86–1.14)
LV EF: 40 %
LVEF MODALITY: NORMAL

## 2021-01-29 PROCEDURE — 93010 ELECTROCARDIOGRAM REPORT: CPT | Performed by: INTERNAL MEDICINE

## 2021-01-29 PROCEDURE — 85610 PROTHROMBIN TIME: CPT

## 2021-01-29 PROCEDURE — 93283 PRGRMG EVAL IMPLANTABLE DFB: CPT | Performed by: INTERNAL MEDICINE

## 2021-01-29 PROCEDURE — 2500000003 HC RX 250 WO HCPCS

## 2021-01-29 PROCEDURE — 93325 DOPPLER ECHO COLOR FLOW MAPG: CPT

## 2021-01-29 PROCEDURE — 93312 ECHO TRANSESOPHAGEAL: CPT

## 2021-01-29 PROCEDURE — 92960 CARDIOVERSION ELECTRIC EXT: CPT | Performed by: INTERNAL MEDICINE

## 2021-01-29 PROCEDURE — 93005 ELECTROCARDIOGRAM TRACING: CPT | Performed by: INTERNAL MEDICINE

## 2021-01-29 PROCEDURE — 7100000010 HC PHASE II RECOVERY - FIRST 15 MIN

## 2021-01-29 PROCEDURE — 99152 MOD SED SAME PHYS/QHP 5/>YRS: CPT | Performed by: INTERNAL MEDICINE

## 2021-01-29 PROCEDURE — 92960 CARDIOVERSION ELECTRIC EXT: CPT

## 2021-01-29 PROCEDURE — 99152 MOD SED SAME PHYS/QHP 5/>YRS: CPT

## 2021-01-29 PROCEDURE — 93320 DOPPLER ECHO COMPLETE: CPT

## 2021-01-29 NOTE — PROCEDURES
[facility]     Electrophysiology Procedure Note       Date of Procedure: 1/29/2021  Patient's Name: Michelle Mathis  YOB: 1945   Medical Record Number: [id number]  Procedure Performed by: Bianca Broussard MD    Procedures performed:  IV sedation. Trans-esophageal echocardiography  External Electrical cardioversion     Indication of the procedure: Persistent atrial fibrillation     Details of procedure: The patient was brought to the cath lab area in a fasting and non-sedated state. The risks, benefits and alternatives of the procedure were discussed with the patient. The patient opted to proceed with the procedure. Written informed consent was signed and placed in the chart. A timeout protocol was completed to identify the patient and the procedure being performed. IV sedation was provided with IV Versed, Fentanyl initially and LATESHA was performed which did not show any WESTON/LA clot/thrombus. Full LATESHA reports will be dictated. Patient is on chronic anticoagulation therapy. Then we used brevital for sedation and electrical DC cardioversion was perfomred using 200J, synchronized shock. Patient was converted to sinus rhythm. The patient tolerated the procedure well and there were no complications.      Conclusion:   Successful external DC cardioversion of atrial fibrillation

## 2021-01-29 NOTE — H&P
[address]   Electrophysiology    Date: 1/29/2021  I had the privilege of visiting Evan Mathis in the office. Chief Complaint:   No chief complaint on file. History of Present Illness: History obtained from patient and medical record. Roxana Schirmer is 70 y.o. male with a past medical history of HTN, HLD, CAD s/p CABG (redo 2019), seizures, bradycardia, and atrial fibrillation. NSTEMI 10/13/2017. Has been treated with Tikosyn in the past for afib. DId not tolerate amiodarone due to bradycardia. S/p redo CABGx4 at [facility], [place] ligation, and [place] maze 1/8/2019. Today, Evan Mathis is being seen for atrial fibrillation and HTN. He felt himself go back into afib on Saturday and has been persistent since then. He is symptomatic with it and has weakness and fatigue that effect his activity level. Denies CP or SOB. He has palpitations that are chronic and related to his PVCs. Device interrogation shows he has been in afib since January 9th. PVC rate has also increased to 250/hour, he has palpitations with these. Recently his metoprolol was increased and he has had episodes of bradycardia to the 40's at home. Denies lightheadedness, dizziness or syncope. HE wears CPAP nightly. He has been treated with Tikosyn in the past and would like to consider resuming this. He has not had PFTs done. Denies weight gain or swelling. Denies having chest pain, palpitations, shortness of breath, orthopnea/PND, cough, or dizziness at the time of this visit. With regard to medication therapy the patient has been compliant with prescribed regimen. He has tolerated therapy to date. Assessment:  Paroxysmal Atrial Fibrillation  - ECG today shows  underlying atrial fibrillation.  - On amiodarone 200 mg daily and Toprol 100 mg daily   - Fatigue and weakness in afib  - [id number] score: 4 (age, HTN, CAD, CMP) ; [id number] Vasc score and anticoagulation discussed.  High risk for stroke and thromboembolism. Anticoagulation is recommended.   ~ On Warfarin last INR 2.6, denies bleeding  ~ S/p [place] ligation, will do LATESHA to assess for remnant and then cn stop warfarin if indicated  - Afib risk factors including age, HTN, obesity, inactivity and JOSEPH were discussed with patient. Risk factor modification recommended   ~ TSH 3.26 (1/10/2020)     - Treatment options including cardioversion, rate control strategy, antiarrhythmics, anticoagulation and possible ablation were discussed with patient. Risks, benefits and alternative of each treatment options were explained. All questions answered    ~ Ablation has been discussed in the past and he was not ready for it  Ischemic Cardiomyopathy/NSVT/Implantable device   - S/p AICD   - The CIED was interrogated and I reviewed all data    - Device interrogation today shows RIDDHI 7.6 yrs, AT/AF 5.8%, AP 33.6%,  64.2%   - Continue GDMT   - Appears Compensated  CAD   - Hx of MI s/p re-do CABG 2019 at [facility]   - No reports of angina   - Continue medical therapy   HTN-goal <130/80   - Controlled   - Continue current medicaitons   - Encouraged patient to check BP at home, log and bring to office visits  - Discussed lifestyle modifications, weight loss, low sodium diet  Plan  - Continue current medications  - Afib ablation  - LATESHA/DCCV  - Call if symptoms worsen    F/U: Follow-up with EP in 6 weeks NPAL  -Follow up with device clinic as scheduled  -Call [facility] at [phone] with any questions    Allergies:  No Known Allergies  Home Medications:  Prior to Visit Medications    Medication Sig Taking?  Authorizing Provider   metoprolol succinate (TOPROL XL) 100 MG extended release tablet Take 1 tablet by mouth daily Yes BETO Diaz - CNP   furosemide (LASIX) 20 MG tablet TAKE 1 TABLET BY MOUTH ONCE DAILY AS NEEDED FOR EDEMA Yes Gail Miner MD   warfarin (COUMADIN) 5 MG tablet Take 1 tablet by mouth daily Yes Gail Miner MD   potassium chloride (KLOR-CON M20) 20 MEQ extended release tablet TAKE 1 TABLET BY MOUTH AS NEEDED (AS NEEDED WITH LASIX FOR SWELLING) Yes Lisa Collazo MD   atorvastatin (LIPITOR) 80 MG tablet TAKE 1 TABLET BY MOUTH ONE TIME A DAY Yes Lisa Collazo MD   amiodarone (CORDARONE) 200 MG tablet Take 1 tablet by mouth daily Yes Lisa Collazo MD   aspirin 81 MG chewable tablet Take 81 mg by mouth daily Yes Historical Provider, MD   diazepam (VALIUM) 5 MG tablet Take 5 mg by mouth 2 times daily. Yes Historical Provider, MD   phenytoin (DILANTIN) 100 MG ER capsule Take 1 capsule by mouth 2 times daily. Resume home dose Yes BETO Gary CNP   primidone (MYSOLINE) 250 MG tablet Take 250 mg by mouth 2 times daily  Yes Historical Provider, MD   ezetimibe (ZETIA) 10 MG tablet TAKE 1 TABLET BY MOUTH ONCE DAILY  Lisa Collazo MD   docusate sodium (COLACE, DULCOLAX) 100 MG CAPS Take 100 mg by mouth 2 times daily as needed for Constipation  Pete Smith MD   nitroGLYCERIN (NITROLINGUAL) 0.4 MG/SPRAY 0.4 mg spray Place 1 spray under the tongue every 5 minutes as needed for Chest pain  Dorthey Hodgkin, APRN - CNP      Past Medical History:  Past Medical History:   Diagnosis Date    Arthritis     shoulders and knee    Atrial fib/flut     CAD (coronary artery disease)     Hyperlipidemia     Hypertension     Seizures ([facility] [address]     last seizures many years ago,     Sinus bradycardia      Past Surgical History:    has a past surgical history that includes hernia repair (Right); Coronary angioplasty with stent (1997); [place] tooth extraction (April 2012); pacemaker placement (12/18/2017); Coronary artery bypass graft (01/2019); Cardiac surgery; and Cystoscopy (N/A, 11/13/2019). Social History:  Reviewed. reports that he quit smoking about 32 years ago. He has a 30.00 pack-year smoking history. He has never used smokeless tobacco. He reports that he does not drink alcohol or use drugs. Family History:  Reviewed.  family history includes Heart Failure in his mother. Denies family history of sudden cardiac death, arrhythmia, premature CAD    Review of System:  · Constitutional: No weight changes or weakness + fatigue  · HEENT: No visual changes. No mouth sores or sore throat. · Cardiovascular: denies chest pain, denies dyspnea on exertion, reports palpitations or denies loss of consciousness. No cough, hemoptysis, denies pleuritic pain, or phlebitis. denies dizziness. · Respiratory: denies cough or wheezing. · Gastrointestinal: Negative, No blood in stools. · Genitourinary: No hematuria. · Neurological: No focal weakness  · Psychiatric: No confusion, anxiety, or depression. · Hem/Lymph: Denies abnormal bruising or bleeding. Physical Examination:  Vitals:    01/29/21 0931   BP: (!) 140/90   Pulse: 64   Resp: 16   Temp: 98.4 °F (36.9 °C)   SpO2: 99%      Wt Readings from Last 3 Encounters:   01/29/21 175 lb (79.4 kg)   01/15/21 175 lb 3.2 oz (79.5 kg)   10/05/20 185 lb 1.9 oz (84 kg)      Constitutional: Cooperative and in no apparent distress, and appears well nourished   Skin: Warm and pink; no cyanosis or bruising   HEENT: Symmetric and normocephalic. Conjunctiva pink with clear sclera. Mucus membranes pink and moist. No visible masses/goiter   Respiratory: Respirations symmetric and unlabored. Lungs clear to auscultation bilaterally, no wheezing, rhonchi, or crackles.  Cardiovascular:  irregular rate and rhythm. S1 & S2 present, negative for murmur. negative elevation of JVP. No peripheral edema.  Musculoskeletal:  No focal weakness.  Neurological/Psych: Awake and orientated to person, place and time. Calm affect, appropriate mood.      Pertinent labs, diagnostic, device, and imaging results reviewed as a part of this visit    LABS    CBC:   Lab Results   Component Value Date    WBC 8.7 08/09/2019    HGB 14.1 08/09/2019    HCT 43.5 08/09/2019    MCV 92.8 08/09/2019     08/09/2019     BMP:   Lab Results   Component Value Date    CREATININE 1.13 01/10/2020    BUN 25 01/10/2020     01/10/2020    K 4.5 01/10/2020     01/10/2020    CO2 23 01/10/2020     CrCl cannot be calculated (Patient's most recent lab result is older than the maximum 120 days allowed. ). Lab Results   Component Value Date     2018    BNP <15 2012    BNP 24 08/15/2012       Thyroid:   Lab Results   Component Value Date    TSH 3.26 01/10/2020     Lipid Panel:   Lab Results   Component Value Date    CHOL 124 2018    CHOL 82 2018    HDL 42 2018    HDL 47 2011    TRIG 63 2018     LFTs:  Lab Results   Component Value Date    ALT 48 (H) 2021    AST 28 2021    ALKPHOS 102 2021    BILITOT 0.3 2021     Coags:   Lab Results   Component Value Date    PROTIME 32.3 (H) 2021    INR 2.80 (H) 2021    APTT 42.0 (H) 2019     EC2021  underlying afib HR 60, QRS 92, QTc 384    Echo: 2019  Summary   -Very technically difficult study.   -There is moderate LV dysfunction with distal septal and apical hypokinesis.   -Ejection fraction is estimated at 35%. -There is concentric left ventricular hypertrophy. -Grade II diastolic dysfunction with elevated LV filling pressures. -Mild mitral annular calcification is present. Mild mitral regurgitation.   -The left atrium is dilated. -Aortic valve appears sclerotic but opens adequately. -Mild to moderate tricuspid regurgitation with PASP of 44 mmHg. -Mild pulmonic regurgitation present.   -Pacer / ICD wire is visualized in the right ventricle. GXT: 10/17/2017  Summary    Medium sized anterior fixed defect of moderate intensity consistent with    infarction in the territory of the LAD .    Small sized inferior fixed defect of moderate intensity consistent with    infarction in the territory of the RCA .    No ischemia.    Normal LV function.    Overall findings represent a intermediate risk scan.      8/9/2019  Summary   Nondiagnostic due to failure to reach target heart rate. No ischemia is seen to 69% max HR. Diet & Exercise:   The patient is counseled to follow a low salt diet to assure blood pressure remains controlled for cardiovascular risk factor modification   The patient is counseled to avoid excess caffeine, and energy drinks as this may exacerbated ectopy and arrhythmia   The patient is counseled to lose weight to control cardiovascular risk factors   Exercise program discussed: To improve overall cardiovascular health, the patient is instructed to increase cardiovascular related activities with a goal of 150 min/week of moderate level activity or 10,000 steps per day. Encouraged to perform as much activity as tolerated     I have addressed the patient's cardiac risk factors and adjusted pharmacologic treatment as needed. In addition, I have reinforced the need for patient directed risk factor modification. I independently reviewed the device check interrogation and ECG    All questions and concerns were addressed with the patient. Alternatives to treatment were discussed. Thank you for allowing to us to participate in the care of Rigo Fernandez. Anila Weinstein MD, MPH  [address]   Office: [phone]  Fax: [phone]      H&P Update    I have reviewed the history and physical and examined the patient and updated with relevant changes. Consent: I have discussed with the patient and/or the patient representative the indication, alternatives, and the possible risks and/or complications of the planned procedure and the anesthesia methods. The patient and/or patient representative appear to understand and agree to proceed. Vitals:    01/29/21 0931   BP: (!) 140/90   Pulse: 64   Resp: 16   Temp: 98.4 °F (36.9 °C)   SpO2: 99%     Prior to Admission medications    Medication Sig Start Date End Date Taking?  Authorizing Provider   metoprolol succinate (TOPROL XL) 100 MG extended release tablet Take 1 tablet by mouth daily 1/18/21  Yes BETO Remy CNP   furosemide (LASIX) 20 MG tablet TAKE 1 TABLET BY MOUTH ONCE DAILY AS NEEDED FOR EDEMA 12/15/20  Yes Jose Alejandro Green MD   warfarin (COUMADIN) 5 MG tablet Take 1 tablet by mouth daily 12/15/20  Yes Jose Alejandro Green MD   potassium chloride (KLOR-CON M20) 20 MEQ extended release tablet TAKE 1 TABLET BY MOUTH AS NEEDED (AS NEEDED WITH LASIX FOR SWELLING) 12/15/20  Yes Jose Alejandro Green MD   atorvastatin (LIPITOR) 80 MG tablet TAKE 1 TABLET BY MOUTH ONE TIME A DAY 12/15/20  Yes Jose Alejandro Green MD   amiodarone (CORDARONE) 200 MG tablet Take 1 tablet by mouth daily 12/15/20  Yes Jose Alejandro Green MD   aspirin 81 MG chewable tablet Take 81 mg by mouth daily   Yes Historical Provider, MD   diazepam (VALIUM) 5 MG tablet Take 5 mg by mouth 2 times daily. Yes Historical Provider, MD   phenytoin (DILANTIN) 100 MG ER capsule Take 1 capsule by mouth 2 times daily.  Resume home dose 12/21/12  Yes BETO Gary CNP   primidone (MYSOLINE) 250 MG tablet Take 250 mg by mouth 2 times daily    Yes Historical Provider, MD   ezetimibe (ZETIA) 10 MG tablet TAKE 1 TABLET BY MOUTH ONCE DAILY 12/15/20   Jose Alejandro Green MD   docusate sodium (COLACE, DULCOLAX) 100 MG CAPS Take 100 mg by mouth 2 times daily as needed for Constipation 8/10/19   Vishnu Whittington MD   nitroGLYCERIN (NITROLINGUAL) 0.4 MG/SPRAY 0.4 mg spray Place 1 spray under the tongue every 5 minutes as needed for Chest pain 10/20/17   BETO Gates - CNP     Past Medical History:   Diagnosis Date    Arthritis     shoulders and knee    Atrial fib/flut     CAD (coronary artery disease)     Hyperlipidemia     Hypertension     Seizures ([facility] [address]     last seizures many years ago,     Sinus bradycardia      Past Surgical History:   Procedure Laterality Date    CARDIAC SURGERY      , angioplasty 2007    CORONARY ANGIOPLASTY WITH STENT PLACEMENT  1997    [facility]RA[facility]/2019    [facility]    CYSTOSCOPY N/A 11/13/2019    FLEXIBLE CYSTOSCOPY performed by Dominic Moulton MD at [facility] Right     done 65 sam ago   [facility]  12/18/2017    WISDOM TOOTH EXTRACTION  April 2012     No Known Allergies    Pre-Sedation Documentation and Exam:   I have personally completed a history, physical exam & review of systems for this patient (see notes).     Mallampati Airway Assessment:  Class I     Prior History of Anesthesia Complications:   None    ASA Classification:  Class 2 - A normal healthy patient with mild systemic disease    Sedation/ Anesthesia Plan:   Intravenous sedation    Medications Planned:   Midazolam (Versed) and Fentanyl intravenously  Brevital intravenously     Patient is an appropriate candidate for plan of sedation:   Yes    Electronically signed by Kandy Gilman MD on 1/29/2021 at 11:08 AM

## 2021-02-01 NOTE — PROGRESS NOTES
[facility] LATESHA CV Device Check  Rep Checked Device   Device shows normal function  Changes This Session Session Start     Current Value  Mode                                     AAI<=>DDD    AAIR<=>DDDR  Lower Rate                              45 bpm                60 bpm  A.  Preference Pacing                   Off                      On  LISA Maximum Rate                                             80 bpm  LISA Decrement                                                     30 ms  LISA Search Beats                                                    20

## 2021-02-04 ENCOUNTER — TELEPHONE (OUTPATIENT)
Dept: CARDIOLOGY CLINIC | Age: 76
End: 2021-02-04

## 2021-02-04 NOTE — TELEPHONE ENCOUNTER
Pt calling asking to speak with someone that can change the pace set on his pacemaker he wants it at 50 beats and its at 60 also its on motion censor and wants it turned off  pls call to advise thank you

## 2021-02-04 NOTE — TELEPHONE ENCOUNTER
Dr. Raejean Aschoff, Patient had Jagdish Piper had LATESHA/DCCV on 1/29/20201. At that time changes were made to his device. Changes This Session                  Session Start     Current Value  Mode          AAI<=>DDD    AAIR<=>DDDR  Lower Rate     45 bpm                60 bpm  PP                      Off                      On  LISA MaximumRate                     80 bpm  LISA Decrement                           30 ms  LISA Search Beats                          20      Is it okay to make Change rate to 50 bpm and turn RR off? SHoudl I keep PP on? Please advise. Thank you.

## 2021-02-05 NOTE — TELEPHONE ENCOUNTER
Called Patient this morning and asked him if he would like to come in this morning.  He scheduled an appointment for Monday at 3:00pm. Thanks

## 2021-02-08 ENCOUNTER — NURSE ONLY (OUTPATIENT)
Dept: CARDIOLOGY CLINIC | Age: 76
End: 2021-02-08
Payer: MEDICARE

## 2021-02-08 DIAGNOSIS — R00.1 BRADYCARDIA: ICD-10-CM

## 2021-02-08 DIAGNOSIS — I25.5 ISCHEMIC CARDIOMYOPATHY: Chronic | ICD-10-CM

## 2021-02-08 DIAGNOSIS — I48.0 PAROXYSMAL ATRIAL FIBRILLATION (HCC): Chronic | ICD-10-CM

## 2021-02-08 DIAGNOSIS — I49.01 VF (VENTRICULAR FIBRILLATION) (HCC): ICD-10-CM

## 2021-02-08 DIAGNOSIS — I50.22 CHRONIC SYSTOLIC HEART FAILURE (HCC): ICD-10-CM

## 2021-02-08 DIAGNOSIS — I47.20 VT (VENTRICULAR TACHYCARDIA): ICD-10-CM

## 2021-02-08 DIAGNOSIS — Z95.810 ICD (IMPLANTABLE CARDIOVERTER-DEFIBRILLATOR) IN PLACE: ICD-10-CM

## 2021-02-08 PROCEDURE — 93283 PRGRMG EVAL IMPLANTABLE DFB: CPT | Performed by: INTERNAL MEDICINE

## 2021-02-08 NOTE — PROGRESS NOTES
Patient comes in for programming evaluation for his defibrillator. All sensing and pacing parameters are within normal range. Patient comes in to the office today to have changes to his device. Parameters set back to original settings prior to LATESHA/DCCV on 1/29/2021. Changes This Session          Session Start       Current Value  Mode           AAIR<=>DDDR       AAI<=>DDD  Lower Rate          60 bpm               50 bpm  A.  Preference Pacing  On                 Off

## 2021-02-23 ENCOUNTER — OFFICE VISIT (OUTPATIENT)
Dept: CARDIOLOGY CLINIC | Age: 76
End: 2021-02-23
Payer: MEDICARE

## 2021-02-23 ENCOUNTER — NURSE ONLY (OUTPATIENT)
Dept: CARDIOLOGY CLINIC | Age: 76
End: 2021-02-23
Payer: MEDICARE

## 2021-02-23 VITALS
HEIGHT: 70 IN | HEART RATE: 50 BPM | WEIGHT: 170.4 LBS | DIASTOLIC BLOOD PRESSURE: 79 MMHG | SYSTOLIC BLOOD PRESSURE: 127 MMHG | OXYGEN SATURATION: 98 % | BODY MASS INDEX: 24.39 KG/M2

## 2021-02-23 DIAGNOSIS — I25.83 CORONARY ARTERY DISEASE DUE TO LIPID RICH PLAQUE: ICD-10-CM

## 2021-02-23 DIAGNOSIS — Z95.810 ICD (IMPLANTABLE CARDIOVERTER-DEFIBRILLATOR) IN PLACE: ICD-10-CM

## 2021-02-23 DIAGNOSIS — I25.10 CORONARY ARTERY DISEASE DUE TO LIPID RICH PLAQUE: ICD-10-CM

## 2021-02-23 DIAGNOSIS — I25.5 ISCHEMIC CARDIOMYOPATHY: Chronic | ICD-10-CM

## 2021-02-23 DIAGNOSIS — R00.1 BRADYCARDIA: ICD-10-CM

## 2021-02-23 DIAGNOSIS — I49.01 VF (VENTRICULAR FIBRILLATION) (HCC): ICD-10-CM

## 2021-02-23 DIAGNOSIS — R00.2 PALPITATIONS: ICD-10-CM

## 2021-02-23 DIAGNOSIS — I48.0 PAF (PAROXYSMAL ATRIAL FIBRILLATION) (HCC): Primary | Chronic | ICD-10-CM

## 2021-02-23 DIAGNOSIS — I50.22 CHRONIC SYSTOLIC HEART FAILURE (HCC): ICD-10-CM

## 2021-02-23 DIAGNOSIS — I10 ESSENTIAL HYPERTENSION: Chronic | ICD-10-CM

## 2021-02-23 DIAGNOSIS — I47.20 VT (VENTRICULAR TACHYCARDIA): ICD-10-CM

## 2021-02-23 DIAGNOSIS — I48.0 PAROXYSMAL ATRIAL FIBRILLATION (HCC): Chronic | ICD-10-CM

## 2021-02-23 PROCEDURE — G8484 FLU IMMUNIZE NO ADMIN: HCPCS | Performed by: INTERNAL MEDICINE

## 2021-02-23 PROCEDURE — 4040F PNEUMOC VAC/ADMIN/RCVD: CPT | Performed by: INTERNAL MEDICINE

## 2021-02-23 PROCEDURE — 93283 PRGRMG EVAL IMPLANTABLE DFB: CPT | Performed by: INTERNAL MEDICINE

## 2021-02-23 PROCEDURE — G8427 DOCREV CUR MEDS BY ELIG CLIN: HCPCS | Performed by: INTERNAL MEDICINE

## 2021-02-23 PROCEDURE — 93290 INTERROG DEV EVAL ICPMS IP: CPT | Performed by: INTERNAL MEDICINE

## 2021-02-23 PROCEDURE — 99214 OFFICE O/P EST MOD 30 MIN: CPT | Performed by: INTERNAL MEDICINE

## 2021-02-23 PROCEDURE — 3017F COLORECTAL CA SCREEN DOC REV: CPT | Performed by: INTERNAL MEDICINE

## 2021-02-23 PROCEDURE — 1036F TOBACCO NON-USER: CPT | Performed by: INTERNAL MEDICINE

## 2021-02-23 PROCEDURE — G8420 CALC BMI NORM PARAMETERS: HCPCS | Performed by: INTERNAL MEDICINE

## 2021-02-23 PROCEDURE — 1123F ACP DISCUSS/DSCN MKR DOCD: CPT | Performed by: INTERNAL MEDICINE

## 2021-02-23 NOTE — PROGRESS NOTES
[facility]   Electrophysiology Follow up   Date: 2/23/2021  I had the privilege of visiting Kiarra Mathis in the office. CC: Atrial fibrillation    HPI: Kiarra Mathis is a 70 y.o. male a past medical history of HTN, HLD, CAD s/p CABG (redo 2019), seizures, bradycardia, and atrial fibrillation. NSTEMI 10/13/2017. Has been treated with Tikosyn in the past for afib. DId not tolerate amiodarone due to bradycardia. S/p redo CABGx4 at [facility], WESTON ligation, and LA maze 1/8/2019.      01/27/2020 Went back into atrial fibrillation. Device interrogation showed he had been back in atrial fibrillation since 01/09/2020. He also had an increase in his PVC rate to 250/hour. S/p DCCV 01/29/2020    He has been treated with Tikosyn in the past and would like to consider resuming this. He has not had PFTs done. Denies weight gain or swelling.       Susu Pcao presents today to follow up  regarding his atrial fibrillation. Denies any palpitation. No chest pain. Assessment and plan:   Symptomatic paroxysmal Atrial Fibrillation      We discussed treatment options including antiarrhythmics, rate control with anticoagulation, and ablation. We discussed progressive nature of atrial fibrillation. Treatment success decreases when AF becomes persistent and last more than 6 months. Antiarrhythmic therapy, side effects, benefits and alternative discussed. Atrial fibrillation ablation procedure was discussed. We discussed the need for repeat procedure. On average patients may need more than one ablation procedure.      Risks associated with ablation include but not limited to allergic reaction to the medications, pain, bleeding, infection, nerve injury, injury to diaphragm(breathing muscle), pulmonary embolus(blood clot in lungs), deep vein blood clot, pneumothorax, hemothorax, acute renal failure, cardiac perforation,  tamponade, need for emergent surgery (open heart), permanent pacemaker, pulmonary vein stenosis, left atrial to esophageal fistula, stroke, myocardial infarction and death. Difference between atrial fibrillation and atrial flutter discussed and treatment discussed. Patient verbalized understanding of procedure, risks and benefits. Think about ablation will let me know. After ablation amiodarone can be stopped. He can also consider going to [facility] for ablation. ECG today Sinus Bradycardia  S/p DCCV 0129/2021  On amiodarone 200 mg daily  Liver enzymes 01/26/2021 ALT 48. AST  28  I  Have discussed with patient side effects of amiodarone including but not limited to thyroid disease, discoloration of skin and cornea and pulmonary fibrosis. Patient understands the importance of monitoring for toxicity. Patient would like to continue with it. Toprol  mg daily   Fatigue and weakness in afib    [id number] score: 4 (age, HTN, CAD, CMP) ; [id number] Vasc score and anticoagulation discussed. High risk for stroke and thromboembolism. Anticoagulation is recommended. On Warfarin last INR  01/29/2021 2.8, denies bleeding     S/p WESTON ligation,  No remnant of Left atrial appendage was seen on LATESHA 01/29/2021    - Afib risk factors including age, HTN, obesity, inactivity and JOSEPH were discussed with patient. Risk factor modification recommended              TSH 3.26 (1/10/2020)     Ablation has been discussed in the past and he was not ready for it. Today he is willing to consider ablation.   States he is having PVC about every 4 weeks    Ischemic Cardiomyopathy/NSVT/Implantable device              - S/p dual chamber  AICD 12/08/2017              - The CIED was interrogated and I reviewed all data               - Device interrogation today shows RIDDHI 6.4 years , AT/AF 0, AP 80.7,  < 0.1%              - Continue GDMT , Toprol  mg daily                 CAD              - Hx of MI 10/2018 s/p re-do CABG 01/08/ 2019 at [facility]              - No reports of angina - Continue medical therapy, Toprol XL, zetia, lipitor, ASA     HTN  -Controlled  -BP goal <130/80  -Home BP monitoring encouraged, printed information provided on how to accurately measure BP at home.  -Counseled to follow a low salt diet to assure blood pressure remains controlled for cardiovascular risk factor modification.   -The patient is counseled to get regular exercise 3-5 times per week and maintain a healthy weight reduce cardiovascular risk factors. - Discussed lifestyle modifications,       Follow up in 6 months      Patient Active Problem List    Diagnosis Date Noted    Lightheadedness      Priority: High    Chest pain at rest 06/07/2010     Priority: High    Variant angina (HCC) 08/08/2019    Chronic systolic heart failure ([address] 05/22/2019    Coronary artery disease involving autologous artery coronary bypass graft without angina pectoris     Shortness of breath     ICD (implantable cardioverter-defibrillator) in place 01/09/2018    VT (ventricular tachycardia) ([address] 12/18/2017    History of MI (myocardial infarction)     Ischemic cardiomyopathy     VF (ventricular fibrillation) ([address]     JOSEPH on CPAP 11/20/2017    Vertebrobasilar insufficiency     ST elevation myocardial infarction (STEMI) ([facility])     NSVT (nonsustained ventricular tachycardia) ([address]     Coronary artery disease involving coronary bypass graft of native heart without angina pectoris     Bradycardia 09/28/2016    Palpitations 01/14/2016    Hyperlipidemia 12/13/2012    Atrial fibrillation ([address] 06/27/2012    Coronary artery disease due to lipid rich plaque 06/07/2010    Seizure ([address] 06/07/2010    Essential hypertension 06/07/2010    PAF (paroxysmal atrial fibrillation) ([address]      Diagnostic studies:   EKg today Sinus bradycardia    Echo 01/29/2021  Summary   Normal left ventricle size. There is left ventricular hypertrophy. There is   moderate LV dysfunction. Ejection fraction is estimated at 40%.    Mild to · Neck: Neck supple. No JVD present. · Cardiovascular: Normal rate, regular rhythm, S1&S2. · Pulmonary/Chest: Bilateral respiratory sounds. No rhonchi. · Abdominal: Soft. No tenderness. · Musculoskeletal: No tenderness. No edema    · Lymphadenopathy: Has no cervical adenopathy. · Neurological: Alert and oriented. Follows command, No Gross deficit   · Skin: Skin is warm, No rash noted. · Psychiatric: Has a normal behavior       Review of System:  [x] Full ROS obtained and negative except as mentioned in HPI    Prior to Admission medications    Medication Sig Start Date End Date Taking? Authorizing Provider   metoprolol succinate (TOPROL XL) 100 MG extended release tablet Take 1 tablet by mouth daily 1/18/21   BETO Thayer - CNP   ezetimibe (ZETIA) 10 MG tablet TAKE 1 TABLET BY MOUTH ONCE DAILY 12/15/20   Jacob Severin, MD   furosemide (LASIX) 20 MG tablet TAKE 1 TABLET BY MOUTH ONCE DAILY AS NEEDED FOR EDEMA 12/15/20   Jacob Severin, MD   warfarin (COUMADIN) 5 MG tablet Take 1 tablet by mouth daily 12/15/20   Jacob Severin, MD   potassium chloride (KLOR-CON M20) 20 MEQ extended release tablet TAKE 1 TABLET BY MOUTH AS NEEDED (AS NEEDED WITH LASIX FOR SWELLING) 12/15/20   Jacob Severin, MD   atorvastatin (LIPITOR) 80 MG tablet TAKE 1 TABLET BY MOUTH ONE TIME A DAY 12/15/20   Jacob Severin, MD   amiodarone (CORDARONE) 200 MG tablet Take 1 tablet by mouth daily 12/15/20   Jacob Severin, MD   docusate sodium (COLACE, DULCOLAX) 100 MG CAPS Take 100 mg by mouth 2 times daily as needed for Constipation 8/10/19   Tito Lorenzana MD   aspirin 81 MG chewable tablet Take 81 mg by mouth daily    Historical Provider, MD   diazepam (VALIUM) 5 MG tablet Take 5 mg by mouth 2 times daily.      Historical Provider, MD   nitroGLYCERIN (NITROLINGUAL) 0.4 MG/SPRAY 0.4 mg spray Place 1 spray under the tongue every 5 minutes as needed for Chest pain 10/20/17   BETO Mckinley - ANT   phenytoin (DILANTIN) 100 MG ER capsule Take 1 capsule by mouth 2 times daily. Resume home dose 12/21/12   BETO Gary - CNP   primidone (MYSOLINE) 250 MG tablet Take 250 mg by mouth 2 times daily     Historical Provider, MD       No Known Allergies    Social History:  Reviewed. reports that he quit smoking about 32 years ago. He has a 30.00 pack-year smoking history. He has never used smokeless tobacco. He reports that he does not drink alcohol or use drugs. Family History:  Reviewed. Reviewed. No family history of SCD. Relevant and available labs, and cardiovascular diagnostics reviewed. Reviewed. I independently reviewed relevant and available cardiac diagnostic tests ECG, CXR, Echo, Stress test, Device interrogation, Holter, CT scan. Outside medical records via Care everywhere reviewed and summarized in H&P above. Complex medical condition with multiple medical problems affecting prognosis and outcome of EP interventions    All questions and concerns were addressed to the patient/family. Alternatives to my treatment were discussed. I have discussed the above stated plan and the patient verbalized understanding and agreed with the plan. Scribe attestation: This note was scribed in the presence of Jarrett Sanchez MD by Bashir Xiong RN    Physician Attestation: I, Dr. Jarrett Sanchez, confirm that the scribe's documentation has been prepared under my direction and personally reviewed by me in its entirety. I also confirm that the note above accurately reflects all work, treatment, procedures, and medical decision making performed by me. NOTE: This report was transcribed using voice recognition software. Every effort was made to ensure accuracy, however, inadvertent computerized transcription errors may be present.      Jarrett Sanchez MD, MPH  [facility]   Office: [phone]  Fax: [phone]

## 2021-02-25 NOTE — PROGRESS NOTES
Patient comes in for programming evaluation for his defibrillator. All sensing and pacing parameters are within normal range. Battery life 6.4 years  AP 80.7%.  <0.1%. No episodes noted. Patient remains on warfarin, amiodarone and metoprolol. No changes need to be made at this time. Please see interrogation for more detail. Optivol is within normal range. Patient will see Dr. Heather Lombardi today and follow up in 3 months in office or remotely.

## 2021-03-08 ENCOUNTER — TELEPHONE (OUTPATIENT)
Dept: CARDIOLOGY CLINIC | Age: 76
End: 2021-03-08

## 2021-03-08 NOTE — TELEPHONE ENCOUNTER
CARDIAC CLEARANCE     What type of procedure are you having? Colonoscopy    Which physician is performing your procedure? Dr Chavo Jovel    When is your procedure scheduled for? 03/19/2021    Where are you having this procedure? [facility]    Are you taking Blood Thinners? yes   If so what? Warfarin (Name/dose/frequesncy)     Does the surgeon want you to stop your blood thinner? If so for how long? Dr Jason Mahan would like him off this 5 days prior. Is this ok? Pt states last time he came off his blood thinners he had a heart attack.      Phone Number and Contact Name for Physicians office: [phone]    Fax number to send information: [phone]

## 2021-03-12 DIAGNOSIS — I48.0 PAF (PAROXYSMAL ATRIAL FIBRILLATION) (HCC): Chronic | ICD-10-CM

## 2021-03-12 DIAGNOSIS — Z95.810 S/P IMPLANTATION OF AUTOMATIC CARDIOVERTER/DEFIBRILLATOR (AICD): Chronic | ICD-10-CM

## 2021-03-12 DIAGNOSIS — I48.0 PAROXYSMAL ATRIAL FIBRILLATION (HCC): Chronic | ICD-10-CM

## 2021-03-12 DIAGNOSIS — I25.10 CORONARY ARTERY DISEASE INVOLVING NATIVE CORONARY ARTERY OF NATIVE HEART WITHOUT ANGINA PECTORIS: Chronic | ICD-10-CM

## 2021-03-12 LAB
INR BLD: 3.3
INR BLD: 3.3 (ref 0.8–1.2)
PROTHROMBIN TIME: 33.1 SECONDS (ref 11.7–14.2)

## 2021-03-12 RX ORDER — FUROSEMIDE 20 MG/1
TABLET ORAL
Qty: 90 TABLET | Refills: 1 | OUTPATIENT
Start: 2021-03-12

## 2021-03-12 RX ORDER — EZETIMIBE 10 MG/1
TABLET ORAL
Qty: 90 TABLET | Refills: 1 | OUTPATIENT
Start: 2021-03-12

## 2021-03-12 RX ORDER — POTASSIUM CHLORIDE 20 MEQ/1
TABLET, EXTENDED RELEASE ORAL
Qty: 90 TABLET | Refills: 1 | OUTPATIENT
Start: 2021-03-12

## 2021-03-12 RX ORDER — ATORVASTATIN CALCIUM 80 MG/1
TABLET, FILM COATED ORAL
Qty: 90 TABLET | Refills: 1 | OUTPATIENT
Start: 2021-03-12

## 2021-03-12 RX ORDER — WARFARIN SODIUM 5 MG/1
5 TABLET ORAL DAILY
Qty: 90 TABLET | Refills: 0 | Status: CANCELLED | OUTPATIENT
Start: 2021-03-12

## 2021-03-12 NOTE — TELEPHONE ENCOUNTER
Medication Refill    Medication needing refilled:  warfarin (COUMADIN) 5 MG    atorvastatin (LIPITOR) 80 MG    furosemide (LASIX) 20 MG     potassium chloride (KLOR-CON M20) 20 MEQ     ezetimibe (ZETIA) 10 MG        Dosage of the medication:    How are you taking this medication (QD, BID, TID, QID, PRN):    30 or 90 day supply called in: 90 day supply    When will you run out of your medication:    Which Pharmacy are we sending the medication to?: [address] [place] [address], [address] [place] [address],[address] [id number]   Phone:  [phone]  Fax:  [phone]

## 2021-03-12 NOTE — TELEPHONE ENCOUNTER
Patient needs to establish with Gen cards since previous  [facility] patient.  Has not had lipids drawn in 3 years and no BMP in over a year

## 2021-03-15 ENCOUNTER — ANTI-COAG VISIT (OUTPATIENT)
Dept: CARDIOLOGY CLINIC | Age: 76
End: 2021-03-15
Payer: MEDICARE

## 2021-03-15 DIAGNOSIS — I48.0 PAROXYSMAL ATRIAL FIBRILLATION (HCC): ICD-10-CM

## 2021-03-15 PROCEDURE — 93793 ANTICOAG MGMT PT WARFARIN: CPT | Performed by: NURSE PRACTITIONER

## 2021-03-15 NOTE — PROGRESS NOTES
ANTICOAGULATION MONITORING    Juluis Holstein Day, 1945    Anticoagulation Indication(s):  Afib  ; hx of DCCV, s/p WESTON ligation  Referring Physician:   Dr. Ange Block / Brigido Anne  Goal INR Range:  2.0 - 3.0  Duration of Anticoagulation Therapy:  Long term  Home or Lab Draw: Lab--Cheo Davis  Product patient has at home: warfarin 5 mg    Recent INR Results:  Lab Results   Component Value Date    INR 3.3 (H) 03/12/2021    INR 3.30 03/12/2021    INR 2.80 (H) 01/29/2021    INR 2.76 (H) 01/26/2021       INR Summary                          Warfarin regimen (mg)  Date INR A/P Sun Mon Tue Wed Thu Fri Sat Mg/wk                                                                                                                                                                            3/12/21 3.3 Just above goal 5 5 5 5 5 5 5 35   1/6/21 2.6 At goal 5 5 5 5 5 5 5 35   12/10/20 1.86 Below goal 5 5 5 5 5 5 5 35   10/30/20 2.9 At goal 5 5 5 5 5 5 5 35   9/16/20 2.0 At goal 5 5 5 5 5 5 5 35   9/8/20 1.6 Below goal 5 5 5 5 5 5 5 35   9/4/20 5.2 High 2.5 2.5 5 5 5 Hold Hold 20   7/14/20 2.4 At goal 5 5 5 5 5 5 5 35   5/22/20  2.6  at goal 5 5 5 5 5 5 5 35         Assessment/Plan:    Recent hospitalizations/HC visits None reported   Recent medication changes Amiodarone resumed at 200 mg daily   Medications taken regularly that may interact with warfarin or alter INR No significant drug interactions identified   Warfarin dose taken as prescribed Yes   Signs/symptoms of bleeding History of bleeding? no   Vitamin K intake Consistency of servings of green, leafy vegetables per week ? Yes   Recent vomiting/diarrhea/fever None reported   Changes in weight, activity, stress Weight loss   alcohol use Patient reports having 0 drinks per day   Upcoming surgeries or procedures None reported     Patient's INR was near range today, continue with schedule.   Patient was also reminded to maintain consistent vitamin K intake and call with any bleeding, medication changes, or fever/vomiting/diarrhea. Next INR check: 4/9/21    Addendum:  3/15/21  Reviewed assessment and plan.    Pt will continue 5 mg daily Repeat INR in one month   Patient/family instructed    Zeynep Becerril CNP

## 2021-03-18 DIAGNOSIS — I25.10 CORONARY ARTERY DISEASE INVOLVING NATIVE CORONARY ARTERY OF NATIVE HEART WITHOUT ANGINA PECTORIS: Chronic | ICD-10-CM

## 2021-03-18 DIAGNOSIS — I48.0 PAROXYSMAL ATRIAL FIBRILLATION (HCC): Chronic | ICD-10-CM

## 2021-03-18 RX ORDER — WARFARIN SODIUM 5 MG/1
5 TABLET ORAL DAILY
Qty: 90 TABLET | Refills: 0 | Status: SHIPPED | OUTPATIENT
Start: 2021-03-18 | End: 2021-03-19 | Stop reason: SDUPTHER

## 2021-03-19 ENCOUNTER — ANTI-COAG VISIT (OUTPATIENT)
Dept: CARDIOLOGY CLINIC | Age: 76
End: 2021-03-19
Payer: MEDICARE

## 2021-03-19 ENCOUNTER — TELEPHONE (OUTPATIENT)
Dept: CARDIOLOGY CLINIC | Age: 76
End: 2021-03-19

## 2021-03-19 DIAGNOSIS — I25.10 CORONARY ARTERY DISEASE INVOLVING NATIVE CORONARY ARTERY OF NATIVE HEART WITHOUT ANGINA PECTORIS: Chronic | ICD-10-CM

## 2021-03-19 DIAGNOSIS — I48.0 PAROXYSMAL ATRIAL FIBRILLATION (HCC): ICD-10-CM

## 2021-03-19 DIAGNOSIS — I48.0 PAROXYSMAL ATRIAL FIBRILLATION (HCC): Chronic | ICD-10-CM

## 2021-03-19 LAB
INR BLD: 2.7
INR BLD: 2.7 (ref 0.8–1.2)
PROTHROMBIN TIME: 28.7 SECONDS (ref 11.7–14.2)

## 2021-03-19 PROCEDURE — 93793 ANTICOAG MGMT PT WARFARIN: CPT | Performed by: NURSE PRACTITIONER

## 2021-03-19 RX ORDER — WARFARIN SODIUM 5 MG/1
5 TABLET ORAL DAILY
Qty: 90 TABLET | Refills: 0 | Status: SHIPPED | OUTPATIENT
Start: 2021-03-19 | End: 2021-07-15 | Stop reason: SDUPTHER

## 2021-03-19 NOTE — TELEPHONE ENCOUNTER
Pt calling the pharmacy listed under his warfarin is not correct he uses the [facility], [address]

## 2021-03-19 NOTE — PROGRESS NOTES
ANTICOAGULATION MONITORING    David Lozanomers Day, 1945    Anticoagulation Indication(s):  Afib  ; hx of DCCV, s/p WESTON ligation  Referring Physician:   Dr. Tio Buitrago / Jewels Ryan  Goal INR Range:  2.0 - 3.0  Duration of Anticoagulation Therapy:  Long term  Home or Lab Draw: Lab--Cheo Davis  Product patient has at home: warfarin 5 mg    Recent INR Results:  Lab Results   Component Value Date    INR 2.7 (H) 03/19/2021    INR 2.70 03/19/2021    INR 3.3 (H) 03/12/2021    INR 3.30 03/12/2021       INR Summary                          Warfarin regimen (mg)  Date INR A/P Sun Mon Tue Wed Thu Fri Sat Mg/wk                                                                                                                                                               3/19/21 2.7 At goal 5 5 5 5 5 5 5 35   3/12/21 3.3 Just above goal 5 5 5 5 5 5 5 35   1/6/21 2.6 At goal 5 5 5 5 5 5 5 35   12/10/20 1.86 Below goal 5 5 5 5 5 5 5 35   10/30/20 2.9 At goal 5 5 5 5 5 5 5 35   9/16/20 2.0 At goal 5 5 5 5 5 5 5 35   9/8/20 1.6 Below goal 5 5 5 5 5 5 5 35   9/4/20 5.2 High 2.5 2.5 5 5 5 Hold Hold 20   7/14/20 2.4 At goal 5 5 5 5 5 5 5 35   5/22/20  2.6  at goal 5 5 5 5 5 5 5 35         Assessment/Plan:    Recent hospitalizations/HC visits None reported   Recent medication changes Amiodarone resumed at 200 mg daily   Medications taken regularly that may interact with warfarin or alter INR No significant drug interactions identified   Warfarin dose taken as prescribed Yes   Signs/symptoms of bleeding History of bleeding? no   Vitamin K intake Consistency of servings of green, leafy vegetables per week ? Yes   Recent vomiting/diarrhea/fever None reported   Changes in weight, activity, stress Weight loss   alcohol use Patient reports having 0 drinks per day   Upcoming surgeries or procedures None reported     Patient's INR was near range today, continue with schedule.   Patient was also reminded to maintain consistent vitamin K intake and call with any bleeding, medication changes, or fever/vomiting/diarrhea. Next INR check: 4/16/21    Addendum:  3/19/21  Reviewed assessment and plan.    Pt will continue 5 mg daily Repeat INR in one month   Patient/family instructed    Guru Castanon CNP

## 2021-04-14 NOTE — PROGRESS NOTES
[address]  4/15/21  Referring: Dr. Devin Garcia CONSULT/CHIEF COMPLAINT/HPI     Reason for visit/ Chief complaint  6 month follow up  CAD   HPI Destin Mathis is a 70 y.o. seen as a 6 month follow up for CAD, hypertension, hyperlipidemia and AF. He has SVT,  JOSEPH  He quit smoking 1990. He has no family history of CAD. He lives with his wife, who has dementia. Prior his first CABG, he did not have chest pain, but \"just did not feel right\" and \"had heaviness of the head. \" He had an abnormal stress test followed by an angiogram  Prior his second bypass, he did experience chest pain. He has not had any of this feeling since. 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 His exercise activity is limited by palpitations, that he calls \"PVC's\" Describes the pvc's as occurring midsternal, sometimes feels in the back of his head when his head is leaning on something. This is associated with shortness of breath, no dizziness or syncope. Typically feels the pvc's after he has worked outside and comes in to sit down. He is able to walk up a flight of stairs without chest pain, shortness of breath palpitations or dizziness. He sleeps in a recliner because it is the most comfortable. He has no orthopnea or edema      Patient is compliant with medications and is tolerating them well without side effects     HISTORY/ALLERGIES/ROS     MedHx:  has a past medical history of Arthritis, Atrial fib/flut, CAD (coronary artery disease), Hyperlipidemia, Hypertension, Seizures ([address] and Sinus bradycardia. SurgHx:  has a past surgical history that includes hernia repair (Right); Coronary angioplasty with stent (1997); [place] tooth extraction (April 2012); pacemaker placement (12/18/2017); Coronary artery bypass graft (01/2019); Cardiac surgery; and Cystoscopy (N/A, 11/13/2019). SocHx:  reports that he quit smoking about 32 years ago. He has a 30.00 pack-year smoking history.  He has never used smokeless tobacco. He reports that he does not drink alcohol or use drugs. FamHx: No family history of premature coronary artery disease, sudden death, or aneurysm  Allergies: Patient has no known allergies. ROS:   Review of Systems   Constitutional: Positive for fatigue. Negative for activity change, diaphoresis and fever. HENT: Negative for congestion and ear discharge. Eyes: Negative for photophobia and visual disturbance. Respiratory: Positive for shortness of breath. Negative for cough and chest tightness. Cardiovascular: Positive for palpitations. Negative for chest pain. Gastrointestinal: Negative for abdominal distention, abdominal pain, blood in stool and nausea. Endocrine: Negative for cold intolerance and polydipsia. Genitourinary: Negative for difficulty urinating and flank pain. Musculoskeletal: Positive for arthralgias and myalgias. Skin: Negative for rash and wound. Allergic/Immunologic: Negative for environmental allergies and immunocompromised state. Neurological: Negative for dizziness and headaches. Hematological: Negative for adenopathy. Does not bruise/bleed easily. Psychiatric/Behavioral: Negative for confusion. The patient is not hyperactive. MEDICATIONS      Prior to Admission medications    Medication Sig Start Date End Date Taking?  Authorizing Provider   warfarin (COUMADIN) 5 MG tablet Take 1 tablet by mouth daily 3/19/21  Yes Artem Cordero DO   metoprolol succinate (TOPROL XL) 100 MG extended release tablet Take 1 tablet by mouth daily 1/18/21  Yes Bettynonevaeh Pill, APRN - CNP   ezetimibe (ZETIA) 10 MG tablet TAKE 1 TABLET BY MOUTH ONCE DAILY 12/15/20  Yes Jamel Bamberger, MD   furosemide (LASIX) 20 MG tablet TAKE 1 TABLET BY MOUTH ONCE DAILY AS NEEDED FOR EDEMA 12/15/20  Yes Jamel Bamberger, MD   potassium chloride (KLOR-CON M20) 20 MEQ extended release tablet TAKE 1 TABLET BY MOUTH AS NEEDED (AS NEEDED WITH LASIX FOR SWELLING) 12/15/20  Yes Marycarmen Aguayo  No remnant of left atrial appendage was seen. Patient is s/p WESTON amputation  in the past.   Cath: s/p CABG (x4 '07 LIMA to LAD, SVG to RCA, SVG to D2, SVG to OM3),   19 redo CABG at [facility]  (free IAN-LAD, SVG-Diag, SVG-OM, SVG-PDA  Holter:  [id number]  Bradycardia   -Old anterior infarct. Low voltage with rightward P-axis and rotation -possible pulmonary disease. Stress: 2017 myoview--  Medium sized anterior fixed defect of moderate intensity consistent with    infarction in the territory of the LAD .    Small sized inferior fixed defect of moderate intensity consistent with    infarction in the territory of the RCA .    No ischemia.    Normal LV function.    Overall findings represent a intermediate risk scan. 16  CT of abd--no AAA    carotid dopplers 1-15%  PFT-mild obstruction  moderate Risk  modreate Complexity/Medical Decision Making  Outside records Reviewed  Labs Reviewed  Echo and ekg personally interpreted. Imaging reviewed  Medications reviewed  Old Notes reviewed  ASSESSMENT AND PLAN     1.  CAD  - redo CABG 2019 at [facility]  - did cardiac rehab 2-3 times  - on toprol 100 mg daily  - on asa 81 mg daily  -stable  Plan  - continue above      2. Atrial fibrillation  - CHA2DS vasc scroe 4  - did not tolerate amiodarone due to bradycardia  - 2019 WESTON ligating and LA maze-No remnant of Left atrial appendage was seen on LATESHA 2021  - 20  DCCV DR Natalee Mckeon  - considering ablation with Terry/[facility]- discussed stopping amiodarone after ablation  - on coumadin-managed by our office  - on amiodarone 200 mg daily  -stable  Plan  - follow up as scheduled with Dr Bethel Strong      3.  Ischemic cardiomyopathy/NSVT  - 17 EPS with inducible VT/VF  - 17  Dual chamber AICD  - previously taken off of lisinopril due to hypotension (but that was prior to most recent revasc)  - worsening  Plan  - continue to follow up  - start magnesium 400 mg daily  - recommended starting entresto today, patient is not interested in an additional medication     4. Hypertension  - on toprol xl 100 mg  - 4/12/21  Na 143  Bun 20 creat 1.12   - at home his blood pressure has been  systolic  -917/41  Plan  - continue to monitor    5. Hyperlipidemia  - 4/12/2021  Tc 156  Tri 68 ldl 93  hdl 50  Alk phos 140  ast 43  - on zetia 10 mg daily  - on lipitor 80 mg daily  Plan  - continue above  - repeat next visit, if ldl > 70 then pcsk9      6. JOSEPH  - wears cpap,managed at [facility]  - continue as above      Patient counseled on lifestyle modification, diet, and exercise. Follow Up:   3 months    Dr. Shayne James:  I, Gissel Rutledge, am scribing for and in the presence of Tamiko Samuel Silence 04/15/21 9:34 AM   Physician Attestation  The scribe for and in the presence of cydney Garcia DO). The scribe Ale Bruce may have prepopulated components of this note with my historical  intellectual property under my direct supervision. Any additions to this intellectual property were performed in my presence and at my direction.   Furthermore, the content and accuracy of this note have been reviewed by me Jack Garcia DO).  4/15/2021 10:03 PM

## 2021-04-15 ENCOUNTER — TELEPHONE (OUTPATIENT)
Dept: CARDIOLOGY CLINIC | Age: 76
End: 2021-04-15

## 2021-04-15 ENCOUNTER — PROCEDURE VISIT (OUTPATIENT)
Dept: CARDIOLOGY CLINIC | Age: 76
End: 2021-04-15
Payer: MEDICARE

## 2021-04-15 ENCOUNTER — OFFICE VISIT (OUTPATIENT)
Dept: CARDIOLOGY CLINIC | Age: 76
End: 2021-04-15
Payer: MEDICARE

## 2021-04-15 ENCOUNTER — ANTI-COAG VISIT (OUTPATIENT)
Dept: CARDIOLOGY CLINIC | Age: 76
End: 2021-04-15
Payer: MEDICARE

## 2021-04-15 VITALS
OXYGEN SATURATION: 99 % | WEIGHT: 174 LBS | HEART RATE: 50 BPM | DIASTOLIC BLOOD PRESSURE: 82 MMHG | RESPIRATION RATE: 20 BRPM | HEIGHT: 70 IN | SYSTOLIC BLOOD PRESSURE: 136 MMHG | BODY MASS INDEX: 24.91 KG/M2

## 2021-04-15 DIAGNOSIS — R94.31 ABNORMAL ELECTROCARDIOGRAM (ECG) (EKG): ICD-10-CM

## 2021-04-15 DIAGNOSIS — Z95.810 S/P IMPLANTATION OF AUTOMATIC CARDIOVERTER/DEFIBRILLATOR (AICD): ICD-10-CM

## 2021-04-15 DIAGNOSIS — I25.10 CORONARY ARTERY DISEASE INVOLVING NATIVE CORONARY ARTERY OF NATIVE HEART WITHOUT ANGINA PECTORIS: ICD-10-CM

## 2021-04-15 DIAGNOSIS — Z95.810 ICD (IMPLANTABLE CARDIOVERTER-DEFIBRILLATOR) IN PLACE: ICD-10-CM

## 2021-04-15 DIAGNOSIS — I42.9 CARDIOMYOPATHY, UNSPECIFIED TYPE (HCC): Primary | ICD-10-CM

## 2021-04-15 DIAGNOSIS — I42.9 CARDIOMYOPATHY, UNSPECIFIED TYPE (HCC): ICD-10-CM

## 2021-04-15 DIAGNOSIS — I48.0 PAROXYSMAL ATRIAL FIBRILLATION (HCC): ICD-10-CM

## 2021-04-15 DIAGNOSIS — R06.02 SHORTNESS OF BREATH: Chronic | ICD-10-CM

## 2021-04-15 DIAGNOSIS — I50.22 CHRONIC SYSTOLIC HEART FAILURE (HCC): ICD-10-CM

## 2021-04-15 DIAGNOSIS — G47.33 OSA (OBSTRUCTIVE SLEEP APNEA): ICD-10-CM

## 2021-04-15 DIAGNOSIS — I10 ESSENTIAL HYPERTENSION: ICD-10-CM

## 2021-04-15 LAB
INR BLD: 2.29
INR BLD: 4.7
LV EF: 43 %
LVEF MODALITY: NORMAL

## 2021-04-15 PROCEDURE — 99214 OFFICE O/P EST MOD 30 MIN: CPT | Performed by: INTERNAL MEDICINE

## 2021-04-15 PROCEDURE — 3017F COLORECTAL CA SCREEN DOC REV: CPT | Performed by: INTERNAL MEDICINE

## 2021-04-15 PROCEDURE — 1036F TOBACCO NON-USER: CPT | Performed by: INTERNAL MEDICINE

## 2021-04-15 PROCEDURE — G8420 CALC BMI NORM PARAMETERS: HCPCS | Performed by: INTERNAL MEDICINE

## 2021-04-15 PROCEDURE — 93306 TTE W/DOPPLER COMPLETE: CPT | Performed by: INTERNAL MEDICINE

## 2021-04-15 PROCEDURE — 4040F PNEUMOC VAC/ADMIN/RCVD: CPT | Performed by: INTERNAL MEDICINE

## 2021-04-15 PROCEDURE — 93793 ANTICOAG MGMT PT WARFARIN: CPT | Performed by: NURSE PRACTITIONER

## 2021-04-15 PROCEDURE — 1123F ACP DISCUSS/DSCN MKR DOCD: CPT | Performed by: INTERNAL MEDICINE

## 2021-04-15 PROCEDURE — G8427 DOCREV CUR MEDS BY ELIG CLIN: HCPCS | Performed by: INTERNAL MEDICINE

## 2021-04-15 RX ORDER — MAGNESIUM OXIDE 400 MG/1
400 TABLET ORAL DAILY
Qty: 90 TABLET | Refills: 3 | Status: SHIPPED | OUTPATIENT
Start: 2021-04-15 | End: 2022-02-04 | Stop reason: SDUPTHER

## 2021-04-15 ASSESSMENT — ENCOUNTER SYMPTOMS
NAUSEA: 0
ABDOMINAL DISTENTION: 0
SHORTNESS OF BREATH: 1
CHEST TIGHTNESS: 0
PHOTOPHOBIA: 0
BLOOD IN STOOL: 0
COUGH: 0
ABDOMINAL PAIN: 0

## 2021-04-15 NOTE — PATIENT INSTRUCTIONS
Continue to remain active,recommend walking daily    Echo today if possible, pending EF will consider entresto    Start magnesium 400 mg daily    Follow up in 3months

## 2021-04-15 NOTE — PROGRESS NOTES
ANTICOAGULATION MONITORING    Michelle Crowleyro Day, 1945    Anticoagulation Indication(s):  Afib  ; hx of DCCV, s/p WESTON ligation       ~s/p LATESHA Jun '19 Jan '21 with DCCV  Referring Physician:   Dr. Shawna Gongora   Goal INR Range:  2.0 - 3.0  Duration of Anticoagulation Therapy:  Long term  Home or Lab Draw: Lab--[facility]  Product patient has at home: warfarin 5 mg    Recent INR Results:  Lab Results   Component Value Date    INR 4.7 (HH) 04/15/2021    INR 4.70 04/15/2021    INR 2.7 (H) 03/19/2021    INR 2.70 03/19/2021       INR Summary                          Warfarin regimen (mg)  Date INR A/P Sun Mon Tue Wed Thu Fri Sat Mg/wk                                                                                                                                                  4/15/21 4.7 Above goal 5 JOSH   HOLD HOLD 5    3/19/21 2.7 At goal 5 5 5 5 5 5 5 35   3/12/21 3.3 Just above goal 5 5 5 5 5 5 5 35   1/6/21 2.6 At goal 5 5 5 5 5 5 5 35   12/10/20 1.86 Below goal 5 5 5 5 5 5 5 35   10/30/20 2.9 At goal 5 5 5 5 5 5 5 35   9/16/20 2.0 At goal 5 5 5 5 5 5 5 35   9/8/20 1.6 Below goal 5 5 5 5 5 5 5 35   9/4/20 5.2 High 2.5 2.5 5 5 5 Hold Hold 20   7/14/20 2.4 At goal 5 5 5 5 5 5 5 35   5/22/20  2.6  at goal 5 5 5 5 5 5 5 35         Assessment/Plan:    Recent hospitalizations/HC visits None reported   Recent medication changes Amiodarone resumed at 200 mg daily   Medications taken regularly that may interact with warfarin or alter INR No significant drug interactions identified   Warfarin dose taken as prescribed Yes   Signs/symptoms of bleeding History of bleeding? no   Vitamin K intake Consistency of servings of green, leafy vegetables per week ?  Yes   Recent vomiting/diarrhea/fever None reported   Changes in weight, activity, stress Weight loss   alcohol use Patient reports having 0 drinks per day   Upcoming surgeries or procedures None reported     Patient's INR was above range today, gave decreased dosage schedule. Patient was also reminded to maintain consistent vitamin K intake and call with any bleeding, medication changes, or fever/vomiting/diarrhea. Next INR check: 4/19/21    Addendum:  4/15/21  Reviewed assessment and plan. Hold the dose 4/15, and 4/16, 5 mg 4/17, and 4/18, recheck 4/19.   Patient/family instructed    Jesus Mosquera CNP    Will see if DOAC remains indicated as s/p WESTON

## 2021-04-19 ENCOUNTER — HOSPITAL ENCOUNTER (OUTPATIENT)
Age: 76
Discharge: HOME | End: 2021-04-19
Payer: MEDICARE

## 2021-04-19 DIAGNOSIS — I48.0 PAF (PAROXYSMAL ATRIAL FIBRILLATION) (HCC): Chronic | ICD-10-CM

## 2021-04-19 LAB
INR BLD: 2.29 (ref 0.86–1.14)
PROTHROMBIN TIME: 26.8 SEC (ref 10–13.2)

## 2021-04-19 PROCEDURE — 36415 COLL VENOUS BLD VENIPUNCTURE: CPT

## 2021-04-19 PROCEDURE — 85610 PROTHROMBIN TIME: CPT

## 2021-04-21 ENCOUNTER — ANTI-COAG VISIT (OUTPATIENT)
Dept: CARDIOLOGY CLINIC | Age: 76
End: 2021-04-21

## 2021-04-21 ENCOUNTER — TELEPHONE (OUTPATIENT)
Dept: CARDIOLOGY CLINIC | Age: 76
End: 2021-04-21

## 2021-04-21 DIAGNOSIS — I48.0 PAROXYSMAL ATRIAL FIBRILLATION (HCC): ICD-10-CM

## 2021-04-21 NOTE — PROGRESS NOTES
ANTICOAGULATION MONITORING    Miller Vancouver Day, 1945    Anticoagulation Indication(s):  Afib  ; hx of DCCV, s/p WESTON ligation       ~s/p LATESHA Jun '19 Jan '21 with DCCV  Referring Physician:   Dr. Cesar Reyna   Goal INR Range:  2.0 - 3.0  Duration of Anticoagulation Therapy:  Long term  Home or Lab Draw: Lab--[facility]  Product patient has at home: warfarin 5 mg    Recent INR Results:  Lab Results   Component Value Date    INR 2.29 (H) 04/19/2021    INR 4.7 (HH) 04/15/2021    INR 4.70 04/15/2021    INR 2.29 04/15/2021       INR Summary                          Warfarin regimen (mg)  Date INR A/P Sun Mon Tue Wed Thu Fri Sat Mg/wk                                                                                                                                     4/19/21 2.29 At goal           4/15/21 4.7 Above goal 5 JOSH   HOLD HOLD 5    3/19/21 2.7 At goal 5 5 5 5 5 5 5 35   3/12/21 3.3 Just above goal 5 5 5 5 5 5 5 35   1/6/21 2.6 At goal 5 5 5 5 5 5 5 35   12/10/20 1.86 Below goal 5 5 5 5 5 5 5 35   10/30/20 2.9 At goal 5 5 5 5 5 5 5 35   9/16/20 2.0 At goal 5 5 5 5 5 5 5 35   9/8/20 1.6 Below goal 5 5 5 5 5 5 5 35   9/4/20 5.2 High 2.5 2.5 5 5 5 Hold Hold 20   7/14/20 2.4 At goal 5 5 5 5 5 5 5 35   5/22/20  2.6  at goal 5 5 5 5 5 5 5 35         Assessment/Plan:    Recent hospitalizations/HC visits None reported   Recent medication changes Amiodarone resumed at 200 mg daily   Medications taken regularly that may interact with warfarin or alter INR No significant drug interactions identified   Warfarin dose taken as prescribed Yes   Signs/symptoms of bleeding History of bleeding? no   Vitamin K intake Consistency of servings of green, leafy vegetables per week ?  Yes   Recent vomiting/diarrhea/fever None reported   Changes in weight, activity, stress Weight loss   alcohol use Patient reports having 0 drinks per day   Upcoming surgeries or procedures None reported     Patient's INR was in range today, gave decreased dosage schedule. Patient was also reminded to maintain consistent vitamin K intake and call with any bleeding, medication changes, or fever/vomiting/diarrhea. Next INR check: Please dose pt. Addendum:  4/21/21  Reviewed assessment and plan.    .  Patient/family instructed    Jone Gonzalez CNP    Will see if DOAC remains indicated as s/p WESTON

## 2021-04-22 NOTE — PROGRESS NOTES
ANTICOAGULATION MONITORING    Diya Lea Day, 1945    Anticoagulation Indication(s):  Afib  ; hx of DCCV, s/p WESTON ligation       ~s/p LATESHA Jun '19 Jan '21 with DCCV  Referring Physician:   Dr. Bethel Strong   Goal INR Range:  2.0 - 3.0  Duration of Anticoagulation Therapy:  Long term  Home or Lab Draw: Lab--[facility]  Product patient has at home: warfarin 5 mg    Recent INR Results:  Lab Results   Component Value Date    INR 2.29 (H) 04/19/2021    INR 4.7 (HH) 04/15/2021    INR 4.70 04/15/2021    INR 2.29 04/15/2021       INR Summary                          Warfarin regimen (mg)  Date INR A/P Sun Mon Tue Wed Thu Fri Sat Mg/wk                                                                                                                                     4/21/21 2.2 At goal 5 5 2.5 5 2.5 5 5 30   4/15/21 4.7 Above goal 5 JOSH   HOLD HOLD 5    3/19/21 2.7 At goal 5 5 5 5 5 5 5 35   3/12/21 3.3 Just above goal 5 5 5 5 5 5 5 35   1/6/21 2.6 At goal 5 5 5 5 5 5 5 35   12/10/20 1.86 Below goal 5 5 5 5 5 5 5 35   10/30/20 2.9 At goal 5 5 5 5 5 5 5 35   9/16/20 2.0 At goal 5 5 5 5 5 5 5 35   9/8/20 1.6 Below goal 5 5 5 5 5 5 5 35   9/4/20 5.2 High 2.5 2.5 5 5 5 Hold Hold 20   7/14/20 2.4 At goal 5 5 5 5 5 5 5 35   5/22/20  2.6  at goal 5 5 5 5 5 5 5 35         Assessment/Plan:    Recent hospitalizations/HC visits None reported   Recent medication changes Amiodarone resumed at 200 mg daily   Medications taken regularly that may interact with warfarin or alter INR No significant drug interactions identified   Warfarin dose taken as prescribed Yes   Signs/symptoms of bleeding History of bleeding? no   Vitamin K intake Consistency of servings of green, leafy vegetables per week ?  Yes   Recent vomiting/diarrhea/fever None reported   Changes in weight, activity, stress Weight loss   alcohol use Patient reports having 0 drinks per day   Upcoming surgeries or procedures None reported     Patient's INR was in range today, gave dosage schedule. Patient was also reminded to maintain consistent vitamin K intake and call with any bleeding, medication changes, or fever/vomiting/diarrhea. Next INR check: 4/21/21    Addendum:  4/22/21  Reviewed assessment and plan. Gave decreased dosage instructions.     Patient/family instructed    Livia Ríos CNP    Will see if DOAC remains indicated as s/p WESTON

## 2021-04-26 ENCOUNTER — ANTI-COAG VISIT (OUTPATIENT)
Dept: CARDIOLOGY CLINIC | Age: 76
End: 2021-04-26
Payer: MEDICARE

## 2021-04-26 DIAGNOSIS — I48.0 PAROXYSMAL ATRIAL FIBRILLATION (HCC): ICD-10-CM

## 2021-04-26 LAB
INR BLD: 2.5
INR BLD: 2.5 (ref 0.8–1.2)
PROTHROMBIN TIME: 27.1 SECONDS (ref 11.7–14.2)

## 2021-04-26 PROCEDURE — 93793 ANTICOAG MGMT PT WARFARIN: CPT | Performed by: NURSE PRACTITIONER

## 2021-04-26 NOTE — PROGRESS NOTES
ANTICOAGULATION MONITORING    Wilner Salts Day, 1945    Anticoagulation Indication(s):  Afib  ; hx of DCCV, s/p WESTON ligation       ~s/p LATESHA Jun '19 Jan '21 with DCCV  Referring Physician:   Dr. Darling Mon   Goal INR Range:  2.0 - 3.0  Duration of Anticoagulation Therapy:  Long term  Home or Lab Draw: Lab--[facility]  Product patient has at home: warfarin 5 mg    Recent INR Results:  Lab Results   Component Value Date    INR 2.5 (H) 04/26/2021    INR 2.50 04/26/2021    INR 2.29 (H) 04/19/2021    INR 4.7 (HH) 04/15/2021       INR Summary                          Warfarin regimen (mg)  Date INR A/P Sun Mon Tue Wed Thu Fri Sat Mg/wk                                                                                                                        4/26/21 2.5 At goal 5 5 2.5 5 2.5 5 5 30   4/21/21 2.2 At goal 5 5 2.5 5 2.5 5 5 30   4/15/21 4.7 Above goal 5 JOSH   HOLD HOLD 5    3/19/21 2.7 At goal 5 5 5 5 5 5 5 35   3/12/21 3.3 Just above goal 5 5 5 5 5 5 5 35   1/6/21 2.6 At goal 5 5 5 5 5 5 5 35   12/10/20 1.86 Below goal 5 5 5 5 5 5 5 35   10/30/20 2.9 At goal 5 5 5 5 5 5 5 35   9/16/20 2.0 At goal 5 5 5 5 5 5 5 35   9/8/20 1.6 Below goal 5 5 5 5 5 5 5 35   9/4/20 5.2 High 2.5 2.5 5 5 5 Hold Hold 20   7/14/20 2.4 At goal 5 5 5 5 5 5 5 35   5/22/20  2.6  at goal 5 5 5 5 5 5 5 35         Assessment/Plan:    Recent hospitalizations/HC visits None reported   Recent medication changes Amiodarone resumed at 200 mg daily   Medications taken regularly that may interact with warfarin or alter INR No significant drug interactions identified   Warfarin dose taken as prescribed Yes   Signs/symptoms of bleeding History of bleeding? no   Vitamin K intake Consistency of servings of green, leafy vegetables per week ?  Yes   Recent vomiting/diarrhea/fever None reported   Changes in weight, activity, stress Weight loss   alcohol use Patient reports having 0 drinks per day   Upcoming surgeries or procedures None reported     Patient's INR was in range today, gave dosage schedule. Patient was also reminded to maintain consistent vitamin K intake and call with any bleeding, medication changes, or fever/vomiting/diarrhea. Next INR check: 5/3/21    Addendum:  4/26/21  Reviewed assessment and plan. Same dosage instructions.     Patient/family instructed    Everton Jefferson CNP    Will see if [facility] [place] remains indicated as s/p WESTON >> Needs to be address by NANCY HANSEN

## 2021-05-19 ENCOUNTER — TELEPHONE (OUTPATIENT)
Dept: CARDIOLOGY CLINIC | Age: 76
End: 2021-05-19

## 2021-05-28 ENCOUNTER — ANTI-COAG VISIT (OUTPATIENT)
Dept: CARDIOLOGY CLINIC | Age: 76
End: 2021-05-28
Payer: MEDICARE

## 2021-05-28 ENCOUNTER — TELEPHONE (OUTPATIENT)
Dept: CARDIOLOGY CLINIC | Age: 76
End: 2021-05-28

## 2021-05-28 DIAGNOSIS — I48.11 LONGSTANDING PERSISTENT ATRIAL FIBRILLATION (HCC): Primary | ICD-10-CM

## 2021-05-28 DIAGNOSIS — I25.10 CORONARY ARTERY DISEASE INVOLVING NATIVE CORONARY ARTERY OF NATIVE HEART WITHOUT ANGINA PECTORIS: Chronic | ICD-10-CM

## 2021-05-28 DIAGNOSIS — Z95.810 S/P IMPLANTATION OF AUTOMATIC CARDIOVERTER/DEFIBRILLATOR (AICD): Chronic | ICD-10-CM

## 2021-05-28 DIAGNOSIS — I48.0 PAF (PAROXYSMAL ATRIAL FIBRILLATION) (HCC): Chronic | ICD-10-CM

## 2021-05-28 LAB
INR BLD: 2.2
INR BLD: 2.2 (ref 0.8–1.2)
PROTHROMBIN TIME: 23.9 SECONDS (ref 11.7–14.2)

## 2021-05-28 PROCEDURE — 93793 ANTICOAG MGMT PT WARFARIN: CPT | Performed by: NURSE PRACTITIONER

## 2021-05-28 RX ORDER — EZETIMIBE 10 MG/1
TABLET ORAL
Qty: 90 TABLET | Refills: 1 | Status: SHIPPED | OUTPATIENT
Start: 2021-05-28 | End: 2021-07-27 | Stop reason: SDUPTHER

## 2021-05-28 RX ORDER — POTASSIUM CHLORIDE 20 MEQ/1
TABLET, EXTENDED RELEASE ORAL
Qty: 90 TABLET | Refills: 1 | Status: SHIPPED | OUTPATIENT
Start: 2021-05-28 | End: 2021-11-19 | Stop reason: SDUPTHER

## 2021-05-28 RX ORDER — ATORVASTATIN CALCIUM 80 MG/1
TABLET, FILM COATED ORAL
Qty: 90 TABLET | Refills: 1 | Status: SHIPPED | OUTPATIENT
Start: 2021-05-28 | End: 2021-07-27 | Stop reason: SDUPTHER

## 2021-05-28 RX ORDER — FUROSEMIDE 20 MG/1
TABLET ORAL
Qty: 90 TABLET | Refills: 1 | Status: SHIPPED | OUTPATIENT
Start: 2021-05-28 | End: 2021-07-27 | Stop reason: SDUPTHER

## 2021-05-28 NOTE — PROGRESS NOTES
ANTICOAGULATION MONITORING    Franceen Radon Day, 1945    Anticoagulation Indication(s):  Afib  ; hx of DCCV, s/p WESTON ligation       ~s/p LATESHA Jun '19 Jan '21 with DCCV  Referring Physician:   Dr. Amarjit Stringer   Goal INR Range:  2.0 - 3.0  Duration of Anticoagulation Therapy:  Long term  Home or Lab Draw: Lab--[facility]  Product patient has at home: warfarin 5 mg    Recent INR Results:  Lab Results   Component Value Date    INR 2.2 (H) 05/28/2021    INR 2.20 05/28/2021    INR 2.5 (H) 04/26/2021    INR 2.50 04/26/2021       INR Summary                          Warfarin regimen (mg)  Date INR A/P Sun Mon Tue Wed Thu Fri Sat Mg/wk                                                                                                           5/28/21 2.2 At goal 5 5 2.5 5 2.5 5 5 30   4/26/21 2.5 At goal 5 5 2.5 5 2.5 5 5 30   4/21/21 2.2 At goal 5 5 2.5 5 2.5 5 5 30   4/15/21 4.7 Above goal 5 JOSH   HOLD HOLD 5    3/19/21 2.7 At goal 5 5 5 5 5 5 5 35   3/12/21 3.3 Just above goal 5 5 5 5 5 5 5 35   1/6/21 2.6 At goal 5 5 5 5 5 5 5 35   12/10/20 1.86 Below goal 5 5 5 5 5 5 5 35   10/30/20 2.9 At goal 5 5 5 5 5 5 5 35   9/16/20 2.0 At goal 5 5 5 5 5 5 5 35   9/8/20 1.6 Below goal 5 5 5 5 5 5 5 35   9/4/20 5.2 High 2.5 2.5 5 5 5 Hold Hold 20   7/14/20 2.4 At goal 5 5 5 5 5 5 5 35   5/22/20  2.6  at goal 5 5 5 5 5 5 5 35         Assessment/Plan:    Recent hospitalizations/HC visits None reported   Recent medication changes Amiodarone resumed at 200 mg daily   Medications taken regularly that may interact with warfarin or alter INR No significant drug interactions identified   Warfarin dose taken as prescribed Yes   Signs/symptoms of bleeding History of bleeding? no   Vitamin K intake Consistency of servings of green, leafy vegetables per week ?  Yes   Recent vomiting/diarrhea/fever None reported   Changes in weight, activity, stress Weight loss   alcohol use Patient reports having 0 drinks per day   Upcoming surgeries or procedures None

## 2021-06-07 ENCOUNTER — NURSE ONLY (OUTPATIENT)
Dept: CARDIOLOGY CLINIC | Age: 76
End: 2021-06-07
Payer: MEDICARE

## 2021-06-07 ENCOUNTER — TELEPHONE (OUTPATIENT)
Dept: CARDIOLOGY | Age: 76
End: 2021-06-07

## 2021-06-07 DIAGNOSIS — I25.5 ISCHEMIC CARDIOMYOPATHY: Chronic | ICD-10-CM

## 2021-06-07 DIAGNOSIS — I50.22 CHRONIC SYSTOLIC HEART FAILURE (HCC): ICD-10-CM

## 2021-06-07 DIAGNOSIS — Z95.810 ICD (IMPLANTABLE CARDIOVERTER-DEFIBRILLATOR) IN PLACE: ICD-10-CM

## 2021-06-07 NOTE — TELEPHONE ENCOUNTER
Spoke with the patient and he denies any increased SOB , weight gain, or swelling . He states that he hasn't had to take any Lasix recently and he is doing fine .

## 2021-06-07 NOTE — TELEPHONE ENCOUNTER
Call patient and see if he is gaining weight or swelling. Please discuss with Dr. Rebeca Franks, may need increase in lasix for a period.      BETO Dahl-CNP

## 2021-06-07 NOTE — PROGRESS NOTES
We received remote transmission from patient's monitor at home. Transmission shows normal sensing and pacing function. No new arrhythmias/events recorded since last interrogation 02.23.2021. Optivol is slightly elevated - Possible Optivol fluid accumulation 05.27.2021-ongoing. Office to contact pt regarding Optivol findings. EP physician will review. See interrogation under cardiology tab in the [address] [place] [address] [address] field for more details. Follow up in 3 months via carelink.

## 2021-06-11 PROCEDURE — 93295 DEV INTERROG REMOTE 1/2/MLT: CPT | Performed by: INTERNAL MEDICINE

## 2021-06-11 PROCEDURE — 93297 REM INTERROG DEV EVAL ICPMS: CPT | Performed by: INTERNAL MEDICINE

## 2021-06-11 PROCEDURE — 93296 REM INTERROG EVL PM/IDS: CPT | Performed by: INTERNAL MEDICINE

## 2021-07-02 ENCOUNTER — TELEPHONE (OUTPATIENT)
Dept: CARDIOLOGY CLINIC | Age: 76
End: 2021-07-02

## 2021-07-02 DIAGNOSIS — I48.91 ATRIAL FIBRILLATION, UNSPECIFIED TYPE (HCC): Primary | ICD-10-CM

## 2021-07-02 DIAGNOSIS — Z79.01 LONG TERM CURRENT USE OF ANTICOAGULANT: ICD-10-CM

## 2021-07-02 NOTE — TELEPHONE ENCOUNTER
Pt calling wanting to have his lab orders faxed to [facility] he wants to get them done this morning.  Pls call to advise Thank you

## 2021-07-02 NOTE — TELEPHONE ENCOUNTER
Spoke to the pt-advised the lab order has been faxed to [facility], [phone].

## 2021-07-08 RX ORDER — AMIODARONE HYDROCHLORIDE 200 MG/1
200 TABLET ORAL DAILY
Qty: 90 TABLET | Refills: 0 | Status: SHIPPED | OUTPATIENT
Start: 2021-07-08 | End: 2021-07-15 | Stop reason: SDUPTHER

## 2021-07-08 NOTE — TELEPHONE ENCOUNTER
Medication Refill    Medication needing refilled: amiodarone (CORDARONE) 200 MG tablet     Dosage of the medication:    How are you taking this medication (QD, BID, TID, QID, PRN):    30 or 90 day supply called in:    When will you run out of your medication:    Which Pharmacy are we sending the medication to?: [facility], [address]

## 2021-07-08 NOTE — TELEPHONE ENCOUNTER
Received refill request for Amiodarone     Last OV: 04/15/2021 w/ HOLLIE     Last Labs:  01/26/2021 Hepatic    Last Refill: 12/15/2020 #90 w/ 1 refill by JEFF    Next Appointment: 07/27/2021 lillian/ HOLLIE

## 2021-07-12 DIAGNOSIS — I48.91 ATRIAL FIBRILLATION, UNSPECIFIED TYPE (HCC): Primary | ICD-10-CM

## 2021-07-12 DIAGNOSIS — R74.8 ELEVATED LIVER ENZYMES: ICD-10-CM

## 2021-07-12 NOTE — TELEPHONE ENCOUNTER
Called patient he is going to do it the same day he has an INR at the end of this month. He will get the order to take with him at his 7/27 appt.  He already had liver enzymes 5/27 ( in [place]) [facility]

## 2021-07-15 ENCOUNTER — TELEPHONE (OUTPATIENT)
Dept: CARDIOLOGY CLINIC | Age: 76
End: 2021-07-15

## 2021-07-15 DIAGNOSIS — I25.10 CORONARY ARTERY DISEASE INVOLVING NATIVE CORONARY ARTERY OF NATIVE HEART WITHOUT ANGINA PECTORIS: Chronic | ICD-10-CM

## 2021-07-15 DIAGNOSIS — I48.0 PAROXYSMAL ATRIAL FIBRILLATION (HCC): Chronic | ICD-10-CM

## 2021-07-15 RX ORDER — WARFARIN SODIUM 5 MG/1
5 TABLET ORAL DAILY
Qty: 90 TABLET | Refills: 1 | Status: SHIPPED | OUTPATIENT
Start: 2021-07-15 | End: 2021-12-29

## 2021-07-15 RX ORDER — AMIODARONE HYDROCHLORIDE 200 MG/1
200 TABLET ORAL DAILY
Qty: 90 TABLET | Refills: 1 | Status: SHIPPED | OUTPATIENT
Start: 2021-07-15 | End: 2021-09-09 | Stop reason: SDUPTHER

## 2021-07-15 NOTE — TELEPHONE ENCOUNTER
Medication Refill    Medication needing refilled: warfarin  Asking for 90 day with 3 refills , amiodarone (called in this week but he would like refills added to it )     Dosage of the medication:    How are you taking this medication (QD, BID, TID, QID, PRN):    30 or 90 day supply called in:  Asking that both rx's be 90 days w 3 refills     When will you run out of your medication:    Which Pharmacy are we sending the medication to?:  [facility] in [address] [place] [address],[address]

## 2021-07-15 NOTE — TELEPHONE ENCOUNTER
Refills placed. These are high risk medications requiring frequent monitoring and blood draws. Refills aren't given for the entire year.  thanks

## 2021-07-16 ENCOUNTER — TELEPHONE (OUTPATIENT)
Dept: CARDIOLOGY CLINIC | Age: 76
End: 2021-07-16

## 2021-07-16 NOTE — TELEPHONE ENCOUNTER
PT calling he had a CD of his Echo made and he misplaced and wants to know if he can get another one?  Pls call to advise Thank you

## 2021-07-20 ENCOUNTER — TELEPHONE (OUTPATIENT)
Dept: CARDIOLOGY CLINIC | Age: 76
End: 2021-07-20

## 2021-07-20 NOTE — TELEPHONE ENCOUNTER
Pt called wanting to know if they can get copies of their EKG from 1/15 12 lead 1/29 12 lead. They need them for an evaluation.     pls advise thank you

## 2021-07-26 NOTE — PROGRESS NOTES
[address]  7/27/21  Referring: Dr. Lorrie Ibrahim CONSULT/CHIEF COMPLAINT/HPI     Reason for visit/ Chief complaint  6 month follow up  CAD   HPI Brett Mathis is a 70 y.o. seen as a 6 month follow up for CAD, hypertension, hyperlipidemia and AF. He has SVT,  JOSEPH  He quit smoking 1990. He has no family history of CAD. He lives with his wife, who has dementia. Prior his first CABG, he did not have chest pain, but \"just did not feel right\" and \"had heaviness of the head. \" He had an abnormal stress test followed by an angiogram.  Prior his second bypass, he did experience chest pain. He has not had any of this feeling since. He is down 10 pounds since April. States he is eating well, is more active in the summer months. 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 Will be helping to put up a pole barn in August. He paces himself to avoid extreme fatigue. He has no chest pain shortness of breath, dizziness, or syncope. He continue to struggle with palpitations, no increase in severity or frequency. He drinks prune juice to control constipation. He is able to walk up a flight of stairs without symptoms. He sleeps in a recliner because it is the most comfortable. He has no orthopnea or edema. He enjoys  Cooking. Occasionally has tingling in his feet and legs at night      Patient is compliant with medications - notices vivid dreams on toprol     HISTORY/ALLERGIES/ROS     MedHx:  has a past medical history of Arthritis, Atrial fib/flut, CAD (coronary artery disease), Hyperlipidemia, Hypertension, Seizures ([facility] [address] and Sinus bradycardia. SurgHx:  has a past surgical history that includes hernia repair (Right); Coronary angioplasty with stent (1997); [place] tooth extraction (April 2012); pacemaker placement (12/18/2017); Coronary artery bypass graft (01/2019); Cardiac surgery; and Cystoscopy (N/A, 11/13/2019).    SocHx:  reports that he quit smoking about 32 years ago. He has a 30.00 pack-year smoking history. He has never used smokeless tobacco. He reports that he does not drink alcohol and does not use drugs. FamHx: No family history of premature coronary artery disease, sudden death, or aneurysm  Allergies: Patient has no known allergies. ROS:   Review of Systems   Constitutional: Positive for fatigue. Negative for activity change, diaphoresis and fever. HENT: Negative for congestion and ear discharge. Eyes: Negative for photophobia and visual disturbance. Respiratory: Positive for shortness of breath. Negative for cough and chest tightness. Cardiovascular: Positive for palpitations. Negative for chest pain. Gastrointestinal: Negative for abdominal distention, abdominal pain, blood in stool and nausea. Endocrine: Negative for cold intolerance and polydipsia. Genitourinary: Negative for difficulty urinating and flank pain. Musculoskeletal: Positive for arthralgias and myalgias. Skin: Negative for rash and wound. Allergic/Immunologic: Negative for environmental allergies and immunocompromised state. Neurological: Negative for dizziness and headaches. Hematological: Negative for adenopathy. Does not bruise/bleed easily. Psychiatric/Behavioral: Negative for confusion. The patient is not hyperactive. MEDICATIONS      Prior to Admission medications    Medication Sig Start Date End Date Taking?  Authorizing Provider   atorvastatin (LIPITOR) 80 MG tablet TAKE 1 TABLET BY MOUTH ONE TIME A DAY 7/27/21  Yes Azalea Hopes, DO   ezetimibe (ZETIA) 10 MG tablet TAKE 1 TABLET BY MOUTH ONCE DAILY 7/27/21  Yes Azalea Hopes, DO   furosemide (LASIX) 20 MG tablet TAKE 1 TABLET BY MOUTH ONCE DAILY AS NEEDED FOR EDEMA 7/27/21  Yes Azalea Hopes, DO   metoprolol succinate (TOPROL XL) 100 MG extended release tablet Take 1 tablet by mouth daily 7/27/21  Yes Azalea Hopes, DO   warfarin (COUMADIN) 5 MG tablet Take 1 tablet by mouth daily 7/15/21 Yes [facility], DO   amiodarone (CORDARONE) 200 MG tablet Take 1 tablet by mouth daily 7/15/21  Yes [facility], DO   potassium chloride (KLOR-CON M20) 20 MEQ extended release tablet TAKE 1 TABLET BY MOUTH AS NEEDED (AS NEEDED WITH LASIX FOR SWELLING) 5/28/21  Yes Sangeetha [place], DO   magnesium oxide (MAG-OX) 400 MG tablet Take 1 tablet by mouth daily 4/15/21  Yes [facility], DO   docusate sodium (COLACE, DULCOLAX) 100 MG CAPS Take 100 mg by mouth 2 times daily as needed for Constipation 8/10/19  Yes Zach Nicole MD   aspirin 81 MG chewable tablet Take 81 mg by mouth daily   Yes Historical Provider, MD   diazepam (VALIUM) 5 MG tablet Take 5 mg by mouth 2 times daily. Yes Historical Provider, MD   nitroGLYCERIN (NITROLINGUAL) 0.4 MG/SPRAY 0.4 mg spray Place 1 spray under the tongue every 5 minutes as needed for Chest pain 10/20/17  Yes BETO Moeller CNP   phenytoin (DILANTIN) 100 MG ER capsule Take 1 capsule by mouth 2 times daily. Resume home dose 12/21/12  Yes BETO Gary CNP   primidone (MYSOLINE) 250 MG tablet Take 250 mg by mouth 2 times daily    Yes Historical Provider, MD       PHYSICAL EXAM        Vitals:    07/27/21 1013   BP: 134/78   Pulse: 50   SpO2: 98%    Weight: 164 lb (74.4 kg)     Gen Alert, cooperative, no distress Heart  Regular rate and rhythm, 2/6 murmur, ICD in place   Head Normocephalic, atraumatic, no abnormalities Abd  Soft, NT, +BS, no mass, no OM   Eyes PERRLA, conj/corn clear Ext  Ext nl, AT, no C/C, trace edema   Nose Nares normal, no drain age, Non-tender Pulse 2+ and symmetric   Throat Lips, mucosa, tongue normal Skin Color/text/turg nl, no rash/lesions   Neck S/S, TM, NT, no bruit Psych Nl mood and affect   Lung = CTA-B, unlabored, no DTP     Ch wall NT, no deform       LABS and Imaging     Relevant and available CV data reviewed  Echo/MRI: Echo 01/29/2021 Summary  Normal left ventricle size. There is left ventricular hypertrophy.  There is  moderate LV dysfunction. Ejection fraction is estimated at 40%.  Mild to moderate mitral regurgitation. [place] is no evidence of mass or thrombus in the left atrium.  No remnant of left atrial appendage was seen. Patient is s/p WESTON amputation  in the past.   Cath: s/p CABG (x4 '07 LIMA to LAD, SVG to RCA, SVG to D2, SVG to OM3),   [facility]o [facility]G at [facility]  (free IAN-LAD, SVG-Diag, SVG-OM, SVG-PDA  Holter:  [id number]  Bradycardia   -Old anterior infarct. Low voltage with rightward P-axis and rotation -possible pulmonary disease. Stress: 2017 myoview--  Medium sized anterior fixed defect of moderate intensity consistent with    infarction in the territory of the LAD .    Small sized inferior fixed defect of moderate intensity consistent with    infarction in the territory of the RCA .    No ischemia.    Normal LV function.    Overall findings represent a intermediate risk scan. 16  CT of abd--no AAA    carotid dopplers 1-15%  PFT-mild obstruction  moderate Risk  modreate Complexity/Medical Decision Making  Outside records Reviewed  Labs Reviewed  Echo and ekg personally interpreted. Imaging reviewed  Medications reviewed  Old Notes reviewed  ASSESSMENT AND PLAN     1.  CAD  - redo CABG [facility] at [facility]  - did cardiac rehab 2-3 times  - on toprol 100 mg daily  - on asa 81 mg daily  -stable  - likely primary hyperlipidemia  Plan  - continue above  - carotid dopplers      2. Atrial fibrillation/PVCs  - CHA2DS vasc scroe 4  - did not tolerate amiodarone due to bradycardia  - 2019 WESTON ligating and LA maze-No remnant of Left atrial appendage was seen on LATESHA 2021  - 20  DCCV DR Debbie Boswell  - considering ablation with Terry/[facility]- discussed stopping amiodarone after ablation  - on coumadin-managed by our office  - on amiodarone 200 mg daily  -stable  Plan  - follow up as scheduled with Dr Debbie Boswell 2021      3.  Ischemic cardiomyopathy/NSVT  NYHA II, ACC C  - 17 EPS with inducible VT/VF  - 12/8/17  Dual chamber AICD  - previously taken off of lisinopril due to hypotension (but that was prior to most recent revasc)  - previously not interested in entresto  - takes lasix 20 mg prn, typically once a week  Plan  - continue to follow up, encouraged to take his lasix regularly. He prefers to take as needed  - bnp  - he is not interested in entresto or spironolactone    4. Essential Hypertension  - on toprol xl 100 mg  - 5/27/21  Bun 20 crat 1.15 k  4.6ast 37 alt 35  - at home his blood pressure has been  systolic  - 195/47  Plan  - continue to monitor    5. Primary hypercholesterolemia  - 4/12/2021  Tc 156  Tri 68 ldl 93  hdl 50  Alk phos 140  ast 43  - 5/27 21 ast 37 alt 35  - on zetia 10 mg daily  - on lipitor 80 mg daily  Plan  - lipid panel  - repeat next visit, if ldl > 70 then pcsk9      6. JOSEPH  - wears cpap,managed at [facility]  - continue as above    7. Carotid stenosis  - new problem  Plan:  - carotid ultrasound    Patient counseled on lifestyle modification, diet, and exercise. Follow Up:    6 months      Dr. Skye Phipps:  I, Lele Cox, am scribing for and in the presence of Riley Graham MD.   Kendra Plascencia 07/27/21 10:46 AM   Physician Attestation  The scribe for and in the presence of cydney Howard DO). The scribe Padma Aly may have prepopulated components of this note with my historical  intellectual property under my direct supervision. Any additions to this intellectual property were performed in my presence and at my direction.   Furthermore, the content and accuracy of this note have been reviewed by cydney Howard DO).  7/27/2021 10:46 AM

## 2021-07-27 ENCOUNTER — ANTI-COAG VISIT (OUTPATIENT)
Dept: CARDIOLOGY CLINIC | Age: 76
End: 2021-07-27
Payer: MEDICARE

## 2021-07-27 ENCOUNTER — OFFICE VISIT (OUTPATIENT)
Dept: CARDIOLOGY CLINIC | Age: 76
End: 2021-07-27
Payer: MEDICARE

## 2021-07-27 VITALS
WEIGHT: 164 LBS | HEART RATE: 50 BPM | OXYGEN SATURATION: 98 % | BODY MASS INDEX: 23.48 KG/M2 | DIASTOLIC BLOOD PRESSURE: 78 MMHG | HEIGHT: 70 IN | SYSTOLIC BLOOD PRESSURE: 134 MMHG

## 2021-07-27 DIAGNOSIS — I25.5 ISCHEMIC CARDIOMYOPATHY: ICD-10-CM

## 2021-07-27 DIAGNOSIS — Z95.810 S/P IMPLANTATION OF AUTOMATIC CARDIOVERTER/DEFIBRILLATOR (AICD): Chronic | ICD-10-CM

## 2021-07-27 DIAGNOSIS — I48.91 ATRIAL FIBRILLATION, UNSPECIFIED TYPE (HCC): ICD-10-CM

## 2021-07-27 DIAGNOSIS — G47.33 OSA (OBSTRUCTIVE SLEEP APNEA): ICD-10-CM

## 2021-07-27 DIAGNOSIS — I65.23 OCCLUSION AND STENOSIS OF BILATERAL CAROTID ARTERIES: ICD-10-CM

## 2021-07-27 DIAGNOSIS — R06.02 SHORTNESS OF BREATH: ICD-10-CM

## 2021-07-27 DIAGNOSIS — E78.2 MIXED HYPERLIPIDEMIA: ICD-10-CM

## 2021-07-27 DIAGNOSIS — I25.10 CORONARY ARTERY DISEASE INVOLVING NATIVE CORONARY ARTERY OF NATIVE HEART WITHOUT ANGINA PECTORIS: Primary | ICD-10-CM

## 2021-07-27 DIAGNOSIS — I48.0 PAF (PAROXYSMAL ATRIAL FIBRILLATION) (HCC): Chronic | ICD-10-CM

## 2021-07-27 DIAGNOSIS — I48.0 PAROXYSMAL ATRIAL FIBRILLATION (HCC): Primary | ICD-10-CM

## 2021-07-27 DIAGNOSIS — I10 ESSENTIAL HYPERTENSION: ICD-10-CM

## 2021-07-27 LAB — INR BLD: 2.1

## 2021-07-27 PROCEDURE — 4040F PNEUMOC VAC/ADMIN/RCVD: CPT | Performed by: INTERNAL MEDICINE

## 2021-07-27 PROCEDURE — G8420 CALC BMI NORM PARAMETERS: HCPCS | Performed by: INTERNAL MEDICINE

## 2021-07-27 PROCEDURE — 3017F COLORECTAL CA SCREEN DOC REV: CPT | Performed by: INTERNAL MEDICINE

## 2021-07-27 PROCEDURE — 1036F TOBACCO NON-USER: CPT | Performed by: INTERNAL MEDICINE

## 2021-07-27 PROCEDURE — G8427 DOCREV CUR MEDS BY ELIG CLIN: HCPCS | Performed by: INTERNAL MEDICINE

## 2021-07-27 PROCEDURE — 1123F ACP DISCUSS/DSCN MKR DOCD: CPT | Performed by: INTERNAL MEDICINE

## 2021-07-27 PROCEDURE — 99214 OFFICE O/P EST MOD 30 MIN: CPT | Performed by: INTERNAL MEDICINE

## 2021-07-27 PROCEDURE — 93793 ANTICOAG MGMT PT WARFARIN: CPT | Performed by: NURSE PRACTITIONER

## 2021-07-27 RX ORDER — ATORVASTATIN CALCIUM 80 MG/1
TABLET, FILM COATED ORAL
Qty: 90 TABLET | Refills: 1 | Status: SHIPPED | OUTPATIENT
Start: 2021-07-27 | End: 2022-02-04 | Stop reason: SDUPTHER

## 2021-07-27 RX ORDER — EZETIMIBE 10 MG/1
TABLET ORAL
Qty: 90 TABLET | Refills: 1 | Status: SHIPPED | OUTPATIENT
Start: 2021-07-27 | End: 2022-02-04 | Stop reason: SDUPTHER

## 2021-07-27 RX ORDER — FUROSEMIDE 20 MG/1
TABLET ORAL
Qty: 90 TABLET | Refills: 1 | Status: SHIPPED | OUTPATIENT
Start: 2021-07-27 | End: 2022-02-04 | Stop reason: SDUPTHER

## 2021-07-27 RX ORDER — METOPROLOL SUCCINATE 100 MG/1
100 TABLET, EXTENDED RELEASE ORAL DAILY
Qty: 90 TABLET | Refills: 3 | Status: SHIPPED | OUTPATIENT
Start: 2021-07-27 | End: 2022-02-04 | Stop reason: SDUPTHER

## 2021-07-27 NOTE — PATIENT INSTRUCTIONS
Lipid panel--pending results, may consider repatha/praluent    Continue all medications    bnp    Call for any changes    Follow up in 6 months

## 2021-07-27 NOTE — PROGRESS NOTES
ANTICOAGULATION MONITORING    Wilma Lien Day, 1945    Anticoagulation Indication(s):  Afib  ; hx of DCCV, s/p WESTON ligation       ~s/p LATESHA Jun '19 Jan '21 with DCCV  Referring Physician:   Dr. Allison Edgar   Goal INR Range:  2.0 - 3.0  Duration of Anticoagulation Therapy:  Long term  Home or Lab Draw: Lab--Cheo Davis  Product patient has at home: warfarin 5 mg    Recent INR Results:  Lab Results   Component Value Date    INR 2.10 07/27/2021    INR 2.2 (H) 05/28/2021    INR 2.20 05/28/2021    INR 2.5 (H) 04/26/2021       INR Summary                          Warfarin regimen (mg)  Date INR A/P Sun Mon Tue Wed Thu Fri Sat Mg/wk                                                                                              7/27/21 2.1 At goal 5 5 2.5 5 2.5 5 5 30   5/28/21 2.2 At goal 5 5 2.5 5 2.5 5 5 30   4/26/21 2.5 At goal 5 5 2.5 5 2.5 5 5 30   4/21/21 2.2 At goal 5 5 2.5 5 2.5 5 5 30   4/15/21 4.7 Above goal 5 JOSH   HOLD HOLD 5    3/19/21 2.7 At goal 5 5 5 5 5 5 5 35   3/12/21 3.3 Just above goal 5 5 5 5 5 5 5 35   1/6/21 2.6 At goal 5 5 5 5 5 5 5 35   12/10/20 1.86 Below goal 5 5 5 5 5 5 5 35   10/30/20 2.9 At goal 5 5 5 5 5 5 5 35   9/16/20 2.0 At goal 5 5 5 5 5 5 5 35   9/8/20 1.6 Below goal 5 5 5 5 5 5 5 35   9/4/20 5.2 High 2.5 2.5 5 5 5 Hold Hold 20   7/14/20 2.4 At goal 5 5 5 5 5 5 5 35   5/22/20  2.6  at goal 5 5 5 5 5 5 5 35         Assessment/Plan:    Recent hospitalizations/HC visits None reported   Recent medication changes Amiodarone resumed at 200 mg daily   Medications taken regularly that may interact with warfarin or alter INR No significant drug interactions identified   Warfarin dose taken as prescribed Yes   Signs/symptoms of bleeding History of bleeding? no   Vitamin K intake Consistency of servings of green, leafy vegetables per week ?  Yes   Recent vomiting/diarrhea/fever None reported   Changes in weight, activity, stress Weight loss   alcohol use Patient reports having 0 drinks per day Upcoming surgeries or procedures None reported     Patient's INR was in range today, gave dosage schedule. Patient was also reminded to maintain consistent vitamin K intake and call with any bleeding, medication changes, or fever/vomiting/diarrhea. Next INR check: 8/24/21    Addendum:  7/27/21  Reviewed assessment and plan. Same dosage instructions.     Patient/family instructed    Alexei Campoverde CNP

## 2021-08-13 LAB — INR BLD: 2

## 2021-08-16 ENCOUNTER — ANTI-COAG VISIT (OUTPATIENT)
Dept: CARDIOLOGY CLINIC | Age: 76
End: 2021-08-16
Payer: MEDICARE

## 2021-08-16 DIAGNOSIS — I48.11 LONGSTANDING PERSISTENT ATRIAL FIBRILLATION (HCC): Primary | ICD-10-CM

## 2021-08-16 PROCEDURE — 93793 ANTICOAG MGMT PT WARFARIN: CPT | Performed by: NURSE PRACTITIONER

## 2021-08-16 NOTE — PROGRESS NOTES
ANTICOAGULATION MONITORING    Hetal First Day, 1945    Anticoagulation Indication(s):  Afib  ; hx of DCCV, s/p WESTON ligation       ~s/p LATESHA Jun '19 Jan '21 with DCCV  Referring Physician:   Dr. Juanis Hoyt   Goal INR Range:  2.0 - 3.0  Duration of Anticoagulation Therapy:  Long term  Home or Lab Draw: Lab--Cheo Davis  Product patient has at home: warfarin 5 mg    Recent INR Results:  Lab Results   Component Value Date    INR 2.00 08/13/2021    INR 2.10 07/27/2021    INR 2.2 (H) 05/28/2021    INR 2.20 05/28/2021       INR Summary                          Warfarin regimen (mg)  Date INR A/P Sun Mon Tue Wed Thu Fri Sat Mg/wk                                                                                 8/13/21 2.0 At goal 5 5 2.5 5 2.5 5 5 30   7/27/21 2.1 At goal 5 5 2.5 5 2.5 5 5 30   5/28/21 2.2 At goal 5 5 2.5 5 2.5 5 5 30   4/26/21 2.5 At goal 5 5 2.5 5 2.5 5 5 30   4/21/21 2.2 At goal 5 5 2.5 5 2.5 5 5 30   4/15/21 4.7 Above goal 5 JOSH   HOLD HOLD 5    3/19/21 2.7 At goal 5 5 5 5 5 5 5 35   3/12/21 3.3 Just above goal 5 5 5 5 5 5 5 35   1/6/21 2.6 At goal 5 5 5 5 5 5 5 35   12/10/20 1.86 Below goal 5 5 5 5 5 5 5 35   10/30/20 2.9 At goal 5 5 5 5 5 5 5 35   9/16/20 2.0 At goal 5 5 5 5 5 5 5 35   9/8/20 1.6 Below goal 5 5 5 5 5 5 5 35   9/4/20 5.2 High 2.5 2.5 5 5 5 Hold Hold 20   7/14/20 2.4 At goal 5 5 5 5 5 5 5 35   5/22/20  2.6  at goal 5 5 5 5 5 5 5 35         Assessment/Plan:    Recent hospitalizations/HC visits None reported   Recent medication changes Amiodarone resumed at 200 mg daily   Medications taken regularly that may interact with warfarin or alter INR No significant drug interactions identified   Warfarin dose taken as prescribed Yes   Signs/symptoms of bleeding History of bleeding? no   Vitamin K intake Consistency of servings of green, leafy vegetables per week ?  Yes   Recent vomiting/diarrhea/fever None reported   Changes in weight, activity, stress Weight loss   alcohol use Patient reports having 0 drinks per day   Upcoming surgeries or procedures None reported     Patient's INR was in range today, gave dosage schedule. Patient was also reminded to maintain consistent vitamin K intake and call with any bleeding, medication changes, or fever/vomiting/diarrhea. Next INR check: 8/24/21    Addendum:  8/15/21  Reviewed assessment and plan. Same dosage instructions.     Patient/family instructed    Fidencio Aragon CNP

## 2021-08-20 ENCOUNTER — ANTI-COAG VISIT (OUTPATIENT)
Dept: CARDIOLOGY CLINIC | Age: 76
End: 2021-08-20
Payer: MEDICARE

## 2021-08-20 DIAGNOSIS — I48.11 LONGSTANDING PERSISTENT ATRIAL FIBRILLATION (HCC): Primary | ICD-10-CM

## 2021-08-20 LAB — INR BLD: 1.9

## 2021-08-20 PROCEDURE — 93793 ANTICOAG MGMT PT WARFARIN: CPT | Performed by: NURSE PRACTITIONER

## 2021-08-20 NOTE — PROGRESS NOTES
ANTICOAGULATION MONITORING    Odisaiasia Patch Day, 1945    Anticoagulation Indication(s):  Afib  ; hx of DCCV, s/p WESTON ligation       ~s/p LATESHA Jun '19 Jan '21 with DCCV  Referring Physician:   Dr. Heladio Brice   Goal INR Range:  2.0 - 3.0  Duration of Anticoagulation Therapy:  Long term  Home or Lab Draw: Lab--Cheo Davis  Product patient has at home: warfarin 5 mg    Recent INR Results:  Lab Results   Component Value Date    INR 1.90 08/20/2021    INR 2.00 08/13/2021    INR 2.10 07/27/2021    INR 2.2 (H) 05/28/2021       INR Summary                          Warfarin regimen (mg)  Date INR A/P Sun Mon Tue Wed Thu Fri Sat Mg/wk                                                                    8/20/21 1.9 Just below goal 5 5 2.5 5 2.5 5 5 30   8/13/21 2.0 At goal 5 5 2.5 5 2.5 5 5 30   7/27/21 2.1 At goal 5 5 2.5 5 2.5 5 5 30   5/28/21 2.2 At goal 5 5 2.5 5 2.5 5 5 30   4/26/21 2.5 At goal 5 5 2.5 5 2.5 5 5 30   4/21/21 2.2 At goal 5 5 2.5 5 2.5 5 5 30   4/15/21 4.7 Above goal 5 JOSH   HOLD HOLD 5    3/19/21 2.7 At goal 5 5 5 5 5 5 5 35   3/12/21 3.3 Just above goal 5 5 5 5 5 5 5 35   1/6/21 2.6 At goal 5 5 5 5 5 5 5 35   12/10/20 1.86 Below goal 5 5 5 5 5 5 5 35   10/30/20 2.9 At goal 5 5 5 5 5 5 5 35   9/16/20 2.0 At goal 5 5 5 5 5 5 5 35   9/8/20 1.6 Below goal 5 5 5 5 5 5 5 35   9/4/20 5.2 High 2.5 2.5 5 5 5 Hold Hold 20   7/14/20 2.4 At goal 5 5 5 5 5 5 5 35   5/22/20  2.6  at goal 5 5 5 5 5 5 5 35         Assessment/Plan:    Recent hospitalizations/HC visits None reported   Recent medication changes Amiodarone resumed at 200 mg daily   Medications taken regularly that may interact with warfarin or alter INR No significant drug interactions identified   Warfarin dose taken as prescribed Yes   Signs/symptoms of bleeding History of bleeding? no   Vitamin K intake Consistency of servings of green, leafy vegetables per week ?  Yes   Recent vomiting/diarrhea/fever None reported   Changes in weight, activity, stress Weight loss   alcohol use Patient reports having 0 drinks per day   Upcoming surgeries or procedures None reported     Patient's INR was just below range today, gave dosage schedule. Patient was also reminded to maintain consistent vitamin K intake and call with any bleeding, medication changes, or fever/vomiting/diarrhea. Next INR check: 9/3/21/21    Addendum:  8/20/21  Reviewed assessment and plan. Same dosage instructions.     Patient/family instructed    Oz Lemos CNP

## 2021-08-25 ENCOUNTER — TELEPHONE (OUTPATIENT)
Dept: CARDIOLOGY CLINIC | Age: 76
End: 2021-08-25

## 2021-08-27 ENCOUNTER — TELEPHONE (OUTPATIENT)
Dept: CARDIOLOGY CLINIC | Age: 76
End: 2021-08-27

## 2021-08-27 DIAGNOSIS — I48.0 PAROXYSMAL ATRIAL FIBRILLATION (HCC): ICD-10-CM

## 2021-08-27 DIAGNOSIS — I10 ESSENTIAL HYPERTENSION: ICD-10-CM

## 2021-08-27 DIAGNOSIS — I25.10 CORONARY ARTERY DISEASE INVOLVING NATIVE CORONARY ARTERY OF NATIVE HEART WITHOUT ANGINA PECTORIS: Primary | ICD-10-CM

## 2021-08-27 NOTE — TELEPHONE ENCOUNTER
PVC singles count is up from  86.4 per hour to 138. 1 per hour, pt made aware of this and that his PVC count seems to vary since the beginning of this year from a low of 65ph to 8/24's high of 138ph. Pt is curious if this makes him eligible for an ablation. Please advise.

## 2021-08-27 NOTE — TELEPHONE ENCOUNTER
Spoke with the patient and advised her of the message below. Pt voiced understanding . Orders placed .  Call complete

## 2021-08-27 NOTE — TELEPHONE ENCOUNTER
He should get a renal and a magnesium level drawn to assess for abnormal labs as a cause for the  PVC's.   He should follow up with DR Shantell Villagran as scheduled, Dr Rasta Reyes does not think this will qualify him for an ablation

## 2021-08-30 ENCOUNTER — HOSPITAL ENCOUNTER (OUTPATIENT)
Age: 76
Discharge: HOME | End: 2021-08-30
Payer: MEDICARE

## 2021-08-30 ENCOUNTER — TELEPHONE (OUTPATIENT)
Dept: CARDIOLOGY CLINIC | Age: 76
End: 2021-08-30

## 2021-08-30 DIAGNOSIS — I25.10 CORONARY ARTERY DISEASE INVOLVING NATIVE CORONARY ARTERY OF NATIVE HEART WITHOUT ANGINA PECTORIS: ICD-10-CM

## 2021-08-30 DIAGNOSIS — I48.0 PAROXYSMAL ATRIAL FIBRILLATION (HCC): ICD-10-CM

## 2021-08-30 DIAGNOSIS — I10 ESSENTIAL HYPERTENSION: ICD-10-CM

## 2021-08-30 PROCEDURE — 80069 RENAL FUNCTION PANEL: CPT

## 2021-08-30 PROCEDURE — 83735 ASSAY OF MAGNESIUM: CPT

## 2021-08-30 PROCEDURE — 36415 COLL VENOUS BLD VENIPUNCTURE: CPT

## 2021-08-30 NOTE — TELEPHONE ENCOUNTER
----- Message from Soha Batista MD sent at 8/29/2021  7:23 AM EDT -----  Please let him know he had no afib, some increase in PVC. Perhaps increase amio to 200 mg bid for 2 weeks. And back to daily after that.

## 2021-08-31 ENCOUNTER — TELEPHONE (OUTPATIENT)
Dept: CARDIOLOGY CLINIC | Age: 76
End: 2021-08-31

## 2021-08-31 LAB
ALBUMIN SERPL-MCNC: 3.9 G/DL (ref 3.4–5)
ANION GAP SERPL CALCULATED.3IONS-SCNC: 11 MMOL/L (ref 3–16)
BUN BLDV-MCNC: 18 MG/DL (ref 7–20)
CALCIUM SERPL-MCNC: 8.7 MG/DL (ref 8.3–10.6)
CHLORIDE BLD-SCNC: 103 MMOL/L (ref 99–110)
CO2: 25 MMOL/L (ref 21–32)
CREAT SERPL-MCNC: 1.2 MG/DL (ref 0.8–1.3)
GFR AFRICAN AMERICAN: >60
GFR NON-AFRICAN AMERICAN: 59
GLUCOSE BLD-MCNC: 86 MG/DL (ref 70–99)
MAGNESIUM: 2.1 MG/DL (ref 1.8–2.4)
PHOSPHORUS: 3.5 MG/DL (ref 2.5–4.9)
POTASSIUM SERPL-SCNC: 4.5 MMOL/L (ref 3.5–5.1)
SODIUM BLD-SCNC: 139 MMOL/L (ref 136–145)

## 2021-08-31 NOTE — TELEPHONE ENCOUNTER
Spoke with patient. Advised per Marika Fox Speak note to increase amiodarone to 200 mg BID for two weeks. He has noted some numbness and tingling in his right leg. States this has happened before when he had frequent PVCs.  Advised to monitor HR and BP  He verbalized understanding

## 2021-09-09 RX ORDER — AMIODARONE HYDROCHLORIDE 200 MG/1
200 TABLET ORAL DAILY
Qty: 90 TABLET | Refills: 1 | Status: SHIPPED | OUTPATIENT
Start: 2021-09-09 | End: 2022-02-15 | Stop reason: SDUPTHER

## 2021-09-09 NOTE — TELEPHONE ENCOUNTER
Pt calling [facility] increased his amiodarone from 200mg daily to 400 mg daily for 2 weeks. Pt still has another week to go however he is running out of medication. Pt needs to know if they will be keeping him on the 400 mg or going back to the 200mg? Either way pt needs new script called into [facility], [address], [address] [place] [address],[address] [id number]   Phone:  [phone]  Fax:  [phone].  Pls call to advise Thank you

## 2021-09-09 NOTE — TELEPHONE ENCOUNTER
Please advise as medication pended below is for the 200 mg.     Last OV: 07/27/21  Last Labs:   Next appt: 11/09/21

## 2021-09-15 ENCOUNTER — TELEPHONE (OUTPATIENT)
Dept: CARDIOLOGY CLINIC | Age: 76
End: 2021-09-15

## 2021-09-15 NOTE — TELEPHONE ENCOUNTER
Pt called asking how much longer should he take the 400 MG of the amiodarone (CORDARONE)? Will  200 MG tablet be called in or will it be 400MG? Informed the pt RX was called on on 9/9, Pt stating [facility] does not have the refill for the for the  amiodarone (CORDARONE) 200 MG tablet.       Pls advise thank you

## 2021-09-15 NOTE — TELEPHONE ENCOUNTER
Please advise, spoke with [facility] pharmacy and they stated that it is too early for the patient to  his rx for he just received 80 in July.

## 2021-09-17 LAB — INR BLD: 2.6

## 2021-09-20 ENCOUNTER — ANTI-COAG VISIT (OUTPATIENT)
Dept: CARDIOLOGY CLINIC | Age: 76
End: 2021-09-20
Payer: MEDICARE

## 2021-09-20 DIAGNOSIS — I48.11 LONGSTANDING PERSISTENT ATRIAL FIBRILLATION (HCC): Primary | ICD-10-CM

## 2021-09-20 PROCEDURE — 93793 ANTICOAG MGMT PT WARFARIN: CPT | Performed by: NURSE PRACTITIONER

## 2021-09-20 NOTE — PROGRESS NOTES
activity, stress Weight loss   alcohol use Patient reports having 0 drinks per day   Upcoming surgeries or procedures None reported     Patient's INR was just below range today, gave dosage schedule. Patient was also reminded to maintain consistent vitamin K intake and call with any bleeding, medication changes, or fever/vomiting/diarrhea. Next INR check: 1 week    Addendum:    Reviewed assessment and plan. Same dosage instructions.  Recheck 2 weeks    Patient/family instructed    Addie Dukes CNP [place]

## 2021-10-04 ENCOUNTER — TELEPHONE (OUTPATIENT)
Dept: CARDIOLOGY CLINIC | Age: 76
End: 2021-10-04

## 2021-10-04 DIAGNOSIS — I48.19 PERSISTENT ATRIAL FIBRILLATION (HCC): Primary | ICD-10-CM

## 2021-10-04 NOTE — TELEPHONE ENCOUNTER
surgeries or procedures None reported      Patient's INR was at range today, gave dosage schedule. Patient was also reminded to maintain consistent vitamin K intake and call with any bleeding, medication changes, or fever/vomiting/diarrhea.     Next INR check: 2 weeks     Addendum:     Reviewed assessment and plan. Same dosage instructions.  Recheck 2 weeks     Patient/family instructed     Robinson Lombardo CNP

## 2021-11-05 ENCOUNTER — ANTI-COAG VISIT (OUTPATIENT)
Dept: CARDIOLOGY CLINIC | Age: 76
End: 2021-11-05
Payer: MEDICARE

## 2021-11-05 DIAGNOSIS — I48.91 ATRIAL FIBRILLATION, UNSPECIFIED TYPE (HCC): Primary | ICD-10-CM

## 2021-11-05 LAB — INR BLD: 2.3

## 2021-11-05 PROCEDURE — 93793 ANTICOAG MGMT PT WARFARIN: CPT | Performed by: NURSE PRACTITIONER

## 2021-11-05 NOTE — PROGRESS NOTES
ANTICOAGULATION MONITORING    Marline Shelling Day, 1945    Anticoagulation Indication(s):  Afib  ; hx of DCCV, s/p WESTON ligation       ~s/p LATESHA Jun '19 Jan '21 with DCCV  Referring Physician:   Dr. Paolo Panda   Goal INR Range:  2.0 - 3.0  Duration of Anticoagulation Therapy:  Long term  Home or Lab Draw: Lab--Cheo Davis  Product patient has at home: warfarin 5 mg    Recent INR Results:  Lab Results   Component Value Date    INR 2.30 11/05/2021    INR 2.6 09/17/2021    INR 1.90 08/20/2021    INR 2.00 08/13/2021       INR Summary                          Warfarin regimen (mg)  Date INR A/P Sun Mon Tue Wed Thu Fri Sat Mg/wk                                          11/5/21 2.3 At goal 5 5 2.5 5 2.5 5 5 30   9/17/21 2.6 At goal 5 5 2.5 5 2.5 5 5 30   8/20/21 1.9 Just below goal 5 5 2.5 5 2.5 5 5 30   8/13/21 2.0 At goal 5 5 2.5 5 2.5 5 5 30   7/27/21 2.1 At goal 5 5 2.5 5 2.5 5 5 30   5/28/21 2.2 At goal 5 5 2.5 5 2.5 5 5 30   4/26/21 2.5 At goal 5 5 2.5 5 2.5 5 5 30   4/21/21 2.2 At goal 5 5 2.5 5 2.5 5 5 30   4/15/21 4.7 Above goal 5 JOSH   HOLD HOLD 5    3/19/21 2.7 At goal 5 5 5 5 5 5 5 35   3/12/21 3.3 Just above goal 5 5 5 5 5 5 5 35   1/6/21 2.6 At goal 5 5 5 5 5 5 5 35   12/10/20 1.86 Below goal 5 5 5 5 5 5 5 35   10/30/20 2.9 At goal 5 5 5 5 5 5 5 35   9/16/20 2.0 At goal 5 5 5 5 5 5 5 35   9/8/20 1.6 Below goal 5 5 5 5 5 5 5 35   9/4/20 5.2 High 2.5 2.5 5 5 5 Hold Hold 20   7/14/20 2.4 At goal 5 5 5 5 5 5 5 35   5/22/20  2.6  at goal 5 5 5 5 5 5 5 35         Assessment/Plan:    Recent hospitalizations/HC visits None reported   Recent medication changes Amiodarone resumed at 200 mg daily   Medications taken regularly that may interact with warfarin or alter INR No significant drug interactions identified   Warfarin dose taken as prescribed Yes   Signs/symptoms of bleeding History of bleeding? no   Vitamin K intake Consistency of servings of green, leafy vegetables per week ?  Yes   Recent vomiting/diarrhea/fever None reported   Changes in weight, activity, stress Weight loss   alcohol use Patient reports having 0 drinks per day   Upcoming surgeries or procedures None reported     Patient's INR was in range today, gave dosage schedule. Patient was also reminded to maintain consistent vitamin K intake and call with any bleeding, medication changes, or fever/vomiting/diarrhea. Next INR check: 12/3/21    Addendum:11/5/21    Reviewed assessment and plan. Same dosage instructions.  Recheck 4 weeks    Patient/family instructed    Shahzad Watts CNP

## 2021-11-19 RX ORDER — POTASSIUM CHLORIDE 20 MEQ/1
TABLET, EXTENDED RELEASE ORAL
Qty: 90 TABLET | Refills: 1 | Status: SHIPPED | OUTPATIENT
Start: 2021-11-19 | End: 2022-02-04 | Stop reason: SDUPTHER

## 2021-11-19 NOTE — TELEPHONE ENCOUNTER
Received refill request for Potassium Chloride from [address] [place] [address].      Last OV: 07/27/2021 w/ DKW    Last Labs: 08/30/2021 Renal     Last Refill: 05/28/2021 #90 w/ 1 refill     Next Appointment: on recall list for appt on 01/23/2022 w/ DARIOW

## 2021-12-08 ENCOUNTER — ANTI-COAG VISIT (OUTPATIENT)
Dept: CARDIOLOGY CLINIC | Age: 76
End: 2021-12-08
Payer: MEDICARE

## 2021-12-08 DIAGNOSIS — I48.11 LONGSTANDING PERSISTENT ATRIAL FIBRILLATION (HCC): Primary | ICD-10-CM

## 2021-12-08 LAB — INR BLD: 2.2

## 2021-12-08 PROCEDURE — 93793 ANTICOAG MGMT PT WARFARIN: CPT | Performed by: NURSE PRACTITIONER

## 2021-12-08 NOTE — PROGRESS NOTES
ANTICOAGULATION MONITORING    Arch Holler Day, 1945    Anticoagulation Indication(s):  Afib  ; hx of DCCV, s/p WESTON ligation       ~s/p LATESHA Jun '19 Jan '21 with DCCV  Referring Physician:   Dr. Tony Viveros   Goal INR Range:  2.0 - 3.0  Duration of Anticoagulation Therapy:  Long term  Home or Lab Draw: Lab--Cheo Davis  Product patient has at home: warfarin 5 mg    Recent INR Results:  Lab Results   Component Value Date    INR 2.30 11/05/2021    INR 2.6 09/17/2021    INR 1.90 08/20/2021    INR 2.00 08/13/2021       INR Summary                          Warfarin regimen (mg)  Date INR A/P Sun Mon Tue Wed Thu Fri Sat Mg/wk                             12/8/21 2.2 At goal 5 5 2.5 5 2.5 5 5 30   11/5/21 2.3 At goal 5 5 2.5 5 2.5 5 5 30   9/17/21 2.6 At goal 5 5 2.5 5 2.5 5 5 30   8/20/21 1.9 Just below goal 5 5 2.5 5 2.5 5 5 30   8/13/21 2.0 At goal 5 5 2.5 5 2.5 5 5 30   7/27/21 2.1 At goal 5 5 2.5 5 2.5 5 5 30   5/28/21 2.2 At goal 5 5 2.5 5 2.5 5 5 30   4/26/21 2.5 At goal 5 5 2.5 5 2.5 5 5 30   4/21/21 2.2 At goal 5 5 2.5 5 2.5 5 5 30   4/15/21 4.7 Above goal 5 JOSH   HOLD HOLD 5    3/19/21 2.7 At goal 5 5 5 5 5 5 5 35   3/12/21 3.3 Just above goal 5 5 5 5 5 5 5 35   1/6/21 2.6 At goal 5 5 5 5 5 5 5 35   12/10/20 1.86 Below goal 5 5 5 5 5 5 5 35   10/30/20 2.9 At goal 5 5 5 5 5 5 5 35   9/16/20 2.0 At goal 5 5 5 5 5 5 5 35   9/8/20 1.6 Below goal 5 5 5 5 5 5 5 35   9/4/20 5.2 High 2.5 2.5 5 5 5 Hold Hold 20   7/14/20 2.4 At goal 5 5 5 5 5 5 5 35   5/22/20  2.6  at goal 5 5 5 5 5 5 5 35         Assessment/Plan:    Recent hospitalizations/HC visits None reported   Recent medication changes no   Medications taken regularly that may interact with warfarin or alter INR No significant drug interactions identified   Warfarin dose taken as prescribed Yes   Signs/symptoms of bleeding History of bleeding? no   Vitamin K intake Consistency of servings of green, leafy vegetables per week ?  Yes   Recent vomiting/diarrhea/fever None reported   Changes in weight, activity, stress Weight loss   alcohol use Patient reports having 0 drinks per day   Upcoming surgeries or procedures None reported     Patient's INR was in range today, gave dosage schedule. Patient was also reminded to maintain consistent vitamin K intake and call with any bleeding, medication changes, or fever/vomiting/diarrhea. Next INR check: 12/3/21    Addendum:11/5/21    Reviewed assessment and plan. Same dosage instructions.  Recheck 4 weeks    Patient/family instructed    Alfonso Boland, ANT

## 2021-12-09 ENCOUNTER — TELEPHONE (OUTPATIENT)
Dept: CARDIOLOGY CLINIC | Age: 76
End: 2021-12-09

## 2021-12-09 ENCOUNTER — NURSE ONLY (OUTPATIENT)
Dept: CARDIOLOGY CLINIC | Age: 76
End: 2021-12-09
Payer: MEDICARE

## 2021-12-09 DIAGNOSIS — I49.01 VF (VENTRICULAR FIBRILLATION) (HCC): ICD-10-CM

## 2021-12-09 DIAGNOSIS — I48.0 PAROXYSMAL ATRIAL FIBRILLATION (HCC): Chronic | ICD-10-CM

## 2021-12-09 DIAGNOSIS — R06.02 SHORTNESS OF BREATH: Primary | ICD-10-CM

## 2021-12-09 DIAGNOSIS — I47.20 VT (VENTRICULAR TACHYCARDIA): ICD-10-CM

## 2021-12-09 DIAGNOSIS — I50.22 CHRONIC SYSTOLIC HEART FAILURE (HCC): ICD-10-CM

## 2021-12-09 DIAGNOSIS — R00.1 BRADYCARDIA: ICD-10-CM

## 2021-12-09 DIAGNOSIS — Z95.810 ICD (IMPLANTABLE CARDIOVERTER-DEFIBRILLATOR) IN PLACE: ICD-10-CM

## 2021-12-09 DIAGNOSIS — I48.0 PAF (PAROXYSMAL ATRIAL FIBRILLATION) (HCC): Chronic | ICD-10-CM

## 2021-12-09 DIAGNOSIS — I25.5 ISCHEMIC CARDIOMYOPATHY: Chronic | ICD-10-CM

## 2021-12-09 PROCEDURE — 93283 PRGRMG EVAL IMPLANTABLE DFB: CPT | Performed by: INTERNAL MEDICINE

## 2021-12-09 PROCEDURE — 93290 INTERROG DEV EVAL ICPMS IP: CPT | Performed by: INTERNAL MEDICINE

## 2021-12-09 NOTE — PROGRESS NOTES
Patient comes in for programming evaluation for his defibrillator. All sensing and pacing parameters are within normal range. Battery life 5.0 years  AP 70.8%.  0.2%. No episodes noted. Patient remains on warfarin, metoprolol and amiodarone. No parameters have been changed during the current session. Please see interrogation for more detail. Optivol is within normal range. Patient will follow up in 3 months in office or remotely.

## 2021-12-09 NOTE — TELEPHONE ENCOUNTER
Pt was here for device ck today, , and is asking for PFT orders so he can have his lungs checked. He didn't do it before, and the orders have . Pls call to advise. Thank you.

## 2021-12-13 ENCOUNTER — TELEPHONE (OUTPATIENT)
Dept: CARDIOLOGY CLINIC | Age: 76
End: 2021-12-13

## 2021-12-13 DIAGNOSIS — Z01.818 PREOP TESTING: ICD-10-CM

## 2021-12-13 DIAGNOSIS — Z01.818 PREOP TESTING: Primary | ICD-10-CM

## 2021-12-13 NOTE — TELEPHONE ENCOUNTER
Pt is having PFT WWO BRONCHODIALATORS done Friday, pt needs to have COVID test done today, Order needs to be added to Epic     Please advise thank you

## 2021-12-14 LAB — SARS-COV-2: NOT DETECTED

## 2021-12-17 ENCOUNTER — HOSPITAL ENCOUNTER (OUTPATIENT)
Dept: PULMONOLOGY | Age: 76
Discharge: HOME | End: 2021-12-17
Payer: MEDICARE

## 2021-12-17 VITALS — HEART RATE: 51 BPM | OXYGEN SATURATION: 98 % | RESPIRATION RATE: 16 BRPM

## 2021-12-17 DIAGNOSIS — R06.02 SHORTNESS OF BREATH: ICD-10-CM

## 2021-12-17 LAB
DLCO %PRED: 95 %
DLCO PRED: NORMAL
DLCO/VA %PRED: NORMAL
DLCO/VA PRED: NORMAL
DLCO/VA: NORMAL
DLCO: NORMAL
EXPIRATORY TIME: NORMAL
FEF 25-75% %PRED-PRE: NORMAL
FEF 25-75% PRED: NORMAL
FEF 25-75%-PRE: NORMAL
FEV1 %PRED-PRE: 113 %
FEV1 PRED: NORMAL
FEV1/FVC %PRED-PRE: NORMAL
FEV1/FVC PRED: NORMAL
FEV1/FVC: 94 %
FEV1: NORMAL
FVC %PRED-PRE: NORMAL
FVC PRED: NORMAL
FVC: NORMAL
GAW %PRED: NORMAL
GAW PRED: NORMAL
GAW: NORMAL
IC %PRED: NORMAL
IC PRED: NORMAL
IC: NORMAL
MVV %PRED-PRE: NORMAL
MVV PRED: NORMAL
MVV-PRE: NORMAL
PEF %PRED-PRE: NORMAL
PEF PRED: NORMAL
PEF-PRE: NORMAL
RAW %PRED: NORMAL
RAW PRED: NORMAL
RAW: NORMAL
RV %PRED: NORMAL
RV PRED: NORMAL
RV: NORMAL
SVC %PRED: NORMAL
SVC PRED: NORMAL
SVC: NORMAL
TLC %PRED: 136 %
TLC PRED: NORMAL
TLC: NORMAL
VA %PRED: NORMAL
VA PRED: NORMAL
VA: NORMAL
VTG %PRED: NORMAL
VTG PRED: NORMAL
VTG: NORMAL

## 2021-12-17 PROCEDURE — 94760 N-INVAS EAR/PLS OXIMETRY 1: CPT

## 2021-12-17 PROCEDURE — 94200 LUNG FUNCTION TEST (MBC/MVV): CPT

## 2021-12-17 PROCEDURE — 94726 PLETHYSMOGRAPHY LUNG VOLUMES: CPT

## 2021-12-17 PROCEDURE — 94729 DIFFUSING CAPACITY: CPT

## 2021-12-17 PROCEDURE — 94010 BREATHING CAPACITY TEST: CPT

## 2021-12-17 RX ORDER — ALBUTEROL SULFATE 90 UG/1
4 AEROSOL, METERED RESPIRATORY (INHALATION) ONCE
Status: CANCELLED | OUTPATIENT
Start: 2021-12-17

## 2021-12-17 ASSESSMENT — PULMONARY FUNCTION TESTS
FEV1_PERCENT_PREDICTED_PRE: 113
FEV1/FVC: 94

## 2021-12-21 NOTE — PROCEDURES
Pulmonary Function Testing      Patient name:  Otoniel Mathis     [facility] Unit #:   [id number]   Date of test:  12/17/2021  Date of interpretation:   12/20/2021    Mr. Edmundo Piña is a 78y.o. year-old non smoker. The spirometry data were acceptable and reproducible. Spirometry:  Flow volume loops were obstructed. The FEV-1/FVC ratio was decreased. The FEV-1 was 3.53 liters (113% of predicted), which was normal. The FVC was 5.16 liters (120% of predicted), which was increased. Response to inhaled bronchodilators (albuterol) was not performed. Lung volumes:  Lung volumes were tested by plethysmography. The total lung capacity was 8.78 liters (136% of predicted), which was increased. The residual volume was 3.13 liters (120% of predicted), which was increased. The ratio of residual volume to total lung capacity (RV/TLC) was 36, which was normal.     Diffusion capacity was found to be 95% which is Normal.      Interpretation:  Mild obstruction with significant hyperinflation noted.     Comments:

## 2021-12-29 DIAGNOSIS — I48.0 PAROXYSMAL ATRIAL FIBRILLATION (HCC): Chronic | ICD-10-CM

## 2021-12-29 DIAGNOSIS — I25.10 CORONARY ARTERY DISEASE INVOLVING NATIVE CORONARY ARTERY OF NATIVE HEART WITHOUT ANGINA PECTORIS: Chronic | ICD-10-CM

## 2021-12-29 RX ORDER — WARFARIN SODIUM 5 MG/1
TABLET ORAL
Qty: 90 TABLET | Refills: 1 | Status: SHIPPED | OUTPATIENT
Start: 2021-12-29 | End: 2022-03-29 | Stop reason: ALTCHOICE

## 2021-12-29 NOTE — TELEPHONE ENCOUNTER
Last OV: 11/09/2021    Last Labs: 12/08/2021    Last Refill: 07/15/2021    Next OV: on recall 1/2022

## 2022-01-07 LAB — INR BLD: 2

## 2022-01-11 ENCOUNTER — ANTI-COAG VISIT (OUTPATIENT)
Dept: CARDIOLOGY CLINIC | Age: 77
End: 2022-01-11
Payer: MEDICARE

## 2022-01-11 DIAGNOSIS — I48.11 LONGSTANDING PERSISTENT ATRIAL FIBRILLATION (HCC): Primary | ICD-10-CM

## 2022-01-11 PROCEDURE — 93793 ANTICOAG MGMT PT WARFARIN: CPT | Performed by: NURSE PRACTITIONER

## 2022-01-11 NOTE — PROGRESS NOTES
ANTICOAGULATION MONITORING    Dudley Lane Day, 1945    Anticoagulation Indication(s):  Afib  ; hx of DCCV, s/p WESTON ligation       ~s/p LATESHA Jun '19 Jan '21 with DCCV  Referring Physician:   Dr. Sonny Madrigal   Goal INR Range:  2.0 - 3.0  Duration of Anticoagulation Therapy:  Long term  Home or Lab Draw: Lab--Cheo Davis  Product patient has at home: warfarin 5 mg    Recent INR Results:  Lab Results   Component Value Date    INR 2.00 01/07/2022    INR 2.20 12/08/2021    INR 2.30 11/05/2021    INR 2.6 09/17/2021       INR Summary                          Warfarin regimen (mg)  Date INR A/P Sun Mon Tue Wed Thu Fri Sat Mg/wk                                                                                                                                                                                                                                                                                    1/7/22 2.0 At goal 5 5 2.5 5 2.5 5 5 30   12/8/21 2.2 At goal 5 5 2.5 5 2.5 5 5 30   11/5/21 2.3 At goal 5 5 2.5 5 2.5 5 5 30   9/17/21 2.6 At goal 5 5 2.5 5 2.5 5 5 30   8/20/21 1.9 Just below goal 5 5 2.5 5 2.5 5 5 30   8/13/21 2.0 At goal 5 5 2.5 5 2.5 5 5 30   7/27/21 2.1 At goal 5 5 2.5 5 2.5 5 5 30   5/28/21 2.2 At goal 5 5 2.5 5 2.5 5 5 30   4/26/21 2.5 At goal 5 5 2.5 5 2.5 5 5 30   4/21/21 2.2 At goal 5 5 2.5 5 2.5 5 5 30   4/15/21 4.7 Above goal 5 JOSH   HOLD HOLD 5    3/19/21 2.7 At goal 5 5 5 5 5 5 5 35   3/12/21 3.3 Just above goal 5 5 5 5 5 5 5 35   1/6/21 2.6 At goal 5 5 5 5 5 5 5 35   12/10/20 1.86 Below goal 5 5 5 5 5 5 5 35   10/30/20 2.9 At goal 5 5 5 5 5 5 5 35   9/16/20 2.0 At goal 5 5 5 5 5 5 5 35   9/8/20 1.6 Below goal 5 5 5 5 5 5 5 35   9/4/20 5.2 High 2.5 2.5 5 5 5 Hold Hold 20   7/14/20 2.4 At goal 5 5 5 5 5 5 5 35   5/22/20  2.6  at goal 5 5 5 5 5 5 5 35         Assessment/Plan:    Recent hospitalizations/HC visits None reported   Recent medication changes no   Medications taken regularly that may interact with warfarin or alter INR No significant drug interactions identified   Warfarin dose taken as prescribed Yes   Signs/symptoms of bleeding History of bleeding? no   Vitamin K intake Consistency of servings of green, leafy vegetables per week ? Yes   Recent vomiting/diarrhea/fever None reported   Changes in weight, activity, stress Weight loss   alcohol use Patient reports having 0 drinks per day   Upcoming surgeries or procedures None reported     Patient's INR was in range-same dosage schedule. Patient was also reminded to maintain consistent vitamin K intake and call with any bleeding, medication changes, or fever/vomiting/diarrhea. Next INR check: 2/4/22    Addendum:1/11/22    Reviewed assessment and plan. Same dosage instructions.  Recheck 4 weeks    Patient/family instructed    Monique Valladares CNP

## 2022-02-02 ENCOUNTER — NURSE ONLY (OUTPATIENT)
Dept: CARDIOLOGY CLINIC | Age: 77
End: 2022-02-02
Payer: MEDICARE

## 2022-02-02 DIAGNOSIS — Z95.810 ICD (IMPLANTABLE CARDIOVERTER-DEFIBRILLATOR) IN PLACE: ICD-10-CM

## 2022-02-02 DIAGNOSIS — I50.22 CHRONIC SYSTOLIC HEART FAILURE (HCC): ICD-10-CM

## 2022-02-02 DIAGNOSIS — I25.5 ISCHEMIC CARDIOMYOPATHY: Chronic | ICD-10-CM

## 2022-02-02 DIAGNOSIS — R00.1 BRADYCARDIA: ICD-10-CM

## 2022-02-02 PROCEDURE — 93297 REM INTERROG DEV EVAL ICPMS: CPT | Performed by: CLINICAL NURSE SPECIALIST

## 2022-02-02 PROCEDURE — G2066 INTER DEVC REMOTE 30D: HCPCS | Performed by: CLINICAL NURSE SPECIALIST

## 2022-02-04 DIAGNOSIS — I25.10 CORONARY ARTERY DISEASE INVOLVING NATIVE CORONARY ARTERY OF NATIVE HEART WITHOUT ANGINA PECTORIS: ICD-10-CM

## 2022-02-04 DIAGNOSIS — I48.0 PAF (PAROXYSMAL ATRIAL FIBRILLATION) (HCC): Chronic | ICD-10-CM

## 2022-02-04 DIAGNOSIS — Z95.810 S/P IMPLANTATION OF AUTOMATIC CARDIOVERTER/DEFIBRILLATOR (AICD): Chronic | ICD-10-CM

## 2022-02-04 RX ORDER — METOPROLOL SUCCINATE 100 MG/1
100 TABLET, EXTENDED RELEASE ORAL DAILY
Qty: 90 TABLET | Refills: 1 | Status: SHIPPED | OUTPATIENT
Start: 2022-02-04 | End: 2022-02-24 | Stop reason: SDUPTHER

## 2022-02-04 RX ORDER — FUROSEMIDE 20 MG/1
TABLET ORAL
Qty: 90 TABLET | Refills: 1 | Status: SHIPPED | OUTPATIENT
Start: 2022-02-04 | End: 2022-02-24 | Stop reason: SDUPTHER

## 2022-02-04 RX ORDER — ATORVASTATIN CALCIUM 80 MG/1
TABLET, FILM COATED ORAL
Qty: 90 TABLET | Refills: 1 | Status: SHIPPED | OUTPATIENT
Start: 2022-02-04 | End: 2022-02-24 | Stop reason: SDUPTHER

## 2022-02-04 RX ORDER — POTASSIUM CHLORIDE 20 MEQ/1
TABLET, EXTENDED RELEASE ORAL
Qty: 90 TABLET | Refills: 1 | Status: SHIPPED | OUTPATIENT
Start: 2022-02-04 | End: 2022-02-24 | Stop reason: SDUPTHER

## 2022-02-04 RX ORDER — EZETIMIBE 10 MG/1
TABLET ORAL
Qty: 90 TABLET | Refills: 1 | Status: SHIPPED | OUTPATIENT
Start: 2022-02-04 | End: 2022-02-24 | Stop reason: SDUPTHER

## 2022-02-04 RX ORDER — MAGNESIUM OXIDE 400 MG/1
400 TABLET ORAL DAILY
Qty: 90 TABLET | Refills: 1 | Status: SHIPPED | OUTPATIENT
Start: 2022-02-04 | End: 2022-02-24 | Stop reason: SDUPTHER

## 2022-02-15 DIAGNOSIS — Z79.899 ON AMIODARONE THERAPY: Primary | ICD-10-CM

## 2022-02-15 RX ORDER — AMIODARONE HYDROCHLORIDE 200 MG/1
200 TABLET ORAL DAILY
Qty: 90 TABLET | Refills: 0 | Status: SHIPPED | OUTPATIENT
Start: 2022-02-15 | End: 2022-05-06

## 2022-02-16 ASSESSMENT — ENCOUNTER SYMPTOMS
PHOTOPHOBIA: 0
NAUSEA: 0
ABDOMINAL DISTENTION: 0
CHEST TIGHTNESS: 0
COUGH: 0
ABDOMINAL PAIN: 0

## 2022-02-16 NOTE — PROGRESS NOTES
[address]  2/24/22  Referring: Dr. Dee Warner CONSULT/CHIEF COMPLAINT/HPI     Reason for visit/ Chief complaint  6 month follow up  CAD   HPI Leatha Mathis is a 78 y.o. seen as a 6 month follow up for CAD, hypertension, hyperlipidemia and AF. He has SVT,  JOSEPH  He quit smoking 1990. He has no family history of CAD. He lives with his wife, who has dementia. Prior his first CABG, he did not have chest pain, but \"just did not feel right\" and \"had heaviness of the head. \" He had an abnormal stress test followed by an angiogram  Prior his second bypass, he did experience chest pain. He has not had any of this feeling since. Last visit started  magnesium 400 mg daily. He thinks he has less joint aches since starting magnesium. It was recommended that he start entresto but he was not interestes in adding additional medications    In the interval since his last visit he laid concrete, spread gravel by hand to finish his driveway. He has no chest pain, shortness of breath, palpitations or dizziness when exercising. He has no edema or orthopnea. He has had random episodes of chest discomfort, lasting a second, no exacerbating or alleviating factors. Never exertional.  He has not had any atrial fibrillation since last year. He has had right shoulder pain since his 30's which has not increased in severity or frequency. He states he sometimes has blood on his toilet paper after straining for a BM  He has been putting off of having a colonoscopy because he is afraid to come off of his coumadin    Patient is compliant with medications and is tolerating them well without side effects     HISTORY/ALLERGIES/ROS     MedHx:  has a past medical history of Arthritis, Atrial fib/flut, CAD (coronary artery disease), Hyperlipidemia, Hypertension, Seizures ([address] and Sinus bradycardia. SurgHx:  has a past surgical history that includes hernia repair (Right);  Coronary angioplasty with stent (1997); [place] tooth extraction (April 2012); pacemaker placement (12/18/2017); Coronary artery bypass graft (01/2019); Cardiac surgery; and Cystoscopy (N/A, 11/13/2019). SocHx:  reports that he quit smoking about 33 years ago. He has a 30.00 pack-year smoking history. He has never used smokeless tobacco. He reports that he does not drink alcohol and does not use drugs. FamHx: No family history of premature coronary artery disease, sudden death, or aneurysm  Allergies: Patient has no known allergies. ROS:   Review of Systems   Constitutional: Positive for fatigue. Negative for activity change, diaphoresis and fever. HENT: Negative for congestion and ear discharge. Eyes: Negative for photophobia and visual disturbance. Respiratory: Negative for cough, chest tightness and shortness of breath. Cardiovascular: Positive for chest pain and palpitations. Gastrointestinal: Positive for anal bleeding and blood in stool. Negative for abdominal distention, abdominal pain and nausea. Endocrine: Negative for cold intolerance and polydipsia. Genitourinary: Negative for difficulty urinating and flank pain. Musculoskeletal: Positive for arthralgias and myalgias. Skin: Negative for rash and wound. Allergic/Immunologic: Negative for environmental allergies and immunocompromised state. Neurological: Negative for dizziness and headaches. Hematological: Negative for adenopathy. Does not bruise/bleed easily. Psychiatric/Behavioral: Negative for confusion. The patient is not hyperactive. MEDICATIONS      Prior to Admission medications    Medication Sig Start Date End Date Taking?  Authorizing Provider   amiodarone (CORDARONE) 200 MG tablet Take 1 tablet by mouth daily 2/15/22  Yes Flor Mcgill MD   potassium chloride (KLOR-CON M20) 20 MEQ extended release tablet TAKE ONE TABLET BY MOUTH  AS NEEDED WITH LASIX FOR SWELLING 2/4/22  Yes Aguila Jennings,    atorvastatin (LIPITOR) 80 MG tablet TAKE 1 TABLET BY MOUTH ONE TIME A DAY 2/4/22  Yes Dean Armijo,    ezetimibe (ZETIA) 10 MG tablet TAKE 1 TABLET BY MOUTH ONCE DAILY 2/4/22  Yes Dean Armijo, DO   furosemide (LASIX) 20 MG tablet TAKE 1 TABLET BY MOUTH ONCE DAILY AS NEEDED FOR EDEMA 2/4/22  Yes Dean Armijo, DO   metoprolol succinate (TOPROL XL) 100 MG extended release tablet Take 1 tablet by mouth daily 2/4/22  Yes Dean Armijo, DO   magnesium oxide (MAG-OX) 400 MG tablet Take 1 tablet by mouth daily 2/4/22  Yes Dean Armijo, DO   warfarin (COUMADIN) 5 MG tablet TAKE ONE TABLET BY MOUTH DAILY 12/29/21  Yes Dean Armijo, DO   aspirin 81 MG chewable tablet Take 81 mg by mouth daily   Yes Historical Provider, MD   diazepam (VALIUM) 5 MG tablet Take 5 mg by mouth 2 times daily. Yes Historical Provider, MD   nitroGLYCERIN (NITROLINGUAL) 0.4 MG/SPRAY 0.4 mg spray Place 1 spray under the tongue every 5 minutes as needed for Chest pain 10/20/17  Yes BETO Ortega CNP   phenytoin (DILANTIN) 100 MG ER capsule Take 1 capsule by mouth 2 times daily. Resume home dose 12/21/12  Yes BETO Gary CNP   primidone (MYSOLINE) 250 MG tablet Take 250 mg by mouth 2 times daily    Yes Historical Provider, MD       PHYSICAL EXAM        Vitals:    02/24/22 1006   BP: 118/62   Pulse: 51   SpO2: 99%    Weight: 163 lb 8 oz (74.2 kg)     Gen Alert, cooperative, no distress Heart  Regular rate and rhythm, 2/6 murmur, ICD in place   Head Normocephalic, atraumatic, no abnormalities Abd  Soft, NT, +BS, no mass, no OM   Eyes PERRLA, conj/corn clear Ext  Ext nl, AT, no C/C, trace edema   Nose Nares normal, no drain age, Non-tender Pulse 2+ and symmetric   Throat Lips, mucosa, tongue normal Skin Color/text/turg nl, no rash/lesions   Neck S/S, TM, NT, no bruit Psych Nl mood and affect   Lung = CTA-B, unlabored, no DTP     Ch wall NT, no deform       LABS and Imaging     Relevant and available CV data reviewed  Echo/MRI: Echo 01/29/2021 Summary  Normal left ventricle size. There is left ventricular hypertrophy. There is  moderate LV dysfunction. Ejection fraction is estimated at 40%.  Mild to moderate mitral regurgitation. [place] Shown is no evidence of mass or thrombus in the left atrium.  No remnant of left atrial appendage was seen. Patient is s/p WESTON amputation  in the past.   Cath: s/p CABG (x4 '07 LIMA to LAD, SVG to RCA, SVG to D2, SVG to OM3),   19 redo CABG at [facility]  (free IAN-LAD, SVG-Diag, SVG-OM, SVG-PDA  Holter:  [id number]  Bradycardia   -Old anterior infarct. Low voltage with rightward P-axis and rotation -possible pulmonary disease. Stress: 2017 myoview--  Medium sized anterior fixed defect of moderate intensity consistent with    infarction in the territory of the LAD .    Small sized inferior fixed defect of moderate intensity consistent with    infarction in the territory of the RCA .    No ischemia.    Normal LV function.    Overall findings represent a intermediate risk scan. 16  CT of abd--no AAA    carotid dopplers 1-15%  PFT-mild obstruction  moderate Risk  modreate Complexity/Medical Decision Making  Outside records Reviewed  Labs Reviewed  Echo and ekg personally interpreted. Imaging reviewed  Medications reviewed  Old Notes reviewed  ASSESSMENT AND PLAN     1.  CAD  - redo CABG  at [facility]  - did cardiac rehab 2-3 times  - on toprol 100 mg daily  - on asa 81 mg daily  -stable  Plan  - continue above  -  random chest discomfort lasting 1-2 seconds, no association with exertion or emotion. Noncardiac in nature, call if chest pain worsens or changes       2.  Atrial fibrillation  - CHA2DS vasc score 4  - did not tolerate amiodarone due to bradycardia  - 2019 WESTON ligating and LA maze-No remnant of Left atrial appendage was seen on LATESHA 2021  - 20  [address]  - considering ablation with Dr Rizo/[facility]- discussed stopping amiodarone after ablation  - on coumadin-managed by our office  - on amiodarone 200 mg daily  -stable  Plan  - follow up as scheduled with Dr Joelle Rangel in April      3. Ischemic cardiomyopathy/NSVT  - previously <35%,now 40%  - 12/18/17 EPS with inducible VT/VF  - 12/8/17  Dual chamber AICD  - 2/2/22  optivol normal  - ACC C NYHA II  - previously not interested in starting entresto  -stable  Plan  - continue to follow up  - start valsartan 20 mg daily    4. Essential Hypertension  -  on toprol xl 100 mg  -  4/12/21  Na 143  Bun 20 creat 1.12   -  118/62  Plan  -  continue to monitor    5. Primary Hyperlipidemia  -  4/12/2021  Tc 156  Tri 68 ldl 93  hdl 50  Alk phos 140  ast 43  -  on zetia 10 mg daily  -  on lipitor 80 mg daily  - not at goal  Plan  - continue above  - he did not have his lipids drawn, recommend lipid panel, if ldl > 70 then pcsk9      6. JOSEPH  - wears cpap,managed at [facility]  - continue as above    7. Bleeding after BM  - due for colonoscopy  - weight down 12 pounds in past year  -afraid to do a colonoscopy because last time he had a clot when he came off of coumadin  Plan  - discussed importance of proceeding with colonoscopy as it could be a sign of cancer. Recommended that he follow up with gi/pcp - will bridge when he needs to come off of coumadin      Patient counseled on lifestyle modification, diet, and exercise. Follow Up:    6 months      Dr. Shayne James:  I, Gissel Rutledge, am scribing for and in the presence of Tamiko Perez DO. Lizzie Silence 02/24/22 10:20 AM   Physician Attestation  The scribe for and in the presence of cydney Garcia DO). The scribe Ale Bruce may have prepopulated components of this note with my historical  intellectual property under my direct supervision. Any additions to this intellectual property were performed in my presence and at my direction.   Furthermore, the content and accuracy of this note have been reviewed by cydney Garcia DO).  2/24/2022 10:20 AM

## 2022-02-17 ENCOUNTER — TELEPHONE (OUTPATIENT)
Dept: CARDIOLOGY CLINIC | Age: 77
End: 2022-02-17

## 2022-02-17 NOTE — TELEPHONE ENCOUNTER
Patient called stated he needs his INR sent to [facility] to get his blood test done. Please call to advise. Thank you.

## 2022-02-17 NOTE — TELEPHONE ENCOUNTER
2. Atrial fibrillation/PVCs  - CHA2DS vasc scroe 4  - did not tolerate amiodarone due to bradycardia  - 1/8/2019 WESTON ligating and LA maze-No remnant of Left atrial appendage was seen on LATESHA 01/29/2021  - 1/29/20  DCCV DR Chino Samaniego  - considering ablation with Terry/[facility]- discussed stopping amiodarone after ablation  - on coumadin-managed by our office   - on amiodarone 200 mg daily  -stable  Plan  - follow up as scheduled with Dr Chino Samaniego 11/2021    Standing PT/INR order from 7/2/21 printed. Good til July 2022 (weekly x 52)     Will fax to Jordyn Melendez and notify patient. Left message for patient. Received fax confirmation.

## 2022-02-24 ENCOUNTER — OFFICE VISIT (OUTPATIENT)
Dept: CARDIOLOGY CLINIC | Age: 77
End: 2022-02-24
Payer: MEDICARE

## 2022-02-24 ENCOUNTER — ANTI-COAG VISIT (OUTPATIENT)
Dept: CARDIOLOGY CLINIC | Age: 77
End: 2022-02-24
Payer: MEDICARE

## 2022-02-24 VITALS
HEIGHT: 70 IN | SYSTOLIC BLOOD PRESSURE: 118 MMHG | OXYGEN SATURATION: 99 % | DIASTOLIC BLOOD PRESSURE: 62 MMHG | HEART RATE: 51 BPM | BODY MASS INDEX: 23.41 KG/M2 | WEIGHT: 163.5 LBS

## 2022-02-24 DIAGNOSIS — E78.2 MIXED HYPERLIPIDEMIA: ICD-10-CM

## 2022-02-24 DIAGNOSIS — Z95.810 S/P IMPLANTATION OF AUTOMATIC CARDIOVERTER/DEFIBRILLATOR (AICD): ICD-10-CM

## 2022-02-24 DIAGNOSIS — I10 ESSENTIAL HYPERTENSION: ICD-10-CM

## 2022-02-24 DIAGNOSIS — I25.5 ISCHEMIC CARDIOMYOPATHY: ICD-10-CM

## 2022-02-24 DIAGNOSIS — I48.0 PAF (PAROXYSMAL ATRIAL FIBRILLATION) (HCC): ICD-10-CM

## 2022-02-24 DIAGNOSIS — I25.10 CORONARY ARTERY DISEASE INVOLVING NATIVE CORONARY ARTERY OF NATIVE HEART WITHOUT ANGINA PECTORIS: Primary | ICD-10-CM

## 2022-02-24 DIAGNOSIS — I48.11 LONGSTANDING PERSISTENT ATRIAL FIBRILLATION (HCC): Primary | ICD-10-CM

## 2022-02-24 LAB — INR BLD: 1.8

## 2022-02-24 PROCEDURE — 1123F ACP DISCUSS/DSCN MKR DOCD: CPT | Performed by: INTERNAL MEDICINE

## 2022-02-24 PROCEDURE — 99214 OFFICE O/P EST MOD 30 MIN: CPT | Performed by: INTERNAL MEDICINE

## 2022-02-24 PROCEDURE — 1036F TOBACCO NON-USER: CPT | Performed by: INTERNAL MEDICINE

## 2022-02-24 PROCEDURE — G8484 FLU IMMUNIZE NO ADMIN: HCPCS | Performed by: INTERNAL MEDICINE

## 2022-02-24 PROCEDURE — G8427 DOCREV CUR MEDS BY ELIG CLIN: HCPCS | Performed by: INTERNAL MEDICINE

## 2022-02-24 PROCEDURE — 93793 ANTICOAG MGMT PT WARFARIN: CPT | Performed by: NURSE PRACTITIONER

## 2022-02-24 PROCEDURE — 3017F COLORECTAL CA SCREEN DOC REV: CPT | Performed by: INTERNAL MEDICINE

## 2022-02-24 PROCEDURE — G8420 CALC BMI NORM PARAMETERS: HCPCS | Performed by: INTERNAL MEDICINE

## 2022-02-24 PROCEDURE — 4040F PNEUMOC VAC/ADMIN/RCVD: CPT | Performed by: INTERNAL MEDICINE

## 2022-02-24 RX ORDER — FUROSEMIDE 20 MG/1
TABLET ORAL
Qty: 90 TABLET | Refills: 1 | Status: ON HOLD | OUTPATIENT
Start: 2022-02-24 | End: 2022-03-31 | Stop reason: HOSPADM

## 2022-02-24 RX ORDER — POTASSIUM CHLORIDE 20 MEQ/1
TABLET, EXTENDED RELEASE ORAL
Qty: 90 TABLET | Refills: 1 | Status: SHIPPED | OUTPATIENT
Start: 2022-02-24 | End: 2022-08-04

## 2022-02-24 RX ORDER — METOPROLOL SUCCINATE 100 MG/1
100 TABLET, EXTENDED RELEASE ORAL DAILY
Qty: 90 TABLET | Refills: 1 | Status: SHIPPED | OUTPATIENT
Start: 2022-02-24 | End: 2022-08-11 | Stop reason: SDUPTHER

## 2022-02-24 RX ORDER — MAGNESIUM OXIDE 400 MG/1
400 TABLET ORAL DAILY
Qty: 90 TABLET | Refills: 1 | Status: SHIPPED | OUTPATIENT
Start: 2022-02-24 | End: 2022-06-16

## 2022-02-24 RX ORDER — NITROGLYCERIN 0.4 MG/1
0.4 TABLET SUBLINGUAL EVERY 5 MIN PRN
Qty: 25 TABLET | Refills: 3 | Status: SHIPPED | OUTPATIENT
Start: 2022-02-24

## 2022-02-24 RX ORDER — VALSARTAN 40 MG/1
20 TABLET ORAL DAILY
Qty: 90 TABLET | Refills: 3 | Status: SHIPPED | OUTPATIENT
Start: 2022-02-24 | End: 2022-02-24

## 2022-02-24 RX ORDER — VALSARTAN 40 MG/1
20 TABLET ORAL NIGHTLY
Qty: 90 TABLET | Refills: 1 | Status: SHIPPED | OUTPATIENT
Start: 2022-02-24 | End: 2022-08-11 | Stop reason: SDUPTHER

## 2022-02-24 RX ORDER — ASPIRIN 81 MG/1
81 TABLET, CHEWABLE ORAL DAILY
Qty: 90 TABLET | Refills: 3 | Status: ON HOLD | OUTPATIENT
Start: 2022-02-24 | End: 2022-03-31 | Stop reason: HOSPADM

## 2022-02-24 RX ORDER — EZETIMIBE 10 MG/1
TABLET ORAL
Qty: 90 TABLET | Refills: 1 | Status: ON HOLD | OUTPATIENT
Start: 2022-02-24 | End: 2022-03-30

## 2022-02-24 RX ORDER — ATORVASTATIN CALCIUM 80 MG/1
TABLET, FILM COATED ORAL
Qty: 90 TABLET | Refills: 1 | Status: ON HOLD | OUTPATIENT
Start: 2022-02-24 | End: 2022-03-30

## 2022-02-24 ASSESSMENT — ENCOUNTER SYMPTOMS
BLOOD IN STOOL: 1
SHORTNESS OF BREATH: 0
ANAL BLEEDING: 1

## 2022-02-24 NOTE — PROGRESS NOTES
ANTICOAGULATION MONITORING    Cirs Bolognese Day, 1945    Anticoagulation Indication(s):  Afib  ; hx of DCCV, s/p WESTON ligation       ~s/p LATESHA Jun '19 Jan '21 with DCCV  Referring Physician:   Dr. Leo Adams   Goal INR Range:  2.0 - 3.0  Duration of Anticoagulation Therapy:  Long term  Home or Lab Draw: Lab--Cheo Davis  Product patient has at home: warfarin 5 mg    Recent INR Results:  Lab Results   Component Value Date    INR 1.80 02/24/2022    INR 2.00 01/07/2022    INR 2.20 12/08/2021    INR 2.30 11/05/2021       INR Summary                          Warfarin regimen (mg)  Date INR A/P Sun Mon Tue Wed Thu Fri Sat Mg/wk                                                                                                                                                                                                                                                                       2/24/22 1.8 Below goal 5 5 2.5 5 2.5 5 5 30   1/7/22 2.0 At goal 5 5 2.5 5 2.5 5 5 30   12/8/21 2.2 At goal 5 5 2.5 5 2.5 5 5 30   11/5/21 2.3 At goal 5 5 2.5 5 2.5 5 5 30   9/17/21 2.6 At goal 5 5 2.5 5 2.5 5 5 30   8/20/21 1.9 Just below goal 5 5 2.5 5 2.5 5 5 30   8/13/21 2.0 At goal 5 5 2.5 5 2.5 5 5 30   7/27/21 2.1 At goal 5 5 2.5 5 2.5 5 5 30   5/28/21 2.2 At goal 5 5 2.5 5 2.5 5 5 30   4/26/21 2.5 At goal 5 5 2.5 5 2.5 5 5 30   4/21/21 2.2 At goal 5 5 2.5 5 2.5 5 5 30   4/15/21 4.7 Above goal 5 JOSH   HOLD HOLD 5    3/19/21 2.7 At goal 5 5 5 5 5 5 5 35   3/12/21 3.3 Just above goal 5 5 5 5 5 5 5 35   1/6/21 2.6 At goal 5 5 5 5 5 5 5 35   12/10/20 1.86 Below goal 5 5 5 5 5 5 5 35   10/30/20 2.9 At goal 5 5 5 5 5 5 5 35   9/16/20 2.0 At goal 5 5 5 5 5 5 5 35   9/8/20 1.6 Below goal 5 5 5 5 5 5 5 35   9/4/20 5.2 High 2.5 2.5 5 5 5 Hold Hold 20   7/14/20 2.4 At goal 5 5 5 5 5 5 5 35   5/22/20  2.6  at goal 5 5 5 5 5 5 5 35         Assessment/Plan:    Recent hospitalizations/HC visits None reported   Recent medication changes no   Medications taken regularly that may interact with warfarin or alter INR No significant drug interactions identified   Warfarin dose taken as prescribed Yes   Signs/symptoms of bleeding History of bleeding? no   Vitamin K intake Consistency of servings of green, leafy vegetables per week ? Yes   Recent vomiting/diarrhea/fever None reported   Changes in weight, activity, stress Weight loss   alcohol use Patient reports having 0 drinks per day   Upcoming surgeries or procedures None reported     Patient's INR was in range-same dosage schedule. Patient was also reminded to maintain consistent vitamin K intake and call with any bleeding, medication changes, or fever/vomiting/diarrhea.     Next INR check: 3/3/22    Elva Winter CNP

## 2022-02-24 NOTE — PATIENT INSTRUCTIONS
Start valsartan 20 mg ( 1/2 of 40 mg)  Follow up with Dr Wayne Juares in April  Lipid panel renal panel  Call when colonoscopy is scheduled  Follow up in 6 month  Call if recurrence of chest pain or change in symptoms

## 2022-02-24 NOTE — LETTER
[address] [place] [address]  Phone: [phone]  Fax: [phone]    Verna Godinez DO    February 24, 2022     Blade Ahumada MD  [address] [place] [id number]    Patient: Nathalie Mathis   MR Number: [id number]   YOB: 1945   Date of Visit: 2/24/2022       Dear Blade Ahumada: Thank you for referring Genevive Bolus Day to me for evaluation/treatment. Below are the relevant portions of my assessment and plan of care. If you have questions, please do not hesitate to call me. I look forward to following Genevive Bolus along with you.     Sincerely,      Verna Godinez, DO

## 2022-03-11 LAB — INR BLD: 3.3

## 2022-03-14 ENCOUNTER — NURSE ONLY (OUTPATIENT)
Dept: CARDIOLOGY CLINIC | Age: 77
End: 2022-03-14
Payer: MEDICARE

## 2022-03-14 ENCOUNTER — ANTI-COAG VISIT (OUTPATIENT)
Dept: CARDIOLOGY CLINIC | Age: 77
End: 2022-03-14
Payer: MEDICARE

## 2022-03-14 DIAGNOSIS — Z95.810 ICD (IMPLANTABLE CARDIOVERTER-DEFIBRILLATOR) IN PLACE: ICD-10-CM

## 2022-03-14 DIAGNOSIS — I48.11 LONGSTANDING PERSISTENT ATRIAL FIBRILLATION (HCC): Primary | ICD-10-CM

## 2022-03-14 DIAGNOSIS — I25.5 ISCHEMIC CARDIOMYOPATHY: Chronic | ICD-10-CM

## 2022-03-14 DIAGNOSIS — I50.22 CHRONIC SYSTOLIC HEART FAILURE (HCC): ICD-10-CM

## 2022-03-14 PROCEDURE — 93793 ANTICOAG MGMT PT WARFARIN: CPT | Performed by: NURSE PRACTITIONER

## 2022-03-14 NOTE — PROGRESS NOTES
ANTICOAGULATION MONITORING    Yadien Radon Day, 1945    Anticoagulation Indication(s):  Afib  ; hx of DCCV, s/p WESTON ligation       ~s/p LATESHA Jun '19 Jan '21 with DCCV  Referring Physician:   Dr. Amarjit Stringer   Goal INR Range:  2.0 - 3.0  Duration of Anticoagulation Therapy:  Long term  Home or Lab Draw: Lab--[facility]  Product patient has at home: warfarin 5 mg    Recent INR Results:  Lab Results   Component Value Date    INR 3.30 03/11/2022    INR 1.80 02/24/2022    INR 2.00 01/07/2022    INR 2.20 12/08/2021       INR Summary                          Warfarin regimen (mg)  Date INR A/P Sun Mon Tue Wed Thu Fri Sat Mg/wk                                                                                                                                                                                                                                                          3/14/22 3.3 At goal 5 5 2.5 5 2.5 5 5 30   2/24/22 1.8 Below goal 5 5 2.5 5 2.5 5 5 30   1/7/22 2.0 At goal 5 5 2.5 5 2.5 5 5 30   12/8/21 2.2 At goal 5 5 2.5 5 2.5 5 5 30   11/5/21 2.3 At goal 5 5 2.5 5 2.5 5 5 30   9/17/21 2.6 At goal 5 5 2.5 5 2.5 5 5 30   8/20/21 1.9 Just below goal 5 5 2.5 5 2.5 5 5 30   8/13/21 2.0 At goal 5 5 2.5 5 2.5 5 5 30   7/27/21 2.1 At goal 5 5 2.5 5 2.5 5 5 30   5/28/21 2.2 At goal 5 5 2.5 5 2.5 5 5 30   4/26/21 2.5 At goal 5 5 2.5 5 2.5 5 5 30   4/21/21 2.2 At goal 5 5 2.5 5 2.5 5 5 30   4/15/21 4.7 Above goal 5 JOSH   HOLD HOLD 5    3/19/21 2.7 At goal 5 5 5 5 5 5 5 35   3/12/21 3.3 Just above goal 5 5 5 5 5 5 5 35   1/6/21 2.6 At goal 5 5 5 5 5 5 5 35   12/10/20 1.86 Below goal 5 5 5 5 5 5 5 35   10/30/20 2.9 At goal 5 5 5 5 5 5 5 35   9/16/20 2.0 At goal 5 5 5 5 5 5 5 35   9/8/20 1.6 Below goal 5 5 5 5 5 5 5 35   9/4/20 5.2 High 2.5 2.5 5 5 5 Hold Hold 20   7/14/20 2.4 At goal 5 5 5 5 5 5 5 35   5/22/20  2.6  at goal 5 5 5 5 5 5 5 35         Assessment/Plan:    Recent hospitalizations/HC visits None reported   Recent medication changes no   Medications taken regularly that may interact with warfarin or alter INR No significant drug interactions identified   Warfarin dose taken as prescribed Yes   Signs/symptoms of bleeding History of bleeding? no   Vitamin K intake Consistency of servings of green, leafy vegetables per week ? Yes   Recent vomiting/diarrhea/fever None reported   Changes in weight, activity, stress Weight loss   alcohol use Patient reports having 0 drinks per day   Upcoming surgeries or procedures None reported     Patient's INR was in range-same dosage schedule. Patient was also reminded to maintain consistent vitamin K intake and call with any bleeding, medication changes, or fever/vomiting/diarrhea. I spoke with pt and advised to keep same dose and recheck in a week. Pt thought that he may have taken a dose twice in one day. He will eat extra greens this week and recheck in a week.     Next INR check: 3/21/22    Alexander Nix, CNP

## 2022-03-15 PROCEDURE — 93295 DEV INTERROG REMOTE 1/2/MLT: CPT | Performed by: INTERNAL MEDICINE

## 2022-03-15 PROCEDURE — 93296 REM INTERROG EVL PM/IDS: CPT | Performed by: INTERNAL MEDICINE

## 2022-03-15 PROCEDURE — 93297 REM INTERROG DEV EVAL ICPMS: CPT | Performed by: INTERNAL MEDICINE

## 2022-03-25 ENCOUNTER — APPOINTMENT (OUTPATIENT)
Dept: GENERAL RADIOLOGY | Age: 77
End: 2022-03-25
Payer: MEDICARE

## 2022-03-25 ENCOUNTER — HOSPITAL ENCOUNTER (OUTPATIENT)
Age: 77
Discharge: HOME | End: 2022-03-25
Payer: MEDICARE

## 2022-03-25 ENCOUNTER — TELEPHONE (OUTPATIENT)
Dept: CARDIOLOGY CLINIC | Age: 77
End: 2022-03-25

## 2022-03-25 ENCOUNTER — APPOINTMENT (OUTPATIENT)
Dept: CT IMAGING | Age: 77
End: 2022-03-25
Payer: MEDICARE

## 2022-03-25 ENCOUNTER — HOSPITAL ENCOUNTER (OUTPATIENT)
Age: 77
Setting detail: OBSERVATION
Discharge: HOME | End: 2022-03-26
Attending: EMERGENCY MEDICINE | Admitting: FAMILY MEDICINE
Payer: MEDICARE

## 2022-03-25 DIAGNOSIS — R74.01 TRANSAMINITIS: ICD-10-CM

## 2022-03-25 DIAGNOSIS — R53.1 GENERAL WEAKNESS: ICD-10-CM

## 2022-03-25 DIAGNOSIS — R11.0 NAUSEA: Primary | ICD-10-CM

## 2022-03-25 LAB
A/G RATIO: 1.6 (ref 1.1–2.2)
ALBUMIN SERPL-MCNC: 4 G/DL (ref 3.4–5)
ALBUMIN SERPL-MCNC: 4.1 G/DL (ref 3.4–5)
ALP BLD-CCNC: 119 U/L (ref 40–129)
ALP BLD-CCNC: 130 U/L (ref 40–129)
ALT SERPL-CCNC: 56 U/L (ref 10–40)
ALT SERPL-CCNC: 59 U/L (ref 10–40)
ANION GAP SERPL CALCULATED.3IONS-SCNC: 11 MMOL/L (ref 3–16)
ANION GAP SERPL CALCULATED.3IONS-SCNC: 9 MMOL/L (ref 3–16)
AST SERPL-CCNC: 46 U/L (ref 15–37)
AST SERPL-CCNC: 48 U/L (ref 15–37)
BASOPHILS ABSOLUTE: 0.1 K/UL (ref 0–0.2)
BASOPHILS RELATIVE PERCENT: 2 %
BILIRUB SERPL-MCNC: 0.3 MG/DL (ref 0–1)
BILIRUB SERPL-MCNC: <0.2 MG/DL (ref 0–1)
BILIRUBIN DIRECT: <0.2 MG/DL (ref 0–0.3)
BILIRUBIN, INDIRECT: ABNORMAL MG/DL (ref 0–1)
BUN BLDV-MCNC: 19 MG/DL (ref 7–20)
BUN BLDV-MCNC: 21 MG/DL (ref 7–20)
CALCIUM SERPL-MCNC: 8.7 MG/DL (ref 8.3–10.6)
CALCIUM SERPL-MCNC: 9.2 MG/DL (ref 8.3–10.6)
CHLORIDE BLD-SCNC: 103 MMOL/L (ref 99–110)
CHLORIDE BLD-SCNC: 104 MMOL/L (ref 99–110)
CO2: 25 MMOL/L (ref 21–32)
CO2: 26 MMOL/L (ref 21–32)
CREAT SERPL-MCNC: 1.1 MG/DL (ref 0.8–1.3)
CREAT SERPL-MCNC: 1.1 MG/DL (ref 0.8–1.3)
EKG ATRIAL RATE: 49 BPM
EKG DIAGNOSIS: NORMAL
EKG Q-T INTERVAL: 406 MS
EKG QRS DURATION: 84 MS
EKG QTC CALCULATION (BAZETT): 385 MS
EKG R AXIS: -73 DEGREES
EKG T AXIS: 62 DEGREES
EKG VENTRICULAR RATE: 54 BPM
EOSINOPHILS ABSOLUTE: 0.2 K/UL (ref 0–0.6)
EOSINOPHILS RELATIVE PERCENT: 4.6 %
GFR AFRICAN AMERICAN: >60
GFR AFRICAN AMERICAN: >60
GFR NON-AFRICAN AMERICAN: >60
GFR NON-AFRICAN AMERICAN: >60
GLUCOSE BLD-MCNC: 73 MG/DL (ref 70–99)
GLUCOSE BLD-MCNC: 86 MG/DL (ref 70–99)
HCT VFR BLD CALC: 41.5 % (ref 40.5–52.5)
HEMOGLOBIN: 13.5 G/DL (ref 13.5–17.5)
INR BLD: 2 (ref 0.88–1.12)
INR BLD: 2.01 (ref 0.88–1.12)
LYMPHOCYTES ABSOLUTE: 1.2 K/UL (ref 1–5.1)
LYMPHOCYTES RELATIVE PERCENT: 22.8 %
MCH RBC QN AUTO: 30.7 PG (ref 26–34)
MCHC RBC AUTO-ENTMCNC: 32.6 G/DL (ref 31–36)
MCV RBC AUTO: 94.1 FL (ref 80–100)
MONOCYTES ABSOLUTE: 0.5 K/UL (ref 0–1.3)
MONOCYTES RELATIVE PERCENT: 9.8 %
NEUTROPHILS ABSOLUTE: 3.3 K/UL (ref 1.7–7.7)
NEUTROPHILS RELATIVE PERCENT: 60.8 %
PDW BLD-RTO: 14 % (ref 12.4–15.4)
PHOSPHORUS: 3.7 MG/DL (ref 2.5–4.9)
PLATELET # BLD: 296 K/UL (ref 135–450)
PMV BLD AUTO: 8 FL (ref 5–10.5)
POTASSIUM SERPL-SCNC: 4.7 MMOL/L (ref 3.5–5.1)
POTASSIUM SERPL-SCNC: 4.7 MMOL/L (ref 3.5–5.1)
PRO-BNP: 706 PG/ML (ref 0–449)
PROTHROMBIN TIME: 23.3 SEC (ref 9.9–12.7)
PROTHROMBIN TIME: 23.4 SEC (ref 9.9–12.7)
RBC # BLD: 4.41 M/UL (ref 4.2–5.9)
SODIUM BLD-SCNC: 139 MMOL/L (ref 136–145)
SODIUM BLD-SCNC: 139 MMOL/L (ref 136–145)
TOTAL CK: 173 U/L (ref 39–308)
TOTAL PROTEIN: 6.1 G/DL (ref 6.4–8.2)
TOTAL PROTEIN: 6.7 G/DL (ref 6.4–8.2)
TROPONIN: <0.01 NG/ML
TSH SERPL DL<=0.05 MIU/L-ACNC: 6.41 UIU/ML (ref 0.27–4.2)
WBC # BLD: 5.4 K/UL (ref 4–11)

## 2022-03-25 PROCEDURE — 74177 CT ABD & PELVIS W/CONTRAST: CPT

## 2022-03-25 PROCEDURE — 82550 ASSAY OF CK (CPK): CPT

## 2022-03-25 PROCEDURE — 80069 RENAL FUNCTION PANEL: CPT

## 2022-03-25 PROCEDURE — 93005 ELECTROCARDIOGRAM TRACING: CPT | Performed by: NURSE PRACTITIONER

## 2022-03-25 PROCEDURE — 85610 PROTHROMBIN TIME: CPT

## 2022-03-25 PROCEDURE — 84443 ASSAY THYROID STIM HORMONE: CPT

## 2022-03-25 PROCEDURE — 36415 COLL VENOUS BLD VENIPUNCTURE: CPT

## 2022-03-25 PROCEDURE — 6360000004 HC RX CONTRAST MEDICATION: Performed by: EMERGENCY MEDICINE

## 2022-03-25 PROCEDURE — G0378 HOSPITAL OBSERVATION PER HR: HCPCS

## 2022-03-25 PROCEDURE — 6370000000 HC RX 637 (ALT 250 FOR IP): Performed by: FAMILY MEDICINE

## 2022-03-25 PROCEDURE — 83880 ASSAY OF NATRIURETIC PEPTIDE: CPT

## 2022-03-25 PROCEDURE — 99283 EMERGENCY DEPT VISIT LOW MDM: CPT

## 2022-03-25 PROCEDURE — 85025 COMPLETE CBC W/AUTO DIFF WBC: CPT

## 2022-03-25 PROCEDURE — 2580000003 HC RX 258: Performed by: FAMILY MEDICINE

## 2022-03-25 PROCEDURE — 71046 X-RAY EXAM CHEST 2 VIEWS: CPT

## 2022-03-25 PROCEDURE — 80076 HEPATIC FUNCTION PANEL: CPT

## 2022-03-25 PROCEDURE — 80053 COMPREHEN METABOLIC PANEL: CPT

## 2022-03-25 PROCEDURE — 93010 ELECTROCARDIOGRAM REPORT: CPT | Performed by: INTERNAL MEDICINE

## 2022-03-25 PROCEDURE — 84484 ASSAY OF TROPONIN QUANT: CPT

## 2022-03-25 RX ORDER — SODIUM CHLORIDE 0.9 % (FLUSH) 0.9 %
5-40 SYRINGE (ML) INJECTION PRN
Status: DISCONTINUED | OUTPATIENT
Start: 2022-03-25 | End: 2022-03-26 | Stop reason: HOSPADM

## 2022-03-25 RX ORDER — WARFARIN SODIUM 5 MG/1
5 TABLET ORAL
Status: DISCONTINUED | OUTPATIENT
Start: 2022-03-25 | End: 2022-03-26 | Stop reason: HOSPADM

## 2022-03-25 RX ORDER — PHENYTOIN SODIUM 100 MG/1
100 CAPSULE, EXTENDED RELEASE ORAL 2 TIMES DAILY
Status: DISCONTINUED | OUTPATIENT
Start: 2022-03-25 | End: 2022-03-25 | Stop reason: ALTCHOICE

## 2022-03-25 RX ORDER — ONDANSETRON 4 MG/1
4 TABLET, ORALLY DISINTEGRATING ORAL EVERY 8 HOURS PRN
Status: DISCONTINUED | OUTPATIENT
Start: 2022-03-25 | End: 2022-03-26 | Stop reason: HOSPADM

## 2022-03-25 RX ORDER — AMIODARONE HYDROCHLORIDE 200 MG/1
200 TABLET ORAL DAILY
Status: DISCONTINUED | OUTPATIENT
Start: 2022-03-26 | End: 2022-03-26 | Stop reason: HOSPADM

## 2022-03-25 RX ORDER — ASPIRIN 81 MG/1
81 TABLET, CHEWABLE ORAL DAILY
Status: DISCONTINUED | OUTPATIENT
Start: 2022-03-26 | End: 2022-03-26 | Stop reason: HOSPADM

## 2022-03-25 RX ORDER — SODIUM CHLORIDE 9 MG/ML
25 INJECTION, SOLUTION INTRAVENOUS PRN
Status: DISCONTINUED | OUTPATIENT
Start: 2022-03-25 | End: 2022-03-26 | Stop reason: HOSPADM

## 2022-03-25 RX ORDER — NITROGLYCERIN 0.4 MG/1
0.4 TABLET SUBLINGUAL EVERY 5 MIN PRN
Status: DISCONTINUED | OUTPATIENT
Start: 2022-03-25 | End: 2022-03-26 | Stop reason: SDUPTHER

## 2022-03-25 RX ORDER — ACETAMINOPHEN 650 MG/1
650 SUPPOSITORY RECTAL EVERY 6 HOURS PRN
Status: DISCONTINUED | OUTPATIENT
Start: 2022-03-25 | End: 2022-03-26 | Stop reason: HOSPADM

## 2022-03-25 RX ORDER — WARFARIN SODIUM 2.5 MG/1
2.5 TABLET ORAL
Status: DISCONTINUED | OUTPATIENT
Start: 2022-03-29 | End: 2022-03-26 | Stop reason: HOSPADM

## 2022-03-25 RX ORDER — POLYETHYLENE GLYCOL 3350 17 G/17G
17 POWDER, FOR SOLUTION ORAL DAILY PRN
Status: DISCONTINUED | OUTPATIENT
Start: 2022-03-25 | End: 2022-03-26 | Stop reason: HOSPADM

## 2022-03-25 RX ORDER — ONDANSETRON 2 MG/ML
4 INJECTION INTRAMUSCULAR; INTRAVENOUS EVERY 6 HOURS PRN
Status: DISCONTINUED | OUTPATIENT
Start: 2022-03-25 | End: 2022-03-26 | Stop reason: HOSPADM

## 2022-03-25 RX ORDER — SODIUM CHLORIDE 0.9 % (FLUSH) 0.9 %
5-40 SYRINGE (ML) INJECTION EVERY 12 HOURS SCHEDULED
Status: DISCONTINUED | OUTPATIENT
Start: 2022-03-25 | End: 2022-03-26 | Stop reason: HOSPADM

## 2022-03-25 RX ORDER — ACETAMINOPHEN 325 MG/1
650 TABLET ORAL EVERY 6 HOURS PRN
Status: DISCONTINUED | OUTPATIENT
Start: 2022-03-25 | End: 2022-03-26 | Stop reason: HOSPADM

## 2022-03-25 RX ORDER — DIAZEPAM 5 MG/1
5 TABLET ORAL 2 TIMES DAILY
Status: DISCONTINUED | OUTPATIENT
Start: 2022-03-25 | End: 2022-03-26 | Stop reason: HOSPADM

## 2022-03-25 RX ORDER — ATORVASTATIN CALCIUM 80 MG/1
80 TABLET, FILM COATED ORAL NIGHTLY
Status: DISCONTINUED | OUTPATIENT
Start: 2022-03-25 | End: 2022-03-26 | Stop reason: HOSPADM

## 2022-03-25 RX ORDER — FUROSEMIDE 20 MG/1
20 TABLET ORAL DAILY
Status: DISCONTINUED | OUTPATIENT
Start: 2022-03-25 | End: 2022-03-25 | Stop reason: ALTCHOICE

## 2022-03-25 RX ADMIN — IOPAMIDOL 75 ML: 755 INJECTION, SOLUTION INTRAVENOUS at 15:30

## 2022-03-25 RX ADMIN — SODIUM CHLORIDE, PRESERVATIVE FREE 10 ML: 5 INJECTION INTRAVENOUS at 23:36

## 2022-03-25 RX ADMIN — ATORVASTATIN CALCIUM 80 MG: 80 TABLET, FILM COATED ORAL at 23:36

## 2022-03-25 RX ADMIN — WARFARIN SODIUM 5 MG: 5 TABLET ORAL at 23:36

## 2022-03-25 ASSESSMENT — PAIN SCALES - GENERAL: PAINLEVEL_OUTOF10: 0

## 2022-03-25 NOTE — PROGRESS NOTES
Went to ED to pickup patient and take report. Patient was very confused and not sure why he was getting admitted. ED physician went and clarified to patient about the reason for admission. Pt currently admitted in 5903. Alert and oriented. VSS. Family at bedside.

## 2022-03-25 NOTE — ED PROVIDER NOTES
EKG is reviewed myself. Dated today at 46. Rate 54. Looks like A. fib rate of around 54.   No change from 2/23/2021     Melissa Diop MD  03/25/22 7809

## 2022-03-25 NOTE — TELEPHONE ENCOUNTER
Called patient, for the past two weeks he just hasnt felt right. Cant describe it, but sometimes thinks its indigestion. This does not wake him up at night, but comes and goes all day. Not exacerbated by food or activity. He doesn't think he is having any chest pain, no change in shortness of breath.   \"I just dont feel right\" ( same symptoms as prior CABG) discussed with dkw- referred to ER, patient agrees to go

## 2022-03-25 NOTE — ED PROVIDER NOTES
This patient was seen by the Mid-Level Provider. I have seen and examined the patient, agree with the workup, evaluation, management and diagnosis. Care plan has been discussed. My assessment reveals a 57-year-old male who presents with feeling \"sick\". This is a 30-year-old male with a history of known cardiac disease who presents with a sick feeling. The patient states this is similar to his cardiac disease he has had in the past.  He denies any lightheadedness or dizziness. He denies any substernal chest pain. He said the symptoms for the last several days. He was apparently sent here by cardiology. Radiology results:    CT ABDOMEN PELVIS W IV CONTRAST Additional Contrast? None   Final Result   No acute abnormality. Cholelithiasis unchanged. No duct dilation. Apparent weight loss since the prior study. Moderate fat deposition the throughout the left myocardial apex likely from   significant old subendocardial infarction. XR CHEST (2 VW)   Final Result   No acute process. LABS:    Labs Reviewed   COMPREHENSIVE METABOLIC PANEL - Abnormal; Notable for the following components:       Result Value    BUN 21 (*)     Alkaline Phosphatase 130 (*)     ALT 59 (*)     AST 48 (*)     All other components within normal limits   BRAIN NATRIURETIC PEPTIDE - Abnormal; Notable for the following components:    Pro- (*)     All other components within normal limits   PROTIME-INR - Abnormal; Notable for the following components:    Protime 23.3 (*)     INR 2.00 (*)     All other components within normal limits   CBC WITH AUTO DIFFERENTIAL   TROPONIN   CK           EKG:    Bradycardic rhythm at a rate of 54 beats a minute. Appear to be junctional versus sinus. No acute ST elevations or depressions or pathologic Q waves. Occasional PVC. Exam:    Well-nourished male no acute distress. Abdomen soft benign with no guarding rebound.       Medical decision makin-year-old male who presents with a vague ill type of presentation. The patient states this is consistent with his angina in the past.  His cardiac work-up was unremarkable normal.  Given his history I felt it prudent to admit the patient for further care. He did have a mild transaminitis but a CT of the abdomen pelvis showed no significant acute findings. He was admitted in stable condition. FINAL IMPRESSION:    1. Nausea    2. General weakness    3.  Sea Granger MD  [phone]

## 2022-03-25 NOTE — ED PROVIDER NOTES
[address] [place] [address] EMERGENCY DEPARTMENT  [address] [phone]  Dept: [phone]  Loc: [phone]    [address] [place] [address] COMPLAINT    Chief Complaint   Patient presents with    Illness     Patient has had hx of heart by pass, and has been having similiar symptoms. Denies CP, SOB - sent by cardio       HPI    Virgilio Dick is a 78 y.o. male who presents to the emergency department at the request of his cardiologist.  The patient has a history of myocardial infarction twice with bypass. His last MI was in 2017. Over the last 2 weeks he has had a \"sick\" feeling in his upper abdomen and chest area. He does not have chest pain or shortness of breath but he just overall has a sickness to that area. He has been feeling weak as well. Denies lightheadedness, dizziness, leg swelling, DVT history or PE history. He complains that his legs feel achy. This sickness that he describes as how he felt before he had his myocardial infarction. He has a history of atrial fibrillation, coronary artery disease, hyperlipidemia, hypertension. He takes a statin. REVIEW OF SYSTEMS    Cardiac: No chest pain or palpitations  Respiratory: No shortness of breath or new cough  General: see HPI, no fevers   GI: No abdominal pain or diarrhea, nausea, vomiting  Neuro: see HPI, no LOC  All other systems reviewed and are negative.     PAST MEDICAL & SURGICAL HISTORY    Past Medical History:   Diagnosis Date    Arthritis     shoulders and knee    Atrial fib/flut     CAD (coronary artery disease)     Hyperlipidemia     Hypertension     Seizures ([address]     last seizures many years ago,     Sinus bradycardia      Past Surgical History:   Procedure Laterality Date    CARDIAC SURGERY      , angioplasty 2007    CORONARY ANGIOPLASTY WITH STENT PLACEMENT  1997    CORONARY ARTERY [facility]PASS GRAFT  [facility]    CYSTOSCOPY N/A 11/13/2019    FLEXIBLE Pack years: 30.00     Quit date: 8/15/1988     Years since quittin.6    Smokeless tobacco: Never Used   Vaping Use    Vaping Use: Never used   Substance and Sexual Activity    Alcohol use: No    Drug use: No    Sexual activity: None   Other Topics Concern    None   Social History Narrative    None     Social Determinants of Health     Financial Resource Strain:     Difficulty of Paying Living Expenses: Not on file   Food Insecurity:     Worried About Running Out of Food in the Last Year: Not on file    Slava of Food in the Last Year: Not on file   Transportation Needs:     Lack of Transportation (Medical): Not on file    Lack of Transportation (Non-Medical):  Not on file   Physical Activity:     Days of Exercise per Week: Not on file    Minutes of Exercise per Session: Not on file   Stress:     Feeling of Stress : Not on file   Social Connections:     Frequency of Communication with Friends and Family: Not on file    Frequency of Social Gatherings with Friends and Family: Not on file    Attends Congregation Services: Not on file    Active Member of [address] [place] [address] or Organizations: Not on file    Attends Club or Organization Meetings: Not on file    Marital Status: Not on file   Intimate Partner Violence:     Fear of Current or Ex-Partner: Not on file    Emotionally Abused: Not on file    Physically Abused: Not on file    Sexually Abused: Not on file   Housing Stability:     Unable to Pay for Housing in the Last Year: Not on file    Number of [place] in the Last Year: Not on file    Unstable Housing in the Last Year: Not on file     Family History   Problem Relation Age of Onset    Heart Failure Mother     Heart Disease Neg Hx        PHYSICAL EXAM    VITAL SIGNS: /84   Pulse (!) 48   Temp 98.4 °F (36.9 °C) (Oral)   Resp 16   SpO2 100%   Constitutional:  Well developed, well nourished, no acute distress  Eyes:  Pupils equally round and reactive to light  HENT:  External ears normal, nose normal  NECK: Normal range of motion, no tenderness  Respiratory:  Lungs clear to auscultation bilaterally, no respiratory distress, no wheezing   Cardiovascular:  Regular rate, normal rhythm, no murmurs   GI:  Soft, nontender, nondistended, normal bowel sounds  Musculoskeletal:  No edema, no acute deformities   Integument:  Skin is warm and dry, no obvious rash    Vascular: Radial and DP pulses 2+ equal bilaterally  Neurologic: Awake, alert, oriented x3, no aphasia, no slurred speech, CN II-XII intact, normal finger to nose test bilaterally, 5/5 strength in all 4 extremities    EKG    Please see physician's note for EKG interpretation. RADIOLOGY/PROCEDURES    CT ABDOMEN PELVIS W IV CONTRAST Additional Contrast? None   Final Result   No acute abnormality. Cholelithiasis unchanged. No duct dilation. Apparent weight loss since the prior study. Moderate fat deposition the throughout the left myocardial apex likely from   significant old subendocardial infarction. XR CHEST (2 VW)   Final Result   No acute process. Labs Reviewed   COMPREHENSIVE METABOLIC PANEL - Abnormal; Notable for the following components:       Result Value    BUN 21 (*)     Alkaline Phosphatase 130 (*)     ALT 59 (*)     AST 48 (*)     All other components within normal limits   BRAIN NATRIURETIC PEPTIDE - Abnormal; Notable for the following components:    Pro- (*)     All other components within normal limits   PROTIME-INR - Abnormal; Notable for the following components:    Protime 23.3 (*)     INR 2.00 (*)     All other components within normal limits   CBC WITH AUTO DIFFERENTIAL   TROPONIN   CK       ED COURSE & MEDICAL DECISION MAKING    Pertinent Labs & Imaging studies reviewed and interpreted. (See chart for details)    See chart for details of medications given during the ED stay.     Vitals:    03/25/22 1319   BP: 130/84   Pulse: (!) 48   Resp: 16   Temp: 98.4 °F (36.9 °C)   TempSrc: Oral   SpO2: 100%     Medications   iopamidol (ISOVUE-370) 76 % injection 75 mL (75 mLs IntraVENous Given 3/25/22 1530)        This patient was seen evaluated by myself my attending physician Dr. Kranthi Valera    Differential diagnosis: anemia, dehydration, acute coronary syndrome, cardiac arrhythmia, neurologic emergency, metabolic emergency, toxicologic emergency, infection, other    This is a 79-year-old who is nontoxic in appearance and bradycardic with a rate in the high 40s on arrival.  He has had 2-week complaint of weakness and an intermittent sick feeling in his chest and epigastric area. His other complaint is that his legs are aching. He was sent by his cardiologist today for further work-up. Labs reveal a white count of 5.4 with a hemoglobin of 13.5, CK is 173. CMP shows a BUN of 21. He has an alk phos of 130 with an ALT of 59 and AST of 48. He is on Coumadin for history of atrial fibrillation and his INR today is 2.  proBNP is 706. Troponin less than 0.01. No acute changes on EKG. Because of his new transaminitis we ordered a CT of the abdomen pelvis with IV contrast.  Shows  Impression:    No acute abnormality. Cholelithiasis unchanged.  No duct dilation. Apparent weight loss since the prior study. Moderate fat deposition the throughout the left myocardial apex likely from   significant old subendocardial infarction. Because the patient's presenting with 2-week complaints reporting he had his last MI we are recommending hospitalization. Perfect serve out to the hospitalist service for admission. Patient was updated on plan of care and he agrees. FINAL IMPRESSION    1. Nausea    2. General weakness    3.  Transaminitis        PLAN  Admission    (Please note that this note was completed with a voice recognition program.  Every attempt was made to edit the dictations, but inevitably there remain words that are mis-transcribed.)            Sebastien Bravo, APRN - CNP  03/25/22 1624

## 2022-03-26 ENCOUNTER — APPOINTMENT (OUTPATIENT)
Dept: CT IMAGING | Age: 77
End: 2022-03-26
Payer: MEDICARE

## 2022-03-26 VITALS
DIASTOLIC BLOOD PRESSURE: 77 MMHG | HEIGHT: 70 IN | TEMPERATURE: 96.4 F | SYSTOLIC BLOOD PRESSURE: 119 MMHG | WEIGHT: 161.16 LBS | HEART RATE: 52 BPM | RESPIRATION RATE: 18 BRPM | BODY MASS INDEX: 23.07 KG/M2 | OXYGEN SATURATION: 97 %

## 2022-03-26 LAB
A/G RATIO: 1.5 (ref 1.1–2.2)
ALBUMIN SERPL-MCNC: 3.7 G/DL (ref 3.4–5)
ALP BLD-CCNC: 121 U/L (ref 40–129)
ALT SERPL-CCNC: 52 U/L (ref 10–40)
ANION GAP SERPL CALCULATED.3IONS-SCNC: 9 MMOL/L (ref 3–16)
AST SERPL-CCNC: 42 U/L (ref 15–37)
BILIRUB SERPL-MCNC: 0.3 MG/DL (ref 0–1)
BUN BLDV-MCNC: 19 MG/DL (ref 7–20)
CALCIUM SERPL-MCNC: 9 MG/DL (ref 8.3–10.6)
CHLORIDE BLD-SCNC: 105 MMOL/L (ref 99–110)
CO2: 25 MMOL/L (ref 21–32)
CREAT SERPL-MCNC: 1.2 MG/DL (ref 0.8–1.3)
GFR AFRICAN AMERICAN: >60
GFR NON-AFRICAN AMERICAN: 59
GLUCOSE BLD-MCNC: 77 MG/DL (ref 70–99)
HCT VFR BLD CALC: 40.4 % (ref 40.5–52.5)
HEMOGLOBIN: 13.5 G/DL (ref 13.5–17.5)
INR BLD: 2.08 (ref 0.88–1.12)
MCH RBC QN AUTO: 31.8 PG (ref 26–34)
MCHC RBC AUTO-ENTMCNC: 33.4 G/DL (ref 31–36)
MCV RBC AUTO: 95.1 FL (ref 80–100)
PDW BLD-RTO: 13.7 % (ref 12.4–15.4)
PLATELET # BLD: 265 K/UL (ref 135–450)
PMV BLD AUTO: 8.5 FL (ref 5–10.5)
POTASSIUM SERPL-SCNC: 4.4 MMOL/L (ref 3.5–5.1)
PROTHROMBIN TIME: 24.2 SEC (ref 9.9–12.7)
RBC # BLD: 4.25 M/UL (ref 4.2–5.9)
SODIUM BLD-SCNC: 139 MMOL/L (ref 136–145)
TOTAL PROTEIN: 6.1 G/DL (ref 6.4–8.2)
WBC # BLD: 5.6 K/UL (ref 4–11)

## 2022-03-26 PROCEDURE — G0378 HOSPITAL OBSERVATION PER HR: HCPCS

## 2022-03-26 PROCEDURE — 99204 OFFICE O/P NEW MOD 45 MIN: CPT | Performed by: INTERNAL MEDICINE

## 2022-03-26 PROCEDURE — 6370000000 HC RX 637 (ALT 250 FOR IP): Performed by: INTERNAL MEDICINE

## 2022-03-26 PROCEDURE — 36415 COLL VENOUS BLD VENIPUNCTURE: CPT

## 2022-03-26 PROCEDURE — 6360000004 HC RX CONTRAST MEDICATION: Performed by: INTERNAL MEDICINE

## 2022-03-26 PROCEDURE — 85610 PROTHROMBIN TIME: CPT

## 2022-03-26 PROCEDURE — 80053 COMPREHEN METABOLIC PANEL: CPT

## 2022-03-26 PROCEDURE — 85027 COMPLETE CBC AUTOMATED: CPT

## 2022-03-26 PROCEDURE — 6370000000 HC RX 637 (ALT 250 FOR IP): Performed by: FAMILY MEDICINE

## 2022-03-26 PROCEDURE — 2580000003 HC RX 258: Performed by: FAMILY MEDICINE

## 2022-03-26 PROCEDURE — 75574 CT ANGIO HRT W/3D IMAGE: CPT

## 2022-03-26 RX ORDER — PHENYTOIN SODIUM 100 MG/1
100 CAPSULE, EXTENDED RELEASE ORAL 2 TIMES DAILY
Status: DISCONTINUED | OUTPATIENT
Start: 2022-03-26 | End: 2022-03-26 | Stop reason: HOSPADM

## 2022-03-26 RX ORDER — METOPROLOL SUCCINATE 50 MG/1
100 TABLET, EXTENDED RELEASE ORAL DAILY
Status: DISCONTINUED | OUTPATIENT
Start: 2022-03-26 | End: 2022-03-26 | Stop reason: HOSPADM

## 2022-03-26 RX ORDER — PRIMIDONE 250 MG/1
250 TABLET ORAL 2 TIMES DAILY
Status: DISCONTINUED | OUTPATIENT
Start: 2022-03-26 | End: 2022-03-26 | Stop reason: HOSPADM

## 2022-03-26 RX ORDER — NITROGLYCERIN 0.4 MG/1
0.4 TABLET SUBLINGUAL EVERY 5 MIN PRN
Status: DISCONTINUED | OUTPATIENT
Start: 2022-03-26 | End: 2022-03-26 | Stop reason: HOSPADM

## 2022-03-26 RX ORDER — METOPROLOL TARTRATE 5 MG/5ML
INJECTION INTRAVENOUS
Status: DISCONTINUED
Start: 2022-03-26 | End: 2022-03-26 | Stop reason: HOSPADM

## 2022-03-26 RX ADMIN — PHENYTOIN SODIUM 100 MG: 100 CAPSULE ORAL at 11:15

## 2022-03-26 RX ADMIN — PRIMIDONE 250 MG: 250 TABLET ORAL at 11:15

## 2022-03-26 RX ADMIN — METOPROLOL SUCCINATE 100 MG: 50 TABLET, EXTENDED RELEASE ORAL at 13:33

## 2022-03-26 RX ADMIN — IOPAMIDOL 85 ML: 755 INJECTION, SOLUTION INTRAVENOUS at 16:11

## 2022-03-26 RX ADMIN — ASPIRIN 81 MG 81 MG: 81 TABLET ORAL at 09:30

## 2022-03-26 RX ADMIN — SODIUM CHLORIDE, PRESERVATIVE FREE 10 ML: 5 INJECTION INTRAVENOUS at 09:30

## 2022-03-26 RX ADMIN — AMIODARONE HYDROCHLORIDE 200 MG: 200 TABLET ORAL at 09:30

## 2022-03-26 RX ADMIN — DIAZEPAM 5 MG: 5 TABLET ORAL at 09:30

## 2022-03-26 RX ADMIN — NITROGLYCERIN 0.4 MG: 0.4 TABLET, ORALLY DISINTEGRATING SUBLINGUAL at 16:00

## 2022-03-26 ASSESSMENT — PAIN SCALES - GENERAL
PAINLEVEL_OUTOF10: 0
PAINLEVEL_OUTOF10: 1
PAINLEVEL_OUTOF10: 0

## 2022-03-26 NOTE — PROGRESS NOTES
Patient assessment completed at this time, see flowsheets. Patient alert and oriented x4. Patient oriented to room and surroundings. Patient admission completed at this time. VSS. Lung sounds clear. Patient is APaced. Takes pills WWW. Patient wallet noted to have $200 in cash. This RN offered to have security inventory it and lock it away. Patient states, \"It's fine I will just wear my jeans under my gown and keep my wallet with me for now. \" Patient has PIV in LFA. Patient ambulates independently in the room. Patient wears eyeglasses and bilateral hearing aids. Plan of care reviewed. Patient denies any other needs at this time. Will continue to monitor.

## 2022-03-26 NOTE — CONSULTS
Clinical Pharmacy Note: Pharmacy to Dose Warfarin    Pharmacy consulted by Dr. Aracely Foley to dose warfarin. Janett Deshpande is a 78 y.o. male  is receiving warfarin for indication: AF. INR Goal Range: 2-3  Prior to admission warfarin dosing regimen: 2.5mg Tues / Thurs and 5mg AOD  INR today:   Lab Results   Component Value Date    INR 2.00 03/25/2022       Assessment/Plan:  INR is therapeutic on prior to admission dosing regimen. Possible concomitant drug-drug interactions include: NA   Based on today's assessment, dose warfarin restart home dose listed above. Daily INR is ordered. Pharmacy will continue to monitor and make adjustments to regimen as necessary.      Thank you for the consult,     Judie Rodriguez, BerthaD

## 2022-03-26 NOTE — PROGRESS NOTES
Morning assessment complete. Patient alert and oriented, looks comfortable. Hard of hearing with hearing aids in. No complaints of pain. Afebrile. VSS. Was supposed to go to stress test but that has been cancelled. Informed of cardiac cath on Monday instead. Patient denies nausea at this time. Will continue to monitor.

## 2022-03-26 NOTE — DISCHARGE SUMMARY
Patient: Ileen Apgar Day     Gender: male  : 1945   Age: 78 y.o. MRN: [id number]    Admitting Physician: Rob Sierra MD  Discharge Physician: Paula Traore MD     Code Status: Full Code     Admit Date: 3/25/2022   Discharge Date:  3/26/2022    Disposition:  Home    Discharge Diagnoses: Active Hospital Problems    Diagnosis Date Noted    Nausea [R11.0] 2022       Follow-up appointments: Monday or Tuesday with Dr. Anyi Gardner in cardiology    Outpatient to do list: Work-up for chest pain as outpatient with Dr. Sea Herrera at Discharge:  [facility] [place] Course:   72-year-old admitted to the hospital with some nausea and fluttering feeling in his abdomen EKG troponins were negative initially a stress test was planned however per cardiology recommended cardiac catheterization on Monday cardiology spoke with the patient patient with prefer to go home given he had no further chest pain EKG and troponins were negative given the decided was okay for him to go home and follow-up with cardiology as an outpatient  Per cards  CT scan done  Images personally reviewed by me  Significant amount of step artifact  No radiologist on call today to do post-processing and formal read     Given lack of worrisome symptoms and that patient r/o for MI ok to discharge home     Continue coumadin for now, will defer to Dr. Anyi Gardner whether or not patient needs cath after radiology has had a chance to read the images.     I spoke to the patient and explained the need for follow-up on Monday with Dr. Garett Leger for CT scan results he voiced understanding and is in agreement with this plan    Discharge Medications:   Current Discharge Medication List        Current Discharge Medication List        Current Discharge Medication List      CONTINUE these medications which have NOT CHANGED    Details   potassium chloride (KLOR-CON M20) 20 MEQ extended release tablet TAKE ONE TABLET BY MOUTH  AS NEEDED WITH LASIX FOR SWELLING  Qty: 90 tablet, Refills: 1      atorvastatin (LIPITOR) 80 MG tablet TAKE 1 TABLET BY MOUTH ONE TIME A DAY  Qty: 90 tablet, Refills: 1    Associated Diagnoses: Coronary artery disease involving native coronary artery of native heart without angina pectoris      ezetimibe (ZETIA) 10 MG tablet TAKE 1 TABLET BY MOUTH ONCE DAILY  Qty: 90 tablet, Refills: 1    Comments: Needs lab work before receiving additional refills      furosemide (LASIX) 20 MG tablet TAKE 1 TABLET BY MOUTH ONCE DAILY AS NEEDED FOR EDEMA  Qty: 90 tablet, Refills: 1    Associated Diagnoses: PAF (paroxysmal atrial fibrillation) ([facility]); S/P implantation of automatic cardioverter/defibrillator (AICD)      metoprolol succinate (TOPROL XL) 100 MG extended release tablet Take 1 tablet by mouth daily  Qty: 90 tablet, Refills: 1      magnesium oxide (MAG-OX) 400 MG tablet Take 1 tablet by mouth daily  Qty: 90 tablet, Refills: 1      aspirin 81 MG chewable tablet Take 1 tablet by mouth daily  Qty: 90 tablet, Refills: 3      nitroGLYCERIN (NITROSTAT) 0.4 MG SL tablet Place 1 tablet under the tongue every 5 minutes as needed for Chest pain  Qty: 25 tablet, Refills: 3      valsartan (DIOVAN) 40 MG tablet Take 0.5 tablets by mouth at bedtime  Qty: 90 tablet, Refills: 1      amiodarone (CORDARONE) 200 MG tablet Take 1 tablet by mouth daily  Qty: 90 tablet, Refills: 0      warfarin (COUMADIN) 5 MG tablet TAKE ONE TABLET BY MOUTH DAILY  Qty: 90 tablet, Refills: 1    Associated Diagnoses: Paroxysmal atrial fibrillation ([facility] [address] Coronary artery disease involving native coronary artery of native heart without angina pectoris      diazepam (VALIUM) 5 MG tablet Take 5 mg by mouth 2 times daily. phenytoin (DILANTIN) 100 MG ER capsule Take 1 capsule by mouth 2 times daily.  Resume home dose  Qty: 1 capsule, Refills: 0      primidone (MYSOLINE) 250 MG tablet Take 250 mg by mouth 2 times daily            Current Discharge Medication List          Discharge ROS:  A complete review of systems was asked and negative    Discharge Exam:    /74   Pulse 50   Temp 97.5 °F (36.4 °C) (Temporal)   Resp 20   Ht 5' 10\" (1.778 m)   Wt 161 lb 2.5 oz (73.1 kg)   SpO2 98%   BMI 23.12 kg/m²   General appearance:  NAD  HEENT:   Normal cephalic, atraumatic, moist mucous membranes, no oropharyngeal erythema or exudate  Heart[de-identified] Normal s1/s2, RRR, no murmurs, gallops, or rubs. no leg edema  Lungs:  Normal respiratory effort. Clear to auscultation, bilaterally without Rales/Wheezes/Rhonchi. Abdomen: Soft, non-tender, non-distended, bowel sounds present, no masses  Musculoskeletal:  No clubbing, no cyanosis, *  Neurologic:  Neurovascularly intact without any focal sensory/motor deficits. Cranial nerves: II-XII intact, grossly non-focal.    Labs: For convenience and continuity at follow-up the following most recent labs are provided:    Lab Results   Component Value Date    WBC 5.6 03/26/2022    HGB 13.5 03/26/2022    HCT 40.4 03/26/2022    MCV 95.1 03/26/2022     03/26/2022     03/26/2022    K 4.4 03/26/2022    K 4.1 08/08/2019     03/26/2022    CO2 25 03/26/2022    BUN 19 03/26/2022    CREATININE 1.2 03/26/2022    CALCIUM 9.0 03/26/2022    PHOS 3.7 03/25/2022     03/09/2018    ALKPHOS 121 03/26/2022    ALT 52 03/26/2022    AST 42 03/26/2022    BILITOT 0.3 03/26/2022    BILIDIR <0.2 03/25/2022    LABALBU 3.7 03/26/2022    LDLCALC 69 12/12/2018    TRIG 63 12/12/2018     Lab Results   Component Value Date    INR 2.08 (H) 03/26/2022    INR 2.00 (H) 03/25/2022    INR 2.01 (H) 03/25/2022           The patient was seen and examined on day of discharge and this discharge summary is in conjunction with any daily progress note from day of discharge. Time Spent on discharge is 45 minutes  in the examination, evaluation, counseling and review of medications and discharge plan.       Note that greater  than 30 minutes was spent in preparing discharge papers, discussing discharge with patient, medication review, etc.       Signed:    Pratik Tompkins MD   3/26/2022      Thank you Livia Earl MD for the opportunity to be involved in this patient's care.  If you have any questions or concerns please feel free to contact me

## 2022-03-26 NOTE — PROGRESS NOTES
4 Eyes Skin Assessment     NAME:  Peace Mathis  YOB: 1945  MEDICAL RECORD NUMBER:  [id number]    The patient is being assess for  Admission    I agree that 2 RN's have performed a thorough Head to Toe Skin Assessment on the patient. ALL assessment sites listed below have been assessed. Areas assessed by both nurses:    Head, Face, Ears, Shoulders, Back, Chest, Arms, Elbows, Hands, Sacrum. Buttock, Coccyx, Ischium and Legs. Feet and Heels        Does the Patient have a Wound?  No noted wound(s)       Ezequiel Prevention initiated:  No   Wound Care Orders initiated:  No    Pressure Injury (Stage 3,4, Unstageable, DTI, NWPT, and Complex wounds) if present place consult order under [de-identified] No    New and Established Ostomies if present place consult order under : No      Nurse 1 eSignature: Electronically signed by Silas Lopez RN on 3/26/22 at 5:53 AM EDT    **SHARE this note so that the co-signing nurse is able to place an eSignature**    Nurse 2 eSignature: Electronically signed by Jono Frazier RN on 3/26/22 at 5:54 AM EDT

## 2022-03-26 NOTE — CONSULTS
[facility]  [phone]      Chief Complaint   Patient presents with    Illness     Patient has had hx of heart by pass, and has been having similiar symptoms. Denies CP, SOB - sent by cardio        Reason for consult:     History of Present Illness:  Ibrahima Epstein is a 78 y.o. patient who presented to the hospital with complaints of feeling \"off\". I have been asked to provide consultation regarding further management and testing. Patient was seen in Dr. Christin Carroll clinic yesterday. 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 He has never had chest pain, even before his CABGs. Dr. Keshav Jansen called me early this am to let me know he advised him to be admitted due to new symptoms of exertional fatigue. He r/o for MI overnight and actually feels well enough that he wants to go home. I intially planned to keep him till Monday for a heart cath, but he feels better and doesn't want to stay in the hospital for the whole weekend. Past Medical History:   has a past medical history of Arthritis, Atrial fib/flut, CAD (coronary artery disease), Hyperlipidemia, Hypertension, Seizures ([address] and Sinus bradycardia. Surgical History:   has a past surgical history that includes hernia repair (Right); Coronary angioplasty with stent (1997); [place] tooth extraction (April 2012); pacemaker placement (12/18/2017); Coronary artery bypass graft (01/2019); Cardiac surgery; and Cystoscopy (N/A, 11/13/2019). Social History:   reports that he quit smoking about 33 years ago. He has a 30.00 pack-year smoking history. He has never used smokeless tobacco. He reports that he does not drink alcohol and does not use drugs. Family History:  No family history of premature coronary artery disease, aortic disease, or valve disease. Home Medications:  Were reviewed and are listed in nursing record.  and/or listed below  Prior to Admission medications    Medication Sig Start Date End Date Taking? Authorizing Provider   potassium chloride (KLOR-CON M20) 20 MEQ extended release tablet TAKE ONE TABLET BY MOUTH  AS NEEDED WITH LASIX FOR SWELLING 2/24/22   [facility]   atorvastatin (LIPITOR) 80 MG tablet TAKE 1 TABLET BY MOUTH ONE TIME A DAY 2/24/22   [facility], DO   ezetimibe (ZETIA) 10 MG tablet TAKE 1 TABLET BY MOUTH ONCE DAILY 2/24/22   [facility], DO   furosemide (LASIX) 20 MG tablet TAKE 1 TABLET BY MOUTH ONCE DAILY AS NEEDED FOR EDEMA 2/24/22   [facility], DO   metoprolol succinate (TOPROL XL) 100 MG extended release tablet Take 1 tablet by mouth daily 2/24/22   [facility], DO   magnesium oxide (MAG-OX) 400 MG tablet Take 1 tablet by mouth daily 2/24/22   [facility], DO   aspirin 81 MG chewable tablet Take 1 tablet by mouth daily 2/24/22   [facility], DO   nitroGLYCERIN (NITROSTAT) 0.4 MG SL tablet Place 1 tablet under the tongue every 5 minutes as needed for Chest pain 2/24/22   [facility], DO   valsartan (DIOVAN) 40 MG tablet Take 0.5 tablets by mouth at bedtime 2/24/22   [facility], DO   amiodarone (CORDARONE) 200 MG tablet Take 1 tablet by mouth daily 2/15/22   Dung Lowery MD   warfarin (COUMADIN) 5 MG tablet TAKE ONE TABLET BY MOUTH DAILY 12/29/21   [facility]   diazepam (VALIUM) 5 MG tablet Take 5 mg by mouth 2 times daily. Historical Provider, MD   phenytoin (DILANTIN) 100 MG ER capsule Take 1 capsule by mouth 2 times daily.  Resume home dose  Patient not taking: Reported on 3/25/2022 12/21/12   BETO Ryan - CNP   primidone (MYSOLINE) 250 MG tablet Take 250 mg by mouth 2 times daily     Historical Provider, MD        Current Medications:  Current Facility-Administered Medications   Medication Dose Route Frequency Provider Last Rate Last Admin    phenytoin (DILANTIN) ER capsule 100 mg  100 mg Oral BID Aamir Chaparro MD   100 mg at 03/26/22 1115    primidone (MYSOLINE) tablet 250 mg  250 mg Oral BID Matt Calle MD   250 mg at 03/26/22 1115    metoprolol succinate (TOPROL XL) extended release tablet 100 mg  100 mg Oral Daily Matt Calle MD        amiodarone (CORDARONE) tablet 200 mg  200 mg Oral Daily Pam Monk MD   200 mg at 03/26/22 0930    aspirin chewable tablet 81 mg  81 mg Oral Daily Pam Monk MD   81 mg at 03/26/22 0930    atorvastatin (LIPITOR) tablet 80 mg  80 mg Oral Nightly Pam Monk MD   80 mg at 03/25/22 2336    diazePAM (VALIUM) tablet 5 mg  5 mg Oral BID Pam Monk MD   5 mg at 03/26/22 0930    nitroGLYCERIN (NITROSTAT) SL tablet 0.4 mg  0.4 mg SubLINGual Q5 Min PRN Pam Monk MD        sodium chloride flush 0.9 % injection 5-40 mL  5-40 mL IntraVENous 2 times per day Pam Monk MD   10 mL at 03/26/22 0930    sodium chloride flush 0.9 % injection 5-40 mL  5-40 mL IntraVENous PRN Ilda Vega MD        0.9 % sodium chloride infusion  25 mL IntraVENous PRN Pam Monk MD        ondansetron (ZOFRAN-ODT) disintegrating tablet 4 mg  4 mg Oral Q8H PRN Pam Monk MD        Or    ondansetron (ZOFRAN) injection 4 mg  4 mg IntraVENous Q6H PRN Pam Monk MD        polyethylene glycol (GLYCOLAX) packet 17 g  17 g Oral Daily PRN Pam Monk MD        acetaminophen (TYLENOL) tablet 650 mg  650 mg Oral Q6H PRN Pam Monk MD        Or    acetaminophen (TYLENOL) suppository 650 mg  650 mg Rectal Q6H PRN Pam Monk MD        [facility] ON 3/29/2022] warfarin (COUMADIN) tablet 2.5 mg  2.5 mg Oral Once per day on Tue Thu Pam Monk MD        And    warfarin (COUMADIN) tablet 5 mg  5 mg Oral Once per day on Sun Mon Wed Fri Sat Pam Monk MD   5 mg at 03/25/22 2336        Allergies:  Patient has no known allergies. Review of Systems:     Constitutional: there has been no unanticipated weight loss. There's been a decrease in energy level and activity level. Eyes: No visual changes or diplopia. No scleral icterus. ENT: No Headaches, hearing loss or vertigo.  No mouth sores or sore throat. Cardiovascular: Reviewed in HPI  Respiratory: No cough or wheezing, no sputum production. No hematemesis. Gastrointestinal: No abdominal pain, appetite loss, blood in stools. No change in bowel or bladder habits. Genitourinary: No dysuria, trouble voiding, or hematuria. Musculoskeletal:  No gait disturbance, weakness or joint complaints. Integumentary: No rash or pruritis. Neurological: No headache, diplopia, change in muscle strength, numbness or tingling. No change in gait, balance, coordination, mood, affect, memory, mentation, behavior. Psychiatric: No anxiety, no depression. Endocrine: No malaise, fatigue or temperature intolerance. No excessive thirst, fluid intake, or urination. No tremor. Hematologic/Lymphatic: No abnormal bruising or bleeding, blood clots or swollen lymph nodes. Allergic/Immunologic: No nasal congestion or hives.       Physical Examination:    Vitals:    03/26/22 1100   BP: 123/74   Pulse: 50   Resp: 20   Temp: 97.5 °F (36.4 °C)   SpO2: 98%    Weight: 161 lb 2.5 oz (73.1 kg)         General Appearance:  Alert, cooperative, no distress, appears stated age   Head:  Normocephalic, without obvious abnormality, atraumatic   Eyes:  PERRL, conjunctiva/corneas clear       Nose: Nares normal, no drainage or sinus tenderness   Throat: Lips, mucosa, and tongue normal   Neck: Supple, symmetrical, trachea midline, no adenopathy, thyroid: not enlarged, symmetric, no tenderness/mass/nodules, no carotid bruit or JVD       Lungs:   Clear to auscultation bilaterally, respirations unlabored   Chest Wall:  No tenderness or deformity   Heart:  Regular rate and rhythm, S1, S2 normal, no murmur, rub or gallop   Abdomen:   Soft, non-tender, bowel sounds active all four quadrants,  no masses, no organomegaly           Extremities: Extremities normal, atraumatic, no cyanosis or edema   Pulses: 2+ and symmetric   Skin: Skin color, texture, turgor normal, no rashes or lesions Psych: Normal mood and affect   Neurologic: Normal gross motor and sensory exam.         Labs  Lab Results   Component Value Date    PROBNP 706 03/25/2022    PROBNP 257 08/08/2019    PROBNP 1,224 01/25/2019       Lab Results   Component Value Date    CHOL 124 12/12/2018    CHOL 82 12/12/2018    TRIG 63 12/12/2018    HDL 42 12/12/2018    HDL 47 12/13/2011    LDLCALC 69 12/12/2018    LABVLDL 37 10/14/2014         CBC:   Lab Results   Component Value Date    WBC 5.6 03/26/2022    RBC 4.25 03/26/2022    HGB 13.5 03/26/2022    HCT 40.4 03/26/2022    MCV 95.1 03/26/2022    RDW 13.7 03/26/2022     03/26/2022     CMP:    Lab Results   Component Value Date     03/26/2022    K 4.4 03/26/2022    K 4.1 08/08/2019     03/26/2022    CO2 25 03/26/2022    BUN 19 03/26/2022    CREATININE 1.2 03/26/2022    GFRAA >60 03/26/2022    GFRAA >60 12/21/2012    AGRATIO 1.5 03/26/2022    LABGLOM 59 03/26/2022    GLUCOSE 77 03/26/2022    PROT 6.1 03/26/2022    CALCIUM 9.0 03/26/2022    BILITOT 0.3 03/26/2022    ALKPHOS 121 03/26/2022    AST 42 03/26/2022    ALT 52 03/26/2022     PT/INR:  No results found for: PTINR  Lab Results   Component Value Date    CKTOTAL 173 03/25/2022    CKMB 0.65 06/07/2010    CKMBINDEX 0.8 06/07/2010    TROPONINI <0.01 03/25/2022       EKG:  I have reviewed EKG with the following interpretation:  Impression:  NSR LBBB    Old notes reviewed  Telemetry reviewd  Ekg personally reviewed  Chest xray personally reviewed  Echo, cath, and   Medications and labs reviewed    Moderate complexity/medical decision making due to extensive data review, extensive history review, independent review of data  Moderate risk due to acute illness, evaluation of drug-drug interactions, medication management and diagnostic interventions      Assessment  Fatigue, possible anginal equivalent  CAD s/p CABG      Plan  After discussion with patient, I outlined various options.   One would be to stay for a cath, in which case we would need to hold his warfarin. I discussed with Dr. Spike Alvarado that he wouldn't believe a nuclear stress test if it were negative given his underlying issues. A coronary CT was a reasonable alternative. I was able to get this done today, however it turns out we don't have a radiologist on-call to read it. Given his clinical stability, will have him go home and follow-up as an outpatient while we have a chance to review the CT with radiology. Thank you for allowing to us to participate in the care or [place]. Further evaluation will be based upon the patient's clinical course and testing results. All questions and concerns were addressed to the patient/family. Alternatives to my treatment were discussed. The note was completed using EMR. Every effort was made to ensure accuracy; however, inadvertent computerized transcription errors may be present.       Filemon Martinez MD, MD 3/26/2022 12:04 PM

## 2022-03-26 NOTE — H&P
HOSPITALISTS HISTORY AND PHYSICAL  03/25/22  Patient Information:  Isi Tapia is a 78 y.o. male [id number]  PCP:  Ute Steel MD (Tel: [phone] )    Chief complaint:    Chief Complaint   Patient presents with    Illness     Patient has had hx of heart by pass, and has been having similiar symptoms. Denies CP, SOB - sent by cardio        History of Present Illness:  Liz Muñoz is a 78 y.o. male with h/o CABG, CAD , cardiomyopathy ICD in place, CHF, JOSEPH , afib , chronic anticoagulation presented with c/o nausea and not feeling well. Also reports leg cramps. Has h/o MI states feels similar to when he had his heart attack. He called his cardiology office who sent him to the the ED . Follows up with Cardiology Dr Janna Saravia last visit in Feb. Sees dr. Andrés Chavez for Afib Last CABG redo in 2019. LAst stress test in 2019. ED work up showed troponin < 0.01, EKG showed junctional rhythm with PVCs. Chest xray is non acute    History obtained and   Old medical records reviewed        REVIEW OF SYSTEMS:   Constitutional: Negative for fever,chills or night sweats  ENT: Negative for rhinorrhea, epistaxis, hoarseness, sore throat. Respiratory: Negative for shortness of breath,wheezing  Cardiovascular: Negative for chest pain, palpitations   Gastrointestinal: +Ve for nausea, -ve vomiting, diarrhea  Genitourinary: Negative for polyuria, dysuria   Hematologic/Lymphatic: Negative for bleeding tendency, easy bruising  Musculoskeletal: Negative for myalgias and arthralgias  Neurologic: Negative for confusion,dysarthria. Skin: Negative for itching,rash  Psychiatric: Negative for depression,anxiety, agitation. Endocrine: Negative for polydipsia,polyuria,heat /cold intolerance.     Past Medical History:   has a past medical history of Arthritis, Atrial fib/flut, CAD (coronary artery disease), Hyperlipidemia, Hypertension, Seizures ([facility] [address] and Sinus bradycardia. Past Surgical History:   has a past surgical history that includes hernia repair (Right); Coronary angioplasty with stent (1997); [place] tooth extraction (April 2012); pacemaker placement (12/18/2017); Coronary artery bypass graft (01/2019); Cardiac surgery; and Cystoscopy (N/A, 11/13/2019). Medications:  No current facility-administered medications on file prior to encounter. Current Outpatient Medications on File Prior to Encounter   Medication Sig Dispense Refill    potassium chloride (KLOR-CON M20) 20 MEQ extended release tablet TAKE ONE TABLET BY MOUTH  AS NEEDED WITH LASIX FOR SWELLING 90 tablet 1    atorvastatin (LIPITOR) 80 MG tablet TAKE 1 TABLET BY MOUTH ONE TIME A DAY 90 tablet 1    ezetimibe (ZETIA) 10 MG tablet TAKE 1 TABLET BY MOUTH ONCE DAILY 90 tablet 1    furosemide (LASIX) 20 MG tablet TAKE 1 TABLET BY MOUTH ONCE DAILY AS NEEDED FOR EDEMA 90 tablet 1    metoprolol succinate (TOPROL XL) 100 MG extended release tablet Take 1 tablet by mouth daily 90 tablet 1    magnesium oxide (MAG-OX) 400 MG tablet Take 1 tablet by mouth daily 90 tablet 1    aspirin 81 MG chewable tablet Take 1 tablet by mouth daily 90 tablet 3    nitroGLYCERIN (NITROSTAT) 0.4 MG SL tablet Place 1 tablet under the tongue every 5 minutes as needed for Chest pain 25 tablet 3    valsartan (DIOVAN) 40 MG tablet Take 0.5 tablets by mouth at bedtime 90 tablet 1    amiodarone (CORDARONE) 200 MG tablet Take 1 tablet by mouth daily 90 tablet 0    warfarin (COUMADIN) 5 MG tablet TAKE ONE TABLET BY MOUTH DAILY 90 tablet 1    diazepam (VALIUM) 5 MG tablet Take 5 mg by mouth 2 times daily.  phenytoin (DILANTIN) 100 MG ER capsule Take 1 capsule by mouth 2 times daily.  Resume home dose (Patient not taking: Reported on 3/25/2022) 1 capsule 0    primidone (MYSOLINE) 250 MG tablet Take 250 mg by mouth 2 times daily        Current Facility-Administered Medications   Medication Dose Route Frequency Provider Last Rate Last Admin    amiodarone (CORDARONE) tablet 200 mg  200 mg Oral Daily Skip Chisholm MD        aspirin chewable tablet 81 mg  81 mg Oral Daily Skip Chisholm MD        atorvastatin (LIPITOR) tablet 80 mg  80 mg Oral Nightly Skip Chisholm MD   80 mg at 03/25/22 2336    diazePAM (VALIUM) tablet 5 mg  5 mg Oral BID Skip Chisholm MD        nitroGLYCERIN (NITROSTAT) SL tablet 0.4 mg  0.4 mg SubLINGual Q5 Min PRN Skip Chisholm MD        sodium chloride flush 0.9 % injection 5-40 mL  5-40 mL IntraVENous 2 times per day Skip Chisholm MD   10 mL at 03/25/22 2336    sodium chloride flush 0.9 % injection 5-40 mL  5-40 mL IntraVENous PRN Skip Chisholm MD        0.9 % sodium chloride infusion  25 mL IntraVENous PRN Skip Chisholm MD        ondansetron (ZOFRAN-ODT) disintegrating tablet 4 mg  4 mg Oral Q8H PRN Skip Chisholm MD        Or    ondansetron (ZOFRAN) injection 4 mg  4 mg IntraVENous Q6H PRN Skip Chisholm MD        polyethylene glycol (GLYCOLAX) packet 17 g  17 g Oral Daily PRN Skip Chisholm MD        acetaminophen (TYLENOL) tablet 650 mg  650 mg Oral Q6H PRN Skip Chisholm MD        Or   Sukh Alejandro acetaminophen (TYLENOL) suppository 650 mg  650 mg Rectal Q6H PRN MD Sukh Coleman ON 3/29/2022] warfarin (COUMADIN) tablet 2.5 mg  2.5 mg Oral Once per day on Tue Thu Skip Chisholm MD        And    warfarin (COUMADIN) tablet 5 mg  5 mg Oral Once per day on Sun Mon Wed Fri Sat Skip Chisholm MD   5 mg at 03/25/22 2336     Allergies:  No Known Allergies     Social History:  Patient Lives    reports that he quit smoking about 33 years ago. He has a 30.00 pack-year smoking history. He has never used smokeless tobacco. He reports that he does not drink alcohol and does not use drugs.      Family History:  family history includes Heart Failure in his mother. ,     Physical Exam:  /75   Pulse 50   Temp 97.4 °F (36.3 °C) (Temporal)   Resp 18   Ht 5' 10\" (1.778 m)   Wt 161 lb 2.5 oz (73.1 kg)   SpO2 95%   BMI 23.12 kg/m²     General appearance:  Appears comfortable. Well nourished  Eyes: Sclera clear, pupils equal  ENT: Moist mucus membranes, no thrush. Trachea midline. Cardiovascular: Regular rhythm, normal S1, S2. No murmur, gallop, rub. No edema in lower extremities  Respiratory: Clear to auscultation bilaterally, no wheeze, good inspiratory effort  Gastrointestinal: Abdomen soft, non-tender, not distended, normal bowel sounds  Musculoskeletal: No cyanosis in digits, neck supple  Neurology: Cranial nerves grossly intact. Alert and oriented in time, place and person. No speech or motor deficits  Psychiatry: Appropriate affect. Not agitated  Skin: Warm, dry, normal turgor, no rash  Brisk capillary refill, peripheral pulses palpable   Labs:  CBC:   Lab Results   Component Value Date    WBC 5.6 03/26/2022    RBC 4.25 03/26/2022    HGB 13.5 03/26/2022    HCT 40.4 03/26/2022    MCV 95.1 03/26/2022    MCH 31.8 03/26/2022    MCHC 33.4 03/26/2022    RDW 13.7 03/26/2022     03/26/2022    MPV 8.5 03/26/2022     BMP:    Lab Results   Component Value Date     03/26/2022    K 4.4 03/26/2022    K 4.1 08/08/2019     03/26/2022    CO2 25 03/26/2022    BUN 19 03/26/2022    CREATININE 1.2 03/26/2022    CALCIUM 9.0 03/26/2022    GFRAA >60 03/26/2022    GFRAA >60 12/21/2012    LABGLOM 59 03/26/2022    GLUCOSE 77 03/26/2022     CT ABDOMEN PELVIS W IV CONTRAST Additional Contrast? None   Final Result   No acute abnormality. Cholelithiasis unchanged. No duct dilation. Apparent weight loss since the prior study. Moderate fat deposition the throughout the left myocardial apex likely from   significant old subendocardial infarction. XR CHEST (2 VW)   Final Result   No acute process.          NM MYOCARDIAL SPECT REST EXERCISE OR RX    (Results Pending)     Chest Xray:   EKG:    I visualized CXR images and EKG strips    Discussed case  with     Problem List  Principal Problem:    Nausea  Resolved Problems:    * No resolved hospital problems. *        Assessment/Plan:   Admit to r/out ACS  H/o CAD , MI   Cont to trend troponin  On ASA, nitro , litpitor  Stress test ordered     Chronic afib   Cont Coumadin   INR is therapeutic  Pharmacy to dose  DVT prophylaxis Lovenox   Code status   Diet   IV access   Luz Catheter    Admit as obs. I anticipate hospitalization spanning les than two midnights for investigation and treatment of the above medically necessary diagnoses. Please note that some part of this chart was generated using Dragon dictation software. Although every effort was made to ensure the accuracy of this automated transcription, some errors in transcription may have occurred inadvertently. If you may need any clarification, please do not hesitate to contact me through [facility].        Ryan Buck MD    03/25/22

## 2022-03-26 NOTE — PROGRESS NOTES
CT scan done  Images personally reviewed by me  Significant amount of step artifact  No radiologist on call today to do post-processing and formal read    Given lack of worrisome symptoms and that patient r/o for MI ok to discharge home    Continue coumadin for now, will defer to Dr. Melvin Salmeron whether or not patient needs cath after radiology has had a chance to read the images.

## 2022-03-26 NOTE — PROGRESS NOTES
Pt admitted with h/o MI, Afib , ICD , HTN CAD presented with  nausea   Admitted for ACS r.out STress test ordered  The pt is hemodynamically stable   Full note to follow

## 2022-03-26 NOTE — CONSULTS
Cardiology consult   Patient seen and examined, chart reviewed   Full note to follow     Stress canceled as a nuclear may be misleading in his case per my conversation with Dr Eunice Matamoros  He ruled out for MI  He doesn't want to stay all weekend for a cath    I think given his clinical stability it's reasonable for him to go home and get either an outpatient CTA or an outpatient cath per Dr Yuan Olivia to discharge home      ADDENDUM - will try to get an inpatient CTA if possible today

## 2022-03-26 NOTE — PROGRESS NOTES
Patient discharged, no one to read CT results. Patient takes care of wife with dementia and strongly urged to go home. Knows to make an appt with Dr. Tiny Nichole as soon as possible for hopefully Monday to go over the results. IV's removed. Resume medication regimen including still taking Warfarin. All questions answered for the patient. AVS given to patient and agreement signed and placed in patient's chart. Patient's family wheeled him off unit.

## 2022-03-26 NOTE — PROGRESS NOTES
STAT CT ordered on patient. CT called to clarify the orders that were placed. Perfect serve sent to Dr. Santana Olea, waiting for reply.

## 2022-03-26 NOTE — DISCHARGE INSTR - DIET
Good nutrition is important when healing from an illness, injury, or surgery. Follow any nutrition recommendations given to you during your hospital stay. If you were given an oral nutrition supplement while in the hospital, continue to take this supplement at home. You can take it with meals, in-between meals, and/or before bedtime. These supplements can be purchased at most local grocery stores, pharmacies, and chain [employer]-stores. If you have any questions about your diet or nutrition, call the hospital and ask for the dietitian. Low sodium diet, low potassium, low fat.

## 2022-03-28 ENCOUNTER — TELEPHONE (OUTPATIENT)
Dept: CARDIOLOGY CLINIC | Age: 77
End: 2022-03-28

## 2022-03-28 DIAGNOSIS — R93.89 ABNORMAL COMPUTED TOMOGRAPHY ANGIOGRAPHY (CTA): ICD-10-CM

## 2022-03-28 DIAGNOSIS — I25.5 ISCHEMIC CARDIOMYOPATHY: Primary | ICD-10-CM

## 2022-03-28 DIAGNOSIS — I20.8 ANGINA AT REST (HCC): ICD-10-CM

## 2022-03-28 DIAGNOSIS — I25.10 CORONARY ARTERY DISEASE INVOLVING NATIVE CORONARY ARTERY OF NATIVE HEART WITHOUT ANGINA PECTORIS: ICD-10-CM

## 2022-03-28 NOTE — PROGRESS NOTES
[address]   Electrophysiology Follow up   Date: 3/29/2022  I had the privilege of visiting Ileen Apgar Day in the office. CC: PAF  HPI: Theodore Watts is a 78 y.o. male with a past medical history of HTN, HLD, CAD s/p CABG (redo ), seizures, bradycardia, and atrial fibrillation. NSTEMI 10/13/2017. Has been treated with Tikosyn in the past for afib. Initially did not tolerate amiodarone due to bradycardia. S/p redo CABGx4 at [address]  [facility], WESTON ligation, and LA maze 2019.       2020:  Atrial fibrillation. Device interrogation showed he had been back in atrial fibrillation since 2020. He also had an increase in his PVC rate to 250/hour. S/p DCCV 2020    LATESHA with no remnant of WESTON. Ablation discussed but he was not interested.        3/25/2022 presented to [facility] ED with complaints of chest/abdomiinal pain. Felt fluttering in his abdomen. ECG and troponin both negative. CTA completed as an inpatient. Patient discharged. Cardiac CTA completed 3/25/2022: recommended cardiac cath, followed by , patient will be scheduled as an outpatient     [facility] COURTNEY presents to the office in follow up. He had complaints of feeling similar to how he felt before his last bypass. He is scheduled for a [facility] tomorrow 3/30/2022. Complains of bradycardia on pulse oximeter, discussion that he has PVC's that make his HR read low and it is inaccurate. Lower rate on PPM set at 50 BPM. He remains compliant on his medication regimen.      Assessment and plan:   -PAF   ECG today shows Sinus Bradycardia  with PVC's   <0.1% AT/AF burden per device    TSH 3/25/2022 6.41,      On Amiodarone 200 mg daily       Symptomatic with episodes in the past, denies any recurrent symptoms    On toprol  mg daily for rate control   S/p DCCV 2021     He was not interested in Ablation in the past.     WESTON ligation 2018 during CABG, LATESHA 2021 No remnant of Left atrial appendage was seen , Patient remains on coumadin managed by Cathy PÉREZ-CNP     Discussed that his risk of stroke is low with no remnant of WESTON, It would be reasonable for him to take him off coumadin.       -Ischemic Cardiomyopathy/NSVT/Implantable device              S/p dual chamber  AICD 12/08/2017   The CIED was interrogated and programmed and I supervised and reviewed all the data. All findings and changes are in device interrogation sheet and reflect my personal interpretation and changes and is scanned to Epic. 5.2 years remaining, AP 75.3% ,  0.1%     -PVC's   Frequent multiform PVC's on ECG   Does not feel symptomatic with these, may consider further treatment if has increased PVC's off amiodarone    Continues amiodarone 200 mg daily   Scheduled for [facility] 3/30/2022 to r/o ischemic cause      If negative for ischemia may consider ablation in the future      -CAD   Scheduled for [facility] 3/30/2022 with Jalen Ramos    Cardiac CTA completed 3/25/2022: recommended cardiac cath, followed by               Hx of MI 10/2018 s/p re-do CABG 01/08/ 2019 at [address] [place] [address] Rd therapy, Toprol XL, zetia, lipitor, ASA     -HTN  Controlled: 120/60  BP goal <130/80  Home BP monitoring encouraged, printed information provided on how to accurately measure BP at home. Counseled to follow a low salt diet to assure blood pressure remains controlled for cardiovascular risk factor modification. The patient is counseled to get regular exercise 3-5 times per week and maintain a healthy weight reduce cardiovascular risk factors.         Patient Active Problem List    Diagnosis Date Noted    Lightheadedness     Chest pain at rest 06/07/2010    JOSEPH on CPAP 11/20/2017    Vertebrobasilar insufficiency     ST elevation myocardial infarction (STEMI) (HCC)     NSVT (nonsustained ventricular tachycardia) (HCC)     Coronary artery disease involving coronary bypass graft of native heart without angina pectoris     Bradycardia 09/28/2016    Palpitations 01/14/2016    Hyperlipidemia 12/13/2012    Atrial fibrillation ([facility] 06/27/2012    Coronary artery disease due to lipid rich plaque 06/07/2010    Seizure ([facility] 06/07/2010    Essential hypertension 06/07/2010    PAF [facility]oxysmal atrial fibrillation) ([facility])     Nausea 03/25/2022    Variant angina (HCC) 08/08/2019    Chronic systolic heart failure ([facility] 05/22/2019    Coronary artery disease involving autologous artery coronary bypass graft without angina pectoris     Shortness of breath     ICD (implantable cardioverter-defibrillator) in place 01/09/2018    VT (ventricular tachycardia) ([facility] 12/18/2017    History of MI (myocardial infarction)     Ischemic cardiomyopathy     VF (ventricular fibrillation) ([facility]      Diagnostic studies:   ECG 3/29/22  Sinus Paulie with multiform PVC's   395 QTcH, 98 QRS    CTA 3/25/2022  There is some limitation due to motion, sternotomy wires, metallic atrial   clip and pacemaker wires.       Nonfilling, presumed proximal total occlusion of the LIMA graft.       Patent graft from aorta to distal right coronary artery with moderate   stenosis proximal descending portion and suspected stenosis distal   anastomosis.  Moderate foci of intervening descending graft stenosis.       Patent graft from the aorta to obtuse marginal with mild graft stenosis. There may be retrograde filling supplying the circumflex and small branches   proximally.       Very small graft interposed between the larger saphenous vein grafts with   apparent high-grade stenosis and subtle flow and patency perhaps extending to   the LAD.  This is insufficiently seen.       Given potential significance of several findings, correlation with nuclear   cardiology and/or coronary angiography may be necessary. Echo 4/15/2021   Summary   Mildly dilated left ventricle. Mild concentric left ventricular hypertrophy.    Mildly decreased left ventricular systolic function with distal septal and   apical hypokinesis. Estimated EF 40-45%. Cannot assess diastolic function. Mild thickening of the mitral valve leaflets. No stenosis. Moderate mitral regurgitation. The left atrium is mildly dilated. The aortic valve appears tricuspid with mild sclerosis no stenosis. Trivial aortic regurgitation. Normal right ventricular size and mildly decreased in function. Moderate tricuspid regurgitation. PASP 41mmHg. The right atrium is moderately dilated. Pacemaker / ICD lead is visualized   in the right atrium. Trivial pulmonic regurgitation. IVC not well visualized. LATESHA 1/29/2021   Summary   Normal left ventricle size. There is left ventricular hypertrophy. There is   moderate LV dysfunction. Ejection fraction is estimated at 40%. Mild to moderate mitral regurgitation. There is no evidence of mass or thrombus in the left atrium. No remnant of left atrial appendage was seen. Patient is s/p WESTON amputation   in the past.       I independently reviewed the cardiac diagnostic studies, ECG and relevant imaging studies. Lab Results   Component Value Date    LVEF 43 04/15/2021    LVEFMODE Echo 10/14/2017     Lab Results   Component Value Date    TSH 6.41 (H) 03/25/2022         Physical Examination:  Vitals:    03/29/22 1137   BP: 120/60   Pulse: (!) 34   SpO2: 99%      Wt Readings from Last 3 Encounters:   03/29/22 162 lb 6.4 oz (73.7 kg)   03/26/22 161 lb 2.5 oz (73.1 kg)   02/24/22 163 lb 8 oz (74.2 kg)       · Constitutional: Oriented. No distress. · Head: Normocephalic and atraumatic. · Mouth/Throat: Oropharynx is clear and moist.   · Eyes: Conjunctivae normal. EOM are normal.   · Neck: Neck supple. No JVD present. · Cardiovascular: Normal rate, regular rhythm, S1&S2. · Pulmonary/Chest: Bilateral respiratory sounds. No rhonchi. · Abdominal: Soft. No tenderness. · Musculoskeletal: No tenderness. No edema    · Lymphadenopathy: Has no cervical adenopathy. · Neurological: Alert and oriented. Follows command, No Gross deficit   · Skin: Skin is warm, No rash noted. · Psychiatric: Has a normal behavior     Review of System:  [x] Full ROS obtained and negative except as mentioned in HPI    Prior to Admission medications    Medication Sig Start Date End Date Taking? Authorizing Provider   potassium chloride (KLOR-CON M20) 20 MEQ extended release tablet TAKE ONE TABLET BY MOUTH  AS NEEDED WITH LASIX FOR SWELLING 2/24/22  Yes Fahad Mcdaniels DO   atorvastatin (LIPITOR) 80 MG tablet TAKE 1 TABLET BY MOUTH ONE TIME A DAY 2/24/22  Yes Fahad Mcdaniels DO   ezetimibe (ZETIA) 10 MG tablet TAKE 1 TABLET BY MOUTH ONCE DAILY 2/24/22  Yes Fahad Mcdaniels DO   furosemide (LASIX) 20 MG tablet TAKE 1 TABLET BY MOUTH ONCE DAILY AS NEEDED FOR EDEMA 2/24/22  Yes Fahad Mcdaniels DO   metoprolol succinate (TOPROL XL) 100 MG extended release tablet Take 1 tablet by mouth daily 2/24/22  Yes Fahad Mcdaniels DO   magnesium oxide (MAG-OX) 400 MG tablet Take 1 tablet by mouth daily 2/24/22  Yes Fahad Mcdaniels DO   aspirin 81 MG chewable tablet Take 1 tablet by mouth daily 2/24/22  Yes Fahad Mcdaniels DO   nitroGLYCERIN (NITROSTAT) 0.4 MG SL tablet Place 1 tablet under the tongue every 5 minutes as needed for Chest pain 2/24/22  Yes Fahad Mcdaniels DO   valsartan (DIOVAN) 40 MG tablet Take 0.5 tablets by mouth at bedtime 2/24/22  Yes Fahad Mcdaniels DO   amiodarone (CORDARONE) 200 MG tablet Take 1 tablet by mouth daily 2/15/22  Yes Last oClindres MD   diazepam (VALIUM) 5 MG tablet Take 5 mg by mouth 2 times daily. Yes Historical Provider, MD   phenytoin (DILANTIN) 100 MG ER capsule Take 1 capsule by mouth 2 times daily. Resume home dose 12/21/12  Yes Lisset Simmons, APRN - CNP   primidone (MYSOLINE) 250 MG tablet Take 250 mg by mouth 2 times daily    Yes Historical Provider, MD       No Known Allergies    Social History:  Reviewed. reports that he quit smoking about 33 years ago.  He has a 30.00

## 2022-03-28 NOTE — TELEPHONE ENCOUNTER
Dr Ysabel Barnes discussed results of CTA with patient. Recommended cardiac cath, patient agrees to proceed.  Instructed to hold asa and warfarin,not to eat or drink after midnight,  come in 10 am on wed for an 1130 cath with [facility]  Also instructed to come to the ER for worsening symptoms    Helga--I am routing this to you for your information, Thank you for your help

## 2022-03-29 ENCOUNTER — OFFICE VISIT (OUTPATIENT)
Dept: CARDIOLOGY CLINIC | Age: 77
End: 2022-03-29
Payer: MEDICARE

## 2022-03-29 ENCOUNTER — NURSE ONLY (OUTPATIENT)
Dept: CARDIOLOGY CLINIC | Age: 77
End: 2022-03-29
Payer: MEDICARE

## 2022-03-29 VITALS
OXYGEN SATURATION: 99 % | HEART RATE: 34 BPM | BODY MASS INDEX: 23.25 KG/M2 | DIASTOLIC BLOOD PRESSURE: 60 MMHG | HEIGHT: 70 IN | SYSTOLIC BLOOD PRESSURE: 120 MMHG | WEIGHT: 162.4 LBS

## 2022-03-29 DIAGNOSIS — I48.0 PAF (PAROXYSMAL ATRIAL FIBRILLATION) (HCC): Primary | ICD-10-CM

## 2022-03-29 DIAGNOSIS — I47.20 VT (VENTRICULAR TACHYCARDIA): ICD-10-CM

## 2022-03-29 DIAGNOSIS — R00.1 BRADYCARDIA: ICD-10-CM

## 2022-03-29 DIAGNOSIS — I48.0 PAROXYSMAL ATRIAL FIBRILLATION (HCC): Chronic | ICD-10-CM

## 2022-03-29 DIAGNOSIS — Z95.810 ICD (IMPLANTABLE CARDIOVERTER-DEFIBRILLATOR) IN PLACE: ICD-10-CM

## 2022-03-29 DIAGNOSIS — I25.5 ISCHEMIC CARDIOMYOPATHY: Chronic | ICD-10-CM

## 2022-03-29 DIAGNOSIS — I50.22 CHRONIC SYSTOLIC HEART FAILURE (HCC): ICD-10-CM

## 2022-03-29 DIAGNOSIS — I49.01 VF (VENTRICULAR FIBRILLATION) (HCC): ICD-10-CM

## 2022-03-29 DIAGNOSIS — I48.0 PAF (PAROXYSMAL ATRIAL FIBRILLATION) (HCC): Chronic | ICD-10-CM

## 2022-03-29 PROCEDURE — G8427 DOCREV CUR MEDS BY ELIG CLIN: HCPCS | Performed by: INTERNAL MEDICINE

## 2022-03-29 PROCEDURE — G8420 CALC BMI NORM PARAMETERS: HCPCS | Performed by: INTERNAL MEDICINE

## 2022-03-29 PROCEDURE — G8484 FLU IMMUNIZE NO ADMIN: HCPCS | Performed by: INTERNAL MEDICINE

## 2022-03-29 PROCEDURE — 1036F TOBACCO NON-USER: CPT | Performed by: INTERNAL MEDICINE

## 2022-03-29 PROCEDURE — 99214 OFFICE O/P EST MOD 30 MIN: CPT | Performed by: INTERNAL MEDICINE

## 2022-03-29 PROCEDURE — 93290 INTERROG DEV EVAL ICPMS IP: CPT | Performed by: INTERNAL MEDICINE

## 2022-03-29 PROCEDURE — 3017F COLORECTAL CA SCREEN DOC REV: CPT | Performed by: INTERNAL MEDICINE

## 2022-03-29 PROCEDURE — 4040F PNEUMOC VAC/ADMIN/RCVD: CPT | Performed by: INTERNAL MEDICINE

## 2022-03-29 PROCEDURE — 1123F ACP DISCUSS/DSCN MKR DOCD: CPT | Performed by: INTERNAL MEDICINE

## 2022-03-29 PROCEDURE — 93283 PRGRMG EVAL IMPLANTABLE DFB: CPT | Performed by: INTERNAL MEDICINE

## 2022-03-29 PROCEDURE — 93000 ELECTROCARDIOGRAM COMPLETE: CPT | Performed by: INTERNAL MEDICINE

## 2022-03-29 NOTE — PROGRESS NOTES
Patient comes in for programming evaluation for his defibrillator. All sensing and pacing parameters are within normal range. Battery life 5.2 years  AP 75.3%.  0.1%. No episodes noted. PVC Runs (2-4 beats) 2.8 per hour  PVC Singles 265.3 per hour  Patient remains on warfarin, metoprolol and amiodarone. No parameters have been changed during the current session. Please see interrogation for more detail. Optivol is within normal range. Patient will follow up in 3 months in office or remotely.

## 2022-03-30 ENCOUNTER — TELEPHONE (OUTPATIENT)
Dept: CARDIOLOGY CLINIC | Age: 77
End: 2022-03-30

## 2022-03-30 ENCOUNTER — HOSPITAL ENCOUNTER (OUTPATIENT)
Dept: CARDIAC CATH/INVASIVE PROCEDURES | Age: 77
Setting detail: OBSERVATION
Discharge: HOME | End: 2022-03-31
Attending: INTERNAL MEDICINE | Admitting: INTERNAL MEDICINE
Payer: MEDICARE

## 2022-03-30 DIAGNOSIS — I25.83 CORONARY ARTERY DISEASE DUE TO LIPID RICH PLAQUE: Primary | ICD-10-CM

## 2022-03-30 DIAGNOSIS — I25.10 CORONARY ARTERY DISEASE DUE TO LIPID RICH PLAQUE: Primary | ICD-10-CM

## 2022-03-30 PROBLEM — I25.110 CORONARY ARTERY DISEASE INVOLVING NATIVE CORONARY ARTERY OF NATIVE HEART WITH UNSTABLE ANGINA PECTORIS (HCC): Status: ACTIVE | Noted: 2022-03-30

## 2022-03-30 LAB
EKG ATRIAL RATE: 50 BPM
EKG DIAGNOSIS: NORMAL
EKG P-R INTERVAL: 256 MS
EKG Q-T INTERVAL: 402 MS
EKG QRS DURATION: 94 MS
EKG QTC CALCULATION (BAZETT): 366 MS
EKG R AXIS: -62 DEGREES
EKG T AXIS: 52 DEGREES
EKG VENTRICULAR RATE: 50 BPM
POC ACT LR: 283 SEC
POC ACT LR: 325 SEC

## 2022-03-30 PROCEDURE — 2580000003 HC RX 258: Performed by: INTERNAL MEDICINE

## 2022-03-30 PROCEDURE — 6360000004 HC RX CONTRAST MEDICATION: Performed by: INTERNAL MEDICINE

## 2022-03-30 PROCEDURE — 6370000000 HC RX 637 (ALT 250 FOR IP): Performed by: INTERNAL MEDICINE

## 2022-03-30 PROCEDURE — C1725 CATH, TRANSLUMIN NON-LASER: HCPCS

## 2022-03-30 PROCEDURE — 99152 MOD SED SAME PHYS/QHP 5/>YRS: CPT | Performed by: INTERNAL MEDICINE

## 2022-03-30 PROCEDURE — 99153 MOD SED SAME PHYS/QHP EA: CPT

## 2022-03-30 PROCEDURE — 93010 ELECTROCARDIOGRAM REPORT: CPT | Performed by: INTERNAL MEDICINE

## 2022-03-30 PROCEDURE — C9604 PERC D-E COR REVASC T CABG S: HCPCS

## 2022-03-30 PROCEDURE — G0378 HOSPITAL OBSERVATION PER HR: HCPCS

## 2022-03-30 PROCEDURE — 6360000002 HC RX W HCPCS

## 2022-03-30 PROCEDURE — 2709999900 HC NON-CHARGEABLE SUPPLY

## 2022-03-30 PROCEDURE — C1894 INTRO/SHEATH, NON-LASER: HCPCS

## 2022-03-30 PROCEDURE — 2500000003 HC RX 250 WO HCPCS

## 2022-03-30 PROCEDURE — 6370000000 HC RX 637 (ALT 250 FOR IP)

## 2022-03-30 PROCEDURE — C1884 EMBOLIZATION PROTECT SYST: HCPCS

## 2022-03-30 PROCEDURE — 93459 L HRT ART/GRFT ANGIO: CPT | Performed by: INTERNAL MEDICINE

## 2022-03-30 PROCEDURE — 93005 ELECTROCARDIOGRAM TRACING: CPT | Performed by: INTERNAL MEDICINE

## 2022-03-30 PROCEDURE — 93459 L HRT ART/GRFT ANGIO: CPT

## 2022-03-30 PROCEDURE — 92929 PR PRQ TRLUML CORONARY STENT W/ANGIO ADDL ART/BRNCH: CPT | Performed by: INTERNAL MEDICINE

## 2022-03-30 PROCEDURE — 85347 COAGULATION TIME ACTIVATED: CPT

## 2022-03-30 PROCEDURE — 99152 MOD SED SAME PHYS/QHP 5/>YRS: CPT

## 2022-03-30 PROCEDURE — C1769 GUIDE WIRE: HCPCS

## 2022-03-30 PROCEDURE — 85610 PROTHROMBIN TIME: CPT

## 2022-03-30 PROCEDURE — C1887 CATHETER, GUIDING: HCPCS

## 2022-03-30 PROCEDURE — 2580000003 HC RX 258

## 2022-03-30 PROCEDURE — 92928 PRQ TCAT PLMT NTRAC ST 1 LES: CPT | Performed by: INTERNAL MEDICINE

## 2022-03-30 PROCEDURE — C1874 STENT, COATED/COV W/DEL SYS: HCPCS

## 2022-03-30 RX ORDER — AMIODARONE HYDROCHLORIDE 200 MG/1
200 TABLET ORAL DAILY
Status: DISCONTINUED | OUTPATIENT
Start: 2022-03-31 | End: 2022-03-31 | Stop reason: HOSPADM

## 2022-03-30 RX ORDER — OXYCODONE HYDROCHLORIDE AND ACETAMINOPHEN 5; 325 MG/1; MG/1
1 TABLET ORAL EVERY 4 HOURS PRN
Status: DISCONTINUED | OUTPATIENT
Start: 2022-03-30 | End: 2022-03-31 | Stop reason: HOSPADM

## 2022-03-30 RX ORDER — OXYCODONE HYDROCHLORIDE AND ACETAMINOPHEN 5; 325 MG/1; MG/1
2 TABLET ORAL EVERY 4 HOURS PRN
Status: DISCONTINUED | OUTPATIENT
Start: 2022-03-30 | End: 2022-03-31 | Stop reason: HOSPADM

## 2022-03-30 RX ORDER — ONDANSETRON 2 MG/ML
4 INJECTION INTRAMUSCULAR; INTRAVENOUS EVERY 6 HOURS PRN
Status: DISCONTINUED | OUTPATIENT
Start: 2022-03-30 | End: 2022-03-31 | Stop reason: HOSPADM

## 2022-03-30 RX ORDER — DIAZEPAM 5 MG/1
5 TABLET ORAL 2 TIMES DAILY
Status: DISCONTINUED | OUTPATIENT
Start: 2022-03-30 | End: 2022-03-31 | Stop reason: HOSPADM

## 2022-03-30 RX ORDER — NITROGLYCERIN 0.4 MG/1
0.4 TABLET SUBLINGUAL EVERY 5 MIN PRN
Status: DISCONTINUED | OUTPATIENT
Start: 2022-03-30 | End: 2022-03-31 | Stop reason: HOSPADM

## 2022-03-30 RX ORDER — ASPIRIN 81 MG/1
81 TABLET, CHEWABLE ORAL DAILY
Status: DISCONTINUED | OUTPATIENT
Start: 2022-03-30 | End: 2022-03-30 | Stop reason: SDUPTHER

## 2022-03-30 RX ORDER — PHENYTOIN SODIUM 100 MG/1
100 CAPSULE, EXTENDED RELEASE ORAL ONCE
Status: COMPLETED | OUTPATIENT
Start: 2022-03-30 | End: 2022-03-30

## 2022-03-30 RX ORDER — PHENYTOIN SODIUM 100 MG/1
100 CAPSULE, EXTENDED RELEASE ORAL 2 TIMES DAILY
Status: DISCONTINUED | OUTPATIENT
Start: 2022-03-30 | End: 2022-03-31 | Stop reason: HOSPADM

## 2022-03-30 RX ORDER — CLOPIDOGREL BISULFATE 75 MG/1
75 TABLET ORAL DAILY
Status: DISCONTINUED | OUTPATIENT
Start: 2022-03-31 | End: 2022-03-31 | Stop reason: HOSPADM

## 2022-03-30 RX ORDER — SODIUM CHLORIDE 0.9 % (FLUSH) 0.9 %
5-40 SYRINGE (ML) INJECTION PRN
Status: DISCONTINUED | OUTPATIENT
Start: 2022-03-30 | End: 2022-03-31 | Stop reason: HOSPADM

## 2022-03-30 RX ORDER — METOPROLOL SUCCINATE 50 MG/1
100 TABLET, EXTENDED RELEASE ORAL DAILY
Status: DISCONTINUED | OUTPATIENT
Start: 2022-03-31 | End: 2022-03-31 | Stop reason: HOSPADM

## 2022-03-30 RX ORDER — SODIUM CHLORIDE 0.9 % (FLUSH) 0.9 %
5-40 SYRINGE (ML) INJECTION EVERY 12 HOURS SCHEDULED
Status: DISCONTINUED | OUTPATIENT
Start: 2022-03-30 | End: 2022-03-31 | Stop reason: HOSPADM

## 2022-03-30 RX ORDER — SODIUM CHLORIDE 9 MG/ML
INJECTION, SOLUTION INTRAVENOUS CONTINUOUS
Status: ACTIVE | OUTPATIENT
Start: 2022-03-30 | End: 2022-03-30

## 2022-03-30 RX ORDER — LANOLIN ALCOHOL/MO/W.PET/CERES
400 CREAM (GRAM) TOPICAL DAILY
Status: DISCONTINUED | OUTPATIENT
Start: 2022-03-31 | End: 2022-03-31 | Stop reason: HOSPADM

## 2022-03-30 RX ORDER — MORPHINE SULFATE 2 MG/ML
2 INJECTION, SOLUTION INTRAMUSCULAR; INTRAVENOUS
Status: DISCONTINUED | OUTPATIENT
Start: 2022-03-30 | End: 2022-03-31 | Stop reason: HOSPADM

## 2022-03-30 RX ORDER — ROSUVASTATIN CALCIUM 40 MG/1
40 TABLET, COATED ORAL NIGHTLY
Status: DISCONTINUED | OUTPATIENT
Start: 2022-03-30 | End: 2022-03-31 | Stop reason: HOSPADM

## 2022-03-30 RX ORDER — SODIUM CHLORIDE 9 MG/ML
25 INJECTION, SOLUTION INTRAVENOUS PRN
Status: DISCONTINUED | OUTPATIENT
Start: 2022-03-30 | End: 2022-03-31 | Stop reason: HOSPADM

## 2022-03-30 RX ORDER — VALSARTAN 40 MG/1
20 TABLET ORAL NIGHTLY
Status: DISCONTINUED | OUTPATIENT
Start: 2022-03-30 | End: 2022-03-31 | Stop reason: HOSPADM

## 2022-03-30 RX ORDER — PRIMIDONE 250 MG/1
250 TABLET ORAL 2 TIMES DAILY
Status: DISCONTINUED | OUTPATIENT
Start: 2022-03-30 | End: 2022-03-31 | Stop reason: HOSPADM

## 2022-03-30 RX ORDER — ACETAMINOPHEN 325 MG/1
650 TABLET ORAL EVERY 4 HOURS PRN
Status: DISCONTINUED | OUTPATIENT
Start: 2022-03-30 | End: 2022-03-31 | Stop reason: HOSPADM

## 2022-03-30 RX ORDER — ASPIRIN 81 MG/1
81 TABLET ORAL DAILY
Status: DISCONTINUED | OUTPATIENT
Start: 2022-03-31 | End: 2022-03-31 | Stop reason: HOSPADM

## 2022-03-30 RX ADMIN — IOPAMIDOL 206 ML: 755 INJECTION, SOLUTION INTRAVENOUS at 13:15

## 2022-03-30 RX ADMIN — DIAZEPAM 5 MG: 5 TABLET ORAL at 17:21

## 2022-03-30 RX ADMIN — SODIUM CHLORIDE: 9 INJECTION, SOLUTION INTRAVENOUS at 17:00

## 2022-03-30 RX ADMIN — ROSUVASTATIN CALCIUM 40 MG: 40 TABLET, COATED ORAL at 21:14

## 2022-03-30 RX ADMIN — PHENYTOIN SODIUM 100 MG: 100 CAPSULE ORAL at 16:59

## 2022-03-30 RX ADMIN — VALSARTAN 20 MG: 40 TABLET, FILM COATED ORAL at 16:59

## 2022-03-30 RX ADMIN — PRIMIDONE 250 MG: 250 TABLET ORAL at 17:21

## 2022-03-30 RX ADMIN — PHENYTOIN SODIUM 100 MG: 100 CAPSULE ORAL at 21:14

## 2022-03-30 ASSESSMENT — ENCOUNTER SYMPTOMS
NAUSEA: 0
ABDOMINAL PAIN: 0
COUGH: 0
ABDOMINAL DISTENTION: 0
CHEST TIGHTNESS: 0
SHORTNESS OF BREATH: 0
PHOTOPHOBIA: 0
BLOOD IN STOOL: 1
ANAL BLEEDING: 1

## 2022-03-30 ASSESSMENT — PAIN SCALES - GENERAL
PAINLEVEL_OUTOF10: 0

## 2022-03-30 NOTE — H&P
[address]  2/24/22  Referring: Dr. Magy Carpenter CONSULT/CHIEF COMPLAINT/HPI     Reason for visit/ Chief complaint  6 month follow up  CAD   HPI Lb Mathis is a 78 y.o. seen as a 6 month follow up for CAD, hypertension, hyperlipidemia and AF. He has SVT,  JOSEPH  He quit smoking 1990. He has no family history of CAD. He lives with his wife, who has dementia. Prior his first CABG, he did not have chest pain, but \"just did not feel right\" and \"had heaviness of the head. \" He had an abnormal stress test followed by an angiogram  Prior his second bypass, he did experience chest pain. He has not had any of this feeling since. Last visit started  magnesium 400 mg daily. He thinks he has less joint aches since starting magnesium. It was recommended that he start entresto but he was not interestes in adding additional medications    In the interval since his last visit he laid concrete, spread gravel by hand to finish his driveway. He has no chest pain, shortness of breath, palpitations or dizziness when exercising. He has no edema or orthopnea. He has had random episodes of chest discomfort, lasting a second, no exacerbating or alleviating factors. Never exertional.  He has not had any atrial fibrillation since last year. He has had right shoulder pain since his 30's which has not increased in severity or frequency. He states he sometimes has blood on his toilet paper after straining for a BM  He has been putting off of having a colonoscopy because he is afraid to come off of his coumadin    Patient is compliant with medications and is tolerating them well without side effects     HISTORY/ALLERGIES/ROS     MedHx:  has a past medical history of Arthritis, Atrial fib/flut, CAD (coronary artery disease), Hyperlipidemia, Hypertension, Seizures ([address] and Sinus bradycardia. SurgHx:  has a past surgical history that includes hernia repair (Right);  Coronary angioplasty with stent (1997); [place] tooth extraction (April 2012); pacemaker placement (12/18/2017); Coronary artery bypass graft (01/2019); Cardiac surgery; and Cystoscopy (N/A, 11/13/2019). SocHx:  reports that he quit smoking about 33 years ago. He has a 30.00 pack-year smoking history. He has never used smokeless tobacco. He reports that he does not drink alcohol and does not use drugs. FamHx: No family history of premature coronary artery disease, sudden death, or aneurysm  Allergies: Patient has no known allergies. ROS:   Review of Systems   Constitutional: Positive for fatigue. Negative for activity change, diaphoresis and fever. HENT: Negative for congestion and ear discharge. Eyes: Negative for photophobia and visual disturbance. Respiratory: Negative for cough, chest tightness and shortness of breath. Cardiovascular: Positive for chest pain and palpitations. Gastrointestinal: Positive for anal bleeding and blood in stool. Negative for abdominal distention, abdominal pain and nausea. Endocrine: Negative for cold intolerance and polydipsia. Genitourinary: Negative for difficulty urinating and flank pain. Musculoskeletal: Positive for arthralgias and myalgias. Skin: Negative for rash and wound. Allergic/Immunologic: Negative for environmental allergies and immunocompromised state. Neurological: Negative for dizziness and headaches. Hematological: Negative for adenopathy. Does not bruise/bleed easily. Psychiatric/Behavioral: Negative for confusion. The patient is not hyperactive. MEDICATIONS      Prior to Admission medications    Medication Sig Start Date End Date Taking?  Authorizing Provider   potassium chloride (KLOR-CON M20) 20 MEQ extended release tablet TAKE ONE TABLET BY MOUTH  AS NEEDED WITH LASIX FOR SWELLING 2/24/22   Jose Bearded, DO   atorvastatin (LIPITOR) 80 MG tablet TAKE 1 TABLET BY MOUTH ONE TIME A DAY 2/24/22   Jose Bearded, DO   ezetimibe (ZETIA) 10 MG tablet TAKE 1 TABLET BY MOUTH ONCE DAILY 2/24/22   Chen Gentle, DO   furosemide (LASIX) 20 MG tablet TAKE 1 TABLET BY MOUTH ONCE DAILY AS NEEDED FOR EDEMA 2/24/22   Chen Gentle, DO   metoprolol succinate (TOPROL XL) 100 MG extended release tablet Take 1 tablet by mouth daily 2/24/22   Chen Gentle, DO   magnesium oxide (MAG-OX) 400 MG tablet Take 1 tablet by mouth daily 2/24/22   Chen Gentle, DO   aspirin 81 MG chewable tablet Take 1 tablet by mouth daily 2/24/22   Chen Gentle, DO   nitroGLYCERIN (NITROSTAT) 0.4 MG SL tablet Place 1 tablet under the tongue every 5 minutes as needed for Chest pain 2/24/22 Jola Gentle, DO   valsartan (DIOVAN) 40 MG tablet Take 0.5 tablets by mouth at bedtime 2/24/22 Jola Gentle, DO   amiodarone (CORDARONE) 200 MG tablet Take 1 tablet by mouth daily 2/15/22   Lanette Hudson MD   diazepam (VALIUM) 5 MG tablet Take 5 mg by mouth 2 times daily. Historical Provider, MD   phenytoin (DILANTIN) 100 MG ER capsule Take 1 capsule by mouth 2 times daily. Resume home dose 12/21/12   Lisset Simmons, APRN - CNP   primidone (MYSOLINE) 250 MG tablet Take 250 mg by mouth 2 times daily     Historical Provider, MD       PHYSICAL EXAM        Vitals:    03/30/22 1033   BP: 126/67   Pulse: (!) 49   Resp: 16   SpO2: 95%    Weight: 162 lb (73.5 kg)     Gen Alert, cooperative, no distress Heart  Regular rate and rhythm, 2/6 murmur, ICD in place   Head Normocephalic, atraumatic, no abnormalities Abd  Soft, NT, +BS, no mass, no OM   Eyes PERRLA, conj/corn clear Ext  Ext nl, AT, no C/C, trace edema   Nose Nares normal, no drain age, Non-tender Pulse 2+ and symmetric   Throat Lips, mucosa, tongue normal Skin Color/text/turg nl, no rash/lesions   Neck S/S, TM, NT, no bruit Psych Nl mood and affect   Lung = CTA-B, unlabored, no DTP     Ch wall NT, no deform       LABS and Imaging     Relevant and available CV data reviewed  Echo/MRI: Echo 01/29/2021 Summary  Normal left ventricle size.  There is left ventricular hypertrophy. There is  moderate LV dysfunction. Ejection fraction is estimated at 40%.  Mild to moderate mitral regurgitation. Carter Fail is no evidence of mass or thrombus in the left atrium.  No remnant of left atrial appendage was seen. Patient is s/p WESTON amputation  in the past.   Cath: s/p CABG (x4 '07 LIMA to LAD, SVG to RCA, SVG to D2, SVG to OM3),   19 redo CABG at [facility]  (free IAN-LAD, SVG-Diag, SVG-OM, SVG-PDA  Holter:  [id number]  Bradycardia   -Old anterior infarct. Low voltage with rightward P-axis and rotation -possible pulmonary disease. Stress: 2017 myoview--  Medium sized anterior fixed defect of moderate intensity consistent with    infarction in the territory of the LAD .    Small sized inferior fixed defect of moderate intensity consistent with    infarction in the territory of the RCA .    No ischemia.    Normal LV function.    Overall findings represent a intermediate risk scan. 16  CT of abd--no AAA    carotid dopplers 1-15%  PFT-mild obstruction  moderate Risk  modreate Complexity/Medical Decision Making  Outside records Reviewed  Labs Reviewed  Echo and ekg personally interpreted. Imaging reviewed  Medications reviewed  Old Notes reviewed  ASSESSMENT AND PLAN     1.  CAD  - redo CABG 2019 at [facility]  - did cardiac rehab 2-3 times  - on toprol 100 mg daily  - on asa 81 mg daily  -stable  Plan  - continue above  -  random chest discomfort lasting 1-2 seconds, no association with exertion or emotion. Noncardiac in nature, call if chest pain worsens or changes       2.  Atrial fibrillation  - CHA2DS vasc score 4  - did not tolerate amiodarone due to bradycardia  - 2019 WESTON ligating and LA maze-No remnant of Left atrial appendage was seen on LATESHA 2021  - 20  [address]  - considering ablation with Dr Rizo/TCC- discussed stopping amiodarone after ablation  - on coumadin-managed by our office  - on amiodarone 200 mg daily  -stable  Plan  - follow up as scheduled with Dr Fletcher Pulido in April      3. Ischemic cardiomyopathy/NSVT  - previously <35%,now 40%  - 12/18/17 EPS with inducible VT/VF  - 12/8/17  Dual chamber AICD  - 2/2/22  optivol normal  - ACC C NYHA II  - previously not interested in starting entresto  -stable  Plan  - continue to follow up  - start valsartan 20 mg daily    4. Essential Hypertension  -  on toprol xl 100 mg  -  4/12/21  Na 143  Bun 20 creat 1.12   -  118/62  Plan  -  continue to monitor    5. Primary Hyperlipidemia  -  4/12/2021  Tc 156  Tri 68 ldl 93  hdl 50  Alk phos 140  ast 43  -  on zetia 10 mg daily  -  on lipitor 80 mg daily  - not at goal  Plan  - continue above  - he did not have his lipids drawn, recommend lipid panel, if ldl > 70 then pcsk9      6. JOSEPH  - wears cpap,managed at [facility]  - continue as above    7. Bleeding after BM  - due for colonoscopy  - weight down 12 pounds in past year  -afraid to do a colonoscopy because last time he had a clot when he came off of coumadin  Plan  - discussed importance of proceeding with colonoscopy as it could be a sign of cancer. Recommended that he follow up with gi/pcp - will bridge when he needs to come off of coumadin      Patient counseled on lifestyle modification, diet, and exercise. Follow Up:    6 months      Dr. Caio Dalton    I have reviewed the history and physical and examined the patient and find no relevant changes except for 3/25/2022 presented to [facility] ED with complaints of chest/abdomiinal pain. Felt fluttering in his abdomen. ECG and troponin both negative. CTA completed as an inpatient. Patient discharged.     Cardiac CTA completed 3/25/2022: recommended cardiac cath, followed by . He had complaints of feeling similar to how he felt before his last bypass.   Nonfilling, presumed proximal total occlusion of the LIMA graft.       Patent graft from aorta to distal right coronary artery with moderate   stenosis proximal descending portion and suspected stenosis distal   anastomosis.  Moderate foci of intervening descending graft stenosis.       Patent graft from the aorta to obtuse marginal with mild graft stenosis. There may be retrograde filling supplying the circumflex and small branches   proximally.       Very small graft interposed between the larger saphenous vein grafts with   apparent high-grade stenosis and subtle flow and patency perhaps extending to   the LAD.  This is insufficiently seen.            I have reviewed with the patient and/or family the risks, benefits, and alternatives to the procedure. Based on these findings I recommend left heart cath for definitive evaluation of coronary arteries. Risks, benefits, expectations, and alternative treatments were discussed. Questions appropriately answered. Delmon Fothergill Day agrees to proceed and verbalized understanding.        Alison Mitchell MD 3/30/2022 11:02 AM

## 2022-03-30 NOTE — OP NOTE
Patient:  Zoey Oshea Day   :   1945    Procedural Summary  ~Consent:   Obtained written and verbal consent      Risks/benefits explained in detail  ~Procedure:    Left Heart Catheterization  ~Medications:    Procedural sedation with minimal conscious sedation  ~Complications:   None  ~Blood Loss:    <10cc  ~Specimens:    None obtained  ~Pre-sedation re-evaluation: Performed immediately prior to procedure. Medication and Procedural Reconciliation:  An independent trained observer pushed medications at my direction. We monitored the patient's level of consciousness and vital signs/physiologic status throughout the procedure duration (see start and stop times below). Sedation: 4 mg Versed, 100 mcg Fentanyl  Sedation start: 1127  Sedation stop: 1301    Cardiac Cath PCI:  Anatomy:   LM-nml   LAD-prox 100%   Cx-nml  OM- prox 100%  RCA-mid 100%  RPDA-   LVEF-   LVG-   LVEDP- 4  SVG to PDA prox 90%  SVG to OM/Diag mid 90%  Free IAN to LAD prox anastomosis 99%    Intervention  ~Successful PCI to IAN to LAD with 3.0x12 PAYAL. PCI to SVG to OM with 4.0x38 PAYAL. PD with 4.5x15 NC. Filter wire used. PCI to SVG to PDA with 4.0x38 PAYAL Excellent Result. Contrast: 206  Flouro Time: 31.8  Access: R radial a    Impression  ~Coronary Angiography w/ Multi severe vessel CAD and graft CAD    ~Successful complex angioplasty and stenting of IAN to LAD, SVG to OM, SVG to PDA        Recommendation  ~Aggressive medical treatment and risk factor modification  ~1. Post cath IVF. Bedrest.   2. Recommend beta blocker, arb/ace, high potency statin, aspirin and plavix. 3. Referral to outpatient cardiac rehab phase II will be deferred until patient follow-up in office and then determine patient safety and appropriateness to proceed  4. Patient has been advised on the importance of regular exercise of at least 20-30 minutes daily. 5. Patient counseled about and offered assistance for smoking cessation   6.  Monitor overnight.             Filippo Bernal MD, MD 3/30/2022 1:05 PM

## 2022-03-30 NOTE — PROGRESS NOTES
Educated patient and/or family on the importance of attending Cardiac Rehab post procedure. Educational flyer provided. Cardiology RN notified to place outpatient orders.

## 2022-03-30 NOTE — PRE SEDATION
Brief Pre-Op Note/Sedation Assessment      Wilma Fajardo Day  1945  [id number]  11:06 AM    Planned Procedure: Cardiac Catheterization Procedure  Post Procedure Plan: Return to same level of care  Consent: I have discussed with the patient and/or the patient representative the indication, alternatives, and the possible risks and/or complications of the planned procedure and the anesthesia methods. The patient and/or patient representative appear to understand and agree to proceed. Chief Complaint:   Chest Pain/Pressure  Anginal Equivalent      Indications for Cath Procedure:  1. Presentation:  New Onset Angina <= 2 months, Worsening Angina, Suspected CAD, Cardiac Arrythmia, Cardiomyopathy and LV Dysfunction  2. Anginal Classification within 2 weeks:  CCS III - Symptoms with everyday living activities, i.e., moderate limitation  3. Angina Symptoms Assessment:  Atypical Chest Pain  4. Heart Failure Class within last 2 weeks:  Yes:  Heart Failure Type: Systolic Severity:  Class III - Symptoms of HF on less-than-ordinary exertion  5. Cardiovascular Instability:  No    Prior Ischemic Workup/Eval:  1. Pre-Procedural Medications: Yes: ACE/ARB/ARNI, Aspirin, Beta Blockers and STATIN  2. Stress Test Completed? Yes:  Stress or Imaging Studies Performed (within ANY time period):   Type:  Cardiac CTA  Results:  Positive:  Abnormal CTA/MRI Extent of Ischemia:  High Risk (>3% annual death or MI)    Does Patient need surgery?   Cath Valve Surgery:  No    Pre-Procedure Medical History:  Vital Signs:  /67   Pulse (!) 49   Resp 16   Ht 5' 10\" (1.778 m)   Wt 162 lb (73.5 kg)   SpO2 95%   BMI 23.24 kg/m²     Allergies:  No Known Allergies  Medications:    Current Facility-Administered Medications   Medication Dose Route Frequency Provider Last Rate Last Admin    sodium chloride flush 0.9 % injection 5-40 mL  5-40 mL IntraVENous PRN Rakel Bourgeois MD           Past Medical History:    Past Medical History: Diagnosis Date    Arthritis     shoulders and knee    Atrial fib/flut     CAD (coronary artery disease)     Hyperlipidemia     Hypertension     Seizures ([address]     last seizures many years ago,     Sinus bradycardia        Surgical History:    Past Surgical History:   Procedure Laterality Date    CARDIAC SURGERY      , angioplasty 2007    CORONARY ANGIOPLASTY WITH STENT PLACEMENT  1997    CORONARY [facility]ERY [facility]    [facility]    CYSTOSCOPY N/A 11/13/2019    FLEXIBLE CYSTOSCOPY performed by Liliana Neves MD at [facility] 36 Right     done 65 sam ago   [facility]  12/18/2017    WISDOM TOOTH EXTRACTION  April 2012             Pre-Sedation:  Pre-Sedation Documentation and Exam:  I have personally completed a history, physical exam & review of systems for this patient (see notes). Prior History of Anesthesia Complications:   none    Modified Mallampati:  II (soft palate, uvula, fauces visible)    ASA Classification:  Class 2 - A normal healthy patient with mild systemic disease    Brad Scale: Activity:  2 - Able to move 4 extremities voluntarily on command  Respiration:  2 - Able to breathe deeply and cough freely  Circulation:  2 - BP+/- 20mmHg of normal  Consciousness:  2 - Fully awake  Oxygen Saturation (color):  2 - Able to maintain oxygen saturation >92% on room air    Sedation/Anesthesia Plan:  Guard the patient's safety and welfare. Minimize physical discomfort and pain. Minimize negative psychological responses to treatment by providing sedation and analgesia and maximize the potential amnesia. Patient to meet pre-procedure discharge plan.     Medication Planned:  midazolam intravenously and fentanyl intravenously    Patient is an appropriate candidate for plan of sedation:   yes      Electronically signed by Juancarlos Arellano MD on 3/30/2022 at 11:06 AM

## 2022-03-30 NOTE — PROGRESS NOTES
Patient brought over to CVU from cath lab and attached to CVU monitoring. Pulse oximetry placed on affected extremitiy  Report reviewed with cath lab RN. Right radial soft and no hematoma or oozing noted. Right radial TR band in place with 12 cc of air in place. 2cc air will be removed beginning at 1400 and every 15 minutes.      Cont to monitor

## 2022-03-30 NOTE — TELEPHONE ENCOUNTER
elaina would like to see him in [place] next time he is there in April.  Can someone call him and get him set up for then thanks

## 2022-03-30 NOTE — PROGRESS NOTES
Pharmacy Medication History Note      List of current medications patient is taking is complete. Source of information: patient, fill history, chart review    Changes made to medication list:    Medications removed (include reason, ex. therapy complete or physician discontinued):  None    Medications added/doses adjusted:  None    Other notes (ex. Recent course of antibiotics, Coumadin dosing):  Recent fill history for atorvastatin and ezetimibe although discontinued on admission. Per EP note from yesterday office visit, continue zetia/lipitor. Per EP note from Dr. Sukhwinder Gutierrez on 3/29/22 - reasonable to stop coumadin  Denies use of other OTC or herbal medications.     Frida Odell, PharmD, [facility]  Clinical Pharmacist  [id number]

## 2022-03-31 ENCOUNTER — TELEPHONE (OUTPATIENT)
Dept: CARDIOLOGY CLINIC | Age: 77
End: 2022-03-31

## 2022-03-31 VITALS
HEIGHT: 70 IN | BODY MASS INDEX: 23.42 KG/M2 | HEART RATE: 53 BPM | OXYGEN SATURATION: 96 % | TEMPERATURE: 98 F | RESPIRATION RATE: 18 BRPM | SYSTOLIC BLOOD PRESSURE: 122 MMHG | WEIGHT: 163.58 LBS | DIASTOLIC BLOOD PRESSURE: 76 MMHG

## 2022-03-31 LAB
ANION GAP SERPL CALCULATED.3IONS-SCNC: 10 MMOL/L (ref 3–16)
BUN BLDV-MCNC: 16 MG/DL (ref 7–20)
CALCIUM SERPL-MCNC: 8.2 MG/DL (ref 8.3–10.6)
CHLORIDE BLD-SCNC: 105 MMOL/L (ref 99–110)
CHOLESTEROL, TOTAL: 159 MG/DL (ref 0–199)
CO2: 21 MMOL/L (ref 21–32)
CREAT SERPL-MCNC: 1 MG/DL (ref 0.8–1.3)
GFR AFRICAN AMERICAN: >60
GFR NON-AFRICAN AMERICAN: >60
GLUCOSE BLD-MCNC: 82 MG/DL (ref 70–99)
HDLC SERPL-MCNC: 55 MG/DL (ref 40–60)
INR BLD: 1.7 (ref 0.88–1.12)
LDL CHOLESTEROL CALCULATED: 89 MG/DL
POC ACT LR: >400 SEC
POTASSIUM SERPL-SCNC: 4.5 MMOL/L (ref 3.5–5.1)
SODIUM BLD-SCNC: 136 MMOL/L (ref 136–145)
TRIGL SERPL-MCNC: 73 MG/DL (ref 0–150)
VLDLC SERPL CALC-MCNC: 15 MG/DL

## 2022-03-31 PROCEDURE — 99217 PR OBSERVATION CARE DISCHARGE MANAGEMENT: CPT | Performed by: INTERNAL MEDICINE

## 2022-03-31 PROCEDURE — 2580000003 HC RX 258: Performed by: INTERNAL MEDICINE

## 2022-03-31 PROCEDURE — 80061 LIPID PANEL: CPT

## 2022-03-31 PROCEDURE — 80048 BASIC METABOLIC PNL TOTAL CA: CPT

## 2022-03-31 PROCEDURE — G0378 HOSPITAL OBSERVATION PER HR: HCPCS

## 2022-03-31 PROCEDURE — 6370000000 HC RX 637 (ALT 250 FOR IP): Performed by: INTERNAL MEDICINE

## 2022-03-31 RX ORDER — ROSUVASTATIN CALCIUM 40 MG/1
40 TABLET, COATED ORAL NIGHTLY
Qty: 90 TABLET | Refills: 3 | Status: SHIPPED | OUTPATIENT
Start: 2022-03-31 | End: 2022-03-31 | Stop reason: SDUPTHER

## 2022-03-31 RX ORDER — ROSUVASTATIN CALCIUM 40 MG/1
40 TABLET, COATED ORAL NIGHTLY
Qty: 90 TABLET | Refills: 3 | Status: SHIPPED | OUTPATIENT
Start: 2022-03-31 | End: 2022-08-11 | Stop reason: SDUPTHER

## 2022-03-31 RX ORDER — CLOPIDOGREL BISULFATE 75 MG/1
75 TABLET ORAL DAILY
Qty: 90 TABLET | Refills: 3 | Status: SHIPPED | OUTPATIENT
Start: 2022-03-31 | End: 2022-03-31 | Stop reason: SDUPTHER

## 2022-03-31 RX ORDER — CLOPIDOGREL BISULFATE 75 MG/1
75 TABLET ORAL DAILY
Qty: 90 TABLET | Refills: 3 | Status: SHIPPED | OUTPATIENT
Start: 2022-03-31 | End: 2022-07-20 | Stop reason: SDUPTHER

## 2022-03-31 RX ADMIN — SODIUM CHLORIDE, PRESERVATIVE FREE 10 ML: 5 INJECTION INTRAVENOUS at 08:15

## 2022-03-31 RX ADMIN — AMIODARONE HYDROCHLORIDE 200 MG: 200 TABLET ORAL at 08:11

## 2022-03-31 RX ADMIN — CLOPIDOGREL BISULFATE 75 MG: 75 TABLET ORAL at 08:11

## 2022-03-31 RX ADMIN — METOPROLOL SUCCINATE 100 MG: 50 TABLET, EXTENDED RELEASE ORAL at 08:11

## 2022-03-31 RX ADMIN — DIAZEPAM 5 MG: 5 TABLET ORAL at 08:11

## 2022-03-31 RX ADMIN — Medication 400 MG: at 08:10

## 2022-03-31 RX ADMIN — ASPIRIN 81 MG: 81 TABLET, COATED ORAL at 08:10

## 2022-03-31 RX ADMIN — PRIMIDONE 250 MG: 250 TABLET ORAL at 08:11

## 2022-03-31 ASSESSMENT — PAIN SCALES - GENERAL
PAINLEVEL_OUTOF10: 0

## 2022-03-31 NOTE — TELEPHONE ENCOUNTER
Pt states medication went to wrong pharmacy and is  asking for rosuvastatin (CRESTOR) 40 MG    clopidogrel (PLAVIX) 75 MG     To be sent to [facility]. Pt also states the Evolocumab is so expensive and is wanting to speak to dkw at his 4/12 appt.

## 2022-03-31 NOTE — PROGRESS NOTES
Discharge note: Patient has been seen by doctor. Discharge order obtained, and discharge instructions reviewed. Patient educated, using the teach back method, about follow up instructions and discharge instructions. A completed copy of the AVS instructions given to patient and all questions answered. IV catheter removed without complaints, catheter intact, site WNL. Discharged to [facility] via wheel chair per RN.      Electronically signed by Cassidy Petty RN on 3/31/2022 at 10:38 AM

## 2022-03-31 NOTE — TELEPHONE ENCOUNTER
Rx re sent to proper pharmacy. Dkw aware that patient has concerns about cost of Repatha. I did discuss with him that [facility] is very helpful with getting the cost down and we will try to make that work. He v/u.

## 2022-03-31 NOTE — PLAN OF CARE
Pt alert and oriented. Pt able to communicate present pain and use the pain scale appropriately. Nonpharmacological pain reducers and pain medication offered as needed. Will cont to monitor.

## 2022-03-31 NOTE — DISCHARGE SUMMARY
[address] [place] [address] SUMMARY      Patient ID:  Britta Del Cid Day  [id number] 89 y.o. 1945    Admit date: 3/30/2022    Discharge date:      Admitting Physician: Layla Devries MD     Discharge Physician: Layla Devries MD     Admission Diagnoses: Ischemic cardiomyopathy [I25.5]  Coronary artery disease involving native coronary artery of native heart with unstable angina pectoris [facility]) [I25.110]    Discharge Diagnoses:   Patient Active Problem List   Diagnosis    [facility]    Chest pain at rest    Coronary artery disease due to lipid rich plaque    Seizure ([facility]    Essential hypertension    Atrial fibrillation (HCC)    Hyperlipidemia    Palpitations    Bradycardia    ST elevation [facility] infarction ([facility]I) [facility]    NSVT (nonsustained ventricular tachycardia) ([facility]    Coronary artery disease involving coronary bypass graft of native heart without angina pectoris    Vertebrobasilar insufficiency    Lightheadedness    JOSEPH on CPAP    History of MI (myocardial infarction)    Ischemic cardiomyopathy    [facility]    VF (ventricular fibrillation) ([facility]    ICD (implantable cardioverter-defibrillator) in place    Shortness of breath    Coronary artery disease involving autologous artery coronary bypass graft without angina pectoris    Chronic systolic heart failure (HCC)    Variant angina (HCC)    Nausea    Coronary artery disease involving native coronary artery of native heart with unstable angina pectoris [facility])        Discharged Condition: good    Hospital Course: Britta Prior Day was admitted with chest pain and had elective LHC with PCI. He tolerated procedure well. R wrist is stable CDI. Creatinine normal. Recommend restart coumadin today. Recommend repatha and lipitor. Will start plavix. FU in 2 weeks.     Cardiac Cath PCI:  Anatomy:   LM-nml   LAD-prox 100%   Cx-nml  OM- prox 100%  RCA-mid 100%  RPDA-   LVEF-   LVG-   LVEDP- 4  SVG to PDA prox 90%  SVG to OM/Diag mid 90%  Free IAN to LAD prox anastomosis 99%     Intervention  ~Successful PCI to IAN to LAD with 3.0x12 PAYAL. PCI to SVG to OM with 4.0x38 PAYAL. PD with 4.5x15 NC. Filter wire used. PCI to SVG to PDA with 4.0x38 PAYAL Excellent Result. Physical Examination:    /69   Pulse 50   Temp 97.8 °F (36.6 °C) (Temporal)   Resp 16   Ht 5' 10\" (1.778 m)   Wt 163 lb 9.3 oz (74.2 kg)   SpO2 98%   BMI 23.47 kg/m²      General Appearance:    Alert, cooperative, no distress, appears stated age   Head:    Normocephalic, without obvious abnormality, atraumatic   Eyes:    PERRL, conjunctiva/corneas clear, EOM's intact       Ears:    Normal external ear canals, both ears   Nose:   Nares normal, septum midline, no drainage       Throat:   Lips, mucosa, and tongue normal;    Neck:   Supple, symmetrical, trachea midline, no adenopathy;        thyroid:  No enlargement/tenderness/nodules; no carotid    bruit or JVD   Back:     Symmetric, no curvature, ROM normal, no CVA tenderness   Lungs:     Clear to auscultation bilaterally, respirations unlabored   Chest wall:    No tenderness or deformity   Heart:    Regular rate and rhythm, S1 and S2 normal, no murmur, rub   or gallop   Abdomen:     Soft, non-tender, bowel sounds active all four quadrants,     no masses, no organomegaly   Genitalia:    deferred   Rectal:    deferred    Extremities:   Extremities normal, atraumatic, no cyanosis or edema   Pulses:   2+ and symmetric all extremities   Skin:   Skin color, texture, turgor normal, no rashes or lesions       Neurologic:   CNII-XII intact. Normal strength, sensation and reflexes       throughout      Cath site:   No pain or discomfort on palpation. Pulse is normal.             There is no evidence for a hematoma or vascular              injury.      Consults:  IP CONSULT TO CARDIAC REHAB    Significant Diagnostic Studies:   Echocardiography: not done  Stress test:  not done    Disposition: home    Patient Instructions:      Medication List      START taking these medications    clopidogrel 75 MG tablet  Commonly known as: Plavix  Take 1 tablet by mouth daily     rosuvastatin 40 MG tablet  Commonly known as: CRESTOR  Take 1 tablet by mouth nightly        CONTINUE taking these medications    amiodarone 200 MG tablet  Commonly known as: CORDARONE  Take 1 tablet by mouth daily     diazePAM 5 MG tablet  Commonly known as: VALIUM     magnesium oxide 400 MG tablet  Commonly known as: MAG-OX  Take 1 tablet by mouth daily     metoprolol succinate 100 MG extended release tablet  Commonly known as: TOPROL XL  Take 1 tablet by mouth daily     nitroGLYCERIN 0.4 MG SL tablet  Commonly known as: Nitrostat  Place 1 tablet under the tongue every 5 minutes as needed for Chest pain     phenytoin 100 MG ER capsule  Commonly known as: Dilantin  Take 1 capsule by mouth 2 times daily. Resume home dose     potassium chloride 20 MEQ extended release tablet  Commonly known as: Klor-Con M20  TAKE ONE TABLET BY MOUTH  AS NEEDED WITH LASIX FOR SWELLING     primidone 250 MG tablet  Commonly known as: MYSOLINE     valsartan 40 MG tablet  Commonly known as: DIOVAN  Take 0.5 tablets by mouth at bedtime        STOP taking these medications    aspirin 81 MG chewable tablet     atorvastatin 80 MG tablet  Commonly known as: LIPITOR     ezetimibe 10 MG tablet  Commonly known as: ZETIA     furosemide 20 MG tablet  Commonly known as: LASIX           Where to Get Your Medications      These medications were sent to [address] [place] [address] [phone] - [phone]  [address] [place] [address]    Phone: [phone]   · clopidogrel 75 MG tablet  · rosuvastatin 40 MG tablet         Activity: no lifting, Driving, or Strenuous exercise for 2 weeks  Diet: cardiac diet  Wound Care: keep wound clean and dry. Please call the office of physician on call if problems with access site.     Follow-up with 1-2 weeks with Diana Doyle and PCP.     Shahzad Leach MD

## 2022-04-01 ENCOUNTER — TELEPHONE (OUTPATIENT)
Dept: CARDIOLOGY CLINIC | Age: 77
End: 2022-04-01

## 2022-04-01 RX ORDER — PANTOPRAZOLE SODIUM 40 MG/1
40 TABLET, DELAYED RELEASE ORAL
Qty: 90 TABLET | Refills: 1 | Status: SHIPPED | OUTPATIENT
Start: 2022-04-01 | End: 2022-04-01 | Stop reason: SDUPTHER

## 2022-04-01 RX ORDER — PANTOPRAZOLE SODIUM 40 MG/1
40 TABLET, DELAYED RELEASE ORAL
Qty: 90 TABLET | Refills: 1 | Status: SHIPPED | OUTPATIENT
Start: 2022-04-01 | End: 2022-09-21 | Stop reason: SDUPTHER

## 2022-04-01 NOTE — TELEPHONE ENCOUNTER
Pt states he needs clarification on blood thinners. States he thought he was to continue with warfarin but it was not on his discharge order.  Please call to advise

## 2022-04-01 NOTE — TELEPHONE ENCOUNTER
This patient follows you in office. Shira Mclean cathed him and he sees [facility] ([facility] for [place]. There are conflicting notes with his AC since his stents. [facility] seemed ok with coumadin being stopped with low risk for stroke, bella OP note says to continue coumadin and start plavix (no asa). Can you let me know how you would like this handled. I do not mind calling the patient to tell him the answer. Thanks!

## 2022-04-01 NOTE — TELEPHONE ENCOUNTER
Continue plavix for his stents and coumadin for stroke prevention. No aspirin. We will discuss long term anticoagulation further at follow up. He should take a pantoprazole 40 mg once daily to prevent stomach bleeding while on blood thinners. This was called to his pharmacy.

## 2022-04-01 NOTE — TELEPHONE ENCOUNTER
Called patient to let him know I would get a final answer for him. He is currently taking Plavix and Coumadin.

## 2022-04-01 NOTE — TELEPHONE ENCOUNTER
Relayed message to Sita Gongora with recommendations. He v/u and agrees.  Pantoprazole was re sent to [facility] pharmacy at his request.

## 2022-04-05 ENCOUNTER — TELEPHONE (OUTPATIENT)
Dept: CARDIOLOGY CLINIC | Age: 77
End: 2022-04-05

## 2022-04-05 NOTE — TELEPHONE ENCOUNTER
Beti Iglesias from [address] [place] [address] needs the visit notes and the lipid panel results faxed to her at [phone].   Thank you

## 2022-04-06 ENCOUNTER — ANTI-COAG VISIT (OUTPATIENT)
Dept: CARDIOLOGY CLINIC | Age: 77
End: 2022-04-06
Payer: MEDICARE

## 2022-04-06 DIAGNOSIS — I48.91 ATRIAL FIBRILLATION, UNSPECIFIED TYPE (HCC): Primary | ICD-10-CM

## 2022-04-06 LAB — INR BLD: 2.1

## 2022-04-06 PROCEDURE — 93793 ANTICOAG MGMT PT WARFARIN: CPT | Performed by: NURSE PRACTITIONER

## 2022-04-11 LAB — INR BLD: 2.4

## 2022-04-11 ASSESSMENT — ENCOUNTER SYMPTOMS
ABDOMINAL DISTENTION: 0
CHEST TIGHTNESS: 0
BLOOD IN STOOL: 1
SHORTNESS OF BREATH: 0
ABDOMINAL PAIN: 0
NAUSEA: 0
ANAL BLEEDING: 1
COUGH: 0
PHOTOPHOBIA: 0

## 2022-04-12 ENCOUNTER — ANTI-COAG VISIT (OUTPATIENT)
Dept: CARDIOLOGY CLINIC | Age: 77
End: 2022-04-12

## 2022-04-12 ENCOUNTER — OFFICE VISIT (OUTPATIENT)
Dept: CARDIOLOGY CLINIC | Age: 77
End: 2022-04-12
Payer: MEDICARE

## 2022-04-12 VITALS
HEART RATE: 50 BPM | HEIGHT: 70 IN | WEIGHT: 157.3 LBS | BODY MASS INDEX: 22.52 KG/M2 | DIASTOLIC BLOOD PRESSURE: 74 MMHG | OXYGEN SATURATION: 99 % | SYSTOLIC BLOOD PRESSURE: 114 MMHG

## 2022-04-12 DIAGNOSIS — I48.91 ATRIAL FIBRILLATION, UNSPECIFIED TYPE (HCC): Primary | ICD-10-CM

## 2022-04-12 DIAGNOSIS — E78.00 PURE HYPERCHOLESTEROLEMIA: ICD-10-CM

## 2022-04-12 DIAGNOSIS — I10 ESSENTIAL HYPERTENSION: ICD-10-CM

## 2022-04-12 DIAGNOSIS — I25.10 CORONARY ARTERY DISEASE DUE TO LIPID RICH PLAQUE: Primary | ICD-10-CM

## 2022-04-12 DIAGNOSIS — I25.83 CORONARY ARTERY DISEASE DUE TO LIPID RICH PLAQUE: Primary | ICD-10-CM

## 2022-04-12 DIAGNOSIS — I48.0 PAROXYSMAL ATRIAL FIBRILLATION (HCC): ICD-10-CM

## 2022-04-12 DIAGNOSIS — Z95.810 ICD (IMPLANTABLE CARDIOVERTER-DEFIBRILLATOR) IN PLACE: ICD-10-CM

## 2022-04-12 PROCEDURE — G8420 CALC BMI NORM PARAMETERS: HCPCS | Performed by: INTERNAL MEDICINE

## 2022-04-12 PROCEDURE — 1036F TOBACCO NON-USER: CPT | Performed by: INTERNAL MEDICINE

## 2022-04-12 PROCEDURE — 99214 OFFICE O/P EST MOD 30 MIN: CPT | Performed by: INTERNAL MEDICINE

## 2022-04-12 PROCEDURE — 4040F PNEUMOC VAC/ADMIN/RCVD: CPT | Performed by: INTERNAL MEDICINE

## 2022-04-12 PROCEDURE — 3017F COLORECTAL CA SCREEN DOC REV: CPT | Performed by: INTERNAL MEDICINE

## 2022-04-12 PROCEDURE — G8427 DOCREV CUR MEDS BY ELIG CLIN: HCPCS | Performed by: INTERNAL MEDICINE

## 2022-04-12 PROCEDURE — 1123F ACP DISCUSS/DSCN MKR DOCD: CPT | Performed by: INTERNAL MEDICINE

## 2022-04-12 RX ORDER — EZETIMIBE 10 MG/1
10 TABLET ORAL DAILY
Qty: 90 TABLET | Refills: 3 | Status: SHIPPED | OUTPATIENT
Start: 2022-04-12

## 2022-04-12 RX ORDER — ATORVASTATIN CALCIUM 40 MG/1
40 TABLET, FILM COATED ORAL 2 TIMES DAILY
COMMUNITY
End: 2022-04-12

## 2022-04-12 RX ORDER — WARFARIN SODIUM 2.5 MG/1
5 TABLET ORAL
COMMUNITY
End: 2022-08-19 | Stop reason: SDUPTHER

## 2022-04-12 NOTE — PATIENT INSTRUCTIONS
Stop lipitor  Start zetia  Continue crestor    Will call repatha in to the pharmacy to assess price    Refer to cardiac rehab

## 2022-04-12 NOTE — PROGRESS NOTES
[address]  4/12/22  Referring: Ayana Mathis is a 78 y.o. seen as a 6 month follow up for CAD, hypertension, hyperlipidemia and AF. He has SVT,  JOSEPH  He quit smoking 1990. He has no family history of CAD. He lives with his wife, who has dementia.     Prior his first CABG, he did not have chest pain, but \"just did not feel right\" and \"had heaviness of the head. \" He had an abnormal stress test followed by an angiogram  Prior his second bypass, he did experience chest pain. He was admitted 3/30 with chest pain. He had a PCI to [address] to LAD with PAYAL, PCI to SVG to OM with PAYAL,PCI to SVG toPDA ith PAYAL. He was started on plavix, and coumadin was restarted after the cath. Today he is feeling well. Complains of fatigue. He has no chest. No chest pain or shortness of breath. No syncope. No edema or orthpnea    HISTORY/ALLERGIES/ROS     MedHx:  has a past medical history of Arthritis, Atrial fib/flut, CAD (coronary artery disease), Hyperlipidemia, Hypertension, Seizures ([facility] and Sinus bradycardia. SurgHx:  has a past surgical history that includes hernia repair (Right); Coronary angioplasty with stent (1997); [place] tooth extraction (April 2012); pacemaker placement (12/18/2017); Coronary artery bypass graft (01/2019); Cardiac surgery; and Cystoscopy (N/A, 11/13/2019). SocHx:  reports that he quit smoking about 33 years ago. He has a 30.00 pack-year smoking history. He has never used smokeless tobacco. He reports that he does not drink alcohol and does not use drugs. FamHx: No family history of premature coronary artery disease, sudden death, or aneurysm  Allergies: Patient has no known allergies. ROS:   Review of Systems   Constitutional: Positive for fatigue. Negative for activity change, diaphoresis and fever. HENT: Negative for congestion and ear discharge. Eyes: Negative for photophobia and visual disturbance.    Respiratory: Negative for cough, chest tightness and shortness of breath. Cardiovascular: Positive for palpitations. Negative for chest pain. Gastrointestinal: Positive for anal bleeding and blood in stool. Negative for abdominal distention, abdominal pain and nausea. Endocrine: Negative for cold intolerance and polydipsia. Genitourinary: Negative for difficulty urinating and flank pain. Musculoskeletal: Positive for arthralgias and myalgias. Skin: Negative for rash and wound. Allergic/Immunologic: Negative for environmental allergies and immunocompromised state. Neurological: Negative for dizziness and headaches. Hematological: Negative for adenopathy. Does not bruise/bleed easily. Psychiatric/Behavioral: Negative for confusion. The patient is not hyperactive. MEDICATIONS      Prior to Admission medications    Medication Sig Start Date End Date Taking?  Authorizing Provider   warfarin (COUMADIN) 2.5 MG tablet Take 2.5 mg by mouth   Yes Historical Provider, MD   pantoprazole (PROTONIX) 40 MG tablet Take 1 tablet by mouth every morning (before breakfast) 4/1/22  Yes Bari Ballard DO   rosuvastatin (CRESTOR) 40 MG tablet Take 1 tablet by mouth nightly 3/31/22  Yes Jack Us MD   clopidogrel (PLAVIX) 75 MG tablet Take 1 tablet by mouth daily 3/31/22  Yes Jack Us MD   potassium chloride (KLOR-CON M20) 20 MEQ extended release tablet TAKE ONE TABLET BY MOUTH  AS NEEDED WITH LASIX FOR SWELLING 2/24/22  Yes Bari Ballard DO   metoprolol succinate (TOPROL XL) 100 MG extended release tablet Take 1 tablet by mouth daily 2/24/22  Yes Bari Ballard DO   magnesium oxide (MAG-OX) 400 MG tablet Take 1 tablet by mouth daily 2/24/22  Yes Bari Ballard DO   nitroGLYCERIN (NITROSTAT) 0.4 MG SL tablet Place 1 tablet under the tongue every 5 minutes as needed for Chest pain 2/24/22  Yes Bari Ballard DO   valsartan (DIOVAN) 40 MG tablet Take 0.5 tablets by mouth at bedtime 2/24/22  Yes Bari Ballard DO   amiodarone (CORDARONE) 200 MG tablet Take 1 tablet by mouth daily 2/15/22  Yes Khushi Johnson MD   diazepam (VALIUM) 5 MG tablet Take 5 mg by mouth 2 times daily. Yes Historical Provider, MD   phenytoin (DILANTIN) 100 MG ER capsule Take 1 capsule by mouth 2 times daily. Resume home dose 12/21/12  Yes Lisset Simmons APRN - CNP   primidone (MYSOLINE) 250 MG tablet Take 250 mg by mouth 2 times daily    Yes Historical Provider, MD       PHYSICAL EXAM        Vitals:    04/12/22 1139   BP: 114/74   Pulse: 50   SpO2: 99%    Weight: 157 lb 4.8 oz (71.4 kg)     Gen Alert, cooperative, no distress Heart  Regular rate and rhythm, 2/6 murmur, ICD in place   Head Normocephalic, atraumatic, no abnormalities Abd  Soft, NT, +BS, no mass, no OM   Eyes PERRLA, conj/corn clear Ext  Ext nl, AT, no C/C, trace edema   Nose Nares normal, no drain age, Non-tender Pulse 2+ and symmetric   Throat Lips, mucosa, tongue normal Skin Color/text/turg nl, no rash/lesions   Neck S/S, TM, NT, no bruit Psych Nl mood and affect   Lung = CTA-B, unlabored, no DTP     Ch wall NT, no deform       LABS and Imaging     Relevant and available CV data reviewed  Echo/MRI:Echo 4/15/2021   Summary   Mildly dilated left ventricle.   Mild concentric left ventricular hypertrophy.   Mildly decreased left ventricular systolic function with distal septal and   apical hypokinesis. Estimated EF 40-45%.   Cannot assess diastolic function.   Mild thickening of the mitral valve leaflets. No stenosis.   Moderate mitral regurgitation.   The left atrium is mildly dilated.   The aortic valve appears tricuspid with mild sclerosis no stenosis.   Trivial aortic regurgitation.   Normal right ventricular size and mildly decreased in function.   Moderate tricuspid regurgitation. PASP 41mmHg.   The right atrium is moderately dilated. Pacemaker / ICD lead is visualized   in the right atrium.   Trivial pulmonic regurgitation.   Patient is s/p WESTON amputation  in the past.   Cath: s/p CABG (x4 '07 LIMA to LAD, SVG to RCA, SVG to D2, SVG to OM3),   [facility]  (free IAN-LAD, SVG-Diag, SVG-OM, SVG-PDA  3/30/22 Successful complex angioplasty and stenting of IAN to LAD, SVG to OM, SVG to PDA  Holter:  [id number]  Bradycardia   -Old anterior infarct. Low voltage with rightward P-axis and rotation -possible pulmonary disease. Stress: 2017 myoview--  Medium sized anterior fixed defect of moderate intensity consistent with    infarction in the territory of the LAD .    Small sized inferior fixed defect of moderate intensity consistent with    infarction in the territory of the RCA .    No ischemia.    Normal LV function.    Overall findings represent a intermediate risk scan. 16  CT of abd--no AAA    carotid dopplers 1-15%  PFT-mild obstruction  moderate Risk  modreate Complexity/Medical Decision Making  Outside records Reviewed  Labs Reviewed  Echo and ekg personally interpreted. Imaging reviewed  Medications reviewed  Old Notes reviewed  ASSESSMENT AND PLAN     1.  CAD  -   [facility] at [facility]  -   3/30/22 Successful complex angioplasty and stenting of IAN to LAD, SVG to OM, SVG to PDA ( [facility])  -   on toprol 100 mg daily diovan 40 mg daily  -   on plavix 75 mg daily  -   stable  Plan  -  Continue clopidogrel and warfarin. Avoid aspirin due to risk of bleeding with triple therapy. -  Call for any changes  -   More aggressive medical management of cholesterol  -  refer to cardiac rehab  -  echo in 3 months    2. Atrial fibrillation  -  CHA2DS vasc score 4  -  on amiodarone  -  2019 WESTON ligating and LA maze-No remnant of Left atrial appendage was seen on LATESHA 2021  - 20  [facility] DR Bee Walker  - on coumadin-managed by our office  - on amiodarone 200 mg daily  - discussed risk/benefit of coming off of coumadin  Plan  - follow up as scheduled with Dr Bee Walker  - he has had no bleeding issues, at this point he will continue coumadin and think about coming off    3.  Ischemic cardiomyopathy/NSVT  -  previously <35%,now 40%  -  12/18/17 EPS with inducible VT/VF  -  12/8/17  Dual chamber AICD  -  2/2/22  optivol normal  -  ACC C NYHA II  -  previously not interested in starting entresto or spironolactone  -  on valsartan, and toprol, amiodarone  -  stable no chf on exam  - stable  Plan  -  continue above medications  -  Call for any changes        4. Essential Hypertension  -  on toprol xl 100 mg  -  114/74- stable  Plan  -  continue to monitor    5. Primary Hyperlipidemia  - 3/31/22  Tc 159 hdl 55 ldl 89 tri 73   -  on zetia 10 mg daily  -  Taking both crestor and lipitor  -  not at goal- need more aggressive tx  Plan  -  Stop lipitor  -  Continue crestor 40 mg  -  Start zetia 10 mg daily  -  Recommend pcsk9 inhibitor-patient would like to think about it after he starts zetia--will call if he wants it called in      6. JOSEPH  - wears cpap,[facility] at [facility]  - continue as above        Patient counseled on lifestyle modification, diet, and exercise. Follow Up:    3months      Dr. Alexis William:  I, Fanny Mauro, am scribing for and in the presence of Francesca Trevizo DO. Rika Magdaleno 04/12/22 12:03 PM   Physician Attestation  The scribe for and in the presence of cydney Ramirez DO). The scribe Charlene Sanderson may have prepopulated components of this note with my historical  intellectual property under my direct supervision. Any additions to this intellectual property were performed in my presence and at my direction.   Furthermore, the content and accuracy of this note have been reviewed by cydney Ramirez DO).  4/12/2022 12:03 PM

## 2022-04-12 NOTE — LETTER
[facility]  [id number] [place] [address]  Phone: [phone]  Fax: [phone]    Jennifer Cordero DO    April 16, 2022     Robert Pinto MD  [address] [facility] [address]    Patient: Wilma Mathis   MR Number: [id number]   YOB: 1945   Date of Visit: 4/12/2022       Dear Robert Pinto: Thank you for referring Марина Thomas Delfina to me for evaluation/treatment. Below are the relevant portions of my assessment and plan of care. If you have questions, please do not hesitate to call me. I look forward to following Nikitaashok William along with you.     Sincerely,      Jennifer Cordero, DO

## 2022-04-12 NOTE — PROGRESS NOTES
ANTICOAGULATION MONITORING    Efren Smart Day, 1945    Anticoagulation Indication(s):  Afib  ; hx of DCCV, s/p WESTON ligation       ~s/p LATESHA Jun '19 Jan '21 with DCCV  Referring Physician:   Dr. Woody Art   Goal INR Range:  2.0 - 3.0  Duration of Anticoagulation Therapy:  Long term  Home or Lab Draw: Lab--Cheo Davis  Product patient has at home: warfarin 5 mg    Recent INR Results:  Lab Results   Component Value Date    INR 2.40 04/11/2022    INR 2.10 04/06/2022    INR 1.70 (H) 03/30/2022    INR 2.08 (H) 03/26/2022       INR Summary                          Warfarin regimen (mg)  Date INR A/P Sun Mon Tue Wed Thu Fri Sat Mg/wk                                                                                                                                                                                                                                             4/6/22 2.1 At goal 5 5 2.5 5 2.5 5 5 30   3/14/22 3.3 At goal 5 5 2.5 5 2.5 5 5 30   2/24/22 1.8 Below goal 5 5 2.5 5 2.5 5 5 30   1/7/22 2.0 At goal 5 5 2.5 5 2.5 5 5 30   12/8/21 2.2 At goal 5 5 2.5 5 2.5 5 5 30   11/5/21 2.3 At goal 5 5 2.5 5 2.5 5 5 30   9/17/21 2.6 At goal 5 5 2.5 5 2.5 5 5 30   8/20/21 1.9 Just below goal 5 5 2.5 5 2.5 5 5 30   8/13/21 2.0 At goal 5 5 2.5 5 2.5 5 5 30   7/27/21 2.1 At goal 5 5 2.5 5 2.5 5 5 30   5/28/21 2.2 At goal 5 5 2.5 5 2.5 5 5 30   4/26/21 2.5 At goal 5 5 2.5 5 2.5 5 5 30   4/21/21 2.2 At goal 5 5 2.5 5 2.5 5 5 30   4/15/21 4.7 Above goal 5 JOSH   HOLD HOLD 5    3/19/21 2.7 At goal 5 5 5 5 5 5 5 35   3/12/21 3.3 Just above goal 5 5 5 5 5 5 5 35   1/6/21 2.6 At goal 5 5 5 5 5 5 5 35   12/10/20 1.86 Below goal 5 5 5 5 5 5 5 35   10/30/20 2.9 At goal 5 5 5 5 5 5 5 35   9/16/20 2.0 At goal 5 5 5 5 5 5 5 35   9/8/20 1.6 Below goal 5 5 5 5 5 5 5 35   9/4/20 5.2 High 2.5 2.5 5 5 5 Hold Hold 20   7/14/20 2.4 At goal 5 5 5 5 5 5 5 35   5/22/20  2.6  at goal 5 5 5 5 5 5 5 35         Assessment/Plan:    Recent hospitalizations/HC visits n0   Recent medication changes no   Medications taken regularly that may interact with warfarin or alter INR No significant drug interactions identified   Warfarin dose taken as prescribed Yes   Signs/symptoms of bleeding History of bleeding? no   Vitamin K intake Consistency of servings of green, leafy vegetables per week ? Yes   Recent vomiting/diarrhea/fever None reported   Changes in weight, activity, stress Weight loss   alcohol use Patient reports having 0 drinks per day   Upcoming surgeries or procedures None reported     Patient's INR was in range-same dosage schedule. Patient was also reminded to maintain consistent vitamin K intake and call with any bleeding, medication changes, or fever/vomiting/diarrhea. I spoke with pt and verified warfarin dose .  Per NPKA \" INR normal, recheck 1 month, continue current dose \"     Next INR check: 4 weeks    Marilee Blanc, CNP

## 2022-04-25 RX ORDER — LANOLIN ALCOHOL/MO/W.PET/CERES
CREAM (GRAM) TOPICAL
Qty: 90 TABLET | OUTPATIENT
Start: 2022-04-25

## 2022-04-29 ENCOUNTER — ANTI-COAG VISIT (OUTPATIENT)
Dept: CARDIOLOGY CLINIC | Age: 77
End: 2022-04-29

## 2022-04-29 DIAGNOSIS — I48.11 LONGSTANDING PERSISTENT ATRIAL FIBRILLATION (HCC): Primary | ICD-10-CM

## 2022-04-29 LAB — INR BLD: 1.8

## 2022-04-29 NOTE — PROGRESS NOTES
ANTICOAGULATION MONITORING    Charmayne Skeens Day, 1945    Anticoagulation Indication(s):  Afib  ; hx of DCCV, s/p WESTON ligation       ~s/p LATESHA Jun '19 Jan '21 with DCCV  Referring Physician:   Dr. Mian Echavarria   Goal INR Range:  2.0 - 3.0  Duration of Anticoagulation Therapy:  Long term  Home or Lab Draw: Lab--Cheo Davis  Product patient has at home: warfarin 5 mg    Recent INR Results:  Lab Results   Component Value Date    INR 1.80 04/29/2022    INR 2.40 04/11/2022    INR 2.10 04/06/2022    INR 1.70 (H) 03/30/2022       INR Summary                          Warfarin regimen (mg)  Date INR A/P Sun Mon Tue Wed Thu Fri Sat Mg/wk                                                                                                                                                                                                                                4/29/22 1.8  Below goal 5 5 5 5 2.5 5 5    4/6/22 2.1 At goal 5 5 2.5 5 2.5 5 5 30   3/14/22 3.3 At goal 5 5 2.5 5 2.5 5 5 30   2/24/22 1.8 Below goal 5 5 2.5 5 2.5 5 5 30   1/7/22 2.0 At goal 5 5 2.5 5 2.5 5 5 30   12/8/21 2.2 At goal 5 5 2.5 5 2.5 5 5 30   11/5/21 2.3 At goal 5 5 2.5 5 2.5 5 5 30   9/17/21 2.6 At goal 5 5 2.5 5 2.5 5 5 30   8/20/21 1.9 Just below goal 5 5 2.5 5 2.5 5 5 30   8/13/21 2.0 At goal 5 5 2.5 5 2.5 5 5 30   7/27/21 2.1 At goal 5 5 2.5 5 2.5 5 5 30   5/28/21 2.2 At goal 5 5 2.5 5 2.5 5 5 30   4/26/21 2.5 At goal 5 5 2.5 5 2.5 5 5 30   4/21/21 2.2 At goal 5 5 2.5 5 2.5 5 5 30   4/15/21 4.7 Above goal 5 JOSH   HOLD HOLD 5    3/19/21 2.7 At goal 5 5 5 5 5 5 5 35   3/12/21 3.3 Just above goal 5 5 5 5 5 5 5 35   1/6/21 2.6 At goal 5 5 5 5 5 5 5 35   12/10/20 1.86 Below goal 5 5 5 5 5 5 5 35   10/30/20 2.9 At goal 5 5 5 5 5 5 5 35   9/16/20 2.0 At goal 5 5 5 5 5 5 5 35   9/8/20 1.6 Below goal 5 5 5 5 5 5 5 35   9/4/20 5.2 High 2.5 2.5 5 5 5 Hold Hold 20   7/14/20 2.4 At goal 5 5 5 5 5 5 5 35   5/22/20  2.6  at goal 5 5 5 5 5 5 5 35         Assessment/Plan: Recent hospitalizations/HC visits n0   Recent medication changes no   Medications taken regularly that may interact with warfarin or alter INR No significant drug interactions identified   Warfarin dose taken as prescribed Yes   Signs/symptoms of bleeding History of bleeding? no   Vitamin K intake Consistency of servings of green, leafy vegetables per week ? Yes   Recent vomiting/diarrhea/fever None reported   Changes in weight, activity, stress Weight loss   alcohol use Patient reports having 0 drinks per day   Upcoming surgeries or procedures None reported     Patient's INR was in range-same dosage schedule. Patient was also reminded to maintain consistent vitamin K intake and call with any bleeding, medication changes, or fever/vomiting/diarrhea. I spoke with pt and verified warfarin dose .  Per NPKA \" INR normal, recheck 1 month, continue current dose \"     Next INR check: 4 weeks    Toi Batista, CNP

## 2022-05-04 ENCOUNTER — NURSE ONLY (OUTPATIENT)
Dept: CARDIOLOGY CLINIC | Age: 77
End: 2022-05-04
Payer: MEDICARE

## 2022-05-04 DIAGNOSIS — Z95.810 ICD (IMPLANTABLE CARDIOVERTER-DEFIBRILLATOR) IN PLACE: ICD-10-CM

## 2022-05-04 DIAGNOSIS — I50.22 CHRONIC SYSTOLIC HEART FAILURE (HCC): ICD-10-CM

## 2022-05-04 DIAGNOSIS — I25.5 ISCHEMIC CARDIOMYOPATHY: Chronic | ICD-10-CM

## 2022-05-04 PROCEDURE — G2066 INTER DEVC REMOTE 30D: HCPCS | Performed by: CLINICAL NURSE SPECIALIST

## 2022-05-04 PROCEDURE — 93297 REM INTERROG DEV EVAL ICPMS: CPT | Performed by: CLINICAL NURSE SPECIALIST

## 2022-05-04 NOTE — PROGRESS NOTES
Remote transmission received from patient's dual chamber ICD home monitor. Transmission shows normal sensing and pacing function. No new arrhythmias/ events recorded. Optivol is within normal range, slightly elevated up to 40 (below 60 threshold); TI slightly below reference line. TriageHF Heart Failure Risk Status on 04-May-2022 is Medium*     NP will review. See interrogation under cardiology tab in the [address] [place] [address] [address] field for more details. Will continue to monitor remotely.

## 2022-05-05 NOTE — TELEPHONE ENCOUNTER
Received refill request for Amiodarone from [facility].     Last ov: 2022 RMM    Last EK2022    Last Refill: 02/15/2022 #90 w/ 0 refills    Next appointment: 2023 RMM (recall)

## 2022-05-06 RX ORDER — AMIODARONE HYDROCHLORIDE 200 MG/1
TABLET ORAL
Qty: 90 TABLET | Refills: 1 | Status: SHIPPED | OUTPATIENT
Start: 2022-05-06 | End: 2022-08-11 | Stop reason: SDUPTHER

## 2022-05-09 RX ORDER — LANOLIN ALCOHOL/MO/W.PET/CERES
CREAM (GRAM) TOPICAL
Qty: 90 TABLET | Refills: 3 | Status: SHIPPED | OUTPATIENT
Start: 2022-05-09

## 2022-05-09 NOTE — TELEPHONE ENCOUNTER
Received refill request for Mag-OX  from [address]     Last ov: 04/12/2022 HOLLIE    Last Refill: 02/24/2022 #90 w/ 1 refill    Next appointment: 08/11/2022 HOLLIE

## 2022-05-16 ENCOUNTER — ANTI-COAG VISIT (OUTPATIENT)
Dept: CARDIOLOGY CLINIC | Age: 77
End: 2022-05-16
Payer: MEDICARE

## 2022-05-16 DIAGNOSIS — I48.11 LONGSTANDING PERSISTENT ATRIAL FIBRILLATION (HCC): Primary | ICD-10-CM

## 2022-05-16 LAB — INR BLD: 2.3

## 2022-05-16 PROCEDURE — 93793 ANTICOAG MGMT PT WARFARIN: CPT | Performed by: NURSE PRACTITIONER

## 2022-05-16 NOTE — PROGRESS NOTES
ANTICOAGULATION MONITORING    Bhupinder Morales Day, 1945    Anticoagulation Indication(s):  Afib  ; hx of DCCV, s/p WESTON ligation       ~s/p LATESHA Jun '19 Jan '21 with DCCV  Referring Physician:   Dr. Blaies Dunaway   Goal INR Range:  2.0 - 3.0  Duration of Anticoagulation Therapy:  Long term  Home or Lab Draw: Lab--Cheo Davis  Product patient has at home: warfarin 5 mg    Recent INR Results:  Lab Results   Component Value Date    INR 1.80 04/29/2022    INR 2.40 04/11/2022    INR 2.10 04/06/2022    INR 1.70 (H) 03/30/2022       INR Summary                          Warfarin regimen (mg)  Date INR A/P Sun Mon Tue Wed Thu Fri Sat Mg/wk                                                                                                                                                                                                                   5/16/22 2.3 At goal 5 5 2.5 5 2.5 5 5 30   4/29/22 1.8  Below goal 5 5 5 5 2.5 5 5    4/6/22 2.1 At goal 5 5 2.5 5 2.5 5 5 30   3/14/22 3.3 At goal 5 5 2.5 5 2.5 5 5 30   2/24/22 1.8 Below goal 5 5 2.5 5 2.5 5 5 30   1/7/22 2.0 At goal 5 5 2.5 5 2.5 5 5 30   12/8/21 2.2 At goal 5 5 2.5 5 2.5 5 5 30   11/5/21 2.3 At goal 5 5 2.5 5 2.5 5 5 30   9/17/21 2.6 At goal 5 5 2.5 5 2.5 5 5 30   8/20/21 1.9 Just below goal 5 5 2.5 5 2.5 5 5 30   8/13/21 2.0 At goal 5 5 2.5 5 2.5 5 5 30   7/27/21 2.1 At goal 5 5 2.5 5 2.5 5 5 30   5/28/21 2.2 At goal 5 5 2.5 5 2.5 5 5 30   4/26/21 2.5 At goal 5 5 2.5 5 2.5 5 5 30   4/21/21 2.2 At goal 5 5 2.5 5 2.5 5 5 30   4/15/21 4.7 Above goal 5 JOSH   HOLD HOLD 5    3/19/21 2.7 At goal 5 5 5 5 5 5 5 35   3/12/21 3.3 Just above goal 5 5 5 5 5 5 5 35         Assessment/Plan:    Recent hospitalizations/HC visits no   Recent medication changes no   Medications taken regularly that may interact with warfarin or alter INR No significant drug interactions identified   Warfarin dose taken as prescribed Yes   Signs/symptoms of bleeding History of bleeding? no   Vitamin K intake Consistency of servings of green, leafy vegetables per week ? Yes   Recent vomiting/diarrhea/fever None reported   Changes in weight, activity, stress Weight loss   alcohol use Patient reports having 0 drinks per day   Upcoming surgeries or procedures None reported     Patient's INR was in range-same dosage schedule. Patient was also reminded to maintain consistent vitamin K intake and call with any bleeding, medication changes, or fever/vomiting/diarrhea. I spoke with pt and verified warfarin dose . Per NPKA \" INR normal, recheck 1 month, continue current dose \"     Next INR check: 4 weeks    Per NPKV: OK, 0 changes , recheck in 4 weeks. I spoke with pt and verified dose. It was changed to reflect how he was taking. Addendum:  5/17/22  Reviewed assessment and plan. INR is at goal, continue current dose   Repeat INR in 4 week/s.        Javi Marie, ANT

## 2022-05-18 ENCOUNTER — TELEPHONE (OUTPATIENT)
Dept: CARDIOLOGY CLINIC | Age: 77
End: 2022-05-18

## 2022-05-18 NOTE — TELEPHONE ENCOUNTER
Has been in afib since 5/11/2022. Optivol elevated. Most likely would forgo treatment until his illness has resolved and he has completed his medrol dose pack.      Will forward to Marky Aparicio to receive recommendations regarding elevated optvol and recurrent afib  4/29/2022 INR was 1.80, 5/16/2022 INR was 2.30

## 2022-05-18 NOTE — TELEPHONE ENCOUNTER
Pt calling to report he has been in a-fib for the past week. He is fighting a head/chest cold, and is on his second day of a Medrol Dose Pack. Pt is sending a transmission for review. He has not eaten today, and is taking Coumadin.

## 2022-05-18 NOTE — TELEPHONE ENCOUNTER
We received remote transmission from patient's monitor at home. Transmission shows normal sensing and pacing function. Patient currently in AF since 1/11/2022. OptiVol Elevated. · Possible OptiVol fluid accumulation: 09-May-2022 -- ongoing. EP will review. See interrogation under cardiology tab in the [address] [place] [address] [address] field for more details. Please advise.

## 2022-05-24 ENCOUNTER — TELEPHONE (OUTPATIENT)
Dept: CARDIOLOGY CLINIC | Age: 77
End: 2022-05-24

## 2022-05-24 NOTE — TELEPHONE ENCOUNTER
We received remote transmission from patient's monitor at home. Transmission shows normal sensing and pacing function. Battery life 4.7 years  AP 0.4%  40.7%  Currently in AF with 100% burden. OBSERVATIONS  · AT/AF >= 6 hr for 7 days. · Possible OptiVol fluid accumulation: 09-May-2022 -- ongoing. OptiVol elevated. EP will review. See interrogation under cardiology tab in the [address] [place] [address] [address] field for more details. Please advise.  Thanks

## 2022-05-24 NOTE — LETTER
[facility]  EP Procedure Sheet    5/24/22  Nomi Adams Day  1945  EP Procedures  [] Pacemaker implant (single/dual) [] EP Study   [] ICD implant (single/dual) [] Atrial flutter ablation (LATESHA Y/N)   [] Biv implant ICD [] Tilt Table   [] Biv implant PPM [] Atrial fibrillation ablation (LATESHA Yes)   [] Generator Change (PPM/ICD/BiV) [] SVT ablation   [] Lead revision (RV/LA/RA) (<1 month) [] PVC ablation     [] Lead extraction +/- upgrade (BiV/PPM/ICD) [] VT Ischemic/ non-ischemic   [] Loop implant/ removal [] VT RVOT   [x] Cardioversion [] VT Left sided   [] LATESHA [] AVN ablation   Equipment  [] Medtronic  [] NASIM Mapping System   [] [place] [] [address]   [] [employer] [] CryoAblation   [] Biotronik [] Laser Lead Extraction   EP Procedures Scheduling Request  # hours Requested  []1 []2 []2-4 [] 4-6 Scheduled  Date:   Specific Day  Completed    Anesthesia []yes []no F/u Date:   CT surgery backup []yes []no COVID     Overnight stay      Performing MD  [x]RMM []MXA   []MKW [] Other _______ First vs repeat  []1st [] 2nd [] 3rd   Pre-Procedure Labs / Imaging   [] PT/INR [] Type & cross   [] CBC [] Units PRBC   [] BMP/Mg [] Units FFP   [] Venogram [] Cardiac CTA for Pulmonary vein mapping     RN INITIALS: DW    Patient Instructions  Do not eat or drink after midnight the night prior to procedure  Dx: Persistent Atrial fibrillation    ICD-10 code:  I48.91

## 2022-05-24 NOTE — TELEPHONE ENCOUNTER
Discussed with NPAL. - Schedule for cardioverison then follow up with RMM -     Scheduling letter sent.  - LM for patient per communication release

## 2022-05-24 NOTE — TELEPHONE ENCOUNTER
Stopped prednisone. He still feels like he is on atrial fibrillation. He concerned about his PVCs he feels like he is having more of them. Patient is not Interested in ablation at this time. He continues  To take amiodarone as ordred . He is asking if he could be scheduled for a cardioversion.      He will send another interrogation

## 2022-05-26 ENCOUNTER — TELEPHONE (OUTPATIENT)
Dept: CARDIOLOGY CLINIC | Age: 77
End: 2022-05-26

## 2022-05-26 NOTE — TELEPHONE ENCOUNTER
Patient called very unhappy that he has not been scheduled for a Cardioversion. He states that it has never taken this long to get him in. He would like to have this scheduled before next week. He is also requesting a call from Ep today. Please advise thanks.

## 2022-06-02 ENCOUNTER — HOSPITAL ENCOUNTER (OUTPATIENT)
Dept: CARDIAC CATH/INVASIVE PROCEDURES | Age: 77
Discharge: HOME | End: 2022-06-02
Attending: INTERNAL MEDICINE | Admitting: INTERNAL MEDICINE
Payer: MEDICARE

## 2022-06-02 VITALS
OXYGEN SATURATION: 100 % | HEART RATE: 55 BPM | RESPIRATION RATE: 18 BRPM | SYSTOLIC BLOOD PRESSURE: 117 MMHG | BODY MASS INDEX: 22.48 KG/M2 | WEIGHT: 157 LBS | DIASTOLIC BLOOD PRESSURE: 69 MMHG | HEIGHT: 70 IN

## 2022-06-02 LAB
EKG ATRIAL RATE: 220 BPM
EKG ATRIAL RATE: 49 BPM
EKG DIAGNOSIS: NORMAL
EKG DIAGNOSIS: NORMAL
EKG Q-T INTERVAL: 388 MS
EKG Q-T INTERVAL: 426 MS
EKG QRS DURATION: 100 MS
EKG QRS DURATION: 90 MS
EKG QTC CALCULATION (BAZETT): 403 MS
EKG QTC CALCULATION (BAZETT): 403 MS
EKG R AXIS: -61 DEGREES
EKG R AXIS: -66 DEGREES
EKG T AXIS: -2 DEGREES
EKG T AXIS: 9 DEGREES
EKG VENTRICULAR RATE: 54 BPM
EKG VENTRICULAR RATE: 65 BPM

## 2022-06-02 PROCEDURE — 92960 CARDIOVERSION ELECTRIC EXT: CPT

## 2022-06-02 PROCEDURE — 92960 CARDIOVERSION ELECTRIC EXT: CPT | Performed by: INTERNAL MEDICINE

## 2022-06-02 PROCEDURE — 7100000010 HC PHASE II RECOVERY - FIRST 15 MIN

## 2022-06-02 PROCEDURE — 93005 ELECTROCARDIOGRAM TRACING: CPT | Performed by: INTERNAL MEDICINE

## 2022-06-02 PROCEDURE — 99152 MOD SED SAME PHYS/QHP 5/>YRS: CPT | Performed by: INTERNAL MEDICINE

## 2022-06-02 PROCEDURE — 2500000003 HC RX 250 WO HCPCS

## 2022-06-02 PROCEDURE — 85610 PROTHROMBIN TIME: CPT

## 2022-06-02 ASSESSMENT — ENCOUNTER SYMPTOMS
ANAL BLEEDING: 1
CHEST TIGHTNESS: 0
COUGH: 0
NAUSEA: 0
SHORTNESS OF BREATH: 0
BLOOD IN STOOL: 1
ABDOMINAL DISTENTION: 0
ABDOMINAL PAIN: 0
PHOTOPHOBIA: 0

## 2022-06-02 NOTE — H&P
[address] and P  4/12/22  Referring: Janell Mathis is a 78 y.o. seen as a 6 month follow up for CAD, hypertension, hyperlipidemia and AF. He has SVT,  JOSEPH  He quit smoking 1990. He has no family history of CAD. He lives with his wife, who has dementia.     Prior his first CABG, he did not have chest pain, but \"just did not feel right\" and \"had heaviness of the head. \" He had an abnormal stress test followed by an angiogram  Prior his second bypass, he did experience chest pain. He was admitted 3/30 with chest pain. He had a PCI to [address] to LAD with PAYAL, PCI to SVG to OM with PAYAL,PCI to SVG toPDA ith PAYAL. He was started on plavix, and coumadin was restarted after the cath. Today he is feeling well. Complains of fatigue. He has no chest. No chest pain or shortness of breath. No syncope. No edema or orthpnea    HISTORY/ALLERGIES/ROS     MedHx:  has a past medical history of Arthritis, Atrial fib/flut, CAD (coronary artery disease), Hyperlipidemia, Hypertension, Seizures ([address] and Sinus bradycardia. SurgHx:  has a past surgical history that includes hernia repair (Right); Coronary angioplasty with stent (1997); [place] tooth extraction (April 2012); pacemaker placement (12/18/2017); Coronary artery bypass graft (01/2019); Cardiac surgery; and Cystoscopy (N/A, 11/13/2019). SocHx:  reports that he quit smoking about 33 years ago. He has a 30.00 pack-year smoking history. He has never used smokeless tobacco. He reports that he does not drink alcohol and does not use drugs. FamHx: No family history of premature coronary artery disease, sudden death, or aneurysm  Allergies: Patient has no known allergies. ROS:   Review of Systems   Constitutional: Positive for fatigue. Negative for activity change, diaphoresis and fever. HENT: Negative for congestion and ear discharge. Eyes: Negative for photophobia and visual disturbance.    Respiratory: Negative for cough, chest tightness and shortness of breath. Cardiovascular: Positive for palpitations. Negative for chest pain. Gastrointestinal: Positive for anal bleeding and blood in stool. Negative for abdominal distention, abdominal pain and nausea. Endocrine: Negative for cold intolerance and polydipsia. Genitourinary: Negative for difficulty urinating and flank pain. Musculoskeletal: Positive for arthralgias and myalgias. Skin: Negative for rash and wound. Allergic/Immunologic: Negative for environmental allergies and immunocompromised state. Neurological: Negative for dizziness and headaches. Hematological: Negative for adenopathy. Does not bruise/bleed easily. Psychiatric/Behavioral: Negative for confusion. The patient is not hyperactive. MEDICATIONS      Prior to Admission medications    Medication Sig Start Date End Date Taking?  Authorizing Provider   magnesium oxide (MAG-OX) 400 (240 Mg) MG tablet TAKE ONE TABLET BY MOUTH DAILY 5/9/22   Jasbir Rueda, DO   amiodarone (CORDARONE) 200 MG tablet TAKE 1 TABLET DAILY 5/6/22   BETO Newton CNP   warfarin (COUMADIN) 2.5 MG tablet Take 2.5 mg by mouth    Historical Provider, MD   ezetimibe (ZETIA) 10 MG tablet Take 1 tablet by mouth daily 4/12/22   Jasbir Rueda, DO   pantoprazole (PROTONIX) 40 MG tablet Take 1 tablet by mouth every morning (before breakfast) 4/1/22   Jasbir Rueda, DO   rosuvastatin (CRESTOR) 40 MG tablet Take 1 tablet by mouth nightly 3/31/22   César Kumar MD   clopidogrel (PLAVIX) 75 MG tablet Take 1 tablet by mouth daily 3/31/22   César Kumar MD   potassium chloride (KLOR-CON M20) 20 MEQ extended release tablet TAKE ONE TABLET BY MOUTH  AS NEEDED WITH LASIX FOR SWELLING 2/24/22   Jasbir Rueda, DO   metoprolol succinate (TOPROL XL) 100 MG extended release tablet Take 1 tablet by mouth daily 2/24/22   Jasbir Rueda, DO   magnesium oxide (MAG-OX) 400 MG tablet Take 1 tablet by mouth daily 2/24/22   Jasbir Rueda, DO   nitroGLYCERIN (NITROSTAT) 0.4 MG SL tablet Place 1 tablet under the tongue every 5 minutes as needed for Chest pain 2/24/22   Thang Neumann DO   valsartan (DIOVAN) 40 MG tablet Take 0.5 tablets by mouth at bedtime 2/24/22   Thang Neumann DO   diazepam (VALIUM) 5 MG tablet Take 5 mg by mouth 2 times daily. Historical Provider, MD   phenytoin (DILANTIN) 100 MG ER capsule Take 1 capsule by mouth 2 times daily. Resume home dose 12/21/12   Lisset Simmons, APRN - CNP   primidone (MYSOLINE) 250 MG tablet Take 250 mg by mouth 2 times daily     Historical Provider, MD       PHYSICAL EXAM        Vitals:    06/02/22 0945   BP: 117/69   Pulse: 55   Resp: 18   SpO2: 100%    Weight: 157 lb (71.2 kg)     Gen Alert, cooperative, no distress Heart  Regular rate and rhythm, 2/6 murmur, ICD in place   Head Normocephalic, atraumatic, no abnormalities Abd  Soft, NT, +BS, no mass, no OM   Eyes PERRLA, conj/corn clear Ext  Ext nl, AT, no C/C, trace edema   Nose Nares normal, no drain age, Non-tender Pulse 2+ and symmetric   Throat Lips, mucosa, tongue normal Skin Color/text/turg nl, no rash/lesions   Neck S/S, TM, NT, no bruit Psych Nl mood and affect   Lung = CTA-B, unlabored, no DTP     Ch wall NT, no deform       LABS and Imaging     Relevant and available CV data reviewed  Echo/MRI:Echo 4/15/2021   Summary   Mildly dilated left ventricle.   Mild concentric left ventricular hypertrophy.   Mildly decreased left ventricular systolic function with distal septal and   apical hypokinesis. Estimated EF 40-45%.   Cannot assess diastolic function.   Mild thickening of the mitral valve leaflets. No stenosis.   Moderate mitral regurgitation.   The left atrium is mildly dilated.   The aortic valve appears tricuspid with mild sclerosis no stenosis.   Trivial aortic regurgitation.   Normal right ventricular size and mildly decreased in function.   Moderate tricuspid regurgitation. PASP 41mmHg.   The right atrium is moderately dilated.  Pacemaker / ICD lead is visualized   in the right atrium.   Trivial pulmonic regurgitation. Patient is s/p WESTON amputation  in the past.   Cath: s/p CABG (x4 '07 LIM[place] to LAD, SVG to RCA, SVG to D2, SVG to OM3),   [facility]G at [facility]  (free IAN-LAD, SVG-Diag, SVG-OM, SVG-PDA  3/30/22 Successful complex angioplasty and stenting of IAN to LAD, SVG to OM, SVG to PDA  Holter:  EK2021  Bradycardia   -Old anterior infarct. Low voltage with rightward P-axis and rotation -possible pulmonary disease. Stress: 2017 myoview--  Medium sized anterior fixed defect of moderate intensity consistent with    infarction in the territory of the LAD .    Small sized inferior fixed defect of moderate intensity consistent with    infarction in the territory of the RCA .    No ischemia.    Normal LV function.    Overall findings represent a intermediate risk scan. 16  CT of abd--no AAA    carotid dopplers 1-15%  PFT-mild obstruction  moderate Risk  modreate Complexity/Medical Decision Making  Outside records Reviewed  Labs Reviewed  Echo and ekg personally interpreted. Imaging reviewed  Medications reviewed  Old Notes reviewed  ASSESSMENT AND PLAN     1.  CAD  -   redo CABG 2019 at [facility]  -   3/30/22 Successful complex angioplasty and stenting of IAN to LAD, SVG to OM, SVG to PDA ( ANN MARIE)  -   on toprol 100 mg daily diovan 40 mg daily  -   on plavix 75 mg daily  -   stable  Plan  -  Continue clopidogrel and warfarin. Avoid aspirin due to risk of bleeding with triple therapy. -  Call for any changes  -   More aggressive medical management of cholesterol  -  refer to cardiac rehab  -  echo in 3 months    2.  Atrial fibrillation  -  CHA2DS vasc score 4  -  on amiodarone  -  2019 WESTON ligating and LA maze-No remnant of Left atrial appendage was seen on LATESHA 2021  - 20  DCCV DR Shantell Villagran  - on coumadin-managed by our office  - on amiodarone 200 mg daily  - discussed risk/benefit of coming off of coumadin  Plan  - follow up as scheduled with Dr Mariel Lancaster  - he has had no bleeding issues, at this point he will continue coumadin and think about coming off    3. Ischemic cardiomyopathy/NSVT  -  previously <35%,now 40%  -  12/18/17 EPS with inducible VT/VF  -  12/8/17  Dual chamber AICD  -  2/2/22  optivol normal  -  ACC C NYHA II  -  previously not interested in starting entresto or spironolactone  -  on valsartan, and toprol, amiodarone  -  stable no chf on exam  - stable  Plan  -  continue above medications  -  Call for any changes        4. Essential Hypertension  -  on toprol xl 100 mg  -  114/74- stable  Plan  -  continue to monitor    5. Primary Hyperlipidemia  - 3/31/22  Tc 159 hdl 55 ldl 89 tri 73   -  on zetia 10 mg daily  -  Taking both crestor and lipitor  -  not at goal- need more aggressive tx  Plan  -  Stop lipitor  -  Continue crestor 40 mg  -  Start zetia 10 mg daily  -  Recommend pcsk9 inhibitor-patient would like to think about it after he starts zetia--will call if he wants it called in      6. JOSEPH  - wears cpap,[facility]  - continue as above        Patient counseled on lifestyle modification, diet, and exercise.     Follow Up:    3months      Recurrent a fib so cardioversion scheduled for today

## 2022-06-02 NOTE — PRE SEDATION
Brief Pre-Op Note/Sedation Assessment      Rivera [place] Creek Day  1945  [id number]  1:08 PM    Planned Procedure: cardioversion Procedure  Post Procedure Plan: Return to same level of care  Consent: I have discussed with the patient and/or the patient representative the indication, alternatives, and the possible risks and/or complications of the planned procedure and the anesthesia methods. The patient and/or patient representative appear to understand and agree to proceed. Chief Complaint:   Dyspnea on Exertion      Indications for Cath Procedure:  1. Presentation:  Cardiac Arrythmia  2. Anginal Classification within 2 weeks:  No symptoms  3. Angina Symptoms Assessment:  Asymptomatic  4. Heart Failure Class within last 2 weeks:  No symptoms  5. Cardiovascular Instability:  No    Prior Ischemic Workup/Eval:  1. Pre-Procedural Medications: Yes: Antiarrhythmic Agent Other  2. Stress Test Completed? No    Does Patient need surgery? Cath Valve Surgery:  No    Pre-Procedure Medical History:  Vital Signs:  /69   Pulse 55   Resp 18   Ht 5' 10\" (1.778 m)   Wt 157 lb (71.2 kg)   SpO2 100%   BMI 22.53 kg/m²     Allergies:  No Known Allergies  Medications:    No current facility-administered medications for this encounter.        Past Medical History:    Past Medical History:   Diagnosis Date    Arthritis     shoulders and knee    Atrial fib/flut     CAD (coronary artery disease)     Hyperlipidemia     Hypertension     Seizures ([address]     last seizures many years ago,     Sinus bradycardia        Surgical History:    Past Surgical History:   Procedure Laterality Date    CARDIAC SURGERY      , angioplasty 2007    CORONARY ANGIOPLASTY WITH STENT PLACEMENT  1997    CORONARY ARTERY [facility]PASS GRAFT  01/2019    [facility]    CYSTOSCOPY N/A 11/13/2019    FLEXIBLE CYSTOSCOPY performed by Vin Bazzi MD at [facility]     done 65 sam ago   [facility]  12/18/2017  WISDOM TOOTH EXTRACTION  April 2012             Pre-Sedation:  Pre-Sedation Documentation and Exam:  I have personally completed a history, physical exam & review of systems for this patient (see notes). Prior History of Anesthesia Complications:   none    Modified Mallampati:  I (soft palate, uvula, fauces, tonsillar pillars visible)    ASA Classification:  Class 2 - A normal healthy patient with mild systemic disease    Brad Scale: Activity:  2 - Able to move 4 extremities voluntarily on command  Respiration:  2 - Able to breathe deeply and cough freely  Circulation:  2 - BP+/- 20mmHg of normal  Consciousness:  2 - Fully awake  Oxygen Saturation (color):  2 - Able to maintain oxygen saturation >92% on room air    Sedation/Anesthesia Plan:  Guard the patient's safety and welfare. Minimize physical discomfort and pain. Minimize negative psychological responses to treatment by providing sedation and analgesia and maximize the potential amnesia. Patient to meet pre-procedure discharge plan.     Medication Planned:  brevital    Patient is an appropriate candidate for plan of sedation:   yes      Electronically signed by Tacho Campoverde MD on 6/2/2022 at 1:08 PM

## 2022-06-02 NOTE — PROCEDURES
[address]                     [address] [facility], [address] [facility]                            CARDIAC CATHETERIZATION    PATIENT NAME: Kennedy Goldman                        :        1945  MED REC NO:   [id number]                          ROOM:  ACCOUNT NO:   [de-identified]                           ADMIT DATE: 2022  PROVIDER:     Radha Matamoros MD    DATE OF PROCEDURE:  2022    PROCEDURE:  Elective cardioversion. INDICATION:  The patient with persistent atrial fibrillation with a  history of paroxysmal atrial fibrillation. PROCEDURE:  Informed consent was obtained from the patient. He was  given 70 mg of intravenous Brevital.  With this, he achieved full  conscious sedation from approximately 01:00 p.m. to 01:15 p.m. All  vital signs were monitored by me. Brevital was given under my direct  supervision. The patient was then cardioverted to a normal sinus rhythm  with 200 joules without any apparent complications.         Cynthia Drew MD    D: 2022 13:35:22       T: 2022 13:39:37     [email or web address]  Job#: [id number]     Doc#: [id number]    CC:

## 2022-06-07 LAB — INR BLD: 2.2 (ref 0.88–1.12)

## 2022-06-13 ENCOUNTER — TELEPHONE (OUTPATIENT)
Dept: CARDIOLOGY CLINIC | Age: 77
End: 2022-06-13

## 2022-06-13 ENCOUNTER — NURSE ONLY (OUTPATIENT)
Dept: CARDIOLOGY CLINIC | Age: 77
End: 2022-06-13
Payer: MEDICARE

## 2022-06-13 DIAGNOSIS — Z95.810 ICD (IMPLANTABLE CARDIOVERTER-DEFIBRILLATOR) IN PLACE: ICD-10-CM

## 2022-06-13 DIAGNOSIS — I50.22 CHRONIC SYSTOLIC HEART FAILURE (HCC): ICD-10-CM

## 2022-06-13 DIAGNOSIS — I25.5 ISCHEMIC CARDIOMYOPATHY: Chronic | ICD-10-CM

## 2022-06-13 DIAGNOSIS — I48.0 PAROXYSMAL ATRIAL FIBRILLATION (HCC): Primary | ICD-10-CM

## 2022-06-13 PROCEDURE — 93297 REM INTERROG DEV EVAL ICPMS: CPT | Performed by: CLINICAL NURSE SPECIALIST

## 2022-06-13 NOTE — TELEPHONE ENCOUNTER
Pt states he needs a new script for INR draws to be sent to [facility]. States his next draw is due end of this week. Please fax order, any questions please call pt.

## 2022-06-13 NOTE — PROGRESS NOTES
Remote transmission received from patient's dual chamber ICD home monitor. Transmission shows normal sensing and pacing function. AT/ AF burden 45.5% (coumadin). Possible Optivol fluid accumulation 5/9//22 --- ongoing. Currently elevated around 80, above threshold, with correlating TI trend below reference line approaching back to reference line. TriageHF Heart Failure Risk Status on 13-Jun-2022 is High*    EP/ NP will review. See interrogation under cardiology tab in the [address] [facility] [address] field for more details. Will continue to monitor remotely.

## 2022-06-14 ENCOUNTER — TELEPHONE (OUTPATIENT)
Dept: CARDIOLOGY CLINIC | Age: 77
End: 2022-06-14

## 2022-06-14 DIAGNOSIS — R06.02 SOB (SHORTNESS OF BREATH): Primary | ICD-10-CM

## 2022-06-14 LAB — INR BLD: 2

## 2022-06-14 NOTE — TELEPHONE ENCOUNTER
Called patient, he was treated for URI 2 months ago with AB, followed by a round of steroids from ENT ( Dr Allison Malagon-)   now has abd bloating, \"swelling in his groin\". His scale is broken, but he feels full. Has not taken lasix for 2 months.  Reviewed with dkw, recommended he take lasix 40 mg daily get a chest xray and follow up on Tuesday

## 2022-06-14 NOTE — TELEPHONE ENCOUNTER
I spoke with pt. He states that his scale is unreliable. He states that he has been sick. He thought he had Covid. He was on a steroid for 4 days and was using fluticasone. He stated that he has slight abd swelling. He states that he has pain in the middle of his chest but thinks it's muscular due to cough. He requested a chest X ray. He said that He usually takes his Lasix PRN, but at 20 mg daily. He bumped the up to 40 mg.

## 2022-06-15 ENCOUNTER — ANTI-COAG VISIT (OUTPATIENT)
Dept: CARDIOLOGY CLINIC | Age: 77
End: 2022-06-15
Payer: MEDICARE

## 2022-06-15 DIAGNOSIS — I48.11 LONGSTANDING PERSISTENT ATRIAL FIBRILLATION (HCC): Primary | ICD-10-CM

## 2022-06-15 PROCEDURE — 93793 ANTICOAG MGMT PT WARFARIN: CPT | Performed by: NURSE PRACTITIONER

## 2022-06-15 PROCEDURE — 93296 REM INTERROG EVL PM/IDS: CPT | Performed by: INTERNAL MEDICINE

## 2022-06-15 PROCEDURE — 93295 DEV INTERROG REMOTE 1/2/MLT: CPT | Performed by: INTERNAL MEDICINE

## 2022-06-15 NOTE — PROGRESS NOTES
ANTICOAGULATION MONITORING    Redia Prior Day, 1945    Anticoagulation Indication(s):  Afib  ; hx of DCCV, s/p WESTON ligation       ~s/p LATESHA Jun '19 Jan '21 with DCCV  Referring Physician:   Dr. Ambrocio Like   Goal INR Range:  2.0 - 3.0  Duration of Anticoagulation Therapy:  Long term  Home or Lab Draw: Lab--Cheo Davis  Product patient has at home: warfarin 5 mg    Recent INR Results:  Lab Results   Component Value Date    INR 2.00 06/14/2022    INR 2.20 (H) 06/02/2022    INR 2.30 05/16/2022    INR 1.80 04/29/2022       INR Summary                          Warfarin regimen (mg)  Date INR A/P Sun Mon Tue Wed Thu Fri Sat Mg/wk                                                                                                                                                                                                      6/14/22 2.0 At goal 5 5 2.5 5 5 5 5 32.5   5/16/22 2.3 At goal 5 5 2.5 5 2.5 5 5 30   4/29/22 1.8  Below goal 5 5 5 5 2.5 5 5    4/6/22 2.1 At goal 5 5 2.5 5 2.5 5 5 30   3/14/22 3.3 At goal 5 5 2.5 5 2.5 5 5 30   2/24/22 1.8 Below goal 5 5 2.5 5 2.5 5 5 30   1/7/22 2.0 At goal 5 5 2.5 5 2.5 5 5 30   12/8/21 2.2 At goal 5 5 2.5 5 2.5 5 5 30   11/5/21 2.3 At goal 5 5 2.5 5 2.5 5 5 30   9/17/21 2.6 At goal 5 5 2.5 5 2.5 5 5 30   8/20/21 1.9 Just below goal 5 5 2.5 5 2.5 5 5 30   8/13/21 2.0 At goal 5 5 2.5 5 2.5 5 5 30   7/27/21 2.1 At goal 5 5 2.5 5 2.5 5 5 30   5/28/21 2.2 At goal 5 5 2.5 5 2.5 5 5 30   4/26/21 2.5 At goal 5 5 2.5 5 2.5 5 5 30   4/21/21 2.2 At goal 5 5 2.5 5 2.5 5 5 30   4/15/21 4.7 Above goal 5 JOSH   HOLD HOLD 5    3/19/21 2.7 At goal 5 5 5 5 5 5 5 35   3/12/21 3.3 Just above goal 5 5 5 5 5 5 5 35         Assessment/Plan:    Recent hospitalizations/HC visits no   Recent medication changes no   Medications taken regularly that may interact with warfarin or alter INR No significant drug interactions identified   Warfarin dose taken as prescribed Yes   Signs/symptoms of bleeding History of bleeding? no   Vitamin K intake Consistency of servings of green, leafy vegetables per week ? Yes   Recent vomiting/diarrhea/fever None reported   Changes in weight, activity, stress Weight loss   alcohol use Patient reports having 0 drinks per day   Upcoming surgeries or procedures None reported     Patient's INR was in range-same dosage schedule. Patient was also reminded to maintain consistent vitamin K intake and call with any bleeding, medication changes, or fever/vomiting/diarrhea. I spoke with pt and verified warfarin dose . Next INR check: 1 week      Per NPTS; increase to 5 mg daily except on Tuesday, cont 2.5. Recheck 1 wk. I spoke with pt and relayed instructions. He verbalized understanding.     Addendum:  6/15/22    Krystal Camargo CNP

## 2022-06-16 ENCOUNTER — OFFICE VISIT (OUTPATIENT)
Dept: CARDIOLOGY CLINIC | Age: 77
End: 2022-06-16
Payer: MEDICARE

## 2022-06-16 VITALS
BODY MASS INDEX: 21.66 KG/M2 | HEART RATE: 50 BPM | DIASTOLIC BLOOD PRESSURE: 62 MMHG | OXYGEN SATURATION: 99 % | HEIGHT: 70 IN | SYSTOLIC BLOOD PRESSURE: 100 MMHG | WEIGHT: 151.3 LBS

## 2022-06-16 DIAGNOSIS — I48.0 PAF (PAROXYSMAL ATRIAL FIBRILLATION) (HCC): Chronic | ICD-10-CM

## 2022-06-16 DIAGNOSIS — I25.83 CORONARY ARTERY DISEASE DUE TO LIPID RICH PLAQUE: Primary | ICD-10-CM

## 2022-06-16 DIAGNOSIS — E78.2 MIXED HYPERLIPIDEMIA: Chronic | ICD-10-CM

## 2022-06-16 DIAGNOSIS — I25.5 ISCHEMIC CARDIOMYOPATHY: Chronic | ICD-10-CM

## 2022-06-16 DIAGNOSIS — I25.10 CORONARY ARTERY DISEASE DUE TO LIPID RICH PLAQUE: Primary | ICD-10-CM

## 2022-06-16 DIAGNOSIS — I50.22 CHRONIC SYSTOLIC HEART FAILURE (HCC): ICD-10-CM

## 2022-06-16 DIAGNOSIS — I10 ESSENTIAL HYPERTENSION: Chronic | ICD-10-CM

## 2022-06-16 PROCEDURE — 99214 OFFICE O/P EST MOD 30 MIN: CPT | Performed by: INTERNAL MEDICINE

## 2022-06-16 PROCEDURE — 1036F TOBACCO NON-USER: CPT | Performed by: INTERNAL MEDICINE

## 2022-06-16 PROCEDURE — 3017F COLORECTAL CA SCREEN DOC REV: CPT | Performed by: INTERNAL MEDICINE

## 2022-06-16 PROCEDURE — 1123F ACP DISCUSS/DSCN MKR DOCD: CPT | Performed by: INTERNAL MEDICINE

## 2022-06-16 PROCEDURE — G8420 CALC BMI NORM PARAMETERS: HCPCS | Performed by: INTERNAL MEDICINE

## 2022-06-16 PROCEDURE — G8427 DOCREV CUR MEDS BY ELIG CLIN: HCPCS | Performed by: INTERNAL MEDICINE

## 2022-06-16 RX ORDER — FUROSEMIDE 40 MG/1
40 TABLET ORAL 2 TIMES DAILY
COMMUNITY
End: 2022-07-14 | Stop reason: SDUPTHER

## 2022-06-16 ASSESSMENT — ENCOUNTER SYMPTOMS
COUGH: 0
CHEST TIGHTNESS: 0
PHOTOPHOBIA: 0
BLOOD IN STOOL: 0
ABDOMINAL DISTENTION: 0
ABDOMINAL PAIN: 0
SHORTNESS OF BREATH: 0
NAUSEA: 0
ANAL BLEEDING: 0

## 2022-06-16 NOTE — PROGRESS NOTES
[address]  6/16/22  Referring: Petros Mathis is a 78 y.o. seen in follow up for CAD, iCM,  hypertension, hyperlipidemia and AF. He has SVT,  JOSEPH. CABG redo 2019 at [facility], WESTON ligation, and LA maze. Dual chamber AICD 12/2017. He quit smoking 1990. He has no family history of CAD. He lives with his wife, who has dementia and is the sole caregiver.     Prior his first CABG, he did not have chest pain, but \"just did not feel right\" and \"had heaviness of the head. \" He had an abnormal stress test followed by an angiogram  Prior his second bypass, he did experience chest pain. He was admitted 3/30/22 with chest pain. He had a PCI to [address] to LAD with PAYAL, PCI to SVG to OM with PAYAL,PCI to SVG toPDA ith PAYAL. He was started on plavix, and coumadin was restarted after the cath. Today he reports he was recently on antibiotics and steroids for URI. He had also noticed swelling in his abdomen/groin. He had not taken lasix for 2 months. He was instructed to take 40 mg daily and get a chest xray. Today he is feeling better. He has more energy. He states he has \"lumps\" in groin area, he visited his urologist yesterday and has a double hernia, they are not painful or bothersome. He is still caring for his wife on his own. HISTORY/ALLERGIES/ROS     MedHx:  has a past medical history of Arthritis, Atrial fib/flut, CAD (coronary artery disease), Hyperlipidemia, Hypertension, Seizures ([facility] [address] and Sinus bradycardia. SurgHx:  has a past surgical history that includes hernia repair (Right); Coronary angioplasty with stent (1997); [place] tooth extraction (April 2012); pacemaker placement (12/18/2017); Coronary artery bypass graft (01/2019); Cardiac surgery; and Cystoscopy (N/A, 11/13/2019). SocHx:  reports that he quit smoking about 33 years ago. He has a 30.00 pack-year smoking history. He has never used smokeless tobacco. He reports that he does not drink alcohol and does not use drugs. FamHx: No family history of premature coronary artery disease, sudden death, or aneurysm  Allergies: Patient has no known allergies. ROS:   Review of Systems   Constitutional: Positive for fatigue. Negative for activity change, diaphoresis and fever. HENT: Negative for congestion and ear discharge. Eyes: Negative for photophobia and visual disturbance. Respiratory: Negative for cough, chest tightness and shortness of breath. Cardiovascular: Positive for palpitations. Negative for chest pain. Gastrointestinal: Negative for abdominal distention, abdominal pain, anal bleeding, blood in stool and nausea. Endocrine: Negative for cold intolerance and polydipsia. Genitourinary: Negative for difficulty urinating and flank pain. Musculoskeletal: Positive for arthralgias and myalgias. Skin: Negative for rash and wound. Allergic/Immunologic: Negative for environmental allergies and immunocompromised state. Neurological: Negative for dizziness and headaches. Hematological: Negative for adenopathy. Does not bruise/bleed easily. Psychiatric/Behavioral: Negative for confusion. The patient is not hyperactive. MEDICATIONS      Prior to Admission medications    Medication Sig Start Date End Date Taking?  Authorizing Provider   furosemide (LASIX) 40 MG tablet Take 40 mg by mouth 2 times daily Taking prn for fluid overload/swelling   Yes Historical Provider, MD   magnesium oxide (MAG-OX) 400 (240 Mg) MG tablet TAKE ONE TABLET BY MOUTH DAILY 5/9/22  Yes Elma Gordillo DO   amiodarone (CORDARONE) 200 MG tablet TAKE 1 TABLET DAILY 5/6/22  Yes BETO Dominguez - ANT   warfarin (COUMADIN) 2.5 MG tablet Take 2.5 mg by mouth   Yes Historical Provider, MD   ezetimibe (ZETIA) 10 MG tablet Take 1 tablet by mouth daily 4/12/22  Yes Elma Gordillo DO   pantoprazole (PROTONIX) 40 MG tablet Take 1 tablet by mouth every morning (before breakfast) 4/1/22  Yes Elma Gordillo DO   rosuvastatin (CRESTOR) 40 MG tablet Take 1 tablet by mouth nightly 3/31/22  Yes Glen White MD   clopidogrel (PLAVIX) 75 MG tablet Take 1 tablet by mouth daily 3/31/22  Yes Glen White MD   potassium chloride (KLOR-CON M20) 20 MEQ extended release tablet TAKE ONE TABLET BY MOUTH  AS NEEDED WITH LASIX FOR SWELLING 2/24/22  Yes Chris Gant DO   metoprolol succinate (TOPROL XL) 100 MG extended release tablet Take 1 tablet by mouth daily 2/24/22  Yes Chris Gant DO   nitroGLYCERIN (NITROSTAT) 0.4 MG SL tablet Place 1 tablet under the tongue every 5 minutes as needed for Chest pain 2/24/22  Yes Chris Gant DO   valsartan (DIOVAN) 40 MG tablet Take 0.5 tablets by mouth at bedtime 2/24/22  Yes Chris Gant DO   diazepam (VALIUM) 5 MG tablet Take 5 mg by mouth 2 times daily. Yes Historical Provider, MD   phenytoin (DILANTIN) 100 MG ER capsule Take 1 capsule by mouth 2 times daily.  Resume home dose 12/21/12  Yes Lisset Simmons, APRN - CNP   primidone (MYSOLINE) 250 MG tablet Take 250 mg by mouth 2 times daily    Yes Historical Provider, MD       PHYSICAL EXAM        Vitals:    06/16/22 0916   BP: 100/62   Pulse: 50   SpO2: 99%    Weight: 151 lb 4.8 oz (68.6 kg)     Gen Alert, cooperative, no distress Heart  Regular rate and rhythm, 2/6 murmur, ICD in place   Head Normocephalic, atraumatic, no abnormalities Abd  Soft, NT, +BS, no mass, no OM   Eyes PERRLA, conj/corn clear Ext  Ext nl, AT, no C/C, trace edema   Nose Nares normal, no drain age, Non-tender Pulse 2+ and symmetric   Throat Lips, mucosa, tongue normal Skin Color/text/turg nl, no rash/lesions   Neck S/S, TM, NT, no bruit Psych Nl mood and affect   Lung = CTA-B, unlabored, no DTP     Ch wall NT, no deform       LABS and Imaging     Relevant and available CV data reviewed  Echo/MRI:Echo 4/15/2021   Summary   Mildly dilated left ventricle.   Mild concentric left ventricular hypertrophy.   Mildly decreased left ventricular systolic function with distal septal and   apical hypokinesis. Estimated EF 40-45%.   Cannot assess diastolic function.   Mild thickening of the mitral valve leaflets. No stenosis.   Moderate mitral regurgitation.   The left atrium is mildly dilated.   The aortic valve appears tricuspid with mild sclerosis no stenosis.   Trivial aortic regurgitation.   Normal right ventricular size and mildly decreased in function.   Moderate tricuspid regurgitation. PASP 41mmHg.   The right atrium is moderately dilated. Pacemaker / ICD lead is visualized   in the right atrium.   Trivial pulmonic regurgitation. Patient is s/p WESTON amputation  in the past.   Cath: s/p CABG (x4 '07 LIMA to LAD, SVG to RCA, SVG to D2, SVG to OM3),   [facility]G at [facility]  (free IAN-LAD, SVG-Diag, SVG-OM, SVG-PDA  3/30/22 Successful complex angioplasty and stenting of IAN to LAD, SVG to OM, SVG to PDA  Holter:  [id number]  Bradycardia   -Old anterior infarct. Low voltage with rightward P-axis and rotation -possible pulmonary disease. Stress: 2017 myoview--  Medium sized anterior fixed defect of moderate intensity consistent with    infarction in the territory of the LAD .    Small sized inferior fixed defect of moderate intensity consistent with    infarction in the territory of the RCA .    No ischemia.    Normal LV function.    Overall findings represent a intermediate risk scan. 16  CT of abd--no AAA  2012  carotid dopplers 1-15%  PFT-mild obstruction  moderate Risk  modreate Complexity/Medical Decision Making  Outside records Reviewed  Labs Reviewed  Echo and ekg personally interpreted. Imaging reviewed  Medications reviewed  Old Notes reviewed  ASSESSMENT AND PLAN     1.  CAD  -   redo CABG 2019 at [facility]  -   3/30/22 Successful complex angioplasty and stenting of IAN to LAD, SVG to OM, SVG to PDA ( [facility])  -   on toprol 100 mg daily diovan 40 mg daily  -   on plavix 75 mg daily  -   stable  Plan  -  Continue clopidogrel and warfarin with ppi.  Avoid aspirin due to risk of bleeding with triple therapy. -  Call for any changes    2. Atrial fibrillation  -  CHA2DS vasc score 4  -  on amiodarone  -  1/8/2019 WESTON ligating and LA maze-No remnant of Left atrial appendage was seen on LATESHA 01/29/2021  - 1/29/20  [address]  - 6/2/22 DCCV with Dr Thelma Nickerson  - on coumadin-managed by our office  - on amiodarone 200 mg daily  - discussed risk/benefit of coming off of coumadin  Plan  - he has had no bleeding issues, at this point he will continue coumadin     3. Ischemic cardiomyopathy/NSVT  -  previously <35%,now 40%  -  12/18/17 EPS with inducible VT/VF  -  12/8/17  Dual chamber AICD  -  ACC C NYHA II  -  previously not interested in starting entresto, SGLT2 or spironolactone  -  on valsartan, and toprol, amiodarone  -  Re started lasix 40 mg q other day, then daily since last week,   - stable  Plan  - furosemide 40 mg once daily until Saturday and then back to 20 mg  -  continue valsartan and toprol  -  Add spironolactone if labs stable        4. Essential Hypertension  -  on toprol xl 100 mg  -  100/62  stable  Plan  -  continue to monitor    5. Primary Hyperlipidemia  - 3/31/22  Tc 159 hdl 55 ldl 89 tri 73   -  on zetia 10 mg daily  -  Taking Crestor  -  not at goal- need more aggressive tx  Plan  -  Recommend pcsk9 inhibitor-patient would like to think about it after he starts zetia--will call if he wants it called in    6. JOSEPH  - wears cpap,[facility] at [facility]  - continue as above    Patient counseled on lifestyle modification, diet, and exercise. Follow Up:    6 months  He is planning to look for a [facility] pcp      Dr. Reuben Barreto: This note was scribed in the presence of Dr. Sapna Berrios DO by Beny Rogers RN. Physician Attestation  The scribe for and in the presence of cydney Mena DO).   The scribe [facility] Floor may have prepopulated components of this note with my historical intellectual property under my direct supervision. Any additions to this intellectual property were performed in my presence and at my direction.   Furthermore, the content and accuracy of this note have been reviewed by me DHAAR Odonnell.  6/16/2022 10:29 PM

## 2022-06-16 NOTE — PATIENT INSTRUCTIONS
You can try melatonin to help get restful sleep (over the counter)  Continue all medications   Continue to check your weight daily - may need to get a new scale if yours is not working well  Will give you a list of [facility] primary care doctors  There is a service called [place] [employer] [place] on aging that may be able to help with some of the care for your wife when needed. Use your water pill 40 mg every day until Saturday, then go to every other day after that.    You have an echo scheduled already with a follow up with Dr Cheyanne Rojas in August.

## 2022-06-16 NOTE — LETTER
[address] [address]  Phone: [phone]  Fax: [phone]    Griselda Hicks DO    June 16, 2022     Yue Lentz MD  [address] [place] [id number]    Patient: Manuel Mathis   MR Number: [id number]   YOB: 1945   Date of Visit: 6/16/2022       Dear Yue Lentz: Thank you for referring Bard Tania Mathis to me for evaluation/treatment. Below are the relevant portions of my assessment and plan of care. If you have questions, please do not hesitate to call me. I look forward to following Bard Marin along with you.     Sincerely,      Griselda Hicks, DO

## 2022-06-27 ENCOUNTER — OFFICE VISIT (OUTPATIENT)
Dept: SURGERY | Age: 77
End: 2022-06-27
Payer: MEDICARE

## 2022-06-27 VITALS — WEIGHT: 160 LBS | DIASTOLIC BLOOD PRESSURE: 60 MMHG | BODY MASS INDEX: 22.96 KG/M2 | SYSTOLIC BLOOD PRESSURE: 100 MMHG

## 2022-06-27 DIAGNOSIS — K40.91 UNILATERAL RECURRENT INGUINAL HERNIA WITHOUT OBSTRUCTION OR GANGRENE: Primary | ICD-10-CM

## 2022-06-27 PROCEDURE — 99203 OFFICE O/P NEW LOW 30 MIN: CPT | Performed by: SURGERY

## 2022-06-27 PROCEDURE — 1123F ACP DISCUSS/DSCN MKR DOCD: CPT | Performed by: SURGERY

## 2022-06-27 PROCEDURE — G8427 DOCREV CUR MEDS BY ELIG CLIN: HCPCS | Performed by: SURGERY

## 2022-06-27 PROCEDURE — 3017F COLORECTAL CA SCREEN DOC REV: CPT | Performed by: SURGERY

## 2022-06-27 PROCEDURE — 1036F TOBACCO NON-USER: CPT | Performed by: SURGERY

## 2022-06-27 PROCEDURE — G8420 CALC BMI NORM PARAMETERS: HCPCS | Performed by: SURGERY

## 2022-06-27 ASSESSMENT — ENCOUNTER SYMPTOMS
RESPIRATORY NEGATIVE: 1
EYES NEGATIVE: 1
ABDOMINAL DISTENTION: 1
ALLERGIC/IMMUNOLOGIC NEGATIVE: 1

## 2022-06-27 NOTE — PROGRESS NOTES
Juan Mathis is a 78 y.o. male     CC: Bulge in the right groin region    HPI: 42-year-old male who presents for evaluation of a bulge in the right groin region which was first noted about 2 and half months ago. It causes occasional symptoms of mild discomfort. He believes it may have resulted from straining with bowel movements. The bulge has not changed in size. No history of nausea, vomiting, anorexia, unexpected weight loss, fevers, chills or urinary symptoms. Past Medical History:   Diagnosis Date    Arthritis     shoulders and knee    Atrial fib/flut     CAD (coronary artery disease)     Hyperlipidemia     Hypertension     Seizures ([facility] [address]     last seizures many years ago,     Sinus bradycardia        Patient has no known allergies. Past Surgical History:   Procedure Laterality Date    CARDIAC SURGERY      , angioplasty 2007    CORONARY ANGIOPLASTY WITH STENT PLACEMENT  1997    CORONARY ART[facility]PASS GRAFT  01/2019    [facility]    CYSTOSCOPY N/A 11/13/2019    FLEXIBLE CYSTOSCOPY performed by Juliet Shankar MD at [facility] Right     done 72 sam ago   [facility]  12/18/2017    WISDOM TOOTH EXTRACTION  April 2012        Prior to Visit Medications    Medication Sig Taking?  Authorizing Provider   furosemide (LASIX) 40 MG tablet Take 40 mg by mouth 2 times daily Taking prn for fluid overload/swelling Yes Historical Provider, MD   magnesium oxide (MAG-OX) 400 (240 Mg) MG tablet TAKE ONE TABLET BY MOUTH DAILY Yes Stephen Manning DO   amiodarone (CORDARONE) 200 MG tablet TAKE 1 TABLET DAILY Yes BETO Arceo CNP   warfarin (COUMADIN) 2.5 MG tablet Take 2.5 mg by mouth Yes Historical Provider, MD   ezetimibe (ZETIA) 10 MG tablet Take 1 tablet by mouth daily Yes Stephen Manning DO   pantoprazole (PROTONIX) 40 MG tablet Take 1 tablet by mouth every morning (before breakfast) Yes Stephen Manning DO   rosuvastatin (CRESTOR) 40 MG tablet Take 1 tablet by mouth nightly Yes Juancarlos Arellano MD   clopidogrel (PLAVIX) 75 MG tablet Take 1 tablet by mouth daily Yes Juancarlos Arellano MD   potassium chloride (KLOR-CON M20) 20 MEQ extended release tablet TAKE ONE TABLET BY MOUTH  AS NEEDED WITH LASIX FOR SWELLING Yes Bety Shawman, DO   metoprolol succinate (TOPROL XL) 100 MG extended release tablet Take 1 tablet by mouth daily Yes Bety Zhang, DO   nitroGLYCERIN (NITROSTAT) 0.4 MG SL tablet Place 1 tablet under the tongue every 5 minutes as needed for Chest pain Yes Bety Zhang, DO   valsartan (DIOVAN) 40 MG tablet Take 0.5 tablets by mouth at bedtime Yes Bety Zhang, DO   diazepam (VALIUM) 5 MG tablet Take 5 mg by mouth 2 times daily. Yes Historical Provider, MD   phenytoin (DILANTIN) 100 MG ER capsule Take 1 capsule by mouth 2 times daily. Resume home dose Yes BETO Gary - CNP   primidone (MYSOLINE) 250 MG tablet Take 250 mg by mouth 2 times daily  Yes Historical Provider, MD       Social History     Socioeconomic History    Marital status:      Spouse name: Not on file    Number of children: Not on file    Years of education: Not on file    Highest education level: Not on file   Occupational History    Not on file   Tobacco Use    Smoking status: Former Smoker     Packs/day: 2.00     Years: 15.00     Pack years: 30.00     Quit date: 8/15/1988     Years since quittin.8    Smokeless tobacco: Never Used   Vaping Use    Vaping Use: Never used   Substance and Sexual Activity    Alcohol use: No    Drug use: No    Sexual activity: Not on file   Other Topics Concern    Not on file   Social History Narrative    Not on file     Social Determinants of Health     Financial Resource Strain:     Difficulty of Paying Living Expenses: Not on file   Food Insecurity:     Worried About [address] in the Last Year: Not on file    [address] in the Last Year: Not on file   Transportation Needs:     Lack of Transportation (Medical):  Not on file    Lack of Transportation (Non-Medical): Not on file   Physical Activity:     Days of Exercise per Week: Not on file    Minutes of Exercise per Session: Not on file   Stress:     Feeling of Stress : Not on file   Social Connections:     Frequency of Communication with Friends and Family: Not on file    Frequency of Social Gatherings with Friends and Family: Not on file    Attends Spiritism Services: Not on file    Active Member of [address] [place] [address] or Organizations: Not on file    Attends Club or Organization Meetings: Not on file    Marital Status: Not on file   Intimate Partner Violence:     Fear of Current or Ex-Partner: Not on file    Emotionally Abused: Not on file    Physically Abused: Not on file    Sexually Abused: Not on file   Housing Stability:     Unable to Pay for Housing in the Last Year: Not on file    Number of [place] in the Last Year: Not on file    Unstable Housing in the Last Year: Not on file       Review of Systems   Constitutional: Negative. HENT: Negative. Eyes: Negative. Respiratory: Negative. Cardiovascular: Negative. Gastrointestinal: Positive for abdominal distention. Endocrine: Negative. Genitourinary: Negative. Musculoskeletal: Positive for gait problem. Skin: Negative. Allergic/Immunologic: Negative. Hematological: Bruises/bleeds easily. Psychiatric/Behavioral: Negative.        :   Physical Exam  Constitutional:       Appearance: He is well-developed. HENT:      Head: Normocephalic and atraumatic. Right Ear: External ear normal.      Left Ear: External ear normal.   Eyes:      Conjunctiva/sclera: Conjunctivae normal.   Cardiovascular:      Rate and Rhythm: Normal rate and regular rhythm. Pulmonary:      Effort: Pulmonary effort is normal.      Breath sounds: Normal breath sounds. Abdominal:      General: There is no distension. Palpations: Abdomen is soft. Comments: Small recurrent right inguinal hernia.   No evidence of left inguinal hernia. Musculoskeletal:         General: Normal range of motion. Cervical back: Normal range of motion and neck supple. Skin:     General: Skin is warm and dry. Neurological:      Mental Status: He is alert and oriented to person, place, and time. Psychiatric:         Behavior: Behavior normal.       Wt 160 lb (72.6 kg)   BMI 22.96 kg/m²     :      Pleasant 59-year-old male with a history of an open right inguinal hernia repair with mesh as a child who presents for evaluation of a 2-1/2-month history of a bulge in the right groin region. Physical examination reveals a small, medial recurrent right inguinal hernia. There is no evidence of a left inguinal hernia. Plan:      Robotic laparoscopic repair of recurrent right inguinal hernia with mesh. Patient explained risks, benefits and possible complications including risk of bleeding, bowel injury, hernia recurrence, infection requiring mesh removal, erosion of mesh into bowel or nerve entrapment.

## 2022-06-27 NOTE — LETTER
[facility]  [address] [address]  Phone: [phone]  Fax: [phone]    June 27, 2022    Patient: Geronimo Mathis  MRN:  [id number]  YOB: 1945  Date of Visit: 6/27/2022    Dear Dr Montana Eagar: Thank you for the request for consultation for Trever Mathis. Below are the relevant portions of my assessment and plan of care. Assessment:  Pleasant 22-year-old male with a history of an open right inguinal hernia repair with mesh as a child who presents for evaluation of a 2-1/2-month history of a bulge in the right groin region. Physical examination reveals a small, medial recurrent right inguinal hernia. There is no evidence of a left inguinal hernia. Plan:  Robotic laparoscopic repair of recurrent right inguinal hernia with mesh. If you have questions, please do not hesitate to call me. I look forward to following Trever along with you.     Sincerely,    Meche Cheung MD     providers:    Eliana Watts MD  [address] [facility] [address]  Via Fax: [phone]     Glen Pastrana MD  [address] [facility] [address] [address]  Via Fax: [phone]

## 2022-07-08 ENCOUNTER — ANTI-COAG VISIT (OUTPATIENT)
Dept: CARDIOLOGY CLINIC | Age: 77
End: 2022-07-08

## 2022-07-08 DIAGNOSIS — I48.11 LONGSTANDING PERSISTENT ATRIAL FIBRILLATION (HCC): Primary | ICD-10-CM

## 2022-07-08 LAB — INR BLD: 1.9

## 2022-07-08 NOTE — PROGRESS NOTES
Signs/symptoms of bleeding History of bleeding? no   Vitamin K intake Consistency of servings of green, leafy vegetables per week ? Yes   Recent vomiting/diarrhea/fever None reported   Changes in weight, activity, stress Weight loss   alcohol use Patient reports having 0 drinks per day   Upcoming surgeries or procedures None reported     Patient's INR was in range-same dosage schedule. Patient was also reminded to maintain consistent vitamin K intake and call with any bleeding, medication changes, or fever/vomiting/diarrhea. I spoke with pt and verified warfarin dose . Next INR check: 1 week      Per NPTS; increase to 5 mg daily except on Tuesday, cont 2.5. Recheck 1 wk. I spoke with pt and relayed instructions. He verbalized understanding.     Addendum:  7/8/22    Vicenta Melendez CNP

## 2022-07-11 DIAGNOSIS — I48.0 PAF (PAROXYSMAL ATRIAL FIBRILLATION) (HCC): Chronic | ICD-10-CM

## 2022-07-11 DIAGNOSIS — Z95.810 S/P IMPLANTATION OF AUTOMATIC CARDIOVERTER/DEFIBRILLATOR (AICD): Chronic | ICD-10-CM

## 2022-07-14 ENCOUNTER — TELEPHONE (OUTPATIENT)
Dept: CARDIOLOGY CLINIC | Age: 77
End: 2022-07-14

## 2022-07-14 NOTE — TELEPHONE ENCOUNTER
Medication Refill    Medication needing refilled:  furosemide (LASIX) 40 MG    furosemide (LASIX) 40 MG    clopidogrel (PLAVIX) 75 MG       Dosage of the medication:    How are you taking this medication (QD, BID, TID, QID, PRN):    30 or 90 day supply called in: 90 day supply    When will you run out of your medication:    Which Pharmacy are we sending the medication to?:   [address], [address] [place] [address] [phone] - F [phone]   [place] [address]   Phone:  [phone]  Fax:  [phone]

## 2022-07-14 NOTE — TELEPHONE ENCOUNTER
I called Mr Mathis, he has 3 weeks of pills left. He is switching pharmacies from [facility] to [facility]. I let him know Dr Martinez Tyson will send in his meds upon his return 7/19/22. Mr Mathis was fine with this.

## 2022-07-14 NOTE — LETTER
[facility]  EP Procedure Sheet    7/21/22  Bekah Barrie Day  1945  EP Procedures  [] Pacemaker implant (single/dual) [] EP Study   [] ICD implant (single/dual) [] Atrial flutter ablation (LATESHA Y/N)   [] Biv implant ICD [] Tilt Table   [] Biv implant PPM [] Atrial fibrillation ablation (LATESHA Yes)   [] Generator Change (PPM/ICD/BiV) [] SVT ablation   [] Lead revision (RV/LA/RA) (<1 month) [] PVC ablation     [] Lead extraction +/- upgrade (BiV/PPM/ICD) [] VT Ischemic/ non-ischemic   [] Loop implant/ removal [] VT RVOT   [x] Cardioversion [] VT Left sided   [] LATESHA [] AVN ablation   Equipment  [] Medtronic  [] NASIM Mapping System   [] [place] [] [address]   [] [employer] [] CryoAblation   [] Biotronik [] Laser Lead Extraction   EP Procedures Scheduling Request  # hours Requested  []1 []2 []2-4 [] 4-6 Scheduled  Date:   Specific Day  Completed    Anesthesia []yes [x]no F/u Date:   CT surgery backup []yes [x]no COVID     Overnight stay      Performing MD  [x]RMM []MXA   []MKW [] Other _______ First vs repeat   []1st [] 2nd [x] 3rd   Pre-Procedure Labs / Imaging  [] PT/INR [] Type & cross   [] CBC [] Units PRBC   [] BMP/Mg [] Units FFP   [] Venogram [] Cardiac CTA for Pulmonary vein mapping     RN INITIALS: DW     Patient Instructions  Do not eat or drink after midnight the night prior to procedure  Dx: persistent atrial fibrillation  ICD-10 code:  I48.9

## 2022-07-14 NOTE — TELEPHONE ENCOUNTER
Received refill request for Lasix, Plavix from [address] : 6/16/22 DKRADHA    Last Labs : 3/31/22 BMP    Next OV : 8/11/22 DKW

## 2022-07-20 RX ORDER — CLOPIDOGREL BISULFATE 75 MG/1
75 TABLET ORAL DAILY
Qty: 90 TABLET | Refills: 3 | Status: SHIPPED | OUTPATIENT
Start: 2022-07-20

## 2022-07-20 RX ORDER — FUROSEMIDE 20 MG/1
TABLET ORAL
Qty: 90 TABLET | Refills: 3 | OUTPATIENT
Start: 2022-07-20

## 2022-07-20 RX ORDER — FUROSEMIDE 40 MG/1
40 TABLET ORAL 2 TIMES DAILY
Qty: 60 TABLET | Refills: 11 | Status: SHIPPED | OUTPATIENT
Start: 2022-07-20 | End: 2022-08-11 | Stop reason: SDUPTHER

## 2022-07-21 NOTE — TELEPHONE ENCOUNTER
Interrogation shows current episode of afib started 07/08/2022 . He feels like he is in it all the time. He would like to have another  cardioversion and then discuss ablation with RMM. Scheduling letter sent.

## 2022-07-21 NOTE — TELEPHONE ENCOUNTER
Spoke with the patient and advised him of the message below .  He states that he would like to discuss having a DCCV and Ablation

## 2022-07-28 ENCOUNTER — TELEPHONE (OUTPATIENT)
Dept: CARDIOLOGY CLINIC | Age: 77
End: 2022-07-28

## 2022-07-28 ENCOUNTER — ANTI-COAG VISIT (OUTPATIENT)
Dept: CARDIOLOGY CLINIC | Age: 77
End: 2022-07-28
Payer: MEDICARE

## 2022-07-28 ENCOUNTER — HOSPITAL ENCOUNTER (OUTPATIENT)
Dept: CARDIAC CATH/INVASIVE PROCEDURES | Age: 77
Discharge: HOME | End: 2022-07-28
Attending: INTERNAL MEDICINE | Admitting: INTERNAL MEDICINE
Payer: MEDICARE

## 2022-07-28 VITALS
RESPIRATION RATE: 16 BRPM | DIASTOLIC BLOOD PRESSURE: 77 MMHG | HEIGHT: 70 IN | SYSTOLIC BLOOD PRESSURE: 109 MMHG | WEIGHT: 162 LBS | BODY MASS INDEX: 23.19 KG/M2 | OXYGEN SATURATION: 100 % | HEART RATE: 56 BPM

## 2022-07-28 DIAGNOSIS — I48.0 PAROXYSMAL ATRIAL FIBRILLATION (HCC): Primary | ICD-10-CM

## 2022-07-28 LAB
EKG ATRIAL RATE: 220 BPM
EKG ATRIAL RATE: 51 BPM
EKG DIAGNOSIS: NORMAL
EKG DIAGNOSIS: NORMAL
EKG P AXIS: 98 DEGREES
EKG P-R INTERVAL: 288 MS
EKG Q-T INTERVAL: 390 MS
EKG Q-T INTERVAL: 408 MS
EKG QRS DURATION: 86 MS
EKG QRS DURATION: 90 MS
EKG QTC CALCULATION (BAZETT): 376 MS
EKG QTC CALCULATION (BAZETT): 382 MS
EKG R AXIS: -58 DEGREES
EKG R AXIS: -67 DEGREES
EKG T AXIS: 44 DEGREES
EKG T AXIS: 54 DEGREES
EKG VENTRICULAR RATE: 51 BPM
EKG VENTRICULAR RATE: 58 BPM
INR BLD: 3.7

## 2022-07-28 PROCEDURE — 92960 CARDIOVERSION ELECTRIC EXT: CPT

## 2022-07-28 PROCEDURE — 99152 MOD SED SAME PHYS/QHP 5/>YRS: CPT | Performed by: INTERNAL MEDICINE

## 2022-07-28 PROCEDURE — 93010 ELECTROCARDIOGRAM REPORT: CPT | Performed by: INTERNAL MEDICINE

## 2022-07-28 PROCEDURE — 7100000010 HC PHASE II RECOVERY - FIRST 15 MIN

## 2022-07-28 PROCEDURE — 93005 ELECTROCARDIOGRAM TRACING: CPT | Performed by: INTERNAL MEDICINE

## 2022-07-28 PROCEDURE — 2500000003 HC RX 250 WO HCPCS

## 2022-07-28 PROCEDURE — 93793 ANTICOAG MGMT PT WARFARIN: CPT | Performed by: NURSE PRACTITIONER

## 2022-07-28 PROCEDURE — 92960 CARDIOVERSION ELECTRIC EXT: CPT | Performed by: INTERNAL MEDICINE

## 2022-07-28 PROCEDURE — 85610 PROTHROMBIN TIME: CPT

## 2022-07-28 ASSESSMENT — ENCOUNTER SYMPTOMS
ABDOMINAL PAIN: 0
BLOOD IN STOOL: 0
NAUSEA: 0
ABDOMINAL DISTENTION: 0
CHEST TIGHTNESS: 0
COUGH: 0
SHORTNESS OF BREATH: 0
PHOTOPHOBIA: 0
ANAL BLEEDING: 0

## 2022-07-28 NOTE — TELEPHONE ENCOUNTER
Pt called today. He stated that he got his INR done prior to his CV. He stated that it was 3.7. Pt wanted to get his dosing.

## 2022-07-28 NOTE — PROGRESS NOTES
ANTICOAGULATION MONITORING    Madhavi Pardo Day, 1945    Anticoagulation Indication(s):  Afib  ; hx of DCCV, s/p WESTON ligation       ~s/p LATESHA Jun '19 Jan '21 with DCCV  Referring Physician:   Dr. Juan Parks   Goal INR Range:  2.0 - 3.0  Duration of Anticoagulation Therapy:  Long term  Home or Lab Draw: Lab--[facility]  Product patient has at home: warfarin 5 mg    Recent INR Results:  Lab Results   Component Value Date    INR 3.70 07/28/2022    INR 1.90 07/08/2022    INR 2.00 06/14/2022    INR 2.20 (H) 06/02/2022       INR Summary                          Warfarin regimen (mg)  Date INR A/P Sun Mon Tue Wed Thu Fri Sat Mg/wk                                                                                                                                                                            7/28/22 3.7 Above goal 5 5 2.5 5 2.5 5 2.5 27.5   7/8/22 1.9 Below goal 5 5 5 5 5 2.5 5    6/14/22 2.0 At goal 5 5 2.5 5 5 5 5 32.5   5/16/22 2.3 At goal 5 5 2.5 5 2.5 5 5 30   4/29/22 1.8  Below goal 5 5 5 5 2.5 5 5    4/6/22 2.1 At goal 5 5 2.5 5 2.5 5 5 30   3/14/22 3.3 At goal 5 5 2.5 5 2.5 5 5 30   2/24/22 1.8 Below goal 5 5 2.5 5 2.5 5 5 30   1/7/22 2.0 At goal 5 5 2.5 5 2.5 5 5 30   12/8/21 2.2 At goal 5 5 2.5 5 2.5 5 5 30   11/5/21 2.3 At goal 5 5 2.5 5 2.5 5 5 30   9/17/21 2.6 At goal 5 5 2.5 5 2.5 5 5 30   8/20/21 1.9 Just below goal 5 5 2.5 5 2.5 5 5 30   8/13/21 2.0 At goal 5 5 2.5 5 2.5 5 5 30   7/27/21 2.1 At goal 5 5 2.5 5 2.5 5 5 30   5/28/21 2.2 At goal 5 5 2.5 5 2.5 5 5 30   4/26/21 2.5 At goal 5 5 2.5 5 2.5 5 5 30   4/21/21 2.2 At goal 5 5 2.5 5 2.5 5 5 30   4/15/21 4.7 Above goal 5 JOSH   HOLD HOLD 5    3/19/21 2.7 At goal 5 5 5 5 5 5 5 35   3/12/21 3.3 Just above goal 5 5 5 5 5 5 5 35         Assessment/Plan:    Recent hospitalizations/HC visits no   Recent medication changes no   Medications taken regularly that may interact with warfarin or alter INR No significant drug interactions identified   Warfarin dose taken as prescribed Yes   Signs/symptoms of bleeding History of bleeding? no   Vitamin K intake Consistency of servings of green, leafy vegetables per week ? Yes   Recent vomiting/diarrhea/fever None reported   Changes in weight, activity, stress Weight loss   alcohol use Patient reports having 0 drinks per day   Upcoming surgeries or procedures None reported     Patient's INR was not in range-see dosage schedule. Patient was also reminded to maintain consistent vitamin K intake and call with any bleeding, medication changes, or fever/vomiting/diarrhea. I spoke with pt and verified warfarin dose . Next INR check: 1 week      Per NPTS; Have him take 2.5 mg three times a week (thrus, sat, tues wth 5 mg remaining days. Recheck in one week . I spoke with pt and relayed instructions. He verbalized understanding.     Addendum:  7/28/22    Sadie Lancaster CNP

## 2022-07-28 NOTE — H&P
[address]   Electrophysiology Follow up   Date: 7/28/2022  I had the privilege of visiting Yenni Biswas Delfina in the office. CC: PAF  HPI: Shira Chávez is a 78 y.o. male with a past medical history of HTN, HLD, CAD s/p CABG (redo 2019), seizures, bradycardia, and atrial fibrillation. NSTEMI 10/13/2017. Has been treated with Tikosyn in the past for afib. Initially did not tolerate amiodarone due to bradycardia. S/p redo CABGx4 at [facility] clinic, WESTON ligation, and LA maze 1/8/2019.       01/27/2020:  Atrial fibrillation. Device interrogation showed he had been back in atrial fibrillation since 01/09/2020. He also had an increase in his PVC rate to 250/hour. S/p DCCV 01/29/2020    LATESHA with no remnant of WESTON. Ablation discussed but he was not interested. 3/25/2022 presented to [facility] ED with complaints of chest/abdomiinal pain. Felt fluttering in his abdomen. ECG and troponin both negative. CTA completed as an inpatient. Patient discharged. Cardiac CTA completed 3/25/2022: recommended cardiac cath, followed by , patient will be scheduled as an outpatient     [facility] presents to the office in follow up. He had complaints of feeling similar to how he felt before his last bypass. He is scheduled for a [facility] tomorrow 3/30/2022. Complains of bradycardia on pulse oximeter, discussion that he has PVC's that make his HR read low and it is inaccurate. Lower rate on PPM set at 50 BPM. He remains compliant on his medication regimen. He is in atrial fibrillation. He is here for cardioversion.      Assessment and plan:   -PAF   ECG today shows Sinus Bradycardia  with PVC's   <0.1% AT/AF burden per device    TSH 3/25/2022 6.41,      On Amiodarone 200 mg daily       Symptomatic with episodes in the past, denies any recurrent symptoms    On toprol  mg daily for rate control   S/p DCCV 1/29/2021     He was not interested in Ablation in the past.     WESTON ligation 1/8/2018 during CABG, LATESHA 1/29/2021 No remnant of Left atrial appendage was seen , Patient remains on coumadin managed by Clemencia Baig APRN-CNP     Discussed that his risk of stroke is low with no remnant of WESTON, It would be reasonable for him to take him off coumadin. Cardioversion discussed. Risks, benefits and alternative of procedure were explained. All questions answered. Patient understood and would like to proceed. -Ischemic Cardiomyopathy/NSVT/Implantable device              S/p dual chamber  AICD 12/08/2017   The CIED was interrogated and programmed and I supervised and reviewed all the data. All findings and changes are in device interrogation sheet and reflect my personal interpretation and changes and is scanned to Epic. 5.2 years remaining, AP 75.3% ,  0.1%     -PVC's   Frequent multiform PVC's on ECG   Does not feel symptomatic with these, may consider further treatment if has increased PVC's off amiodarone    Continues amiodarone 200 mg daily   Scheduled for [facility] 3/30/2022 to r/o ischemic cause      If negative for ischemia may consider ablation in the future      -CAD   Scheduled for [facility] 3/30/2022 with [place] [facility]    Cardiac CTA completed 3/25/2022: recommended cardiac cath, followed by               Hx of MI 10/2018 s/p re-do CABG 01/08/ 2019 at [address] [place] [address] Rd therapy, Toprol XL, zetia, lipitor, ASA     -HTN  Controlled: 120/60  BP goal <130/80  Home BP monitoring encouraged, printed information provided on how to accurately measure BP at home. Counseled to follow a low salt diet to assure blood pressure remains controlled for cardiovascular risk factor modification. The patient is counseled to get regular exercise 3-5 times per week and maintain a healthy weight reduce cardiovascular risk factors.         Patient Active Problem List    Diagnosis Date Noted    Lightheadedness     Chest pain at rest 06/07/2010    JOSEPH on CPAP 11/20/2017    Vertebrobasilar insufficiency ST elevation myocardial infarction (STEMI) (HCC)     NSVT (nonsustained ventricular tachycardia) (HCC)     Coronary artery disease involving coronary bypass graft of native heart without angina pectoris     Bradycardia 09/28/2016    Palpitations 01/14/2016    Hyperlipidemia 12/13/2012    Atrial fibrillation ([address] 06/27/2012    Coronary artery disease due to lipid rich plaque 06/07/2010    Seizure ([address] 06/07/2010    Essential hypertension 06/07/2010    PAF (paroxysmal atrial fibrillation) ([address]     Coronary artery disease involving native coronary artery of native heart with unstable angina pectoris ([address] 03/30/2022    Nausea 03/25/2022    Variant angina ([address] 08/08/2019    Chronic systolic heart failure ([address] 05/22/2019    Coronary artery disease involving autologous artery coronary bypass graft without angina pectoris     Shortness of breath     ICD (implantable cardioverter-defibrillator) in place 01/09/2018    VT (ventricular tachycardia) ([address] 12/18/2017    History of MI (myocardial infarction)     Ischemic cardiomyopathy     VF (ventricular fibrillation) ([address]      Diagnostic studies:   ECG 3/29/22  Sinus Paulie with multiform PVC's   395 QTcH, 98 QRS    CTA 3/25/2022  There is some limitation due to motion, sternotomy wires, metallic atrial   clip and pacemaker wires. Nonfilling, presumed proximal total occlusion of the LIMA graft. Patent graft from aorta to distal right coronary artery with moderate   stenosis proximal descending portion and suspected stenosis distal   anastomosis. Moderate foci of intervening descending graft stenosis. Patent graft from the aorta to obtuse marginal with mild graft stenosis. There may be retrograde filling supplying the circumflex and small branches   proximally. Very small graft interposed between the larger saphenous vein grafts with   apparent high-grade stenosis and subtle flow and patency perhaps extending to   the LAD.   This is insufficiently seen. Given potential significance of several findings, correlation with nuclear   cardiology and/or coronary angiography may be necessary. Echo 4/15/2021   Summary   Mildly dilated left ventricle. Mild concentric left ventricular hypertrophy. Mildly decreased left ventricular systolic function with distal septal and   apical hypokinesis. Estimated EF 40-45%. Cannot assess diastolic function. Mild thickening of the mitral valve leaflets. No stenosis. Moderate mitral regurgitation. The left atrium is mildly dilated. The aortic valve appears tricuspid with mild sclerosis no stenosis. Trivial aortic regurgitation. Normal right ventricular size and mildly decreased in function. Moderate tricuspid regurgitation. PASP 41mmHg. The right atrium is moderately dilated. Pacemaker / ICD lead is visualized   in the right atrium. Trivial pulmonic regurgitation. IVC not well visualized. LATESHA 1/29/2021   Summary   Normal left ventricle size. There is left ventricular hypertrophy. There is   moderate LV dysfunction. Ejection fraction is estimated at 40%. Mild to moderate mitral regurgitation. There is no evidence of mass or thrombus in the left atrium. No remnant of left atrial appendage was seen. Patient is s/p WESTON amputation   in the past.       I independently reviewed the cardiac diagnostic studies, ECG and relevant imaging studies. Lab Results   Component Value Date    LVEF 43 04/15/2021    LVEFMODE Echo 10/14/2017     Lab Results   Component Value Date    TSH 6.41 (H) 03/25/2022         Physical Examination:  Vitals:    07/28/22 0945   BP: 109/77   Pulse: 56   Resp: 16   SpO2: 100%      Wt Readings from Last 3 Encounters:   07/28/22 162 lb (73.5 kg)   06/27/22 160 lb (72.6 kg)   06/16/22 151 lb 4.8 oz (68.6 kg)       Constitutional: Oriented. No distress. Head: Normocephalic and atraumatic.    Mouth/Throat: Oropharynx is clear and moist.   Eyes: Conjunctivae normal. EOM are normal.   Neck: Neck supple. No JVD present. Cardiovascular: Normal rate, regular rhythm, S1&S2. Pulmonary/Chest: Bilateral respiratory sounds. No rhonchi. Abdominal: Soft. No tenderness. Musculoskeletal: No tenderness. No edema    Lymphadenopathy: Has no cervical adenopathy. Neurological: Alert and oriented. Follows command, No Gross deficit   Skin: Skin is warm, No rash noted. Psychiatric: Has a normal behavior     Review of System:  [x] Full ROS obtained and negative except as mentioned in HPI    Prior to Admission medications    Medication Sig Start Date End Date Taking? Authorizing Provider   clopidogrel (PLAVIX) 75 MG tablet Take 1 tablet by mouth in the morning. 7/20/22   [facility], DO   furosemide (LASIX) 40 MG tablet Take 1 tablet by mouth in the morning and 1 tablet in the evening.  Taking prn for fluid overload/swelling. 7/20/22   [facility], DO   magnesium oxide (MAG-OX) 400 (240 Mg) MG tablet TAKE ONE TABLET BY MOUTH DAILY 5/9/22   [facility], DO   amiodarone (CORDARONE) 200 MG tablet TAKE 1 TABLET DAILY 5/6/22   BETO [place] - ANT   warfarin (COUMADIN) 2.5 MG tablet Take 5 mg by mouth Pt taking 5mg since 7/8/22    Historical Provider, MD   ezetimibe (ZETIA) 10 MG tablet Take 1 tablet by mouth daily 4/12/22   [facility]   pantoprazole (PROTONIX) 40 MG tablet Take 1 tablet by mouth every morning (before breakfast) 4/1/22   [facility]   rosuvastatin (CRESTOR) 40 MG tablet Take 1 tablet by mouth nightly 3/31/22   Krystina Hdez MD   potassium chloride (KLOR-CON M20) 20 MEQ extended release tablet TAKE ONE TABLET BY MOUTH  AS NEEDED WITH LASIX FOR SWELLING 2/24/22   [facility]   metoprolol succinate (TOPROL XL) 100 MG extended release tablet Take 1 tablet by mouth daily 2/24/22   [facility],    nitroGLYCERIN (NITROSTAT) 0.4 MG SL tablet Place 1 tablet under the tongue every 5 minutes as needed for Chest pain 2/24/22   Amarilis Russo, DO valsartan (DIOVAN) 40 MG tablet Take 0.5 tablets by mouth at bedtime 2/24/22   Kami Monroe DO   diazepam (VALIUM) 5 MG tablet Take 5 mg by mouth in the morning and 5 mg in the evening. Historical Provider, MD   phenytoin (DILANTIN) 100 MG ER capsule Take 1 capsule by mouth 2 times daily. Resume home dose 12/21/12   Lisset Simmons APRN - CNP   primidone (MYSOLINE) 250 MG tablet Take 250 mg by mouth 2 times daily     Historical Provider, MD       No Known Allergies    Social History:  Reviewed. reports that he quit smoking about 33 years ago. He has a 30.00 pack-year smoking history. He has never used smokeless tobacco. He reports that he does not drink alcohol and does not use drugs. Family History:  Reviewed. Reviewed. No family history of SCD. Relevant and available labs, and cardiovascular diagnostics reviewed. Reviewed. I independently reviewed relevant and available cardiac diagnostic tests ECG, CXR, Echo, Stress test, Device interrogation, Holter, CT scan. All questions and concerns were addressed to the patient/family. Alternatives to my treatment were discussed. I have discussed the above stated plan and the patient verbalized understanding and agreed with the plan. NOTE: This report was transcribed using voice recognition software. Every effort was made to ensure accuracy, however, inadvertent computerized transcription errors may be present. Donis Richter MD, MPH  [place] [id number]   Office: [phone]  Fax: [phone]      H&P Update    I have reviewed the history and physical and examined the patient and updated with relevant changes. Consent: I have discussed with the patient and/or the patient representative the indication, alternatives, and the possible risks and/or complications of the planned procedure and the anesthesia methods. The patient and/or patient representative appear to understand and agree to proceed.     Vitals:    07/28/22 0945 BP: 109/77   Pulse: 56   Resp: 16   SpO2: 100%     Prior to Admission medications    Medication Sig Start Date End Date Taking? Authorizing Provider   clopidogrel (PLAVIX) 75 MG tablet Take 1 tablet by mouth in the morning. 7/20/22   Chauncey Adrian, DO   furosemide (LASIX) 40 MG tablet Take 1 tablet by mouth in the morning and 1 tablet in the evening. Taking prn for fluid overload/swelling. 7/20/22   Chauncey Adrian, DO   magnesium oxide (MAG-OX) 400 (240 Mg) MG tablet TAKE ONE TABLET BY MOUTH DAILY 5/9/22   Chauncey Adrian, DO   amiodarone (CORDARONE) 200 MG tablet TAKE 1 TABLET DAILY 5/6/22   BETO [place] CNP   warfarin (COUMADIN) 2.5 MG tablet Take 5 mg by mouth Pt taking 5mg since 7/8/22    Historical Provider, MD   ezetimibe (ZETIA) 10 MG tablet Take 1 tablet by mouth daily 4/12/22   Chauncey Adrian, DO   pantoprazole (PROTONIX) 40 MG tablet Take 1 tablet by mouth every morning (before breakfast) 4/1/22   Chauncey Adrian, DO   rosuvastatin (CRESTOR) 40 MG tablet Take 1 tablet by mouth nightly 3/31/22   Phan Martin MD   potassium chloride (KLOR-CON M20) 20 MEQ extended release tablet TAKE ONE TABLET BY MOUTH  AS NEEDED WITH LASIX FOR SWELLING 2/24/22   Chauncey Adrian, DO   metoprolol succinate (TOPROL XL) 100 MG extended release tablet Take 1 tablet by mouth daily 2/24/22   Chauncey Adrian, DO   nitroGLYCERIN (NITROSTAT) 0.4 MG SL tablet Place 1 tablet under the tongue every 5 minutes as needed for Chest pain 2/24/22   Chauncey Adrian, DO   valsartan (DIOVAN) 40 MG tablet Take 0.5 tablets by mouth at bedtime 2/24/22   Chauncey Adrian, DO   diazepam (VALIUM) 5 MG tablet Take 5 mg by mouth in the morning and 5 mg in the evening. Historical Provider, MD   phenytoin (DILANTIN) 100 MG ER capsule Take 1 capsule by mouth 2 times daily.  Resume home dose 12/21/12   BETO Gary - CNP   primidone (MYSOLINE) 250 MG tablet Take 250 mg by mouth 2 times daily     Historical Provider, MD     Past Medical History: Diagnosis Date    Arthritis     shoulders and knee    Atrial fib/flut     CAD (coronary artery disease)     Hyperlipidemia     Hypertension     Seizures ([address]     last seizures many years ago,     Sinus bradycardia      Past Surgical History:   Procedure Laterality Date    CARDIAC SURGERY      , angioplasty 2007    CORONARY ANGIOPLASTY WITH STENT PLACEMENT  1997    CORONARY ARTERY BYPASS GRAFT  01/2019    [facility]    CYSTOSCOPY N/A 11/13/2019    FLEXIBLE CYSTOSCOPY performed by Al Sam MD at [facility]     done 65 sam ago    PACEMAKER PLACEMENT  12/18/2017    WISDOM TOOTH EXTRACTION  April 2012     No Known Allergies    Pre-Sedation Documentation and Exam:   I have personally completed a history, physical exam & review of systems for this patient (see notes).     Mallampati Airway Assessment:  Class II     Prior History of Anesthesia Complications:   None    ASA Classification:  Class 3 - A patient with severe systemic disease that limits activity but is not incapacitating    Sedation/ Anesthesia Plan:   Intravenous sedation    Medications Planned:   Brevital intravenously     Patient is an appropriate candidate for plan of sedation:   Yes    Electronically signed by Ariella Sanchez MD on 7/28/2022 at 10:21 AM

## 2022-07-28 NOTE — PROCEDURES
[address]     Electrophysiology Procedure Note       Date of Procedure: 7/28/2022  Patient's Name: Radha Noble Day  YOB: 1945   Medical Record Number: [id number]  Procedure Performed by: Jessie Miller MD    Procedures performed:    External Electrical cardioversion   IV sedation. Start time: 10:20 AM  Stop time: 10:25 AM   Medication: Brevital Sodium   Sedation medication was given by physician     Indication of the procedure: Persistent atrial fibrillation     Details of procedure: The patient was brought to the cath lab area in a fasting and non-sedated state. The risks, benefits and alternatives of the procedure were discussed with the patient. The patient opted to proceed with the procedure. Written informed consent was signed and placed in the chart. A timeout protocol was completed to identify the patient and the procedure being performed. Then we used brevital for sedation. 70 mg Brevital was given by me as one dose, and electrical DC cardioversion was perfomred using 200J, synchronized shock. Patient was converted to sinus rhythm. The patient tolerated the procedure well and there were no complications.      Conclusion:   Successful external DC cardioversion of atrial fibrillation

## 2022-07-28 NOTE — DISCHARGE INSTRUCTIONS
CARDIOVERSION DISCHARGE INSTRUCTIONS    No driving for 24 hours. We strongly recommend that a responsible adult stay with you for the next 24 hours. Continue Coumadin. Hydrocortisone 1% cream to reddened areas as needed.

## 2022-07-28 NOTE — PROGRESS NOTES
[address]  8/11/22  Referring: Min Mathis is a 78 y.o. male seen for follow up for CAD, iCM,  hypertension, hyperlipidemia and AF. He has SVT,  JOSEPH. CABG redo 2019 at [facility], WESTON ligation, and LA maze. Dual chamber AICD 12/2017. Prior his first CABG, he did not have chest pain, but \"just did not feel right\" and \"had heaviness of the head. \" He had an abnormal stress test followed by an angiogram  Prior his second bypass, he did experience chest pain. He was admitted 3/30/22 with chest pain. He had a PCI to [address] to LAD with PAYAL, PCI to SVG to OM with PAYAL,PCI to SVG toPDA ith PAYAL. He was started on plavix, and coumadin was restarted after the cath. He quit smoking 1990. He has no family history of CAD. He lives with his wife, who has dementia and is the sole caregiver. Today he reports that he is feeling okay. No chest pain or pressure. No nausea or vomiting. He is not taking his diuretics regularly. No further anginal symptoms. He can walk up a flight of stairs without stopping. He has been eating a lot of tomatoes. HISTORY/ALLERGIES/ROS     MedHx:  has a past medical history of Arthritis, Atrial fib/flut, CAD (coronary artery disease), Hyperlipidemia, Hypertension, Seizures ([address] and Sinus bradycardia. SurgHx:  has a past surgical history that includes hernia repair (Right); Coronary angioplasty with stent (1997); [place] tooth extraction (April 2012); pacemaker placement (12/18/2017); Coronary artery bypass graft (01/2019); Cardiac surgery; and Cystoscopy (N/A, 11/13/2019). SocHx:  reports that he quit smoking about 34 years ago. His smoking use included cigarettes. He has a 30.00 pack-year smoking history. He has never used smokeless tobacco. He reports that he does not drink alcohol and does not use drugs. FamHx: No family history of premature coronary artery disease, sudden death, or aneurysm  Allergies: Patient has no known allergies.    ROS:   Review of Systems   Constitutional:  Positive for fatigue. Negative for activity change, diaphoresis and fever. HENT:  Negative for congestion and ear discharge. Eyes:  Negative for photophobia and visual disturbance. Respiratory:  Negative for cough, chest tightness and shortness of breath. Cardiovascular:  Positive for palpitations. Negative for chest pain. Gastrointestinal:  Negative for abdominal distention, abdominal pain, anal bleeding, blood in stool and nausea. Endocrine: Negative for cold intolerance and polydipsia. Genitourinary:  Negative for difficulty urinating and flank pain. Musculoskeletal:  Positive for arthralgias and myalgias. Skin:  Negative for rash and wound. Allergic/Immunologic: Negative for environmental allergies and immunocompromised state. Neurological:  Negative for dizziness and headaches. Hematological:  Negative for adenopathy. Does not bruise/bleed easily. Psychiatric/Behavioral:  Negative for confusion. The patient is not hyperactive. MEDICATIONS      Prior to Admission medications    Medication Sig Start Date End Date Taking? Authorizing Provider   metoprolol succinate (TOPROL XL) 100 MG extended release tablet Take 1 tablet by mouth in the morning. 8/11/22  Yes Zeny Mayfield DO   valsartan (DIOVAN) 40 MG tablet Take 0.5 tablets by mouth at bedtime 8/11/22  Yes Zeny Mayfield DO   furosemide (LASIX) 40 MG tablet Take 1 tablet by mouth in the morning and 1 tablet in the evening. Taking prn for fluid overload/swelling. 8/11/22  Yes Zeny Mayfield DO   amiodarone (CORDARONE) 200 MG tablet TAKE 1 TABLET DAILY 8/11/22  Yes Zeny Mayfield DO   rosuvastatin (CRESTOR) 40 MG tablet Take 1 tablet by mouth nightly 8/11/22  Yes Zeny Mayfield DO   spironolactone (ALDACTONE) 25 MG tablet Take 0.5 tablets by mouth in the morning.  8/11/22  Yes Zeny Mayfield DO   clopidogrel (PLAVIX) 75 MG tablet Take 1 tablet by mouth in the morning. 7/20/22  Yes Zeny Mayfield DO magnesium oxide (MAG-OX) 400 (240 Mg) MG tablet TAKE ONE TABLET BY MOUTH DAILY 5/9/22  Yes Chauncey Campbell, DO   warfarin (COUMADIN) 2.5 MG tablet Take 5 mg by mouth Pt taking 5mg since 7/8/22   Yes Historical Provider, MD   ezetimibe (ZETIA) 10 MG tablet Take 1 tablet by mouth daily 4/12/22  Yes Chauncey Campbell, DO   pantoprazole (PROTONIX) 40 MG tablet Take 1 tablet by mouth every morning (before breakfast) 4/1/22  Yes Chauncey Campbell, DO   diazepam (VALIUM) 5 MG tablet Take 5 mg by mouth in the morning and 5 mg in the evening. Yes Historical Provider, MD   phenytoin (DILANTIN) 100 MG ER capsule Take 1 capsule by mouth 2 times daily. Resume home dose 12/21/12  Yes Lisset Simmons, APRN - CNP   primidone (MYSOLINE) 250 MG tablet Take 250 mg by mouth 2 times daily    Yes Historical Provider, MD   nitroGLYCERIN (NITROSTAT) 0.4 MG SL tablet Place 1 tablet under the tongue every 5 minutes as needed for Chest pain 2/24/22   Chauncey Campbell, DO       PHYSICAL EXAM        Vitals:    08/11/22 1101   BP: 110/70   Pulse: 50   SpO2: 100%    Weight: 161 lb (73 kg)     Gen Alert, cooperative, no distress Heart  Regular rate and rhythm, 2/6 murmur, ICD in place   Head Normocephalic, atraumatic, no abnormalities Abd  Soft, NT, +BS, no mass, no OM   Eyes PERRLA, conj/corn clear Ext  Ext nl, AT, no C/C, trace edema   Nose Nares normal, no drain age, Non-tender Pulse 2+ and symmetric   Throat Lips, mucosa, tongue normal Skin Color/text/turg nl, no rash/lesions   Neck S/S, TM, NT, no bruit Psych Nl mood and affect   Lung = CTA-B, unlabored, no DTP     Ch wall NT, no deform       LABS and Imaging     Relevant and available CV data reviewed  Echo 4/15/2021   Summary   Mildly dilated left ventricle. Mild concentric left ventricular hypertrophy. Mildly decreased left ventricular systolic function with distal septal and   apical hypokinesis. Estimated EF 40-45%. Cannot assess diastolic function.    Mild thickening of the mitral valve leaflets. No stenosis. Moderate mitral regurgitation. The left atrium is mildly dilated. The aortic valve appears tricuspid with mild sclerosis no stenosis. Trivial aortic regurgitation. Normal right ventricular size and mildly decreased in function. Moderate tricuspid regurgitation. PASP 41mmHg. The right atrium is moderately dilated. Pacemaker / ICD lead is visualized   in the right atrium. Trivial pulmonic regurgitation. Patient is s/p WESTON amputation  in the past.   Cath: s/p CABG (x4 '07 [place] to LAD, SVG to RCA, SVG to D2, SVG to OM3),   19 redo CABG at [facility]  (free IAN-LAD, SVG-Diag, SVG-OM, SVG-PDA  3/30/22 Successful complex angioplasty and stenting of IAN to LAD, SVG to OM, SVG to PDA    EK2021  Bradycardia   -Old anterior infarct. Low voltage with rightward P-axis and rotation -possible pulmonary disease. Stress: 2017 myoview--  Medium sized anterior fixed defect of moderate intensity consistent with    infarction in the territory of the LAD . Small sized inferior fixed defect of moderate intensity consistent with    infarction in the territory of the RCA . No ischemia. Normal LV function. Overall findings represent a intermediate risk scan. 16  CT of abd--no AAA      carotid dopplers 1-15%    PFT-mild obstruction    Cardioversion 22  Conclusion:  Successful external DC cardioversion of atrial fibrillation     moderate Risk  modreate Complexity/Medical Decision Making  Outside records Reviewed  Labs Reviewed  Echo and ekg personally interpreted. Imaging reviewed  Medications reviewed  Old Notes reviewed  ASSESSMENT AND PLAN     1.  CAD  -   redo CABG 2019 at [facility]  -   3/30/22 Successful complex angioplasty and stenting of IAN to LAD, SVG to OM, SVG to PDA ( ANN MARIE)  -   toprol 100 mg daily diovan 20 mg daily  -   plavix 75 mg daily  -   stable  Plan  -  Continue clopidogrel and warfarin with ppi.  Avoid aspirin due to risk of bleeding with triple therapy. -  Call for any changes    2. Atrial fibrillation/history of thrombus  -  CHA2DS vasc score 4  -  1/8/2019 WESTON ligating and LA maze-No remnant of Left atrial appendage was seen on LATESHA 01/29/2021  - 1/29/20  DCCV DR Emiliano Gregorio  - coumadin-managed by our office  - amiodarone 200 mg daily  - Cardioversion by Dr Emiliano Gregorio 7/28/2022  Plan  - continue amiodarone  - recommend continuation of warfarin given history of thrombus and high risk of events  - patient missed two doses of warfarin, will resume normal schedule and recheck in 1 week    3. Ischemic cardiomyopathy/NSVT  -  previously <35%,now 40%  -  12/18/17 EPS with inducible VT/VF  -  12/8/17  Dual chamber AICD  -  ACC C NYHA II  -  valsartan 20 mg daily, toprol 100mg daily , amiodarone 200mg daily  -  lasix 20 mg daily   -  stable  Plan  -  continue valsartan, toprol, amiodarone. He doesn't take his furosemide regularly despite instruction  -  Added spironolactone 12.5mg  3 times a week   -  previously not interested in starting entresto or SGLT2, resistant to changes to \"what has always worked\"  - echo today    4. Essential Hypertension  - 110/70  -  toprol xl 100 mg  -  stable  Plan  -  continue toprol    5. Primary Hyperlipidemia  -  8/4/22  TRI 66 HDL 43 LDL 69   -  3/31/22  Tc 159 hdl 55 ldl 89 tri 73   -  zetia 10 mg daily, Crestor   -  not at goal- need more aggressive tx  Plan  -  Recommend pcsk9 inhibitor-patient would like to think about it after he starts zetia--will call if he wants it called in  - fasting labs     6. JOSEPH  - wears cpap,[facility] at [facility]  - continue as above    Patient counseled on lifestyle modification, diet, and exercise. Follow Up: 3 months        Dr. Jerad Brenner: This note was scribed in the presence of Dr. Magy Laguerre by Sheryl Duane, RN.

## 2022-07-29 LAB — INR BLD: 3.7 (ref 0.88–1.12)

## 2022-08-01 ENCOUNTER — NURSE ONLY (OUTPATIENT)
Dept: CARDIOLOGY CLINIC | Age: 77
End: 2022-08-01
Payer: MEDICARE

## 2022-08-01 DIAGNOSIS — I50.22 CHRONIC SYSTOLIC HEART FAILURE (HCC): Primary | ICD-10-CM

## 2022-08-01 DIAGNOSIS — Z95.810 ICD (IMPLANTABLE CARDIOVERTER-DEFIBRILLATOR) IN PLACE: ICD-10-CM

## 2022-08-01 DIAGNOSIS — I25.5 ISCHEMIC CARDIOMYOPATHY: Chronic | ICD-10-CM

## 2022-08-01 PROCEDURE — 93297 REM INTERROG DEV EVAL ICPMS: CPT | Performed by: CLINICAL NURSE SPECIALIST

## 2022-08-01 PROCEDURE — G2066 INTER DEVC REMOTE 30D: HCPCS | Performed by: CLINICAL NURSE SPECIALIST

## 2022-08-01 NOTE — PROGRESS NOTES
Remote transmission received from patient's dual chamber ICD home monitor. Transmission shows normal sensing and pacing function. AT/ AF burden 82.1% (coumadin, amiodarone, Toprol). Optivol is WNL. TriageHF Heart Failure Risk Status on 01-Aug-2022 is High*    NP will review. See interrogation under cardiology tab in the [address] [place] [address] [address] field for more details. Will continue to monitor remotely. (End of 31-day monitoring period 8/1/22).

## 2022-08-04 ENCOUNTER — TELEPHONE (OUTPATIENT)
Dept: CARDIOLOGY CLINIC | Age: 77
End: 2022-08-04

## 2022-08-04 ENCOUNTER — HOSPITAL ENCOUNTER (OUTPATIENT)
Age: 77
Discharge: HOME | End: 2022-08-04
Payer: MEDICARE

## 2022-08-04 DIAGNOSIS — E78.2 MIXED HYPERLIPIDEMIA: Chronic | ICD-10-CM

## 2022-08-04 DIAGNOSIS — I50.22 CHRONIC SYSTOLIC HEART FAILURE (HCC): ICD-10-CM

## 2022-08-04 DIAGNOSIS — I48.0 PAROXYSMAL ATRIAL FIBRILLATION (HCC): ICD-10-CM

## 2022-08-04 LAB
ANION GAP SERPL CALCULATED.3IONS-SCNC: 6 MMOL/L (ref 3–16)
BUN BLDV-MCNC: 20 MG/DL (ref 7–20)
CALCIUM SERPL-MCNC: 8.2 MG/DL (ref 8.3–10.6)
CHLORIDE BLD-SCNC: 106 MMOL/L (ref 99–110)
CHOLESTEROL, FASTING: 125 MG/DL (ref 0–199)
CO2: 29 MMOL/L (ref 21–32)
CREAT SERPL-MCNC: 1 MG/DL (ref 0.8–1.3)
GFR AFRICAN AMERICAN: >60
GFR NON-AFRICAN AMERICAN: >60
GLUCOSE BLD-MCNC: 85 MG/DL (ref 70–99)
HCT VFR BLD CALC: 36.7 % (ref 40.5–52.5)
HDLC SERPL-MCNC: 43 MG/DL (ref 40–60)
HEMOGLOBIN: 12.5 G/DL (ref 13.5–17.5)
INR BLD: 3.33 (ref 0.87–1.14)
LDL CHOLESTEROL CALCULATED: 69 MG/DL
MCH RBC QN AUTO: 32.9 PG (ref 26–34)
MCHC RBC AUTO-ENTMCNC: 34 G/DL (ref 31–36)
MCV RBC AUTO: 96.6 FL (ref 80–100)
PDW BLD-RTO: 14.2 % (ref 12.4–15.4)
PLATELET # BLD: 201 K/UL (ref 135–450)
PMV BLD AUTO: 8.4 FL (ref 5–10.5)
POTASSIUM SERPL-SCNC: 5.5 MMOL/L (ref 3.5–5.1)
PRO-BNP: 560 PG/ML (ref 0–449)
PROTHROMBIN TIME: 34 SEC (ref 11.7–14.5)
RBC # BLD: 3.8 M/UL (ref 4.2–5.9)
SODIUM BLD-SCNC: 141 MMOL/L (ref 136–145)
TRIGLYCERIDE, FASTING: 66 MG/DL (ref 0–150)
VLDLC SERPL CALC-MCNC: 13 MG/DL
WBC # BLD: 3.8 K/UL (ref 4–11)

## 2022-08-04 PROCEDURE — 36415 COLL VENOUS BLD VENIPUNCTURE: CPT

## 2022-08-04 PROCEDURE — 83880 ASSAY OF NATRIURETIC PEPTIDE: CPT

## 2022-08-04 PROCEDURE — 85610 PROTHROMBIN TIME: CPT

## 2022-08-04 PROCEDURE — 80061 LIPID PANEL: CPT

## 2022-08-04 PROCEDURE — 80048 BASIC METABOLIC PNL TOTAL CA: CPT

## 2022-08-04 PROCEDURE — 85027 COMPLETE CBC AUTOMATED: CPT

## 2022-08-04 NOTE — TELEPHONE ENCOUNTER
Patients Potassium 5.5 on labs reviewed by Dr Sherfi Robbins. Per Dr Nirmal Contreras. Take Lasix 40 mg tomorrow morning. Repeat renal in 1 week. Spoke to patient. Reviewed instructions per Dr Sherif Robbins. Patient verbalized understanding. Of note- he states he has been drinking a soda daily that has a lot of potassium in it. Advised him to stop drinking them until he sees Dr Ahsan Anand next week.

## 2022-08-08 ENCOUNTER — ANTI-COAG VISIT (OUTPATIENT)
Dept: CARDIOLOGY CLINIC | Age: 77
End: 2022-08-08

## 2022-08-08 NOTE — PROGRESS NOTES
ANTICOAGULATION MONITORING    Leny Artem Day, 1945    Anticoagulation Indication(s):  Afib  ; hx of DCCV, s/p WESTON ligation       ~s/p LATESHA Jun '19 Jan '21 with DCCV  Referring Physician:   Dr. aDnna Plascencia   Goal INR Range:  2.0 - 3.0  Duration of Anticoagulation Therapy:  Long term  Home or Lab Draw: Lab--Cheo Davis  Product patient has at home: warfarin 5 mg    Recent INR Results:  Lab Results   Component Value Date    INR 3.33 (H) 08/04/2022    INR 3.70 (H) 07/28/2022    INR 3.70 07/28/2022    INR 1.90 07/08/2022       INR Summary                          Warfarin regimen (mg)  Date INR A/P Sun Mon Tue Wed Thu Fri Sat Mg/wk                                                                                                                                                               8/4/22 3.33 Above goal 5 5 2.5 5 2.5 2.5 2.5 25   7/28/22 3.7 Above goal 5 5 2.5 5 2.5 5 2.5 27.5   7/8/22 1.9 Below goal 5 5 5 5 5 2.5 5    6/14/22 2.0 At goal 5 5 2.5 5 5 5 5 32.5   5/16/22 2.3 At goal 5 5 2.5 5 2.5 5 5 30   4/29/22 1.8  Below goal 5 5 5 5 2.5 5 5    4/6/22 2.1 At goal 5 5 2.5 5 2.5 5 5 30   3/14/22 3.3 At goal 5 5 2.5 5 2.5 5 5 30   2/24/22 1.8 Below goal 5 5 2.5 5 2.5 5 5 30   1/7/22 2.0 At goal 5 5 2.5 5 2.5 5 5 30   12/8/21 2.2 At goal 5 5 2.5 5 2.5 5 5 30   11/5/21 2.3 At goal 5 5 2.5 5 2.5 5 5 30   9/17/21 2.6 At goal 5 5 2.5 5 2.5 5 5 30   8/20/21 1.9 Just below goal 5 5 2.5 5 2.5 5 5 30   8/13/21 2.0 At goal 5 5 2.5 5 2.5 5 5 30   7/27/21 2.1 At goal 5 5 2.5 5 2.5 5 5 30   5/28/21 2.2 At goal 5 5 2.5 5 2.5 5 5 30   4/26/21 2.5 At goal 5 5 2.5 5 2.5 5 5 30   4/21/21 2.2 At goal 5 5 2.5 5 2.5 5 5 30   4/15/21 4.7 Above goal 5 JOSH   HOLD HOLD 5    3/19/21 2.7 At goal 5 5 5 5 5 5 5 35   3/12/21 3.3 Just above goal 5 5 5 5 5 5 5 35         Assessment/Plan:    Recent hospitalizations/HC visits no   Recent medication changes no   Medications taken regularly that may interact with warfarin or alter INR No significant drug interactions identified   Warfarin dose taken as prescribed Yes   Signs/symptoms of bleeding History of bleeding? no   Vitamin K intake Consistency of servings of green, leafy vegetables per week ? Yes   Recent vomiting/diarrhea/fever None reported   Changes in weight, activity, stress Weight loss   alcohol use Patient reports having 0 drinks per day   Upcoming surgeries or procedures None reported     Patient's INR was not in range-see dosage schedule. Patient was also reminded to maintain consistent vitamin K intake and call with any bleeding, medication changes, or fever/vomiting/diarrhea. I spoke with pt and verified warfarin dose . Next INR check: 1 week      Per DKW:    Sun 5 mg   Mon 5 mg   Tues 2.5 mg   Wed 5 mg   Thurs 2.5   Friday 2.5 mg   Saturday 2.5 mg   Repeat in one week.    Left message on patients recorder  per MMRN    Addendum:  8/8/22    Robe Carlson CNP

## 2022-08-10 ENCOUNTER — HOSPITAL ENCOUNTER (OUTPATIENT)
Age: 77
Discharge: HOME | End: 2022-08-10
Payer: MEDICARE

## 2022-08-10 ENCOUNTER — TELEPHONE (OUTPATIENT)
Dept: CARDIOLOGY CLINIC | Age: 77
End: 2022-08-10

## 2022-08-10 DIAGNOSIS — E87.5 HYPERKALEMIA: Primary | ICD-10-CM

## 2022-08-10 DIAGNOSIS — I48.0 PAROXYSMAL ATRIAL FIBRILLATION (HCC): ICD-10-CM

## 2022-08-10 DIAGNOSIS — E87.5 HYPERKALEMIA: ICD-10-CM

## 2022-08-10 LAB
ANION GAP SERPL CALCULATED.3IONS-SCNC: 9 MMOL/L (ref 3–16)
BUN BLDV-MCNC: 29 MG/DL (ref 7–20)
CALCIUM SERPL-MCNC: 8.7 MG/DL (ref 8.3–10.6)
CHLORIDE BLD-SCNC: 105 MMOL/L (ref 99–110)
CO2: 25 MMOL/L (ref 21–32)
CREAT SERPL-MCNC: 1 MG/DL (ref 0.8–1.3)
GFR AFRICAN AMERICAN: >60
GFR NON-AFRICAN AMERICAN: >60
GLUCOSE BLD-MCNC: 83 MG/DL (ref 70–99)
INR BLD: 1.81 (ref 0.87–1.14)
POTASSIUM SERPL-SCNC: 4.4 MMOL/L (ref 3.5–5.1)
PROTHROMBIN TIME: 21 SEC (ref 11.7–14.5)
SODIUM BLD-SCNC: 139 MMOL/L (ref 136–145)

## 2022-08-10 PROCEDURE — 85610 PROTHROMBIN TIME: CPT

## 2022-08-10 PROCEDURE — 36415 COLL VENOUS BLD VENIPUNCTURE: CPT

## 2022-08-10 PROCEDURE — 80048 BASIC METABOLIC PNL TOTAL CA: CPT

## 2022-08-10 NOTE — TELEPHONE ENCOUNTER
----- Message from Jacinto Pablo RN sent at 8/10/2022  4:48 PM EDT -----  Please let him know his labs are good, potassium is better

## 2022-08-10 NOTE — TELEPHONE ENCOUNTER
Call placed to patient who was not available for message below. Per Cesar Barnhart left results on Vm. Patient was encouraged to call the office with any concerns or questions.

## 2022-08-11 ENCOUNTER — OFFICE VISIT (OUTPATIENT)
Dept: CARDIOLOGY CLINIC | Age: 77
End: 2022-08-11
Payer: MEDICARE

## 2022-08-11 ENCOUNTER — PROCEDURE VISIT (OUTPATIENT)
Dept: CARDIOLOGY CLINIC | Age: 77
End: 2022-08-11
Payer: MEDICARE

## 2022-08-11 VITALS
HEART RATE: 50 BPM | SYSTOLIC BLOOD PRESSURE: 110 MMHG | DIASTOLIC BLOOD PRESSURE: 70 MMHG | WEIGHT: 161 LBS | OXYGEN SATURATION: 100 % | BODY MASS INDEX: 23.05 KG/M2 | HEIGHT: 70 IN

## 2022-08-11 DIAGNOSIS — E78.2 MIXED HYPERLIPIDEMIA: Chronic | ICD-10-CM

## 2022-08-11 DIAGNOSIS — I48.0 PAF (PAROXYSMAL ATRIAL FIBRILLATION) (HCC): Chronic | ICD-10-CM

## 2022-08-11 DIAGNOSIS — I25.10 CORONARY ARTERY DISEASE DUE TO LIPID RICH PLAQUE: Primary | ICD-10-CM

## 2022-08-11 DIAGNOSIS — I10 ESSENTIAL HYPERTENSION: Chronic | ICD-10-CM

## 2022-08-11 DIAGNOSIS — G47.33 OSA ON CPAP: ICD-10-CM

## 2022-08-11 DIAGNOSIS — I25.10 CORONARY ARTERY DISEASE DUE TO LIPID RICH PLAQUE: ICD-10-CM

## 2022-08-11 DIAGNOSIS — I50.22 CHRONIC SYSTOLIC HEART FAILURE (HCC): ICD-10-CM

## 2022-08-11 DIAGNOSIS — I25.5 ISCHEMIC CARDIOMYOPATHY: Chronic | ICD-10-CM

## 2022-08-11 DIAGNOSIS — Z99.89 OSA ON CPAP: ICD-10-CM

## 2022-08-11 DIAGNOSIS — I25.83 CORONARY ARTERY DISEASE DUE TO LIPID RICH PLAQUE: Primary | ICD-10-CM

## 2022-08-11 DIAGNOSIS — Z95.810 ICD (IMPLANTABLE CARDIOVERTER-DEFIBRILLATOR) IN PLACE: ICD-10-CM

## 2022-08-11 DIAGNOSIS — I25.83 CORONARY ARTERY DISEASE DUE TO LIPID RICH PLAQUE: ICD-10-CM

## 2022-08-11 LAB
LV EF: 45 %
LVEF MODALITY: NORMAL

## 2022-08-11 PROCEDURE — 99214 OFFICE O/P EST MOD 30 MIN: CPT | Performed by: INTERNAL MEDICINE

## 2022-08-11 PROCEDURE — 1036F TOBACCO NON-USER: CPT | Performed by: INTERNAL MEDICINE

## 2022-08-11 PROCEDURE — 3017F COLORECTAL CA SCREEN DOC REV: CPT | Performed by: INTERNAL MEDICINE

## 2022-08-11 PROCEDURE — G8427 DOCREV CUR MEDS BY ELIG CLIN: HCPCS | Performed by: INTERNAL MEDICINE

## 2022-08-11 PROCEDURE — 93306 TTE W/DOPPLER COMPLETE: CPT | Performed by: INTERNAL MEDICINE

## 2022-08-11 PROCEDURE — G8420 CALC BMI NORM PARAMETERS: HCPCS | Performed by: INTERNAL MEDICINE

## 2022-08-11 PROCEDURE — 1123F ACP DISCUSS/DSCN MKR DOCD: CPT | Performed by: INTERNAL MEDICINE

## 2022-08-11 RX ORDER — METOPROLOL SUCCINATE 100 MG/1
100 TABLET, EXTENDED RELEASE ORAL DAILY
Qty: 90 TABLET | Refills: 1 | Status: SHIPPED | OUTPATIENT
Start: 2022-08-11

## 2022-08-11 RX ORDER — AMIODARONE HYDROCHLORIDE 200 MG/1
TABLET ORAL
Qty: 90 TABLET | Refills: 1 | Status: SHIPPED | OUTPATIENT
Start: 2022-08-11

## 2022-08-11 RX ORDER — ROSUVASTATIN CALCIUM 40 MG/1
40 TABLET, COATED ORAL NIGHTLY
Qty: 90 TABLET | Refills: 3 | Status: SHIPPED | OUTPATIENT
Start: 2022-08-11

## 2022-08-11 RX ORDER — VALSARTAN 40 MG/1
20 TABLET ORAL NIGHTLY
Qty: 90 TABLET | Refills: 1 | Status: SHIPPED | OUTPATIENT
Start: 2022-08-11

## 2022-08-11 RX ORDER — SPIRONOLACTONE 25 MG/1
12.5 TABLET ORAL DAILY
Qty: 45 TABLET | Refills: 1 | Status: SHIPPED | OUTPATIENT
Start: 2022-08-11

## 2022-08-11 RX ORDER — FUROSEMIDE 40 MG/1
40 TABLET ORAL 2 TIMES DAILY
Qty: 90 TABLET | Refills: 3 | Status: SHIPPED | OUTPATIENT
Start: 2022-08-11

## 2022-08-11 NOTE — LETTER
[facility] [address]  Phone: [phone]  Fax: [phone]    Danielito Mathis DO    August 14, 2022     Rajni Kumar MD  [address] [facility] [address]    Patient: Courtney Mathis   MR Number: [id number]   YOB: 1945   Date of Visit: 8/11/2022       Dear Rajni Kumar: Thank you for referring Naomy Mathis to me for evaluation/treatment. Below are the relevant portions of my assessment and plan of care. If you have questions, please do not hesitate to call me. I look forward to following Naomy Woodson along with you.     Sincerely,      Danielito Mathis, DO

## 2022-08-11 NOTE — PATIENT INSTRUCTIONS
Follow up in 3 months   Fasting labs soon.    Continue Warfarin   Start Spironolactone 12.5 mg three times a week   Call for any questions and concerns

## 2022-08-12 ENCOUNTER — TELEPHONE (OUTPATIENT)
Dept: CARDIOLOGY CLINIC | Age: 77
End: 2022-08-12

## 2022-08-12 NOTE — TELEPHONE ENCOUNTER
----- Message from Aly Gaona RN sent at 8/12/2022  1:19 PM EDT -----  He was not taking the coumadin, dkw asked him to take it as prescribed, and repeat next week.

## 2022-08-19 RX ORDER — WARFARIN SODIUM 2.5 MG/1
TABLET ORAL
Qty: 180 TABLET | Refills: 1 | Status: SHIPPED | OUTPATIENT
Start: 2022-08-19 | End: 2022-09-21 | Stop reason: SDUPTHER

## 2022-08-19 NOTE — TELEPHONE ENCOUNTER
Pt up to date on INR's, labs, and office visits.     Pended for 90 days with 1 refill (pt requested 3 refills)

## 2022-08-19 NOTE — TELEPHONE ENCOUNTER
Medication Refill    Medication needing refilled: Warfarin    Dosage of the medication: 5 mg    How are you taking this medication (QD, BID, TID, QID, PRN):     30 or 90 day supply called in: 90 day supply with  3 refills    When will you run out of your medication:    Which Pharmacy are we sending the medication to?: [address], [address] [place] [address] [phone] - F [phone]   [place] [id number]   Phone:  [phone]  Fax:  [phone]

## 2022-08-24 ENCOUNTER — TELEPHONE (OUTPATIENT)
Dept: CARDIOLOGY CLINIC | Age: 77
End: 2022-08-24

## 2022-08-24 DIAGNOSIS — R60.9 SWELLING: Primary | ICD-10-CM

## 2022-08-24 DIAGNOSIS — L53.9 REDNESS: ICD-10-CM

## 2022-08-24 DIAGNOSIS — M79.661 PAIN IN RIGHT LOWER LEG: ICD-10-CM

## 2022-08-24 NOTE — TELEPHONE ENCOUNTER
Pt called stating he could not stand up on his right leg. Per Pt he went to see an orthopedic Provider 08/24 and Pt  thinks it could be a blood clot,  his ankle is swollen and he is wanting [facility] opinion and to call him.  Please advise

## 2022-08-24 NOTE — TELEPHONE ENCOUNTER
Spoke with patient regarding symptoms. He states his weight at home is 151. He has been taking his lasix 40 mg twice per day since Sunday when he woke up with a swollen right ankle. He says it is red/painful and swollen. Per Dr Ez Lester, have INR re checked tomorrow. Will order stat doppler. [address] [facility] desk, please call to get him scheduled.

## 2022-08-24 NOTE — TELEPHONE ENCOUNTER
I spoke with pt . He stated that Pierce Grey he fell asleep in his chair and woke up with his Rt leg swollen. He stated that he has had some improvement since then. He saw ortho and they said no breaks, but mentioned bone on bone. Pt has concerns about a clot. He says his ankle and below is swollen- 2-3 inches above and below. The area is warm.

## 2022-08-25 ENCOUNTER — ANTI-COAG VISIT (OUTPATIENT)
Dept: CARDIOLOGY CLINIC | Age: 77
End: 2022-08-25
Payer: MEDICARE

## 2022-08-25 ENCOUNTER — TELEPHONE (OUTPATIENT)
Dept: CARDIOLOGY CLINIC | Age: 77
End: 2022-08-25

## 2022-08-25 DIAGNOSIS — I48.0 PAROXYSMAL ATRIAL FIBRILLATION (HCC): Primary | ICD-10-CM

## 2022-08-25 LAB — INR BLD: 4.1

## 2022-08-25 PROCEDURE — 93793 ANTICOAG MGMT PT WARFARIN: CPT | Performed by: NURSE PRACTITIONER

## 2022-08-25 NOTE — PROGRESS NOTES
ANTICOAGULATION MONITORING    Marline Shelling Day, 1945    Anticoagulation Indication(s):  Afib  ; hx of DCCV, s/p WESTON ligation       ~s/p LATESHA Jun '19 Jan '21 with DCCV  Referring Physician:   Dr. Paolo Panda   Goal INR Range:  2.0 - 3.0  Duration of Anticoagulation Therapy:  Long term  Home or Lab Draw: Lab--Cheo Davis  Product patient has at home: warfarin 5 mg    Recent INR Results:  Lab Results   Component Value Date    INR 4.10 08/25/2022    INR 1.81 (H) 08/10/2022    INR 3.33 (H) 08/04/2022    INR 3.70 (H) 07/28/2022       INR Summary                          Warfarin regimen (mg)  Date INR A/P Sun Mon Tue Wed Thu Fri Sat Mg/wk                                                                                                                                                  8/25/22 4.1 Above goal 5 2.5 2.5 5 0 2.5 2.5 22.5   8/4/22 3.33 Above goal 5 5 2.5 5 2.5 2.5 2.5 25   7/28/22 3.7 Above goal 5 5 2.5 5 2.5 5 2.5 27.5   7/8/22 1.9 Below goal 5 5 5 5 5 2.5 5    6/14/22 2.0 At goal 5 5 2.5 5 5 5 5 32.5   5/16/22 2.3 At goal 5 5 2.5 5 2.5 5 5 30   4/29/22 1.8  Below goal 5 5 5 5 2.5 5 5    4/6/22 2.1 At goal 5 5 2.5 5 2.5 5 5 30   3/14/22 3.3 At goal 5 5 2.5 5 2.5 5 5 30   2/24/22 1.8 Below goal 5 5 2.5 5 2.5 5 5 30   1/7/22 2.0 At goal 5 5 2.5 5 2.5 5 5 30   12/8/21 2.2 At goal 5 5 2.5 5 2.5 5 5 30   11/5/21 2.3 At goal 5 5 2.5 5 2.5 5 5 30   9/17/21 2.6 At goal 5 5 2.5 5 2.5 5 5 30   8/20/21 1.9 Just below goal 5 5 2.5 5 2.5 5 5 30   8/13/21 2.0 At goal 5 5 2.5 5 2.5 5 5 30   7/27/21 2.1 At goal 5 5 2.5 5 2.5 5 5 30   5/28/21 2.2 At goal 5 5 2.5 5 2.5 5 5 30   4/26/21 2.5 At goal 5 5 2.5 5 2.5 5 5 30   4/21/21 2.2 At goal 5 5 2.5 5 2.5 5 5 30   4/15/21 4.7 Above goal 5 JOSH   HOLD HOLD 5    3/19/21 2.7 At goal 5 5 5 5 5 5 5 35   3/12/21 3.3 Just above goal 5 5 5 5 5 5 5 35         Assessment/Plan:    Recent hospitalizations/HC visits no   Recent medication changes no   Medications taken regularly that may interact with warfarin or alter INR No significant drug interactions identified   Warfarin dose taken as prescribed Yes   Signs/symptoms of bleeding History of bleeding? no   Vitamin K intake Consistency of servings of green, leafy vegetables per week ? Yes   Recent vomiting/diarrhea/fever None reported   Changes in weight, activity, stress Weight loss   alcohol use Patient reports having 0 drinks per day   Upcoming surgeries or procedures None reported     Patient's INR was not in range-see dosage schedule. Patient was also reminded to maintain consistent vitamin K intake and call with any bleeding, medication changes, or fever/vomiting/diarrhea. I spoke with pt and verified warfarin dose . Per  NPTS: none 8/25, change to 5 mg on Sun & Wed 2.5 all others. LMOM for pt. I relayed instructions. I advised to CB with questions or concerns.         Next INR check: 1 week          Addendum:  8/25/22

## 2022-08-26 ENCOUNTER — OFFICE VISIT (OUTPATIENT)
Dept: CARDIOLOGY CLINIC | Age: 77
End: 2022-08-26
Payer: MEDICARE

## 2022-08-26 DIAGNOSIS — I25.10 CORONARY ARTERY DISEASE DUE TO LIPID RICH PLAQUE: ICD-10-CM

## 2022-08-26 DIAGNOSIS — I48.0 PAROXYSMAL ATRIAL FIBRILLATION (HCC): ICD-10-CM

## 2022-08-26 DIAGNOSIS — I25.83 CORONARY ARTERY DISEASE DUE TO LIPID RICH PLAQUE: ICD-10-CM

## 2022-08-26 DIAGNOSIS — I49.9 IRREGULAR HEARTBEAT: ICD-10-CM

## 2022-08-26 DIAGNOSIS — I10 ESSENTIAL HYPERTENSION: ICD-10-CM

## 2022-08-26 DIAGNOSIS — Z12.11 SCREENING FOR COLON CANCER: ICD-10-CM

## 2022-08-26 DIAGNOSIS — E78.2 MIXED HYPERLIPIDEMIA: ICD-10-CM

## 2022-08-26 DIAGNOSIS — I82.411 DVT OF DEEP FEMORAL VEIN, RIGHT (HCC): Primary | ICD-10-CM

## 2022-08-26 PROCEDURE — G8427 DOCREV CUR MEDS BY ELIG CLIN: HCPCS | Performed by: INTERNAL MEDICINE

## 2022-08-26 PROCEDURE — 1036F TOBACCO NON-USER: CPT | Performed by: INTERNAL MEDICINE

## 2022-08-26 PROCEDURE — 3017F COLORECTAL CA SCREEN DOC REV: CPT | Performed by: INTERNAL MEDICINE

## 2022-08-26 PROCEDURE — 1123F ACP DISCUSS/DSCN MKR DOCD: CPT | Performed by: INTERNAL MEDICINE

## 2022-08-26 PROCEDURE — 93000 ELECTROCARDIOGRAM COMPLETE: CPT | Performed by: INTERNAL MEDICINE

## 2022-08-26 PROCEDURE — G8420 CALC BMI NORM PARAMETERS: HCPCS | Performed by: INTERNAL MEDICINE

## 2022-08-26 PROCEDURE — 99214 OFFICE O/P EST MOD 30 MIN: CPT | Performed by: INTERNAL MEDICINE

## 2022-08-26 NOTE — LETTER
[address] [place] [address] Cardiology - [place] [address] [address] [address]  Phone: [phone]  Fax: [phone]    Kristen Aguirre DO    August 31, 2022     Karen Acevedo MD  [address] [facility] [address]    Patient: Luc Mathis   MR Number: [id number]   YOB: 1945   Date of Visit: 8/26/2022       Dear Karen Acevedo: Thank you for referring Luis Mathis to me for evaluation/treatment. Below are the relevant portions of my assessment and plan of care. If you have questions, please do not hesitate to call me. I look forward to following Luis Bass along with you.     Sincerely,      Kristen Aguirre, DO

## 2022-08-26 NOTE — PROGRESS NOTES
[address]  8/29/22  Referring: Jose Francisco Valladares FOR CONSULT/CHIEF COMPLAINT/HPI     Reason for visit/ Chief complaint  Follow up  Right leg ankle swelling   HPI Carlos Mathis is a 78 y.o. seen as a follow up to recent right DVT  He has a history of  CAD, ICM< htn,hchol and AF, SVT JOSEPH   He had a CABG redo 2019 at [facility] with WESTON ligation and LA maze. In Dec 2017 he had a dual chamber AICD  On 3/30 he had a PCI to [address] to LAD with PAYAL, PCI to SVG to OM with PAYAL, PCI to SVG to PDA. His warfarin was temporarily interrupted. On Sunday he woke up with swollen right ankle, associated with redness and swelling. He started to take lasix bid. Called 8/24 without relief of symptoms. He had a stat venous doppler on 8/25 which showed a dvt. He remains on coumadin. No recent travel, illness, or trauma. He did have a recent coronary angiogram with femoral approach    He has no chest pain, palpitations dizziness or syncope. Right ankle remains painful and swollen. On warfarin    Patient is adherent with medications and is tolerating them well without side effects     HISTORY/ALLERGIES/ROS     MedHx:   HISTORY/ALLERGIES/ROS      MedHx:            has a past medical history of Arthritis, Atrial fib/flut, CAD (coronary artery disease), Hyperlipidemia, Hypertension, Seizures ([address] and Sinus bradycardia. SurgHx:           has a past surgical history that includes hernia repair (Right); Coronary angioplasty with stent (1997); Effie tooth extraction (April 2012); pacemaker placement (12/18/2017); Coronary artery bypass graft (01/2019); Cardiac surgery; and Cystoscopy (N/A, 11/13/2019). SocHx:             reports that he quit smoking about 34 years ago. His smoking use included cigarettes. He has a 30.00 pack-year smoking history. He has never used smokeless tobacco. He reports that he does not drink alcohol and does not use drugs.    FamHx:           No family history of premature coronary artery morning. 8/11/22   Yes Chacho Elliott DO   clopidogrel (PLAVIX) 75 MG tablet Take 1 tablet by mouth in the morning. 7/20/22   Yes Chacho Elliott DO   magnesium oxide (MAG-OX) 400 (240 Mg) MG tablet TAKE ONE TABLET BY MOUTH DAILY 5/9/22   Yes Chacho Elliott DO   warfarin (COUMADIN) 2.5 MG tablet Take 5 mg by mouth Pt taking 5mg since 7/8/22     Yes Historical Provider, MD   ezetimibe (ZETIA) 10 MG tablet Take 1 tablet by mouth daily 4/12/22   Yes Chacho Elliott DO   pantoprazole (PROTONIX) 40 MG tablet Take 1 tablet by mouth every morning (before breakfast) 4/1/22   Yes Chacho Elliott DO   diazepam (VALIUM) 5 MG tablet Take 5 mg by mouth in the morning and 5 mg in the evening. Yes Historical Provider, MD   phenytoin (DILANTIN) 100 MG ER capsule Take 1 capsule by mouth 2 times daily. Resume home dose 12/21/12   Yes BETO Gary - CNP   primidone (MYSOLINE) 250 MG tablet Take 250 mg by mouth 2 times daily      Yes Historical Provider, MD   nitroGLYCERIN (NITROSTAT) 0.4 MG SL tablet Place 1 tablet under the tongue every 5 minutes as needed for Chest pain 2/24/22     Chacho Elliott, DO            PHYSICAL EXAM            Vitals:     08/11/22 1101   BP: 110/70   Pulse: 50   SpO2: 100%    Weight: 161 lb (73 kg)     Gen Alert, cooperative, no distress Heart  Regular rate and rhythm, 2/6 murmur, ICD in place   Head Normocephalic, atraumatic, no abnormalities Abd  Soft, NT, +BS, no mass, no OM   Eyes PERRLA, conj/corn clear Ext  Ext nl, AT, no C/C, +right ankle trace swelling, non tender, + palpable cord posteriorly   Nose Nares normal, no drain age, Non-tender Pulse 2+ and symmetric   Throat Lips, mucosa, tongue normal Skin Color/text/turg nl, no rash/lesions   Neck S/S, TM, NT, no bruit Psych Nl mood and affect   Lung = CTA-B, unlabored, no DTP       Ch wall NT, no deform          SurgHx:  has a past surgical history that includes hernia repair (Right); Coronary angioplasty with stent (1997);  [place] tooth valsartan (DIOVAN) 40 MG tablet Take 0.5 tablets by mouth at bedtime 8/11/22  Yes Junior Begin, DO   furosemide (LASIX) 40 MG tablet Take 1 tablet by mouth in the morning and 1 tablet in the evening. Taking prn for fluid overload/swelling. 8/11/22  Yes Junior Begin, DO   amiodarone (CORDARONE) 200 MG tablet TAKE 1 TABLET DAILY 8/11/22  Yes Junior Begin, DO   rosuvastatin (CRESTOR) 40 MG tablet Take 1 tablet by mouth nightly 8/11/22  Yes Junior Begin, DO   spironolactone (ALDACTONE) 25 MG tablet Take 0.5 tablets by mouth in the morning. 8/11/22  Yes Junior Begin, DO   clopidogrel (PLAVIX) 75 MG tablet Take 1 tablet by mouth in the morning. 7/20/22  Yes Junior Begin, DO   magnesium oxide (MAG-OX) 400 (240 Mg) MG tablet TAKE ONE TABLET BY MOUTH DAILY 5/9/22  Yes Junior Begin, DO   ezetimibe (ZETIA) 10 MG tablet Take 1 tablet by mouth daily 4/12/22  Yes Junior Begin, DO   pantoprazole (PROTONIX) 40 MG tablet Take 1 tablet by mouth every morning (before breakfast) 4/1/22  Yes Junior Begin, DO   nitroGLYCERIN (NITROSTAT) 0.4 MG SL tablet Place 1 tablet under the tongue every 5 minutes as needed for Chest pain 2/24/22  Yes Junior Begin, DO   diazepam (VALIUM) 5 MG tablet Take 5 mg by mouth in the morning and 5 mg in the evening. Yes Historical Provider, MD   phenytoin (DILANTIN) 100 MG ER capsule Take 1 capsule by mouth 2 times daily.  Resume home dose 12/21/12  Yes Lisset Simmons, BETO - CNP   primidone (MYSOLINE) 250 MG tablet Take 250 mg by mouth 2 times daily    Yes Historical Provider, MD       PHYSICAL EXAM        Vitals:    08/26/22 1229   BP: 108/64   Pulse:    Resp:    SpO2:     Weight: 156 lb 9.6 oz (71 kg)     Gen Alert, cooperative, no distress Heart  Regular rate and rhythm, 2/6 murmur murmur   Head Normocephalic, atraumatic, no abnormalities Abd  Soft, NT, +BS, no mass, no OM   Eyes PERRLA, conj/corn clear Ext  Ext nl, AT, no C/C, trace   edema   Nose Nares normal, no drain age, Non-tender Pulse 2+ and symmetric   Throat Lips, mucosa, tongue normal Skin Color/text/turg nl, no rash/lesions   Neck S/S, TM, NT, no bruit Psych Nl mood and affect   Lung CTA-B, unlabored, no DTP     Ch wall NT, no deform       LABS and Imaging     Relevant and available CV data reviewed  Echo 4/15/2021   Summary   Mildly dilated left ventricle. Mild concentric left ventricular hypertrophy. Mildly decreased left ventricular systolic function with distal septal and   apical hypokinesis. Estimated EF 40-45%. Cannot assess diastolic function. Mild thickening of the mitral valve leaflets. No stenosis. Moderate mitral regurgitation. The left atrium is mildly dilated. The aortic valve appears tricuspid with mild sclerosis no stenosis. Trivial aortic regurgitation. Normal right ventricular size and mildly decreased in function. Moderate tricuspid regurgitation. PASP 41mmHg. The right atrium is moderately dilated. Pacemaker / ICD lead is visualized   in the right atrium. Trivial pulmonic regurgitation. Patient is s/p WESTON amputation  in the past.       Cath: s/p CABG (x4 '07 LIMA to LAD, SVG to RCA, SVG to D2, SVG to OM3),   1/8/19 redo CABG at [facility]  (free IAN-LAD, SVG-Diag, SVG-OM, SVG-PDA  3/30/22 Successful complex angioplasty and stenting of IAN to LAD, SVG to OM, SVG to PDA     EKG: sinus bradycardia, prior anterior infarct, pvc    Stress: 2017 myoview--  Medium sized anterior fixed defect of moderate intensity consistent with    infarction in the territory of the LAD . Small sized inferior fixed defect of moderate intensity consistent with    infarction in the territory of the RCA . No ischemia. Normal LV function. Overall findings represent a intermediate risk scan.       2/1/16  CT of abd--no AAA     2012  carotid dopplers 1-15%     PFT-mild obstruction     Cardioversion 7/28/22  Conclusion:  Successful external DC cardioversion of atrial fibrillation      moderate Risk  modreate Complexity/Medical Decision Making  Outside records Reviewed  Labs Reviewed  Echo and ekg personally interpreted. Imaging reviewed  Medications reviewed  Old Notes reviewed  ASSESSMENT AND PLAN    1. Right leg DVT  -      new problem  -      right leg, recent cath off of warfarin  Plan  -      can be managed as outpatient. He has no pain, swelling, or shortness of breath. He is currently supratherapeutic on his warfarin. Continue warfarin   -      INR on Monday  -      call for any chest pain, shortness of breath  -      recommend screening colonoscopy and age appropriate cancer screening, he voiced understanding. Referral to GI    2     CAD  -      redo CABG 2019 at [facility]  -       3/30/22 Successful complex angioplasty and stenting of IAN to LAD, SVG to OM, SVG to PDA ( ANN MARIE)  -stable  Plan  -      continue toprol  -      Continue clopidogrel and warfarin with ppi. Avoid aspirin due to risk of bleeding with triple therapy. -      Call for any changes     3. Atrial fibrillation/history of thrombus  -    CHA2DS vasc score 4  -    1/8/2019 WESTON ligating and LA maze-No remnant of Left atrial appendage was seen on LATESHA 01/29/2021  -    1/29/20  DCCV DR Danna Plascencia  -    Cardioversion by Dr Danna Plascencia 7/28/2022  -stable  Plan  -    continue amiodarone  -    recommend continuation of warfarin given history of thrombus and high risk of events       4. Ischemic cardiomyopathy/NSVT  -     previously <35%,now 40%  -     12/18/17 EPS with inducible VT/VF  -     12/8/17  Dual chamber AICD  -     ACC C NYHA II  - stable  Plan  -  continue valsartan, toprol, amiodarone. Lasix spironolactone  -  previously not interested in starting entresto or SGLT2, resistant to changes to \"what has always worked\"       5. Essential Hypertension  -     108/64  -      stable  Plan  -       continue toprol diovan      6.    Primary Hyperlipidemia  -     8/4/22  TRI 66 HDL 43 LDL 69   -     3/31/22  Tc 159 hdl 55 ldl 89 tri 73   - zetia 10 mg daily, Crestor   -     not at goal- need more aggressive tx  Plan  -    Recommend pcsk9 inhibitor-patient would like to think about it after he starts zetia--will call if he wants it called in       7. JOSEPH  - wears cpap,managed at [facility]  - continue as above     Patient counseled on lifestyle modification, diet, and exercise. Follow Up:   As scheduled        Dr. Kelly Tyson  Cardiologist [address]    Patient counseled on lifestyle modification, diet, and exercise. Follow Up: as scheduled    Dr. Anthony Soni:  IRenetta, am scribing for and in the presence of Emiliano Muñoz 08/29/22 3:20 PM       Physician Attestation  The scribe for and in the presence of cydney Whiteside DO). The scribe Renetta Robles RN   may have prepopulated components of this note with my historical  intellectual property under my direct supervision. Any additions to this intellectual property were performed in my presence and at my direction.   Furthermore, the content and accuracy of this note have been reviewed by cydney Whiteside DO).  8/29/2022 3:20 PM

## 2022-08-26 NOTE — PATIENT INSTRUCTIONS
Recheck INR on Monday  Call if leg becomes more swollen, more painful  Continue coumadin  Call for shortness of breath chest pain, or change in status    Refer to Dr Shaniqua Recinos for routine colonoscopy

## 2022-08-29 ENCOUNTER — ANTI-COAG VISIT (OUTPATIENT)
Dept: CARDIOLOGY CLINIC | Age: 77
End: 2022-08-29

## 2022-08-29 ENCOUNTER — ANTI-COAG VISIT (OUTPATIENT)
Dept: CARDIOLOGY CLINIC | Age: 77
End: 2022-08-29
Payer: MEDICARE

## 2022-08-29 DIAGNOSIS — I48.0 PAROXYSMAL ATRIAL FIBRILLATION (HCC): Primary | ICD-10-CM

## 2022-08-29 LAB — INR BLD: 2.6

## 2022-08-29 PROCEDURE — 93793 ANTICOAG MGMT PT WARFARIN: CPT | Performed by: NURSE PRACTITIONER

## 2022-08-29 NOTE — PROGRESS NOTES
ANTICOAGULATION MONITORING    Arch Holler Day, 1945    Anticoagulation Indication(s):  Afib  ; hx of DCCV, s/p WESTON ligation       ~s/p LATESHA Jun '19 Jan '21 with DCCV  Referring Physician:   Dr. Tony Viveros   Goal INR Range:  2.0 - 3.0  Duration of Anticoagulation Therapy:  Long term  Home or Lab Draw: Lab--Cheo Davis  Product patient has at home: warfarin 5 mg    Recent INR Results:  Lab Results   Component Value Date    INR 2.60 08/29/2022    INR 4.10 08/25/2022    INR 1.81 (H) 08/10/2022    INR 3.33 (H) 08/04/2022       INR Summary                          Warfarin regimen (mg)  Date INR A/P Sun Mon Tue Wed Thu Fri Sat Mg/wk                                                                                                                                     8/29/22 2.6 At goal 5 2.5 2.5 5 2.5 2.5 2.5 22.5   8/25/22 4.1 Above goal 5 2.5 2.5 5 0 2.5 2.5 22.5   8/4/22 3.33 Above goal 5 5 2.5 5 2.5 2.5 2.5 25   7/28/22 3.7 Above goal 5 5 2.5 5 2.5 5 2.5 27.5   7/8/22 1.9 Below goal 5 5 5 5 5 2.5 5    6/14/22 2.0 At goal 5 5 2.5 5 5 5 5 32.5   5/16/22 2.3 At goal 5 5 2.5 5 2.5 5 5 30   4/29/22 1.8  Below goal 5 5 5 5 2.5 5 5    4/6/22 2.1 At goal 5 5 2.5 5 2.5 5 5 30   3/14/22 3.3 At goal 5 5 2.5 5 2.5 5 5 30   2/24/22 1.8 Below goal 5 5 2.5 5 2.5 5 5 30   1/7/22 2.0 At goal 5 5 2.5 5 2.5 5 5 30   12/8/21 2.2 At goal 5 5 2.5 5 2.5 5 5 30   11/5/21 2.3 At goal 5 5 2.5 5 2.5 5 5 30   9/17/21 2.6 At goal 5 5 2.5 5 2.5 5 5 30   8/20/21 1.9 Just below goal 5 5 2.5 5 2.5 5 5 30   8/13/21 2.0 At goal 5 5 2.5 5 2.5 5 5 30   7/27/21 2.1 At goal 5 5 2.5 5 2.5 5 5 30   5/28/21 2.2 At goal 5 5 2.5 5 2.5 5 5 30   4/26/21 2.5 At goal 5 5 2.5 5 2.5 5 5 30   4/21/21 2.2 At goal 5 5 2.5 5 2.5 5 5 30   4/15/21 4.7 Above goal 5 JOSH   HOLD HOLD 5    3/19/21 2.7 At goal 5 5 5 5 5 5 5 35   3/12/21 3.3 Just above goal 5 5 5 5 5 5 5 35         Assessment/Plan:    Recent hospitalizations/HC visits no   Recent medication changes no   Medications taken regularly that may interact with warfarin or alter INR No significant drug interactions identified   Warfarin dose taken as prescribed Yes   Signs/symptoms of bleeding History of bleeding? no   Vitamin K intake Consistency of servings of green, leafy vegetables per week ? Yes   Recent vomiting/diarrhea/fever None reported   Changes in weight, activity, stress Weight loss   alcohol use Patient reports having 0 drinks per day   Upcoming surgeries or procedures None reported     Patient was also reminded to maintain consistent vitamin K intake and call with any bleeding, medication changes, or fever/vomiting/diarrhea. Addendum:  8/29/22  Reviewed assessment and plan. INR is at goal, dosing as able  Repeat INR in one week/s. Patient/family instructed.     Brittany Sena, CNP

## 2022-08-31 VITALS
WEIGHT: 156.6 LBS | BODY MASS INDEX: 22.42 KG/M2 | OXYGEN SATURATION: 99 % | DIASTOLIC BLOOD PRESSURE: 64 MMHG | SYSTOLIC BLOOD PRESSURE: 108 MMHG | HEIGHT: 70 IN | HEART RATE: 53 BPM | RESPIRATION RATE: 19 BRPM

## 2022-08-31 ASSESSMENT — ENCOUNTER SYMPTOMS
PHOTOPHOBIA: 0
BLOOD IN STOOL: 0
COUGH: 0
SHORTNESS OF BREATH: 1
NAUSEA: 0
ABDOMINAL PAIN: 0
CHEST TIGHTNESS: 0
ABDOMINAL DISTENTION: 0

## 2022-09-01 ENCOUNTER — TELEPHONE (OUTPATIENT)
Dept: CARDIOLOGY CLINIC | Age: 77
End: 2022-09-01

## 2022-09-01 NOTE — TELEPHONE ENCOUNTER
Called patient, he has less swelling and less pain in his leg.  No chest pain or shortness of breath  Will have INR in am

## 2022-09-01 NOTE — TELEPHONE ENCOUNTER
----- Message from Rishi Garza DO sent at 8/31/2022  8:59 PM EDT -----  Regarding: follow up inr and clinical status  Can you check on the status of his inr and how his leg is doing?

## 2022-09-02 ENCOUNTER — ANTI-COAG VISIT (OUTPATIENT)
Dept: CARDIOLOGY CLINIC | Age: 77
End: 2022-09-02
Payer: MEDICARE

## 2022-09-02 DIAGNOSIS — I48.0 PAROXYSMAL ATRIAL FIBRILLATION (HCC): Primary | ICD-10-CM

## 2022-09-02 LAB — INR BLD: 2.6

## 2022-09-02 PROCEDURE — 93793 ANTICOAG MGMT PT WARFARIN: CPT | Performed by: NURSE PRACTITIONER

## 2022-09-02 NOTE — PROGRESS NOTES
ANTICOAGULATION MONITORING    Josephus Elders Day, 1945    Anticoagulation Indication(s):  Afib  ; hx of DCCV, s/p WESTON ligation       ~s/p LATESHA Jun '19 Jan '21 with DCCV  Referring Physician:   Dr. Conner Ramirez   Goal INR Range:  2.0 - 3.0  Duration of Anticoagulation Therapy:  Long term  Home or Lab Draw: Lab--Cheo Davis  Product patient has at home: warfarin 5 mg    Recent INR Results:  Lab Results   Component Value Date    INR 2.60 08/29/2022    INR 4.10 08/25/2022    INR 1.81 (H) 08/10/2022    INR 3.33 (H) 08/04/2022       INR Summary                          Warfarin regimen (mg)  Date INR A/P Sun Mon Tue Wed Thu Fri Sat Mg/wk                                                                                                                        9/2/22 2.6 At goal 5 2.5 2.5 5 2.5 2.5 2.5 22.5   8/29/22 2.6 At goal 5 2.5 2.5 5 2.5 2.5 2.5 22.5   8/25/22 4.1 Above goal 5 2.5 2.5 5 0 2.5 2.5 22.5   8/4/22 3.33 Above goal 5 5 2.5 5 2.5 2.5 2.5 25   7/28/22 3.7 Above goal 5 5 2.5 5 2.5 5 2.5 27.5   7/8/22 1.9 Below goal 5 5 5 5 5 2.5 5    6/14/22 2.0 At goal 5 5 2.5 5 5 5 5 32.5   5/16/22 2.3 At goal 5 5 2.5 5 2.5 5 5 30   4/29/22 1.8  Below goal 5 5 5 5 2.5 5 5    4/6/22 2.1 At goal 5 5 2.5 5 2.5 5 5 30   3/14/22 3.3 At goal 5 5 2.5 5 2.5 5 5 30   2/24/22 1.8 Below goal 5 5 2.5 5 2.5 5 5 30   1/7/22 2.0 At goal 5 5 2.5 5 2.5 5 5 30   12/8/21 2.2 At goal 5 5 2.5 5 2.5 5 5 30   11/5/21 2.3 At goal 5 5 2.5 5 2.5 5 5 30   9/17/21 2.6 At goal 5 5 2.5 5 2.5 5 5 30   8/20/21 1.9 Just below goal 5 5 2.5 5 2.5 5 5 30   8/13/21 2.0 At goal 5 5 2.5 5 2.5 5 5 30   7/27/21 2.1 At goal 5 5 2.5 5 2.5 5 5 30   5/28/21 2.2 At goal 5 5 2.5 5 2.5 5 5 30   4/26/21 2.5 At goal 5 5 2.5 5 2.5 5 5 30   4/21/21 2.2 At goal 5 5 2.5 5 2.5 5 5 30   4/15/21 4.7 Above goal 5 JOSH   HOLD HOLD 5    3/19/21 2.7 At goal 5 5 5 5 5 5 5 35   3/12/21 3.3 Just above goal 5 5 5 5 5 5 5 35         Assessment/Plan:    Recent hospitalizations/HC visits no   Recent medication changes no   Medications taken regularly that may interact with warfarin or alter INR No significant drug interactions identified   Warfarin dose taken as prescribed Yes   Signs/symptoms of bleeding History of bleeding? no   Vitamin K intake Consistency of servings of green, leafy vegetables per week ? Yes   Recent vomiting/diarrhea/fever None reported   Changes in weight, activity, stress Weight loss   alcohol use Patient reports having 0 drinks per day   Upcoming surgeries or procedures None reported     Patient was also reminded to maintain consistent vitamin K intake and call with any bleeding, medication changes, or fever/vomiting/diarrhea. Next INR : 1 week    Per NPKA: 5mg sun and wed, 2.5mg other days, recheck in one week per NPKA. Spoke with the patient and advised him of the instructions. Pt v/u.

## 2022-09-09 ENCOUNTER — TELEPHONE (OUTPATIENT)
Dept: CARDIOLOGY CLINIC | Age: 77
End: 2022-09-09

## 2022-09-09 ENCOUNTER — ANTI-COAG VISIT (OUTPATIENT)
Dept: CARDIOLOGY CLINIC | Age: 77
End: 2022-09-09
Payer: MEDICARE

## 2022-09-09 DIAGNOSIS — I48.0 PAROXYSMAL ATRIAL FIBRILLATION (HCC): Primary | ICD-10-CM

## 2022-09-09 LAB — INR BLD: 2.6

## 2022-09-09 PROCEDURE — 93793 ANTICOAG MGMT PT WARFARIN: CPT | Performed by: NURSE PRACTITIONER

## 2022-09-09 NOTE — TELEPHONE ENCOUNTER
Coumadin/PT/INR    PT: Renelda Like Day    INR:  2.6    What dosage do you take daily? 5 days 2.5 - 2 days 5    Person calling in results:  Patient    Call back number? [phone]    Where do you have your blood drawn?  [facility]    (lab/HHRN/Home Monitor) Lab

## 2022-09-09 NOTE — PROGRESS NOTES
ANTICOAGULATION MONITORING    Desi Trever Day, 1945    Anticoagulation Indication(s):  Afib  ; hx of DCCV, s/p WESTON ligation       ~s/p LATESHA Jun '19 Jan '21 with DCCV  Referring Physician:   Dr. Alana Collazo   Goal INR Range:  2.0 - 3.0  Duration of Anticoagulation Therapy:  Long term  Home or Lab Draw: Lab--Cheo Davis  Product patient has at home: warfarin 5 mg    Recent INR Results:  Lab Results   Component Value Date    INR 2.60 09/09/2022    INR 2.60 09/02/2022    INR 2.60 08/29/2022    INR 4.10 08/25/2022       INR Summary                          Warfarin regimen (mg)  Date INR A/P Sun Mon Tue Wed Thu Fri Sat Mg/wk                                                                                                           9/9/22 2.6 At goal 5 2.5 2.5 5 2.5 2.5 2.5 22.5   9/2/22 2.6 At goal 5 2.5 2.5 5 2.5 2.5 2.5 22.5   8/29/22 2.6 At goal 5 2.5 2.5 5 2.5 2.5 2.5 22.5   8/25/22 4.1 Above goal 5 2.5 2.5 5 0 2.5 2.5 22.5   8/4/22 3.33 Above goal 5 5 2.5 5 2.5 2.5 2.5 25   7/28/22 3.7 Above goal 5 5 2.5 5 2.5 5 2.5 27.5   7/8/22 1.9 Below goal 5 5 5 5 5 2.5 5    6/14/22 2.0 At goal 5 5 2.5 5 5 5 5 32.5   5/16/22 2.3 At goal 5 5 2.5 5 2.5 5 5 30   4/29/22 1.8  Below goal 5 5 5 5 2.5 5 5    4/6/22 2.1 At goal 5 5 2.5 5 2.5 5 5 30   3/14/22 3.3 At goal 5 5 2.5 5 2.5 5 5 30   2/24/22 1.8 Below goal 5 5 2.5 5 2.5 5 5 30   1/7/22 2.0 At goal 5 5 2.5 5 2.5 5 5 30   12/8/21 2.2 At goal 5 5 2.5 5 2.5 5 5 30   11/5/21 2.3 At goal 5 5 2.5 5 2.5 5 5 30   9/17/21 2.6 At goal 5 5 2.5 5 2.5 5 5 30   8/20/21 1.9 Just below goal 5 5 2.5 5 2.5 5 5 30   8/13/21 2.0 At goal 5 5 2.5 5 2.5 5 5 30   7/27/21 2.1 At goal 5 5 2.5 5 2.5 5 5 30   5/28/21 2.2 At goal 5 5 2.5 5 2.5 5 5 30   4/26/21 2.5 At goal 5 5 2.5 5 2.5 5 5 30   4/21/21 2.2 At goal 5 5 2.5 5 2.5 5 5 30   4/15/21 4.7 Above goal 5 JOSH   HOLD HOLD 5    3/19/21 2.7 At goal 5 5 5 5 5 5 5 35   3/12/21 3.3 Just above goal 5 5 5 5 5 5 5 35         Assessment/Plan:    Recent hospitalizations/HC visits no   Recent medication changes no   Medications taken regularly that may interact with warfarin or alter INR No significant drug interactions identified   Warfarin dose taken as prescribed Yes   Signs/symptoms of bleeding History of bleeding? no   Vitamin K intake Consistency of servings of green, leafy vegetables per week ? Yes   Recent vomiting/diarrhea/fever None reported   Changes in weight, activity, stress Weight loss   alcohol use Patient reports having 0 drinks per day   Upcoming surgeries or procedures None reported     Patient was also reminded to maintain consistent vitamin K intake and call with any bleeding, medication changes, or fever/vomiting/diarrhea. Next INR : 1 week      Addendum:  9/9/22  Reviewed assessment and plan. INR is at goal, continue current dose   Repeat INR in one week/s. Patient/family instructed. Audelia Davis CNP      No change, recheck one week.  Rocky Hernandez  Patient verbalized understanding [facility] 9/9/

## 2022-09-15 ENCOUNTER — TELEPHONE (OUTPATIENT)
Dept: CARDIOLOGY CLINIC | Age: 77
End: 2022-09-15

## 2022-09-15 ENCOUNTER — HOSPITAL ENCOUNTER (OUTPATIENT)
Age: 77
Discharge: HOME | DRG: 310 | End: 2022-09-15
Payer: MEDICARE

## 2022-09-15 DIAGNOSIS — E78.2 MIXED HYPERLIPIDEMIA: Chronic | ICD-10-CM

## 2022-09-15 DIAGNOSIS — I25.10 CORONARY ARTERY DISEASE DUE TO LIPID RICH PLAQUE: ICD-10-CM

## 2022-09-15 DIAGNOSIS — I10 ESSENTIAL HYPERTENSION: Chronic | ICD-10-CM

## 2022-09-15 DIAGNOSIS — I25.83 CORONARY ARTERY DISEASE DUE TO LIPID RICH PLAQUE: ICD-10-CM

## 2022-09-15 DIAGNOSIS — I48.0 PAF (PAROXYSMAL ATRIAL FIBRILLATION) (HCC): Chronic | ICD-10-CM

## 2022-09-15 LAB
ALBUMIN SERPL-MCNC: 3.9 G/DL (ref 3.4–5)
ANION GAP SERPL CALCULATED.3IONS-SCNC: 11 MMOL/L (ref 3–16)
BUN BLDV-MCNC: 16 MG/DL (ref 7–20)
CALCIUM SERPL-MCNC: 8.8 MG/DL (ref 8.3–10.6)
CHLORIDE BLD-SCNC: 105 MMOL/L (ref 99–110)
CHOLESTEROL, FASTING: 143 MG/DL (ref 0–199)
CO2: 24 MMOL/L (ref 21–32)
CREAT SERPL-MCNC: 1.1 MG/DL (ref 0.8–1.3)
GFR AFRICAN AMERICAN: >60
GFR NON-AFRICAN AMERICAN: >60
GLUCOSE BLD-MCNC: 93 MG/DL (ref 70–99)
HDLC SERPL-MCNC: 45 MG/DL (ref 40–60)
INR BLD: 2.4 (ref 0.87–1.14)
LDL CHOLESTEROL CALCULATED: 81 MG/DL
PHOSPHORUS: 3.5 MG/DL (ref 2.5–4.9)
POTASSIUM SERPL-SCNC: 4.7 MMOL/L (ref 3.5–5.1)
PROTHROMBIN TIME: 26.3 SEC (ref 11.7–14.5)
SODIUM BLD-SCNC: 140 MMOL/L (ref 136–145)
TRIGLYCERIDE, FASTING: 87 MG/DL (ref 0–150)
VLDLC SERPL CALC-MCNC: 17 MG/DL

## 2022-09-15 PROCEDURE — 80061 LIPID PANEL: CPT

## 2022-09-15 PROCEDURE — 85610 PROTHROMBIN TIME: CPT

## 2022-09-15 PROCEDURE — 36415 COLL VENOUS BLD VENIPUNCTURE: CPT

## 2022-09-15 PROCEDURE — 80069 RENAL FUNCTION PANEL: CPT

## 2022-09-15 NOTE — TELEPHONE ENCOUNTER
Patient came in said he did a remote transmission Sunday and was wondering the results from them. Also patient states he wants to talk to someone about the cardioversion and ablation that was previously discussed. Please call and advise.

## 2022-09-15 NOTE — TELEPHONE ENCOUNTER
We received remote transmission from patient's monitor at home on 9/12/2022. Transmission shows normal sensing and pacing function. Patient in AF since 9/11/2022. EP will review. See interrogation under cardiology tab in the [address] [place] [address] [address] field for more details. OptiVol WNL. Please advise.  Thanks

## 2022-09-16 ENCOUNTER — HOSPITAL ENCOUNTER (INPATIENT)
Age: 77
LOS: 1 days | Discharge: HOME | DRG: 310 | End: 2022-09-16
Attending: EMERGENCY MEDICINE | Admitting: INTERNAL MEDICINE
Payer: MEDICARE

## 2022-09-16 ENCOUNTER — APPOINTMENT (OUTPATIENT)
Dept: GENERAL RADIOLOGY | Age: 77
DRG: 310 | End: 2022-09-16
Payer: MEDICARE

## 2022-09-16 VITALS
HEIGHT: 70 IN | SYSTOLIC BLOOD PRESSURE: 129 MMHG | HEART RATE: 61 BPM | RESPIRATION RATE: 16 BRPM | WEIGHT: 158 LBS | BODY MASS INDEX: 22.62 KG/M2 | OXYGEN SATURATION: 100 % | TEMPERATURE: 97.8 F | DIASTOLIC BLOOD PRESSURE: 84 MMHG

## 2022-09-16 DIAGNOSIS — I48.91 ATRIAL FIBRILLATION, UNSPECIFIED TYPE (HCC): Primary | ICD-10-CM

## 2022-09-16 DIAGNOSIS — I20.8 ANGINAL EQUIVALENT (HCC): Primary | ICD-10-CM

## 2022-09-16 DIAGNOSIS — I48.0 PAROXYSMAL ATRIAL FIBRILLATION (HCC): ICD-10-CM

## 2022-09-16 PROBLEM — Z95.1 S/P CABG (CORONARY ARTERY BYPASS GRAFT): Status: ACTIVE | Noted: 2022-09-16

## 2022-09-16 PROBLEM — I25.10 CAD IN NATIVE ARTERY: Status: ACTIVE | Noted: 2022-09-16

## 2022-09-16 PROBLEM — R07.9 CHEST PAIN: Status: ACTIVE | Noted: 2022-09-16

## 2022-09-16 LAB
A/G RATIO: 1.3 (ref 1.1–2.2)
ALBUMIN SERPL-MCNC: 3.9 G/DL (ref 3.4–5)
ALP BLD-CCNC: 116 U/L (ref 40–129)
ALT SERPL-CCNC: 51 U/L (ref 10–40)
ANION GAP SERPL CALCULATED.3IONS-SCNC: 8 MMOL/L (ref 3–16)
AST SERPL-CCNC: 33 U/L (ref 15–37)
BASOPHILS ABSOLUTE: 0.1 K/UL (ref 0–0.2)
BASOPHILS RELATIVE PERCENT: 1.4 %
BILIRUB SERPL-MCNC: 0.4 MG/DL (ref 0–1)
BUN BLDV-MCNC: 19 MG/DL (ref 7–20)
CALCIUM SERPL-MCNC: 9 MG/DL (ref 8.3–10.6)
CHLORIDE BLD-SCNC: 103 MMOL/L (ref 99–110)
CO2: 27 MMOL/L (ref 21–32)
CREAT SERPL-MCNC: 1.2 MG/DL (ref 0.8–1.3)
EKG ATRIAL RATE: 441 BPM
EKG ATRIAL RATE: 52 BPM
EKG DIAGNOSIS: NORMAL
EKG DIAGNOSIS: NORMAL
EKG Q-T INTERVAL: 386 MS
EKG Q-T INTERVAL: 408 MS
EKG QRS DURATION: 86 MS
EKG QRS DURATION: 90 MS
EKG QTC CALCULATION (BAZETT): 379 MS
EKG QTC CALCULATION (BAZETT): 395 MS
EKG R AXIS: -62 DEGREES
EKG R AXIS: -69 DEGREES
EKG T AXIS: 18 DEGREES
EKG T AXIS: 25 DEGREES
EKG VENTRICULAR RATE: 52 BPM
EKG VENTRICULAR RATE: 63 BPM
EOSINOPHILS ABSOLUTE: 0.3 K/UL (ref 0–0.6)
EOSINOPHILS RELATIVE PERCENT: 4.3 %
GFR AFRICAN AMERICAN: >60
GFR NON-AFRICAN AMERICAN: 59
GLUCOSE BLD-MCNC: 101 MG/DL (ref 70–99)
HCT VFR BLD CALC: 40.5 % (ref 40.5–52.5)
HEMOGLOBIN: 13.6 G/DL (ref 13.5–17.5)
INR BLD: 2.71 (ref 0.87–1.14)
LYMPHOCYTES ABSOLUTE: 1.3 K/UL (ref 1–5.1)
LYMPHOCYTES RELATIVE PERCENT: 17.8 %
MCH RBC QN AUTO: 32.3 PG (ref 26–34)
MCHC RBC AUTO-ENTMCNC: 33.6 G/DL (ref 31–36)
MCV RBC AUTO: 96.2 FL (ref 80–100)
MONOCYTES ABSOLUTE: 0.6 K/UL (ref 0–1.3)
MONOCYTES RELATIVE PERCENT: 8.3 %
NEUTROPHILS ABSOLUTE: 5.1 K/UL (ref 1.7–7.7)
NEUTROPHILS RELATIVE PERCENT: 68.2 %
PDW BLD-RTO: 14.3 % (ref 12.4–15.4)
PLATELET # BLD: 317 K/UL (ref 135–450)
PMV BLD AUTO: 8.2 FL (ref 5–10.5)
POTASSIUM SERPL-SCNC: 4.5 MMOL/L (ref 3.5–5.1)
PROTHROMBIN TIME: 28.9 SEC (ref 11.7–14.5)
RBC # BLD: 4.21 M/UL (ref 4.2–5.9)
SODIUM BLD-SCNC: 138 MMOL/L (ref 136–145)
TOTAL PROTEIN: 6.9 G/DL (ref 6.4–8.2)
TROPONIN: <0.01 NG/ML
TROPONIN: <0.01 NG/ML
WBC # BLD: 7.5 K/UL (ref 4–11)

## 2022-09-16 PROCEDURE — 85610 PROTHROMBIN TIME: CPT

## 2022-09-16 PROCEDURE — 84484 ASSAY OF TROPONIN QUANT: CPT

## 2022-09-16 PROCEDURE — 71046 X-RAY EXAM CHEST 2 VIEWS: CPT

## 2022-09-16 PROCEDURE — 85025 COMPLETE CBC W/AUTO DIFF WBC: CPT

## 2022-09-16 PROCEDURE — 92960 CARDIOVERSION ELECTRIC EXT: CPT | Performed by: INTERNAL MEDICINE

## 2022-09-16 PROCEDURE — 99285 EMERGENCY DEPT VISIT HI MDM: CPT

## 2022-09-16 PROCEDURE — 93228 REMOTE 30 DAY ECG REV/REPORT: CPT | Performed by: INTERNAL MEDICINE

## 2022-09-16 PROCEDURE — 93005 ELECTROCARDIOGRAM TRACING: CPT | Performed by: EMERGENCY MEDICINE

## 2022-09-16 PROCEDURE — 80053 COMPREHEN METABOLIC PANEL: CPT

## 2022-09-16 PROCEDURE — 36415 COLL VENOUS BLD VENIPUNCTURE: CPT

## 2022-09-16 PROCEDURE — 5A2204Z RESTORATION OF CARDIAC RHYTHM, SINGLE: ICD-10-PCS | Performed by: INTERNAL MEDICINE

## 2022-09-16 PROCEDURE — 2500000003 HC RX 250 WO HCPCS: Performed by: EMERGENCY MEDICINE

## 2022-09-16 PROCEDURE — 1200000000 HC SEMI PRIVATE

## 2022-09-16 PROCEDURE — 99222 1ST HOSP IP/OBS MODERATE 55: CPT | Performed by: INTERNAL MEDICINE

## 2022-09-16 PROCEDURE — 93010 ELECTROCARDIOGRAM REPORT: CPT | Performed by: INTERNAL MEDICINE

## 2022-09-16 RX ORDER — PANTOPRAZOLE SODIUM 40 MG/1
40 TABLET, DELAYED RELEASE ORAL
Status: DISCONTINUED | OUTPATIENT
Start: 2022-09-17 | End: 2022-09-16

## 2022-09-16 RX ORDER — ONDANSETRON 4 MG/1
4 TABLET, ORALLY DISINTEGRATING ORAL EVERY 8 HOURS PRN
Status: DISCONTINUED | OUTPATIENT
Start: 2022-09-16 | End: 2022-09-16

## 2022-09-16 RX ORDER — SODIUM CHLORIDE 0.9 % (FLUSH) 0.9 %
5-40 SYRINGE (ML) INJECTION EVERY 12 HOURS SCHEDULED
Status: DISCONTINUED | OUTPATIENT
Start: 2022-09-16 | End: 2022-09-16

## 2022-09-16 RX ORDER — ETOMIDATE 2 MG/ML
10 INJECTION INTRAVENOUS ONCE
Status: COMPLETED | OUTPATIENT
Start: 2022-09-16 | End: 2022-09-16

## 2022-09-16 RX ORDER — PHENYTOIN SODIUM 100 MG/1
100 CAPSULE, EXTENDED RELEASE ORAL 2 TIMES DAILY
Status: DISCONTINUED | OUTPATIENT
Start: 2022-09-16 | End: 2022-09-16

## 2022-09-16 RX ORDER — ATORVASTATIN CALCIUM 80 MG/1
40 TABLET, FILM COATED ORAL NIGHTLY
Status: DISCONTINUED | OUTPATIENT
Start: 2022-09-16 | End: 2022-09-16

## 2022-09-16 RX ORDER — ROSUVASTATIN CALCIUM 10 MG/1
40 TABLET, COATED ORAL NIGHTLY
Status: DISCONTINUED | OUTPATIENT
Start: 2022-09-16 | End: 2022-09-16

## 2022-09-16 RX ORDER — DIAZEPAM 5 MG/1
5 TABLET ORAL 2 TIMES DAILY
Status: DISCONTINUED | OUTPATIENT
Start: 2022-09-16 | End: 2022-09-16

## 2022-09-16 RX ORDER — POTASSIUM CHLORIDE 20 MEQ/1
20 TABLET, EXTENDED RELEASE ORAL AS NEEDED
COMMUNITY
End: 2022-09-29 | Stop reason: SDUPTHER

## 2022-09-16 RX ORDER — AMIODARONE HYDROCHLORIDE 200 MG/1
200 TABLET ORAL DAILY
Status: DISCONTINUED | OUTPATIENT
Start: 2022-09-17 | End: 2022-09-16

## 2022-09-16 RX ORDER — ASPIRIN 81 MG/1
81 TABLET, CHEWABLE ORAL DAILY
Status: DISCONTINUED | OUTPATIENT
Start: 2022-09-17 | End: 2022-09-16

## 2022-09-16 RX ORDER — SPIRONOLACTONE 25 MG/1
12.5 TABLET ORAL DAILY
Status: DISCONTINUED | OUTPATIENT
Start: 2022-09-17 | End: 2022-09-16

## 2022-09-16 RX ORDER — ACETAMINOPHEN 650 MG/1
650 SUPPOSITORY RECTAL EVERY 6 HOURS PRN
Status: DISCONTINUED | OUTPATIENT
Start: 2022-09-16 | End: 2022-09-16

## 2022-09-16 RX ORDER — SODIUM CHLORIDE 9 MG/ML
INJECTION, SOLUTION INTRAVENOUS PRN
Status: DISCONTINUED | OUTPATIENT
Start: 2022-09-16 | End: 2022-09-16

## 2022-09-16 RX ORDER — SODIUM CHLORIDE 0.9 % (FLUSH) 0.9 %
5-40 SYRINGE (ML) INJECTION PRN
Status: DISCONTINUED | OUTPATIENT
Start: 2022-09-16 | End: 2022-09-16

## 2022-09-16 RX ORDER — ACETAMINOPHEN 325 MG/1
650 TABLET ORAL EVERY 6 HOURS PRN
Status: DISCONTINUED | OUTPATIENT
Start: 2022-09-16 | End: 2022-09-16

## 2022-09-16 RX ORDER — ONDANSETRON 2 MG/ML
4 INJECTION INTRAMUSCULAR; INTRAVENOUS EVERY 6 HOURS PRN
Status: DISCONTINUED | OUTPATIENT
Start: 2022-09-16 | End: 2022-09-16

## 2022-09-16 RX ORDER — CLOPIDOGREL BISULFATE 75 MG/1
75 TABLET ORAL EVERY EVENING
Status: DISCONTINUED | OUTPATIENT
Start: 2022-09-16 | End: 2022-09-16

## 2022-09-16 RX ORDER — METOPROLOL SUCCINATE 50 MG/1
100 TABLET, EXTENDED RELEASE ORAL DAILY
Status: DISCONTINUED | OUTPATIENT
Start: 2022-09-17 | End: 2022-09-16

## 2022-09-16 RX ORDER — EZETIMIBE 10 MG/1
10 TABLET ORAL EVERY EVENING
Status: DISCONTINUED | OUTPATIENT
Start: 2022-09-16 | End: 2022-09-16

## 2022-09-16 RX ORDER — VALSARTAN 40 MG/1
20 TABLET ORAL DAILY
Status: DISCONTINUED | OUTPATIENT
Start: 2022-09-17 | End: 2022-09-16

## 2022-09-16 RX ORDER — POLYETHYLENE GLYCOL 3350 17 G/17G
17 POWDER, FOR SOLUTION ORAL DAILY PRN
Status: DISCONTINUED | OUTPATIENT
Start: 2022-09-16 | End: 2022-09-16

## 2022-09-16 RX ADMIN — ETOMIDATE 10 MG: 2 INJECTION, SOLUTION INTRAVENOUS at 13:40

## 2022-09-16 ASSESSMENT — PAIN - FUNCTIONAL ASSESSMENT: PAIN_FUNCTIONAL_ASSESSMENT: NONE - DENIES PAIN

## 2022-09-16 NOTE — ED NOTES
Dr. Maurisio Snider at bedside, determining disposition for patient.      Gabriel Márquez RN  09/16/22 7782

## 2022-09-16 NOTE — PRE SEDATION
Sedation Pre-Procedure Note    Patient Name: Daquan Mathis   YOB: 1945  Room/Bed: [facility]0022/22  Medical Record Number: [id number]  Date: 9/16/2022   Time: 1:34 PM       Indication:  Cardioversion    Consent: I have discussed with the patient and/or the patient representative the indication, alternatives, and the possible risks and/or complications of the planned procedure and the anesthesia methods. The patient and/or patient representative appear to understand and agree to proceed. Vital Signs:   Vitals:    09/16/22 1230   BP: 99/63   Pulse: 62   Resp: 14   Temp:    SpO2: 98%       Past Medical History:   has a past medical history of Arthritis, Atrial fib/flut, CAD (coronary artery disease), Hyperlipidemia, Hypertension, Seizures ([facility] [address] and Sinus bradycardia. Past Surgical History:   has a past surgical history that includes hernia repair (Right); Coronary angioplasty with stent (1997); [place] tooth extraction (April 2012); pacemaker placement (12/18/2017); Coronary artery bypass graft (01/2019); Cardiac surgery; and Cystoscopy (N/A, 11/13/2019). Medications:   Scheduled Meds:    sodium chloride flush  5-40 mL IntraVENous 2 times per day    [START ON 9/17/2022] amiodarone  200 mg Oral Daily    clopidogrel  75 mg Oral QPM    diazePAM  5 mg Oral BID    ezetimibe  10 mg Oral QPM    [START ON 9/17/2022] metoprolol succinate  100 mg Oral Daily    [START ON 9/17/2022] pantoprazole  40 mg Oral QAM AC    phenytoin  100 mg Oral BID    rosuvastatin  40 mg Oral Nightly    [START ON 9/17/2022] spironolactone  12.5 mg Oral Daily    [START ON 9/17/2022] valsartan  20 mg Oral Daily    etomidate  10 mg IntraVENous Once     Continuous Infusions:    sodium chloride       PRN Meds: sodium chloride flush, sodium chloride, ondansetron **OR** ondansetron, acetaminophen **OR** acetaminophen, polyethylene glycol  Home Meds:   Prior to Admission medications    Medication Sig Start Date End Date Taking?  Authorizing Provider   potassium chloride (KLOR-CON M) 20 MEQ extended release tablet Take 20 mEq by mouth as needed (with Lasix)   Yes Historical Provider, MD   warfarin (COUMADIN) 2.5 MG tablet Take 2 tabs by mouth daily or as directed by provider based on INR 8/19/22   Mahala Mix, DO   metoprolol succinate (TOPROL XL) 100 MG extended release tablet Take 1 tablet by mouth in the morning. 8/11/22   Mahala Mix, DO   valsartan (DIOVAN) 40 MG tablet Take 0.5 tablets by mouth at bedtime  Patient taking differently: Take 20 mg by mouth daily 8/11/22   Mahala Mix, DO   furosemide (LASIX) 40 MG tablet Take 1 tablet by mouth in the morning and 1 tablet in the evening. Taking prn for fluid overload/swelling. Patient taking differently: Take 40 mg by mouth as needed (fluid overload/swelling) Taking prn for fluid overload/swelling 8/11/22   Mahala Mix, DO   amiodarone (CORDARONE) 200 MG tablet TAKE 1 TABLET DAILY 8/11/22   Mahala Mix, DO   rosuvastatin (CRESTOR) 40 MG tablet Take 1 tablet by mouth nightly 8/11/22   Mahala Mix, DO   spironolactone (ALDACTONE) 25 MG tablet Take 0.5 tablets by mouth in the morning. 8/11/22   Mahala Mix, DO   clopidogrel (PLAVIX) 75 MG tablet Take 1 tablet by mouth in the morning. Patient taking differently: Take 75 mg by mouth every evening 7/20/22   Mahala Mix, DO   magnesium oxide (MAG-OX) 400 (240 Mg) MG tablet TAKE ONE TABLET BY MOUTH DAILY 5/9/22   Mahala Mix, DO   ezetimibe (ZETIA) 10 MG tablet Take 1 tablet by mouth daily  Patient taking differently: Take 10 mg by mouth every evening 4/12/22   Mahala Mix, DO   pantoprazole (PROTONIX) 40 MG tablet Take 1 tablet by mouth every morning (before breakfast) 4/1/22   Mahala Mix, DO   nitroGLYCERIN (NITROSTAT) 0.4 MG SL tablet Place 1 tablet under the tongue every 5 minutes as needed for Chest pain 2/24/22   Mahala Mix, DO   diazepam (VALIUM) 5 MG tablet Take 5 mg by mouth in the morning and 5 mg in the evening. Historical Provider, MD   phenytoin (DILANTIN) 100 MG ER capsule Take 1 capsule by mouth 2 times daily. Resume home dose 12/21/12   BETO Gary - CNP   primidone (MYSOLINE) 250 MG tablet Take 250 mg by mouth 1 tablet AM, half tablet PM    Historical Provider, MD     Coumadin Use Last 7 Days:  yes - INR > 2  Antiplatelet drug therapy use last 7 days: yes - plavix  Other anticoagulant use last 7 days: no  Additional Medication Information:  see above list      Pre-Sedation Documentation and Exam:   I have personally completed a history, physical exam & review of systems for this patient (see notes). Vital signs have been reviewed (see flow sheet for vitals). Mallampati Airway Assessment:  Mallampati Class I - (soft palate, fauces, uvula & anterior/posterior tonsillar pillars are visible)    Prior History of Anesthesia Complications:   none    ASA Classification:  Class 2 - A normal healthy patient with mild systemic disease    Sedation/ Anesthesia Plan:   intravenous sedation    Medications Planned:    To be given by ED staff - IV etomidate    Patient is an appropriate candidate for plan of sedation: yes    Electronically signed by Balaji Mckeon MD on 9/16/2022 at 1:34 PM

## 2022-09-16 NOTE — PROCEDURES
CARDIOVERSION    Cardioversion procedure note:       Informed consent explained to patient and consent form signed by patient. Pads placed in appropraite position, site confirmed   Time-out performed prior to initiation of procedure.        Presenting rhyhm: AF with V pacing   Sedation: 200 J   # of attempts: 1       Results: successful conversion to dual AV pacing with regular rhythm and occasional PVCs      Sedation was provided by ED Staff Dr. Hedy Hernandez

## 2022-09-16 NOTE — CONSULTS
[facility]  [phone]      Chief Complaint   Patient presents with    Nausea     Presents with nausea for 1 week. No chest pain, but feels the same way he did when he had to have a CABG and stents in March 2022. Reason for consult:  nausea, possible anginal equivalent    ASSESSMENT AND PLAN:  Persistent AF - s/p successful cardioversion in 6/2022 but back in AF today, already on amiodarone  CAD s/p CABG s/p stents to all 3 bypass grafts 3/2022  Nausea, unlikely to be anginal equivalent given 7 days of stable symptoms with normal troponins and no hemodynamic changes; EKG uninterpretable for ischemia d/t paced rhythm    After discussing goals of possible with admission with patient, given that he is hemodynamically stable, ruled out for MI, has a therapeutic INR and happens to be NPO, instead of admitted for a stress test tomorrow we agreed that it would be best to do a cardioversion, and if successful, send him home with a 30 day event monitor with plans to f/u with his cardiologist Dr. Clari Crowley and EP Dr. Mario Garcia. He may benefit from an ablation  given recurrent need for cardioversion despite amiodarone    Cardioversion was done and successful. Ok to go home directly from ER, continue all home medications at current doses      History of Present Illness:  Clau Mathis is a 78 y.o. patient who presented to the hospital with complaints of nausea. I have been asked to provide consultation regarding further management and testing. He is s/p CABG in 2019 and in March had symptoms of nausea that were thought to be due to ischemia and thus got a CTA coronary which was nondiagnostic, and then got a cath demonstrating lesions in 3 bypass grafts, all of which were stented with successful results. He had no change in symptoms after this. He went back into AF in June and was successfully cardioveted by Dr. Jackie Zavala, but is now back in AF with good rate control.   He has been compliant with coumadin and with amiodarone. No palpitations or dizziness, although he has noted feeling nauseated before when in AF. No fever, chills, no shortness of breath, no chest pain or pressure. Past Medical History:   has a past medical history of Arthritis, Atrial fib/flut, CAD (coronary artery disease), Hyperlipidemia, Hypertension, Seizures ([address] and Sinus bradycardia. Surgical History:   has a past surgical history that includes hernia repair (Right); Coronary angioplasty with stent (1997); [place] tooth extraction (April 2012); pacemaker placement (12/18/2017); Coronary artery bypass graft (01/2019); Cardiac surgery; and Cystoscopy (N/A, 11/13/2019). Social History:   reports that he quit smoking about 34 years ago. His smoking use included cigarettes. He has a 30.00 pack-year smoking history. He has never used smokeless tobacco. He reports that he does not drink alcohol and does not use drugs. Family History:  No family history of premature coronary artery disease, aortic disease, or valve disease. Home Medications:  Were reviewed and are listed in nursing record. and/or listed below  Prior to Admission medications    Medication Sig Start Date End Date Taking? Authorizing Provider   potassium chloride (KLOR-CON M) 20 MEQ extended release tablet Take 20 mEq by mouth as needed (with Lasix)   Yes Historical Provider, MD   warfarin (COUMADIN) 2.5 MG tablet Take 2 tabs by mouth daily or as directed by provider based on INR 8/19/22   Rola Corbett DO   metoprolol succinate (TOPROL XL) 100 MG extended release tablet Take 1 tablet by mouth in the morning. 8/11/22   Rola Corbett DO   valsartan (DIOVAN) 40 MG tablet Take 0.5 tablets by mouth at bedtime  Patient taking differently: Take 20 mg by mouth daily 8/11/22   Rola Corbett DO   furosemide (LASIX) 40 MG tablet Take 1 tablet by mouth in the morning and 1 tablet in the evening. Taking prn for fluid overload/swelling.   Patient taking differently: Take 40 mg by mouth as needed (fluid overload/swelling) Taking prn for fluid overload/swelling 8/11/22   Padmaja King, DO   amiodarone (CORDARONE) 200 MG tablet TAKE 1 TABLET DAILY 8/11/22   Padmaja King, DO   rosuvastatin (CRESTOR) 40 MG tablet Take 1 tablet by mouth nightly 8/11/22   Padmaja King, DO   spironolactone (ALDACTONE) 25 MG tablet Take 0.5 tablets by mouth in the morning. 8/11/22   Padmaja King, DO   clopidogrel (PLAVIX) 75 MG tablet Take 1 tablet by mouth in the morning. Patient taking differently: Take 75 mg by mouth every evening 7/20/22   Padmaja King, DO   magnesium oxide (MAG-OX) 400 (240 Mg) MG tablet TAKE ONE TABLET BY MOUTH DAILY 5/9/22   Padmaja King, DO   ezetimibe (ZETIA) 10 MG tablet Take 1 tablet by mouth daily  Patient taking differently: Take 10 mg by mouth every evening 4/12/22   Padmaja King, DO   pantoprazole (PROTONIX) 40 MG tablet Take 1 tablet by mouth every morning (before breakfast) 4/1/22   Padmaja King, DO   nitroGLYCERIN (NITROSTAT) 0.4 MG SL tablet Place 1 tablet under the tongue every 5 minutes as needed for Chest pain 2/24/22   Padmaja King, DO   diazepam (VALIUM) 5 MG tablet Take 5 mg by mouth in the morning and 5 mg in the evening. Historical Provider, MD   phenytoin (DILANTIN) 100 MG ER capsule Take 1 capsule by mouth 2 times daily.  Resume home dose 12/21/12   Lisset Simmons, APRN - CNP   primidone (MYSOLINE) 250 MG tablet Take 250 mg by mouth 1 tablet AM, half tablet PM    Historical Provider, MD        Current Medications:  Current Facility-Administered Medications   Medication Dose Route Frequency Provider Last Rate Last Admin    sodium chloride flush 0.9 % injection 5-40 mL  5-40 mL IntraVENous 2 times per day Lexie Dickinson MD        sodium chloride flush 0.9 % injection 5-40 mL  5-40 mL IntraVENous PRN Lexie Dickinson MD        0.9 % sodium chloride infusion   IntraVENous PRN Lexie Dickinson MD        ondansetron (ZOFRAN-ODT) disintegrating tablet 4 mg  4 mg Oral Q8H PRN Janna Viveros MD        Or    ondansetron [facility]) injection 4 mg  4 mg IntraVENous Q6H PRN Janna Viveros MD        acetaminophen (TYLENOL) tablet 650 mg  650 mg Oral Q6H PRN Janna Viveros MD        Or    acetaminophen (TYLENOL) suppository 650 mg  650 mg Rectal Q6H PRN Janna Viveros MD        polyethylene glycol (GLYCOLAX) packet 17 g  17 g Oral Daily PRN Janna Viveros MD        [START ON 9/17/2022] amiodarone (CORDARONE) tablet 200 mg  200 mg Oral Daily Janna Viveros MD        clopidogrel (PLAVIX) tablet 75 mg  75 mg Oral QPM Janna Viveros MD        diazePAM (VALIUM) tablet 5 mg  5 mg Oral BID Janna Viveros MD        ezetimibe (ZETIA) tablet 10 mg  10 mg Oral QPM Janna Viveros MD        [START ON 9/17/2022] metoprolol succinate (TOPROL XL) extended release tablet 100 mg  100 mg Oral Daily Janna Viveros MD        [START ON 9/17/2022] pantoprazole (PROTONIX) tablet 40 mg  40 mg Oral QAM AC Janna Viveros MD        phenytoin (DILANTIN) ER capsule 100 mg  100 mg Oral BID Janna Viveros MD        rosuvastatin (CRESTOR) tablet 40 mg  40 mg Oral Nightly Janna Viveros MD        [START ON 9/17/2022] spironolactone (ALDACTONE) tablet 12.5 mg  12.5 mg Oral Daily MD Hanny Pinto ON 9/17/2022] valsartan (DIOVAN) tablet 20 mg  20 mg Oral Daily Janna Viveros MD         Current Outpatient Medications   Medication Sig Dispense Refill    potassium chloride (KLOR-CON M) 20 MEQ extended release tablet Take 20 mEq by mouth as needed (with Lasix)      warfarin (COUMADIN) 2.5 MG tablet Take 2 tabs by mouth daily or as directed by provider based on  tablet 1    metoprolol succinate (TOPROL XL) 100 MG extended release tablet Take 1 tablet by mouth in the morning.  90 tablet 1    valsartan (DIOVAN) 40 MG tablet Take 0.5 tablets by mouth at bedtime (Patient taking differently: Take 20 mg by mouth daily) 90 tablet 1    furosemide (LASIX) 40 MG tablet Take 1 tablet by Throat: Lips, mucosa, and tongue normal   Neck: Supple, symmetrical, trachea midline, no adenopathy, thyroid: not enlarged, symmetric, no tenderness/mass/nodules, no carotid bruit or JVD   Lungs:   Clear to auscultation bilaterally, respirations unlabored   Chest Wall:  No tenderness or deformity   Heart:  Regular rate and rhythm, S1, S2 normal, no murmur, rub or gallop   Abdomen:   Soft, non-tender, bowel sounds active all four quadrants,  no masses, no organomegaly   Extremities: Extremities normal, atraumatic, no cyanosis or edema   Pulses: 2+ and symmetric   Skin: Skin color, texture, turgor normal, no rashes or lesions   Psych: Normal mood and affect   Neurologic: CN II-XII grossly intact        Labs  Lab Results   Component Value Date/Time    PROBNP 560 08/04/2022 10:09 AM    PROBNP 706 03/25/2022 01:18 PM    PROBNP 257 08/08/2019 12:03 PM         Lab Results   Component Value Date/Time    CHOL 159 03/31/2022 09:25 AM    TRIG 73 03/31/2022 09:25 AM    HDL 45 09/15/2022 11:00 AM    HDL 47 12/13/2011 07:40 AM    LDLCALC 81 09/15/2022 11:00 AM    LABVLDL 17 09/15/2022 11:00 AM         Troponin   Date/Time Value Ref Range Status   09/16/2022 09:48 AM <0.01 <0.01 ng/mL Final     Comment:     Methodology by Troponin T   03/25/2022 10:27 PM <0.01 <0.01 ng/mL Final     Comment:     Methodology by Troponin T   03/25/2022 07:01 PM <0.01 <0.01 ng/mL Final     Comment:     Methodology by Troponin T           CBC:   Lab Results   Component Value Date/Time    WBC 7.5 09/16/2022 09:48 AM    RBC 4.21 09/16/2022 09:48 AM    HGB 13.6 09/16/2022 09:48 AM    HCT 40.5 09/16/2022 09:48 AM    MCV 96.2 09/16/2022 09:48 AM    RDW 14.3 09/16/2022 09:48 AM     09/16/2022 09:48 AM     CMP:    Lab Results   Component Value Date/Time     09/16/2022 09:48 AM    K 4.5 09/16/2022 09:48 AM    K 4.1 08/08/2019 12:03 PM     09/16/2022 09:48 AM    CO2 27 09/16/2022 09:48 AM    BUN 19 09/16/2022 09:48 AM    CREATININE 1.2 09/16/2022 09:48 AM    GFRAA >60 09/16/2022 09:48 AM    GFRAA >60 12/21/2012 06:42 AM    AGRATIO 1.3 09/16/2022 09:48 AM    LABGLOM 59 09/16/2022 09:48 AM    GLUCOSE 101 09/16/2022 09:48 AM    PROT 6.9 09/16/2022 09:48 AM    CALCIUM 9.0 09/16/2022 09:48 AM    BILITOT 0.4 09/16/2022 09:48 AM    ALKPHOS 116 09/16/2022 09:48 AM    AST 33 09/16/2022 09:48 AM    ALT 51 09/16/2022 09:48 AM     PT/INR:  No results found for: PTINR  Lab Results   Component Value Date    CKTOTAL 173 03/25/2022    CKMB 0.65 06/07/2010    CKMBINDEX 0.8 06/07/2010    TROPONINI <0.01 09/16/2022       EKG:  I have reviewed EKG with the following interpretation:  Impression:  A fib, V paced    Echo:   4/12/2022  Normal left ventricular size. Mild concentric left ventricular hypertrophy. Mildly decreased left ventricular systolic function with distal septal and apical hypokinesis. Estimated EF 45%. E/e'=14. Grade III diastolic dysfunction with elevated LV filling pressures. Mildly thickened mitral valve leaflets, no stenosis. Moderate-severe mitral regurgitation is present. No pulmonary vein reversal.  The left atrium is dilated. The aortic valve appears tricuspid with mild sclerosis no stenosis. Trivial aortic regurgitation is present. Pressure half-time is calculated at 591 msec. Moderate to severe tricuspid regurgitation. RVSP 50mmHg. Normal right ventricular size and mildly decreased function. The right atrium is dilated. Pacemaker / ICD lead is visualized in the right atrium. Trivial pulmonic regurgitation. IVC not well visualized. Cath:   3/2022  Cardiac Cath PCI:  Anatomy:   LM-nml   LAD-prox 100%   Cx-nml  OM- prox 100%  RCA-mid 100%  RPDA-   LVEF-   LVG-   LVEDP- 4  SVG to PDA prox 90%  SVG to OM/Diag mid 90%  Free IAN to LAD prox anastomosis 99%     Intervention  ~Successful PCI to [address] to LAD with 3.0x12 PAYAL. PCI to SVG to OM with 4.0x38 PAYAL. PD with 4.5x15 NC. Filter wire used.  PCI to SVG to PDA with 4.0x38 PAYAL Excellent Result. MRI/EP/Other: DCCV to sinus rhythm 6/2022    Old notes reviewed  Telemetry reviewd  Ekg personally reviewed  Chest xray personally reviewed  Echo, stress, cath, and/or other cardiac testing reviewed in detail   Medications and labs reviewed    High complexity/medical decision making due to extensive data review, extensive history review, independent review of data    High risk due to acute illness, evaluation of drug-drug interactions, medication management and diagnostic interventions    I will address the patient's cardiac risk factors and adjusted pharmacologic treatment as needed. In addition, I have reinforced the need for patient directed risk factor modification. Tobacco use was discussed with the patient and educated on the negative effects. I have asked the patient to not utilize these agents. Thank you for allowing to us to participate in the care or Mildred Zamora. Further evaluation will be based upon the patient's clinical course and testing results. All questions and concerns were addressed to the patient/family. Alternatives to my treatment were discussed. The note was completed using EMR. Every effort was made to ensure accuracy; however, inadvertent computerized transcription errors may be present.       Corey Sharp MD, MD 9/16/2022 1:02 PM

## 2022-09-16 NOTE — ED PROVIDER NOTES
[address] [facility]  [facility]      Pt Name: Aime Mathis  MRN: [id number]  Grahamgfdg 1945  Date ofevaluation: 9/16/2022  Provider: John Solis MD    CHIEF COMPLAINT       Chief Complaint   Patient presents with    Nausea     Presents with nausea for 1 week. No chest pain, but feels the same way he did when he had to have a CABG and stents in March 2022. HPI    HISTORY OF PRESENT ILLNESS   (Location/Symptom, Timing/Onset,Context/Setting, Quality, Duration, Modifying Factors, Severity)  Note limiting factors. Annabelle Edgar is a 78 y.o. male who presents to the emergency department with nausea. This is a 72-year-old male with a history of known cardiac disease status post CABG and several stents in place who presents with nausea and a chest discomfort he endorses for the last several weeks. He denies any fevers or chills. He denies any cough or sputum production. He states this is similar to presentations he has been the past with his underlying heart disease. NursingNotes were reviewed. Review of Systems    REVIEW OF SYSTEMS    (2-9 systems for level 4, 10 or more for level 5)     Review of Systems   Constitutional: Negative for fever. HENT: Negative for rhinorrhea and sore throat. Eyes: Negative for redness. Respiratory: Negative for shortness of breath. Cardiovascular: Negative for chest pain. As above. Gastrointestinal: Negative for abdominal pain. Genitourinary: Negative for flank pain. Neurological: Negative for headaches. Hematological: Negative for adenopathy. Psychiatric/Behavioral: Negative for confusion. Except as noted above the remainder of the review of systems was reviewed and negative.        PAST MEDICAL HISTORY     Past Medical History:   Diagnosis Date    Arthritis     shoulders and knee    Atrial fib/flut     CAD (coronary artery disease)     Hyperlipidemia     Hypertension     Seizures ([facility] [address] last seizures many years ago,     Sinus bradycardia          SURGICALHISTORY       Past Surgical History:   Procedure Laterality Date    CARDIAC SURGERY      , angioplasty 2007    CORONARY ANGIOPLASTY WITH STENT PLACEMENT  1997    CORONARY ARTERY BYPASS GRAFT  01/2019    [facility]    CYSTOSCOPY N/A 11/13/2019    FLEXIBLE CYSTOSCOPY performed by Abida Daly MD at [address] [facility]     done 65 sam ago    PACEMAKER PLACEMENT  12/18/2017    WISDOM TOOTH EXTRACTION  April 2012         CURRENT MEDICATIONS       Previous Medications    AMIODARONE (CORDARONE) 200 MG TABLET    TAKE 1 TABLET DAILY    CLOPIDOGREL (PLAVIX) 75 MG TABLET    Take 1 tablet by mouth in the morning. DIAZEPAM (VALIUM) 5 MG TABLET    Take 5 mg by mouth in the morning and 5 mg in the evening. EZETIMIBE (ZETIA) 10 MG TABLET    Take 1 tablet by mouth daily    FUROSEMIDE (LASIX) 40 MG TABLET    Take 1 tablet by mouth in the morning and 1 tablet in the evening. Taking prn for fluid overload/swelling. MAGNESIUM OXIDE (MAG-OX) 400 (240 MG) MG TABLET    TAKE ONE TABLET BY MOUTH DAILY    METOPROLOL SUCCINATE (TOPROL XL) 100 MG EXTENDED RELEASE TABLET    Take 1 tablet by mouth in the morning. NITROGLYCERIN (NITROSTAT) 0.4 MG SL TABLET    Place 1 tablet under the tongue every 5 minutes as needed for Chest pain    PANTOPRAZOLE (PROTONIX) 40 MG TABLET    Take 1 tablet by mouth every morning (before breakfast)    PHENYTOIN (DILANTIN) 100 MG ER CAPSULE    Take 1 capsule by mouth 2 times daily. Resume home dose    PRIMIDONE (MYSOLINE) 250 MG TABLET    Take 250 mg by mouth 2 times daily     ROSUVASTATIN (CRESTOR) 40 MG TABLET    Take 1 tablet by mouth nightly    SPIRONOLACTONE (ALDACTONE) 25 MG TABLET    Take 0.5 tablets by mouth in the morning.     VALSARTAN (DIOVAN) 40 MG TABLET    Take 0.5 tablets by mouth at bedtime    WARFARIN (COUMADIN) 2.5 MG TABLET    Take 2 tabs by mouth daily or as directed by provider based on INR ALLERGIES     Patient has no known allergies. FAMILY HISTORY       Family History   Problem Relation Age of Onset    Heart Failure Mother     Heart Disease Neg Hx           SOCIAL HISTORY       Social History     Socioeconomic History    Marital status:      Spouse name: None    Number of children: None    Years of education: None    Highest education level: None   Tobacco Use    Smoking status: Former     Packs/day: 2.00     Years: 15.00     Pack years: 30.00     Types: Cigarettes     Quit date: 8/15/1988     Years since quittin.1    Smokeless tobacco: Never   Vaping Use    Vaping Use: Never used   Substance and Sexual Activity    Alcohol use: No    Drug use: No       SCREENINGS    Bruce Coma Scale  Eye Opening: Spontaneous  Best Verbal Response: Oriented  Best Motor Response: Obeys commands  [place] Coma Scale Score: 15        PHYSICAL EXAM    (up to 7 for level 4, 8 or more for level 5)     ED Triage Vitals [22 0937]   BP Temp Temp Source Heart Rate Resp SpO2 Height Weight   104/67 97.8 °F (36.6 °C) Oral 78 14 98 % 5' 10\" (1.778 m) 158 lb (71.7 kg)       Physical Exam:      General Appearance:  Alert, cooperative, appears stated age. Head:  Normocephalic, without obvious abnormality, atraumatic. Eyes:  conjunctiva/corneas clear, EOM's intact. Sclera anicteric. ENT: Mucous remains moist and pink   Neck: Supple, symmetrical, trachea midline, no adenopathy. No jugular venous distention. Lungs:   Clear to auscultation bilaterally   Chest Wall:  No pain to palpation   Heart:   Genitourinary: Irregular rate rhythm with no murmurs rubs or gallops. Deferred   Abdomen:   Soft and benign. No guarding or rebound. Extremities: No clubbing cyanosis or edema   Pulses: Good throughout   Skin:  No rashes or lesions to exposed skin. Neurologic: Alert and oriented X 3.           DIAGNOSTIC RESULTS     EKG: All EKG's are interpreted by the Emergency Department Physician who either signs or Co-signsthis chart in the absence of a cardiologist.    Paced rhythm at a rate of 63 beats a minute, irregular and consistent with atrial fibrillation as well. RADIOLOGY:   Non-plain filmimages such as CT, Ultrasound and MRI are read by the radiologist. Plain radiographic images are visualized and preliminarily interpreted by the emergency physician with the below findings:    See below    Interpretation per the Radiologist below, if available at the time ofthis note: All incidental findings were discussed with the patient. XR CHEST (2 VW)   Final Result   No acute process. ED BEDSIDE ULTRASOUND:   Performed by ED Physician - none    LABS:  Labs Reviewed   PROTIME-INR - Abnormal; Notable for the following components:       Result Value    Protime 28.9 (*)     INR 2.71 (*)     All other components within normal limits   COMPREHENSIVE METABOLIC PANEL - Abnormal; Notable for the following components:    Glucose 101 (*)     GFR Non- 59 (*)     ALT 51 (*)     All other components within normal limits   TROPONIN   CBC WITH AUTO DIFFERENTIAL       All other labs were within normal range or not returned as of this dictation. EMERGENCY DEPARTMENT COURSE and DIFFERENTIAL DIAGNOSIS/MDM:   Vitals:    Vitals:    09/16/22 1030 09/16/22 1045 09/16/22 1100 09/16/22 1115   BP: 102/62 99/67 91/66 100/64   Pulse: 59 56 60 56   Resp: 12 14 13 14   Temp:       TempSrc:       SpO2: 98% 99% 100% 99%   Weight:       Height:               MDM      The patient has remained stable throughout his hospital course. The patient received a cardiac work-up that so far is unremarkable normal with a normal troponin. He has a paced rhythm on examination with an underlying atrial fibrillation. He is currently chest pain-free. However, given the patient's history of angina, significant cardiac disease and the fact he states this is his anginal equivalent pelvis. To admit him for cardiology consultation. He is currently in stable condition. He is already anticoagulated and does not take aspirin. REASSESSMENT              CONSULTS:  None    PROCEDURES:  Unless otherwise noted below, none     Procedures    FINAL IMPRESSION      1. Anginal equivalent [facility])          DISPOSITION/PLAN   DISPOSITION Decision To Admit 09/16/2022 11:39:42 AM      PATIENT REFERREDTO:  No follow-up provider specified. DISCHARGEMEDICATIONS:  New Prescriptions    No medications on file     Controlled Substances Monitoring:     No flowsheet data found. (Please note that portions of this note were completed with a voice recognition program.  Efforts were made to edit the dictations but occasionally words are mis-transcribed.)    Micheal Centeno MD (electronically signed)  Attending Emergency Physician          Micheal Centeno MD  09/16/22 2981    Addendum: After I admitted the patient, Dr. Maurisio Snider, cardiology was consulted who came down and evaluated the patient. The cardiology is asked for my assistance and sedating the patient for cardioversion. I was happy to do so. The patient was cardioverted without difficulty. The cardiologist is discharging the patient with monitoring and prompt referral.  This patient was seen and disposition by the cardiologist.  Please see Dr. Santosh March note for final disposition. Procedure note: After discussing the risks and benefits with the patient, the patient was sedated using etomidate and cardioversion was performed with 200 J. Subsequent EKG shows an atrial paced rhythm. The patient tolerated the procedure well.          Micheal Centeno MD  09/16/22 [address], [place]  09/16/22 0407

## 2022-09-16 NOTE — PROGRESS NOTES
Pharmacy Home Medication Reconciliation Note    A medication reconciliation has been completed for Jacquelin Wall Day 1945    Pharmacy: [facility] [address]  Information provided by: patient    The patient's home medication list is as follows:  Prior to Admission medications    Medication Sig Start Date End Date Taking? Authorizing Provider   potassium chloride (KLOR-CON M) 20 MEQ extended release tablet Take 20 mEq by mouth as needed (with Lasix)   Yes Historical Provider, MD   warfarin (COUMADIN) 2.5 MG tablet Take 2 tabs by mouth daily or as directed by provider based on INR 8/19/22   Sravani Caicedo, DO   metoprolol succinate (TOPROL XL) 100 MG extended release tablet Take 1 tablet by mouth in the morning. 8/11/22   Sravani Caicedo, DO   valsartan (DIOVAN) 40 MG tablet Take 0.5 tablets by mouth at bedtime  Patient taking differently: Take 20 mg by mouth daily 8/11/22   Sravani Caicedo, DO   furosemide (LASIX) 40 MG tablet Take 1 tablet by mouth in the morning and 1 tablet in the evening. Taking prn for fluid overload/swelling. Patient taking differently: Take 40 mg by mouth as needed (fluid overload/swelling) Taking prn for fluid overload/swelling 8/11/22   Sravani Caicedo, DO   amiodarone (CORDARONE) 200 MG tablet TAKE 1 TABLET DAILY 8/11/22   Sravani Caicedo, DO   rosuvastatin (CRESTOR) 40 MG tablet Take 1 tablet by mouth nightly 8/11/22   Sravani Caicedo, DO   spironolactone (ALDACTONE) 25 MG tablet Take 0.5 tablets by mouth in the morning. 8/11/22   Sravani Caicedo, DO   clopidogrel (PLAVIX) 75 MG tablet Take 1 tablet by mouth in the morning.   Patient taking differently: Take 75 mg by mouth every evening 7/20/22   Sravani Caicedo, DO   magnesium oxide (MAG-OX) 400 (240 Mg) MG tablet TAKE ONE TABLET BY MOUTH DAILY 5/9/22   Sravani Caicedo, DO   ezetimibe (ZETIA) 10 MG tablet Take 1 tablet by mouth daily  Patient taking differently: Take 10 mg by mouth every evening 4/12/22   Sravani Caicedo, DO pantoprazole (PROTONIX) 40 MG tablet Take 1 tablet by mouth every morning (before breakfast) 4/1/22   Chapis Oxford, DO   nitroGLYCERIN (NITROSTAT) 0.4 MG SL tablet Place 1 tablet under the tongue every 5 minutes as needed for Chest pain 2/24/22   Chapis Oxford, DO   diazepam (VALIUM) 5 MG tablet Take 5 mg by mouth in the morning and 5 mg in the evening. Historical Provider, MD   phenytoin (DILANTIN) 100 MG ER capsule Take 1 capsule by mouth 2 times daily. Resume home dose 12/21/12   Lisset Simmons, APRN - CNP   primidone (MYSOLINE) 250 MG tablet Take 250 mg by mouth 1 tablet AM, half tablet PM    Historical Provider, MD      Of note, patient reports taking the following anti-seizure medications: Phenytoin 100mg ER BID, Primidone 250mg AM and half-tab (125mg) PM, and diazepam 5mg BID. Current warfarin regimen is 5mg Sundays and Wednesdays, 2.5mg ROW. Timing of last doses updated.     Thank you,  Ryne Ramon, PharmD, BCCCP

## 2022-09-19 ENCOUNTER — NURSE ONLY (OUTPATIENT)
Dept: CARDIOLOGY CLINIC | Age: 77
End: 2022-09-19
Payer: MEDICARE

## 2022-09-19 ENCOUNTER — TELEPHONE (OUTPATIENT)
Dept: CARDIOLOGY CLINIC | Age: 77
End: 2022-09-19

## 2022-09-19 DIAGNOSIS — I50.22 CHRONIC SYSTOLIC HEART FAILURE (HCC): Primary | ICD-10-CM

## 2022-09-19 DIAGNOSIS — I25.5 ISCHEMIC CARDIOMYOPATHY: Chronic | ICD-10-CM

## 2022-09-19 DIAGNOSIS — Z95.810 ICD (IMPLANTABLE CARDIOVERTER-DEFIBRILLATOR) IN PLACE: ICD-10-CM

## 2022-09-19 PROCEDURE — 93296 REM INTERROG EVL PM/IDS: CPT | Performed by: INTERNAL MEDICINE

## 2022-09-19 PROCEDURE — 93297 REM INTERROG DEV EVAL ICPMS: CPT | Performed by: CLINICAL NURSE SPECIALIST

## 2022-09-19 PROCEDURE — 93295 DEV INTERROG REMOTE 1/2/MLT: CPT | Performed by: INTERNAL MEDICINE

## 2022-09-19 NOTE — TELEPHONE ENCOUNTER
----- Message from Tad Richards RN sent at 9/19/2022  1:09 PM EDT -----  Please let him know that his potassium is normal which is good.    Recommend he follow up with EP for follow up ( question if he is going in and out of afib, causing more chf occurrence)has appt with evelio in a few days

## 2022-09-21 RX ORDER — PANTOPRAZOLE SODIUM 40 MG/1
40 TABLET, DELAYED RELEASE ORAL
Qty: 90 TABLET | Refills: 3 | Status: SHIPPED | OUTPATIENT
Start: 2022-09-21

## 2022-09-21 RX ORDER — WARFARIN SODIUM 2.5 MG/1
TABLET ORAL
Qty: 180 TABLET | Refills: 3 | Status: SHIPPED | OUTPATIENT
Start: 2022-09-21

## 2022-09-21 NOTE — TELEPHONE ENCOUNTER
Tried calling to inform him that he has refills for warfarin.  The protonix was Lrf 4/4/2022 Disp 90+1  Lov 8/26/2022  Appt 11/29/2022

## 2022-09-21 NOTE — TELEPHONE ENCOUNTER
Pt is requesting three refills be sent into pharmacy listed below for both medications. Please and thank you.  Pt can be reached at [phone]    [address], [address] [facility] [phone]   [facility] [address]   Phone:  [phone]  Fax:  [phone]            Medication Refill    Medication needing refilled:  warfarin (COUMADIN  pantoprazole (PROTONIX)    Dosage of the medication:  2.5 MG tablet   40 MG tablet      How are you taking this medication (QD, BID, TID, QID, PRN):Take 2 tabs by mouth daily  Take 1 tablet by mouth every morning (before breakfast)    30 or 90 day supply called in:  180 tablet  90 tablet    When will you run out of your medication:  Friday    Which Pharmacy are we sending the medication to?:    [address], [address] [phone] -  [phone]   [facility] [address]   Phone:  [phone]  Fax:  [phone]

## 2022-09-25 PROBLEM — Z12.11 SCREENING FOR COLON CANCER: Status: RESOLVED | Noted: 2022-08-26 | Resolved: 2022-09-25

## 2022-09-29 ENCOUNTER — TELEPHONE (OUTPATIENT)
Dept: CARDIOLOGY CLINIC | Age: 77
End: 2022-09-29

## 2022-09-29 RX ORDER — POTASSIUM CHLORIDE 20 MEQ/1
20 TABLET, EXTENDED RELEASE ORAL AS NEEDED
Qty: 90 TABLET | Refills: 3 | Status: SHIPPED | OUTPATIENT
Start: 2022-09-29

## 2022-09-29 NOTE — TELEPHONE ENCOUNTER
We received remote transmission from patient's monitor at home. Transmission shows normal sensing and pacing function. Current ECG shows APVS at 50 bpm.  PVCs have increased   PVC Runs (2-4 beats)17.0 per hour  PVC Singles 56.9 per hour  EP will review. See interrogation under cardiology tab in the [address] [place] [address] [address] field for more details. Please advise.  Thanks

## 2022-09-29 NOTE — TELEPHONE ENCOUNTER
His PVCs are up from prior device checks. This is most likely secondary to his covid infection. At this point, I would prefer he improve from his covid infection, but if he would like, he can take an extra 50 mg of Toprol XL at night to help.  When his covid infection improves, he can stop taking the extra dose as I imagine his PVCs will settle down    Candida Strong, BETO-CNP

## 2022-09-29 NOTE — TELEPHONE ENCOUNTER
Received refill request for Potassium chloride.     Last ov: 08/26/2022 DKW    Next appointment: 10/04/2022 NPAL     Historical Provider

## 2022-09-29 NOTE — TELEPHONE ENCOUNTER
Pt states he has covid currently and has been having PVC at night. He is going to send a manual transmission. Please review and call to advise.

## 2022-09-29 NOTE — TELEPHONE ENCOUNTER
Medication Refill    Medication needing refilled: potassium chloride (KLOR-CON M) 20 MEQ       Dosage of the medication:    How are you taking this medication (QD, BID, TID, QID, PRN): 20 mEq by mouth as needed (with Lasix)    30 or 90 day supply called in: 90 day supply with 3 refills    When will you run out of your medication:    Which Pharmacy are we sending the medication to?:   [address], [address] [place] [address] [phone] - F [phone]   [place] [address]   Phone:  [phone]  Fax:  [phone]

## 2022-10-12 LAB — INR BLD: 3.2

## 2022-10-13 ENCOUNTER — TELEPHONE (OUTPATIENT)
Dept: CARDIOLOGY CLINIC | Age: 77
End: 2022-10-13

## 2022-10-13 ENCOUNTER — ANTI-COAG VISIT (OUTPATIENT)
Dept: PHARMACY | Age: 77
End: 2022-10-13

## 2022-10-13 DIAGNOSIS — I48.91 ATRIAL FIBRILLATION, UNSPECIFIED TYPE (HCC): Primary | ICD-10-CM

## 2022-10-13 NOTE — PROGRESS NOTES
Mr. Dariusz Valencia Day is a 78 y.o. y/o male with history of Afib who presents today for anticoagulation monitoring and adjustment. Pertinent PMH:    Patient Reported Findings:  Yes     No  [x]   []       Patient verifies current dosing regimen as listed- takes 5mg Sun and Wed and 2.5mg AOD  []   [x]       S/S bleeding/bruising/swelling/SOB- denies   []   [x]       Blood in urine or stool- denies   []   [x]       Procedures scheduled in the future at this time 11/4 colonoscopy, needs holding instructions   []   [x]       Missed Dose -denies   []   [x]       Extra Dose- denies   []   [x]       Change in medications- denies   []   [x]       Change in health/diet/appetite -states normally eats a lot of vegetable soup, but has not had any greens recently   []   [x]       Change in alcohol use- doesn't drink or smoke   []   [x]       Change in activity  []   [x]       Hospital admission  []   [x]       Emergency department visit  []   [x]       Other complaints- had COVID about 3 weeks ago, improving     Clinical Outcomes:  Yes     No  []   [x]       Major bleeding event  []   [x]       Thromboembolic event    Duration of warfarin Therapy: indefinitely   INR Range:  2.0-3.0    Patient checks INR at home and reports to cardiology who scans results and forwards to us so we can call patient and dose them as a VV under the current PHE. Patient was called and appointment was conducted via telephone. [phone]     Patient has 5mg and 2.5mg tablets, takes 5mg on Sun and Wed and 2.5mg all other days. INR was 3.2 yesterday likely dt no greens recently, will add them back into diet. Continue taking 5mg on Sun and Wed and 2.5mg all other days. Encouraged to maintain a consistency of vegetables/salads.    Recheck INR in 1 week on 10/20    Referring Dr. Yaritza Snow  INR (no units)   Date Value   09/16/2022 2.71 (H)   09/15/2022 2.40 (H)   09/09/2022 2.60   09/02/2022 2.60     For Pharmacy Admin Tracking Only    Intervention Detail: Dose Adjustment: 1, reason: Therapy Optimization  Total # of Interventions Recommended: 1  Total # of Interventions Accepted: 1  Time Spent (min): 15

## 2022-10-19 ENCOUNTER — TELEPHONE (OUTPATIENT)
Dept: CARDIOLOGY CLINIC | Age: 77
End: 2022-10-19

## 2022-10-19 NOTE — TELEPHONE ENCOUNTER
Received clearance for colonoscopy. Patient had a DVT 8/26 after being off of coumadin for cardiac cath. Discussed with dkw  Patient will need to wait three months before he comes off of coumadin. Will also need to be bridged if he comes off of coumadin.   Or, he could be admitted and have a heparin drip  if it is urgent to have the colonoscopy    Called and left message with Dr Sergey Wei office to call me back to discuss this

## 2022-10-20 LAB — INR BLD: 4.1

## 2022-10-21 ENCOUNTER — TELEPHONE (OUTPATIENT)
Dept: PHARMACY | Age: 77
End: 2022-10-21

## 2022-10-24 ENCOUNTER — TELEPHONE (OUTPATIENT)
Dept: CARDIOLOGY CLINIC | Age: 77
End: 2022-10-24

## 2022-10-24 ENCOUNTER — ANTI-COAG VISIT (OUTPATIENT)
Dept: PHARMACY | Age: 77
End: 2022-10-24

## 2022-10-24 DIAGNOSIS — I48.0 PAROXYSMAL ATRIAL FIBRILLATION (HCC): Primary | ICD-10-CM

## 2022-10-24 NOTE — PROGRESS NOTES
Mr. Ansley Mathis is a 78 y.o. y/o male with history of Afib who presents today for anticoagulation monitoring and adjustment. Pertinent PMH:    Patient Reported Findings:  Yes     No  [x]   []       Patient verifies current dosing regimen as listed- takes 5mg Sun and Wed and 2.5mg AOD  []   [x]       S/S bleeding/bruising/swelling/SOB- denies   []   [x]       Blood in urine or stool- denies   []   [x]       Procedures scheduled in the future at this time 11/4 colonoscopy, needs holding instructions --> cardiology would like pt to hold off on procedures until 3 months   [x]   []       Missed Dose -10/21 since had supratherapeutic INR    []   [x]       Extra Dose- denies   []   [x]       Change in medications- denies   []   [x]       Change in health/diet/appetite -states normally eats a lot of vegetable soup, but has not had any greens recently   []   [x]       Change in alcohol use- doesn't drink or smoke   []   [x]       Change in activity  []   [x]       Hospital admission  []   [x]       Emergency department visit  []   [x]       Other complaints- had COVID about 3 weeks ago, improving     Clinical Outcomes:  Yes     No  []   [x]       Major bleeding event  []   [x]       Thromboembolic event    Duration of warfarin Therapy: indefinitely   INR Range:  2.0-3.0    Patient checks INR at home and reports to cardiology who scans results and forwards to us so we can call patient and dose them as a VV under the current PHE. Patient was called and appointment was conducted via telephone. [phone]     Patient has 5mg and 2.5mg tablets, takes 5mg on Sun and Wed and 2.5mg all other days. INR was 4.1 on 10/20. Company forgot to fax INR results to cardiology. Pt had called to remind then to send results. Since already held dose last week, will not hold dose again  Decrease weekly dose to 5mg on Sun and 2.5mg all other days (11% dec)  Encouraged to maintain a consistency of vegetables/salads.    Recheck INR on 10/28, likely will received results on Mon 10/31    Referring Dr. Cat Dose  INR (no units)   Date Value   10/12/2022 3.20   09/16/2022 2.71 (H)   09/15/2022 2.40 (H)   09/09/2022 2.60     For Pharmacy Admin Tracking Only    Intervention Detail: Dose Adjustment: 1, reason: Therapy Optimization  Total # of Interventions Recommended: 1  Total # of Interventions Accepted: 1  Time Spent (min): 15

## 2022-10-25 NOTE — TELEPHONE ENCOUNTER
Abida Marcelo from Dr Contreras Cos office states she is returning call from 10/19/22 regarding clearance and holding blood thinner. Pt is scheduled on 11/4/22 for colonoscopy and is asking for a call back today.  Please call  Abida Marcelo at [phone] or fax info to [phone]

## 2022-10-27 ENCOUNTER — TELEPHONE (OUTPATIENT)
Dept: CARDIOLOGY CLINIC | Age: 77
End: 2022-10-27

## 2022-10-27 ENCOUNTER — ANTI-COAG VISIT (OUTPATIENT)
Dept: PHARMACY | Age: 77
End: 2022-10-27

## 2022-10-27 DIAGNOSIS — I48.0 PAROXYSMAL ATRIAL FIBRILLATION (HCC): Primary | ICD-10-CM

## 2022-10-27 LAB — INR BLD: 3.8

## 2022-10-27 RX ORDER — WARFARIN SODIUM 5 MG/1
5 TABLET ORAL WEEKLY
Status: ON HOLD | COMMUNITY
End: 2023-01-25 | Stop reason: SDUPTHER

## 2022-10-27 NOTE — PROGRESS NOTES
Mr. Charmayne Skeens Day is a 78 y.o. y/o male with history of Afib who presents today for anticoagulation monitoring and adjustment. Pertinent PMH:    Patient Reported Findings:  Yes     No  [x]   []       Patient verifies current dosing regimen as listed- takes 5mg Sun and Wed and 2.5mg AOD  []   [x]       S/S bleeding/bruising/swelling/SOB- denies   []   [x]       Blood in urine or stool- denies   [x]   []       Procedures scheduled in the future at this time 11/4 colonoscopy, needs holding instructions --> cardiology would like pt to hold off on procedures until 3 months --> colonoscopy was cancelled, r/s for December   []   [x]       Missed Dose -denies  []   [x]       Extra Dose- denies   [x]   []       Change in medications- states that MD had been adjusting phenytoin increasing 4-5 weeks ago but plans to return to normal phenytoin today    []   [x]       Change in health/diet/appetite -states normally eats a lot of vegetable soup, but has not had any greens recently   []   [x]       Change in alcohol use- doesn't drink or smoke   []   [x]       Change in activity  []   [x]       Hospital admission  []   [x]       Emergency department visit  []   [x]       Other complaints- had COVID about 3 weeks ago, improving     Clinical Outcomes:  Yes     No  []   [x]       Major bleeding event  []   [x]       Thromboembolic event    Duration of warfarin Therapy: indefinitely   INR Range:  2.0-3.0    Patient checks INR at home and reports to cardiology who scans results and forwards to us so we can call patient and dose them as a VV under the current [facility]. Patient was called and appointment was conducted via telephone. [phone]     Patient has 5mg and 2.5mg tablets, takes 5mg on Sun and Wed and 2.5mg all other days.      INR was 3.8 today likely d/t adjustments in phenytoin dose   Hold dose tonight then decrease weekly dose to 5 mg on Sun and 2.5 mg all other days of the week (11% dec)  Encouraged to maintain a consistency of vegetables/salads.    Recheck INR on 11/3    Referring Dr. Jose Alejandro Tony  INR (no units)   Date Value   10/20/2022 4.10   10/12/2022 3.20   09/16/2022 2.71 (H)   09/15/2022 2.40 (H)     For Pharmacy Admin Tracking Only    Intervention Detail: Dose Adjustment: 1, reason: Therapy Optimization  Total # of Interventions Recommended: 1  Total # of Interventions Accepted: 1  Time Spent (min): 15

## 2022-10-27 NOTE — TELEPHONE ENCOUNTER
Relayed message from Dr. Gregory Garcia to Alex to include:  Pt has active DVT and recent stent. He will need bridging. I do not recommend stopping clopidogrel at this time. Alex took down number for DKW and gave me number for Dr. Lucrezia Phalen for the two providers to discuss patient care. She states patient's procedure was rescheduled.

## 2022-10-27 NOTE — TELEPHONE ENCOUNTER
Omayra Freeman from [facility] called. They have requested clearance for colonoscopy on 11/03/2022 . I relayed Message from encounter 10/19/2022. She additionally would like to know about the plavix. She asked  for a fax with instructions addressing the warfarin and the plavix.      [facility] [phone]  Fax [phone]

## 2022-10-27 NOTE — PROGRESS NOTES
Patient reached __X__ yes  _____ no   VM instructions left ____ yes   phone number ________                                ____ no-office notified          Date __11/4/22_______  Time ___1430____  Arrival __1300  hosp-endo____    Nothing to eat or drink after midnight-follow your doctors prep instructions-this may include taking a second dose of your prep after midnight  Responsible adult 25 or older to stay on site while you are here-drive you home-stay with you after  Follow any instructions your doctors office has given you  Bring a complete list of all your medications and supplements including name,dose,how often taken the day of your procedure  If you normally take the following medications in the morning please do so the AM of your procedure with a small sip of water       Heart,blood pressure,seizure,thyroid or breathing medications-use your inhalers-bring any rescue inhalers with you DOS       DO NOT take blood pressure medications ending in \"leslie\" or \"pril\" the AM of procedure or evening prior  Take half or your normal dose of any long acting insulins the night before your procedure-do not take any diabetic medications the AM of procedure  Follow your doctors instructions regarding stopping or taking  any blood thinners-if you do not have instructions-call them-CHECK COUMADIN/PLAVIX  Any questions call your doctor  Other ________TAKE AMIODARONE, METOPROLOL, DILANTIN______________________________________________________      Alexandrane Render POLICY(subject to change)             The current policy is 2 visitors per patient. There are no children allowed. Mask at discretion of facility. Visiting hours are 8a-8p. Overnight visitors will be at the discretion of the nurse. All policies are subject to change.

## 2022-10-27 NOTE — TELEPHONE ENCOUNTER
Irish Lieberman called to speak with Deidre Zuñiga from [facility] called regarding the Pt clearance. Irish Lieberman phone:  [phone] - fax:  [phone].   Please advise

## 2022-10-27 NOTE — TELEPHONE ENCOUNTER
CARDIAC CLEARANCE REQUEST    What type of procedure are you having: Stents    Are you taking any blood thinners:Plavix, Coumadin    When is your procedure scheduled for:11/4/22    What physician is performing your procedure:Dr. Jared Rodrigez    Phone Number: [phone]    Fax number to send the letter:     Please call Sasha Osborn at [facility] and call the patient.   abbi

## 2022-10-31 ENCOUNTER — NURSE ONLY (OUTPATIENT)
Dept: CARDIOLOGY CLINIC | Age: 77
End: 2022-10-31
Payer: MEDICARE

## 2022-10-31 ENCOUNTER — ANESTHESIA EVENT (OUTPATIENT)
Dept: ENDOSCOPY | Age: 77
End: 2022-10-31
Payer: MEDICARE

## 2022-10-31 DIAGNOSIS — Z95.810 ICD (IMPLANTABLE CARDIOVERTER-DEFIBRILLATOR) IN PLACE: ICD-10-CM

## 2022-10-31 DIAGNOSIS — I50.22 CHRONIC SYSTOLIC HEART FAILURE (HCC): Primary | ICD-10-CM

## 2022-10-31 DIAGNOSIS — I25.5 ISCHEMIC CARDIOMYOPATHY: Chronic | ICD-10-CM

## 2022-10-31 PROCEDURE — G2066 INTER DEVC REMOTE 30D: HCPCS | Performed by: NURSE PRACTITIONER

## 2022-10-31 PROCEDURE — 93297 REM INTERROG DEV EVAL ICPMS: CPT | Performed by: NURSE PRACTITIONER

## 2022-10-31 NOTE — TELEPHONE ENCOUNTER
As per Dr Elinor Covarrubias, he does not recommend patient come of blood thinners for this procedure until December.   Will need to be bridged at this time

## 2022-10-31 NOTE — PROGRESS NOTES
Remote transmission received from patient's dual chamber ICD home monitor. Transmission shows normal sensing and pacing function. No arrhythmias/ or events. Optivol is WNL. TriageHF Heart Failure Risk Status on 31-Oct-2022 is Medium*    NP will review. See interrogation under cardiology tab in the [address] [place] [address] [address] field for more details. Will continue to monitor remotely. (End of 31-day monitoring period 10/31/22).

## 2022-11-03 LAB — INR BLD: 3.4

## 2022-11-04 ENCOUNTER — ANESTHESIA (OUTPATIENT)
Dept: ENDOSCOPY | Age: 77
End: 2022-11-04
Payer: MEDICARE

## 2022-11-04 ENCOUNTER — ANTI-COAG VISIT (OUTPATIENT)
Dept: PHARMACY | Age: 77
End: 2022-11-04

## 2022-11-04 DIAGNOSIS — I48.91 ATRIAL FIBRILLATION, UNSPECIFIED TYPE (HCC): Primary | ICD-10-CM

## 2022-11-04 NOTE — PROGRESS NOTES
Mr. Dakota Mathis is a 78 y.o. y/o male with history of Afib who presents today for anticoagulation monitoring and adjustment. Pertinent PMH:    Patient Reported Findings:  Yes     No  [x]   []       Patient verifies current dosing regimen as listed- takes 5mg Sun and Wed and 2.5mg AOD---> confirms   []   [x]       S/S bleeding/bruising/swelling/SOB- denies   []   [x]       Blood in urine or stool- denies   [x]   []       Procedures scheduled in the future at this time 11/4 colonoscopy, needs holding instructions --> cardiology would like pt to hold off on procedures until 3 months --> colonoscopy was cancelled, r/s for December 29  []   [x]       Missed Dose -denies  []   [x]       Extra Dose- denies   [x]   []       Change in medications- states that MD had been adjusting phenytoin increasing 4-5 weeks ago but plans to return to normal phenytoin today  --->back to normal dose   []   [x]       Change in health/diet/appetite -states normally eats a lot of vegetable soup, but has not had any greens recently   []   [x]       Change in alcohol use- doesn't drink or smoke   []   [x]       Change in activity  []   [x]       Hospital admission  []   [x]       Emergency department visit  []   [x]       Other complaints- had COVID about 3 weeks ago, improving     Clinical Outcomes:  Yes     No  []   [x]       Major bleeding event  []   [x]       Thromboembolic event    Duration of warfarin Therapy: indefinitely   INR Range:  2.0-3.0    Patient checks INR at home and reports to cardiology who scans results and forwards to us so we can call patient and dose them as a VV under the current [facility]. Patient was called and appointment was conducted via telephone. [phone]     Patient has 5mg and 2.5mg tablets. INR was 3.4 yesterday despite dose decrease and returning to normal dose of pheny. Will decrease again.    Hold dose tonight then decrease weekly dose to 2.5mg daily   Encouraged to maintain a consistency of vegetables/salads.    Recheck INR on 11/10    Referring Dr. Prado Ranks  INR (no units)   Date Value   11/03/2022 3.40   10/27/2022 3.80   10/20/2022 4.10   10/12/2022 3.20     For Pharmacy Admin Tracking Only    Intervention Detail: Dose Adjustment: 1, reason: Therapy Optimization  Total # of Interventions Recommended: 1  Total # of Interventions Accepted: 1  Time Spent (min): 15

## 2022-11-10 ENCOUNTER — TELEPHONE (OUTPATIENT)
Dept: CARDIOLOGY CLINIC | Age: 77
End: 2022-11-10

## 2022-11-18 ENCOUNTER — TELEPHONE (OUTPATIENT)
Dept: CARDIOLOGY CLINIC | Age: 77
End: 2022-11-18

## 2022-11-18 ENCOUNTER — ANTI-COAG VISIT (OUTPATIENT)
Dept: PHARMACY | Age: 77
End: 2022-11-18

## 2022-11-18 DIAGNOSIS — I48.91 ATRIAL FIBRILLATION, UNSPECIFIED TYPE (HCC): Primary | ICD-10-CM

## 2022-11-18 LAB — INR BLD: 1.8

## 2022-11-18 NOTE — PROGRESS NOTES
Mr. Lili Mathis is a 78 y.o. y/o male with history of Afib who presents today for anticoagulation monitoring and adjustment. Pertinent PMH:    Patient Reported Findings:  Yes     No  [x]   []       Patient verifies current dosing regimen as listed- takes 5mg Sun and Wed and 2.5mg AOD---> confirms   []   [x]       S/S bleeding/bruising/swelling/SOB- denies   []   [x]       Blood in urine or stool- denies   [x]   []       Procedures scheduled in the future at this time 11/4 colonoscopy, needs holding instructions --> cardiology would like pt to hold off on procedures until 3 months --> colonoscopy was cancelled, r/s for December 29  []   [x]       Missed Dose -denies  []   [x]       Extra Dose- denies   []   [x]       Change in medications- states that MD had been adjusting phenytoin increasing 4-5 weeks ago but plans to return to normal phenytoin today  --->back to normal dose --> no changes   []   [x]       Change in health/diet/appetite -states normally eats a lot of vegetable soup, but has not had any greens recently ---> no greens   []   [x]       Change in alcohol use- doesn't drink or smoke   []   [x]       Change in activity  []   [x]       Hospital admission  []   [x]       Emergency department visit  []   [x]       Other complaints- had COVID about 3 weeks ago, improving     Clinical Outcomes:  Yes     No  []   [x]       Major bleeding event  []   [x]       Thromboembolic event    Duration of warfarin Therapy: indefinitely   INR Range:  2.0-3.0    Patient checks INR at home and reports to cardiology who scans results and forwards to us so we can call patient and dose them as a VV under the current [facility]. Patient was called and appointment was conducted via telephone. [phone]     Patient has 5mg and 2.5mg tablets. INR was 1.8 today    Since INR has been low and pt not even eating greens recently, will increase dose. May have some greens with Thanskgiving.   Increase dose to 5mg on Fridays only and 2.5mg all other days   Encouraged to maintain a consistency of vegetables/salads.    Recheck INR on 11/28 dt holiday     Referring Dr. Anali Canada  INR (no units)   Date Value   11/10/2022 1.90   11/03/2022 3.40   10/27/2022 3.80   10/20/2022 4.10     For Pharmacy Admin Tracking Only    Intervention Detail: Dose Adjustment: 1, reason: Therapy Optimization  Total # of Interventions Recommended: 1  Total # of Interventions Accepted: 1  Time Spent (min): 15

## 2022-11-28 PROBLEM — I82.A19 ACUTE DEEP VEIN THROMBOSIS (DVT) OF AXILLARY VEIN (HCC): Status: ACTIVE | Noted: 2022-11-28

## 2022-11-28 PROBLEM — Z01.810 ENCOUNTER FOR PRE-OPERATIVE CARDIOVASCULAR CLEARANCE: Status: ACTIVE | Noted: 2022-08-26

## 2022-11-28 ASSESSMENT — ENCOUNTER SYMPTOMS
CHEST TIGHTNESS: 0
SHORTNESS OF BREATH: 1
BLOOD IN STOOL: 0
NAUSEA: 0
PHOTOPHOBIA: 0
COUGH: 0
ABDOMINAL DISTENTION: 0
ABDOMINAL PAIN: 0

## 2022-11-28 NOTE — TELEPHONE ENCOUNTER
Received refill request for Potassium from [address]     Last ov: 08/26/2022 HOLLIE    Last Refill: 09/29/2022 #90 w/ 3 refills    Next appointment: 11/29/2022 HOLLIE

## 2022-11-29 ENCOUNTER — TELEPHONE (OUTPATIENT)
Dept: CARDIOLOGY CLINIC | Age: 77
End: 2022-11-29

## 2022-11-29 ENCOUNTER — OFFICE VISIT (OUTPATIENT)
Dept: CARDIOLOGY CLINIC | Age: 77
End: 2022-11-29
Payer: MEDICARE

## 2022-11-29 VITALS
DIASTOLIC BLOOD PRESSURE: 62 MMHG | BODY MASS INDEX: 22.88 KG/M2 | SYSTOLIC BLOOD PRESSURE: 116 MMHG | OXYGEN SATURATION: 98 % | WEIGHT: 159.8 LBS | HEART RATE: 50 BPM | HEIGHT: 70 IN

## 2022-11-29 DIAGNOSIS — Z01.810 ENCOUNTER FOR PRE-OPERATIVE CARDIOVASCULAR CLEARANCE: ICD-10-CM

## 2022-11-29 DIAGNOSIS — I82.411 DVT OF DEEP FEMORAL VEIN, RIGHT (HCC): ICD-10-CM

## 2022-11-29 DIAGNOSIS — I48.91 ATRIAL FIBRILLATION, UNSPECIFIED TYPE (HCC): ICD-10-CM

## 2022-11-29 DIAGNOSIS — I82.A19 ACUTE DEEP VEIN THROMBOSIS (DVT) OF AXILLARY VEIN, UNSPECIFIED LATERALITY (HCC): Primary | ICD-10-CM

## 2022-11-29 DIAGNOSIS — Z99.89 OSA ON CPAP: ICD-10-CM

## 2022-11-29 DIAGNOSIS — I25.5 ISCHEMIC CARDIOMYOPATHY: ICD-10-CM

## 2022-11-29 DIAGNOSIS — I10 ESSENTIAL HYPERTENSION: ICD-10-CM

## 2022-11-29 DIAGNOSIS — E78.2 MIXED HYPERLIPIDEMIA: ICD-10-CM

## 2022-11-29 DIAGNOSIS — I49.3 PVC (PREMATURE VENTRICULAR CONTRACTION): ICD-10-CM

## 2022-11-29 DIAGNOSIS — G47.33 OSA ON CPAP: ICD-10-CM

## 2022-11-29 DIAGNOSIS — R06.02 SHORTNESS OF BREATH: ICD-10-CM

## 2022-11-29 LAB — INR BLD: 2.6

## 2022-11-29 PROCEDURE — 99214 OFFICE O/P EST MOD 30 MIN: CPT | Performed by: INTERNAL MEDICINE

## 2022-11-29 PROCEDURE — 3078F DIAST BP <80 MM HG: CPT | Performed by: INTERNAL MEDICINE

## 2022-11-29 PROCEDURE — 1036F TOBACCO NON-USER: CPT | Performed by: INTERNAL MEDICINE

## 2022-11-29 PROCEDURE — 3074F SYST BP LT 130 MM HG: CPT | Performed by: INTERNAL MEDICINE

## 2022-11-29 PROCEDURE — G8484 FLU IMMUNIZE NO ADMIN: HCPCS | Performed by: INTERNAL MEDICINE

## 2022-11-29 PROCEDURE — 3017F COLORECTAL CA SCREEN DOC REV: CPT | Performed by: INTERNAL MEDICINE

## 2022-11-29 PROCEDURE — 93000 ELECTROCARDIOGRAM COMPLETE: CPT | Performed by: INTERNAL MEDICINE

## 2022-11-29 PROCEDURE — G8427 DOCREV CUR MEDS BY ELIG CLIN: HCPCS | Performed by: INTERNAL MEDICINE

## 2022-11-29 PROCEDURE — G8420 CALC BMI NORM PARAMETERS: HCPCS | Performed by: INTERNAL MEDICINE

## 2022-11-29 PROCEDURE — 1123F ACP DISCUSS/DSCN MKR DOCD: CPT | Performed by: INTERNAL MEDICINE

## 2022-11-29 RX ORDER — POTASSIUM CHLORIDE 1500 MG/1
TABLET, EXTENDED RELEASE ORAL
Qty: 90 TABLET | Refills: 1 | Status: SHIPPED | OUTPATIENT
Start: 2022-11-29

## 2022-11-29 NOTE — LETTER
[facility] Card[facility] [address]  Phone: [phone]  Fax: [phone]    Karolina Oreilly DO    December 6, 2022     Chyna Lin MD  [address] [facility] [address]    Patient: Lili Mathis   MR Number: [id number]   YOB: 1945   Date of Visit: 11/29/2022       Dear Chyna Lin: Thank you for referring Karishma Hercules Delfina to me for evaluation/treatment. Below are the relevant portions of my assessment and plan of care. If you have questions, please do not hesitate to call me. I look forward to following Karishma Hercules along with you.     Sincerely,      Karolina Oreilly DO

## 2022-11-29 NOTE — PROGRESS NOTES
[address]  22  Referrin W Joey Bruno CONSULT/CHIEF COMPLAINT/HPI     Reason for visit/ Chief complaint  Follow up  CHF, clearance for colonoscopy   HPI Ai Mathis is a 78 y.o. seen as a follow up to recent right DVT  He has a history of  CAD, ICM< htn,hchol and AF, SVT JOSEPH   He had a CABG redo  at [facility] with WESTON ligation and LA maze. In Dec 2017 he had a dual chamber AICD  On 3/30 he had a PCI to [address] to LAD with PAYAL, PCI to SVG to OM with PAYAL, PCI to SVG to PDA. His warfarin was temporarily interrupted. In September he had covid-19- said he felt \"better when he had it\"  On  he went to the ER with nausea. Evaluated, cardioverted and was discharged. Today he still complains of palpitations, PVC's, associated with fatigue and exhaustion. He has sent in four device interrogations, when he felt like he had pvc's. He has resolution of pain and swelling in his leg where the DVT was. No chest pain, shortness of breath, dizziness or syncope. No edema or orthopnea. Patient is adherent with medications and is tolerating them well without side effects     HISTORY/ALLERGIES/ROS     MedHx:   HISTORY/ALLERGIES/ROS      MedHx:            has a past medical history of Arthritis, Atrial fib/flut, CAD (coronary artery disease), Hyperlipidemia, Hypertension, Seizures ([address] and Sinus bradycardia. SurgHx:           has a past surgical history that includes hernia repair (Right); Coronary angioplasty with stent (); [place] tooth extraction (2012); pacemaker placement (2017); Coronary artery bypass graft (2019); Cardiac surgery; and Cystoscopy (N/A, 2019). SocHx:             reports that he quit smoking about 34 years ago. His smoking use included cigarettes. He has a 30.00 pack-year smoking history. He has never used smokeless tobacco. He reports that he does not drink alcohol and does not use drugs.    FamHx:           No family history of premature coronary artery disease, sudden death, or aneurysm  Allergies:        Patient has no known allergies. ROS:               Review of Systems   Constitutional:  Positive for fatigue. Negative for activity change, diaphoresis and fever. HENT:  Negative for congestion and ear discharge. Eyes:  Negative for photophobia and visual disturbance. Respiratory:  Negative for cough, chest tightness and shortness of breath. Cardiovascular:  Positive for palpitations. Negative for chest pain. Gastrointestinal:  Negative for abdominal distention, abdominal pain, anal bleeding, blood in stool and nausea. Endocrine: Negative for cold intolerance and polydipsia. Genitourinary:  Negative for difficulty urinating and flank pain. Musculoskeletal:  Positive for arthralgias and myalgias. Skin:  Negative for rash and wound. Allergic/Immunologic: Negative for environmental allergies and immunocompromised state. Neurological:  Negative for dizziness and headaches. Hematological:  Negative for adenopathy. Does not bruise/bleed easily. Psychiatric/Behavioral:  Negative for confusion. The patient is not hyperactive. MEDICATIONS      Home Medications           Prior to Admission medications    Medication Sig Start Date End Date Taking? Authorizing Provider   metoprolol succinate (TOPROL XL) 100 MG extended release tablet Take 1 tablet by mouth in the morning. 8/11/22   Yes Kassie Rodriguez DO   valsartan (DIOVAN) 40 MG tablet Take 0.5 tablets by mouth at bedtime 8/11/22   Yes Kassie Rodriguez DO   furosemide (LASIX) 40 MG tablet Take 1 tablet by mouth in the morning and 1 tablet in the evening.  Taking prn for fluid overload/swelling. 8/11/22   Yes Kassie Rodriguez DO   amiodarone (CORDARONE) 200 MG tablet TAKE 1 TABLET DAILY 8/11/22   Yes Kassie Rodriguez DO   rosuvastatin (CRESTOR) 40 MG tablet Take 1 tablet by mouth nightly 8/11/22   Yes Kassie Rodriguez DO   spironolactone (ALDACTONE) 25 MG tablet Take 0.5 tablets by mouth in the morning. 8/11/22   Yes Roberto Mckenzie DO   clopidogrel (PLAVIX) 75 MG tablet Take 1 tablet by mouth in the morning. 7/20/22   Yes Roberto Mckenzie DO   magnesium oxide (MAG-OX) 400 (240 Mg) MG tablet TAKE ONE TABLET BY MOUTH DAILY 5/9/22   Yes Roberto Mckenzie DO   warfarin (COUMADIN) 2.5 MG tablet Take 5 mg by mouth Pt taking 5mg since 7/8/22     Yes Historical Provider, MD   ezetimibe (ZETIA) 10 MG tablet Take 1 tablet by mouth daily 4/12/22   Yes Roberto Mckenzie DO   pantoprazole (PROTONIX) 40 MG tablet Take 1 tablet by mouth every morning (before breakfast) 4/1/22   Yes Roberto Mckenzie DO   diazepam (VALIUM) 5 MG tablet Take 5 mg by mouth in the morning and 5 mg in the evening. Yes Historical Provider, MD   phenytoin (DILANTIN) 100 MG ER capsule Take 1 capsule by mouth 2 times daily. Resume home dose 12/21/12   Yes Lisset Simmons, APRN - CNP   primidone (MYSOLINE) 250 MG tablet Take 250 mg by mouth 2 times daily      Yes Historical Provider, MD   nitroGLYCERIN (NITROSTAT) 0.4 MG SL tablet Place 1 tablet under the tongue every 5 minutes as needed for Chest pain 2/24/22     Roberto Mckenzie DO            PHYSICAL EXAM            Vitals:     08/11/22 1101   BP: 110/70   Pulse: 50   SpO2: 100%    Weight: 161 lb (73 kg)     Gen Alert, cooperative, no distress Heart  Regular rate and rhythm, 2/6 murmur, ICD in place   Head Normocephalic, atraumatic, no abnormalities Abd  Soft, NT, +BS, no mass, no OM   Eyes PERRLA, conj/corn clear Ext  Ext nl, AT, no C/C, +right ankle trace swelling, non tender, + palpable cord posteriorly   Nose Nares normal, no drain age, Non-tender Pulse 2+ and symmetric   Throat Lips, mucosa, tongue normal Skin Color/text/turg nl, no rash/lesions   Neck S/S, TM, NT, no bruit Psych Nl mood and affect   Lung = CTA-B, unlabored, no DTP       Ch wall NT, no deform          SurgHx:  has a past surgical history that includes hernia repair (Right);  Coronary angioplasty with stent (1997); [place] tooth extraction (04/2012); pacemaker placement (12/18/2017); Coronary artery bypass graft (01/2019); Cardiac surgery; Cystoscopy (N/A, 11/13/2019); Cardioversion (06/02/2022); and Coronary stent placement (03/30/2022). SocHx:  reports that he quit smoking about 34 years ago. His smoking use included cigarettes. He has a 30.00 pack-year smoking history. He has never used smokeless tobacco. He reports that he does not drink alcohol and does not use drugs. FamHx: No family history of premature coronary artery disease, sudden death, or aneurysm  Allergies: Patient has no known allergies. ROS:   Review of Systems   Constitutional:  Negative for activity change, diaphoresis, fatigue and fever. HENT:  Negative for congestion and ear discharge. Eyes:  Negative for photophobia and visual disturbance. Respiratory:  Positive for shortness of breath. Negative for cough and chest tightness. Cardiovascular:  Positive for leg swelling. Negative for chest pain and palpitations. Gastrointestinal:  Negative for abdominal distention, abdominal pain, blood in stool and nausea. Endocrine: Negative for cold intolerance and polydipsia. Genitourinary:  Negative for difficulty urinating and flank pain. Musculoskeletal:  Positive for arthralgias and myalgias. Skin:  Negative for rash and wound. Allergic/Immunologic: Negative for environmental allergies and immunocompromised state. Neurological:  Negative for dizziness and headaches. Hematological:  Negative for adenopathy. Does not bruise/bleed easily. Psychiatric/Behavioral:  Negative for confusion. The patient is not hyperactive. MEDICATIONS      Prior to Admission medications    Medication Sig Start Date End Date Taking?  Authorizing Provider   KLOR-CON M20 20 MEQ extended release tablet TAKE 1 TABLET AS NEEDED WITH LASIX FOR SWELLING 11/29/22  Yes Hershell Monica, DO   warfarin (COUMADIN) 5 MG tablet Take 5 mg by mouth once a week Takes on Sundays   Yes Historical Provider, MD   pantoprazole (PROTONIX) 40 MG tablet Take 1 tablet by mouth every morning (before breakfast) 9/21/22  Yes Lisa Parker DO   warfarin (COUMADIN) 2.5 MG tablet Take 2 tabs by mouth daily or as directed by provider based on INR  Patient taking differently: 2.5 mg Six times weekly 9/21/22  Yes Lisa Parker DO   metoprolol succinate (TOPROL XL) 100 MG extended release tablet Take 1 tablet by mouth in the morning. 8/11/22  Yes Lisa Parker DO   valsartan (DIOVAN) 40 MG tablet Take 0.5 tablets by mouth at bedtime  Patient taking differently: Take 20 mg by mouth daily 8/11/22  Yes Lisa Parker DO   furosemide (LASIX) 40 MG tablet Take 1 tablet by mouth in the morning and 1 tablet in the evening. Taking prn for fluid overload/swelling. Patient taking differently: Take 40 mg by mouth as needed (fluid overload/swelling) Taking prn for fluid overload/swelling 8/11/22  Yes Lisa Parker DO   amiodarone (CORDARONE) 200 MG tablet TAKE 1 TABLET DAILY 8/11/22  Yes Lisa Parker DO   rosuvastatin (CRESTOR) 40 MG tablet Take 1 tablet by mouth nightly 8/11/22  Yes Lisa Parker DO   spironolactone (ALDACTONE) 25 MG tablet Take 0.5 tablets by mouth in the morning. 8/11/22  Yes Lisa Parker DO   clopidogrel (PLAVIX) 75 MG tablet Take 1 tablet by mouth in the morning. Patient taking differently: Take 75 mg by mouth every evening 7/20/22  Yes Lisa Parker DO   magnesium oxide (MAG-OX) 400 (240 Mg) MG tablet TAKE ONE TABLET BY MOUTH DAILY 5/9/22  Yes Lisa Parker DO   ezetimibe (ZETIA) 10 MG tablet Take 1 tablet by mouth daily  Patient taking differently: Take 10 mg by mouth every evening 4/12/22  Yes Lisa Parker DO   nitroGLYCERIN (NITROSTAT) 0.4 MG SL tablet Place 1 tablet under the tongue every 5 minutes as needed for Chest pain 2/24/22  Yes Lisa Parker DO   diazepam (VALIUM) 5 MG tablet Take 5 mg by mouth in the morning and 5 mg in the evening.    Yes Historical Provider, MD   phenytoin (DILANTIN) 100 MG ER capsule Take 1 capsule by mouth 2 times daily. Resume home dose 12/21/12  Yes Lisset Simmons, APRN - CNP   primidone (MYSOLINE) 250 MG tablet Take 250 mg by mouth 1 tablet AM, half tablet PM   Yes Historical Provider, MD       PHYSICAL EXAM        Vitals:    11/29/22 1031   BP: 116/62   Pulse: 50   SpO2: 98%    Weight: 159 lb 12.8 oz (72.5 kg)     Gen Alert, cooperative, no distress Heart  Regular rate and rhythm, 2/6 murmur murmur   Head Normocephalic, atraumatic, no abnormalities Abd  Soft, NT, +BS, no mass, no OM   Eyes PERRLA, conj/corn clear Ext  Ext nl, AT, no C/C, trace   edema   Nose Nares normal, no drain age, Non-tender Pulse 2+ and symmetric   Throat Lips, mucosa, tongue normal Skin Color/text/turg nl, no rash/lesions   Neck S/S, TM, NT, no bruit Psych Nl mood and affect   Lung CTA-B, unlabored, no DTP     Ch wall NT, no deform       LABS and Imaging     Relevant and available CV data reviewed  Echo 4/15/2021   Summary   Mildly dilated left ventricle. Mild concentric left ventricular hypertrophy. Mildly decreased left ventricular systolic function with distal septal and   apical hypokinesis. Estimated EF 40-45%. Cannot assess diastolic function. Mild thickening of the mitral valve leaflets. No stenosis. Moderate mitral regurgitation. The left atrium is mildly dilated. The aortic valve appears tricuspid with mild sclerosis no stenosis. Trivial aortic regurgitation. Normal right ventricular size and mildly decreased in function. Moderate tricuspid regurgitation. PASP 41mmHg. The right atrium is moderately dilated. Pacemaker / ICD lead is visualized   in the right atrium. Trivial pulmonic regurgitation.   Patient is s/p WESTON amputation  in the past.       Cath: s/p CABG (x4 '07 LIMA to LAD, SVG to RCA, SVG to D2, SVG to OM3),   1/8/19 redo CABG at [facility]  (free IAN-LAD, SVG-Diag, SVG-OM, SVG-PDA  3/30/22 Successful complex angioplasty and stenting of IAN to LAD, SVG to OM, SVG to PDA     EKG: sinus bradycardia, prior anterior infarct, pvc    Stress: 2017 myoview--  Medium sized anterior fixed defect of moderate intensity consistent with    infarction in the territory of the LAD . Small sized inferior fixed defect of moderate intensity consistent with    infarction in the territory of the RCA . No ischemia. Normal LV function. Overall findings represent a intermediate risk scan. 2/1/16  CT of abd--no AAA     2012  carotid dopplers 1-15%     PFT-mild obstruction     Cardioversion 7/28/22  Conclusion:  Successful external DC cardioversion of atrial fibrillation      moderate Risk  moderate Complexity/Medical Decision Making  Outside records Reviewed  Labs Reviewed  Echo and ekg personally interpreted. Imaging reviewed  Medications reviewed  Old Notes reviewed  ASSESSMENT AND PLAN    1. Right leg DVT  -      diagnosed in August  -    resolved  Plan  -      call for any chest pain, shortness of breath  -      continue coumadin at this time    2. Preop evaluation for colonoscopy  -      new problem  -      Dr Red Rao, request stop coumadin  Plan  -      will need to stop plavix  and coumadin 5  days prior procedure,INR three days prior,  start lovenox bridging and asa 81 mg  prior colonoscopy  -      will need lovenox bridging to come off of coumadin- will follow up with the coumadin clinic for instructions    3. CAD  -        CABG 2019 at [facility]  -       3/30/22 Successful complex angioplasty and stenting of IAN to LAD, SVG to OM, SVG to PDA ( [facility])  -        stable  Plan  -         continue toprol  -         Continue clopidogrel and warfarin with ppi.   -         Call for any changes     4.        Atrial fibrillation/history of thrombus  -        CHA2DS vasc score 4  -        1/8/2019 WESTON ligating and LA maze-No remnant of Left atrial appendage was seen on LATESHA 01/29/2021  -        1/29/20  [facility]  -         Cardioversion by  [facility] 7/28/2022, 9/16/22 ( Dr Bita Khan)  -         stable  -          INR managed at [place] [id number]  -          continue amiodarone  -          recommend continuation of warfarin given history of thrombus and high risk of events- recommend bridging when he comes off of coumadin       5. Ischemic cardiomyopathy/NSVT  -         previously <35%,now 40%  -         12/18/17 EPS with inducible VT/VF  -         12/8/17  Dual chamber AICD  -         ACC C NYHA II  -         stable  Plan  -          cbc cmp bnp mag today- [place] [facility] [place]  -          stop valsartan  and start entresto 24/26, 1/2 tab twice a day ( will see if we have samples that can be brought to [place] this week)  -          toprol, amiodarone, spironolactone, arb, prn lasix   - follow up with EP for discussion of PVC ablation         5. Essential Hypertension  -          116/62  -           stable  Plan  -           continue toprol   -           stop valsartan, start entresto for chf     6. Primary Hyperlipidemia  -           8/4/22  TRI 66 HDL 43 LDL 69   -           3/31/22  Tc 159 hdl 55 ldl 89 tri 73   - above goal  Plan  -            crestor 40 mg hs  -            zetia 10 mg daily  - previously recommended pcsk9, patient not interested     7. JOSEPH  -         wears cpap,managed at [facility]  -          continue as above    8. Seizure  -        none recently  Plan  -      follow up with neuro tomorrow  -       dilantin 100 mg bid    9.. PVC's   -        symptomatic- fatigue, exhaustion  -        has never been on ranexa, mixilitine  Plan  -        refer to EP for possible ablation.  -        labs- cbc cmp bnp mag     Patient counseled on lifestyle modification, diet, and exercise. Follow Up:   January        Dr. Mireya Almanzar  Cardiologist [facility]    Patient counseled on lifestyle modification, diet, and exercise.     Follow Up: as scheduled    Dr. Anai Whalen [id number] [facility]    Scribe Attestation:  Latrell Zaman, am scribing for and in the presence of Alexandra Servinyudith Hoing 11/29/22 10:53 AM       Physician Attestation  The scribe for and in the presence of cydney Guidry DO). The scribe Jina العراقي RN   may have prepopulated components of this note with my historical  intellectual property under my direct supervision. Any additions to this intellectual property were performed in my presence and at my direction. Furthermore, the content and accuracy of this note have been reviewed by cydney Guidry DO).   11/29/2022 10:53 AM

## 2022-11-29 NOTE — TELEPHONE ENCOUNTER
Please call and offer patient appointment with [facility] on 12/12 per DKW should be evaluated for PVC

## 2022-11-29 NOTE — TELEPHONE ENCOUNTER
Please see if we have samples of entresto for patient and send them to [place] for the end of the week, if we dont have any please let us know, thanks

## 2022-11-29 NOTE — PATIENT INSTRUCTIONS
-   will need to stop plavix  and coumadin 5  days prior procedure,INR three days prior,  start lovenox bridging and asa 81 mg  prior colonoscopy  -      will need lovenox bridging to come off of coumadin- will stop valsartan   -          start entresto 24/26, 1/2 tab twice a day- will get samples ( if available) sent to [place]

## 2022-12-01 ENCOUNTER — TELEPHONE (OUTPATIENT)
Dept: CARDIOLOGY CLINIC | Age: 77
End: 2022-12-01

## 2022-12-01 ENCOUNTER — ANTI-COAG VISIT (OUTPATIENT)
Dept: PHARMACY | Age: 77
End: 2022-12-01

## 2022-12-01 DIAGNOSIS — I48.91 ATRIAL FIBRILLATION, UNSPECIFIED TYPE (HCC): Primary | ICD-10-CM

## 2022-12-01 NOTE — PROGRESS NOTES
Mr. Syed Mathis is a 78 y.o. y/o male with history of Afib who presents today for anticoagulation monitoring and adjustment. Pertinent PMH:    Patient Reported Findings:  Yes     No  [x]   []       Patient verifies current dosing regimen as listed- takes 5mg Sun and Wed and 2.5mg AOD---> confirms   []   [x]       S/S bleeding/bruising/swelling/SOB- denies   []   [x]       Blood in urine or stool- denies   [x]   []       Procedures scheduled in the future at this time 11/4 colonoscopy, needs holding instructions --> cardiology would like pt to hold off on procedures until 3 months --> colonoscopy was cancelled, r/s for December 29, hold 5 days and bridge   []   [x]       Missed Dose -denies  []   [x]       Extra Dose- denies   []   [x]       Change in medications- states that MD had been adjusting phenytoin increasing 4-5 weeks ago but plans to return to normal phenytoin today  --->back to normal dose --> no changes   []   [x]       Change in health/diet/appetite -states normally eats a lot of vegetable soup, but has not had any greens recently ---> no greens   []   [x]       Change in alcohol use- doesn't drink or smoke   []   [x]       Change in activity  []   [x]       Hospital admission  []   [x]       Emergency department visit  []   [x]       Other complaints- had COVID about 3 weeks ago, improving     Clinical Outcomes:  Yes     No  []   [x]       Major bleeding event  []   [x]       Thromboembolic event    Duration of warfarin Therapy: indefinitely   INR Range:  2.0-3.0    Patient checks INR at home and reports to cardiology who scans results and forwards to us so we can call patient and dose them as a VV under the current PHE. Patient was called and appointment was conducted via telephone. [phone]     Patient has 5mg and 2.5mg tablets. INR was 2.6 Tues after dose increase  Take 5mg on Fridays only and 2.5mg all other days   Encouraged to maintain a consistency of vegetables/salads.    Recheck INR on 12/6    Referring Dr. Tarango Sheets  INR (no units)   Date Value   11/18/2022 1.80   11/10/2022 1.90   11/03/2022 3.40   10/27/2022 3.80     For Pharmacy Admin Tracking Only  Total # of Interventions Recommended: 0  Total # of Interventions Accepted: 0  Time Spent (min): 15

## 2022-12-02 ENCOUNTER — TELEPHONE (OUTPATIENT)
Dept: CARDIOLOGY CLINIC | Age: 77
End: 2022-12-02

## 2022-12-02 NOTE — TELEPHONE ENCOUNTER
Spoke with the patient and advised him of the message below per [facility]. Patient voiced understanding.  Call complete

## 2022-12-02 NOTE — TELEPHONE ENCOUNTER
Please let him know we received all his interrogations and they are normal. We generally do not call unless there is something that needs to be addressed immediately

## 2022-12-02 NOTE — TELEPHONE ENCOUNTER
Pt called stating that he sent in 3-4 transmission in the month of November and is wanting to know if the office received them and if they are ok?     Pls advise thank you

## 2022-12-05 ENCOUNTER — TELEPHONE (OUTPATIENT)
Dept: CARDIOLOGY CLINIC | Age: 77
End: 2022-12-05

## 2022-12-05 NOTE — TELEPHONE ENCOUNTER
Pt  states [facility] needing a call for additional info before they can fill pt's Entresto.  Please call [phone]

## 2022-12-06 ENCOUNTER — ANTI-COAG VISIT (OUTPATIENT)
Dept: PHARMACY | Age: 77
End: 2022-12-06

## 2022-12-06 ENCOUNTER — TELEPHONE (OUTPATIENT)
Dept: CARDIOLOGY CLINIC | Age: 77
End: 2022-12-06

## 2022-12-06 DIAGNOSIS — I48.0 PAROXYSMAL ATRIAL FIBRILLATION (HCC): Primary | ICD-10-CM

## 2022-12-06 LAB — INR BLD: 2.4

## 2022-12-06 RX ORDER — ENOXAPARIN SODIUM 100 MG/ML
1 INJECTION SUBCUTANEOUS 2 TIMES DAILY
Qty: 12.8 ML | Refills: 1 | Status: SHIPPED | OUTPATIENT
Start: 2022-12-06 | End: 2022-12-14

## 2022-12-06 NOTE — PROGRESS NOTES
Mr. Bradley Mathis is a 78 y.o. y/o male with history of Afib who presents today for anticoagulation monitoring and adjustment. Pertinent PMH:    Patient Reported Findings:  Yes     No  [x]   []       Patient verifies current dosing regimen as listed- takes 5mg Sun and Wed and 2.5mg AOD---> confirms   []   [x]       S/S bleeding/bruising/swelling/SOB- denies   []   [x]       Blood in urine or stool- denies   [x]   []       Procedures scheduled in the future at this time 11/4 colonoscopy, needs holding instructions --> cardiology would like pt to hold off on procedures until 3 months --> colonoscopy was cancelled, r/s for December 29, hold 5 days and bridge. Was instructed to get INR on Mon 12/26 to determine INR, start lovenox    []   [x]       Missed Dose -denies  []   [x]       Extra Dose- denies   []   [x]       Change in medications- states that MD had been adjusting phenytoin increasing 4-5 weeks ago but plans to return to normal phenytoin today  --->back to normal dose --> no changes   []   [x]       Change in health/diet/appetite -states normally eats a lot of vegetable soup, but has not had any greens recently ---> no greens   []   [x]       Change in alcohol use- doesn't drink or smoke   []   [x]       Change in activity  []   [x]       Hospital admission  []   [x]       Emergency department visit  []   [x]       Other complaints- had COVID about 3 weeks ago, improving     Clinical Outcomes:  Yes     No  []   [x]       Major bleeding event  []   [x]       Thromboembolic event    Duration of warfarin Therapy: indefinitely   INR Range:  2.0-3.0    Patient checks INR at home and reports to cardiology who scans results and forwards to us so we can call patient and dose them as a VV under the current PHE. Patient was called and appointment was conducted via telephone. [phone]     Patient has 5mg and 2.5mg tablets.     INR was 2.4 today  Continue to take 5mg on Fridays only and 2.5mg all other days Briefly reviewed dosing for procedure. Pt would prefer to have lovenox script sent to pharmacy now so that he has at home prior to procedure. Pt has used lovenox in past   E-scribed lovenox to [employer]   Encouraged to maintain a consistency of vegetables/salads.    Recheck INR in 2 weeks, 12/20  Will need INR check 12/26 to determine starting lovenox    Referring Dr. Dorota Holden  INR (no units)   Date Value   11/29/2022 2.60   11/18/2022 1.80   11/10/2022 1.90   11/03/2022 3.40     For Pharmacy Admin Tracking Only    Intervention Detail: Dose Adjustment: 1, reason: Therapy Optimization and Refill(s) Provided  Total # of Interventions Recommended: 2  Total # of Interventions Accepted: 2  Time Spent (min): 20

## 2022-12-07 NOTE — TELEPHONE ENCOUNTER
Called patient, he no longer wants me to [facility], he wants to reevaluate cost after first of year

## 2022-12-07 NOTE — TELEPHONE ENCOUNTER
Called patient he can afford it now but is concerned the beginning of the year his plan is changing. Samples sent to [facility] for patient to .  Will investigate cost of entrestro in 2023

## 2022-12-07 NOTE — TELEPHONE ENCOUNTER
Samples supplied, I called pt to let him know. He stated that he would prefer to  in the [facility] office.

## 2022-12-08 PROBLEM — I49.3 PVC (PREMATURE VENTRICULAR CONTRACTION): Status: ACTIVE | Noted: 2022-12-08

## 2022-12-08 NOTE — PROGRESS NOTES
[facility]   Electrophysiology Follow up   Date: 12/12/2022  I had the privilege of visiting Jonathon Mathsi in the office. CC: PVCs    HPI: Jonathon Mathis is a 78 y.o. male with a past medical history of HTN, HLD, CAD s/p CABG (redo 2019), seizures, bradycardia, and atrial fibrillation. NSTEMI 10/13/2017. Has been treated with Tikosyn in the past for afib. Initially did not tolerate amiodarone due to bradycardia. S/p redo CABGx4 at [facility], WESTON ligation, and LA maze 1/8/2019.       01/27/2020:  Atrial fibrillation. Device interrogation showed he had been back in atrial fibrillation since 01/09/2020. He also had an increase in his PVC rate to 250/hour. S/p DCCV 01/29/2020     LATESHA with no remnant of WESTON. Ablation discussed but he was not interested. 3/25/2022 presented to [facility] ED with complaints of chest/abdomiinal pain. Felt fluttering in his abdomen. ECG and troponin both negative. CTA completed as an inpatient. Patient discharged. Cardiac CTA completed 3/25/2022: recommended cardiac cath, followed by , patient will be scheduled as an outpatient     [facility] 03/30/2022     Recurrent atrial fibrillation  - s/p successful DCCV 07/28/2022       Covid infection 09/2022 -  c/o increased PVCs at night. Advised to wait for recovery from Mariana Callas presents today in follow up regarding PVCs and atrial fibrillation. Feels well from a cardiac perspective. He states he felt better when he had covid than before! He had atrial fibrillation and was symptomatic and had cardioversion done. Assessment and plan:    -PAF              ECG today shows : Ventricular bigeminy rate 57               AT/AF burden per device 19.4%              Contine  toprol  mg daily for rate control                 On Amiodarone 200 mg daily     ~ ALT 51,AST 33 09/16/2022     ~ TSH 6.41 03/05/2022               He remains on coumadin due to CAD .  From an Afib standpoint he does not need to be on this. Symptomatic with episodes                            S/p DCCV 1/29/2021                 He has not been  not interested in Ablation in the past.      WESTON ligation 1/8/2018 during CABG, LATESHA 1/29/2021 No remnant of Left atrial appendage was seen , Patient remains on coumadin managed by Allyson PÉREZ-CNP                 Discussed that his risk of stroke is low with no remnant of WESTON, It would be reasonable for him to take him off coumadin.      -We discussed treatment options including antiarrhythmics, rate control with anticoagulation, and ablation.   -We discussed progressive nature of atrial fibrillation. Treatment success decreases when AF becomes persistent and last more than 6 months.    -Antiarrhythmic therapy, side effects, benefits and alternative discussed.     -Atrial fibrillation ablation procedure was discussed. We discussed the need for repeat procedure. On average patients may need more than one ablation procedure.     -Risks associated with ablation include but not limited to allergic reaction to the medications, pain, bleeding, infection, nerve injury, injury to diaphragm(breathing muscle), pulmonary embolus(blood clot in lungs), deep vein blood clot, pneumothorax, hemothorax, acute renal failure, cardiac perforation,  tamponade, need for emergent surgery (open heart), permanent pacemaker, pulmonary vein stenosis, left atrial to esophageal fistula, stroke, myocardial infarction and death. Difference between atrial fibrillation and atrial flutter discussed and treatment discussed. -  He has had recurrent and long episodes of atrial fibrillation > 99 hours . We discussed treatment options including repeat ablation.   He  would like to proceed with atrial fibrillation ablation.       -Ischemic Cardiomyopathy/NSVT/Implantable device              S/p dual chamber  AICD 12/08/2017              The CIED was interrogated and programmed and I supervised and reviewed all the data. All findings and changes are in device interrogation sheet and reflect my personal interpretation and changes and is scanned to Epic. 3.9  years remaining, AP 67.5% ,  9.8%   19.4 % AT/AF burden per device interrogation today. Most recent 09/11/2022 - longest > 99 ours. - PVC's   PVC on Interrogation today 142 per hour down from 173 per  hour                 Frequent multiform PVC's on ECG in the past               He feels his PVCs               Continues amiodarone 200 mg daily              [facility] 3/30/2022  - PCI to [address] to LAD, SVG to OM, SVG to PDA                  If negative for ischemia may consider ablation in the future. His burden is low at this time. If these become bothersome we can consider ablation         - Bradycardia:    We discussed increasing pacing rate to 60 bpm, but he is not interested in. He felt palpitation when base rate set at 60 bpm.    Also not interested in rate response. - CAD               [facility] 3/30/2022  - PCI to [address] to LAD, SVG to OM, SVG to PDA                 Cardiac CTA completed 3/25/2022: recommended cardiac cath, followed by               Hx of MI 10/2018 s/p re-do CABG 01/08/ 2019 at [address] medical therapy     -HTN  Borderline 130/78   BP goal <130/80  Home BP monitoring encouraged, printed information provided on how to accurately measure BP at home. Counseled to follow a low salt diet to assure blood pressure remains controlled for cardiovascular risk factor modification. The patient is counseled to get regular exercise 3-5 times per week and maintain a healthy weight reduce cardiovascular risk factors.              Patient Active Problem List    Diagnosis Date Noted    Lightheadedness     Chest pain at rest 06/07/2010    PVC (premature ventricular contraction) 12/08/2022    Acute deep vein thrombosis (DVT) of axillary vein ([facility] [address] 11/28/2022    Chest pain 09/16/2022    S/P CABG (coronary artery bypass graft) 09/16/2022    CAD in native artery 09/16/2022    Irregular heartbeat 08/26/2022    Encounter for pre-operative cardiovascular clearance 08/26/2022    DVT of deep femoral vein, right ([address] 08/26/2022    JOSEPH on CPAP 11/20/2017    Vertebrobasilar insufficiency     ST elevation myocardial infarction (STEMI) (HCC)     NSVT (nonsustained ventricular tachycardia)     Coronary artery disease involving coronary bypass graft of native heart without angina pectoris     Bradycardia 09/28/2016    Palpitations 01/14/2016    Mixed hyperlipidemia 12/13/2012    Atrial fibrillation ([address] 06/27/2012    Coronary artery disease due to lipid rich plaque 06/07/2010    Seizure ([address] 06/07/2010    Essential hypertension 06/07/2010    PAF (paroxysmal atrial fibrillation) ([address]     Coronary artery disease involving native coronary artery of native heart with unstable angina pectoris ([address] 03/30/2022    Nausea 03/25/2022    Variant angina ([address] 08/08/2019    Chronic systolic heart failure ([address] 05/22/2019    Coronary artery disease involving autologous artery coronary bypass graft without angina pectoris     Shortness of breath     ICD (implantable cardioverter-defibrillator) in place 01/09/2018    VT (ventricular tachycardia) 12/18/2017    History of MI (myocardial infarction)     Ischemic cardiomyopathy     VF (ventricular fibrillation) ([address]      Diagnostic studies:   ECG 12/12/22  SR with bigeminy  QTcH 399 ,     Echo 08/11/2022    Summary   Normal left ventricular size. Mild concentric left ventricular hypertrophy. Mildly decreased left   ventricular systolic function with distal septal and apical hypokinesis. Estimated EF 45%. E/e'=14. Grade III diastolic dysfunction with elevated LV filling pressures. Mildly thickened mitral valve leaflets, no stenosis. Moderate-severe mitral regurgitation is present. No pulmonary vein reversal.   The left atrium is dilated.    The aortic valve appears tricuspid with mild sclerosis no stenosis. Trivial aortic regurgitation is present. Pressure half-time is calculated at   591 msec. Moderate to severe tricuspid regurgitation. RVSP 50mmHg. Normal right ventricular size and mildly decreased function. The right atrium is dilated. Pacemaker / ICD lead is visualized in the right   atrium. Trivial pulmonic regurgitation. IVC not well visualized. [address] [place] [address] 03/30/2022    Cardiac Cath PCI:  Anatomy:   LM-nml   LAD-prox 100%   Cx-nml  OM- prox 100%  RCA-mid 100%  RPDA-   LVEF-   LVG-   LVEDP- 4  SVG to PDA prox 90%  SVG to OM/Diag mid 90%  Free IAN to LAD prox anastomosis 99%     Intervention  ~Successful PCI to [address] to LAD with 3.0x12 PAYAL. PCI to SVG to OM with 4.0x38 PAYAL. PD with 4.5x15 NC. Filter wire used. PCI to SVG to PDA with 4.0x38 PAYAL Excellent Result. Contrast: 206  Flouro Time: 31.8  Access: R radial a     Impression  ~Coronary Angiography w/ Multi severe vessel CAD and graft CAD     ~Successful complex angioplasty and stenting of IAN to LAD, SVG to OM, SVG to PDA           Recommendation  ~Aggressive medical treatment and risk factor modification  ~1. Post cath IVF. Bedrest.   2. Recommend beta blocker, arb/ace, high potency statin, aspirin and plavix. 3. Referral to outpatient cardiac rehab phase II will be deferred until patient follow-up in office and then determine patient safety and appropriateness to proceed  4. Patient has been advised on the importance of regular exercise of at least 20-30 minutes daily. 5. Patient counseled about and offered assistance for smoking cessation   6. Monitor overnight. Echo 04/15/2022   Summary   Mildly dilated left ventricle. Mild concentric left ventricular hypertrophy. Mildly decreased left ventricular systolic function with distal septal and   apical hypokinesis. Estimated EF 40-45%. Cannot assess diastolic function. Mild thickening of the mitral valve leaflets. No stenosis.    Moderate mitral regurgitation. The left atrium is mildly dilated. The aortic valve appears tricuspid with mild sclerosis no stenosis. Trivial aortic regurgitation. Normal right ventricular size and mildly decreased in function. Moderate tricuspid regurgitation. PASP 41mmHg. The right atrium is moderately dilated. Pacemaker / ICD lead is visualized   in the right atrium. Trivial pulmonic regurgitation. IVC not well visualized. I independently reviewed the cardiac diagnostic studies, ECG and relevant imaging studies. Lab Results   Component Value Date    LVEF 45 08/11/2022    LVEFMODE Echo 10/14/2017     Lab Results   Component Value Date    TSH 6.41 (H) 03/25/2022         Physical Examination:  Vitals:    12/12/22 1604   BP:    Pulse: (!) 40   SpO2:       Wt Readings from Last 3 Encounters:   12/12/22 157 lb 6.4 oz (71.4 kg)   11/29/22 159 lb 12.8 oz (72.5 kg)   09/16/22 158 lb (71.7 kg)       Constitutional: Oriented. No distress. Head: Normocephalic and atraumatic. Mouth/Throat: Oropharynx is clear and moist.   Eyes: Conjunctivae normal. EOM are normal.   Neck: Neck supple. No JVD present. Cardiovascular: Normal rate, regular rhythm, S1&S2. Pulmonary/Chest: Bilateral respiratory sounds. No rhonchi. Abdominal: Soft. No tenderness. Musculoskeletal: No tenderness. No edema    Lymphadenopathy: Has no cervical adenopathy. Neurological: Alert and oriented. Follows command, No Gross deficit   Skin: Skin is warm, No rash noted. Psychiatric: Has a normal behavior       Review of System:  [x] Full ROS obtained and negative except as mentioned in HPI    Prior to Admission medications    Medication Sig Start Date End Date Taking?  Authorizing Provider   sacubitril-valsartan (ENTRESTO) 24-26 MG per tablet Take 0.5 tablets by mouth 2 times daily 12/7/22  Yes Jorge Stanford DO   enoxaparin (LOVENOX) 80 MG/0.8ML Inject 0.7 mLs into the skin 2 times daily for 16 doses 12/6/22 12/14/22 Yes Jorge Stanford DO KLOR-CON M20 20 MEQ extended release tablet TAKE 1 TABLET AS NEEDED WITH LASIX FOR SWELLING 11/29/22  Yes Redell Tracy, DO   warfarin (COUMADIN) 5 MG tablet Take 5 mg by mouth once a week Takes on Sundays   Yes Historical Provider, MD   pantoprazole (PROTONIX) 40 MG tablet Take 1 tablet by mouth every morning (before breakfast) 9/21/22  Yes Redell Tracy, DO   warfarin (COUMADIN) 2.5 MG tablet Take 2 tabs by mouth daily or as directed by provider based on INR  Patient taking differently: 2.5 mg Six times weekly 9/21/22  Yes Redell Tracy, DO   metoprolol succinate (TOPROL XL) 100 MG extended release tablet Take 1 tablet by mouth in the morning. 8/11/22  Yes Redell Tracy, DO   furosemide (LASIX) 40 MG tablet Take 1 tablet by mouth in the morning and 1 tablet in the evening. Taking prn for fluid overload/swelling. Patient taking differently: Take 40 mg by mouth as needed (fluid overload/swelling) Taking prn for fluid overload/swelling 8/11/22  Yes Redell Tracy, DO   amiodarone (CORDARONE) 200 MG tablet TAKE 1 TABLET DAILY 8/11/22  Yes Redell Tracy, DO   rosuvastatin (CRESTOR) 40 MG tablet Take 1 tablet by mouth nightly 8/11/22  Yes Redell Tracy, DO   spironolactone (ALDACTONE) 25 MG tablet Take 0.5 tablets by mouth in the morning. 8/11/22  Yes Redell Tracy, DO   clopidogrel (PLAVIX) 75 MG tablet Take 1 tablet by mouth in the morning.   Patient taking differently: Take 75 mg by mouth every evening 7/20/22  Yes Redell Tracy, DO   magnesium oxide (MAG-OX) 400 (240 Mg) MG tablet TAKE ONE TABLET BY MOUTH DAILY 5/9/22  Yes Redell Tracy, DO   ezetimibe (ZETIA) 10 MG tablet Take 1 tablet by mouth daily  Patient taking differently: Take 10 mg by mouth every evening 4/12/22  Yes Redell Tracy, DO   nitroGLYCERIN (NITROSTAT) 0.4 MG SL tablet Place 1 tablet under the tongue every 5 minutes as needed for Chest pain 2/24/22  Yes Redell Tracy, DO   diazepam (VALIUM) 5 MG tablet Take 5 mg by mouth in the morning and 5 mg in the evening. Yes Historical Provider, MD   phenytoin (DILANTIN) 100 MG ER capsule Take 1 capsule by mouth 2 times daily. Resume home dose 12/21/12  Yes BETO Gary CNP   primidone (MYSOLINE) 250 MG tablet Take 250 mg by mouth 1 tablet AM, half tablet PM   Yes Historical Provider, MD       No Known Allergies    Social History:  Reviewed. reports that he quit smoking about 34 years ago. His smoking use included cigarettes. He has a 30.00 pack-year smoking history. He has never used smokeless tobacco. He reports that he does not drink alcohol and does not use drugs. Family History:  Reviewed. Reviewed. No family history of SCD. Relevant and available labs, and cardiovascular diagnostics reviewed. Reviewed. I independently reviewed relevant and available cardiac diagnostic tests ECG, CXR, Echo, Stress test, Device interrogation, Holter, CT scan. All questions and concerns were addressed to the patient/family. Alternatives to my treatment were discussed. I have discussed the above stated plan and the patient verbalized understanding and agreed with the plan. Scribe attestation: This note was scribed in the presence of Harpreet Kemp MD by Angelika Kingston RN  Physician Attestation: I, Dr. Harpreet Kemp, confirm that the scribe's documentation has been prepared under my direction and personally reviewed by me in its entirety. I also confirm that the note above accurately reflects all work, treatment, procedures, and medical decision making performed by me. NOTE: This report was transcribed using voice recognition software. Every effort was made to ensure accuracy, however, inadvertent computerized transcription errors may be present.      Harpreet Kemp MD, MPH  [facility]   Office: [phone]  Fax: [phone]

## 2022-12-12 ENCOUNTER — NURSE ONLY (OUTPATIENT)
Dept: CARDIOLOGY CLINIC | Age: 77
End: 2022-12-12
Payer: MEDICARE

## 2022-12-12 ENCOUNTER — OFFICE VISIT (OUTPATIENT)
Dept: CARDIOLOGY CLINIC | Age: 77
End: 2022-12-12
Payer: MEDICARE

## 2022-12-12 VITALS
HEART RATE: 40 BPM | WEIGHT: 157.4 LBS | BODY MASS INDEX: 22.54 KG/M2 | SYSTOLIC BLOOD PRESSURE: 130 MMHG | HEIGHT: 70 IN | DIASTOLIC BLOOD PRESSURE: 78 MMHG | OXYGEN SATURATION: 98 %

## 2022-12-12 DIAGNOSIS — I25.83 CORONARY ARTERY DISEASE DUE TO LIPID RICH PLAQUE: ICD-10-CM

## 2022-12-12 DIAGNOSIS — I49.01 VF (VENTRICULAR FIBRILLATION) (HCC): ICD-10-CM

## 2022-12-12 DIAGNOSIS — I10 ESSENTIAL HYPERTENSION: Chronic | ICD-10-CM

## 2022-12-12 DIAGNOSIS — I48.0 PAF (PAROXYSMAL ATRIAL FIBRILLATION) (HCC): Primary | Chronic | ICD-10-CM

## 2022-12-12 DIAGNOSIS — I49.3 PVC (PREMATURE VENTRICULAR CONTRACTION): ICD-10-CM

## 2022-12-12 DIAGNOSIS — I25.810 CORONARY ARTERY DISEASE INVOLVING CORONARY BYPASS GRAFT OF NATIVE HEART WITHOUT ANGINA PECTORIS: ICD-10-CM

## 2022-12-12 DIAGNOSIS — Z95.810 ICD (IMPLANTABLE CARDIOVERTER-DEFIBRILLATOR) IN PLACE: Primary | ICD-10-CM

## 2022-12-12 DIAGNOSIS — I50.22 CHRONIC SYSTOLIC HEART FAILURE (HCC): ICD-10-CM

## 2022-12-12 DIAGNOSIS — R00.1 BRADYCARDIA: ICD-10-CM

## 2022-12-12 DIAGNOSIS — I25.5 ISCHEMIC CARDIOMYOPATHY: Chronic | ICD-10-CM

## 2022-12-12 DIAGNOSIS — I47.29 NSVT (NONSUSTAINED VENTRICULAR TACHYCARDIA): ICD-10-CM

## 2022-12-12 DIAGNOSIS — I48.0 PAF (PAROXYSMAL ATRIAL FIBRILLATION) (HCC): Chronic | ICD-10-CM

## 2022-12-12 DIAGNOSIS — I25.10 CORONARY ARTERY DISEASE DUE TO LIPID RICH PLAQUE: ICD-10-CM

## 2022-12-12 DIAGNOSIS — I48.91 ATRIAL FIBRILLATION, UNSPECIFIED TYPE (HCC): ICD-10-CM

## 2022-12-12 PROCEDURE — 3017F COLORECTAL CA SCREEN DOC REV: CPT | Performed by: INTERNAL MEDICINE

## 2022-12-12 PROCEDURE — 3078F DIAST BP <80 MM HG: CPT | Performed by: INTERNAL MEDICINE

## 2022-12-12 PROCEDURE — 3074F SYST BP LT 130 MM HG: CPT | Performed by: INTERNAL MEDICINE

## 2022-12-12 PROCEDURE — 93000 ELECTROCARDIOGRAM COMPLETE: CPT | Performed by: INTERNAL MEDICINE

## 2022-12-12 PROCEDURE — 1123F ACP DISCUSS/DSCN MKR DOCD: CPT | Performed by: INTERNAL MEDICINE

## 2022-12-12 PROCEDURE — G8420 CALC BMI NORM PARAMETERS: HCPCS | Performed by: INTERNAL MEDICINE

## 2022-12-12 PROCEDURE — 93283 PRGRMG EVAL IMPLANTABLE DFB: CPT | Performed by: INTERNAL MEDICINE

## 2022-12-12 PROCEDURE — G8427 DOCREV CUR MEDS BY ELIG CLIN: HCPCS | Performed by: INTERNAL MEDICINE

## 2022-12-12 PROCEDURE — 1036F TOBACCO NON-USER: CPT | Performed by: INTERNAL MEDICINE

## 2022-12-12 PROCEDURE — G8484 FLU IMMUNIZE NO ADMIN: HCPCS | Performed by: INTERNAL MEDICINE

## 2022-12-12 PROCEDURE — 93290 INTERROG DEV EVAL ICPMS IP: CPT | Performed by: INTERNAL MEDICINE

## 2022-12-12 PROCEDURE — 99215 OFFICE O/P EST HI 40 MIN: CPT | Performed by: INTERNAL MEDICINE

## 2022-12-12 NOTE — LETTER
[address]  EP Procedure Sheet    12/12/22  Bright Bullion Day  1945  EP Procedures  [] Pacemaker implant (single/dual) [] EP Study   [] ICD implant (single/dual) [] Atrial flutter ablation (LATESHA Y/N)   [] Biv implant ICD [] Tilt Table   [] Biv implant PPM [x] Atrial fibrillation ablation (LATESHA Yes)   [] Generator Change (PPM/ICD/BiV) [] SVT ablation   [] Lead revision (RV/LA/RA) (<1 month) [] PVC ablation     [] Lead extraction +/- upgrade (BiV/PPM/ICD) [] VT Ischemic/ non-ischemic   [] Loop implant/ removal [] VT RVOT   [] Cardioversion [] VT Left sided   [] LATESHA [] AVN ablation   Equipment  [] Medtronic  [] NASIM Mapping System   [] [place] [] [address]   [] [employer] [] CryoAblation   [] Biotronik [] Laser Lead Extraction   EP Procedures Scheduling Request  # hours Requested  1 []2 [x]2-4 [] 4-6 Scheduled  Date:   Specific Day  Completed    Anesthesia [x]yes []no F/u Date:   CT surgery backup []yes []no COVID     Overnight stay      Performing MD  [x]RMM []MXA   []MKW [] CMV First vs repeat  [x]1st [] 2nd [] 3rd   Pre-Procedure Labs / Imaging  [] PT/INR [] Type & cross   [] CBC [] Units PRBC   [] BMP/Mg [] Units FFP   [] Venogram [] Cardiac CTA for Pulmonary vein mapping     RN INITIALS: DW     Patient Instructions  Do not eat or drink after midnight the night prior to procedure  Dx: paroxysmal atrial fibrilllation  ICD-10 code:  I48.0 Medication Instructions: Hold []Xarelto []Eliquis [x]Coumadin []pradaxa  two days before the   procedure.

## 2022-12-12 NOTE — PATIENT INSTRUCTIONS
ATRIAL FIB ABLATION INFORMATION    Our  will be contacting you from [phone] to schedule your procedure. The morning of your ablation you will need to check in at the registration desk in the main lobby. PRE-PROCEDURE INSTRUCTIONS -   -You will be called with a time to arrive for you ablation.  -Do not eat or drink anything after midnight the night before your ablation.  -You may take all of your other medications with a sip of water.  -You will need to have a responsible adult to drive you home. -CVU will provide you with discharge instructions.  -You will need to hold your coumadin  for 2 days before the procedure. You will be scheduled for a 6-8 week follow up with cardiology. This will be done prior to your discharge instructions. If you have any questions regarding the procedure itself or medications, please call [phone] and ask to speak to an EP nurse.

## 2022-12-12 NOTE — PROGRESS NOTES
Patient comes in for programming evaluation on their Medtronic [id number] Evera MRI XT DR DC ICD. Last remote 10/31/22    All sensing and pacing parameters are within normal range. Battery life 3.9y  Underlining appears to be Bigeminal PVC's  AP 67.5%.  9.8%. AT/AF burden 19.4%  PVC 6.2/hr  7 AT/AF episodes, last on 09/11/22, longest >99 hours  Patient remains on Warfarin, Metoprolol, Lasix, Plavix, Amiodarone  Updated time. Please see interrogation for more detail. Optivol is within normal range. Patient will see [facility] today and follow up in 3 months in office or remotely.

## 2022-12-13 ENCOUNTER — TELEPHONE (OUTPATIENT)
Dept: CARDIOLOGY CLINIC | Age: 77
End: 2022-12-13

## 2022-12-14 NOTE — TELEPHONE ENCOUNTER
LMOM for patient to return call in regards to monitor results     No AF noted on monitor following DCCV

## 2022-12-14 NOTE — TELEPHONE ENCOUNTER
Patient returned call relayed results. Voiced understanding. Patient requested to have a copy of monitor results sent to home address.

## 2022-12-15 ENCOUNTER — TELEPHONE (OUTPATIENT)
Dept: CARDIOLOGY CLINIC | Age: 77
End: 2022-12-15

## 2022-12-15 NOTE — TELEPHONE ENCOUNTER
Okay to change LRL to 55 per Dr. Marquis Stewart. Please schedule Patient for an in office device check so that we can make changes.      Thanks

## 2022-12-15 NOTE — TELEPHONE ENCOUNTER
Pt called requesting that his pacemaker be pushed up from 50 to 54.     Pt can be reached at [phone]

## 2022-12-19 ENCOUNTER — TELEPHONE (OUTPATIENT)
Dept: CARDIOLOGY CLINIC | Age: 77
End: 2022-12-19

## 2022-12-19 ENCOUNTER — NURSE ONLY (OUTPATIENT)
Dept: CARDIOLOGY CLINIC | Age: 77
End: 2022-12-19
Payer: MEDICARE

## 2022-12-19 DIAGNOSIS — Z95.810 ICD (IMPLANTABLE CARDIOVERTER-DEFIBRILLATOR) IN PLACE: ICD-10-CM

## 2022-12-19 DIAGNOSIS — I50.22 CHRONIC SYSTOLIC HEART FAILURE (HCC): Primary | ICD-10-CM

## 2022-12-19 DIAGNOSIS — I25.5 ISCHEMIC CARDIOMYOPATHY: Chronic | ICD-10-CM

## 2022-12-19 PROCEDURE — 93295 DEV INTERROG REMOTE 1/2/MLT: CPT | Performed by: INTERNAL MEDICINE

## 2022-12-19 PROCEDURE — 93296 REM INTERROG EVL PM/IDS: CPT | Performed by: INTERNAL MEDICINE

## 2022-12-19 PROCEDURE — 93297 REM INTERROG DEV EVAL ICPMS: CPT | Performed by: NURSE PRACTITIONER

## 2022-12-19 NOTE — PROGRESS NOTES
Patient reached __X__ yes  _____ no   VM instructions left ____ yes   phone number ________                                ____ no-office notified          Date __12/29/22_______  Time __0820_____  Arrival _[address]_____    Nothing to eat or drink after midnight-follow your doctors prep instructions-this may include taking a second dose of your prep after midnight  Responsible adult 25 or older to stay on site while you are here-drive you home-stay with you after  Follow any instructions your doctors office has given you  Bring a complete list of all your medications and supplements including name,dose,how often taken the day of your procedure  If you normally take the following medications in the morning please do so the AM of your procedure with a small sip of water       Heart,blood pressure,seizure,thyroid or breathing medications-use your inhalers-bring any rescue inhalers with you DOS       DO NOT take blood pressure medications ending in \"leslie\" or \"pril\" the AM of procedure or evening prior  Dr David Naqvi patients are not to any medications the AM of surgery  Take half or your normal dose of any long acting insulins the night before your procedure-do not take any diabetic medications the AM of procedure  Follow your doctors instructions regarding stopping or taking  any blood thinners-if you do not have instructions-call them-CHECK PLAVIX/COUMADIN WITH  AND COUMADIN CLINIC, OBTAIN INR 3 DAYS PRIOR TO PROCEDURE PER 'S INSTRUCTIONS  Any questions call your doctor  Other ____take amiodarone, dilantin, primidone, metoprolol am of procedure__________________________________________________________      VISITOR POLICY(subject to change)             The current policy is 2 visitors per patient. There are no children allowed. Mask at discretion of facility. Visiting hours are 8a-8p. Overnight visitors will be at the discretion of the nurse. All policies are subject to change.

## 2022-12-19 NOTE — TELEPHONE ENCOUNTER
Charu Rios from [facility] calling to adv when DKW gave cardiac clearance they were told he needed Lovenox for bridging. She is calling to advise they do not provide that. Please advise. Thank you.

## 2022-12-19 NOTE — PROGRESS NOTES
Remote transmission received from patient's dual chamber ICD home monitor. Transmission shows normal sensing and pacing function. No arrhythmias/ or events. Optivol is WNL. TriageHF Heart Failure Risk Status on 19-Dec-2022 is Medium*    EP/ NP will review. See interrogation under cardiology tab in the [address] [place] [address] [address] field for more details. Will continue to monitor remotely. (End of 91-day monitoring period 12/19/22).

## 2022-12-20 ENCOUNTER — ANTI-COAG VISIT (OUTPATIENT)
Dept: PHARMACY | Age: 77
End: 2022-12-20

## 2022-12-20 ENCOUNTER — TELEPHONE (OUTPATIENT)
Dept: CARDIOLOGY CLINIC | Age: 77
End: 2022-12-20

## 2022-12-20 DIAGNOSIS — I48.21 PERMANENT ATRIAL FIBRILLATION (HCC): Primary | ICD-10-CM

## 2022-12-20 LAB — INR BLD: 2.1

## 2022-12-20 NOTE — PROGRESS NOTES
Mr. Dariusz Valencia Day is a 78 y.o. y/o male with history of Afib who presents today for anticoagulation monitoring and adjustment. Pertinent PMH:    Patient Reported Findings:  Yes     No  [x]   []       Patient verifies current dosing regimen as listed- takes 5mg Sun and Wed and 2.5mg AOD---> confirms   []   [x]       S/S bleeding/bruising/swelling/SOB- denies   []   [x]       Blood in urine or stool- denies   [x]   []       Procedures scheduled in the future at this time 11/4 colonoscopy, needs holding instructions --> cardiology would like pt to hold off on procedures until 3 months --> colonoscopy was cancelled, r/s for December 29, hold 5 days and bridge. Was instructed to get INR on Mon 12/26 to determine INR, start lovenox    []   [x]       Missed Dose -denies  []   [x]       Extra Dose- denies   []   [x]       Change in medications- states that MD had been adjusting phenytoin increasing 4-5 weeks ago but plans to return to normal phenytoin today  --->back to normal dose --> no changes   []   [x]       Change in health/diet/appetite -states normally eats a lot of vegetable soup, but has not had any greens recently ---> no greens   []   [x]       Change in alcohol use- doesn't drink or smoke   []   [x]       Change in activity  []   [x]       Hospital admission  []   [x]       Emergency department visit  []   [x]       Other complaints- had COVID about 3 weeks ago, improving     Clinical Outcomes:  Yes     No  []   [x]       Major bleeding event  []   [x]       Thromboembolic event    Duration of warfarin Therapy: indefinitely   INR Range:  2.0-3.0    Patient checks INR at home and reports to cardiology who scans results and forwards to us so we can call patient and dose them as a VV under the current PHE. Patient was called and appointment was conducted via telephone. [phone]     Patient has 5mg and 2.5mg tablets. INR was 2.1 today  Continue to take 5mg on Fridays only and 2.5mg all other days.  Start holding 12/24 for procedure 12/29. Take 5mg on Fridays only and 2.5mg all other days. Start holding 12/24 for 5 days. Begin lovenox shots on 12/25 in the evening. Inject twice daily. Do not inject lovenox on 12/28 in the evening or 12/29 in the morning. Resume injections 12/29 in the evening until therapeutic. Resume warfarin 12/29 and take 7.5mg for 2 days and then return to normal dose. Encouraged to maintain a consistency of vegetables/salads.    Recheck INR on 1/3/2023    Referring Dr. Cat Dose  INR (no units)   Date Value   12/20/2022 2.10   12/06/2022 2.40   11/29/2022 2.60   11/18/2022 1.80     For Pharmacy Admin Tracking Only    Intervention Detail: Dose Adjustment: 1, reason: Therapy De-escalation, Therapy Optimization  Total # of Interventions Recommended: 1  Total # of Interventions Accepted: 1  Time Spent (min): 20

## 2022-12-22 ENCOUNTER — TELEPHONE (OUTPATIENT)
Dept: PHARMACY | Age: 77
End: 2022-12-22

## 2022-12-22 NOTE — TELEPHONE ENCOUNTER
Pt called to say the procedure was cancelled on 12/29 and will be postponed, date TBD. He will call back when rescheduled. Told pt to disregard bridging and holding schedule then and to continue his normal dose until we recheck 1/3.     Jaye Harrison, PharmD, [facility]

## 2022-12-23 ENCOUNTER — TELEPHONE (OUTPATIENT)
Dept: CARDIOLOGY CLINIC | Age: 77
End: 2022-12-23

## 2022-12-23 DIAGNOSIS — I25.5 ISCHEMIC CARDIOMYOPATHY: Chronic | ICD-10-CM

## 2022-12-23 DIAGNOSIS — I25.83 CORONARY ARTERY DISEASE DUE TO LIPID RICH PLAQUE: ICD-10-CM

## 2022-12-23 DIAGNOSIS — I25.10 CORONARY ARTERY DISEASE DUE TO LIPID RICH PLAQUE: ICD-10-CM

## 2022-12-23 RX ORDER — SPIRONOLACTONE 25 MG/1
12.5 TABLET ORAL DAILY
Qty: 45 TABLET | Refills: 3 | Status: SHIPPED | OUTPATIENT
Start: 2022-12-23

## 2022-12-23 NOTE — TELEPHONE ENCOUNTER
Last OV: 11/29/2022 Karin  Last Labs: BMP 11/29/2022     Next OV:  01/03/2023  karin  Last Refill: 08/11/2022

## 2022-12-23 NOTE — TELEPHONE ENCOUNTER
Medication Refill    Medication needing refilled:  spironolactone (ALDACTONE) 25 MG tablet     Dosage of the medication:    How are you taking this medication (QD, BID, TID, QID, PRN): 0.5 tablet a day    30 or 90 day supply called in: 90 w/3 refill per pt request    When will you run out of your medication:    Which Pharmacy are we sending the medication to?:  [address], [address] [phone] - f [phone]   [facility] [id number]   Phone:  [phone]  Fax:  [phone]

## 2022-12-28 ENCOUNTER — TELEPHONE (OUTPATIENT)
Dept: CARDIOLOGY CLINIC | Age: 77
End: 2022-12-28

## 2022-12-28 DIAGNOSIS — I10 ESSENTIAL HYPERTENSION: Chronic | ICD-10-CM

## 2022-12-28 DIAGNOSIS — I25.5 ISCHEMIC CARDIOMYOPATHY: Chronic | ICD-10-CM

## 2022-12-28 PROBLEM — Z01.810 ENCOUNTER FOR PRE-OPERATIVE CARDIOVASCULAR CLEARANCE: Status: RESOLVED | Noted: 2022-08-26 | Resolved: 2022-12-28

## 2022-12-28 RX ORDER — METOPROLOL SUCCINATE 100 MG/1
TABLET, EXTENDED RELEASE ORAL
Qty: 90 TABLET | Refills: 3 | Status: SHIPPED | OUTPATIENT
Start: 2022-12-28

## 2022-12-28 NOTE — LETTER
[facility]  EP Procedure Sheet    12/29/22  Bekah Barrie Day  1945  EP Procedures  [] Pacemaker implant (single/dual) [] EP Study   [] ICD implant (single/dual) [] Atrial flutter ablation (LATESHA Y/N)   [] Biv implant ICD [] Tilt Table   [] Biv implant PPM [] Atrial fibrillation ablation (LATESHA Yes)   [] Generator Change (PPM/ICD/BiV) [] SVT ablation   [] Lead revision (RV/LA/RA) (<1 month) [] PVC ablation     [] Lead extraction +/- upgrade (BiV/PPM/ICD) [] VT Ischemic/ non-ischemic   [] Loop implant/ removal [] VT RVOT   [x] Cardioversion [] VT Left sided   [] LATESHA [] AVN ablation   Equipment  [] [employer]  [] NASIM Mapping System   [] [place] [] [address]   [] [employer] [] CryoAblation   [] Biotronik [] Laser Lead Extraction   EP Procedures Scheduling Request  # hours Requested  []1 []2 []2-4 [] 4-6 Scheduled  Date:   Specific Day  Completed    Anesthesia []yes [x]no F/u Date:   CT surgery backup []yes [x]no COVID     Overnight stay      Performing MD  [x]RMM []MXA   []MKW [] CMV First vs repeat   []1st [] 2nd [] 3rd   Pre-Procedure Labs / Imaging  [] PT/INR [] Type & cross   [] CBC [] Units PRBC   [] BMP/Mg [] Units FFP   [] Venogram [] Cardiac CTA for Pulmonary vein mapping     RN INITIALS: KBLPN    Patient Instructions  Do not eat or drink after midnight the night prior to procedure  Dx:PAF  ICD-10 code: I48.0

## 2022-12-29 NOTE — TELEPHONE ENCOUNTER
Yes. We can arrange repeat cardioversion in the interim if he would like.     Tata Aldrich, BETO-CNP

## 2022-12-29 NOTE — TELEPHONE ENCOUNTER
Called pt to schedule the Ablation will be on 4-6-23. While discussing he stated he thought he was back in AFIB and wanted to know if he should have a CV prior? Please advise. Thank you.

## 2023-01-03 ENCOUNTER — TELEPHONE (OUTPATIENT)
Dept: CARDIOLOGY CLINIC | Age: 78
End: 2023-01-03

## 2023-01-03 NOTE — TELEPHONE ENCOUNTER
Called to speak with patient. He is still In atrial fibrillation.      Please call and let yovana know

## 2023-01-03 NOTE — TELEPHONE ENCOUNTER
Pt states he has a CV scheduled Friday 1/6/23 and is going to send a remote transmission to make sure he is still in Afib. Also asking if a new PT/INR order can be faxed to [facility] so he can go closer to home.

## 2023-01-05 LAB — INR BLD: 2.1

## 2023-01-06 ENCOUNTER — HOSPITAL ENCOUNTER (OUTPATIENT)
Dept: CARDIAC CATH/INVASIVE PROCEDURES | Age: 78
Discharge: HOME | End: 2023-01-06
Attending: INTERNAL MEDICINE | Admitting: INTERNAL MEDICINE
Payer: MEDICARE

## 2023-01-06 ENCOUNTER — TELEPHONE (OUTPATIENT)
Dept: CARDIOLOGY CLINIC | Age: 78
End: 2023-01-06

## 2023-01-06 ENCOUNTER — ANTI-COAG VISIT (OUTPATIENT)
Dept: PHARMACY | Age: 78
End: 2023-01-06

## 2023-01-06 VITALS
OXYGEN SATURATION: 98 % | WEIGHT: 157 LBS | HEIGHT: 70 IN | RESPIRATION RATE: 16 BRPM | BODY MASS INDEX: 22.48 KG/M2 | DIASTOLIC BLOOD PRESSURE: 74 MMHG | SYSTOLIC BLOOD PRESSURE: 125 MMHG | HEART RATE: 62 BPM

## 2023-01-06 DIAGNOSIS — I48.0 PAF (PAROXYSMAL ATRIAL FIBRILLATION) (HCC): Chronic | ICD-10-CM

## 2023-01-06 DIAGNOSIS — I48.0 PAROXYSMAL ATRIAL FIBRILLATION (HCC): Primary | ICD-10-CM

## 2023-01-06 DIAGNOSIS — I25.5 ISCHEMIC CARDIOMYOPATHY: Chronic | ICD-10-CM

## 2023-01-06 LAB
EKG ATRIAL RATE: 54 BPM
EKG DIAGNOSIS: NORMAL
EKG P AXIS: 46 DEGREES
EKG P-R INTERVAL: 212 MS
EKG Q-T INTERVAL: 424 MS
EKG QRS DURATION: 92 MS
EKG QTC CALCULATION (BAZETT): 402 MS
EKG R AXIS: -51 DEGREES
EKG T AXIS: 31 DEGREES
EKG VENTRICULAR RATE: 54 BPM

## 2023-01-06 PROCEDURE — 93005 ELECTROCARDIOGRAM TRACING: CPT | Performed by: INTERNAL MEDICINE

## 2023-01-06 PROCEDURE — 92960 CARDIOVERSION ELECTRIC EXT: CPT | Performed by: INTERNAL MEDICINE

## 2023-01-06 PROCEDURE — 85610 PROTHROMBIN TIME: CPT

## 2023-01-06 PROCEDURE — 92960 CARDIOVERSION ELECTRIC EXT: CPT

## 2023-01-06 PROCEDURE — 93010 ELECTROCARDIOGRAM REPORT: CPT | Performed by: INTERNAL MEDICINE

## 2023-01-06 PROCEDURE — 7100000010 HC PHASE II RECOVERY - FIRST 15 MIN

## 2023-01-06 RX ORDER — SODIUM CHLORIDE 0.9 % (FLUSH) 0.9 %
5-40 SYRINGE (ML) INJECTION PRN
Status: DISCONTINUED | OUTPATIENT
Start: 2023-01-06 | End: 2023-01-06 | Stop reason: HOSPADM

## 2023-01-06 RX ORDER — AMIODARONE HYDROCHLORIDE 200 MG/1
TABLET ORAL
Qty: 90 TABLET | Refills: 3 | Status: SHIPPED | OUTPATIENT
Start: 2023-01-06

## 2023-01-06 NOTE — H&P
[address]   Electrophysiology Follow up   Date: 1/6/2023  I had the privilege of visiting Tavo Mathis in the office. CC: PVCs    HPI: Tavo Mathis is a 78 y.o. male with a past medical history of HTN, HLD, CAD s/p CABG (redo 2019), seizures, bradycardia, and atrial fibrillation. NSTEMI 10/13/2017. Has been treated with Tikosyn in the past for afib. Initially did not tolerate amiodarone due to bradycardia. [facility], WESTON ligation, and LA maze 1/8/2019.       01/27/2020:  Atrial fibrillation. Device interrogation showed he had been back in atrial fibrillation since 01/09/2020. He also had an increase in his PVC rate to 250/hour. S/p DCCV 01/29/2020     LATESHA with no remnant of WESTON. Ablation discussed but he was not interested. 3/25/2022 presented to [facility] ED with complaints of chest/abdomiinal pain. Felt fluttering in his abdomen. ECG and troponin both negative. CTA completed as an inpatient. Patient discharged. Cardiac CTA completed 3/25/2022: recommended cardiac cath, followed by , patient will be scheduled as an outpatient     [facility] 03/30/2022     Recurrent atrial fibrillation  - s/p successful DCCV 07/28/2022       Covid infection 09/2022 -  c/o increased PVCs at night. Advised to wait for recovery from Arianne Males presents today in follow up regarding PVCs and atrial fibrillation. Feels well from a cardiac perspective. He states he felt better when he had covid than before! He had atrial fibrillation and was symptomatic and had cardioversion done. Assessment and plan:    -PAF              ECG today shows : Ventricular bigeminy rate 57               AT/AF burden per device 19.4%              Contine  toprol  mg daily for rate control                 On Amiodarone 200 mg daily     ~ ALT 51,AST 33 09/16/2022     ~ TSH 6.41 03/05/2022               He remains on coumadin due to CAD .  From an Afib standpoint he does not need to be on this. Symptomatic with episodes                            S/p DCCV 1/29/2021                 He has not been  not interested in Ablation in the past.      WESTON ligation 1/8/2018 during CABG, LATESHA 1/29/2021 No remnant of Left atrial appendage was seen , Patient remains on coumadin managed by Urbano PÉREZ-CNP                 Discussed that his risk of stroke is low with no remnant of WESTON, It would be reasonable for him to take him off coumadin.      -We discussed treatment options including antiarrhythmics, rate control with anticoagulation, and ablation.   -We discussed progressive nature of atrial fibrillation. Treatment success decreases when AF becomes persistent and last more than 6 months.    -Antiarrhythmic therapy, side effects, benefits and alternative discussed.     -Atrial fibrillation ablation procedure was discussed. We discussed the need for repeat procedure. On average patients may need more than one ablation procedure.     -Risks associated with ablation include but not limited to allergic reaction to the medications, pain, bleeding, infection, nerve injury, injury to diaphragm(breathing muscle), pulmonary embolus(blood clot in lungs), deep vein blood clot, pneumothorax, hemothorax, acute renal failure, cardiac perforation,  tamponade, need for emergent surgery (open heart), permanent pacemaker, pulmonary vein stenosis, left atrial to esophageal fistula, stroke, myocardial infarction and death. Difference between atrial fibrillation and atrial flutter discussed and treatment discussed. -  He has had recurrent and long episodes of atrial fibrillation > 99 hours . We discussed treatment options including repeat ablation. He  would like to proceed with atrial fibrillation ablation. Today he is here for cardioversion. Risks, benefits and alternative of procedure were explained. All questions answered. Patient understood and would like to proceed.          -Ischemic Cardiomyopathy/NSVT/Implantable device              S/p dual chamber  AICD 12/08/2017              The CIED was interrogated and programmed and I supervised and reviewed all the data. All findings and changes are in device interrogation sheet and reflect my personal interpretation and changes and is scanned to Epic. 3.9  years remaining, AP 67.5% ,  9.8%   19.4 % AT/AF burden per device interrogation today. Most recent 09/11/2022 - longest > 99 ours. - PVC's   PVC on Interrogation today 142 per hour down from 173 per  hour                 Frequent multiform PVC's on ECG in the past               He feels his PVCs               Continues amiodarone 200 mg daily              [facility] 3/30/2022  - PCI to [address] to LAD, SVG to OM, SVG to PDA                  If negative for ischemia may consider ablation in the future. His burden is low at this time. If these become bothersome we can consider ablation         - Bradycardia:    We discussed increasing pacing rate to 60 bpm, but he is not interested in. He felt palpitation when base rate set at 60 bpm.    Also not interested in rate response. - CAD               [facility] 3/30/2022  - PCI to [address] to LAD, SVG to OM, SVG to PDA                 Cardiac CTA completed 3/25/2022: recommended cardiac cath, followed by Dr.Washko Sandhu of [place] 10/2018 s/p re-do CABG 01/08/ 2019 at [address] medical therapy     -HTN  Borderline 130/78   BP goal <130/80  Home BP monitoring encouraged, printed information provided on how to accurately measure BP at home. Counseled to follow a low salt diet to assure blood pressure remains controlled for cardiovascular risk factor modification. The patient is counseled to get regular exercise 3-5 times per week and maintain a healthy weight reduce cardiovascular risk factors.              Patient Active Problem List    Diagnosis Date Noted    Lightheadedness     Chest pain at rest 06/07/2010    PVC (premature ventricular contraction) 12/08/2022    Acute deep vein thrombosis (DVT) of axillary vein ([address] 11/28/2022    Chest pain 09/16/2022    S/P CABG (coronary artery bypass graft) 09/16/2022    CAD in native artery 09/16/2022    Irregular heartbeat 08/26/2022    DVT of deep femoral vein, right ([address] 08/26/2022    JOSEPH on CPAP 11/20/2017    Vertebrobasilar insufficiency     ST elevation myocardial infarction (STEMI) (HCC)     NSVT (nonsustained ventricular tachycardia)     Coronary artery disease involving coronary bypass graft of native heart without angina pectoris     Bradycardia 09/28/2016    Palpitations 01/14/2016    Mixed hyperlipidemia 12/13/2012    Atrial fibrillation ([address] 06/27/2012    Coronary artery disease due to lipid rich plaque 06/07/2010    Seizure ([address] 06/07/2010    Essential hypertension 06/07/2010    PAF (paroxysmal atrial fibrillation) ([address]     Coronary artery disease involving native coronary artery of native heart with unstable angina pectoris ([address] 03/30/2022    Nausea 03/25/2022    Variant angina ([address] 08/08/2019    Chronic systolic heart failure ([address] 05/22/2019    Coronary artery disease involving autologous artery coronary bypass graft without angina pectoris     Shortness of breath     ICD (implantable cardioverter-defibrillator) in place 01/09/2018    VT (ventricular tachycardia) 12/18/2017    History of MI (myocardial infarction)     Ischemic cardiomyopathy     VF (ventricular fibrillation) ([address]      Diagnostic studies:   ECG 12/12/22  SR with bigeminy  QTcH 399 ,     Echo 08/11/2022    Summary   Normal left ventricular size. Mild concentric left ventricular hypertrophy. Mildly decreased left   ventricular systolic function with distal septal and apical hypokinesis. Estimated EF 45%. E/e'=14. Grade III diastolic dysfunction with elevated LV filling pressures. Mildly thickened mitral valve leaflets, no stenosis. Moderate-severe mitral regurgitation is present.  No pulmonary vein reversal.   The left atrium is dilated. The aortic valve appears tricuspid with mild sclerosis no stenosis. Trivial aortic regurgitation is present. Pressure half-time is calculated at   591 msec. Moderate to severe tricuspid regurgitation. RVSP 50mmHg. Normal right ventricular size and mildly decreased function. The right atrium is dilated. Pacemaker / ICD lead is visualized in the right   atrium. Trivial pulmonic regurgitation. IVC not well visualized. [address] [place] [address] 03/30/2022    Cardiac Cath PCI:  Anatomy:   LM-nml   LAD-prox 100%   Cx-nml  OM- prox 100%  RCA-mid 100%  RPDA-   LVEF-   LVG-   LVEDP- 4  SVG to PDA prox 90%  SVG to OM/Diag mid 90%  Free IAN to LAD prox anastomosis 99%     Intervention  ~Successful PCI to [address] to LAD with 3.0x12 PAYAL. PCI to SVG to OM with 4.0x38 PAYAL. PD with 4.5x15 NC. Filter wire used. PCI to SVG to PDA with 4.0x38 PAYAL Excellent Result. Contrast: 206  Flouro Time: 31.8  Access: R radial a     Impression  ~Coronary Angiography w/ Multi severe vessel CAD and graft CAD     ~Successful complex angioplasty and stenting of IAN to LAD, SVG to OM, SVG to PDA           Recommendation  ~Aggressive medical treatment and risk factor modification  ~1. Post cath IVF. Bedrest.   2. Recommend beta blocker, arb/ace, high potency statin, aspirin and plavix. 3. Referral to outpatient cardiac rehab phase II will be deferred until patient follow-up in office and then determine patient safety and appropriateness to proceed  4. Patient has been advised on the importance of regular exercise of at least 20-30 minutes daily. 5. Patient counseled about and offered assistance for smoking cessation   6. Monitor overnight. Echo 04/15/2022   Summary   Mildly dilated left ventricle. Mild concentric left ventricular hypertrophy. Mildly decreased left ventricular systolic function with distal septal and   apical hypokinesis. Estimated EF 40-45%.    Cannot assess diastolic function. Mild thickening of the mitral valve leaflets. No stenosis. Moderate mitral regurgitation. The left atrium is mildly dilated. The aortic valve appears tricuspid with mild sclerosis no stenosis. Trivial aortic regurgitation. Normal right ventricular size and mildly decreased in function. Moderate tricuspid regurgitation. PASP 41mmHg. The right atrium is moderately dilated. Pacemaker / ICD lead is visualized   in the right atrium. Trivial pulmonic regurgitation. IVC not well visualized. I independently reviewed the cardiac diagnostic studies, ECG and relevant imaging studies. Lab Results   Component Value Date    LVEF 45 08/11/2022    LVEFMODE Echo 10/14/2017     Lab Results   Component Value Date    TSH 6.41 (H) 03/25/2022         Physical Examination:  Vitals:    01/06/23 0943   BP: 125/74   Pulse: 62   Resp: 16   SpO2: 98%      Wt Readings from Last 3 Encounters:   01/06/23 157 lb (71.2 kg)   12/12/22 157 lb 6.4 oz (71.4 kg)   11/29/22 159 lb 12.8 oz (72.5 kg)       Constitutional: Oriented. No distress. Head: Normocephalic and atraumatic. Mouth/Throat: Oropharynx is clear and moist.   Eyes: Conjunctivae normal. EOM are normal.   Neck: Neck supple. No JVD present. Cardiovascular: Normal rate, regular rhythm, S1&S2. Pulmonary/Chest: Bilateral respiratory sounds. No rhonchi. Abdominal: Soft. No tenderness. Musculoskeletal: No tenderness. No edema    Lymphadenopathy: Has no cervical adenopathy. Neurological: Alert and oriented. Follows command, No Gross deficit   Skin: Skin is warm, No rash noted. Psychiatric: Has a normal behavior       Review of System:  [x] Full ROS obtained and negative except as mentioned in HPI    Prior to Admission medications    Medication Sig Start Date End Date Taking?  Authorizing Provider   metoprolol succinate (TOPROL XL) 100 MG extended release tablet TAKE 1 TABLET IN THE MORNING 12/28/22   Nick Arriaga DO spironolactone (ALDACTONE) 25 MG tablet Take 0.5 tablets by mouth daily 12/23/22   Guillaume Herrera, DO   sacubitril-valsartan (ENTRESTO) 24-26 MG per tablet Take 0.5 tablets by mouth 2 times daily 12/7/22   Guillaume Herrera, DO   KLOR-CON M20 20 MEQ extended release tablet TAKE 1 TABLET AS NEEDED WITH LASIX FOR SWELLING 11/29/22   Guillaume Herrera, DO   warfarin (COUMADIN) 5 MG tablet Take 5 mg by mouth once a week Takes on Fridays    Historical Provider, MD   pantoprazole (PROTONIX) 40 MG tablet Take 1 tablet by mouth every morning (before breakfast) 9/21/22   Guillaume Herrera, DO   warfarin (COUMADIN) 2.5 MG tablet Take 2 tabs by mouth daily or as directed by provider based on INR  Patient taking differently: 2.5 mg Six times weekly 9/21/22   Guillaume Herrera, DO   furosemide (LASIX) 40 MG tablet Take 1 tablet by mouth in the morning and 1 tablet in the evening. Taking prn for fluid overload/swelling. Patient taking differently: Take 40 mg by mouth as needed (fluid overload/swelling) Taking prn for fluid overload/swelling 8/11/22   Guillaume Herrera, DO   amiodarone (CORDARONE) 200 MG tablet TAKE 1 TABLET DAILY 8/11/22   Guillaume Herrera, DO   rosuvastatin (CRESTOR) 40 MG tablet Take 1 tablet by mouth nightly 8/11/22   Guillaume Herrera, DO   clopidogrel (PLAVIX) 75 MG tablet Take 1 tablet by mouth in the morning. Patient taking differently: Take 75 mg by mouth every evening 7/20/22   Guillaume Herrera, DO   magnesium oxide (MAG-OX) 400 (240 Mg) MG tablet TAKE ONE TABLET BY MOUTH DAILY 5/9/22   Guillaume Herrera, DO   ezetimibe (ZETIA) 10 MG tablet Take 1 tablet by mouth daily  Patient taking differently: Take 10 mg by mouth every evening 4/12/22   Guillaume Herrera, DO   nitroGLYCERIN (NITROSTAT) 0.4 MG SL tablet Place 1 tablet under the tongue every 5 minutes as needed for Chest pain 2/24/22   Guillaume Herrera, DO   diazepam (VALIUM) 5 MG tablet Take 5 mg by mouth in the morning and 5 mg in the evening.     Historical Provider, MD   phenytoin (DILANTIN) 100 MG ER capsule Take 1 capsule by mouth 2 times daily. Resume home dose 12/21/12   Lisset Simmons APRN - CNP   primidone (MYSOLINE) 250 MG tablet Take 250 mg by mouth 1 tablet AM, half tablet PM    Historical Provider, MD       No Known Allergies    Social History:  Reviewed. reports that he quit smoking about 34 years ago. His smoking use included cigarettes. He has a 30.00 pack-year smoking history. He has never used smokeless tobacco. He reports that he does not drink alcohol and does not use drugs. Family History:  Reviewed. Reviewed. No family history of SCD. Relevant and available labs, and cardiovascular diagnostics reviewed. Reviewed. I independently reviewed relevant and available cardiac diagnostic tests ECG, CXR, Echo, Stress test, Device interrogation, Holter, CT scan. All questions and concerns were addressed to the patient/family. Alternatives to my treatment were discussed. I have discussed the above stated plan and the patient verbalized understanding and agreed with the plan. NOTE: This report was transcribed using voice recognition software. Every effort was made to ensure accuracy, however, inadvertent computerized transcription errors may be present. Bimal Solomon MD, MPH  [facility]   Office: [phone]  Fax: [phone]        H&P Update    I have reviewed the history and physical and examined the patient and updated with relevant changes. Consent: I have discussed with the patient and/or the patient representative the indication, alternatives, and the possible risks and/or complications of the planned procedure and the anesthesia methods. The patient and/or patient representative appear to understand and agree to proceed. Vitals:    01/06/23 0943   BP: 125/74   Pulse: 62   Resp: 16   SpO2: 98%     Prior to Admission medications    Medication Sig Start Date End Date Taking?  Authorizing Provider   metoprolol succinate (TOPROL XL) 100 MG extended release tablet TAKE 1 TABLET IN THE MORNING 12/28/22   Jaydon Frazier, DO   spironolactone (ALDACTONE) 25 MG tablet Take 0.5 tablets by mouth daily 12/23/22   Jaydon Frazier, DO   sacubitril-valsartan (ENTRESTO) 24-26 MG per tablet Take 0.5 tablets by mouth 2 times daily 12/7/22   Jaydon Frazier, DO   KLOR-CON M20 20 MEQ extended release tablet TAKE 1 TABLET AS NEEDED WITH LASIX FOR SWELLING 11/29/22   Jaydon Frazier,    warfarin (COUMADIN) 5 MG tablet Take 5 mg by mouth once a week Takes on Fridays    Historical Provider, MD   pantoprazole (PROTONIX) 40 MG tablet Take 1 tablet by mouth every morning (before breakfast) 9/21/22   Jaydon Frazier,    warfarin (COUMADIN) 2.5 MG tablet Take 2 tabs by mouth daily or as directed by provider based on INR  Patient taking differently: 2.5 mg Six times weekly 9/21/22   Jaydon Frazier, DO   furosemide (LASIX) 40 MG tablet Take 1 tablet by mouth in the morning and 1 tablet in the evening. Taking prn for fluid overload/swelling.  Patient taking differently: Take 40 mg by mouth as needed (fluid overload/swelling) Taking prn for fluid overload/swelling 8/11/22   Jaydon Frazier, DO   amiodarone (CORDARONE) 200 MG tablet TAKE 1 TABLET DAILY 8/11/22   Jaydon Frazier, DO   rosuvastatin (CRESTOR) 40 MG tablet Take 1 tablet by mouth nightly 8/11/22   Jaydon Frazier, DO   clopidogrel (PLAVIX) 75 MG tablet Take 1 tablet by mouth in the morning.  Patient taking differently: Take 75 mg by mouth every evening 7/20/22   Jaydon Frazier, DO   magnesium oxide (MAG-OX) 400 (240 Mg) MG tablet TAKE ONE TABLET BY MOUTH DAILY 5/9/22   Jaydon Frazier, DO   ezetimibe (ZETIA) 10 MG tablet Take 1 tablet by mouth daily  Patient taking differently: Take 10 mg by mouth every evening 4/12/22   Jaydon Frazier, DO   nitroGLYCERIN (NITROSTAT) 0.4 MG SL tablet Place 1 tablet under the tongue every 5 minutes as needed for Chest pain 2/24/22   Jaydon Frazier, DO   diazepam (VALIUM) 5 MG tablet Take 5 mg  by mouth in the morning and 5 mg in the evening. Historical Provider, MD   phenytoin (DILANTIN) 100 MG ER capsule Take 1 capsule by mouth 2 times daily. Resume home dose 12/21/12   BETO Gary - CNP   primidone (MYSOLINE) 250 MG tablet Take 250 mg by mouth 1 tablet AM, half tablet PM    Historical Provider, MD     Past Medical History:   Diagnosis Date    Arthritis     shoulders and knee    Atrial fib/flut     CAD (coronary artery disease)     DVT (deep venous thrombosis) [facility] 08/24/2022    RLE    Hyperlipidemia     Hypertension     Seizures ([facility]     last seizure 1985    Sinus bradycardia     Sleep apnea     uses CPAP     Past Surgical History:   Procedure Laterality Date    CARDIAC SURGERY      , angioplasty 2007    CARDIOVERSION  06/02/2022    CORONARY ANGIOPLASTY WITH STENT PLACEMENT  1997    CORONARY ARTERY BYPASS GRAFT  01/2019    [facility]    CORONARY STENT PLACEMENT  03/30/2022    3/30/22 Successful complex angioplasty and stenting of IAN to LAD, SVG to OM, SVG to PDA    CYSTOSCOPY N/A 11/13/2019    FLEXIBLE CYSTOSCOPY performed by Teddy Beltran MD at [address]     done 65 sam ago    PACEMAKER PLACEMENT  12/18/2017    WISDOM TOOTH EXTRACTION  04/2012     No Known Allergies    Pre-Sedation Documentation and Exam:   I have personally completed a history, physical exam & review of systems for this patient (see notes).     Mallampati Airway Assessment:  Class II     Prior History of Anesthesia Complications:   None    ASA Classification:  Class 3 - A patient with severe systemic disease that limits activity but is not incapacitating    Sedation/ Anesthesia Plan:   Intravenous sedation    Medications Planned:   Brevital intravenously     Patient is an appropriate candidate for plan of sedation:   Yes    Electronically signed by Zoya Albrecht MD on 1/6/2023 at 10:31 AM

## 2023-01-06 NOTE — DISCHARGE INSTRUCTIONS
CARDIOVERSION DISCHARGE INSTRUCTIONS    No driving for 24 hours. We strongly recommend that a responsible adult stay with you for the next 24 hours. Continue Coumadin. Hydrocortisone 1% cream to reddened areas as needed. Take Amiodarone 200mg twice daily up until ablation.
DISCHARGE

## 2023-01-06 NOTE — PROCEDURES
[address]     Electrophysiology Procedure Note       Date of Procedure: 1/6/2023  Patient's Name: Syed Mathis  YOB: 1945   Medical Record Number: [id number]  Procedure Performed by: Nan Rosen MD    Procedures performed:    External Electrical cardioversion   IV sedation. Start time: 10:27 AM  Stop time: 10: 30 AM    Medication: Brevital Sodium   Sedation medication was given by physician     Indication of the procedure: Persistent atrial fibrillation     Details of procedure: The patient was brought to the cath lab area in a fasting and non-sedated state. The risks, benefits and alternatives of the procedure were discussed with the patient. The patient opted to proceed with the procedure. Written informed consent was signed and placed in the chart. A timeout protocol was completed to identify the patient and the procedure being performed. Then we used brevital for sedation. 70 mg Brevital was given by me as one dose, and electrical DC cardioversion was perfomred using 200J, synchronized shock. Patient was converted to sinus rhythm. The patient tolerated the procedure well and there were no complications.      Conclusion:   Successful external DC cardioversion of atrial fibrillation

## 2023-01-06 NOTE — PROGRESS NOTES
Mr. Lizbet Mathis is a 78 y.o. y/o male with history of Afib who presents today for anticoagulation monitoring and adjustment. Pertinent PMH:    Patient Reported Findings:  Yes     No  [x]   []       Patient verifies current dosing regimen as listed- takes 5mg Sun and Wed and 2.5mg AOD---> confirms   []   [x]       S/S bleeding/bruising/swelling/SOB- denies   []   [x]       Blood in urine or stool- denies   [x]   []       Procedures scheduled in the future at this time 11/4 colonoscopy, needs holding instructions --> cardiology would like pt to hold off on procedures until 3 months --> colonoscopy was cancelled, r/s for December 29, hold 5 days and bridge. Was instructed to get INR on Mon 12/26 to determine INR, start lovenox    []   [x]       Missed Dose -denies  []   [x]       Extra Dose- denies   [x]   []       Change in medications- states that MD had been adjusting phenytoin increasing 4-5 weeks ago but plans to return to normal phenytoin today  --->back to normal dose --> starting amiodarone 400 mg x 2 weeks today   []   [x]       Change in health/diet/appetite -states normally eats a lot of vegetable soup, but has not had any greens recently ---> no greens   []   [x]       Change in alcohol use- doesn't drink or smoke   []   [x]       Change in activity  []   [x]       Hospital admission  []   [x]       Emergency department visit  []   [x]       Other complaints- had COVID about 3 weeks ago, improving     Clinical Outcomes:  Yes     No  []   [x]       Major bleeding event  []   [x]       Thromboembolic event    Duration of warfarin Therapy: indefinitely   INR Range:  2.0-3.0    Patient checks INR at home and reports to cardiology who scans results and forwards to us so we can call patient and dose them as a VV under the current PHE. Patient was called and appointment was conducted via telephone. [phone]     Patient has 5mg and 2.5mg tablets.     INR was 2.1 yesterday from [facility] lab. Pt starting amiodarone which will increase INR but at bottom of therapeutic range. Continue to take 5mg on Fridays only and 2.5mg all other days. Pt procedure r/s for 1/27   Encouraged to maintain a consistency of vegetables/salads.    Recheck INR in 2 weeks 1/19    Referring Dr. Edgar Barrera  INR (no units)   Date Value   12/20/2022 2.10   12/06/2022 2.40   11/29/2022 2.60   11/18/2022 1.80     For Pharmacy Admin Tracking Only    Total # of Interventions Recommended: 0  Total # of Interventions Accepted: 0  Time Spent (min): 15

## 2023-01-08 LAB
EKG ATRIAL RATE: 30 BPM
EKG DIAGNOSIS: NORMAL
EKG Q-T INTERVAL: 434 MS
EKG QRS DURATION: 142 MS
EKG QTC CALCULATION (BAZETT): 504 MS
EKG R AXIS: 8 DEGREES
EKG T AXIS: 234 DEGREES
EKG VENTRICULAR RATE: 81 BPM

## 2023-01-08 PROCEDURE — 93010 ELECTROCARDIOGRAM REPORT: CPT | Performed by: INTERNAL MEDICINE

## 2023-01-09 LAB — INR BLD: 2.2 (ref 0.88–1.12)

## 2023-01-16 NOTE — PROGRESS NOTES
Patient reached __X__ yes  _____ no   VM instructions left ____ yes   phone number ________                                ____ no-office notified          Date _1/27/23________  Time __1430_____  Arrival 1300  hosp-endo______    Nothing to eat or drink after midnight-follow your doctors prep instructions-this may include taking a second dose of your prep after midnight  Responsible adult 25 or older to stay on site while you are here-drive you home-stay with you after  Follow any instructions your doctors office has given you  Bring a complete list of all your medications and supplements including name,dose,how often taken the day of your procedure  If you normally take the following medications in the morning please do so the AM of your procedure with a small sip of water       Heart,blood pressure,seizure,thyroid or breathing medications-use your inhalers-bring any rescue inhalers with you DOS       DO NOT take blood pressure medications ending in \"leslie\" or \"pril\" the AM of procedure or evening prior  Dr Margareth Saavedra patients are not to take any medications the AM of surgery  Take half or your normal dose of any long acting insulins the night before your procedure-do not take any diabetic medications the AM of procedure-CHECK COUMADIN,  PLAVIX, BRIDGE WITH LOVENOX  Follow your doctors instructions regarding stopping or taking  any blood thinners-if you do not have instructions-call them  Any questions call your doctor-DO NOT TAKE ENTRESTO  Other _take amiodarone, dilantin, primidone, metoprolol am of procedure_____________________________________________________________    VISITOR POLICY(subject to change)             The current policy is 2 visitors per patient. There are no children allowed. Mask at discretion of facility. Visiting hours are 8a-8p. Overnight visitors will be at the discretion of the nurse. All policies are subject to change.

## 2023-01-19 LAB — INR BLD: 2.8

## 2023-01-20 ENCOUNTER — ANTI-COAG VISIT (OUTPATIENT)
Dept: PHARMACY | Age: 78
End: 2023-01-20

## 2023-01-20 DIAGNOSIS — I48.0 PAROXYSMAL ATRIAL FIBRILLATION (HCC): Primary | ICD-10-CM

## 2023-01-20 NOTE — PROGRESS NOTES
Mr. Efren Mathis is a 78 y.o. y/o male with history of Afib who presents today for anticoagulation monitoring and adjustment. Pertinent PMH:    Patient Reported Findings:  Yes     No  [x]   []       Patient verifies current dosing regimen as listed- takes 5mg Sun and Wed and 2.5mg AOD---> confirms   []   [x]       S/S bleeding/bruising/swelling/SOB- denies   []   [x]       Blood in urine or stool- denies   [x]   []       Procedures scheduled in the future at this time 11/4 colonoscopy, needs holding instructions --> cardiology would like pt to hold off on procedures until 3 months --> colonoscopy was cancelled, r/s for December 29, hold 5 days and bridge. Was instructed to get INR on Mon 12/26 to determine INR, start lovenox --> colonoscopy r/s for 1/27   []   [x]       Missed Dose -denies  []   [x]       Extra Dose- denies   []   [x]       Change in medications- states that MD had been adjusting phenytoin increasing 4-5 weeks ago but plans to return to normal phenytoin today  --->back to normal dose --> starting amiodarone 400 mg x 2 weeks today --> no changes   []   [x]       Change in health/diet/appetite -states normally eats a lot of vegetable soup, but has not had any greens recently ---> no greens --> no changes   []   [x]       Change in alcohol use- doesn't drink or smoke   []   [x]       Change in activity  []   [x]       Hospital admission  []   [x]       Emergency department visit  []   [x]       Other complaints-       Clinical Outcomes:  Yes     No  []   [x]       Major bleeding event  []   [x]       Thromboembolic event    Duration of warfarin Therapy: indefinitely   INR Range:  2.0-3.0    Patient checks INR at home and reports to cardiology who scans results and forwards to us so we can call patient and dose them as a VV under the current PHE. Patient was called and appointment was conducted via telephone. [phone]     Patient has 5mg and 2.5mg tablets.     Pt recently started amiodarone INR was 2.8 yesterday from [facility] lab. Continue to take 5mg on  only and 2.5mg all other days. Begin holding warfarin on  until procedure on . Resume warfarin taking 7.5 mg for 2 days then return to normal weekly dose. See bridging schedule below. Reviewed bridging schedule with patient. Already has lovenox injections at home   Encouraged to maintain a consistency of vegetables/salads.    Recheck INR in 2 weeks /    Referring Dr. Kourtney Nolen  INR (no units)   Date Value   2023 2.80   2023 2.20 (H)   2023 2.10   2022 2.10     Day  Date Warfarin Dose  Lovenox Dose    5 days before 2023 Stop warfarin  8 AM: No Lovenox  8 PM: No Lovenox    4 days before 2023 No warfarin  8 AM: No Lovenox   8 PM: 70 mg injection   3 days before 2023 No warfarin  8 AM: 70 mg injection  8 PM: 70 mg injection   2 days before 2023 No warfarin  8 AM: 70 mg injection  8 PM: 70 mg injection   1 day before  2023 No Warfarin    8 AM: 70 mg injection  8 PM: NO LOVENOX   Day of procedure 2023 Take  7.5 mg of warfarin in the evening 8 AM: NO LOVENOX  8 PM: 70 mg injection   1 day after  2023 Take  7.5 mg of warfarin in the evening 8 AM: 70 mg injection  8 PM: 70 mg injection   2 days after  2023 Take 2.5 mg of warfarin in the evening 8 AM: 70 mg injection  8 PM: 70 mg injection   3 days after  2023 Take 2.5 mg of warfarin in the evening 8 AM: 70 mg injection  8 PM: 70 mg injection   4 days after  2023 Take 2.5 mg of warfarin in the evening 8 AM: 70 mg injection  8 PM: 70 mg injection   5 days after  2023 Take 2.5 mg of warfarin in the evening 8 AM: 70 mg injection  8 PM: 70 mg injection       For Pharmacy Admin Tracking Only    Intervention Detail: Adherence Monitorin and Dose Adjustment: 1, reason: Therapy Optimization  Total # of Interventions Recommended: 2  Total # of Interventions Accepted: 2  Time Spent (min): 21

## 2023-01-23 ENCOUNTER — NURSE ONLY (OUTPATIENT)
Dept: CARDIOLOGY CLINIC | Age: 78
End: 2023-01-23
Payer: MEDICARE

## 2023-01-23 DIAGNOSIS — Z95.810 ICD (IMPLANTABLE CARDIOVERTER-DEFIBRILLATOR) IN PLACE: ICD-10-CM

## 2023-01-23 DIAGNOSIS — I50.22 CHRONIC SYSTOLIC HEART FAILURE (HCC): Primary | ICD-10-CM

## 2023-01-23 DIAGNOSIS — I25.5 ISCHEMIC CARDIOMYOPATHY: Chronic | ICD-10-CM

## 2023-01-23 PROCEDURE — 93297 REM INTERROG DEV EVAL ICPMS: CPT | Performed by: CLINICAL NURSE SPECIALIST

## 2023-01-23 PROCEDURE — G2066 INTER DEVC REMOTE 30D: HCPCS | Performed by: CLINICAL NURSE SPECIALIST

## 2023-01-23 NOTE — PROGRESS NOTES
Remote transmission received from patient's dual chamber ICD home monitor. Transmission shows normal sensing and pacing function. No arrhythmias/ or events. Optivol is WNL. TriageHF Heart Failure Risk Status on 23-Jan-2023 is High    NP will review. See interrogation under cardiology tab in the [address] [place] [address] [address] field for more details. Will continue to monitor remotely. (End of 31-day monitoring period 1/23/23).

## 2023-01-24 ENCOUNTER — HOSPITAL ENCOUNTER (OUTPATIENT)
Age: 78
Setting detail: OBSERVATION
Discharge: HOME | End: 2023-01-25
Attending: EMERGENCY MEDICINE | Admitting: INTERNAL MEDICINE
Payer: MEDICARE

## 2023-01-24 ENCOUNTER — APPOINTMENT (OUTPATIENT)
Dept: GENERAL RADIOLOGY | Age: 78
End: 2023-01-24
Payer: MEDICARE

## 2023-01-24 DIAGNOSIS — R07.9 CHEST PAIN, UNSPECIFIED TYPE: Primary | ICD-10-CM

## 2023-01-24 LAB
A/G RATIO: 1.3 (ref 1.1–2.2)
ALBUMIN SERPL-MCNC: 3.9 G/DL (ref 3.4–5)
ALP BLD-CCNC: 121 U/L (ref 40–129)
ALT SERPL-CCNC: 118 U/L (ref 10–40)
ANION GAP SERPL CALCULATED.3IONS-SCNC: 7 MMOL/L (ref 3–16)
AST SERPL-CCNC: 85 U/L (ref 15–37)
BASOPHILS ABSOLUTE: 0.1 K/UL (ref 0–0.2)
BASOPHILS RELATIVE PERCENT: 1.4 %
BILIRUB SERPL-MCNC: 0.3 MG/DL (ref 0–1)
BUN BLDV-MCNC: 26 MG/DL (ref 7–20)
CALCIUM SERPL-MCNC: 8.5 MG/DL (ref 8.3–10.6)
CHLORIDE BLD-SCNC: 105 MMOL/L (ref 99–110)
CO2: 29 MMOL/L (ref 21–32)
CREAT SERPL-MCNC: 1.2 MG/DL (ref 0.8–1.3)
D DIMER: 0.34 UG/ML FEU (ref 0–0.6)
EOSINOPHILS ABSOLUTE: 0.5 K/UL (ref 0–0.6)
EOSINOPHILS RELATIVE PERCENT: 6.6 %
GFR SERPL CREATININE-BSD FRML MDRD: >60 ML/MIN/{1.73_M2}
GLUCOSE BLD-MCNC: 102 MG/DL (ref 70–99)
HCT VFR BLD CALC: 41.5 % (ref 40.5–52.5)
HEMOGLOBIN: 13.9 G/DL (ref 13.5–17.5)
INR BLD: 3 (ref 0.87–1.14)
LYMPHOCYTES ABSOLUTE: 1.5 K/UL (ref 1–5.1)
LYMPHOCYTES RELATIVE PERCENT: 21.6 %
MCH RBC QN AUTO: 31.8 PG (ref 26–34)
MCHC RBC AUTO-ENTMCNC: 33.4 G/DL (ref 31–36)
MCV RBC AUTO: 95.3 FL (ref 80–100)
MONOCYTES ABSOLUTE: 0.7 K/UL (ref 0–1.3)
MONOCYTES RELATIVE PERCENT: 9.5 %
NEUTROPHILS ABSOLUTE: 4.3 K/UL (ref 1.7–7.7)
NEUTROPHILS RELATIVE PERCENT: 60.9 %
PDW BLD-RTO: 13.5 % (ref 12.4–15.4)
PLATELET # BLD: 293 K/UL (ref 135–450)
PMV BLD AUTO: 8.3 FL (ref 5–10.5)
POTASSIUM REFLEX MAGNESIUM: 4.6 MMOL/L (ref 3.5–5.1)
PRO-BNP: 596 PG/ML (ref 0–449)
PROTHROMBIN TIME: 31.3 SEC (ref 11.7–14.5)
RBC # BLD: 4.36 M/UL (ref 4.2–5.9)
SODIUM BLD-SCNC: 141 MMOL/L (ref 136–145)
TOTAL PROTEIN: 7 G/DL (ref 6.4–8.2)
TROPONIN: <0.01 NG/ML
WBC # BLD: 7 K/UL (ref 4–11)

## 2023-01-24 PROCEDURE — G0378 HOSPITAL OBSERVATION PER HR: HCPCS

## 2023-01-24 PROCEDURE — 2580000003 HC RX 258: Performed by: INTERNAL MEDICINE

## 2023-01-24 PROCEDURE — 36415 COLL VENOUS BLD VENIPUNCTURE: CPT

## 2023-01-24 PROCEDURE — 99285 EMERGENCY DEPT VISIT HI MDM: CPT

## 2023-01-24 PROCEDURE — 93005 ELECTROCARDIOGRAM TRACING: CPT | Performed by: INTERNAL MEDICINE

## 2023-01-24 PROCEDURE — 99223 1ST HOSP IP/OBS HIGH 75: CPT | Performed by: INTERNAL MEDICINE

## 2023-01-24 PROCEDURE — 96372 THER/PROPH/DIAG INJ SC/IM: CPT

## 2023-01-24 PROCEDURE — 84484 ASSAY OF TROPONIN QUANT: CPT

## 2023-01-24 PROCEDURE — 85025 COMPLETE CBC W/AUTO DIFF WBC: CPT

## 2023-01-24 PROCEDURE — 96374 THER/PROPH/DIAG INJ IV PUSH: CPT

## 2023-01-24 PROCEDURE — 71045 X-RAY EXAM CHEST 1 VIEW: CPT

## 2023-01-24 PROCEDURE — 85610 PROTHROMBIN TIME: CPT

## 2023-01-24 PROCEDURE — 6370000000 HC RX 637 (ALT 250 FOR IP): Performed by: INTERNAL MEDICINE

## 2023-01-24 PROCEDURE — 6360000002 HC RX W HCPCS: Performed by: INTERNAL MEDICINE

## 2023-01-24 PROCEDURE — 85379 FIBRIN DEGRADATION QUANT: CPT

## 2023-01-24 PROCEDURE — 83880 ASSAY OF NATRIURETIC PEPTIDE: CPT

## 2023-01-24 PROCEDURE — 80053 COMPREHEN METABOLIC PANEL: CPT

## 2023-01-24 RX ORDER — FUROSEMIDE 40 MG/1
40 TABLET ORAL 2 TIMES DAILY
Status: DISCONTINUED | OUTPATIENT
Start: 2023-01-24 | End: 2023-01-25 | Stop reason: HOSPADM

## 2023-01-24 RX ORDER — SODIUM CHLORIDE 0.9 % (FLUSH) 0.9 %
5-40 SYRINGE (ML) INJECTION PRN
Status: DISCONTINUED | OUTPATIENT
Start: 2023-01-24 | End: 2023-01-25 | Stop reason: HOSPADM

## 2023-01-24 RX ORDER — SODIUM CHLORIDE 9 MG/ML
INJECTION, SOLUTION INTRAVENOUS PRN
Status: DISCONTINUED | OUTPATIENT
Start: 2023-01-24 | End: 2023-01-25 | Stop reason: HOSPADM

## 2023-01-24 RX ORDER — ACETAMINOPHEN 325 MG/1
650 TABLET ORAL EVERY 6 HOURS PRN
Status: DISCONTINUED | OUTPATIENT
Start: 2023-01-24 | End: 2023-01-25 | Stop reason: HOSPADM

## 2023-01-24 RX ORDER — ONDANSETRON 2 MG/ML
4 INJECTION INTRAMUSCULAR; INTRAVENOUS EVERY 6 HOURS PRN
Status: DISCONTINUED | OUTPATIENT
Start: 2023-01-24 | End: 2023-01-25 | Stop reason: HOSPADM

## 2023-01-24 RX ORDER — ONDANSETRON 4 MG/1
4 TABLET, ORALLY DISINTEGRATING ORAL EVERY 8 HOURS PRN
Status: DISCONTINUED | OUTPATIENT
Start: 2023-01-24 | End: 2023-01-25 | Stop reason: HOSPADM

## 2023-01-24 RX ORDER — POLYETHYLENE GLYCOL 3350 17 G/17G
17 POWDER, FOR SOLUTION ORAL DAILY PRN
Status: DISCONTINUED | OUTPATIENT
Start: 2023-01-24 | End: 2023-01-25 | Stop reason: HOSPADM

## 2023-01-24 RX ORDER — DIAZEPAM 5 MG/1
5 TABLET ORAL 2 TIMES DAILY
Status: DISCONTINUED | OUTPATIENT
Start: 2023-01-24 | End: 2023-01-25 | Stop reason: HOSPADM

## 2023-01-24 RX ORDER — ENOXAPARIN SODIUM 100 MG/ML
70 INJECTION SUBCUTANEOUS 2 TIMES DAILY
Status: DISCONTINUED | OUTPATIENT
Start: 2023-01-24 | End: 2023-01-25 | Stop reason: HOSPADM

## 2023-01-24 RX ORDER — PRIMIDONE 250 MG/1
125 TABLET ORAL NIGHTLY
Status: DISCONTINUED | OUTPATIENT
Start: 2023-01-24 | End: 2023-01-25 | Stop reason: HOSPADM

## 2023-01-24 RX ORDER — ATORVASTATIN CALCIUM 80 MG/1
40 TABLET, FILM COATED ORAL NIGHTLY
Status: DISCONTINUED | OUTPATIENT
Start: 2023-01-24 | End: 2023-01-24

## 2023-01-24 RX ORDER — ROSUVASTATIN CALCIUM 10 MG/1
40 TABLET, COATED ORAL NIGHTLY
Status: DISCONTINUED | OUTPATIENT
Start: 2023-01-24 | End: 2023-01-25 | Stop reason: HOSPADM

## 2023-01-24 RX ORDER — AMIODARONE HYDROCHLORIDE 200 MG/1
200 TABLET ORAL 2 TIMES DAILY
Status: DISCONTINUED | OUTPATIENT
Start: 2023-01-24 | End: 2023-01-25 | Stop reason: HOSPADM

## 2023-01-24 RX ORDER — LANOLIN ALCOHOL/MO/W.PET/CERES
400 CREAM (GRAM) TOPICAL DAILY
Status: DISCONTINUED | OUTPATIENT
Start: 2023-01-24 | End: 2023-01-25 | Stop reason: HOSPADM

## 2023-01-24 RX ORDER — ENOXAPARIN SODIUM 100 MG/ML
40 INJECTION SUBCUTANEOUS DAILY
Status: DISCONTINUED | OUTPATIENT
Start: 2023-01-24 | End: 2023-01-24

## 2023-01-24 RX ORDER — PRIMIDONE 250 MG/1
250 TABLET ORAL DAILY
Status: DISCONTINUED | OUTPATIENT
Start: 2023-01-24 | End: 2023-01-25 | Stop reason: HOSPADM

## 2023-01-24 RX ORDER — ACETAMINOPHEN 650 MG/1
650 SUPPOSITORY RECTAL EVERY 6 HOURS PRN
Status: DISCONTINUED | OUTPATIENT
Start: 2023-01-24 | End: 2023-01-25 | Stop reason: HOSPADM

## 2023-01-24 RX ORDER — CLOPIDOGREL BISULFATE 75 MG/1
75 TABLET ORAL DAILY
Status: DISCONTINUED | OUTPATIENT
Start: 2023-01-24 | End: 2023-01-25 | Stop reason: HOSPADM

## 2023-01-24 RX ORDER — SODIUM CHLORIDE 0.9 % (FLUSH) 0.9 %
5-40 SYRINGE (ML) INJECTION EVERY 12 HOURS SCHEDULED
Status: DISCONTINUED | OUTPATIENT
Start: 2023-01-24 | End: 2023-01-25 | Stop reason: HOSPADM

## 2023-01-24 RX ORDER — NITROGLYCERIN 0.4 MG/1
0.4 TABLET SUBLINGUAL EVERY 5 MIN PRN
Status: DISCONTINUED | OUTPATIENT
Start: 2023-01-24 | End: 2023-01-25 | Stop reason: HOSPADM

## 2023-01-24 RX ORDER — ASPIRIN 81 MG/1
81 TABLET, CHEWABLE ORAL DAILY
Status: DISCONTINUED | OUTPATIENT
Start: 2023-01-25 | End: 2023-01-25 | Stop reason: HOSPADM

## 2023-01-24 RX ORDER — SPIRONOLACTONE 25 MG/1
12.5 TABLET ORAL DAILY
Status: DISCONTINUED | OUTPATIENT
Start: 2023-01-24 | End: 2023-01-25 | Stop reason: HOSPADM

## 2023-01-24 RX ORDER — POTASSIUM CHLORIDE 20 MEQ/1
20 TABLET, EXTENDED RELEASE ORAL
Status: DISCONTINUED | OUTPATIENT
Start: 2023-01-24 | End: 2023-01-25 | Stop reason: HOSPADM

## 2023-01-24 RX ORDER — ENOXAPARIN SODIUM 100 MG/ML
70 INJECTION SUBCUTANEOUS 2 TIMES DAILY
COMMUNITY

## 2023-01-24 RX ORDER — PHENYTOIN SODIUM 100 MG/1
100 CAPSULE, EXTENDED RELEASE ORAL 2 TIMES DAILY
Status: DISCONTINUED | OUTPATIENT
Start: 2023-01-24 | End: 2023-01-25 | Stop reason: HOSPADM

## 2023-01-24 RX ORDER — PANTOPRAZOLE SODIUM 40 MG/1
40 TABLET, DELAYED RELEASE ORAL
Status: DISCONTINUED | OUTPATIENT
Start: 2023-01-24 | End: 2023-01-25 | Stop reason: HOSPADM

## 2023-01-24 RX ORDER — METOPROLOL SUCCINATE 50 MG/1
100 TABLET, EXTENDED RELEASE ORAL DAILY
Status: DISCONTINUED | OUTPATIENT
Start: 2023-01-24 | End: 2023-01-25 | Stop reason: HOSPADM

## 2023-01-24 RX ADMIN — DIAZEPAM 5 MG: 5 TABLET ORAL at 11:21

## 2023-01-24 RX ADMIN — SPIRONOLACTONE 12.5 MG: 25 TABLET ORAL at 11:21

## 2023-01-24 RX ADMIN — MAGNESIUM OXIDE 400 MG (241.3 MG MAGNESIUM) TABLET 400 MG: TABLET at 11:20

## 2023-01-24 RX ADMIN — SACUBITRIL AND VALSARTAN 0.5 TABLET: 24; 26 TABLET, FILM COATED ORAL at 11:20

## 2023-01-24 RX ADMIN — PHENYTOIN SODIUM 100 MG: 100 CAPSULE ORAL at 21:46

## 2023-01-24 RX ADMIN — AMIODARONE HYDROCHLORIDE 200 MG: 200 TABLET ORAL at 21:46

## 2023-01-24 RX ADMIN — SODIUM CHLORIDE, PRESERVATIVE FREE 10 ML: 5 INJECTION INTRAVENOUS at 21:48

## 2023-01-24 RX ADMIN — POTASSIUM CHLORIDE 20 MEQ: 1500 TABLET, EXTENDED RELEASE ORAL at 11:21

## 2023-01-24 RX ADMIN — PANTOPRAZOLE SODIUM 40 MG: 40 TABLET, DELAYED RELEASE ORAL at 11:20

## 2023-01-24 RX ADMIN — FUROSEMIDE 40 MG: 40 TABLET ORAL at 11:20

## 2023-01-24 RX ADMIN — ENOXAPARIN SODIUM 70 MG: 100 INJECTION SUBCUTANEOUS at 21:47

## 2023-01-24 RX ADMIN — DIAZEPAM 5 MG: 5 TABLET ORAL at 21:47

## 2023-01-24 RX ADMIN — PHENYTOIN SODIUM 100 MG: 100 CAPSULE ORAL at 11:27

## 2023-01-24 RX ADMIN — ROSUVASTATIN 40 MG: 10 TABLET, FILM COATED ORAL at 21:46

## 2023-01-24 RX ADMIN — PRIMIDONE 125 MG: 250 TABLET ORAL at 21:47

## 2023-01-24 RX ADMIN — ONDANSETRON 4 MG: 2 INJECTION INTRAMUSCULAR; INTRAVENOUS at 16:10

## 2023-01-24 RX ADMIN — PRIMIDONE 250 MG: 250 TABLET ORAL at 11:20

## 2023-01-24 RX ADMIN — SODIUM CHLORIDE, PRESERVATIVE FREE 10 ML: 5 INJECTION INTRAVENOUS at 11:21

## 2023-01-24 ASSESSMENT — LIFESTYLE VARIABLES
HOW OFTEN DO YOU HAVE A DRINK CONTAINING ALCOHOL: MONTHLY OR LESS
HOW MANY STANDARD DRINKS CONTAINING ALCOHOL DO YOU HAVE ON A TYPICAL DAY: 1 OR 2

## 2023-01-24 ASSESSMENT — PAIN - FUNCTIONAL ASSESSMENT: PAIN_FUNCTIONAL_ASSESSMENT: NONE - DENIES PAIN

## 2023-01-24 ASSESSMENT — HEART SCORE: ECG: 1

## 2023-01-24 NOTE — ED PROVIDER NOTES
[facility] Emergency Department      Pt Name: Ky Mathis  MRN: [id number]  [place] 1945  Date of evaluation: 1/24/2023  Provider: Clearnce Riedel, MD  CHIEF COMPLAINT  Chief Complaint   Patient presents with    Shortness of Breath     Pt with SOB and leg weakness that started this AM, pt switched to lovenox yesterday in prep for colonoscopy Friday, pt with pacemaker/defib, denies CP     HPI  Ky Mathis is a 78 y.o. male who presents because of difficulty breathing. Symptoms started this morning when he woke up. He does state that more if he takes a deep breath. He does not have chest pain. He says he has never had traditional chest pain when he has had blockages with his heart. The last time he had somewhat similar symptoms he required multiple cardiac stents. He is currently changing his blood thinner in preparation for colonoscopy this Friday. He stopped taking warfarin and Plavix and is taking Lovenox shots. He denies any cough or congestion. He does have weakness in his legs although he is able to walk. He says he gets an abnormal feeling in his abdominal area when he has problems with his heart. He is feeling that today. He denies any abdominal pain but does admit to some nausea. He was concerned and did send off information on his defibrillator device to the company this morning. Denies any new numbness or tingling. REVIEW OF SYSTEMS:  No fever, no abdominal pain, no cough Pertinent positives and negatives as per the HPI. All other pertinent review of systems reviewed and negative. Nursing notes reviewed.     PAST MEDICAL HISTORY  Past Medical History:   Diagnosis Date    Arthritis     shoulders and knee    Atrial fib/flut     CAD (coronary artery disease)     DVT (deep venous thrombosis) ([facility] [address] 08/24/2022    RLE    Hyperlipidemia     Hypertension     Seizures ([facility] [address]     last seizure 1985    Sinus bradycardia     Sleep apnea     uses CPAP     SURGICAL HISTORY  Past Surgical History: Procedure Laterality Date    CARDIAC SURGERY      , angioplasty 2007    CARDIOVERSION      6/2022, 9/2022, 1/6/23- cardioversions    CORONARY ANGIOPLASTY WITH STENT PLACEMENT  1997    CORONARY ARTERY BYPASS GRAFT  01/2019    [facility]    CORONARY STENT PLACEMENT  03/30/2022    3/30/22 Successful complex angioplasty and stenting of IAN to LAD, SVG to OM, SVG to PDA    CYSTOSCOPY N/A 11/13/2019    FLEXIBLE CYSTOSCOPY performed by Anil Galeas MD at [address]     done 65 sam ago    PACEMAKER PLACEMENT  12/18/2017    WISDOM TOOTH EXTRACTION  04/2012     MEDICATIONS:  No current facility-administered medications on file prior to encounter. Current Outpatient Medications on File Prior to Encounter   Medication Sig Dispense Refill    enoxaparin (LOVENOX) 100 MG/ML Inject 70 mg into the skin 2 times daily      amiodarone (CORDARONE) 200 MG tablet TAKE 1 TABLET BID 90 tablet 3    metoprolol succinate (TOPROL XL) 100 MG extended release tablet TAKE 1 TABLET IN THE MORNING 90 tablet 3    spironolactone (ALDACTONE) 25 MG tablet Take 0.5 tablets by mouth daily 45 tablet 3    sacubitril-valsartan (ENTRESTO) 24-26 MG per tablet Take 0.5 tablets by mouth 2 times daily 180 tablet 3    KLOR-CON M20 20 MEQ extended release tablet TAKE 1 TABLET AS NEEDED WITH LASIX FOR SWELLING 90 tablet 1    warfarin (COUMADIN) 5 MG tablet Take 5 mg by mouth once a week Takes on Fridays      pantoprazole (PROTONIX) 40 MG tablet Take 1 tablet by mouth every morning (before breakfast) 90 tablet 3    warfarin (COUMADIN) 2.5 MG tablet Take 2 tabs by mouth daily or as directed by provider based on INR (Patient taking differently: 2.5 mg Six times weekly) 180 tablet 3    furosemide (LASIX) 40 MG tablet Take 1 tablet by mouth in the morning and 1 tablet in the evening. Taking prn for fluid overload/swelling.  90 tablet 3    rosuvastatin (CRESTOR) 40 MG tablet Take 1 tablet by mouth nightly 90 tablet 3    clopidogrel (PLAVIX) 75 MG tablet Take 1 tablet by mouth in the morning. 90 tablet 3    magnesium oxide (MAG-OX) 400 (240 Mg) MG tablet TAKE ONE TABLET BY MOUTH DAILY 90 tablet 3    ezetimibe (ZETIA) 10 MG tablet Take 1 tablet by mouth daily (Patient taking differently: Take 10 mg by mouth every evening) 90 tablet 3    nitroGLYCERIN (NITROSTAT) 0.4 MG SL tablet Place 1 tablet under the tongue every 5 minutes as needed for Chest pain 25 tablet 3    diazepam (VALIUM) 5 MG tablet Take 5 mg by mouth in the morning and 5 mg in the evening. phenytoin (DILANTIN) 100 MG ER capsule Take 1 capsule by mouth 2 times daily. Resume home dose 1 capsule 0    primidone (MYSOLINE) 250 MG tablet Take 250 mg by mouth 1 tablet AM, half tablet PM       ALLERGIES  Patient has no known allergies.   FAMILY HISTORY:  Family History   Problem Relation Age of Onset    Heart Failure Mother     Heart Disease Neg Hx      SOCIAL HISTORY:  Social History     Tobacco Use    Smoking status: Former     Packs/day: 2.00     Years: 15.00     Pack years: 30.00     Types: Cigarettes     Quit date: 8/15/1988     Years since quittin.4    Smokeless tobacco: Never   Vaping Use    Vaping Use: Never used   Substance Use Topics    Alcohol use: No    Drug use: No     IMMUNIZATIONS:    Immunization History   Administered Date(s) Administered    [place] [address], Primary or Immunocompromised, (age 12y+), IM, 100 mcg/0.5mL 2021, 2021, 2021    Influenza Virus Vaccine 10/18/2012    Pneumococcal Conjugate 13-valent (Hwalttb20) 2017    Pneumococcal Conjugate 7-valent (Prevnar7) 2007       PHYSICAL EXAM  VITAL SIGNS:  /73   Pulse 61   Temp 98.1 °F (36.7 °C) (Oral)   Resp 18   Ht 5' 10\" (1.778 m)   Wt 162 lb (73.5 kg)   SpO2 100%   BMI 23.24 kg/m²   Constitutional:  78 y.o. male alert, tolerates secretions normally  HENT:  Atraumatic, mucous membranes moist  Eyes:   Conjunctiva clear, no discharge, no icterus  Neck: Supple, no adenopathy, no visible JVD, no stridor  Cardiovascular:  Irregular, no rubs  Thorax & Lungs:  No accessory muscle usage, clear  Abdomen:  Soft, non distended, bowel sounds present, nontender  Back:  No deformity  Genitalia:  Deferred  Rectal:  Deferred  Extremities:  No cyanosis, no tenderness, edema negative  Skin:  Warm, dry  Neurologic:  Alert, mentation normal, leg lifts symmetric, ambulated to the room, light touch sensation intact, no facial droop, normal speech, good recent and remote recall  Psychiatric:  Affect appropriate    RESULTS / MEDICAL DECISION MAKING:  Labs resulted at the time of this note reviewed. Labs Reviewed   COMPREHENSIVE METABOLIC PANEL W/ REFLEX TO MG FOR LOW K - Abnormal; Notable for the following components:       Result Value    Glucose 102 (*)     BUN 26 (*)      (*)     AST 85 (*)     All other components within normal limits   BRAIN NATRIURETIC PEPTIDE - Abnormal; Notable for the following components:    Pro- (*)     All other components within normal limits   PROTIME-INR - Abnormal; Notable for the following components:    Protime 31.3 (*)     INR 3.00 (*)     All other components within normal limits   CBC WITH AUTO DIFFERENTIAL   TROPONIN   D-DIMER, QUANTITATIVE     EKG:  Read by me in the absence of a cardiologist shows: Indeterminate rhythm with demand pacemaker, rate 57, nonspecific ST-T wave abnormality, poor R wave progression, nonspecific intraventricular conduction delay, prior EKG was sinus bradycardia with first-degree AV block and premature atrial beats    RADIOLOGY:  Plain x-rays were viewed by me:   XR CHEST PORTABLE   Preliminary Result   No acute process.       Stable exam.           ED COURSE:  Medications administered (list can include non ED meds ordered by other providers for patients that are admitted):  Medications   sodium chloride flush 0.9 % injection 5-40 mL (has no administration in time range)   sodium chloride flush 0.9 % injection 5-40 mL (has no administration in time range)   0.9 % sodium chloride infusion (has no administration in time range)   ondansetron (ZOFRAN-ODT) disintegrating tablet 4 mg (has no administration in time range)     Or   ondansetron (ZOFRAN) injection 4 mg (has no administration in time range)   acetaminophen (TYLENOL) tablet 650 mg (has no administration in time range)     Or   acetaminophen (TYLENOL) suppository 650 mg (has no administration in time range)   polyethylene glycol (GLYCOLAX) packet 17 g (has no administration in time range)   aspirin chewable tablet 81 mg (has no administration in time range)   amiodarone (CORDARONE) tablet 200 mg (has no administration in time range)   clopidogrel (PLAVIX) tablet 75 mg (has no administration in time range)   diazePAM (VALIUM) tablet 5 mg (has no administration in time range)   enoxaparin (LOVENOX) injection 70 mg (has no administration in time range)   furosemide (LASIX) tablet 40 mg (has no administration in time range)   potassium chloride (KLOR-CON M) extended release tablet 20 mEq (has no administration in time range)   magnesium oxide (MAG-OX) tablet 400 mg (has no administration in time range)   metoprolol succinate (TOPROL XL) extended release tablet 100 mg (has no administration in time range)   nitroGLYCERIN (NITROSTAT) SL tablet 0.4 mg (has no administration in time range)   pantoprazole (PROTONIX) tablet 40 mg (has no administration in time range)   phenytoin (DILANTIN) ER capsule 100 mg (has no administration in time range)   primidone (MYSOLINE) tablet 250 mg (has no administration in time range)   primidone (MYSOLINE) tablet 125 mg (has no administration in time range)   rosuvastatin (CRESTOR) tablet 40 mg (has no administration in time range)   sacubitril-valsartan (ENTRESTO) 24-26 MG per tablet 0.5 tablet (has no administration in time range)   spironolactone (ALDACTONE) tablet 12.5 mg (has no administration in time range)     Vitals: 01/24/23 0648 01/24/23 0715 01/24/23 0730 01/24/23 0745   BP: 133/73 115/71 111/70 116/68   Pulse: 61 59 55 55   Resp: 18 10 12 14   Temp: 98.1 °F (36.7 °C)      TempSrc: Oral      SpO2: 100% 99% 100% 100%   Weight: 162 lb (73.5 kg)      Height: 5' 10\" (1.778 m)      History from : Patient  Limitations to history : None  Chronic Conditions:  has a past medical history of Arthritis, Atrial fib/flut, CAD (coronary artery disease), DVT (deep venous thrombosis) ([facility] [address] Hyperlipidemia, Hypertension, Seizures ([facility] [address] Sinus bradycardia, and Sleep apnea. Discussion with Other Profesionals : Admitting Team    Social Determinants : None  Records Reviewed : Inpatient Notes prior Cardiology visits and notes  Disposition Considerations (Tests not ordered but considered, Shared Decision Making, Pt Expectation of Test or Tx.):   CT brain, MRI not deemed clinically necessary as an ED test    PROCEDURES:  None    CRITICAL CARE:  None    CONSULTATIONS:  Francisco Mathis is a 78 y.o. male who presented because of breathing difficulty. Is oxygenating adequately. He has a chronically abnormal EKG and complex cardiac history. First troponin is negative. His device pacemaker defibrillator was interrogated. It is functioning properly. There was some suggestion of OptiVol fluid accumulation noted by the device. He is chest pain-free at this point time. I discussed the case in full with the admitting physician. Differential Diagnosis: pneumonia, pneumothorax, PE, ACS, CHF, aspiration, other    FINAL IMPRESSION:    1. Chest pain, unspecified type        (Please note that I used voice recognition software to generate this note.   Occasionally words are mistranscribed despite my efforts to edit errors.)       Magalie Palma MD  01/24/23 0293

## 2023-01-24 NOTE — CONSULTS
[address] [facility]  [phone]      Chief Complaint   Patient presents with    Shortness of Breath     Pt with SOB and leg weakness that started this AM, pt switched to lovenox yesterday in prep for colonoscopy Friday, pt with pacemaker/defib, denies CP        Reason for consult:  nausea, anginal equivalent    ASSESSMENT AND PLAN:    CAD in native artery s/p CABG  S/P multivessel PCI 3/2021  PAF currently in junctional rhythm  S/p ICD  Variant angina    Patient's presentation is a difficult situation. He doesn't have typical anginal symptoms, however he has sudden onset unsual symptoms of nausea and general unwellness that he has had with prior episodes of worsening CAD. He has severe CAD at baseline s/p CABG in 2019. He has PAF and was recently cardioverted. He has an ICD/pacemaker and nearly all his prior EKGs have a paced rhythm, so it is hard to ascertain if he has acute ischemia or not. EKG this admission was not paced, showing old infarct pattern and no acute ischemia. His troponins are normal.    I debated going straight to cath versus doing a stress test first.  Given that he doesn't have ACS and it would difficult to get him to the cath lab today, will try to do a nuclear stress treadmill today for additional risk stratification, if the schedule can accomodate. His prior cath was very difficult and his risks of a repeat cath would be higher, as it would not be a straightforward procedure. Further recs pending results of stress test.    Continue to hold Plavix at this time, will need to hold Lovenox prior to cath if that is needed. I explained to patient that if he has an abnormal stress test, and if he needs a new stent then he'd need to be on uninterrupted DAPT for at least 6 months prior to his elective colonoscopy or other procedures. History of Present Illness:  Leny Mathis is a 78 y.o. patient who presented to the hospital with complaints of nausea and malaise.  I have been asked to provide consultation regarding further management and testing. He is a 79 yo man s/p CABG in 2019, with PAF, s/p cardioversion earlier this month, s/p AICD 2017, on 3/30 had multivessel PCI by Dr. Dawna Pepe with several stenosed CABG grafts. He is on warfarin for atrial fib. He is awaiting a screening colonoscopy in 3 days, and has been off his Plavix, and also just stopped Warfarin and changed to a Lovenox bridge. He presents without chest pain but vague complaints of nausea, malaise, and inability to move his legs, that he states are his anginal equivalent. Past Medical History:   has a past medical history of Arthritis, Atrial fib/flut, CAD (coronary artery disease), DVT (deep venous thrombosis) ([facility] [address] Hyperlipidemia, Hypertension, Seizures ([facility] [address] Sinus bradycardia, and Sleep apnea. Surgical History:   has a past surgical history that includes hernia repair (Right); Coronary angioplasty with stent (1997); [place] tooth extraction (04/2012); pacemaker placement (12/18/2017); Coronary artery bypass graft (01/2019); Cardiac surgery; Cystoscopy (N/A, 11/13/2019); Cardioversion; and Coronary stent placement (03/30/2022). Social History:   reports that he quit smoking about 34 years ago. His smoking use included cigarettes. He has a 30.00 pack-year smoking history. He has never used smokeless tobacco. He reports that he does not drink alcohol and does not use drugs. Family History:  Family History   Problem Relation Age of Onset    Heart Failure Mother     Heart Disease Neg Hx          Home Medications:  Were reviewed and are listed in nursing record. and/or listed below  Prior to Admission medications    Medication Sig Start Date End Date Taking?  Authorizing Provider   enoxaparin (LOVENOX) 100 MG/ML Inject 70 mg into the skin 2 times daily   Yes Historical Provider, MD   amiodarone (CORDARONE) 200 MG tablet TAKE 1 TABLET BID 1/6/23   Taylor Mena MD   metoprolol succinate ([facility]) 100 MG extended release tablet TAKE 1 TABLET IN THE MORNING 12/28/22   Emiliano Whiteside, DO   spironolactone (ALDACTONE) 25 MG tablet Take 0.5 tablets by mouth daily 12/23/22   Emiliano Whiteside, DO   sacubitril-valsartan (ENTRESTO) 24-26 MG per tablet Take 0.5 tablets by mouth 2 times daily 12/7/22   Emiliano Whiteside, DO   KLOR-CON M20 20 MEQ extended release tablet TAKE 1 TABLET AS NEEDED WITH LASIX FOR SWELLING 11/29/22   Emiliano Whiteside, DO   warfarin (COUMADIN) 5 MG tablet Take 5 mg by mouth once a week Takes on Fridays    Historical Provider, MD   pantoprazole (PROTONIX) 40 MG tablet Take 1 tablet by mouth every morning (before breakfast) 9/21/22   Emiliano Whiteside, DO   warfarin (COUMADIN) 2.5 MG tablet Take 2 tabs by mouth daily or as directed by provider based on INR  Patient taking differently: 2.5 mg Six times weekly 9/21/22   Emiliano Whiteside, DO   furosemide (LASIX) 40 MG tablet Take 1 tablet by mouth in the morning and 1 tablet in the evening. Taking prn for fluid overload/swelling. 8/11/22   Emiliano Whiteside, DO   rosuvastatin (CRESTOR) 40 MG tablet Take 1 tablet by mouth nightly 8/11/22   Emiliano Whiteside, DO   clopidogrel (PLAVIX) 75 MG tablet Take 1 tablet by mouth in the morning. 7/20/22   Emiliano Whiteside, DO   magnesium oxide (MAG-OX) 400 (240 Mg) MG tablet TAKE ONE TABLET BY MOUTH DAILY 5/9/22   Emiliano Whiteside, DO   ezetimibe (ZETIA) 10 MG tablet Take 1 tablet by mouth daily  Patient taking differently: Take 10 mg by mouth every evening 4/12/22   Emiliano Whiteside, DO   nitroGLYCERIN (NITROSTAT) 0.4 MG SL tablet Place 1 tablet under the tongue every 5 minutes as needed for Chest pain 2/24/22   Emiliano Whiteside, DO   diazepam (VALIUM) 5 MG tablet Take 5 mg by mouth in the morning and 5 mg in the evening. Historical Provider, MD   phenytoin (DILANTIN) 100 MG ER capsule Take 1 capsule by mouth 2 times daily.  Resume home dose 12/21/12   Lisset L Winter, APRN - CNP   primidone (MYSOLINE) 250 MG tablet Take 250 mg by mouth 1 tablet AM, half tablet PM    Historical Provider, MD        Current Medications:  Current Facility-Administered Medications   Medication Dose Route Frequency Provider Last Rate Last Admin    sodium chloride flush 0.9 % injection 5-40 mL  5-40 mL IntraVENous 2 times per day Gabriela Barry MD   10 mL at 01/24/23 1121    sodium chloride flush 0.9 % injection 5-40 mL  5-40 mL IntraVENous PRN Gabriela Barry MD        0.9 % sodium chloride infusion   IntraVENous PRN Gabriela Barry MD        ondansetron (ZOFRAN-ODT) disintegrating tablet 4 mg  4 mg Oral Q8H PRN Gabriela Barry MD        Or    [facility]ron [facility] injection 4 mg  4 mg IntraVENous Q6H PRN Gabriela Barry MD        acetaminophen (TYLENOL) tablet 650 mg  650 mg Oral Q6H PRN Gabriela Barry MD        Or    acetaminophen (TYLENOL) suppository 650 mg  650 mg Rectal Q6H PRN Gabriela Barry MD        polyethylene glycol (GLYCOLAX) packet 17 g  17 g Oral Daily PRN Gabriela Barry MD        [START ON 1/25/2023] aspirin chewable tablet 81 mg  81 mg Oral Daily Gabriela Barry MD        amiodarone (CORDARONE) tablet 200 mg  200 mg Oral BID Gabriela Barry MD        clopidogrel (PLAVIX) tablet 75 mg  75 mg Oral Daily Gabriela Barry MD   75 mg at 01/24/23 1120    diazePAM (VALIUM) tablet 5 mg  5 mg Oral BID Gabriela Barry MD   5 mg at 01/24/23 1121    enoxaparin (LOVENOX) injection 70 mg  70 mg SubCUTAneous BID Gabriela Barry MD        furosemide (LASIX) tablet 40 mg  40 mg Oral BID Gabriela Barry MD   40 mg at 01/24/23 1120    potassium chloride (KLOR-CON M) extended release tablet 20 mEq  20 mEq Oral Daily with breakfast Gabriela Barry MD   20 mEq at 01/24/23 1121    magnesium oxide (MAG-OX) tablet 400 mg  400 mg Oral Daily Gabriela Barry MD   400 mg at 01/24/23 1120    metoprolol succinate (TOPROL XL) extended release tablet 100 mg  100 mg Oral Daily Gabriela Barry MD        nitroGLYCERIN (NITROSTAT) SL tablet 0.4 mg  0.4 mg SubLINGual Q5 Min PRN [facility] Nick MD        pantoprazole (PROTONIX) tablet 40 mg  40 mg Oral QAM AC Ana Primrose, MD   40 mg at 01/24/23 1120    phenytoin (DILANTIN) ER capsule 100 mg  100 mg Oral BID Anaa Primrose, MD   100 mg at 01/24/23 1127    primidone (MYSOLINE) tablet 250 mg  250 mg Oral Daily Ana Primrose, MD   250 mg at 01/24/23 1120    primidone (MYSOLINE) tablet 125 mg  125 mg Oral Nightly Ena Primrose, MD        rosuvastatin (CRESTOR) tablet 40 mg  40 mg Oral Nightly Ana Primrose, MD        sacubitril-valsartan (ENTRESTO) 24-26 MG per tablet 0.5 tablet  0.5 tablet Oral BID Anaa Primrose, MD   0.5 tablet at 01/24/23 1120    spironolactone (ALDACTONE) tablet 12.5 mg  12.5 mg Oral Daily Ena Primrose, MD   12.5 mg at 01/24/23 1121        Allergies:  Patient has no known allergies. Review of Systems:     All systems reviewed and negative except as stated above. Physical Examination:    Vitals:    01/24/23 1116   BP: 102/65   Pulse: 55   Resp: 14   Temp: 97.9 °F (36.6 °C)   SpO2: 98%    Weight: 162 lb (73.5 kg)       Body mass index is 23.24 kg/m².     General Appearance:  Alert, cooperative, no distress, appears stated age   Head:  Normocephalic, without obvious abnormality, atraumatic   Eyes:  PERRL, conjunctiva/corneas clear   Nose: Nares normal, no drainage or sinus tenderness   Throat: Lips, mucosa, and tongue normal   Neck: Supple, symmetrical, trachea midline, no adenopathy, thyroid: not enlarged, symmetric, no tenderness/mass/nodules, no carotid bruit or JVD   Lungs:   Clear to auscultation bilaterally, respirations unlabored   Chest Wall:  No tenderness or deformity   Heart:  Regular rate and rhythm, S1, S2 normal, no murmur, rub or gallop   Abdomen:   Soft, non-tender, bowel sounds active all four quadrants,  no masses, no organomegaly   Extremities: Extremities normal, atraumatic, no cyanosis or edema   Pulses: 2+ and symmetric   Skin: Skin color, texture, turgor normal, no rashes or lesions   Psych: Normal mood and affect   Neurologic: No focal deficits        Labs  Lab Results   Component Value Date/Time    PROBNP 596 01/24/2023 07:00 AM    PROBNP 560 08/04/2022 10:09 AM    PROBNP 706 03/25/2022 01:18 PM         Lab Results   Component Value Date/Time    CHOL 159 03/31/2022 09:25 AM    TRIG 73 03/31/2022 09:25 AM    HDL 45 09/15/2022 11:00 AM    HDL 47 12/13/2011 07:40 AM    LDLCALC 81 09/15/2022 11:00 AM    LABVLDL 17 09/15/2022 11:00 AM         Troponin   Date/Time Value Ref Range Status   01/24/2023 10:48 AM <0.01 <0.01 ng/mL Final     Comment:     Methodology by Troponin T   01/24/2023 09:41 AM <0.01 <0.01 ng/mL Final     Comment:     Methodology by Troponin T   01/24/2023 07:00 AM <0.01 <0.01 ng/mL Final     Comment:     Methodology by Troponin T           CBC:   Lab Results   Component Value Date/Time    WBC 7.0 01/24/2023 07:00 AM    RBC 4.36 01/24/2023 07:00 AM    HGB 13.9 01/24/2023 07:00 AM    HCT 41.5 01/24/2023 07:00 AM    MCV 95.3 01/24/2023 07:00 AM    RDW 13.5 01/24/2023 07:00 AM     01/24/2023 07:00 AM     CMP:    Lab Results   Component Value Date/Time     01/24/2023 07:00 AM    K 4.6 01/24/2023 07:00 AM     01/24/2023 07:00 AM    CO2 29 01/24/2023 07:00 AM    BUN 26 01/24/2023 07:00 AM    CREATININE 1.2 01/24/2023 07:00 AM    GFRAA >60 09/16/2022 09:48 AM    GFRAA >60 12/21/2012 06:42 AM    AGRATIO 1.3 01/24/2023 07:00 AM    LABGLOM >60 01/24/2023 07:00 AM    GLUCOSE 102 01/24/2023 07:00 AM    PROT 7.0 01/24/2023 07:00 AM    CALCIUM 8.5 01/24/2023 07:00 AM    BILITOT 0.3 01/24/2023 07:00 AM    ALKPHOS 121 01/24/2023 07:00 AM    AST 85 01/24/2023 07:00 AM     01/24/2023 07:00 AM     PT/INR:  No results found for: PTINR  Lab Results   Component Value Date    CKTOTAL 173 03/25/2022    CKMB 0.65 06/07/2010    CKMBINDEX 0.8 06/07/2010    TROPONINI <0.01 01/24/2023       EKG:  I have reviewed EKG with the following interpretation:  Impression:      Junctional rhythm  Left axis deviation  Inferior infarct , age undetermined  Anterolateral infarct , age undetermined  Abnormal ECG    Echo:   8/2022  Normal left ventricular size. Mild concentric left ventricular hypertrophy. Mildly decreased left ventricular systolic function with distal septal and apical hypokinesis. Estimated EF 45%. E/e'=14. Grade III diastolic dysfunction with elevated LV filling pressures. Mildly thickened mitral valve leaflets, no stenosis. Moderate-severe mitral regurgitation is present. No pulmonary vein reversal.  The left atrium is dilated. The aortic valve appears tricuspid with mild sclerosis no stenosis. Trivial aortic regurgitation is present. Pressure half-time is calculated at 591 msec. Moderate to severe tricuspid regurgitation. RVSP 50mmHg. Normal right ventricular size and mildly decreased function. The right atrium is dilated. Pacemaker / ICD lead is visualized in the right atrium. Trivial pulmonic regurgitation. IVC not well visualized. CTA 3/2022 - poor quality study    Cath: 3/2022 Cameron Calabrese)   Films reviewed by me today  PCI to [address] to LAD with PAYAL, PCI to SVG to OM with PAYAL, PCI to SVG to PDA. MRI/EP/Other: Last DCCV 1/6/2023        Old notes reviewed  Telemetry reviewd  Ekg personally reviewed  Chest xray personally reviewed  Echo, stress, cath, and/or other cardiac testing reviewed in detail   Medications and labs reviewed    High complexity/medical decision making due to extensive data review, extensive history review, independent review of data    High risk due to acute illness, evaluation of drug-drug interactions, medication management and diagnostic interventions    I will address the patient's cardiac risk factors and adjusted pharmacologic treatment as needed. In addition, I have reinforced the need for patient directed risk factor modification. Tobacco use was discussed with the patient and educated on the negative effects.  I have asked the patient to not utilize these agents. Thank you for allowing to us to participate in the care or Shashank Garcia. Further evaluation will be based upon the patient's clinical course and testing results. All questions and concerns were addressed to the patient/family. Alternatives to my treatment were discussed. The note was completed using EMR. Every effort was made to ensure accuracy; however, inadvertent computerized transcription errors may be present.       Anaya Harrison MD, MD 1/24/2023 11:42 AM

## 2023-01-24 NOTE — CARE COORDINATION
Patient admitted as Observation/OPIB with an anticipated short hospitalization length of stay. Chart reviewed and it appears that patient has minimal needs for discharge at this time. Discussed with patients nurse and requested that case management be notified if discharge needs are identified. *Case management will continue to follow progress and update discharge plan as needed.        Jorge Edmond RN, BSN  [phone]

## 2023-01-24 NOTE — H&P
HOSPITALISTS HISTORY AND PHYSICAL    1/24/2023 5:24 PM    Patient Information:  Rachel Melendez is a 78 y.o. male [id number]  PCP:  Ethan Aly DO (Tel: [phone] )    Chief complaint:    Chief Complaint   Patient presents with    Shortness of Breath     Pt with SOB and leg weakness that started this AM, pt switched to lovenox yesterday in prep for colonoscopy Friday, pt with pacemaker/defib, denies CP      History of Present Illness:  Olegario Mathis is a 78 y.o. male with h/o CAD s/p CABG and PCI, PAF on coumadin, s/p cardioversion 1/2023, s/p AICD, chronic combined systolic and diastolic HF, presented with complaints of sudden onset shortness of breath, malaise, nausea without vomiting and discomfort in his legs requesting to be evaluated as these are his anginal equivalent. His previous cardiac procedures were all preceded by these noncardiac/specific symptoms. He was also concerned about having PVCs. He denied any chest pain, orthopnea, palpitations or leg swelling. In the ED, initial troponin was negative. BNP not elevated. EKG with junctional rhythm, LAD. Of note patient is scheduled for a screening colonoscopy on 1/27 thus currently on therapeutic Lovenox. History obtained from patient. REVIEW OF SYSTEMS:   Constitutional: Negative for fever,chills or night sweats  ENT: Negative for rhinorrhea, epistaxis, hoarseness, sore throat. Respiratory: Negative for wheezing  Cardiovascular: Negative for chest pain, palpitations   Gastrointestinal: Negative for nausea, vomiting, diarrhea  Genitourinary: Negative for polyuria, dysuria   Hematologic/Lymphatic: Negative for bleeding tendency, easy bruising  Musculoskeletal: Negative for myalgias and arthralgias  Neurologic: Negative for confusion,dysarthria.   Skin: Negative for itching,rash  Psychiatric: Negative for depression,anxiety, agitation. Endocrine: Negative for polydipsia,polyuria,heat /cold intolerance. Past Medical History:   has a past medical history of Arthritis, Atrial fib/flut, CAD (coronary artery disease), DVT (deep venous thrombosis) ([facility] [address] Hyperlipidemia, Hypertension, Seizures ([facility] [address] Sinus bradycardia, and Sleep apnea. Past Surgical History:   has a past surgical history that includes hernia repair (Right); Coronary angioplasty with stent (1997); [place] tooth extraction (04/2012); pacemaker placement (12/18/2017); Coronary artery bypass graft (01/2019); Cardiac surgery; Cystoscopy (N/A, 11/13/2019); Cardioversion; and Coronary stent placement (03/30/2022). Medications:  No current facility-administered medications on file prior to encounter. Current Outpatient Medications on File Prior to Encounter   Medication Sig Dispense Refill    enoxaparin (LOVENOX) 100 MG/ML Inject 70 mg into the skin 2 times daily      amiodarone (CORDARONE) 200 MG tablet TAKE 1 TABLET BID 90 tablet 3    metoprolol succinate (TOPROL XL) 100 MG extended release tablet TAKE 1 TABLET IN THE MORNING 90 tablet 3    spironolactone (ALDACTONE) 25 MG tablet Take 0.5 tablets by mouth daily 45 tablet 3    sacubitril-valsartan (ENTRESTO) 24-26 MG per tablet Take 0.5 tablets by mouth 2 times daily 180 tablet 3    KLOR-CON M20 20 MEQ extended release tablet TAKE 1 TABLET AS NEEDED WITH LASIX FOR SWELLING 90 tablet 1    warfarin (COUMADIN) 5 MG tablet Take 5 mg by mouth once a week Takes on Fridays      pantoprazole (PROTONIX) 40 MG tablet Take 1 tablet by mouth every morning (before breakfast) 90 tablet 3    warfarin (COUMADIN) 2.5 MG tablet Take 2 tabs by mouth daily or as directed by provider based on INR (Patient taking differently: 2.5 mg Six times weekly) 180 tablet 3    furosemide (LASIX) 40 MG tablet Take 1 tablet by mouth in the morning and 1 tablet in the evening. Taking prn for fluid overload/swelling.  90 tablet 3    rosuvastatin (CRESTOR) 40 MG tablet Take 1 tablet by mouth nightly 90 tablet 3    clopidogrel (PLAVIX) 75 MG tablet Take 1 tablet by mouth in the morning. 90 tablet 3    magnesium oxide (MAG-OX) 400 (240 Mg) MG tablet TAKE ONE TABLET BY MOUTH DAILY 90 tablet 3    ezetimibe (ZETIA) 10 MG tablet Take 1 tablet by mouth daily (Patient taking differently: Take 10 mg by mouth every evening) 90 tablet 3    nitroGLYCERIN (NITROSTAT) 0.4 MG SL tablet Place 1 tablet under the tongue every 5 minutes as needed for Chest pain 25 tablet 3    diazepam (VALIUM) 5 MG tablet Take 5 mg by mouth in the morning and 5 mg in the evening. phenytoin (DILANTIN) 100 MG ER capsule Take 1 capsule by mouth 2 times daily. Resume home dose 1 capsule 0    primidone (MYSOLINE) 250 MG tablet Take 250 mg by mouth 1 tablet AM, half tablet PM         Allergies:  No Known Allergies     Social History:  Patient Lives at home with family   reports that he quit smoking about 34 years ago. His smoking use included cigarettes. He has a 30.00 pack-year smoking history. He has never used smokeless tobacco. He reports that he does not drink alcohol and does not use drugs. Family History:  family history includes Heart Failure in his mother. Physical Exam:  /64   Pulse 55   Temp 97.6 °F (36.4 °C) (Oral)   Resp 16   Ht 5' 10\" (1.778 m)   Wt 162 lb (73.5 kg)   SpO2 95%   BMI 23.24 kg/m²     General appearance: Frail, appears comfortable. Well nourished  Eyes: Sclera clear, pupils equal  ENT: Moist mucus membranes, no thrush. Trachea midline. Cardiovascular: Regular rhythm, normal S1, S2. No murmur, gallop, rub. No edema in lower extremities  Respiratory: Clear to auscultation bilaterally, no wheeze, good inspiratory effort  Gastrointestinal: Abdomen soft, non-tender, not distended, normal bowel sounds  Musculoskeletal: No cyanosis in digits, neck supple  Neurology: Cranial nerves grossly intact. Alert and oriented in time, place and person.  No speech or motor deficits  Psychiatry: Appropriate affect. Not agitated  Skin: Warm, dry, normal turgor, no rash  Brisk capillary refill, peripheral pulses palpable   Labs:  CBC:   Lab Results   Component Value Date/Time    WBC 7.0 01/24/2023 07:00 AM    RBC 4.36 01/24/2023 07:00 AM    HGB 13.9 01/24/2023 07:00 AM    HCT 41.5 01/24/2023 07:00 AM    MCV 95.3 01/24/2023 07:00 AM    MCH 31.8 01/24/2023 07:00 AM    MCHC 33.4 01/24/2023 07:00 AM    RDW 13.5 01/24/2023 07:00 AM     01/24/2023 07:00 AM    MPV 8.3 01/24/2023 07:00 AM     BMP:    Lab Results   Component Value Date/Time     01/24/2023 07:00 AM    K 4.6 01/24/2023 07:00 AM     01/24/2023 07:00 AM    CO2 29 01/24/2023 07:00 AM    BUN 26 01/24/2023 07:00 AM    CREATININE 1.2 01/24/2023 07:00 AM    CALCIUM 8.5 01/24/2023 07:00 AM    GFRAA >60 09/16/2022 09:48 AM    GFRAA >60 12/21/2012 06:42 AM    LABGLOM >60 01/24/2023 07:00 AM    GLUCOSE 102 01/24/2023 07:00 AM     XR CHEST PORTABLE   Final Result   No acute process. Stable exam.         NM Cardiac Stress Test Nuclear Imaging    (Results Pending)     Chest Xray: None   EKG: Junctional rhythm  I visualized CXR images and EKG strips. Problem List  Principal Problem:    Chest pain  Resolved Problems:    * No resolved hospital problems. *        Assessment/Plan:     CAD s/p CABG and multivessel PCI:  Troponin negative x3. EKG without acute ST-T changes. Off Plavix. Continue statin and metoprolol. Cardiology consult appreciated. Stress test recommended for further management. Chronic combined systolic and diastolic HF: s/p AICD  Echo 8/22: LVEF 45%. G3 DD. No acute decompensation. On Entresto, Lasix and Aldactone. PAF:  Coumadin on hold for scheduled colonoscopy 1/27. On therapeutic Lovenox. Continue amiodarone and Toprol-XL. Seizure disorder: Continue Dilantin. Seizure precautions. Valvular heart disease:  Echo 8/2022: Moderate to severe TR and MR.   Further management per cardiology. DVT prophylaxis: Therapeutic Lovenox  Code status: Full code  Diet: Regular  IV access: Peripheral  Luz Catheter: None    Admit as Observation. I anticipate hospitalization spanning less than two midnights for investigation and treatment of the above medically necessary diagnoses. Please note that some part of this chart was generated using Dragon dictation software. Although every effort was made to ensure the accuracy of this automated transcription, some errors in transcription may have occurred inadvertently. If you may need any clarification, please do not hesitate to contact me through [facility].        Michael Bill MD    1/24/2023 5:24 PM

## 2023-01-24 NOTE — PLAN OF CARE
Pt updated on current plan of care, pt admitted to  with all questions answered.    Problem: Discharge Planning  Goal: Discharge to home or other facility with appropriate resources  1/24/2023 1242 by Cheyanne Farris RN  Outcome: Progressing     Problem: Safety - Adult  Goal: Free from fall injury  1/24/2023 1242 by Cheyanne Farris RN  Outcome: Progressing     Problem: ABCDS Injury Assessment  Goal: Absence of physical injury  1/24/2023 1242 by Cheyanne Farris RN  Outcome: Progressing     Problem: Cardiovascular - Adult  Goal: Maintains optimal cardiac output and hemodynamic stability  Outcome: Progressing     Problem: Cardiovascular - Adult  Goal: Absence of cardiac dysrhythmias or at baseline  Outcome: Progressing     Problem: Musculoskeletal - Adult  Goal: Return mobility to safest level of function  Outcome: Progressing  Goal: Maintain proper alignment of affected body part  Outcome: Progressing  Goal: Return ADL status to a safe level of function  Outcome: Progressing

## 2023-01-24 NOTE — ED NOTES
Gave report to 3T SULEMA Garcia, placed pt on their portable telemetry monitor. Pt has no complaints or needs at this time.      Adarsh Doran RN  01/24/23 0900

## 2023-01-25 VITALS
OXYGEN SATURATION: 98 % | HEART RATE: 50 BPM | SYSTOLIC BLOOD PRESSURE: 119 MMHG | WEIGHT: 160.2 LBS | DIASTOLIC BLOOD PRESSURE: 68 MMHG | TEMPERATURE: 98.3 F | RESPIRATION RATE: 18 BRPM | HEIGHT: 70 IN | BODY MASS INDEX: 22.94 KG/M2

## 2023-01-25 LAB
A/G RATIO: 1.2 (ref 1.1–2.2)
ALBUMIN SERPL-MCNC: 3.4 G/DL (ref 3.4–5)
ALP BLD-CCNC: 117 U/L (ref 40–129)
ALT SERPL-CCNC: 93 U/L (ref 10–40)
ANION GAP SERPL CALCULATED.3IONS-SCNC: 9 MMOL/L (ref 3–16)
AST SERPL-CCNC: 63 U/L (ref 15–37)
BILIRUB SERPL-MCNC: 0.3 MG/DL (ref 0–1)
BUN BLDV-MCNC: 23 MG/DL (ref 7–20)
CALCIUM SERPL-MCNC: 8.6 MG/DL (ref 8.3–10.6)
CHLORIDE BLD-SCNC: 103 MMOL/L (ref 99–110)
CHOLESTEROL, TOTAL: 140 MG/DL (ref 0–199)
CO2: 25 MMOL/L (ref 21–32)
CREAT SERPL-MCNC: 1.2 MG/DL (ref 0.8–1.3)
EKG ATRIAL RATE: 312 BPM
EKG ATRIAL RATE: 60 BPM
EKG DIAGNOSIS: NORMAL
EKG DIAGNOSIS: NORMAL
EKG Q-T INTERVAL: 414 MS
EKG Q-T INTERVAL: 418 MS
EKG QRS DURATION: 92 MS
EKG QRS DURATION: 92 MS
EKG QTC CALCULATION (BAZETT): 396 MS
EKG QTC CALCULATION (BAZETT): 418 MS
EKG R AXIS: -70 DEGREES
EKG R AXIS: -73 DEGREES
EKG T AXIS: 65 DEGREES
EKG T AXIS: 66 DEGREES
EKG VENTRICULAR RATE: 55 BPM
EKG VENTRICULAR RATE: 60 BPM
ESTIMATED AVERAGE GLUCOSE: 108.3 MG/DL
GFR SERPL CREATININE-BSD FRML MDRD: >60 ML/MIN/{1.73_M2}
GLUCOSE BLD-MCNC: 106 MG/DL (ref 70–99)
GLUCOSE BLD-MCNC: 86 MG/DL (ref 70–99)
GLUCOSE BLD-MCNC: 90 MG/DL (ref 70–99)
HBA1C MFR BLD: 5.4 %
HCT VFR BLD CALC: 38.4 % (ref 40.5–52.5)
HDLC SERPL-MCNC: 41 MG/DL (ref 40–60)
HEMOGLOBIN: 13.2 G/DL (ref 13.5–17.5)
LDL CHOLESTEROL CALCULATED: 85 MG/DL
LV EF: 36 %
LVEF MODALITY: NORMAL
MAGNESIUM: 2.1 MG/DL (ref 1.8–2.4)
MCH RBC QN AUTO: 32.1 PG (ref 26–34)
MCHC RBC AUTO-ENTMCNC: 34.3 G/DL (ref 31–36)
MCV RBC AUTO: 93.7 FL (ref 80–100)
PDW BLD-RTO: 13.1 % (ref 12.4–15.4)
PERFORMED ON: ABNORMAL
PERFORMED ON: NORMAL
PHOSPHORUS: 3.5 MG/DL (ref 2.5–4.9)
PLATELET # BLD: 276 K/UL (ref 135–450)
PMV BLD AUTO: 8 FL (ref 5–10.5)
POTASSIUM SERPL-SCNC: 4.8 MMOL/L (ref 3.5–5.1)
RBC # BLD: 4.1 M/UL (ref 4.2–5.9)
SODIUM BLD-SCNC: 137 MMOL/L (ref 136–145)
TOTAL PROTEIN: 6.2 G/DL (ref 6.4–8.2)
TRIGL SERPL-MCNC: 71 MG/DL (ref 0–150)
VLDLC SERPL CALC-MCNC: 14 MG/DL
WBC # BLD: 6.1 K/UL (ref 4–11)

## 2023-01-25 PROCEDURE — 96375 TX/PRO/DX INJ NEW DRUG ADDON: CPT

## 2023-01-25 PROCEDURE — 2700000000 HC OXYGEN THERAPY PER DAY

## 2023-01-25 PROCEDURE — 6370000000 HC RX 637 (ALT 250 FOR IP): Performed by: INTERNAL MEDICINE

## 2023-01-25 PROCEDURE — 84100 ASSAY OF PHOSPHORUS: CPT

## 2023-01-25 PROCEDURE — 6360000002 HC RX W HCPCS: Performed by: INTERNAL MEDICINE

## 2023-01-25 PROCEDURE — 93005 ELECTROCARDIOGRAM TRACING: CPT | Performed by: INTERNAL MEDICINE

## 2023-01-25 PROCEDURE — 80061 LIPID PANEL: CPT

## 2023-01-25 PROCEDURE — 93017 CV STRESS TEST TRACING ONLY: CPT | Performed by: INTERNAL MEDICINE

## 2023-01-25 PROCEDURE — 2580000003 HC RX 258: Performed by: INTERNAL MEDICINE

## 2023-01-25 PROCEDURE — 78452 HT MUSCLE IMAGE SPECT MULT: CPT

## 2023-01-25 PROCEDURE — 96372 THER/PROPH/DIAG INJ SC/IM: CPT

## 2023-01-25 PROCEDURE — 99232 SBSQ HOSP IP/OBS MODERATE 35: CPT | Performed by: INTERNAL MEDICINE

## 2023-01-25 PROCEDURE — 93010 ELECTROCARDIOGRAM REPORT: CPT | Performed by: INTERNAL MEDICINE

## 2023-01-25 PROCEDURE — 83036 HEMOGLOBIN GLYCOSYLATED A1C: CPT

## 2023-01-25 PROCEDURE — 3430000000 HC RX DIAGNOSTIC RADIOPHARMACEUTICAL: Performed by: INTERNAL MEDICINE

## 2023-01-25 PROCEDURE — G0378 HOSPITAL OBSERVATION PER HR: HCPCS

## 2023-01-25 PROCEDURE — A9502 TC99M TETROFOSMIN: HCPCS | Performed by: INTERNAL MEDICINE

## 2023-01-25 PROCEDURE — 36415 COLL VENOUS BLD VENIPUNCTURE: CPT

## 2023-01-25 PROCEDURE — 85027 COMPLETE CBC AUTOMATED: CPT

## 2023-01-25 PROCEDURE — 83735 ASSAY OF MAGNESIUM: CPT

## 2023-01-25 PROCEDURE — 80053 COMPREHEN METABOLIC PANEL: CPT

## 2023-01-25 RX ORDER — WARFARIN SODIUM 5 MG/1
5 TABLET ORAL WEEKLY
Qty: 30 TABLET | Refills: 3
Start: 2023-01-25

## 2023-01-25 RX ORDER — CLOPIDOGREL BISULFATE 75 MG/1
75 TABLET ORAL DAILY
Qty: 90 TABLET | Refills: 3
Start: 2023-01-25

## 2023-01-25 RX ORDER — AMINOPHYLLINE DIHYDRATE 25 MG/ML
100 INJECTION, SOLUTION INTRAVENOUS ONCE
Status: COMPLETED | OUTPATIENT
Start: 2023-01-25 | End: 2023-01-25

## 2023-01-25 RX ORDER — ASPIRIN 81 MG/1
81 TABLET, CHEWABLE ORAL DAILY
Qty: 30 TABLET | Refills: 0
Start: 2023-01-26

## 2023-01-25 RX ORDER — WARFARIN SODIUM 2.5 MG/1
TABLET ORAL
Qty: 180 TABLET | Refills: 3
Start: 2023-01-25

## 2023-01-25 RX ADMIN — SODIUM CHLORIDE, PRESERVATIVE FREE 10 ML: 5 INJECTION INTRAVENOUS at 13:40

## 2023-01-25 RX ADMIN — PHENYTOIN SODIUM 100 MG: 100 CAPSULE ORAL at 13:39

## 2023-01-25 RX ADMIN — AMINOPHYLLINE 100 MG: 25 INJECTION, SOLUTION INTRAVENOUS at 10:17

## 2023-01-25 RX ADMIN — SACUBITRIL AND VALSARTAN 0.5 TABLET: 24; 26 TABLET, FILM COATED ORAL at 13:39

## 2023-01-25 RX ADMIN — DIAZEPAM 5 MG: 5 TABLET ORAL at 13:39

## 2023-01-25 RX ADMIN — ENOXAPARIN SODIUM 70 MG: 100 INJECTION SUBCUTANEOUS at 13:39

## 2023-01-25 RX ADMIN — REGADENOSON 0.4 MG: 0.08 INJECTION, SOLUTION INTRAVENOUS at 10:10

## 2023-01-25 RX ADMIN — SPIRONOLACTONE 12.5 MG: 25 TABLET ORAL at 13:39

## 2023-01-25 RX ADMIN — PANTOPRAZOLE SODIUM 40 MG: 40 TABLET, DELAYED RELEASE ORAL at 07:34

## 2023-01-25 RX ADMIN — TETROFOSMIN 30 MILLICURIE: 1.38 INJECTION, POWDER, LYOPHILIZED, FOR SOLUTION INTRAVENOUS at 10:12

## 2023-01-25 RX ADMIN — MAGNESIUM OXIDE 400 MG (241.3 MG MAGNESIUM) TABLET 400 MG: TABLET at 13:39

## 2023-01-25 RX ADMIN — PRIMIDONE 250 MG: 250 TABLET ORAL at 13:38

## 2023-01-25 RX ADMIN — TETROFOSMIN 10 MILLICURIE: 1.38 INJECTION, POWDER, LYOPHILIZED, FOR SOLUTION INTRAVENOUS at 09:02

## 2023-01-25 RX ADMIN — ASPIRIN 81 MG: 81 TABLET, CHEWABLE ORAL at 13:39

## 2023-01-25 NOTE — DISCHARGE SUMMARY
[facility]    Patient Demographics    Patient. Boneta Cost Day  Date of Birth. 1945  MRN. [id number]     Primary care provider. Matthew Gamez DO  (Tel: [phone])    Admit date: 1/24/2023    Discharge date (blank if same as Note Date): Note Date: 1/25/2023     Reason for Hospitalization. Chief Complaint   Patient presents with    Shortness of Breath     Pt with SOB and leg weakness that started this AM, pt switched to lovenox yesterday in prep for colonoscopy Friday, pt with pacemaker/defib, denies CP         Significant Findings. Principal Problem:    Chest pain  Resolved Problems:    * No resolved hospital problems. *       Problems and results from this hospitalization that need follow up. None     Significant test results and incidental findings. GXT  Summary    Abnormal study. There is a large sized, severe intensity, fixed defect of    the anterior, apical, anteroseptal and inferoseptal walls which is    consistent with scar/prior infarction. No evidence of myocardium at risk for significant reversible ischemia. There is moderate-severe global LV systolic dysfunction with ejection    fraction of 36 %. Overall findings represent a intermediate risk scan. Invasive procedures and treatments. None    [facility] Course. Patient is a pleasant 71-year-old male with a history of coronary artery disease status post PCI and CABG along with paroxysmal A. fib, status post AICD, who presented to the hospital with shortness of breath malaise and nausea. These symptoms he has experienced previously prior to coronary intervention. Initial cardiac work-up was unremarkable. He was admitted and seen by cardiology. He underwent a stress test which was negative for reversible ischemia. Ejection fraction 36%.   Scan was reviewed with Dr. Laurel Lozano who has previously performed his angioplasty. Patient was not felt to need a repeat cardiac cath. Patient was discharged home in stable condition. He has a colonoscopy planned for this coming Friday. He is currently bridging with Lovenox and holding Plavix in preparation for that procedure. He will start a baby aspirin in the meantime. Following colonoscopy he should resume anticoagulation and Plavix if okay with GI. Consults. IP CONSULT TO CARDIOLOGY    Physical examination on discharge day. /68   Pulse 50   Temp 98.3 °F (36.8 °C) (Oral)   Resp 18   Ht 5' 10\" (1.778 m)   Wt 160 lb 3.2 oz (72.7 kg)   SpO2 98%   BMI 22.99 kg/m²   General appearance. Alert. Looks comfortable. HEENT. Sclera clear. Moist mucus membranes. Cardiovascular. Regular rate and rhythm, normal S1, S2. No murmur. Respiratory. Not using accessory muscles. Clear to auscultation bilaterally, no wheeze. Gastrointestinal. Abdomen soft, non-tender, not distended, normal bowel sounds  Neurology. Facial symmetry. No speech deficits. Moving all extremities equally. Extremities. No edema in lower extremities. Skin. Warm, dry, normal turgor    Condition at time of discharge stable    Medication instructions provided to patient at discharge. Medication List        START taking these medications      aspirin 81 MG chewable tablet  Take 1 tablet by mouth daily Take aspirin until colonoscopy. Then resume plavix and coumadin/lovenox if ok with GI. Then ok to stop aspirin  Start taking on: January 26, 2023            CHANGE how you take these medications      clopidogrel 75 MG tablet  Commonly known as: Plavix  Take 1 tablet by mouth daily Hold until ok with GI  What changed: additional instructions     ezetimibe 10 MG tablet  Commonly known as: ZETIA  Take 1 tablet by mouth daily  What changed: when to take this     * warfarin 2.5 MG tablet  Commonly known as: COUMADIN  Take as directed.  If you are unsure how to take this medication, talk to your nurse or doctor. Original instructions: Hold until ok with GI  What changed: additional instructions     * warfarin 5 MG tablet  Commonly known as: COUMADIN  Take as directed. If you are unsure how to take this medication, talk to your nurse or doctor. Original instructions: Take 1 tablet by mouth once a week Hold until ok with GI  What changed: additional instructions           * This list has 2 medication(s) that are the same as other medications prescribed for you. Read the directions carefully, and ask your doctor or other care provider to review them with you. CONTINUE taking these medications      amiodarone 200 MG tablet  Commonly known as: CORDARONE  TAKE 1 TABLET BID     diazePAM 5 MG tablet  Commonly known as: VALIUM     furosemide 40 MG tablet  Commonly known as: LASIX  Take 1 tablet by mouth in the morning and 1 tablet in the evening. Taking prn for fluid overload/swelling. Klor-Con M20 20 MEQ extended release tablet  Generic drug: potassium chloride  TAKE 1 TABLET AS NEEDED WITH LASIX FOR SWELLING     Lovenox 100 MG/ML  Generic drug: enoxaparin     magnesium oxide 400 (240 Mg) MG tablet  Commonly known as: MAG-OX  TAKE ONE TABLET BY MOUTH DAILY     metoprolol succinate 100 MG extended release tablet  Commonly known as: TOPROL XL  TAKE 1 TABLET IN THE MORNING     nitroGLYCERIN 0.4 MG SL tablet  Commonly known as: Nitrostat  Place 1 tablet under the tongue every 5 minutes as needed for Chest pain     pantoprazole 40 MG tablet  Commonly known as: PROTONIX  Take 1 tablet by mouth every morning (before breakfast)     phenytoin 100 MG ER capsule  Commonly known as: Dilantin  Take 1 capsule by mouth 2 times daily. Resume home dose     primidone 250 MG tablet  Commonly known as:  MYSOLINE     rosuvastatin 40 MG tablet  Commonly known as: CRESTOR  Take 1 tablet by mouth nightly     sacubitril-valsartan 24-26 MG per tablet  Commonly known as: ENTRESTO  Take 0.5 tablets by mouth 2 times daily spironolactone 25 MG tablet  Commonly known as: ALDACTONE  Take 0.5 tablets by mouth daily               Where to Get Your Medications        Information about where to get these medications is not yet available    Ask your nurse or doctor about these medications  aspirin 81 MG chewable tablet  clopidogrel 75 MG tablet  warfarin 2.5 MG tablet  warfarin 5 MG tablet         Discharge recommendations given to patient. Follow Up. PCP in 1 week   Disposition. home  Activity. activity as tolerated  Diet: ADULT DIET; Regular; Low Fat/Low Chol/High Fiber/2 gm Na      Spent 33 minutes in discharge process. Signed:   Jessi Lal PA-C     1/25/2023 3:31 PM

## 2023-01-25 NOTE — PROGRESS NOTES
All bedtime medications given. BP is 91/54 HR 66 and 92/55 HR 67. Entresto has not been given yet. Message sent to hospitalist for advice regarding holding the dose. Waiting for response.  Will give and continue to monitor per Hospitalist.

## 2023-01-25 NOTE — PROGRESS NOTES
[address]     Daily Progress Note      Admit Date:  1/24/2023    ASSESSMENT AND PLAN:  Variant angina  Persistent atrial fibrillation      Plan  Stress test w/o inducible ischemia  No plans to cath patient at this time    Signifcant bradycardia occurred during [place]  Will have EP evaluate pacemaker    If pacemaker evaluation ok, patient can go home today and have his C-scope on Friday as planned. Continue baby aspirin     Continue to hold Plavix  Continue lovenox bridge as planned for C-scope, will need ro reinitiate Plavix and warfarin post C-scope    Subjective:  Mr. Mathis had no new events or symptoms overnight.     Objective:   /61   Pulse 52   Temp 97.7 °F (36.5 °C) (Oral)   Resp 16   Ht 5' 10\" (1.778 m)   Wt 160 lb 3.2 oz (72.7 kg)   SpO2 97%   BMI 22.99 kg/m²   No intake or output data in the 24 hours ending 01/25/23 0856    TELEMETRY: paced rhythm - I personally reviewed      Medications:    sodium chloride flush  5-40 mL IntraVENous 2 times per day    aspirin  81 mg Oral Daily    amiodarone  200 mg Oral BID    [Held by provider] clopidogrel  75 mg Oral Daily    diazePAM  5 mg Oral BID    enoxaparin  70 mg SubCUTAneous BID    furosemide  40 mg Oral BID    potassium chloride  20 mEq Oral Daily with breakfast    magnesium oxide  400 mg Oral Daily    metoprolol succinate  100 mg Oral Daily    pantoprazole  40 mg Oral QAM AC    phenytoin  100 mg Oral BID    primidone  250 mg Oral Daily    primidone  125 mg Oral Nightly    rosuvastatin  40 mg Oral Nightly    sacubitril-valsartan  0.5 tablet Oral BID    spironolactone  12.5 mg Oral Daily      sodium chloride       sodium chloride flush, sodium chloride, ondansetron **OR** ondansetron, acetaminophen **OR** acetaminophen, polyethylene glycol, nitroGLYCERIN    Lab Data:  CBC:   Recent Labs     01/24/23  0700 01/25/23  0444   WBC 7.0 6.1   HGB 13.9 13.2*   HCT 41.5 38.4*   MCV 95.3 93.7    276     BMP:   Recent Labs 01/24/23  0700 01/25/23  0444    137   K 4.6 4.8    103   CO2 29 25   PHOS  --  3.5   BUN 26* 23*   CREATININE 1.2 1.2     LIVER PROFILE:   Recent Labs     01/24/23  0700 01/25/23  0444   AST 85* 63*   * 93*   BILITOT 0.3 0.3   ALKPHOS 121 117     PT/INR:   Recent Labs     01/24/23  0700   PROTIME 31.3*   INR 3.00*     APTT: No results for input(s): APTT in the last 72 hours. BNP:    Lab Results   Component Value Date/Time    PROBNP 596 01/24/2023 07:00 AM    PROBNP 560 08/04/2022 10:09 AM    PROBNP 706 03/25/2022 01:18 PM       TROP:   Troponin   Date/Time Value Ref Range Status   01/24/2023 10:48 AM <0.01 <0.01 ng/mL Final     Comment:     Methodology by Troponin T   01/24/2023 09:41 AM <0.01 <0.01 ng/mL Final     Comment:     Methodology by Troponin T   01/24/2023 07:00 AM <0.01 <0.01 ng/mL Final     Comment:     Methodology by Troponin T         LIPIDS:   Lab Results   Component Value Date/Time    CHOL 159 03/31/2022 09:25 AM    TRIG 73 03/31/2022 09:25 AM    HDL 45 09/15/2022 11:00 AM    HDL 47 12/13/2011 07:40 AM    LDLCALC 81 09/15/2022 11:00 AM    LABVLDL 17 09/15/2022 11:00 AM         IMAGING:   Susu Mcnally today  Areas of infarct but no ischemia  Study/images reviewed with Dr. Ryan Larose who did his last cath. No indication to go back to the cath lab at this time.     New EKG today   I personally reviewed   Junctional rhythm with frequent Premature ventricular complexes  Left axis deviation  Inferior infarct (cited on or before 06-JAN-2023)  Anterolateral infarct (cited on or before 06-JAN-2023)  Abnormal ECG  When compared with ECG of 24-JAN-2023 10:16,  Premature ventricular complexes are now Present        Beulah Orellana MD, MD 1/25/2023 8:56 AM

## 2023-01-25 NOTE — PROGRESS NOTES
Has a hx of sleep apnea and did not bring his home unit to the hospital. Placed on O2 @ 2L. Saturation 98%. No other requests.

## 2023-01-25 NOTE — PLAN OF CARE
Pt updated on current plan of care, see flowsheet for assessment.    Problem: Discharge Planning  Goal: Discharge to home or other facility with appropriate resources  Outcome: Progressing  Flowsheets (Taken 1/25/2023 1245)  Discharge to home or other facility with appropriate resources: Identify barriers to discharge with patient and caregiver     Problem: Safety - Adult  Goal: Free from fall injury  Outcome: Progressing     Problem: ABCDS Injury Assessment  Goal: Absence of physical injury  Outcome: Progressing     Problem: Cardiovascular - Adult  Goal: Maintains optimal cardiac output and hemodynamic stability  Outcome: Progressing  Flowsheets (Taken 1/25/2023 1245)  Maintains optimal cardiac output and hemodynamic stability: Monitor blood pressure and heart rate     Problem: Musculoskeletal - Adult  Goal: Return mobility to safest level of function  Outcome: Progressing  Flowsheets (Taken 1/25/2023 1245)  Return Mobility to Safest Level of Function: Assess patient stability and activity tolerance for standing, transferring and ambulating with or without assistive devices  Goal: Maintain proper alignment of affected body part  Outcome: Progressing  Flowsheets (Taken 1/25/2023 1245)  Maintain proper alignment of affected body part: Support and protect limb and body alignment per provider's orders  Goal: Return ADL status to a safe level of function  Outcome: Progressing  Flowsheets (Taken 1/25/2023 1245)  Return ADL Status to a Safe Level of Function: Administer medication as ordered

## 2023-01-25 NOTE — PROGRESS NOTES
Brief EP Note    Consult: Concern for pacemaker malfunction    Dual chamber pacemaker interrogated last 1/23 with excellent lead characteristics, no recurrent AF since cardioversion early Jan. Undergoing david scan today, reviewed EKG during stress, appears to have PVCs followed by non paced interval (that otherwise violates the lower rate limit of the ventricle). PVC is followed by atrial beat (likely retrograde), sensed and not conducted (AV node refractory) which is then followed by an atrial beat and ventricular sensed beat. 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 Reviewed with [employer] and normal functioning for device. Continue routine follow up with EP.     MD Diana [address]   Office: [phone]  Fax: [phone]

## 2023-01-25 NOTE — PROGRESS NOTES
Instructed on Lexiscan Stress Test Procedure including possible side effects/ adverse reactions. Patient verbalizes  understanding and denies having any questions . See [facility]              Aminophylline given per lexiscan protocol in stress lab for c/o persistant nausea.

## 2023-01-26 ENCOUNTER — OFFICE VISIT (OUTPATIENT)
Dept: CARDIOLOGY CLINIC | Age: 78
End: 2023-01-26

## 2023-01-26 VITALS
DIASTOLIC BLOOD PRESSURE: 60 MMHG | BODY MASS INDEX: 22.5 KG/M2 | WEIGHT: 157.2 LBS | OXYGEN SATURATION: 98 % | HEART RATE: 56 BPM | HEIGHT: 70 IN | SYSTOLIC BLOOD PRESSURE: 126 MMHG

## 2023-01-26 DIAGNOSIS — I25.5 ISCHEMIC CARDIOMYOPATHY: ICD-10-CM

## 2023-01-26 DIAGNOSIS — I48.0 PAF (PAROXYSMAL ATRIAL FIBRILLATION) (HCC): Chronic | ICD-10-CM

## 2023-01-26 DIAGNOSIS — I48.91 ATRIAL FIBRILLATION, UNSPECIFIED TYPE (HCC): ICD-10-CM

## 2023-01-26 DIAGNOSIS — I10 ESSENTIAL HYPERTENSION: ICD-10-CM

## 2023-01-26 DIAGNOSIS — I49.3 PVC (PREMATURE VENTRICULAR CONTRACTION): ICD-10-CM

## 2023-01-26 DIAGNOSIS — I25.83 CORONARY ARTERY DISEASE DUE TO LIPID RICH PLAQUE: Primary | ICD-10-CM

## 2023-01-26 DIAGNOSIS — I82.A19 ACUTE DEEP VEIN THROMBOSIS (DVT) OF AXILLARY VEIN, UNSPECIFIED LATERALITY (HCC): ICD-10-CM

## 2023-01-26 DIAGNOSIS — I50.22 CHRONIC SYSTOLIC HEART FAILURE (HCC): ICD-10-CM

## 2023-01-26 DIAGNOSIS — I25.10 CORONARY ARTERY DISEASE DUE TO LIPID RICH PLAQUE: Primary | ICD-10-CM

## 2023-01-26 RX ORDER — LANOLIN ALCOHOL/MO/W.PET/CERES
800 CREAM (GRAM) TOPICAL DAILY
Qty: 90 TABLET | Refills: 3 | Status: SHIPPED | OUTPATIENT
Start: 2023-01-26

## 2023-01-26 RX ORDER — LANOLIN ALCOHOL/MO/W.PET/CERES
800 CREAM (GRAM) TOPICAL DAILY
Qty: 90 TABLET | Refills: 3 | Status: SHIPPED | OUTPATIENT
Start: 2023-01-26 | End: 2023-01-26 | Stop reason: SDUPTHER

## 2023-01-26 ASSESSMENT — ENCOUNTER SYMPTOMS
SHORTNESS OF BREATH: 1
ABDOMINAL PAIN: 0
NAUSEA: 0
PHOTOPHOBIA: 0
COUGH: 0
ABDOMINAL DISTENTION: 0
CHEST TIGHTNESS: 0
BLOOD IN STOOL: 0

## 2023-01-26 NOTE — LETTER
[address] [facility]  [id number] [place] [address]  Phone: [phone]  Fax: [address], DO    February 5, 2023     Wesley Rosado    Patient: Bradley Mathis   MR Number: [id number]   YOB: 1945   Date of Visit: 1/26/2023       Dear Sheree Cormier:    Thank you for referring Valentina Waller Delfina to me for evaluation/treatment. Below are the relevant portions of my assessment and plan of care. If you have questions, please do not hesitate to call me. I look forward to following Valentina Waller along with you.     Sincerely,      Misty Horton, DO

## 2023-01-26 NOTE — PROGRESS NOTES
[facility] [place] HEART [facility]  1/26/23  Referring:     REASON FOR CONSULT/CHIEF COMPLAINT/HPI     Reason for visit/ Chief complaint  Follow up  CAD,    HPI Juan A Mathis is a 77 y.o. seen as a follow up to recent right DVT  He has a history of  CAD, ICM< htn,hchol and AF, SVT JOSEPH   He had a CABG redo 2019 at [facility] with WESTON ligation and LA maze. In Dec 2017 he had a dual chamber AICD  On 3/30 he had a PCI to IAN to LAD with PAYAL, PCI to SVG to OM with PAYAL, PCI to SVG to PDA. His warfarin was temporarily interrupted. In September he had covid-19-  On 9/16 he went to the ER with nausea.  Evaluated, cardioverted and was discharged.    On 1/23 ( he woke up with dizziness/prickly feeling) and came to the ER with shortness of breath, nausea and malaise,which were his previous symptoms prior cardiac events.  He did not take the nitro before he came in .  He does not think he was in atrial fibrillation. He had a lexiscan that was unremarkable, ( reviewed by Dr Jenkins), currently being bridged with lovenox and holding plavix in prep for colonoscopy Friday    Today he still complains of palpitations, PVC's, associated with fatigue and exhaustion.He is waking up with PVC's in the morning, and even took extra metoprolol in the evening that did not alleviate the symptoms. He is currently on amiodarone daily. He thinks he had less pvcs on amiodarone bid. He has no orthopnea or edema.    Patient is adherent with medications and is tolerating them well without side effects     HISTORY/ALLERGIES/ROS     MedHx:   HISTORY/ALLERGIES/ROS      MedHx:            has a past medical history of Arthritis, Atrial fib/flut, CAD (coronary artery disease), Hyperlipidemia, Hypertension, Seizures (HCC), and Sinus bradycardia.  SurgHx:           has a past surgical history that includes hernia repair (Right); Coronary angioplasty with stent (1997); [place] tooth extraction (April 2012); pacemaker placement (12/18/2017); Coronary  artery bypass graft (01/2019); Cardiac surgery; and Cystoscopy (N/A, 11/13/2019). SocHx:             reports that he quit smoking about 34 years ago. His smoking use included cigarettes. He has a 30.00 pack-year smoking history. He has never used smokeless tobacco. He reports that he does not drink alcohol and does not use drugs. FamHx:           No family history of premature coronary artery disease, sudden death, or aneurysm  Allergies:        Patient has no known allergies. ROS:               Review of Systems   Constitutional:  Positive for fatigue. Negative for activity change, diaphoresis and fever. HENT:  Negative for congestion and ear discharge. Eyes:  Negative for photophobia and visual disturbance. Respiratory:  Negative for cough, chest tightness and shortness of breath. Cardiovascular:  Positive for palpitations. Negative for chest pain. Gastrointestinal:  Negative for abdominal distention, abdominal pain, anal bleeding, blood in stool and nausea. Endocrine: Negative for cold intolerance and polydipsia. Genitourinary:  Negative for difficulty urinating and flank pain. Musculoskeletal:  Positive for arthralgias and myalgias. Skin:  Negative for rash and wound. Allergic/Immunologic: Negative for environmental allergies and immunocompromised state. Neurological:  Negative for dizziness and headaches. Hematological:  Negative for adenopathy. Does not bruise/bleed easily. Psychiatric/Behavioral:  Negative for confusion. The patient is not hyperactive. MEDICATIONS      Home Medications           Prior to Admission medications    Medication Sig Start Date End Date Taking? Authorizing Provider   metoprolol succinate (TOPROL XL) 100 MG extended release tablet Take 1 tablet by mouth in the morning.  8/11/22   Yes Manju Rodas,    valsartan (DIOVAN) 40 MG tablet Take 0.5 tablets by mouth at bedtime 8/11/22   Yes Manju Rodas, DO   furosemide (LASIX) 40 MG tablet Take 1 tablet by mouth in the morning and 1 tablet in the evening. Taking prn for fluid overload/swelling. 8/11/22   Yes Trey Cuevas DO   amiodarone (CORDARONE) 200 MG tablet TAKE 1 TABLET DAILY 8/11/22   Yes Trey Cuevas DO   rosuvastatin (CRESTOR) 40 MG tablet Take 1 tablet by mouth nightly 8/11/22   Yes Trey Cuevas DO   spironolactone (ALDACTONE) 25 MG tablet Take 0.5 tablets by mouth in the morning. 8/11/22   Yes Trey Cuevas DO   clopidogrel (PLAVIX) 75 MG tablet Take 1 tablet by mouth in the morning. 7/20/22   Yes Trey Cuevas DO   magnesium oxide (MAG-OX) 400 (240 Mg) MG tablet TAKE ONE TABLET BY MOUTH DAILY 5/9/22   Yes Trey Cuevas DO   warfarin (COUMADIN) 2.5 MG tablet Take 5 mg by mouth Pt taking 5mg since 7/8/22     Yes Historical Provider, MD   ezetimibe (ZETIA) 10 MG tablet Take 1 tablet by mouth daily 4/12/22   Yes Trey Cuevas DO   pantoprazole (PROTONIX) 40 MG tablet Take 1 tablet by mouth every morning (before breakfast) 4/1/22   Yes Trey Cuevas DO   diazepam (VALIUM) 5 MG tablet Take 5 mg by mouth in the morning and 5 mg in the evening. Yes Historical Provider, MD   phenytoin (DILANTIN) 100 MG ER capsule Take 1 capsule by mouth 2 times daily.  Resume home dose 12/21/12   Yes BETO Gary - CNP   primidone (MYSOLINE) 250 MG tablet Take 250 mg by mouth 2 times daily      Yes Historical Provider, MD   nitroGLYCERIN (NITROSTAT) 0.4 MG SL tablet Place 1 tablet under the tongue every 5 minutes as needed for Chest pain 2/24/22     Trey Cuevas DO            PHYSICAL EXAM            Vitals:     08/11/22 1101   BP: 110/70   Pulse: 50   SpO2: 100%    Weight: 161 lb (73 kg)     Gen Alert, cooperative, no distress Heart  Regular rate and rhythm, 2/6 murmur, ICD in place   Head Normocephalic, atraumatic, no abnormalities Abd  Soft, NT, +BS, no mass, no OM   Eyes PERRLA, conj/corn clear Ext  Ext nl, AT, no C/C, +right ankle trace swelling, non tender, + palpable cord posteriorly   Nose Nares normal, no drain age, Non-tender Pulse 2+ and symmetric   Throat Lips, mucosa, tongue normal Skin Color/text/turg nl, no rash/lesions   Neck S/S, TM, NT, no bruit Psych Nl mood and affect   Lung = CTA-B, unlabored, no DTP       Ch wall NT, no deform          SurgHx:  has a past surgical history that includes hernia repair (Right); Coronary angioplasty with stent (1997); [place] tooth extraction (04/2012); pacemaker placement (12/18/2017); Coronary artery bypass graft (01/2019); Cardiac surgery; Cystoscopy (N/A, 11/13/2019); Cardioversion; and Coronary stent placement (03/30/2022). SocHx:  reports that he quit smoking about 34 years ago. His smoking use included cigarettes. He has a 30.00 pack-year smoking history. He has never used smokeless tobacco. He reports that he does not drink alcohol and does not use drugs. FamHx: No family history of premature coronary artery disease, sudden death, or aneurysm  Allergies: Patient has no known allergies. ROS:   Review of Systems   Constitutional:  Negative for activity change, diaphoresis, fatigue and fever. HENT:  Negative for congestion and ear discharge. Eyes:  Negative for photophobia and visual disturbance. Respiratory:  Positive for shortness of breath. Negative for cough and chest tightness. Cardiovascular:  Positive for leg swelling. Negative for chest pain and palpitations. Gastrointestinal:  Negative for abdominal distention, abdominal pain, blood in stool and nausea. Endocrine: Negative for cold intolerance and polydipsia. Genitourinary:  Negative for difficulty urinating and flank pain. Musculoskeletal:  Positive for arthralgias and myalgias. Skin:  Negative for rash and wound. Allergic/Immunologic: Negative for environmental allergies and immunocompromised state. Neurological:  Negative for dizziness and headaches. Hematological:  Negative for adenopathy. Does not bruise/bleed easily.    Psychiatric/Behavioral:  Negative for confusion. The patient is not hyperactive. MEDICATIONS      Prior to Admission medications    Medication Sig Start Date End Date Taking? Authorizing Provider   aspirin 81 MG chewable tablet Take 1 tablet by mouth daily Take aspirin until colonoscopy. Then resume plavix and coumadin/lovenox if ok with GI. Then ok to stop aspirin 1/26/23  Yes Geena Bowman PA-C   warfarin (COUMADIN) 2.5 MG tablet Hold until ok with GI 1/25/23  Yes Geena Bowman PA-C   warfarin (COUMADIN) 5 MG tablet Take 1 tablet by mouth once a week Hold until ok with GI 1/25/23  Yes Geena Bowman PA-C   enoxaparin (LOVENOX) 100 MG/ML Inject 70 mg into the skin 2 times daily   Yes Historical Provider, MD   amiodarone (CORDARONE) 200 MG tablet TAKE 1 TABLET BID 1/6/23  Yes aLurent Montez MD   metoprolol succinate (TOPROL XL) 100 MG extended release tablet TAKE 1 TABLET IN THE MORNING 12/28/22  Yes Izaiah Sevilla DO   spironolactone (ALDACTONE) 25 MG tablet Take 0.5 tablets by mouth daily 12/23/22  Yes Izaiah Sevilla DO   sacubitril-valsartan (ENTRESTO) 24-26 MG per tablet Take 0.5 tablets by mouth 2 times daily 12/7/22  Yes Izaiah Sevilla DO   KLOR-CON M20 20 MEQ extended release tablet TAKE 1 TABLET AS NEEDED WITH LASIX FOR SWELLING 11/29/22  Yes Izaiah Sevilla DO   pantoprazole (PROTONIX) 40 MG tablet Take 1 tablet by mouth every morning (before breakfast) 9/21/22  Yes Izaiah Sevilla DO   furosemide (LASIX) 40 MG tablet Take 1 tablet by mouth in the morning and 1 tablet in the evening.  Taking prn for fluid overload/swelling. 8/11/22  Yes Izaiah Sevilla DO   rosuvastatin (CRESTOR) 40 MG tablet Take 1 tablet by mouth nightly 8/11/22  Yes Izaiah Sevilla DO   magnesium oxide (MAG-OX) 400 (240 Mg) MG tablet TAKE ONE TABLET BY MOUTH DAILY 5/9/22  Yes Izaiah Sevilla DO   ezetimibe (ZETIA) 10 MG tablet Take 1 tablet by mouth daily  Patient taking differently: Take 10 mg by mouth every evening 4/12/22  Yes Izaiah Sevilla, DO   nitroGLYCERIN (NITROSTAT) 0.4 MG SL tablet Place 1 tablet under the tongue every 5 minutes as needed for Chest pain 2/24/22  Yes Jaydon Frazier,    diazepam (VALIUM) 5 MG tablet Take 5 mg by mouth in the morning and 5 mg in the evening.   Yes Historical Provider, MD   phenytoin (DILANTIN) 100 MG ER capsule Take 1 capsule by mouth 2 times daily. Resume home dose 12/21/12  Yes Lisset Simmons, APRN - CNP   primidone (MYSOLINE) 250 MG tablet Take 250 mg by mouth 1 tablet AM, half tablet PM   Yes Historical Provider, MD   clopidogrel (PLAVIX) 75 MG tablet Take 1 tablet by mouth daily Hold until ok with GI  Patient not taking: Reported on 1/26/2023 1/25/23   Aspen Hightower PA-C       PHYSICAL EXAM        Vitals:    01/26/23 1057   BP: 126/60   Pulse: 56   SpO2: 98%    Weight: 157 lb 3.2 oz (71.3 kg)     Gen Alert, cooperative, no distress Heart  Regular rate and rhythm, 2/6 murmur murmur   Head Normocephalic, atraumatic, no abnormalities Abd  Soft, NT, +BS, no mass, no OM   Eyes PERRLA, conj/corn clear Ext  Ext nl, AT, no C/C, trace   edema   Nose Nares normal, no drain age, Non-tender Pulse 2+ and symmetric   Throat Lips, mucosa, tongue normal Skin Color/text/turg nl, no rash/lesions   Neck S/S, TM, NT, no bruit Psych Nl mood and affect   Lung CTA-B, unlabored, no DTP     Ch wall NT, no deform       LABS and Imaging     Relevant and available CV data reviewed  Echo 4/15/2021 Summary  Mildly dilated left ventricle.  Mild concentric left ventricular hypertrophy.  Mildly decreased left ventricular systolic function with distal septal and  apical hypokinesis. Estimated EF 40-45%.  Cannot assess diastolic function.  Mild thickening of the mitral valve leaflets. No stenosis.  Moderate mitral regurgitation.  The left atrium is mildly dilated.  The aortic valve appears tricuspid with mild sclerosis no stenosis.  Trivial aortic regurgitation.  Normal right ventricular size and mildly decreased in function.  Moderate tricuspid regurgitation.  PASP 41mmHg. The right atrium is moderately dilated. Pacemaker / ICD lead is visualized  in the right atrium. Trivial pulmonic regurgitation. Cath: s/p CABG (x4 '07 LIMA to LAD, SVG to RCA, SVG to D2, SVG to OM3),   1/8/19 redo CABG at [facility]  (free IAN-LAD, SVG-Diag, SVG-OM, SVG-PDA  3/30/22 Successful complex angioplasty and stenting of IAN to LAD, SVG to OM, SVG to PDA     EKG: sinus bradycardia, prior anterior infarct, pvc    S1/24/23- lexiscan-Abnormal study. There is a large sized, severe intensity, fixed defect of   the anterior, apical, anteroseptal and inferoseptal walls which is   consistent with scar/prior infarction. No evidence of myocardium at risk for significant reversible ischemia. There is moderate-severe global LV systolic dysfunction with ejection   fraction of 36 %. Overall findings represent      2/1/16  CT of abd--no AAA     2012  carotid dopplers 1-15%     PFT-mild obstruction     Cardioversion 7/28/22  Conclusion:  Successful external DC cardioversion of atrial fibrillation      moderate Risk  moderate Complexity/Medical Decision Making  Outside records Reviewed  Labs Reviewed  Echo and ekg personally interpreted. Imaging reviewed  Medications reviewed  Old Notes reviewed  ASSESSMENT AND PLAN    1. Right leg DVT  -  diagnosed in August  -  resolved  Plan  - call for any chest pain, shortness of breath  - currently off of coumadin and on lovenox for colonoscopy    2. Preop evaluation for colonoscopy  -  new problem  -  Dr Alena Bailey, request stop coumadin  Plan  - currently off of coumadin and being bridged with lovenox and asa following colonoscopy he marci resume coumadin and plavix    3. CAD  - CABG 2019 at [facility]  - 3/30/22 Successful complex angioplasty and stenting of IAN to LAD, SVG to OM, SVG to PDA Lian Johnson)  - 1/24/24 lexiscan- consistent with known disease  - stable  Plan  - continue toprol  - Continue clopidogrel and warfarin with ppi.   - call for any changes     4. Atrial fibrillation/history of thrombus  -  CHA2DS vasc score 4  -  1/8/2019 WESTON ligating and LA maze-No remnant of Left atrial appendage was seen on LATESHA 01/29/2021  -  1/29/20  DCCV  [facility]  -  Cardioversion by  [facility] 7/28/2022, 9/16/22 ( Dr Genna Ormond)  -  stable  -  INR managed at [place] [id number]  -  recommend continuation of warfarin given history of thrombus and high risk of events- recommend bridging when he comes off of coumadin     5. Ischemic cardiomyopathy/NSVT  - previously <35%,now 40%  - 12/18/17 EPS with inducible VT/VF  - 12/8/17  Dual chamber AICD  -  ACC C NYHA II  -stable  Plan  - continue entresto 24/26 .5 mg bid  - aldactone 12.5 daily  - metoprolol 100 mg   - not on SGLT2 due to cost  - lasix 40 mg bid       6. Essential hypertension  - 126/60  - stable  Plan  - entresto     7. Primary Hyperlipidemia  -  8/4/22  TRI 66 HDL 43 LDL 69   - above goal  Plan  -  crestor 40 mg hs  -  zetia 10 mg daily  - previously recommended pcsk9, patient not interested     7. JOSEPH  -  wears cpap,managed at [facility]  -  continue as above    8. Seizure  -  none recently  Plan  - follow up with neuro tomorrow  - dilantin 100 mg bid    9.. PVC's   -  symptomatic- fatigue, exhaustion  -  as never been on ranexa, mixilitine  - much more frequent on pacer device  Plan  - follow up with  EP for possible ablation. - increase amiodarone to bid for the next 7 days then take one daily  - increase magnesium to 800 mg daily  - follow up with EP for discussion of PVC ablation     Patient counseled on lifestyle modification, diet, and exercise. Follow Up:           Dr. Jl De La Cruz    Patient counseled on lifestyle modification, diet, and exercise.     Follow Up: 6 weeks        Dr. Phyllis Parnell:  I, Tonyfly Gonzalez, am scribing for and in the presence of John Villeda 01/26/23 11:22 AM Physician Attestation  The scribe for and in the presence of me Zoey Morales DO). The scribe Agustin Day RN   may have prepopulated components of this note with my historical  intellectual property under my direct supervision. Any additions to this intellectual property were performed in my presence and at my direction.   Furthermore, the content and accuracy of this note have been reviewed by me Zoey Morales DO).  1/26/2023 11:22 AM

## 2023-01-26 NOTE — PATIENT INSTRUCTIONS
- increase amiodarone to bid for the next 7 days then take one daily  - increase magnesium to 800 mg daily

## 2023-01-27 ENCOUNTER — HOSPITAL ENCOUNTER (OUTPATIENT)
Age: 78
Setting detail: OUTPATIENT SURGERY
Discharge: HOME HEALTH | End: 2023-01-27
Attending: INTERNAL MEDICINE | Admitting: INTERNAL MEDICINE
Payer: MEDICARE

## 2023-01-27 VITALS
TEMPERATURE: 97 F | OXYGEN SATURATION: 99 % | BODY MASS INDEX: 23.19 KG/M2 | RESPIRATION RATE: 16 BRPM | DIASTOLIC BLOOD PRESSURE: 80 MMHG | SYSTOLIC BLOOD PRESSURE: 106 MMHG | HEIGHT: 70 IN | HEART RATE: 57 BPM | WEIGHT: 162 LBS

## 2023-01-27 LAB
APTT: 39.1 SEC (ref 23–34.3)
INR BLD: 1.65 (ref 0.87–1.14)
PROTHROMBIN TIME: 19.5 SEC (ref 11.7–14.5)

## 2023-01-27 PROCEDURE — 3609027000 HC COLONOSCOPY: Performed by: INTERNAL MEDICINE

## 2023-01-27 PROCEDURE — 2709999900 HC NON-CHARGEABLE SUPPLY: Performed by: INTERNAL MEDICINE

## 2023-01-27 PROCEDURE — 3700000001 HC ADD 15 MINUTES (ANESTHESIA): Performed by: INTERNAL MEDICINE

## 2023-01-27 PROCEDURE — 7100000011 HC PHASE II RECOVERY - ADDTL 15 MIN: Performed by: INTERNAL MEDICINE

## 2023-01-27 PROCEDURE — 85610 PROTHROMBIN TIME: CPT

## 2023-01-27 PROCEDURE — 3700000000 HC ANESTHESIA ATTENDED CARE: Performed by: INTERNAL MEDICINE

## 2023-01-27 PROCEDURE — 2580000003 HC RX 258: Performed by: NURSE ANESTHETIST, CERTIFIED REGISTERED

## 2023-01-27 PROCEDURE — 85730 THROMBOPLASTIN TIME PARTIAL: CPT

## 2023-01-27 PROCEDURE — 36415 COLL VENOUS BLD VENIPUNCTURE: CPT

## 2023-01-27 PROCEDURE — 2500000003 HC RX 250 WO HCPCS: Performed by: NURSE ANESTHETIST, CERTIFIED REGISTERED

## 2023-01-27 PROCEDURE — 2580000003 HC RX 258: Performed by: ANESTHESIOLOGY

## 2023-01-27 PROCEDURE — 7100000010 HC PHASE II RECOVERY - FIRST 15 MIN: Performed by: INTERNAL MEDICINE

## 2023-01-27 PROCEDURE — 6360000002 HC RX W HCPCS: Performed by: NURSE ANESTHETIST, CERTIFIED REGISTERED

## 2023-01-27 RX ORDER — SODIUM CHLORIDE 9 MG/ML
INJECTION, SOLUTION INTRAVENOUS CONTINUOUS
Status: DISCONTINUED | OUTPATIENT
Start: 2023-01-27 | End: 2023-01-27 | Stop reason: HOSPADM

## 2023-01-27 RX ORDER — PROPOFOL 10 MG/ML
INJECTION, EMULSION INTRAVENOUS CONTINUOUS PRN
Status: DISCONTINUED | OUTPATIENT
Start: 2023-01-27 | End: 2023-01-27 | Stop reason: SDUPTHER

## 2023-01-27 RX ORDER — PROPOFOL 10 MG/ML
INJECTION, EMULSION INTRAVENOUS PRN
Status: DISCONTINUED | OUTPATIENT
Start: 2023-01-27 | End: 2023-01-27 | Stop reason: SDUPTHER

## 2023-01-27 RX ORDER — LIDOCAINE HYDROCHLORIDE 20 MG/ML
INJECTION, SOLUTION EPIDURAL; INFILTRATION; INTRACAUDAL; PERINEURAL PRN
Status: DISCONTINUED | OUTPATIENT
Start: 2023-01-27 | End: 2023-01-27 | Stop reason: SDUPTHER

## 2023-01-27 RX ORDER — SODIUM CHLORIDE 9 MG/ML
INJECTION, SOLUTION INTRAVENOUS CONTINUOUS PRN
Status: DISCONTINUED | OUTPATIENT
Start: 2023-01-27 | End: 2023-01-27 | Stop reason: SDUPTHER

## 2023-01-27 RX ADMIN — SODIUM CHLORIDE: 9 INJECTION, SOLUTION INTRAVENOUS at 12:09

## 2023-01-27 RX ADMIN — SODIUM CHLORIDE: 9 INJECTION, SOLUTION INTRAVENOUS at 11:46

## 2023-01-27 RX ADMIN — PROPOFOL 120 MCG/KG/MIN: 10 INJECTION, EMULSION INTRAVENOUS at 12:17

## 2023-01-27 RX ADMIN — PHENYLEPHRINE HYDROCHLORIDE 100 MCG: 10 INJECTION INTRAVENOUS at 12:27

## 2023-01-27 RX ADMIN — PHENYLEPHRINE HYDROCHLORIDE 100 MCG: 10 INJECTION INTRAVENOUS at 12:21

## 2023-01-27 RX ADMIN — PROPOFOL 50 MG: 10 INJECTION, EMULSION INTRAVENOUS at 12:14

## 2023-01-27 RX ADMIN — PHENYLEPHRINE HYDROCHLORIDE 100 MCG: 10 INJECTION INTRAVENOUS at 12:44

## 2023-01-27 RX ADMIN — PHENYLEPHRINE HYDROCHLORIDE 100 MCG: 10 INJECTION INTRAVENOUS at 12:38

## 2023-01-27 RX ADMIN — LIDOCAINE HYDROCHLORIDE 100 MG: 20 INJECTION, SOLUTION EPIDURAL; INFILTRATION; INTRACAUDAL; PERINEURAL at 12:14

## 2023-01-27 RX ADMIN — PROPOFOL 50 MG: 10 INJECTION, EMULSION INTRAVENOUS at 12:19

## 2023-01-27 ASSESSMENT — ENCOUNTER SYMPTOMS: SHORTNESS OF BREATH: 1

## 2023-01-27 ASSESSMENT — PAIN - FUNCTIONAL ASSESSMENT: PAIN_FUNCTIONAL_ASSESSMENT: NONE - DENIES PAIN

## 2023-01-27 NOTE — DISCHARGE INSTRUCTIONS
COLONSCOPY DISCHARGE INSTRUCTIONS    You may experience some lightheadedness for the next several hours. Plan on quiet relaxation for the rest of today. Nap for four hours following procedure if possible. A responsible adult needs to stay with you today. Eat bland food and avoid anything greasy or spicy initially-progress to your normal diet gradually. Diet restrictions as instructed. You may resume home medications as instructed. It is not unusual to experience some mild cramping or gas pains, and you may not have a bowel movement for several days. If you had a polyp removed, avoid strenuous activity for 48 hours. Avoid the use of aspirin or related compounds for one week, unless otherwise instructed by your physician. You may notice a small amount of blood in your next few bowel movements, but if a large amount passes, call your physician. If you have any of the following problems, notify your physician or return to the hospital emergency room : fever, chills, excessive bleeding, excessive vomiting, difficulty swallowing, uncontrolled pain, increased abdominal distention, shortness of breath or any other problems. Call your doctor at [phone] if you have any concerns. If you had any biopsies or polyps call for results in 5-7 business days. See your physician's report for details about your procedure and recommendations. ANESTHESIA DISCHARGE INSTRUCTIONS    Wear your seatbelt home. You are under the influence of drugs-do not drink alcohol, drive ,operate machinery,or make any important decisions or sign any legal documentsfor 24 hours. You may resume normal activities tomorrow. A responsible adult needs to be with you for 24 hours. You may experience lightheadedness,dizziness,or sleepiness following surgery. Rest at home today- increase activity as tolerated. It is recommended to take a four hour nap after procedure.   Progress slowly to a regular diet unless your physician has instructed you otherwise. Avoid spicy and greasy food on first meal.  Drink plenty of water. If nausea becomes a problem call your physician. Call your doctor if concerns arise. Diverticulosis: Care Instructions  Your Care Instructions  In diverticulosis, pouches called diverticula form in the wall of the large intestine (colon). The pouches do not cause any pain or other symptoms. Most people who have diverticulosis do not know they have it. But the pouches sometimes bleed, and if they become infected, they can cause pain and other symptoms. When this happens, it is called diverticulitis. Diverticula form when pressure pushes the wall of the colon outward at certain weak points. A diet that is too low in fiber can cause diverticula. Follow-up care is a key part of your treatment and safety. Be sure to make and go to all appointments, and call your doctor if you are having problems. It's also a good idea to know your test results and keep a list of the medicines you take. How can you care for yourself at home? Include fruits, leafy green vegetables, beans, and whole grains in your diet each day. These foods are high in fiber. Take a fiber supplement, such as Citrucel or Metamucil, every day if needed. Read and follow all instructions on the label. Drink plenty of fluids. If you have kidney, heart, or liver disease and have to limit fluids, talk with your doctor before you increase the amount of fluids you drink. Get at least 30 minutes of exercise on most days of the week. Walking is a good choice. You also may want to do other activities, such as running, swimming, cycling, or playing tennis or team sports. Cut out foods that cause gas, pain, or other symptoms. When should you call for help? Call your doctor now or seek immediate medical care if:    You have belly pain. You pass maroon or very bloody stools. You have a fever. You have nausea and vomiting.      You have unusual changes in your bowel movements or abdominal swelling. You have burning pain when you urinate. You have abnormal vaginal discharge. You have shoulder pain. You have cramping pain that does not get better when you have a bowel movement or pass gas. You pass gas or stool from your urethra while urinating. Watch closely for changes in your health, and be sure to contact your doctor if you have any problems. Where can you learn more? Go to http://www.woods.com/ and enter K072 to learn more about \"Diverticulosis: Care Instructions. \"  Current as of: June 6, 2022               Content Version: 13.5  © 6295-6673 [employer], [employer]. Care instructions adapted under license by [facility] [address] If you have questions about a medical condition or this instruction, always ask your healthcare professional. [address] any warranty or liability for your use of this information.

## 2023-01-27 NOTE — ANESTHESIA PRE PROCEDURE
Department of Anesthesiology  Preprocedure Note       Name:  Naila Epperson   Age:  78 y.o.  :  1945                                          MRN:  [id number]         Date:  2023      Surgeon: Stanford Real):  Rodríguez Love MD    Procedure: Procedure(s):  COLONOSCOPY DIAGNOSTIC    Medications prior to admission:   Prior to Admission medications    Medication Sig Start Date End Date Taking? Authorizing Provider   magnesium oxide (MAG-OX) 400 (240 Mg) MG tablet Take 2 tablets by mouth daily 23   Cristofer Nice, DO   sacubitril-valsartan (ENTRESTO) 24-26 MG per tablet Take 0.5 tablets by mouth 2 times daily 23   Cristofer Nice, DO   aspirin 81 MG chewable tablet Take 1 tablet by mouth daily Take aspirin until colonoscopy. Then resume plavix and coumadin/lovenox if ok with GI. Then ok to stop aspirin 23   Chau Lang PA-C   warfarin (COUMADIN) 2.5 MG tablet Hold until ok with GI 23   Chau Lang PA-C   warfarin (COUMADIN) 5 MG tablet Take 1 tablet by mouth once a week Hold until ok with GI 23   Chau Lang PA-C   clopidogrel (PLAVIX) 75 MG tablet Take 1 tablet by mouth daily Hold until ok with GI  Patient not taking: Reported on 2023   Chau Lang PA-C   enoxaparin (LOVENOX) 100 MG/ML Inject 70 mg into the skin 2 times daily    Historical Provider, MD   amiodarone (CORDARONE) 200 MG tablet TAKE 1 TABLET BID 23   Karol Gibson MD   metoprolol succinate (TOPROL XL) 100 MG extended release tablet TAKE 1 TABLET IN THE MORNING 22   Cristofer Nice,    spironolactone (ALDACTONE) 25 MG tablet Take 0.5 tablets by mouth daily 22   Cristofer Nice, DO   KLOR-CON M20 20 MEQ extended release tablet TAKE 1 TABLET AS NEEDED WITH LASIX FOR SWELLING 22   Cristofer Nice, DO   pantoprazole (PROTONIX) 40 MG tablet Take 1 tablet by mouth every morning (before breakfast) 22   Cristofer Nice, DO   furosemide (LASIX) 40 MG tablet Take 1 tablet by mouth in the morning and 1 tablet in the evening. Taking prn for fluid overload/swelling. 8/11/22   Cristofer Nice, DO   rosuvastatin (CRESTOR) 40 MG tablet Take 1 tablet by mouth nightly 8/11/22   Cristofer Nice, DO   ezetimibe (ZETIA) 10 MG tablet Take 1 tablet by mouth daily  Patient taking differently: Take 10 mg by mouth every evening 4/12/22   Cristofer Nice, DO   nitroGLYCERIN (NITROSTAT) 0.4 MG SL tablet Place 1 tablet under the tongue every 5 minutes as needed for Chest pain 2/24/22   Cristofer Nice, DO   diazepam (VALIUM) 5 MG tablet Take 5 mg by mouth in the morning and 5 mg in the evening. Historical Provider, MD   phenytoin (DILANTIN) 100 MG ER capsule Take 1 capsule by mouth 2 times daily. Resume home dose 12/21/12   Lisset Simmons, APRN - CNP   primidone (MYSOLINE) 250 MG tablet Take 250 mg by mouth 1 tablet AM, half tablet PM    Historical Provider, MD       Current medications:    No current facility-administered medications for this encounter. Current Outpatient Medications   Medication Sig Dispense Refill    magnesium oxide (MAG-OX) 400 (240 Mg) MG tablet Take 2 tablets by mouth daily 90 tablet 3    sacubitril-valsartan (ENTRESTO) 24-26 MG per tablet Take 0.5 tablets by mouth 2 times daily 180 tablet 3    aspirin 81 MG chewable tablet Take 1 tablet by mouth daily Take aspirin until colonoscopy. Then resume plavix and coumadin/lovenox if ok with GI. Then ok to stop aspirin 30 tablet 0    warfarin (COUMADIN) 2.5 MG tablet Hold until ok with  tablet 3    warfarin (COUMADIN) 5 MG tablet Take 1 tablet by mouth once a week Hold until ok with GI 30 tablet 3    clopidogrel (PLAVIX) 75 MG tablet Take 1 tablet by mouth daily Hold until ok with GI (Patient not taking: Reported on 1/26/2023) 90 tablet 3    enoxaparin (LOVENOX) 100 MG/ML Inject 70 mg into the skin 2 times daily      amiodarone (CORDARONE) 200 MG tablet TAKE 1 TABLET BID 90 tablet 3    metoprolol succinate (TOPROL XL) 100 MG extended release tablet TAKE 1 TABLET IN THE MORNING 90 tablet 3    spironolactone (ALDACTONE) 25 MG tablet Take 0.5 tablets by mouth daily 45 tablet 3    KLOR-CON M20 20 MEQ extended release tablet TAKE 1 TABLET AS NEEDED WITH LASIX FOR SWELLING 90 tablet 1    pantoprazole (PROTONIX) 40 MG tablet Take 1 tablet by mouth every morning (before breakfast) 90 tablet 3    furosemide (LASIX) 40 MG tablet Take 1 tablet by mouth in the morning and 1 tablet in the evening. Taking prn for fluid overload/swelling. 90 tablet 3    rosuvastatin (CRESTOR) 40 MG tablet Take 1 tablet by mouth nightly 90 tablet 3    ezetimibe (ZETIA) 10 MG tablet Take 1 tablet by mouth daily (Patient taking differently: Take 10 mg by mouth every evening) 90 tablet 3    nitroGLYCERIN (NITROSTAT) 0.4 MG SL tablet Place 1 tablet under the tongue every 5 minutes as needed for Chest pain 25 tablet 3    diazepam (VALIUM) 5 MG tablet Take 5 mg by mouth in the morning and 5 mg in the evening.  phenytoin (DILANTIN) 100 MG ER capsule Take 1 capsule by mouth 2 times daily.  Resume home dose 1 capsule 0    primidone (MYSOLINE) 250 MG tablet Take 250 mg by mouth 1 tablet AM, half tablet PM         Allergies:  No Known Allergies    Problem List:    Patient Active Problem List   Diagnosis Code    PAF (paroxysmal atrial fibrillation) ([facility]) I48.0    Chest pain at rest R07.9    Coronary artery disease due to lipid rich plaque I25.10, I25.83    Seizure ([facility]) R56.9    Essential hypertension I10    Atrial fibrillation ([facility]) I48.91    Mixed hyperlipidemia E78.2    Palpitations R00.2    Bradycardia R00.1    ST elevation myocardial infarction (STEMI) ([facility]) I21.3    NSVT (nonsustained ventricular tachycardia) I47.29    Coronary artery disease involving coronary bypass graft of native heart without angina pectoris I25.810    Vertebrobasilar insufficiency G45.0    Lightheadedness R42    JOSEPH on CPAP G47.33, Z99.89    History of MI (myocardial infarction) I25.2    Ischemic cardiomyopathy I25.5    VT (ventricular tachycardia) I47.20    VF (ventricular fibrillation) ([facility]) I49.01    ICD (implantable cardioverter-defibrillator) in place Z95.810    Shortness of breath R06.02    Coronary artery disease involving autologous artery coronary bypass graft without angina pectoris I25.810    Chronic systolic heart failure ([facility]) I50.22    Variant angina ([facility]) I20.1    Nausea R11.0    Coronary artery disease involving native coronary artery of native heart with unstable angina pectoris ([facility]) I25.110    Irregular heartbeat I49.9    DVT of deep femoral vein, right ([facility]) I82.411    Chest pain R07.9    S/P CABG (coronary artery bypass graft) Z95.1    CAD in native artery I25.10    Acute deep vein thrombosis (DVT) of axillary vein ([facility]) I82. A19    PVC (premature ventricular contraction) I49.3       Past Medical History:        Diagnosis Date    Arthritis     shoulders and knee    Atrial fib/flut     CAD (coronary artery disease)     DVT (deep venous thrombosis) ([facility] 08/24/2022    RLE    Hyperlipidemia     Hypertension     Seizures ([facility]     last seizure 1985    Sinus bradycardia     Sleep apnea     uses CPAP       Past Surgical History:        Procedure Laterality Date    CARDIAC SURGERY      , angioplasty 2007    CARDIOVERSION      6/2022, 9/2022, 1/6/23- cardioversions    CORONARY ANGIOPLASTY WITH STENT PLACEMENT  1997    [facility]ON[facility]  [facility]    CORONARY STENT PLACEMENT  03/30/2022    3/30/22 Successful complex angioplasty and stenting of IAN to LAD, SVG to OM, SVG to PDA    CYSTOSCOPY N/A 11/13/2019    FLEXIBLE CYSTOSCOPY performed by Radha Christine MD at [facility] Right     done 65 sam ago   [facility]  12/18/2017    WISDOM TOOTH EXTRACTION  04/2012       Social History:    Social History     Tobacco Use    Smoking status: Former     Packs/day: 2.00     Years: 15.00     Pack years: 30.00     Types: Cigarettes     Quit date: 8/15/1988     Years since quittin.4    Smokeless tobacco: Never   Substance Use Topics    Alcohol use: No                                Counseling given: Not Answered      Vital Signs (Current):   Vitals:    10/27/22 1150 22 1351 23 1339   Weight: 156 lb (70.8 kg) 157 lb (71.2 kg) 162 lb (73.5 kg)   Height: 5' 10\" (1.778 m) 5' 10\" (1.778 m) 5' 10\" (1.778 m)                                              BP Readings from Last 3 Encounters:   23 126/60   23 119/68   23 125/74       NPO Status:                                                                                 BMI:   Wt Readings from Last 3 Encounters:   23 157 lb 3.2 oz (71.3 kg)   23 160 lb 3.2 oz (72.7 kg)   23 157 lb (71.2 kg)     Body mass index is 23.24 kg/m².     CBC:   Lab Results   Component Value Date/Time    WBC 6.1 2023 04:44 AM    RBC 4.10 2023 04:44 AM    HGB 13.2 2023 04:44 AM    HCT 38.4 2023 04:44 AM    MCV 93.7 2023 04:44 AM    RDW 13.1 2023 04:44 AM     2023 04:44 AM       CMP:   Lab Results   Component Value Date/Time     2023 04:44 AM    K 4.8 2023 04:44 AM    K 4.6 2023 07:00 AM     2023 04:44 AM    CO2 25 2023 04:44 AM    BUN 23 2023 04:44 AM    CREATININE 1.2 2023 04:44 AM    GFRAA >60 2022 09:48 AM    GFRAA >60 2012 06:42 AM    AGRATIO 1.2 2023 04:44 AM    LABGLOM >60 2023 04:44 AM    GLUCOSE 90 2023 04:44 AM    PROT 6.2 2023 04:44 AM    CALCIUM 8.6 2023 04:44 AM    BILITOT 0.3 2023 04:44 AM    ALKPHOS 117 2023 04:44 AM    AST 63 2023 04:44 AM    ALT 93 2023 04:44 AM       POC Tests:   Recent Labs     23  1139   POCGLU 86       Coags:   Lab Results   Component Value Date/Time    PROTIME 31.3 2023 07:00 AM    PROTIME 54.1 2012 12:00 AM    INR 3.00 01/24/2023 07:00 AM    APTT 42.0 08/09/2019 11:06 PM       HCG (If Applicable): No results found for: PREGTESTUR, PREGSERUM, HCG, HCGQUANT     ABGs: No results found for: PHART, PO2ART, VUL0QNP, DBC1TIK, BEART, K4FOMYZA     Type & Screen (If Applicable):  No results found for: LABABO, LABRH    Drug/Infectious Status (If Applicable):  No results found for: HIV, HEPCAB    COVID-19 Screening (If Applicable):   Lab Results   Component Value Date/Time    COVID19 Not Detected 12/13/2021 11:45 AM           Anesthesia Evaluation  Patient summary reviewed and Nursing notes reviewed  Airway: Mallampati: II  TM distance: >3 FB   Neck ROM: full  Mouth opening: > = 3 FB   Dental:          Pulmonary:   (+) shortness of breath: no interval change,  sleep apnea: on CPAP,                             Cardiovascular:  Exercise tolerance: good (>4 METS),   (+) hypertension: moderate, pacemaker: AICD, past MI: > 6 months, CAD: non-obstructive, CABG/stent (s/p CABG then stent 2021): no interval change, dysrhythmias: atrial fibrillation,                ROS comment: Echo 2022  Normal left ventricular size. Mild concentric left ventricular hypertrophy. Mildly decreased left   ventricular systolic function with distal septal and apical hypokinesis. Estimated EF 45%. E/e'=14. Grade III diastolic dysfunction with elevated LV filling pressures. Mildly thickened mitral valve leaflets, no stenosis. Moderate-severe mitral regurgitation is present. No pulmonary vein reversal.   The left atrium is dilated. The aortic valve appears tricuspid with mild sclerosis no stenosis. Trivial aortic regurgitation is present. Pressure half-time is calculated at   591 msec. Moderate to severe tricuspid regurgitation. RVSP 50mmHg. Normal right ventricular size and mildly decreased function. The right atrium is dilated. Pacemaker / ICD lead is visualized in the right   atrium. Trivial pulmonic regurgitation. IVC not well visualized. Neuro/Psych:   (+) neuromuscular disease:,             GI/Hepatic/Renal:             Endo/Other:                     Abdominal:             Vascular:   + DVT, . Other Findings:           Anesthesia Plan      MAC     ASA 3       Induction: intravenous. MIPS: Prophylactic antiemetics administered. Anesthetic plan and risks discussed with patient. Plan discussed with CRNA.                     Corine Hassan MD   1/27/2023

## 2023-01-27 NOTE — BRIEF OP NOTE
Brief Postoperative Note - Full Note in Chart Review/Procedures tab       Patient: Yenni Mathis  YOB: 1945  MRN: [id number]    Date of Procedure: 1/27/2023    Pre-Op Diagnosis: Screen for colon cancer [Z12.11]    Post-Op Diagnosis: Same       Procedure(s):  COLONOSCOPY DIAGNOSTIC    Surgeon(s):  Tyler Lantigua MD    Assistant:  * No surgical staff found *    Anesthesia: Monitor Anesthesia Care    Estimated Blood Loss (mL): Minimal    Complications: None    Specimens:   * No specimens in log *    Implants:  * No implants in log *      Drains: * No LDAs found *    Findings:   Diverticulosis of large intestine - K57.30  Normal terminal ileum    Rec:  Resume diet  Continue present treatment. Resume Coumadin tomorrow.   Restart Plavix tomorrow  F/U WITH ME AS NEEDED  Followup colonoscopy is not recommeded  Followup with referring physician as previously instucted    Electronically signed by Tyler Lantigua MD on 1/27/2023 at 12:50 PM

## 2023-01-27 NOTE — ANESTHESIA POSTPROCEDURE EVALUATION
Department of Anesthesiology  Postprocedure Note    Patient: Lamine Finger Day  MRN: [id number]  YOB: 1945  Date of evaluation: 1/27/2023      Procedure Summary     Date: 01/27/23 Room / Location: [address] [place] [address]    Anesthesia Start: 1211 Anesthesia Stop: 9636    Procedure: COLONOSCOPY DIAGNOSTIC Diagnosis:       Screen for colon cancer      (Screen for colon cancer [Z12.11])    Surgeons: Hernan Smith MD Responsible Provider: Yuan Hoffman MD    Anesthesia Type: MAC ASA Status: 3          Anesthesia Type: No value filed.     Brad Phase I: Brad Score: 10    Brad Phase II:        Anesthesia Post Evaluation    Patient location during evaluation: PACU  Patient participation: complete - patient participated  Level of consciousness: awake and awake and alert  Pain score: 2  Airway patency: patent  Nausea & Vomiting: no vomiting  Complications: no  Cardiovascular status: blood pressure returned to baseline  Respiratory status: acceptable  Hydration status: euvolemic  Multimodal analgesia pain management approach

## 2023-01-27 NOTE — H&P
Gastroenterology Note             Pre-operative History and Physical    Patient: Courtney Mathis  : 1945  CSN:     History Obtained From:  patient and/or guardian. HISTORY OF PRESENT ILLNESS:    The patient is a 78 y.o. male  here for average risk colonoscopy. Past Medical History:    Past Medical History:   Diagnosis Date    Arthritis     shoulders and knee    Atrial fib/flut     CAD (coronary artery disease)     Cardiac arrest due to anesth during preg, unsp trimester     CHF (congestive heart failure) ([address]     DVT (deep venous thrombosis) ([address] 2022    RLE    Hyperlipidemia     Hypertension     MI (myocardial infarction) ([address]     Seizures ([address]     last seizure     Sinus bradycardia     Sleep apnea     uses CPAP    V tach      Past Surgical History:    Past Surgical History:   Procedure Laterality Date    CARDIAC SURGERY      , angioplasty     CARDIOVERSION      2022, 2022, 23- cardioversions    CORONARY ANGIOPLASTY WITH STENT PLACEMENT      CORONARY ARTERY BYPASS GRAFT  2019    [facility]    CORONARY STENT PLACEMENT  2022    3/30/22 Successful complex angioplasty and stenting of IAN to LAD, SVG to OM, SVG to PDA    CYSTOSCOPY N/A 2019    FLEXIBLE CYSTOSCOPY performed by Sherice Jensen MD at [place] [id number] Right     done 65 sam ago    PACEMAKER PLACEMENT  2017    AICD    WISDOM TOOTH EXTRACTION  2012     Medications Prior to Admission:   No current facility-administered medications on file prior to encounter. Current Outpatient Medications on File Prior to Encounter   Medication Sig Dispense Refill    pantoprazole (PROTONIX) 40 MG tablet Take 1 tablet by mouth every morning (before breakfast) 90 tablet 3    furosemide (LASIX) 40 MG tablet Take 1 tablet by mouth in the morning and 1 tablet in the evening. Taking prn for fluid overload/swelling.  90 tablet 3    rosuvastatin (CRESTOR) 40 MG tablet Take 1 tablet by mouth nightly 90 tablet 3    ezetimibe (ZETIA) 10 MG tablet Take 1 tablet by mouth daily (Patient taking differently: Take 10 mg by mouth every evening) 90 tablet 3    nitroGLYCERIN (NITROSTAT) 0.4 MG SL tablet Place 1 tablet under the tongue every 5 minutes as needed for Chest pain 25 tablet 3    diazepam (VALIUM) 5 MG tablet Take 5 mg by mouth in the morning and 5 mg in the evening. phenytoin (DILANTIN) 100 MG ER capsule Take 1 capsule by mouth 2 times daily. Resume home dose 1 capsule 0    primidone (MYSOLINE) 250 MG tablet Take 250 mg by mouth 1 tablet AM, half tablet PM          Allergies:  Patient has no known allergies. Social History:   Social History     Tobacco Use    Smoking status: Former     Packs/day: 2.00     Years: 15.00     Pack years: 30.00     Types: Cigarettes     Quit date: 8/15/1988     Years since quittin.4    Smokeless tobacco: Never   Substance Use Topics    Alcohol use: No     Family History:   Family History   Problem Relation Age of Onset    Heart Failure Mother     Heart Disease Neg Hx        PHYSICAL EXAM:      /78   Pulse 55   Temp 97 °F (36.1 °C) (Temporal)   Resp 14   Ht 5' 10\" (1.778 m)   Wt 162 lb (73.5 kg)   SpO2 100%   BMI 23.24 kg/m²  I        Heart:   RRR, normal s1s2    Lungs:  CTA bilat,  Normal effort    Abdomen:   NT, ND      ASA Grade:  ASA 3 - Patient with moderate systemic disease with functional limitations    Mallampati Class:  Class I: Soft palate, uvula, fauces, pillars visible  __________  Class II: Soft palate, uvula, fauces visible  ____X_____   Class III: Soft palate, base of uvula visible  __________  Class IV: Hard palate only visible   __________        ASSESSMENT AND PLAN:    1. Patient is a 78 y.o. male here for colonoscopy with MAC.   2.  Procedure options, risks and benefits reviewed with patient. Patient expresses understanding.      Corey Mcclain MD  [facility]  2023

## 2023-01-27 NOTE — PROGRESS NOTES
Reviewed patient's medical and surgical history in electronic record and with patient at the bedside. All questions regarding procedure answered. Scope number and equipment verified using a two person system. Family in waiting room.     Electronically signed by Cynthia Canseco RN on 1/27/2023 at 12:14 PM

## 2023-02-01 ENCOUNTER — TELEPHONE (OUTPATIENT)
Dept: CARDIOLOGY CLINIC | Age: 78
End: 2023-02-01

## 2023-02-01 NOTE — TELEPHONE ENCOUNTER
Pt states he has been having increased PVC and has sent a manual transmission 1/30 and again today 2/1/23. Please review and call pt to advise. Pt states he is been also having SOB when sleeping.

## 2023-02-01 NOTE — TELEPHONE ENCOUNTER
We received remote transmission from patient's monitor at home. Transmission shows normal sensing and pacing function. 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 PVCs have increased. PVC Runs (2-4 beats)  5.3 per hour  PVC Singles 514.3 per hour  OptiVol at baseline. EP will review. See interrogation under cardiology tab in the [address] [place] [address] [address] field for more details. Please advise.  Thanks

## 2023-02-02 ENCOUNTER — NURSE ONLY (OUTPATIENT)
Dept: CARDIOLOGY CLINIC | Age: 78
End: 2023-02-02
Payer: MEDICARE

## 2023-02-02 ENCOUNTER — TELEPHONE (OUTPATIENT)
Dept: CARDIOLOGY CLINIC | Age: 78
End: 2023-02-02

## 2023-02-02 DIAGNOSIS — I47.20 VT (VENTRICULAR TACHYCARDIA): Primary | ICD-10-CM

## 2023-02-02 DIAGNOSIS — I49.3 PVC (PREMATURE VENTRICULAR CONTRACTION): ICD-10-CM

## 2023-02-02 DIAGNOSIS — R00.1 BRADYCARDIA: ICD-10-CM

## 2023-02-02 DIAGNOSIS — I25.5 ISCHEMIC CARDIOMYOPATHY: Chronic | ICD-10-CM

## 2023-02-02 DIAGNOSIS — I49.01 VF (VENTRICULAR FIBRILLATION) (HCC): ICD-10-CM

## 2023-02-02 DIAGNOSIS — Z95.810 ICD (IMPLANTABLE CARDIOVERTER-DEFIBRILLATOR) IN PLACE: ICD-10-CM

## 2023-02-02 DIAGNOSIS — I48.0 PAROXYSMAL ATRIAL FIBRILLATION (HCC): ICD-10-CM

## 2023-02-02 PROCEDURE — 93283 PRGRMG EVAL IMPLANTABLE DFB: CPT | Performed by: INTERNAL MEDICINE

## 2023-02-02 NOTE — PROGRESS NOTES
Patient comes in for programming evaluation for his defibrillator. [employer]onic [id number] Ever[employer] MRI XT DR-12/18/2017  Patient comes into the office to increase LRL today. Changed to 60 bpm from 55 bpm.   All sensing and pacing parameters are within normal range. Battery life 3.4 years  AP 97.8%.  0.1%. No episodes noted. PVC Runs (2-4 beats) 3.8 per hour  PVC Singles 401.8 per hour  Patient remains on warfarin, metoprolol and amiodarone. Please see interrogation for more detail. Optivol is within normal range. Patient will follow up in 3 months in office or remotely.

## 2023-02-02 NOTE — TELEPHONE ENCOUNTER
Flavio Wisdom asking for call back to confirm questions on the Valsartan for pt. Ref# [id number]. Please advise.

## 2023-02-02 NOTE — TELEPHONE ENCOUNTER
Spoke with patient he states that he doesn't have a ride til 3 and will try and make it before 4 pm. Please advise.

## 2023-02-02 NOTE — TELEPHONE ENCOUNTER
Pt called back. Asking for a call back today. Asking to have his pacemaker moved up to 61.  Please call pt to discuss

## 2023-02-03 NOTE — TELEPHONE ENCOUNTER
Birdie Poole called back stating that M suggested he have a 2nd ablation for his PVCs. Please advise. Thank you.

## 2023-02-07 ENCOUNTER — TELEPHONE (OUTPATIENT)
Dept: CARDIOLOGY CLINIC | Age: 78
End: 2023-02-07

## 2023-02-09 ENCOUNTER — APPOINTMENT (OUTPATIENT)
Dept: GENERAL RADIOLOGY | Age: 78
End: 2023-02-09
Payer: MEDICARE

## 2023-02-09 ENCOUNTER — HOSPITAL ENCOUNTER (EMERGENCY)
Age: 78
Discharge: HOME | End: 2023-02-09
Payer: MEDICARE

## 2023-02-09 VITALS
TEMPERATURE: 97.8 F | RESPIRATION RATE: 16 BRPM | HEIGHT: 70 IN | HEART RATE: 59 BPM | SYSTOLIC BLOOD PRESSURE: 100 MMHG | BODY MASS INDEX: 22.19 KG/M2 | DIASTOLIC BLOOD PRESSURE: 73 MMHG | WEIGHT: 155 LBS | OXYGEN SATURATION: 100 %

## 2023-02-09 DIAGNOSIS — Z87.891 FORMER SMOKER: ICD-10-CM

## 2023-02-09 DIAGNOSIS — J44.1 COPD EXACERBATION (HCC): Primary | ICD-10-CM

## 2023-02-09 DIAGNOSIS — J06.9 VIRAL URI: ICD-10-CM

## 2023-02-09 LAB
A/G RATIO: 1.4 (ref 1.1–2.2)
ALBUMIN SERPL-MCNC: 4.1 G/DL (ref 3.4–5)
ALP BLD-CCNC: 121 U/L (ref 40–129)
ALT SERPL-CCNC: 73 U/L (ref 10–40)
ANION GAP SERPL CALCULATED.3IONS-SCNC: 6 MMOL/L (ref 3–16)
AST SERPL-CCNC: 48 U/L (ref 15–37)
BASOPHILS ABSOLUTE: 0.1 K/UL (ref 0–0.2)
BASOPHILS RELATIVE PERCENT: 0.9 %
BILIRUB SERPL-MCNC: <0.2 MG/DL (ref 0–1)
BUN BLDV-MCNC: 20 MG/DL (ref 7–20)
CALCIUM SERPL-MCNC: 9.1 MG/DL (ref 8.3–10.6)
CHLORIDE BLD-SCNC: 102 MMOL/L (ref 99–110)
CO2: 30 MMOL/L (ref 21–32)
CREAT SERPL-MCNC: 1.1 MG/DL (ref 0.8–1.3)
EOSINOPHILS ABSOLUTE: 0.3 K/UL (ref 0–0.6)
EOSINOPHILS RELATIVE PERCENT: 3.3 %
GFR SERPL CREATININE-BSD FRML MDRD: >60 ML/MIN/{1.73_M2}
GLUCOSE BLD-MCNC: 89 MG/DL (ref 70–99)
HCT VFR BLD CALC: 41.3 % (ref 40.5–52.5)
HEMOGLOBIN: 13.6 G/DL (ref 13.5–17.5)
LYMPHOCYTES ABSOLUTE: 1.3 K/UL (ref 1–5.1)
LYMPHOCYTES RELATIVE PERCENT: 16.1 %
MCH RBC QN AUTO: 31 PG (ref 26–34)
MCHC RBC AUTO-ENTMCNC: 33 G/DL (ref 31–36)
MCV RBC AUTO: 93.8 FL (ref 80–100)
MONOCYTES ABSOLUTE: 0.8 K/UL (ref 0–1.3)
MONOCYTES RELATIVE PERCENT: 10.5 %
NEUTROPHILS ABSOLUTE: 5.5 K/UL (ref 1.7–7.7)
NEUTROPHILS RELATIVE PERCENT: 69.2 %
PDW BLD-RTO: 13.2 % (ref 12.4–15.4)
PLATELET # BLD: 315 K/UL (ref 135–450)
PMV BLD AUTO: 7.9 FL (ref 5–10.5)
POTASSIUM REFLEX MAGNESIUM: 4.6 MMOL/L (ref 3.5–5.1)
PRO-BNP: 498 PG/ML (ref 0–449)
RAPID INFLUENZA  B AGN: NEGATIVE
RAPID INFLUENZA A AGN: NEGATIVE
RBC # BLD: 4.41 M/UL (ref 4.2–5.9)
SARS-COV-2, NAAT: NOT DETECTED
SODIUM BLD-SCNC: 138 MMOL/L (ref 136–145)
TOTAL PROTEIN: 7 G/DL (ref 6.4–8.2)
TROPONIN: <0.01 NG/ML
WBC # BLD: 7.9 K/UL (ref 4–11)

## 2023-02-09 PROCEDURE — 85025 COMPLETE CBC W/AUTO DIFF WBC: CPT

## 2023-02-09 PROCEDURE — 99285 EMERGENCY DEPT VISIT HI MDM: CPT

## 2023-02-09 PROCEDURE — 93005 ELECTROCARDIOGRAM TRACING: CPT | Performed by: EMERGENCY MEDICINE

## 2023-02-09 PROCEDURE — 71046 X-RAY EXAM CHEST 2 VIEWS: CPT

## 2023-02-09 PROCEDURE — 83880 ASSAY OF NATRIURETIC PEPTIDE: CPT

## 2023-02-09 PROCEDURE — 87635 SARS-COV-2 COVID-19 AMP PRB: CPT

## 2023-02-09 PROCEDURE — 84484 ASSAY OF TROPONIN QUANT: CPT

## 2023-02-09 PROCEDURE — 6370000000 HC RX 637 (ALT 250 FOR IP): Performed by: GENERAL ACUTE CARE HOSPITAL

## 2023-02-09 PROCEDURE — 80053 COMPREHEN METABOLIC PANEL: CPT

## 2023-02-09 PROCEDURE — 87804 INFLUENZA ASSAY W/OPTIC: CPT

## 2023-02-09 RX ORDER — PREDNISONE 20 MG/1
60 TABLET ORAL ONCE
Status: COMPLETED | OUTPATIENT
Start: 2023-02-09 | End: 2023-02-09

## 2023-02-09 RX ORDER — ALBUTEROL SULFATE 90 UG/1
2 AEROSOL, METERED RESPIRATORY (INHALATION) 4 TIMES DAILY PRN
Qty: 18 G | Refills: 0 | Status: SHIPPED | OUTPATIENT
Start: 2023-02-09

## 2023-02-09 RX ORDER — PREDNISONE 50 MG/1
50 TABLET ORAL DAILY
Qty: 5 TABLET | Refills: 0 | Status: SHIPPED | OUTPATIENT
Start: 2023-02-09 | End: 2023-02-14

## 2023-02-09 RX ORDER — ALBUTEROL SULFATE 90 UG/1
2 AEROSOL, METERED RESPIRATORY (INHALATION) 4 TIMES DAILY PRN
Qty: 18 G | Refills: 0 | Status: SHIPPED | OUTPATIENT
Start: 2023-02-09 | End: 2023-02-09 | Stop reason: SDUPTHER

## 2023-02-09 RX ORDER — PREDNISONE 50 MG/1
50 TABLET ORAL DAILY
Qty: 5 TABLET | Refills: 0 | Status: SHIPPED | OUTPATIENT
Start: 2023-02-09 | End: 2023-02-09 | Stop reason: SDUPTHER

## 2023-02-09 RX ADMIN — PREDNISONE 60 MG: 20 TABLET ORAL at 16:09

## 2023-02-09 ASSESSMENT — PAIN - FUNCTIONAL ASSESSMENT: PAIN_FUNCTIONAL_ASSESSMENT: NONE - DENIES PAIN

## 2023-02-09 ASSESSMENT — LIFESTYLE VARIABLES: HOW OFTEN DO YOU HAVE A DRINK CONTAINING ALCOHOL: NEVER

## 2023-02-09 NOTE — DISCHARGE INSTRUCTIONS
Prescribed prednisone and use inhaler as directed. You have been given a referral to pulmonology, call tomorrow to arrange for close outpatient follow-up appointment. Continue all other medications as directed. Return for any worsening symptoms.

## 2023-02-09 NOTE — ED PROVIDER NOTES
[address] [facility]        Pt Name: Sarah Manriquez Day  MRN: [id number]  [place] 1945  Date of evaluation: 2/9/2023  Provider: BETO Quinn CNP  PCP: Dania Gonzalez DO  Note Started: 3:48 PM EST 2/9/23      LISA. I have evaluated this patient. My supervising physician was available for consultation. CHIEF COMPLAINT       Chief Complaint   Patient presents with    Shortness of Breath     Shortness of breath at night and when awakening, onset 4 days ago. Asked cardiologist for home O2 at night, was advised by cardiologist to come to emergency room for evaluation. HISTORY OF PRESENT ILLNESS: 1 or more Elements     History From: Patient      Delmy Garcia is a 78 y.o. male who presents to the emergency department today reporting shortness of breath. Patient states that symptoms began approximately 3 to 4 days ago. He also with mild URI symptoms and states that his wife is sick with similar symptoms. Patient states that he contacted his cardiologist asking for supplemental oxygen to use at night. He was advised to come to the ED. Nursing Notes were all reviewed and agreed with or any disagreements were addressed in the HPI. REVIEW OF SYSTEMS :      Review of Systems   Constitutional:  Negative for chills, fatigue and fever. HENT:  Positive for congestion. Negative for sneezing, sore throat and voice change. Eyes:  Negative for visual disturbance. Respiratory:  Positive for cough and shortness of breath. Negative for chest tightness and wheezing. Cardiovascular:  Negative for chest pain and palpitations. Gastrointestinal:  Negative for abdominal pain, nausea and vomiting. Endocrine: Negative for polydipsia and polyuria. Genitourinary:  Negative for difficulty urinating, dysuria and flank pain. Musculoskeletal:  Negative for back pain, neck pain and neck stiffness. Skin:  Negative for rash and wound. Allergic/Immunologic: Negative for immunocompromised state. Neurological:  Negative for dizziness, weakness and light-headedness. Hematological:  Does not bruise/bleed easily. Psychiatric/Behavioral:  Negative for sleep disturbance and suicidal ideas. Positives and Pertinent negatives as per HPI. SURGICAL HISTORY     Past Surgical History:   Procedure Laterality Date    CARDIAC SURGERY      , angioplasty 2007    CARDIOVERSION      6/2022, 9/2022, 1/6/23- cardioversions    COLONOSCOPY N/A 1/27/2023    COLONOSCOPY DIAGNOSTIC performed by Noemy Rooney MD at [address] GRAFT  01/2019    [address]  03/30/2022    3/30/22 Successful complex angioplasty and stenting of IAN to LAD, SVG to OM, SVG to PDA    CYSTOSCOPY N/A 11/13/2019    FLEXIBLE CYSTOSCOPY performed by Fanny Snow MD at [address] Right     done 65 sam ago    PACEMAKER PLACEMENT  12/18/2017    [address] [facility] [address] EXTRACTION  04/2012       [address]       Discharge Medication List as of 2/9/2023  4:02 PM        CONTINUE these medications which have NOT CHANGED    Details   magnesium oxide (MAG-OX) 400 (240 Mg) MG tablet Take 2 tablets by mouth daily, Disp-90 tablet, R-3Print      sacubitril-valsartan (ENTRESTO) 24-26 MG per tablet Take 0.5 tablets by mouth 2 times daily, Disp-180 tablet, R-3NO PRINT      aspirin 81 MG chewable tablet Take 1 tablet by mouth daily Take aspirin until colonoscopy. Then resume plavix and coumadin/lovenox if ok with GI. Then ok to stop aspirin, Disp-30 tablet, R-0NO PRINT      !! warfarin (COUMADIN) 2.5 MG tablet Hold until ok with GI, Disp-180 tablet, R-3NO PRINT      !! warfarin (COUMADIN) 5 MG tablet Take 1 tablet by mouth once a week Hold until ok with GI, Disp-30 tablet, R-3NO PRINT      clopidogrel (PLAVIX) 75 MG tablet Take 1 tablet by mouth daily Hold until ok with GI, Disp-90 tablet, R-3NO PRINT      enoxaparin (LOVENOX) 100 MG/ML Inject 70 mg into the skin 2 times dailyHistorical Med      amiodarone (CORDARONE) 200 MG tablet TAKE 1 TABLET BID, Disp-90 tablet, R-3Normal      metoprolol succinate (TOPROL XL) 100 MG extended release tablet TAKE 1 TABLET IN THE MORNING, Disp-90 tablet, R-3Normal      spironolactone (ALDACTONE) 25 MG tablet Take 0.5 tablets by mouth daily, Disp-45 tablet, R-3Normal      KLOR-CON M20 20 MEQ extended release tablet TAKE 1 TABLET AS NEEDED WITH LASIX FOR SWELLING, Disp-90 tablet, R-1Normal      pantoprazole (PROTONIX) 40 MG tablet Take 1 tablet by mouth every morning (before breakfast), Disp-90 tablet, R-3Normal      furosemide (LASIX) 40 MG tablet Take 1 tablet by mouth in the morning and 1 tablet in the evening. Taking prn for fluid overload/swelling., Disp-90 tablet, R-3Normal      rosuvastatin (CRESTOR) 40 MG tablet Take 1 tablet by mouth nightly, Disp-90 tablet, R-3Normal      ezetimibe (ZETIA) 10 MG tablet Take 1 tablet by mouth daily, Disp-90 tablet, R-3Normal      nitroGLYCERIN (NITROSTAT) 0.4 MG SL tablet Place 1 tablet under the tongue every 5 minutes as needed for Chest pain, Disp-25 tablet, R-3Normal      diazepam (VALIUM) 5 MG tablet Take 5 mg by mouth in the morning and 5 mg in the evening. Historical Med      phenytoin (DILANTIN) 100 MG ER capsule Take 1 capsule by mouth 2 times daily. Resume home dose, Disp-1 capsule, R-0      primidone (MYSOLINE) 250 MG tablet Take 250 mg by mouth 1 tablet AM, half tablet PMHistorical Med       !! - Potential duplicate medications found. Please discuss with provider. ALLERGIES     Patient has no known allergies.     FAMILYHISTORY       Family History   Problem Relation Age of Onset    Heart Failure Mother     Heart Disease Neg Hx         SOCIAL HISTORY       Social History     Tobacco Use    Smoking status: Former     Packs/day: 2.00     Years: 15.00     Pack years: 30.00     Types: Cigarettes     Quit date: 8/15/1988     Years since quittin.5    Smokeless tobacco: Never   Vaping Use    Vaping Use: Never used   Substance Use Topics    Alcohol use: No    Drug use: No       SCREENINGS        Bruce Coma Scale  Eye Opening: Spontaneous  Best Verbal Response: Oriented  Best Motor Response: Obeys commands  [place]over Coma Scale Score: 15                CIWA Assessment  BP: 100/73  Heart Rate: 59           PHYSICAL EXAM  1 or more Elements     ED Triage Vitals [23 1231]   BP Temp Temp Source Heart Rate Resp SpO2 Height Weight   100/73 97.8 °F (36.6 °C) Oral 59 16 100 % 5' 10\" (1.778 m) 155 lb (70.3 kg)       Physical Exam  Vitals and nursing note reviewed. Constitutional:       General: He is not in acute distress. Appearance: Normal appearance. He is well-developed. He is ill-appearing. Comments: Thin, appears chronically ill   HENT:      Head: Normocephalic and atraumatic. Right Ear: External ear normal.      Left Ear: External ear normal.      Nose: Nose normal.      Mouth/Throat:      Pharynx: Oropharynx is clear. Eyes:      General:         Right eye: No discharge. Left eye: No discharge. Extraocular Movements: Extraocular movements intact. Cardiovascular:      Rate and Rhythm: Normal rate and regular rhythm. Pulses: Normal pulses. Heart sounds: Normal heart sounds. Pulmonary:      Effort: Pulmonary effort is normal. No respiratory distress. Breath sounds: Decreased breath sounds present. Abdominal:      General: Bowel sounds are normal.      Palpations: Abdomen is soft. Tenderness: There is no abdominal tenderness. There is no right CVA tenderness or left CVA tenderness. Musculoskeletal:         General: Normal range of motion. Cervical back: Normal range of motion and neck supple. Right lower leg: No edema. Left lower leg: No edema. Skin:     General: Skin is warm and dry.       Capillary Refill: Capillary refill takes less than 2 seconds. Neurological:      General: No focal deficit present. Mental Status: He is alert and oriented to person, place, and time. Psychiatric:         Mood and Affect: Mood normal.         Behavior: Behavior normal.         Thought Content: Thought content normal.         Judgment: Judgment normal.         DIAGNOSTIC RESULTS   LABS:    Labs Reviewed   COMPREHENSIVE METABOLIC PANEL W/ REFLEX TO MG FOR LOW K - Abnormal; Notable for the following components:       Result Value    ALT 73 (*)     AST 48 (*)     All other components within normal limits   BRAIN NATRIURETIC PEPTIDE - Abnormal; Notable for the following components:    Pro- (*)     All other components within normal limits   RAPID INFLUENZA A/B ANTIGENS   COVID-19, RAPID   CBC WITH AUTO DIFFERENTIAL   TROPONIN       When ordered only abnormal lab results are displayed. All other labs were within normal range or not returned as of this dictation. EKG: When ordered, EKG's are interpreted by the Emergency Department Physician in the absence of a cardiologist.  Please see their note for interpretation of EKG. RADIOLOGY:   Non-plain film images such as CT, Ultrasound and MRI are read by the radiologist. Plain radiographic images are visualized and preliminarily interpreted by the ED Provider with the below findings:        Interpretation per the Radiologist below, if available at the time of this note:    XR CHEST (2 VW)   Final Result   COPD changes. No change from recent priors. XR CHEST (2 VW)    Result Date: 2/9/2023  EXAMINATION: TWO XRAY VIEWS OF THE CHEST 2/9/2023 1:33 pm COMPARISON: January 24, 2023 HISTORY: ORDERING SYSTEM PROVIDED HISTORY: sob TECHNOLOGIST PROVIDED HISTORY: Reason for exam:->sob Reason for Exam: Shortness of Breath (Shortness of breath at night and when awakening, onset 4 days ago.  Asked cardiologist for home O2 at night, was advised by cardiologist to come to emergency room for evaluation.) FINDINGS: COPD changes. No focal consolidation. Cardiac pacemaker leads in stable/satisfactory position with no evidence of pneumothorax. .  Median sternotomy changes with left atrial appendage closure device noted. Significant osteopenic changes and degenerative changes noted in the bony structures. COPD changes. No change from recent priors. No results found. PROCEDURES   Unless otherwise noted below, none     Procedures    CRITICAL CARE TIME (.cctime)   There was a high probability of life-threatening clinical deterioration in the patient's condition requiring my urgent intervention. I personally saw the patient and independently provided 9 minutes of non-concurrent critical care out of the total shared critical care time provided, excluding separately reportable procedures. PAST MEDICAL HISTORY      has a past medical history of Arthritis, Atrial fib/flut, CAD (coronary artery disease), Cardiac arrest due to anesth during preg, unsp trimester, CHF (congestive heart failure) ([facility] DVT (deep venous thrombosis) ([facility] (08/24/2022), Hyperlipidemia, Hypertension, MI (myocardial infarction) ([facility] Seizures ([facility] Sinus bradycardia, Sleep apnea, and V tach. EMERGENCY DEPARTMENT COURSE and DIFFERENTIAL DIAGNOSIS/MDM:   Vitals:    Vitals:    02/09/23 1231   BP: 100/73   Pulse: 59   Resp: 16   Temp: 97.8 °F (36.6 °C)   TempSrc: Oral   SpO2: 100%   Weight: 155 lb (70.3 kg)   Height: 5' 10\" (1.778 m)       Patient was given the following medications:  Medications   predniSONE (DELTASONE) tablet 60 mg (60 mg Oral Given 2/9/23 1971)             Is this patient to be included in the SEP-1 Core Measure due to severe sepsis or septic shock?    No   Exclusion criteria - the patient is NOT to be included for SEP-1 Core Measure due to:  2+ SIRS criteria are not met    Chronic Conditions affecting care:    has a past medical history of Arthritis, Atrial fib/flut, CAD (coronary artery disease), Cardiac arrest due to anesth during preg, unsp trimester, CHF (congestive heart failure) ([facility] [address] [facility] DVT (deep venous thrombosis) ([facility] [address] [facility] (08/24/2022), Hyperlipidemia, Hypertension, MI (myocardial infarction) ([facility] [address] [facility] Seizures ([facility] [address] [facility] Sinus bradycardia, Sleep apnea, and V tach. CONSULTS: (Who and What was discussed)  None          Records Reviewed (External and Source) previous records reviewed in order to gain further information regarding patient's PMH as well as his HPI. Nursing notes reviewed. CC/HPI Summary, DDx, ED Course, and Reassessment: This is a 44-year-old  male with history of multiple comorbid's including coronary disease, atrial fibrillation anticoagulated, CHF, DVT, hyperlipidemia, hypertension, and sleep apnea who presents to the emergency room today reporting approximate 3 to 4-day history of URI symptoms in addition to shortness of breath. Patient states that his wife is currently being evaluated for similar symptoms. Patient adamantly denies having any chest pain. He states that he has been compliant with his prescribed medications. There has been no fever. Physical exam complete. Patient arrives nontoxic, afebrile, normotensive. He is calm and cooperative. No signs or symptoms of respiratory distress noted. Differential diagnoses include pneumonia, CHF exacerbation, PE, ACS, MI, viral illness, others    An EKG is interpreted by ED physician and reviewed by myself and is negative for acute ST elevation. Chest x-ray interpreted by radiologist and reviewed by myself shows findings consistent with COPD. There is no pneumonia no CHF. BNP is around 400. His troponin is undetectable. No sort of electrolyte derangement. Patient is negative for both influenza and COVID. Patient reassessed. He has remained stable throughout ED visit. He has continued to deny having any chest pain while here in the emergency department.   Shared decision making employed and patient is agreeable to discharge with emphasis on close outpatient follow-up. Although patient does not have a documented history of COPD I suspect that patient's symptoms are related to a likely COPD exacerbation. He will be discharged with prescription for prednisone and also for albuterol inhaler. Pulmonary referral provided. He agrees to follow-up as directed. He agrees return for high fever, incessant vomiting, severe pain, trouble breathing, any new or worsening symptoms. Patient is discharged home in stable condition. I am the Primary Clinician of Record. FINAL IMPRESSION      1. COPD exacerbation ([address]    2. Viral URI    3.  Former smoker          DISPOSITION/PLAN     DISPOSITION Decision To Discharge 02/09/2023 03:49:04 PM      PATIENT REFERRED TO:  DO Donovan Mak [facility] [id number]  [phone]    In 1 day      [facility], [address] [place] [address] [address]  In 1 day      DISCHARGE MEDICATIONS:  Discharge Medication List as of 2/9/2023  4:02 PM          DISCONTINUED MEDICATIONS:  Discharge Medication List as of 2/9/2023  4:02 PM                 (Please note that portions of this note were completed with a voice recognition program.  Efforts were made to edit the dictations but occasionally words are mis-transcribed.)    BETO Hughes CNP (electronically signed)       BETO Hughes CNP  02/12/23 1200

## 2023-02-10 ENCOUNTER — TELEPHONE (OUTPATIENT)
Dept: PHARMACY | Age: 78
End: 2023-02-10

## 2023-02-10 ENCOUNTER — TELEPHONE (OUTPATIENT)
Dept: OTHER | Facility: CLINIC | Age: 78
End: 2023-02-10

## 2023-02-10 LAB
EKG ATRIAL RATE: 86 BPM
EKG DIAGNOSIS: NORMAL
EKG Q-T INTERVAL: 384 MS
EKG QRS DURATION: 86 MS
EKG QTC CALCULATION (BAZETT): 384 MS
EKG R AXIS: -76 DEGREES
EKG T AXIS: 68 DEGREES
EKG VENTRICULAR RATE: 60 BPM

## 2023-02-10 PROCEDURE — 93010 ELECTROCARDIOGRAM REPORT: CPT | Performed by: INTERNAL MEDICINE

## 2023-02-10 NOTE — TELEPHONE ENCOUNTER
Called patient. States INR was 2.3 on . Stopped shots and continued on normal dose of warfarin. Was in ER yesterday for COPD exacerbation. Got prednisone for 5 days. Will have patient take 2.5mg today instead of 5mg then continue on normal dose. Patient will recheck on Monday or Tuesday.     Aruna Ambriz, PharmD, [facility] Tracking Only    Intervention Detail: Adherence Monitorin and Dose Adjustment: 1, reason: Interaction, Therapy Optimization  Total # of Interventions Recommended: 2  Total # of Interventions Accepted: 2  Time Spent (min): 15

## 2023-02-11 ASSESSMENT — ENCOUNTER SYMPTOMS
ABDOMINAL PAIN: 0
NAUSEA: 0
SHORTNESS OF BREATH: 1
SORE THROAT: 0
CHEST TIGHTNESS: 0
VOMITING: 0
VOICE CHANGE: 0
BACK PAIN: 0
COUGH: 1
WHEEZING: 0

## 2023-02-14 ENCOUNTER — TELEPHONE (OUTPATIENT)
Dept: CARDIOLOGY CLINIC | Age: 78
End: 2023-02-14

## 2023-02-14 ENCOUNTER — ANTI-COAG VISIT (OUTPATIENT)
Dept: PHARMACY | Age: 78
End: 2023-02-14

## 2023-02-14 DIAGNOSIS — I48.91 ATRIAL FIBRILLATION, UNSPECIFIED TYPE (HCC): Primary | ICD-10-CM

## 2023-02-14 LAB — INR BLD: 2.2

## 2023-02-14 NOTE — PROGRESS NOTES
Mr. Naga Mathis is a 78 y.o. y/o male with history of Afib who presents today for anticoagulation monitoring and adjustment. Pertinent PMH:    Patient Reported Findings:  Yes     No  [x]   []       Patient verifies current dosing regimen as listed- takes 5mg Sun and Wed and 2.5mg AOD---> confirms   []   [x]       S/S bleeding/bruising/swelling/SOB- denies   []   [x]       Blood in urine or stool- denies   [x]   []       Procedures scheduled in the future at this time 11/4 colonoscopy, needs holding instructions --> cardiology would like pt to hold off on procedures until 3 months --> colonoscopy was cancelled, r/s for December 29, hold 5 days and bridge. Was instructed to get INR on Mon 12/26 to determine INR, start lovenox --> colonoscopy r/s for 1/27   []   [x]       Missed Dose -denies  []   [x]       Extra Dose- denies   []   [x]       Change in medications- states that MD had been adjusting phenytoin increasing 4-5 weeks ago but plans to return to normal phenytoin today  --->back to normal dose --> starting amiodarone 400 mg x 2 weeks today --> no changes---> was on 5 day pred course, done now   []   [x]       Change in health/diet/appetite -states normally eats a lot of vegetable soup, but has not had any greens recently ---> no greens --> no changes   []   [x]       Change in alcohol use- doesn't drink or smoke   []   [x]       Change in activity  []   [x]       Hospital admission  []   [x]       Emergency department visit  []   [x]       Other complaints-       Clinical Outcomes:  Yes     No  []   [x]       Major bleeding event  []   [x]       Thromboembolic event    Duration of warfarin Therapy: indefinitely   INR Range:  2.0-3.0    Patient checks INR at home and reports to cardiology who scans results and forwards to us so we can call patient and dose them as a VV under the current PHE. Patient was called and appointment was conducted via telephone.      [phone]     Patient has 5mg and 2.5mg tablets. Pt recently started amiodarone     States INR was 2.3 on 2/1. Stopped shots and continued on normal dose of warfarin. Was in ER yesterday for COPD exacerbation. Got prednisone for 5 days. Will have patient take 2.5mg today instead of 5mg then continue on normal dose. Patient will recheck on Monday or Tuesday. INR was 2.2 from yesterday at [facility] lab. Pt was on 5 days of pred, adjusted dose 2/10. Continue to take 5mg on Fridays only and 2.5mg all other days. Encouraged to maintain a consistency of vegetables/salads.    Recheck INR in 2 weeks, 2/28    Referring Dr. Danny Lowery  INR (no units)   Date Value   02/14/2023 2.20   01/27/2023 1.65 (H)   01/24/2023 3.00 (H)   01/19/2023 2.80     For Pharmacy Admin Tracking Only    Total # of Interventions Recommended: 0  Total # of Interventions Accepted: 0  Time Spent (min): 15

## 2023-02-27 ENCOUNTER — NURSE ONLY (OUTPATIENT)
Dept: CARDIOLOGY CLINIC | Age: 78
End: 2023-02-27
Payer: MEDICARE

## 2023-02-27 DIAGNOSIS — Z95.810 ICD (IMPLANTABLE CARDIOVERTER-DEFIBRILLATOR) IN PLACE: ICD-10-CM

## 2023-02-27 DIAGNOSIS — I50.22 CHRONIC SYSTOLIC HEART FAILURE (HCC): Primary | ICD-10-CM

## 2023-02-27 DIAGNOSIS — I25.5 ISCHEMIC CARDIOMYOPATHY: Chronic | ICD-10-CM

## 2023-02-27 PROCEDURE — 93297 REM INTERROG DEV EVAL ICPMS: CPT | Performed by: CLINICAL NURSE SPECIALIST

## 2023-02-27 PROCEDURE — G2066 INTER DEVC REMOTE 30D: HCPCS | Performed by: CLINICAL NURSE SPECIALIST

## 2023-02-27 NOTE — PROGRESS NOTES
Remote transmission received from patient's dual chamber ICD home monitor. Transmission shows normal sensing and pacing function. 100% AT/ AF burden, longest 13 days (coumadin, Toprol, amiodarone). Per HR histogram, avg HR around 80 bpm.    Optivol is WNL. TriageHF Heart Failure Risk Status on 27-Feb-2023 is Medium    NP will review. See interrogation under cardiology tab in the [address] [place] [address] [address] field for more details. Will continue to monitor remotely.     (End of 31-day monitoring period 2/27/23)

## 2023-03-01 ASSESSMENT — ENCOUNTER SYMPTOMS
PHOTOPHOBIA: 0
BLOOD IN STOOL: 0
CHEST TIGHTNESS: 0
COUGH: 0
ABDOMINAL DISTENTION: 0
SHORTNESS OF BREATH: 1
ABDOMINAL PAIN: 0
NAUSEA: 0

## 2023-03-01 NOTE — PROGRESS NOTES
- call for any changes     2. Atrial fibrillation/history of thrombus  -  CHA2DS vasc score 4  -  1/8/2019 [facility] ligating and LA maze-No remnant of Left atrial appendage was seen on [facility]ESHA 01/29/2021  -  1/29/20  DCCV  [facility]  -  Cardioversion by  [facility] 7/28/2022, 9/16/22 ( Dr Vicky Sanchez)  -  stable  -  Coumadin/INR managed at [place] [address]  -  recommend continuation of warfarin given history of thrombus and high risk of events- recommend bridging when he comes off of coumadin     3. Ischemic cardiomyopathy/NSVT/PAF  -  ACC C NYHA II  - previously <35%,now 40%  - 12/18/17 EPS with inducible VT/VF  - 12/8/17  Dual chamber AICD  - f/w Dr. Willard Wyatt  - Impedance monitoring via [address] was downloaded from patient's ICD and reviewed; the impedance shows labile fluid level when in atrial fib. Plan  - continue entresto 24/26 .5 mg bid  - continue aldactone 12.5 daily  - continue metoprolol 100 mg   - continue   - not on SGLT2 due to cost, recommended. He will read about it        4. Essential hypertension  - 98/50  - stable  Plan  - entresto     5. Primary Hyperlipidemia  -  8/4/22  TRI 66 HDL 43 LDL 69   - above goal  Plan  -  crestor 40 mg hs  -  zetia 10 mg daily  - previously recommended pcsk9, patient not interested    6. Right leg DVT  -  diagnosed in August  -  resolved  Plan  - call for any chest pain, shortness of breath  - continue coumadin      7. JOSEPH  -  wears cpap  -  managed at [facility]  -  continue as above    8. Seizure, h/o  -  none recently  Plan  - follow up with neuro tomorrow  - dilantin 100 mg bid    9. PVC's   -  symptomatic- fatigue, exhaustion  -  has never been on ranexa or mexilitene  - much more frequent on pacer device  Plan  - Af/VT ablation scheduled 4/6/23  - continue amiodarone   - continue magnesium   - continue Toprol 100mg qd (can take extra prn)       Patient counseled on lifestyle modification, diet, and exercise.            Dr. Kemal Marquez  Cardiologist

## 2023-03-02 LAB — INR BLD: 1.4

## 2023-03-06 ENCOUNTER — ANTI-COAG VISIT (OUTPATIENT)
Dept: PHARMACY | Age: 78
End: 2023-03-06

## 2023-03-06 ENCOUNTER — TELEPHONE (OUTPATIENT)
Dept: CARDIOLOGY CLINIC | Age: 78
End: 2023-03-06

## 2023-03-06 DIAGNOSIS — I48.91 ATRIAL FIBRILLATION, UNSPECIFIED TYPE (HCC): Primary | ICD-10-CM

## 2023-03-06 RX ORDER — EZETIMIBE 10 MG/1
10 TABLET ORAL EVERY EVENING
Qty: 90 TABLET | Refills: 3 | Status: SHIPPED | OUTPATIENT
Start: 2023-03-06

## 2023-03-06 NOTE — PROGRESS NOTES
Mr. Luc Mathis is a 78 y.o. y/o male with history of Afib who presents today for anticoagulation monitoring and adjustment. Pertinent PMH:    Patient Reported Findings:  Yes     No  [x]   []       Patient verifies current dosing regimen as listed- takes 5mg Sun and Wed and 2.5mg AOD---> confirms   []   [x]       S/S bleeding/bruising/swelling/SOB- denies   []   [x]       Blood in urine or stool- denies   [x]   []       Procedures scheduled in the future at this time 11/4 colonoscopy, needs holding instructions --> cardiology would like pt to hold off on procedures until 3 months --> colonoscopy was cancelled, r/s for December 29, hold 5 days and bridge. Was instructed to get INR on Mon 12/26 to determine INR, start lovenox --> colonoscopy r/s for 1/27 ---> ECHO/ablation in April   []   [x]       Missed Dose -denies  []   [x]       Extra Dose- denies   []   [x]       Change in medications- states that MD had been adjusting phenytoin increasing 4-5 weeks ago but plans to return to normal phenytoin today  --->back to normal dose --> starting amiodarone 400 mg x 2 weeks today --> no changes---> was on 5 day pred course, done now   []   [x]       Change in health/diet/appetite -states normally eats a lot of vegetable soup, but has not had any greens recently ---> no greens --> no changes   []   [x]       Change in alcohol use- doesn't drink or smoke   []   [x]       Change in activity  []   [x]       Hospital admission  []   [x]       Emergency department visit  []   [x]       Other complaints-       Clinical Outcomes:  Yes     No  []   [x]       Major bleeding event  []   [x]       Thromboembolic event    Duration of warfarin Therapy: indefinitely   INR Range:  2.0-3.0    Patient checks INR at home and reports to cardiology who scans results and forwards to us so we can call patient and dose them as a VV under the current PHE. Patient was called and appointment was conducted via telephone.      [phone]     Patient has 5mg and 2.5mg tablets.    Pt recently started amiodarone     States INR was 2.3 on . Stopped shots and continued on normal dose of warfarin. Was in ER yesterday for COPD exacerbation. Got prednisone for 5 days. Will have patient take 2.5mg today instead of 5mg then continue on normal dose. Patient will recheck on Monday or Tuesday.    INR was 1.4 3/2 dt unknown etiology. Pt took 5mg Thurs and Sun to make up for it. Wants to check again 3/9. Asked pt to call when results are in mychart so we don't have delayed care.   Continue to take 5mg on  only and 2.5mg all other days.  Encouraged to maintain a consistency of vegetables/salads.   Recheck INR on Thursday 3/9    Referring Dr. Frazier  INR (no units)   Date Value   2023 1.40   2023 2.20   2023 1.65 (H)   2023 3.00 (H)     For Pharmacy Admin Tracking Only    Intervention Detail: Adherence Monitorin and Dose Adjustment: 1, reason: Therapy Optimization  Total # of Interventions Recommended: 2  Total # of Interventions Accepted: 2  Time Spent (min): 15

## 2023-03-06 NOTE — TELEPHONE ENCOUNTER
Received refill request for Zetia from [facility].     Last ov:01/26/2023 DKRADHA    Last labs:01/25/2023 Lipid    Last Refill:04/12/2022      Next appointment:03/09/2023 HOLLIE

## 2023-03-07 ENCOUNTER — OFFICE VISIT (OUTPATIENT)
Dept: PULMONOLOGY | Age: 78
End: 2023-03-07
Payer: MEDICARE

## 2023-03-07 VITALS
HEART RATE: 54 BPM | SYSTOLIC BLOOD PRESSURE: 108 MMHG | DIASTOLIC BLOOD PRESSURE: 60 MMHG | BODY MASS INDEX: 23.34 KG/M2 | WEIGHT: 163 LBS | HEIGHT: 70 IN | OXYGEN SATURATION: 97 %

## 2023-03-07 DIAGNOSIS — R06.09 DOE (DYSPNEA ON EXERTION): Primary | ICD-10-CM

## 2023-03-07 PROCEDURE — 99204 OFFICE O/P NEW MOD 45 MIN: CPT | Performed by: INTERNAL MEDICINE

## 2023-03-07 PROCEDURE — G8420 CALC BMI NORM PARAMETERS: HCPCS | Performed by: INTERNAL MEDICINE

## 2023-03-07 PROCEDURE — G8484 FLU IMMUNIZE NO ADMIN: HCPCS | Performed by: INTERNAL MEDICINE

## 2023-03-07 PROCEDURE — G8427 DOCREV CUR MEDS BY ELIG CLIN: HCPCS | Performed by: INTERNAL MEDICINE

## 2023-03-07 PROCEDURE — 3074F SYST BP LT 130 MM HG: CPT | Performed by: INTERNAL MEDICINE

## 2023-03-07 PROCEDURE — 3078F DIAST BP <80 MM HG: CPT | Performed by: INTERNAL MEDICINE

## 2023-03-07 ASSESSMENT — ENCOUNTER SYMPTOMS
SHORTNESS OF BREATH: 1
CHOKING: 0
APNEA: 0
ANAL BLEEDING: 0
ABDOMINAL PAIN: 0
COUGH: 0
CONSTIPATION: 0
STRIDOR: 0
RHINORRHEA: 0
CHEST TIGHTNESS: 0
ABDOMINAL DISTENTION: 0
BLOOD IN STOOL: 0
SINUS PRESSURE: 0
SORE THROAT: 0
VOICE CHANGE: 0
BACK PAIN: 0
DIARRHEA: 0
WHEEZING: 0

## 2023-03-07 NOTE — PROGRESS NOTES
Aime Mathis    YOB: 1945     Date of Service:  3/7/2023     Chief Complaint   Patient presents with    New Patient     Ref by Gloriann Gottron he had a PFT 02/17/2021         HPI patient referred by Dr. Stefanie Cuba for evaluation of COPD. Patient has been accompanied by his wife to our office today. Patient presented to ER on 2/9 with increasing shortness of breath thought to be related to \"acute bronchitis\", chest x-ray performed in the ER revealed features suggestive of COPD and hence a pulmonary consultation has been requested. Patient states that at this time he does not have significant dyspnea or cough. Occasional dyspnea with exertion. He has no significant limitation of his activity or exercise. However he endorses significant cardiac issues-has history of CAD status post stent and CABG x4 in 2007 and redo CABG at [facility] in 2019. Also has history of atrial fibrillation-previously failed multiple DCCV, last one was in January 2023. Patient is planned for ablation next month-follows with Dr. Pricilla York. Patient is currently on amiodarone. Former smoker, quit over 36 years ago. Smoked 1 to 2 packs/day for approximately 20 years. 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 History of mild COVID-19 infection in September 2022. JOSEPH on CPAP.     No Known Allergies  Outpatient Medications Marked as Taking for the 3/7/23 encounter (Office Visit) with Yuly Kwan MD   Medication Sig Dispense Refill    ezetimibe (ZETIA) 10 MG tablet Take 1 tablet by mouth every evening 90 tablet 3    albuterol sulfate HFA (VENTOLIN HFA) 108 (90 Base) MCG/ACT inhaler Inhale 2 puffs into the lungs 4 times daily as needed for Wheezing 18 g 0    magnesium oxide (MAG-OX) 400 (240 Mg) MG tablet Take 2 tablets by mouth daily 90 tablet 3    sacubitril-valsartan (ENTRESTO) 24-26 MG per tablet Take 0.5 tablets by mouth 2 times daily 180 tablet 3    aspirin 81 MG chewable tablet Take 1 tablet by mouth daily Take aspirin until colonoscopy. Then resume plavix and coumadin/lovenox if ok with GI. Then ok to stop aspirin 30 tablet 0    warfarin (COUMADIN) 2.5 MG tablet Hold until ok with GI (Patient taking differently: Every day except Friday) 180 tablet 3    clopidogrel (PLAVIX) 75 MG tablet Take 1 tablet by mouth daily Hold until ok with GI 90 tablet 3    amiodarone (CORDARONE) 200 MG tablet TAKE 1 TABLET BID 90 tablet 3    metoprolol succinate (TOPROL XL) 100 MG extended release tablet TAKE 1 TABLET IN THE MORNING 90 tablet 3    spironolactone (ALDACTONE) 25 MG tablet Take 0.5 tablets by mouth daily 45 tablet 3    KLOR-CON M20 20 MEQ extended release tablet TAKE 1 TABLET AS NEEDED WITH LASIX FOR SWELLING 90 tablet 1    pantoprazole (PROTONIX) 40 MG tablet Take 1 tablet by mouth every morning (before breakfast) 90 tablet 3    furosemide (LASIX) 40 MG tablet Take 1 tablet by mouth in the morning and 1 tablet in the evening. Taking prn for fluid overload/swelling. 90 tablet 3    rosuvastatin (CRESTOR) 40 MG tablet Take 1 tablet by mouth nightly 90 tablet 3    nitroGLYCERIN (NITROSTAT) 0.4 MG SL tablet Place 1 tablet under the tongue every 5 minutes as needed for Chest pain 25 tablet 3    diazepam (VALIUM) 5 MG tablet Take 5 mg by mouth in the morning and 5 mg in the evening. phenytoin (DILANTIN) 100 MG ER capsule Take 1 capsule by mouth 2 times daily.  Resume home dose 1 capsule 0    primidone (MYSOLINE) 250 MG tablet Take 250 mg by mouth 1 tablet AM, half tablet PM         Immunization History   Administered Date(s) Administered    COVID-19, MODERNA BLUE border, Primary or Immunocompromised, (age 12y+), IM, 100 mcg/0.5mL 02/03/2021, 03/03/2021, 11/01/2021    Influenza Virus Vaccine 10/18/2012    Pneumococcal Conjugate 13-valent (Ama President) 12/19/2017    Pneumococcal Conjugate 7-valent (Tala Montano) 01/01/2007       Past Medical History:   Diagnosis Date    Arthritis shoulders and knee    Atrial fib/flut     CAD (coronary artery disease)     Cardiac arrest due to anesth during preg, unsp trimester     CHF (congestive heart failure) ([address]     DVT (deep venous thrombosis) ([address] 08/24/2022    RLE    Hyperlipidemia     Hypertension     MI (myocardial infarction) ([address]     Seizures ([address]     last seizure 1985    Sinus bradycardia     Sleep apnea     uses CPAP    V tach      Past Surgical History:   Procedure Laterality Date    CARDIAC SURGERY      , angioplasty 2007    CARDIOVERSION      6/2022, 9/2022, 1/6/23- cardioversions    COLONOSCOPY N/A 1/27/2023    COLONOSCOPY DIAGNOSTIC performed by Tiera Hernandez MD [address] [facility]  01/2019    [address]  03/30/2022    3/30/22 Successful complex angioplasty and stenting of IAN to LAD, SVG to OM, SVG to PDA    CYSTOSCOPY N/A 11/13/2019    FLEXIBLE CYSTOSCOPY performed by Carmenza Ceja MD at [address]     done 65 sam ago    PACEMAKER PLACEMENT  12/18/2017    AICD    WISDOM TOOTH EXTRACTION  04/2012     Family History   Problem Relation Age of Onset    Heart Failure Mother     Heart Disease Neg Hx        Review of Systems:  Review of Systems   Constitutional:  Negative for activity change, appetite change, fatigue and fever. HENT:  Negative for congestion, ear discharge, ear pain, postnasal drip, rhinorrhea, sinus pressure, sneezing, sore throat, tinnitus and voice change. Respiratory:  Positive for shortness of breath. Negative for apnea, cough, choking, chest tightness, wheezing and stridor. Cardiovascular:  Negative for chest pain, palpitations and leg swelling. Gastrointestinal:  Negative for abdominal distention, abdominal pain, anal bleeding, blood in stool, constipation and diarrhea. Musculoskeletal:  Negative for arthralgias, back pain and gait problem.    Skin:  Negative for pallor and rash. Allergic/Immunologic: Negative for environmental allergies. Neurological:  Negative for dizziness, tremors, seizures, syncope, speech difficulty, weakness, light-headedness, numbness and headaches. Hematological:  Negative for adenopathy. Does not bruise/bleed easily. Psychiatric/Behavioral:  Negative for sleep disturbance. Vitals:    03/07/23 1416   BP: 108/60   Pulse: 54   SpO2: 97%   Weight: 163 lb (73.9 kg)   Height: 5' 10\" (1.778 m)     No flowsheet data found. Body mass index is 23.39 kg/m². Wt Readings from Last 3 Encounters:   03/07/23 163 lb (73.9 kg)   02/09/23 155 lb (70.3 kg)   01/27/23 162 lb (73.5 kg)     BP Readings from Last 3 Encounters:   03/07/23 108/60   02/09/23 100/73   01/27/23 106/80         Physical Exam  Constitutional:       General: He is not in acute distress. Appearance: He is well-developed. He is not ill-appearing or diaphoretic. HENT:      Mouth/Throat:      Pharynx: No oropharyngeal exudate. Cardiovascular:      Rate and Rhythm: Normal rate and regular rhythm. Heart sounds: Normal heart sounds. No murmur heard. No friction rub. Pulmonary:      Effort: No respiratory distress. Breath sounds: Normal breath sounds. No wheezing, rhonchi or rales. Chest:      Chest wall: No tenderness. Abdominal:      General: There is no distension. Palpations: There is no mass. Tenderness: There is no abdominal tenderness. There is no guarding or rebound. Musculoskeletal:         General: No swelling or tenderness. Skin:     Coloration: Skin is not pale. Findings: No erythema or rash. Neurological:      Mental Status: He is alert and oriented to person, place, and time. Cranial Nerves: No cranial nerve deficit. Motor: No abnormal muscle tone.       Coordination: Coordination normal.      Deep Tendon Reflexes: Reflexes normal.           Health Maintenance   Topic Date Due    Depression Screen  Never done DTaP/Tdap/Td vaccine (1 - Tdap) Never done    Shingles vaccine (1 of 2) Never done    Annual Wellness Visit (AWV)  Never done    COVID-19 Vaccine (4 - Booster for Moderna series) 12/27/2021    Lipids  01/25/2024    Colorectal Cancer Screen  01/27/2033    Flu vaccine  Completed    Pneumococcal 65+ years Vaccine  Completed    AAA screen  Completed    Hepatitis C screen  Completed    Hepatitis A vaccine  Aged Out    Hib vaccine  Aged Out    Meningococcal (ACWY) vaccine  Aged Out          Assessment/Plan:     Diagnosis Orders   1. STEVENS (dyspnea on exertion)  Full PFT Study With Bronchodilator           I reviewed patient's chest x-ray personally which was performed on 2/9, which shows evidence of hyperinflation only. Prior PFT study from December 2021 showed mild obstruction, FEV1 was 3.53 L [113% predicted] with a decreased FEV1/FVC ratio. Signs of hyperinflation noted with TLC of 136% predicted and normal DLCO of 95%. Overall, patient only has mild obstruction with mostly signs of hyperinflation and would doubt benefit from inhaler therapy. We will repeat complete PFT study upon patient's request to clarify this further. He is also worried about pulmonary inflammation/fibrosis related with use of amiodarone. Patient does not qualify for low-dose CT scan, based on quit date of smoking. Patient's dyspnea which is only occasional, is most likely related to his history of CAD and chronic atrial fibrillation. Return in about 1 month (around 4/7/2023).

## 2023-03-09 ENCOUNTER — OFFICE VISIT (OUTPATIENT)
Dept: CARDIOLOGY CLINIC | Age: 78
End: 2023-03-09
Payer: MEDICARE

## 2023-03-09 ENCOUNTER — ANTI-COAG VISIT (OUTPATIENT)
Dept: PHARMACY | Age: 78
End: 2023-03-09

## 2023-03-09 VITALS
HEIGHT: 70 IN | DIASTOLIC BLOOD PRESSURE: 50 MMHG | WEIGHT: 158.8 LBS | SYSTOLIC BLOOD PRESSURE: 98 MMHG | BODY MASS INDEX: 22.73 KG/M2 | OXYGEN SATURATION: 98 % | HEART RATE: 62 BPM

## 2023-03-09 DIAGNOSIS — I48.0 PAF (PAROXYSMAL ATRIAL FIBRILLATION) (HCC): Chronic | ICD-10-CM

## 2023-03-09 DIAGNOSIS — I48.0 PAROXYSMAL ATRIAL FIBRILLATION (HCC): Primary | ICD-10-CM

## 2023-03-09 DIAGNOSIS — I10 ESSENTIAL HYPERTENSION: Chronic | ICD-10-CM

## 2023-03-09 DIAGNOSIS — I25.5 ISCHEMIC CARDIOMYOPATHY: Chronic | ICD-10-CM

## 2023-03-09 DIAGNOSIS — I25.10 CAD IN NATIVE ARTERY: Primary | ICD-10-CM

## 2023-03-09 DIAGNOSIS — I50.22 CHRONIC SYSTOLIC HEART FAILURE (HCC): ICD-10-CM

## 2023-03-09 LAB — INR BLD: 1.7

## 2023-03-09 PROCEDURE — 1123F ACP DISCUSS/DSCN MKR DOCD: CPT | Performed by: INTERNAL MEDICINE

## 2023-03-09 PROCEDURE — 1036F TOBACCO NON-USER: CPT | Performed by: INTERNAL MEDICINE

## 2023-03-09 PROCEDURE — G8427 DOCREV CUR MEDS BY ELIG CLIN: HCPCS | Performed by: INTERNAL MEDICINE

## 2023-03-09 PROCEDURE — 3074F SYST BP LT 130 MM HG: CPT | Performed by: INTERNAL MEDICINE

## 2023-03-09 PROCEDURE — G8484 FLU IMMUNIZE NO ADMIN: HCPCS | Performed by: INTERNAL MEDICINE

## 2023-03-09 PROCEDURE — 3078F DIAST BP <80 MM HG: CPT | Performed by: INTERNAL MEDICINE

## 2023-03-09 PROCEDURE — 3017F COLORECTAL CA SCREEN DOC REV: CPT | Performed by: INTERNAL MEDICINE

## 2023-03-09 PROCEDURE — 99214 OFFICE O/P EST MOD 30 MIN: CPT | Performed by: INTERNAL MEDICINE

## 2023-03-09 PROCEDURE — G8420 CALC BMI NORM PARAMETERS: HCPCS | Performed by: INTERNAL MEDICINE

## 2023-03-09 RX ORDER — METOPROLOL SUCCINATE 100 MG/1
TABLET, EXTENDED RELEASE ORAL
Qty: 100 TABLET | Refills: 3 | Status: SHIPPED | OUTPATIENT
Start: 2023-03-09

## 2023-03-09 NOTE — LETTER
March 20, 2023      DO Leidy MAHAN [address]  [facility]      Patient: Eliott Romberg Day   MR Number: [id number]   YOB: 1945   Date of Visit: 3/9/2023       Dear KALLI:    Thank you for referring Cecilio Coats Delfina to me for evaluation/treatment. Below are the relevant portions of my assessment and plan of care. If you have questions, please do not hesitate to call me. I look forward to following Stanton Pauly along with you.     Sincerely,        Jennifer Li, DO

## 2023-03-09 NOTE — PATIENT INSTRUCTIONS
-Take Lasix 3 days in a row to get rid of fluid.    -Continue taking Entresto 1/2 tablet twice a day (the bottle will read to take 1 tablet twice a day). Go on their web site to fill out financial aid paperwork. -Consider taking Jardiance for your heart failure.

## 2023-03-09 NOTE — PROGRESS NOTES
Mr. Hilda Mathis is a 78 y.o. y/o male with history of Afib who presents today for anticoagulation monitoring and adjustment. Pertinent PMH:    Patient Reported Findings:  Yes     No  [x]   []       Patient verifies current dosing regimen as listed- takes 5mg Sun and Wed and 2.5mg AOD---> confirms   []   [x]       S/S bleeding/bruising/swelling/SOB- denies   []   [x]       Blood in urine or stool- denies   [x]   []       Procedures scheduled in the future at this time 11/4 colonoscopy, needs holding instructions --> cardiology would like pt to hold off on procedures until 3 months --> colonoscopy was cancelled, r/s for December 29, hold 5 days and bridge. Was instructed to get INR on Mon 12/26 to determine INR, start lovenox --> colonoscopy r/s for 1/27 ---> ECHO/ablation in April   []   [x]       Missed Dose -denies  []   [x]       Extra Dose- denies   []   [x]       Change in medications- states that MD had been adjusting phenytoin increasing 4-5 weeks ago but plans to return to normal phenytoin today  --->back to normal dose --> starting amiodarone 400 mg x 2 weeks today--> was on 5 day pred course, done now  --> no changes  []   [x]       Change in health/diet/appetite -states normally eats a lot of vegetable soup, but has not had any greens recently ---> no greens --> no changes   []   [x]       Change in alcohol use- doesn't drink or smoke   []   [x]       Change in activity  []   [x]       Hospital admission  []   [x]       Emergency department visit  []   [x]       Other complaints-       Clinical Outcomes:  Yes     No  []   [x]       Major bleeding event  []   [x]       Thromboembolic event    Duration of warfarin Therapy: indefinitely   INR Range:  2.0-3.0    Patient checks INR at home and reports to cardiology who scans results and forwards to us so we can call patient and dose them as a VV under the current PHE. Patient was called and appointment was conducted via telephone.      [phone] Patient has 5mg and 2.5mg tablets. Pt recently started amiodarone     States INR was 2.3 on 2/1. Stopped shots and continued on normal dose of warfarin. Was in ER yesterday for COPD exacerbation. Got prednisone for 5 days. Will have patient take 2.5mg today instead of 5mg then continue on normal dose. Patient will recheck on Monday or Tuesday. INR was 1.7 today despite increasing weekly dose and pt denying any changes   Take 5 mg tonight, tomorrow and Sun and 2.5 mg all other days of the week   Encouraged to maintain a consistency of vegetables/salads.    Recheck INR in 1 week, 3/16    Referring Dr. Ez Lester  INR (no units)   Date Value   03/02/2023 1.40   02/14/2023 2.20   01/27/2023 1.65 (H)   01/24/2023 3.00 (H)     For Pharmacy Admin Tracking Only    Intervention Detail: Dose Adjustment: 1, reason: Therapy Optimization  Total # of Interventions Recommended: 1  Total # of Interventions Accepted: 1  Time Spent (min): 15

## 2023-03-09 NOTE — Clinical Note
Doing okay. He is interested in PVC and afib ablation. He is slowly but surely allowing me to add modern era chf treatment.  Thanks Sarahy Baez

## 2023-03-13 ENCOUNTER — TELEPHONE (OUTPATIENT)
Dept: CARDIOLOGY CLINIC | Age: 78
End: 2023-03-13

## 2023-03-13 DIAGNOSIS — I48.0 PAROXYSMAL ATRIAL FIBRILLATION (HCC): Primary | ICD-10-CM

## 2023-03-13 NOTE — TELEPHONE ENCOUNTER
Pt requesting an order for INR , so he can test down in [place]. . Can I put a standing order for a weekly INR x3. He stated that he will probably there 2 weeks, but I wanted an extra - just in case. May I place the orders?

## 2023-03-13 NOTE — TELEPHONE ENCOUNTER
Pt called stating if it would be possible to enter labs in the Epic for is INR so they can pick it up today because they are leaving for [place] and will get the labs done there. Pls call patient when order is ready for them to .     [phone]

## 2023-03-16 LAB — INR BLD: 1.6

## 2023-03-17 ENCOUNTER — TELEPHONE (OUTPATIENT)
Dept: PHARMACY | Age: 78
End: 2023-03-17

## 2023-03-17 RX ORDER — SODIUM CHLORIDE 0.9 % (FLUSH) 0.9 %
5-40 SYRINGE (ML) INJECTION PRN
Status: CANCELLED | OUTPATIENT
Start: 2023-03-17

## 2023-03-17 NOTE — TELEPHONE ENCOUNTER
Returned call to patient. States that he got labwork done yesterday and was wondering if we received results yet. Explained have not received results. Sent message to cardiology to determine if results were received in cardiology yet. Advised for pt to continue weekly dose of warfarin for now, unable to adjust without INR results. Will call patient when receive results.          For Pharmacy Admin Tracking Only    Intervention Detail: Adherence Monitorin  Total # of Interventions Recommended: 1  Total # of Interventions Accepted: 1  Time Spent (min): 10

## 2023-03-17 NOTE — TELEPHONE ENCOUNTER
Pre-Operative H & P     CC:  Preoperative exam to assess for increased cardiopulmonary risk while undergoing surgery and anesthesia.    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 Rahman on 3/19/21 at [facility].     The patient is a 60 year old woman who has a past medical history significant for smoking, pulmonary nodules, COPD, allergies, positive EUNICE, Raynaud s, cervicalgia, thoracic pain, left shoulder pain and a new left forearm mass. She was seen by Dr. Rahman on 3/9/21 who reviewed her imaging and treatment options. She is now scheduled for the procedure as above.     History is obtained from the patient and chart review    Past Medical History  Past Medical History:   Diagnosis Date     Abnormal Pap smear of cervix     2017     Cervical high risk HPV (human papillomavirus) test positive 10/04/2017    12/15/20     COPD (chronic obstructive pulmonary disease) (H)      Forearm mass      Human papillomavirus in conditions classified elsewhere and of unspecified site      Lupus erythematosus      Other, mixed, or unspecified nondependent drug abuse, unspecified     Meth, Marijuana. Currently smoking marijuana.     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 [phone] none    Menstrual history: Patient's last menstrual period was 02/27/2013.: s/p tubal ligation     Steroid use in the last year: none    Personal or FH with difficulty with Anesthesia:  Slow to wake    Prior to Admission Medications  Current Outpatient Medications   Medication Sig Dispense Refill     albuterol (VENTOLIN HFA) 108 (90 Base) MCG/ACT inhaler Inhale 2 puffs into the lungs every 6 hours (Patient taking differently: Inhale 2 puffs into the lungs every 6 hours as needed ) 3 Inhaler 3     ibuprofen (ADVIL/MOTRIN) 200 MG capsule Take 400 mg by mouth 2 times daily       multivitamin w/minerals (MULTI-VITAMIN) tablet Take 1 tablet by mouth every morning       omega 3 1000 MG CAPS Take by mouth every morning       tiotropium (SPIRIVA RESPIMAT) 2.5 MCG/ACT inhaler Inhale 2 puffs into the lungs daily (Patient taking differently: Inhale 2 puffs into the lungs every morning ) 12 g 3     tiZANidine (ZANAFLEX) 2 MG tablet Take 1 tablet (2 mg) by mouth 3 times daily 45 tablet 1     TURMERIC PO Take by mouth every morning       TYLENOL EXTRA STRENGTH OR Take by mouth 2 times daily          Allergies  Allergies   Allergen Reactions     Shrimp Nausea and Vomiting     Metronidazole Rash       Social History  Social History     Socioeconomic History     Marital status:      Spouse name: Not on file     Number of children: Not on file     Years of education: Not on file     Highest education level: Not on file   Occupational History     Employer: [employer]     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   Social Needs     Financial resource strain: Not on file     Food insecurity     Worry: Not on file     Inability: Not on file     Transportation needs     Medical: Not on file     Non-medical: Not on file   Tobacco Use     Smoking status: Current Every Day Smoker     Packs/day: 0.50     Years: 36.00     Pack years: 18.00     Types: Cigarettes     Smokeless tobacco: Never Used     Tobacco comment: cutting  down,    Substance and Sexual Activity     Alcohol use: Yes     Frequency: 4 or more times a week     Comment: 1-2 drinks per day ( 2 bottles)     Drug use: Yes     Frequency: 5.0 times per week     Types: Methamphetamines, Marijuana     Comment: occasional pot,   Quit meth 20 year clean     Sexual activity: Yes     Partners: Male     Birth control/protection: Surgical     Comment: tubal   Lifestyle     Physical activity     Days per week: Not on file     Minutes per session: Not on file     Stress: Not on file   Relationships     Social connections     Talks on phone: Not on file     Gets together: Not on file     Attends Tenriism service: Not on file     Active member of club or organization: Not on file     Attends meetings of clubs or organizations: Not on file     Relationship status: Not on file     Intimate partner violence     Fear of current or ex partner: Not on file     Emotionally abused: Not on file     Physically abused: Not on file     Forced sexual activity: Not on file   Other Topics Concern      Service Not Asked     Blood Transfusions Not Asked     Caffeine Concern Yes     Comment: 3 cups a day     Occupational Exposure Not Asked     Hobby Hazards Not Asked     Sleep Concern Not Asked     Stress Concern Not Asked     Weight Concern Not Asked     Special Diet Yes     Comment: low fat/sodium     Back Care Not Asked     Exercise Yes     Comment: walk 3x a week     Bike Helmet Not Asked     Seat Belt Not Asked     Self-Exams Not Asked     Parent/sibling w/ CABG, MI or angioplasty before 65F 55M? No   Social History Narrative     Not on file       Family History  Family History   Problem Relation Age of Onset     Heart Disease Father      Hypertension Father      Alcohol/Drug Father      Diabetes Maternal Grandmother      Family History Negative Mother        Review of Systems  ROS/MED HX  ENT/Pulmonary: Comment: Pulmonary nodules    (+) allergic rhinitis, tobacco use (0.5 ppd for the past 36  years), Current use, mild,  COPD,     Neurologic:  - neg neurologic ROS  (-) no seizures, no CVA and migraines   Cardiovascular:     (+) -----Previous cardiac testing   Echo: Date: 2011 Results:  Interpretation Summary   The study was technically adequate.   Left ventricular systolic function is normal. The visual ejection fraction is   estimated at 60-65%. Right ventricle systolic pressure estimate normal Normal   left ventricular diastolic function   PatientHeight: 63 in   PatientWeight: 176 lbs   SystolicPressure: 96 mmHg   DiastolicPressure: 64 mmHg   HeartRate: 65 bpm   BSA 1.8 m^2    Stress Test: Date: Results:    ECG Reviewed: Date: Results:    Cath: Date: Results:      METS/Exercise Tolerance: >4 METS    Hematologic:  - neg hematologic  ROS     Musculoskeletal: Comment: raynauds - s/p amputation of right hand fingers x2     Left forearm mass    Cervicalgia, thoracic pain, left shoulder pain     History of positive EUNICE in 2017  (+) arthritis,     GI/Hepatic:  - neg GI/hepatic ROS  (-) GERD   Renal/Genitourinary:  - neg Renal ROS     Endo:  - neg endo ROS     Psychiatric/Substance Use:  - neg psychiatric ROS   (+) Recreational drug usage: Cannabis (patient eats pot gummies. 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 ).    Infectious Disease:  - neg infectious disease ROS     Malignancy:  - neg malignancy ROS     Other:  - neg other ROS    (+) , H/O Chronic Pain,        The complete review of systems is negative other than noted in the HPI or here.                         0 lbs 0 oz  Data Unavailable   There is no height or weight on file to calculate BMI.       Physical Exam  NA due to telephone visit    Labs: (personally reviewed)  CBC WITH AUTO DIFFERENTIAL (03/06/2020 10:00 AM CST)  CBC WITH AUTO DIFFERENTIAL (03/06/2020 10:00 AM CST)   Component Value Ref Range Performed At Pathologist [facility]   WHITE BLOOD COUNT  7.0 4.5 - 11.0 thou/cu mm [facility]     RED BLOOD COUNT  4.51 4.00 - 5.20 mil/cu mm  [facility]     HEMOGLOBIN  14.8 12.0 - 16.0 g/dL [facility]     HEMATOCRIT  44.1 33.0 - 51.0 % [facility]     MCV  98 80 - 100 fL [facility]     MCH  32.8 26.0 - 34.0 pg [facility]     MCHC  33.6 32.0 - 36.0 g/dL [facility]     RDW  12.5 11.5 - 15.5 % [facility]     PLATELET COUNT  201 140 - 440 thou/cu mm [facility]     MPV  8.4 6.5 - 11.0 fL [facility]     NEUTROPHILS  76.4 % [facility]     LYMPHOCYTES  14.8 % [facility]     MONOCYTES  8.4 % [facility]     EOSINOPHILS  0.3 % [facility]     BASOPHILS  0.1 % [facility]     ABSOLUTE NEUTROPHILS  5.4 1.7 - 7.0 thou/cu mm [facility]     ABSOLUTE LYMPHOCYTES  1.0 0.9 - 2.9 thou/cu mm [facility]     ABSOLUTE MONOCYTES  0.6 <0.9 thou/cu mm [facility]     ABSOLUTE EOSINOPHILS  0.0 <0.5 thou/cu mm [facility]     ABSOLUTE BASOPHILS  0.0 <0.3 thou/cu mm [facility]     COMP METABOLIC PANEL (03/06/2020 10:00 AM CST)  COMP METABOLIC PANEL (03/06/2020 10:00 AM CST)   Component Value Ref Range Performed At Pathologist [facility]   SODIUM 140 135 - 145 mmol/L [facility]     POTASSIUM 4.0 3.5 - 5.0 mmol/L [facility]     CHLORIDE 102 98 - 110 mmol/L [facility]     CO2,TOTAL 25 21 - 31 mmol/L [facility]     ANION GAP 13 5 - 18  [facility]     GLUCOSE 105 (H) 65 - 100 mg/dL [facility]     CALCIUM 9.9 8.5 - 10.5 mg/dL [facility]     BUN 12 8 - 25 mg/dL [facility]     CREATININE 0.74 0.57 - 1.11 mg/dL [facility]     BUN/CREAT RATIO  16 10 - 20  [facility]     GFR if African American >60 >60 ml/min/1.73m2 [facility]      GFR if not African American >60 >60 ml/min/1.73m2 [facility]     ALBUMIN 4.9 3.5 - 5.2 g/dL [facility]     PROTEIN,TOTAL 8.3 (H) 6.0 - 8.0 g/dL [facility]     GLOBULIN  3.4 2.0 - 3.7 g/dL [facility]     A/G RATIO 1.4 1.0 - 2.0  [facility]     BILIRUBIN,TOTAL 0.6 0.2 - 1.2 mg/dL [facility]     ALK PHOSPHATASE 49 (L) 50 - 136 IU/L [facility]     ALT (SGPT) 22 8 - 45 IU/L [facility]     AST (SGOT) 33 2 - 40 IU/L [facility]       Echo 2011  Interpretation Summary   The study was technically adequate.   Left ventricular systolic function is normal. The visual ejection fraction is   estimated at 60-65%. Right ventricle systolic pressure estimate normal Normal   left ventricular diastolic function   PatientHeight: 63 in   PatientWeight: 176 lbs   SystolicPressure: 96 mmHg   DiastolicPressure: 64 mmHg   HeartRate: 65 bpm   BSA 1.8 m^2    PFTs 2011  INTERPRETATION:  Moderate obstructive lung disease.  Reversibility   with bronchodilators is seen on testing today.  Lung volumes show   evidence for air trapping.  DLCO is reduced, consistent with loss of   capillary-alveolar exchange as in emphysema, pulmonary hypertension   or other pulmonary vascular disease.  The patient's records and results personally reviewed by this provider.     Outside records reviewed from: care everywhere     ASSESSMENT and PLAN  Astrid is a 60 year old woman who is scheduled for biopsy and possible removal tumor left forearm on 3/19/21 by Dr. 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  PAC referral for risk assessment and optimization for anesthesia with comorbid conditions of smoking, pulmonary nodule, COPD, allergies, history of positive EUNICE, Raynaud's, cervicalgia, thoracic pain and left shoulder pain:      Pre-operative considerations:   1.  Cardiac:  Functional status- METS >4. 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 She denies any cardiac symptoms. Low risk surgery with 0.4% (RCRI #) risk of major adverse cardiac event.  The patient had echo in 2011 with EF 60-65% and no wall motion abnormalities. No further cardiac testing indicated.     2.  Pulm:  Airway feasible.  NOAH risk: Low (age)  ~ Smoking - patient smokes 0.5 ppd for the past 36 years. Smoking cessation was discussed for the DOS which the patient agrees to do.   ~ Pulmonary nodule - seen by Dr. Rivers who recommend surveillance with CT scan in 3 months  ~ COPD/ allergies - continue Spiriva and albuterol. She denies any allergy symptoms and no wheezing or shortness of breath.     3. GI:  Risk of PONV score = 2.  If > 2, anti-emetic intervention recommended.     4. Immunology: Raynaud's - s/p amputation of 2 fingers on right hand. She had a positive EUNICE in 2017 and was referred to rheumatology but lost her insurance so never followed up.     5. Musculoskeletal:  Patient recent seen by PCP for complaints of cervicalgia, thoracic pain and left shoulder pain - she will need to hold ibuprofen 24 hours prior and can continue Zanaflex and Tylenol. Consideration for careful positioning to minimize discomfort.     6. Other: The patient has 2 drinks of alcohol a day. She reports she is able to stop and has never had withdrawal symptoms. 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 She does occasionally eat pot gummies and will abstain from then for the day before and the day of surgery.     VTE risk: 0.26%    **Please refer to the physical examination documented by the anesthesiologist in the anesthesia record on the day of surgery**       The patient is optimized for their procedure. AVS with information on surgery time/arrival time, meds and NPO status given by nursing staff.          Video-Visit Details    Type of service:  Video Visit/ telephone visit    Patient verbally consented to  video service today: YES      Video Start Time: 3:19   Telephone start time: 3:28  Telephone End Time (time stopped): 3:42    Originating Location (pt. Location): Home    Distant Location (provider location):  [facility]     Mode of Communication:  Video Conference via [employer]/ [employer]/ telephone     The patient signed into [employer] via [employer] and was never able to connect she reported only a black screen. We then tried [employer] for a video visit which she could not get her camera to work so eventually was converted to telephone visit.         Mariama Agosto PA-C  Preoperative Assessment Center  [facility]  Phone: [phone]  Fax: [phone]

## 2023-03-21 ENCOUNTER — ANTI-COAG VISIT (OUTPATIENT)
Dept: PHARMACY | Age: 78
End: 2023-03-21

## 2023-03-21 ENCOUNTER — TELEPHONE (OUTPATIENT)
Dept: CARDIOLOGY CLINIC | Age: 78
End: 2023-03-21

## 2023-03-21 DIAGNOSIS — I48.0 PAROXYSMAL ATRIAL FIBRILLATION (HCC): Primary | ICD-10-CM

## 2023-03-21 DIAGNOSIS — I48.0 PAROXYSMAL ATRIAL FIBRILLATION (HCC): ICD-10-CM

## 2023-03-21 NOTE — TELEPHONE ENCOUNTER
I spoke with pt to let him know we have not received his INR. He stated that he tested last Thursday. It was supposed to be faxed but was not received.  I called the Results line and last week's reading was not resulted due to the age of the reading

## 2023-03-21 NOTE — PROGRESS NOTES
Mr. Stefany Mathis is a 78 y.o. y/o male with history of Afib who presents today for anticoagulation monitoring and adjustment. Pertinent PMH:    Patient Reported Findings:  Yes     No  [x]   []       Patient verifies current dosing regimen as listed- takes 5mg Sun and Wed and 2.5mg AOD---> confirms --> verified increase in weekly dose to 5 mg on Thurs, fri and Sun   []   [x]       S/S bleeding/bruising/swelling/SOB- denies   []   [x]       Blood in urine or stool- denies   []   [x]       Procedures scheduled in the future at this time 11/4 colonoscopy, needs holding instructions --> cardiology would like pt to hold off on procedures until 3 months --> colonoscopy was cancelled, r/s for December 29, hold 5 days and bridge.  Was instructed to get INR on Mon 12/26 to determine INR, start lovenox --> colonoscopy r/s for 1/27 ---> ECHO/ablation in April   []   [x]       Missed Dose -denies  []   [x]       Extra Dose- denies   [x]   []       Change in medications- states that MD had been adjusting phenytoin increasing 4-5 weeks ago but plans to return to normal phenytoin today  --->back to normal dose --> starting amiodarone 400 mg x 2 weeks today--> was on 5 day pred course, done now  --> takes amiodarone 200 mg qd, has been at this dose for a few weeks  []   [x]       Change in health/diet/appetite -states normally eats a lot of vegetable soup, but has not had any greens recently ---> no greens --> no changes   []   [x]       Change in alcohol use- doesn't drink or smoke   []   [x]       Change in activity  []   [x]       Hospital admission  []   [x]       Emergency department visit  []   [x]       Other complaints-       Clinical Outcomes:  Yes     No  []   [x]       Major bleeding event  []   [x]       Thromboembolic event    Duration of warfarin Therapy: indefinitely   INR Range:  2.0-3.0    Patient checks INR at home and reports to cardiology who scans results and forwards to us so we can call patient and dose them

## 2023-03-22 ENCOUNTER — TELEPHONE (OUTPATIENT)
Dept: CARDIOLOGY CLINIC | Age: 78
End: 2023-03-22

## 2023-03-22 LAB — INR BLD: 1.9

## 2023-03-22 NOTE — TELEPHONE ENCOUNTER
PT is asking that his Lab orders be faxed to [phone]. Also requesting that orders have 3 additional future lab visit so he does not need to keep calling to have it faxed. Fahad Patricia Please advise.

## 2023-03-22 NOTE — TELEPHONE ENCOUNTER
I spoke with pt and told him that the order I sen was a standing order for 3 INR's. He stated that the lab would not test him because they said that they did not have an order. I called [employer] and spoke with an associate. I informed her that the order was for 3 tests. I re faxed the standing order. She then transfered me to a \"national network\" that could not answer any questions. I refaxed the order and called pt to let him know.  I advised him to go gret the INR checked, I advised that if the will not test. I advised him to stay there and CALL ME.

## 2023-03-23 ENCOUNTER — ANTI-COAG VISIT (OUTPATIENT)
Dept: PHARMACY | Age: 78
End: 2023-03-23

## 2023-03-23 ENCOUNTER — TELEPHONE (OUTPATIENT)
Dept: CARDIOLOGY CLINIC | Age: 78
End: 2023-03-23

## 2023-03-23 DIAGNOSIS — I48.0 PAROXYSMAL ATRIAL FIBRILLATION (HCC): ICD-10-CM

## 2023-03-23 DIAGNOSIS — I48.0 PAROXYSMAL ATRIAL FIBRILLATION (HCC): Primary | ICD-10-CM

## 2023-03-23 NOTE — PROGRESS NOTES
Mr. Raza Mathis is a 78 y.o. y/o male with history of Afib who presents today for anticoagulation monitoring and adjustment. Pertinent PMH:    Patient Reported Findings:  Yes     No  [x]   []       Patient verifies current dosing regimen as listed- takes 5mg Sun and Wed and 2.5mg AOD---> confirms --> verified increase in weekly dose to 5 mg on Thurs, fri and Sun   []   [x]       S/S bleeding/bruising/swelling/SOB- denies   []   [x]       Blood in urine or stool- denies   []   [x]       Procedures scheduled in the future at this time 11/4 colonoscopy, needs holding instructions --> cardiology would like pt to hold off on procedures until 3 months --> colonoscopy was cancelled, r/s for December 29, hold 5 days and bridge.  Was instructed to get INR on Mon 12/26 to determine INR, start lovenox --> colonoscopy r/s for 1/27 ---> ECHO/ablation in April   []   [x]       Missed Dose -denies  []   [x]       Extra Dose- denies   [x]   []       Change in medications- states that MD had been adjusting phenytoin increasing 4-5 weeks ago but plans to return to normal phenytoin today  --->back to normal dose --> starting amiodarone 400 mg x 2 weeks today--> was on 5 day pred course, done now  --> takes amiodarone 200 mg qd, has been at this dose for a few weeks  []   [x]       Change in health/diet/appetite -states normally eats a lot of vegetable soup, but has not had any greens recently ---> no greens --> no changes   []   [x]       Change in alcohol use- doesn't drink or smoke   []   [x]       Change in activity  []   [x]       Hospital admission  []   [x]       Emergency department visit  []   [x]       Other complaints-       Clinical Outcomes:  Yes     No  []   [x]       Major bleeding event  []   [x]       Thromboembolic event    Duration of warfarin Therapy: indefinitely   INR Range:  2.0-3.0    Patient checks INR at home and reports to cardiology who scans results and forwards to us so we can call patient and dose them

## 2023-03-29 ENCOUNTER — TELEPHONE (OUTPATIENT)
Dept: CARDIOLOGY CLINIC | Age: 78
End: 2023-03-29

## 2023-03-29 ENCOUNTER — NURSE ONLY (OUTPATIENT)
Dept: CARDIOLOGY CLINIC | Age: 78
End: 2023-03-29

## 2023-03-29 NOTE — PROGRESS NOTES
Received unscheduled transmission via [address]. See phone encounter:    Remote transmission received from patient's dual chamber ICD home monitor. Transmission shows normal sensing and pacing function. 100% AT/ AF burden, longest 44 days (coumadin, Toprol, amiodarone). Possible Optivol fluid accumulation 3/3/23 --- ongoing. Currently elevated around 200, above threshold, with correlating TI trend below reference line approaching back to reference line. TriageHF Heart Failure Risk Status on 29-Mar-2023 is High    NP will review. See interrogation under cardiology tab in the [address] [place] [address] [address] field for more details. Will continue to monitor remotely.

## 2023-03-30 ENCOUNTER — TELEPHONE (OUTPATIENT)
Dept: CARDIOLOGY CLINIC | Age: 78
End: 2023-03-30

## 2023-03-30 ENCOUNTER — OFFICE VISIT (OUTPATIENT)
Dept: CARDIOLOGY CLINIC | Age: 78
End: 2023-03-30
Payer: MEDICARE

## 2023-03-30 ENCOUNTER — HOSPITAL ENCOUNTER (OUTPATIENT)
Dept: ONCOLOGY | Age: 78
Setting detail: INFUSION SERIES
Discharge: HOME | End: 2023-03-30
Payer: MEDICARE

## 2023-03-30 VITALS
DIASTOLIC BLOOD PRESSURE: 79 MMHG | SYSTOLIC BLOOD PRESSURE: 116 MMHG | HEART RATE: 62 BPM | OXYGEN SATURATION: 100 % | RESPIRATION RATE: 16 BRPM | TEMPERATURE: 97.9 F

## 2023-03-30 VITALS
DIASTOLIC BLOOD PRESSURE: 54 MMHG | HEIGHT: 70 IN | BODY MASS INDEX: 22.73 KG/M2 | HEART RATE: 66 BPM | SYSTOLIC BLOOD PRESSURE: 96 MMHG | WEIGHT: 158.8 LBS | OXYGEN SATURATION: 99 %

## 2023-03-30 DIAGNOSIS — I48.0 PAROXYSMAL ATRIAL FIBRILLATION (HCC): ICD-10-CM

## 2023-03-30 DIAGNOSIS — Z95.810 ICD (IMPLANTABLE CARDIOVERTER-DEFIBRILLATOR) IN PLACE: ICD-10-CM

## 2023-03-30 DIAGNOSIS — I10 ESSENTIAL HYPERTENSION: ICD-10-CM

## 2023-03-30 DIAGNOSIS — I50.23 ACUTE ON CHRONIC SYSTOLIC HEART FAILURE (HCC): Primary | ICD-10-CM

## 2023-03-30 DIAGNOSIS — I25.5 ISCHEMIC CARDIOMYOPATHY: ICD-10-CM

## 2023-03-30 LAB
ALBUMIN SERPL-MCNC: 3.7 G/DL (ref 3.4–5)
ALBUMIN/GLOB SERPL: 1.2 {RATIO} (ref 1.1–2.2)
ALP SERPL-CCNC: 129 U/L (ref 40–129)
ALT SERPL-CCNC: 78 U/L (ref 10–40)
ANION GAP SERPL CALCULATED.3IONS-SCNC: 9 MMOL/L (ref 3–16)
AST SERPL-CCNC: 46 U/L (ref 15–37)
BILIRUB SERPL-MCNC: 0.6 MG/DL (ref 0–1)
BUN SERPL-MCNC: 19 MG/DL (ref 7–20)
CALCIUM SERPL-MCNC: 8.9 MG/DL (ref 8.3–10.6)
CHLORIDE SERPL-SCNC: 104 MMOL/L (ref 99–110)
CO2 SERPL-SCNC: 27 MMOL/L (ref 21–32)
CREAT SERPL-MCNC: 1 MG/DL (ref 0.8–1.3)
GFR SERPLBLD CREATININE-BSD FMLA CKD-EPI: >60 ML/MIN/{1.73_M2}
GLUCOSE SERPL-MCNC: 90 MG/DL (ref 70–99)
NT-PROBNP SERPL-MCNC: 1389 PG/ML (ref 0–449)
POTASSIUM SERPL-SCNC: 4.1 MMOL/L (ref 3.5–5.1)
PROT SERPL-MCNC: 6.9 G/DL (ref 6.4–8.2)
SODIUM SERPL-SCNC: 140 MMOL/L (ref 136–145)

## 2023-03-30 PROCEDURE — G8427 DOCREV CUR MEDS BY ELIG CLIN: HCPCS | Performed by: CLINICAL NURSE SPECIALIST

## 2023-03-30 PROCEDURE — 6360000002 HC RX W HCPCS: Performed by: CLINICAL NURSE SPECIALIST

## 2023-03-30 PROCEDURE — G8484 FLU IMMUNIZE NO ADMIN: HCPCS | Performed by: CLINICAL NURSE SPECIALIST

## 2023-03-30 PROCEDURE — 96374 THER/PROPH/DIAG INJ IV PUSH: CPT

## 2023-03-30 PROCEDURE — 99215 OFFICE O/P EST HI 40 MIN: CPT | Performed by: CLINICAL NURSE SPECIALIST

## 2023-03-30 PROCEDURE — 2580000003 HC RX 258: Performed by: CLINICAL NURSE SPECIALIST

## 2023-03-30 PROCEDURE — 3017F COLORECTAL CA SCREEN DOC REV: CPT | Performed by: CLINICAL NURSE SPECIALIST

## 2023-03-30 PROCEDURE — 80053 COMPREHEN METABOLIC PANEL: CPT

## 2023-03-30 PROCEDURE — 99211 OFF/OP EST MAY X REQ PHY/QHP: CPT

## 2023-03-30 PROCEDURE — 1123F ACP DISCUSS/DSCN MKR DOCD: CPT | Performed by: CLINICAL NURSE SPECIALIST

## 2023-03-30 PROCEDURE — 3078F DIAST BP <80 MM HG: CPT | Performed by: CLINICAL NURSE SPECIALIST

## 2023-03-30 PROCEDURE — 1036F TOBACCO NON-USER: CPT | Performed by: CLINICAL NURSE SPECIALIST

## 2023-03-30 PROCEDURE — 3074F SYST BP LT 130 MM HG: CPT | Performed by: CLINICAL NURSE SPECIALIST

## 2023-03-30 PROCEDURE — 83880 ASSAY OF NATRIURETIC PEPTIDE: CPT

## 2023-03-30 PROCEDURE — G8420 CALC BMI NORM PARAMETERS: HCPCS | Performed by: CLINICAL NURSE SPECIALIST

## 2023-03-30 RX ORDER — FUROSEMIDE 10 MG/ML
120 INJECTION INTRAMUSCULAR; INTRAVENOUS ONCE
Status: COMPLETED | OUTPATIENT
Start: 2023-03-30 | End: 2023-03-30

## 2023-03-30 RX ORDER — SODIUM CHLORIDE 0.9 % (FLUSH) 0.9 %
5-40 SYRINGE (ML) INJECTION 2 TIMES DAILY
Status: DISCONTINUED | OUTPATIENT
Start: 2023-03-30 | End: 2023-03-31 | Stop reason: HOSPADM

## 2023-03-30 RX ADMIN — Medication 10 ML: at 10:15

## 2023-03-30 RX ADMIN — FUROSEMIDE 120 MG: 10 INJECTION, SOLUTION INTRAMUSCULAR; INTRAVENOUS at 10:15

## 2023-03-30 NOTE — PROGRESS NOTES
Coronary stent placement (03/30/2022); and Colonoscopy (N/A, 1/27/2023). Social History:   reports that he quit smoking about 34 years ago. His smoking use included cigarettes. He has a 30.00 pack-year smoking history. He has never used smokeless tobacco. He reports that he does not drink alcohol and does not use drugs. Family History:   Family History   Problem Relation Age of Onset    Heart Failure Mother     Heart Disease Neg Hx        Home Medications:  Prior to Admission medications    Medication Sig Start Date End Date Taking?  Authorizing Provider   empagliflozin (JARDIANCE) 10 MG tablet Take 1 tablet by mouth daily 3/30/23  Yes Maribel Gdoinez, APRN - CNS   sacubitril-valsartan (ENTRESTO) 24-26 MG per tablet Take 1 tablet by mouth 2 times daily  Patient taking differently: Take 0.5 tablets by mouth 2 times daily 3/9/23  Yes Padmaja King DO   metoprolol succinate (TOPROL XL) 100 MG extended release tablet TAKE 1 TABLET IN THE MORNING or as directed 3/9/23  Yes Padmaja King DO   ezetimibe (ZETIA) 10 MG tablet Take 1 tablet by mouth every evening 3/6/23  Yes Padmaja King DO   magnesium oxide (MAG-OX) 400 (240 Mg) MG tablet Take 2 tablets by mouth daily 1/26/23  Yes Padmaja King DO   warfarin (COUMADIN) 2.5 MG tablet Hold until ok with GI  Patient taking differently: Every day except Friday 1/25/23  Yes Ze Agosto PA-C   clopidogrel (PLAVIX) 75 MG tablet Take 1 tablet by mouth daily Hold until ok with GI 1/25/23  Yes Ze Agosto PA-C   amiodarone (CORDARONE) 200 MG tablet TAKE 1 TABLET BID  Patient taking differently: Take 200 mg by mouth daily TAKE 1 TABLET BID 1/6/23  Yes Ayana Smiley MD   spironolactone (ALDACTONE) 25 MG tablet Take 0.5 tablets by mouth daily 12/23/22  Yes Padmaja King DO   KLOR-CON M20 20 MEQ extended release tablet TAKE 1 TABLET AS NEEDED WITH LASIX FOR SWELLING 11/29/22  Yes Padmaja King DO   pantoprazole (PROTONIX) 40 MG tablet Take 1 tablet by mouth every

## 2023-03-30 NOTE — TELEPHONE ENCOUNTER
----- Message from BETO Mcclendon - CNS sent at 3/30/2023 11:09 AM EDT -----  Please set up cardioversion for tomorrow  Anytime is ok for patient  Please call and set up  Thanks  R

## 2023-03-30 NOTE — PATIENT INSTRUCTIONS
Will get blood work today in infusion center  He will receive 120 mg of IV lasix today  Schedule cardioversion tomorrow  Keep the appt for the ablation set for April 6th  Start jardiance 10 mg daily  RTO April 13th

## 2023-03-31 ENCOUNTER — HOSPITAL ENCOUNTER (OUTPATIENT)
Dept: CARDIAC CATH/INVASIVE PROCEDURES | Age: 78
Discharge: HOME | End: 2023-03-31
Attending: INTERNAL MEDICINE | Admitting: INTERNAL MEDICINE
Payer: MEDICARE

## 2023-03-31 VITALS
BODY MASS INDEX: 22.62 KG/M2 | SYSTOLIC BLOOD PRESSURE: 130 MMHG | DIASTOLIC BLOOD PRESSURE: 81 MMHG | RESPIRATION RATE: 16 BRPM | WEIGHT: 158 LBS | HEIGHT: 70 IN | OXYGEN SATURATION: 98 % | HEART RATE: 71 BPM

## 2023-03-31 LAB
EKG ATRIAL RATE: 104 BPM
EKG DIAGNOSIS: NORMAL
EKG Q-T INTERVAL: 374 MS
EKG QRS DURATION: 84 MS
EKG QTC CALCULATION (BAZETT): 431 MS
EKG R AXIS: -74 DEGREES
EKG T AXIS: 49 DEGREES
EKG VENTRICULAR RATE: 80 BPM

## 2023-03-31 PROCEDURE — 93010 ELECTROCARDIOGRAM REPORT: CPT | Performed by: INTERNAL MEDICINE

## 2023-03-31 PROCEDURE — 93005 ELECTROCARDIOGRAM TRACING: CPT | Performed by: INTERNAL MEDICINE

## 2023-03-31 PROCEDURE — 92960 CARDIOVERSION ELECTRIC EXT: CPT

## 2023-03-31 PROCEDURE — 7100000010 HC PHASE II RECOVERY - FIRST 15 MIN

## 2023-03-31 PROCEDURE — 2500000003 HC RX 250 WO HCPCS

## 2023-03-31 PROCEDURE — 99152 MOD SED SAME PHYS/QHP 5/>YRS: CPT | Performed by: INTERNAL MEDICINE

## 2023-03-31 PROCEDURE — 92960 CARDIOVERSION ELECTRIC EXT: CPT | Performed by: INTERNAL MEDICINE

## 2023-03-31 RX ORDER — SODIUM CHLORIDE 0.9 % (FLUSH) 0.9 %
5-40 SYRINGE (ML) INJECTION PRN
Status: DISCONTINUED | OUTPATIENT
Start: 2023-03-31 | End: 2023-04-03 | Stop reason: HOSPADM

## 2023-03-31 NOTE — H&P
angioplasty and stenting of IAN to LAD, SVG to OM, SVG to PDA           Recommendation  ~Aggressive medical treatment and risk factor modification  ~1. Post cath IVF. Bedrest.   2. Recommend beta blocker, arb/ace, high potency statin, aspirin and plavix. 3. Referral to outpatient cardiac rehab phase II will be deferred until patient follow-up in office and then determine patient safety and appropriateness to proceed  4. Patient has been advised on the importance of regular exercise of at least 20-30 minutes daily. 5. Patient counseled about and offered assistance for smoking cessation   6. Monitor overnight. Echo 04/15/2022   Summary   Mildly dilated left ventricle. Mild concentric left ventricular hypertrophy. Mildly decreased left ventricular systolic function with distal septal and   apical hypokinesis. Estimated EF 40-45%. Cannot assess diastolic function. Mild thickening of the mitral valve leaflets. No stenosis. Moderate mitral regurgitation. The left atrium is mildly dilated. The aortic valve appears tricuspid with mild sclerosis no stenosis. Trivial aortic regurgitation. Normal right ventricular size and mildly decreased in function. Moderate tricuspid regurgitation. PASP 41mmHg. The right atrium is moderately dilated. Pacemaker / ICD lead is visualized   in the right atrium. Trivial pulmonic regurgitation. IVC not well visualized. I independently reviewed the cardiac diagnostic studies, ECG and relevant imaging studies. Lab Results   Component Value Date    LVEF 45 08/11/2022    LVEFMODE Echo 10/14/2017     Lab Results   Component Value Date    TSH 6.41 (H) 03/25/2022         Physical Examination:  Vitals:    01/06/23 0943   BP: 125/74   Pulse: 62   Resp: 16   SpO2: 98%      Wt Readings from Last 3 Encounters:   01/06/23 157 lb (71.2 kg)   12/12/22 157 lb 6.4 oz (71.4 kg)   11/29/22 159 lb 12.8 oz (72.5 kg)       Constitutional: Oriented. No distress.    Head:

## 2023-03-31 NOTE — PROCEDURES
[facility]     Electrophysiology Procedure Note       Date of Procedure: 3/31/2023  Patient's Name: Jovanni Mathis  YOB: 1945   Medical Record Number: [id number]  Procedure Performed by: Barbara Solomon MD    Procedures performed:  IV sedation. External Electrical cardioversion   Mallampati3  ASA 3    Indication of the procedure: Persistent atrial fibrillation   The patient was brought to the cath lab area in a fasting and non-sedated state. The risks, benefits and alternatives of the procedure were discussed with the patient. The patient opted to proceed with the procedure. Written informed consent was signed and placed in the chart. A timeout protocol was completed to identify the patient and the procedure being performed. I pushed 60 mg Brevital.   We monitored the patient's level of consciousness and vital signs/physiologic status throughout the procedure duration (see start and stop times below). Sedation:     Sedation start: 1310  Sedation stop: 1328     Patient is on chronic anticoagulation therapy. Then we used Brevital for sedation and electrical DC cardioversion was perfomred using 200J, synchronized shock. Patient was converted to sinus rhythm at 69 bpm. The patient tolerated the procedure well and there were no complications. Conclusion:   Successful external DC cardioversion of atrial fibrillation . Plan:   The patient can be discharged if remains stable. Will continue with medical therapy. Do not stop anticoagulation before ablation.

## 2023-04-01 LAB
EKG ATRIAL RATE: 66 BPM
EKG DIAGNOSIS: NORMAL
EKG Q-T INTERVAL: 454 MS
EKG QRS DURATION: 136 MS
EKG QTC CALCULATION (BAZETT): 468 MS
EKG R AXIS: 5 DEGREES
EKG T AXIS: 182 DEGREES
EKG VENTRICULAR RATE: 64 BPM

## 2023-04-01 PROCEDURE — 93010 ELECTROCARDIOGRAM REPORT: CPT | Performed by: INTERNAL MEDICINE

## 2023-04-03 ENCOUNTER — NURSE ONLY (OUTPATIENT)
Dept: CARDIOLOGY CLINIC | Age: 78
End: 2023-04-03
Payer: MEDICARE

## 2023-04-03 DIAGNOSIS — Z95.810 ICD (IMPLANTABLE CARDIOVERTER-DEFIBRILLATOR) IN PLACE: ICD-10-CM

## 2023-04-03 DIAGNOSIS — I50.22 CHRONIC SYSTOLIC HEART FAILURE (HCC): Primary | ICD-10-CM

## 2023-04-03 DIAGNOSIS — I25.5 ISCHEMIC CARDIOMYOPATHY: Chronic | ICD-10-CM

## 2023-04-03 PROCEDURE — 93296 REM INTERROG EVL PM/IDS: CPT | Performed by: INTERNAL MEDICINE

## 2023-04-03 PROCEDURE — 93297 REM INTERROG DEV EVAL ICPMS: CPT | Performed by: CLINICAL NURSE SPECIALIST

## 2023-04-03 PROCEDURE — 93295 DEV INTERROG REMOTE 1/2/MLT: CPT | Performed by: INTERNAL MEDICINE

## 2023-04-03 NOTE — PROGRESS NOTES
Remote transmission received from patient's dual chamber ICD home monitor. Transmission shows normal sensing and pacing function. 43.6% AT/ AF burden, longest 46 days (coumadin, Toprol, amiodarone). Possible Optivol fluid accumulation 3/3/23 --- ongoing. Currently elevated around 180, above threshold, with correlating TI trend back to reference line. TriageHF Heart Failure Risk Status on 03-Apr-2023 is High    EP/ NP will review. See interrogation under cardiology tab in the [address] [place] [address] [address] field for more details. Will continue to monitor remotely. (End of 91-day monitoring period 4/3/23).

## 2023-04-06 ENCOUNTER — HOSPITAL ENCOUNTER (OUTPATIENT)
Dept: CARDIAC CATH/INVASIVE PROCEDURES | Age: 78
Discharge: HOME | End: 2023-04-07
Attending: INTERNAL MEDICINE | Admitting: INTERNAL MEDICINE
Payer: MEDICARE

## 2023-04-06 ENCOUNTER — ANESTHESIA EVENT (OUTPATIENT)
Dept: CARDIAC CATH/INVASIVE PROCEDURES | Age: 78
End: 2023-04-06
Payer: MEDICARE

## 2023-04-06 ENCOUNTER — ANESTHESIA (OUTPATIENT)
Dept: CARDIAC CATH/INVASIVE PROCEDURES | Age: 78
End: 2023-04-06
Payer: MEDICARE

## 2023-04-06 DIAGNOSIS — I48.0 PAF (PAROXYSMAL ATRIAL FIBRILLATION) (HCC): Chronic | ICD-10-CM

## 2023-04-06 DIAGNOSIS — I10 ESSENTIAL HYPERTENSION: Chronic | ICD-10-CM

## 2023-04-06 DIAGNOSIS — I25.5 ISCHEMIC CARDIOMYOPATHY: Chronic | ICD-10-CM

## 2023-04-06 PROBLEM — I42.9 CARDIOMYOPATHY (HCC): Status: ACTIVE | Noted: 2023-04-06

## 2023-04-06 LAB
ABO + RH BLD: NORMAL
ALBUMIN SERPL-MCNC: 4.1 G/DL (ref 3.4–5)
ALBUMIN/GLOB SERPL: 1.4 {RATIO} (ref 1.1–2.2)
ALP SERPL-CCNC: 126 U/L (ref 40–129)
ALT SERPL-CCNC: 85 U/L (ref 10–40)
ANION GAP SERPL CALCULATED.3IONS-SCNC: 8 MMOL/L (ref 3–16)
AST SERPL-CCNC: 71 U/L (ref 15–37)
BASOPHILS # BLD: 0.2 K/UL (ref 0–0.2)
BASOPHILS NFR BLD: 2.7 %
BILIRUB SERPL-MCNC: 0.3 MG/DL (ref 0–1)
BLD GP AB SCN SERPL QL: NORMAL
BUN SERPL-MCNC: 26 MG/DL (ref 7–20)
CALCIUM SERPL-MCNC: 9.1 MG/DL (ref 8.3–10.6)
CHLORIDE SERPL-SCNC: 102 MMOL/L (ref 99–110)
CO2 SERPL-SCNC: 29 MMOL/L (ref 21–32)
CREAT SERPL-MCNC: 1.3 MG/DL (ref 0.8–1.3)
DEPRECATED RDW RBC AUTO: 14.2 % (ref 12.4–15.4)
EKG ATRIAL RATE: 58 BPM
EKG DIAGNOSIS: NORMAL
EKG Q-T INTERVAL: 408 MS
EKG QRS DURATION: 88 MS
EKG QTC CALCULATION (BAZETT): 414 MS
EKG R AXIS: -59 DEGREES
EKG T AXIS: 67 DEGREES
EKG VENTRICULAR RATE: 62 BPM
EOSINOPHIL # BLD: 0.5 K/UL (ref 0–0.6)
EOSINOPHIL NFR BLD: 6.5 %
GFR SERPLBLD CREATININE-BSD FMLA CKD-EPI: 57 ML/MIN/{1.73_M2}
GLUCOSE SERPL-MCNC: 87 MG/DL (ref 70–99)
HCT VFR BLD AUTO: 40.9 % (ref 40.5–52.5)
HGB BLD-MCNC: 13.3 G/DL (ref 13.5–17.5)
LV EF: 45 %
LVEF MODALITY: NORMAL
LYMPHOCYTES # BLD: 1.7 K/UL (ref 1–5.1)
LYMPHOCYTES NFR BLD: 24.4 %
MAGNESIUM SERPL-MCNC: 2.4 MG/DL (ref 1.8–2.4)
MCH RBC QN AUTO: 30.8 PG (ref 26–34)
MCHC RBC AUTO-ENTMCNC: 32.5 G/DL (ref 31–36)
MCV RBC AUTO: 94.9 FL (ref 80–100)
MONOCYTES # BLD: 0.7 K/UL (ref 0–1.3)
MONOCYTES NFR BLD: 9.3 %
NEUTROPHILS # BLD: 4 K/UL (ref 1.7–7.7)
NEUTROPHILS NFR BLD: 57.1 %
PLATELET # BLD AUTO: 356 K/UL (ref 135–450)
PMV BLD AUTO: 7.4 FL (ref 5–10.5)
POC ACT LR: 319 SEC
POC ACT LR: 341 SEC
POC ACT LR: 354 SEC
POC ACT LR: 376 SEC
POC ACT LR: >400 SEC
POC ACT LR: >400 SEC
POTASSIUM SERPL-SCNC: 4.5 MMOL/L (ref 3.5–5.1)
PROT SERPL-MCNC: 7.1 G/DL (ref 6.4–8.2)
RBC # BLD AUTO: 4.31 M/UL (ref 4.2–5.9)
SODIUM SERPL-SCNC: 139 MMOL/L (ref 136–145)
WBC # BLD AUTO: 7 K/UL (ref 4–11)

## 2023-04-06 PROCEDURE — 2500000003 HC RX 250 WO HCPCS

## 2023-04-06 PROCEDURE — C1893 INTRO/SHEATH, FIXED,NON-PEEL: HCPCS

## 2023-04-06 PROCEDURE — 6360000002 HC RX W HCPCS: Performed by: NURSE ANESTHETIST, CERTIFIED REGISTERED

## 2023-04-06 PROCEDURE — 86850 RBC ANTIBODY SCREEN: CPT

## 2023-04-06 PROCEDURE — 86901 BLOOD TYPING SEROLOGIC RH(D): CPT

## 2023-04-06 PROCEDURE — 93010 ELECTROCARDIOGRAM REPORT: CPT | Performed by: INTERNAL MEDICINE

## 2023-04-06 PROCEDURE — 93325 DOPPLER ECHO COLOR FLOW MAPG: CPT

## 2023-04-06 PROCEDURE — 2580000003 HC RX 258: Performed by: NURSE ANESTHETIST, CERTIFIED REGISTERED

## 2023-04-06 PROCEDURE — 2580000003 HC RX 258

## 2023-04-06 PROCEDURE — 93623 PRGRMD STIMJ&PACG IV RX NFS: CPT

## 2023-04-06 PROCEDURE — 83735 ASSAY OF MAGNESIUM: CPT

## 2023-04-06 PROCEDURE — C1759 CATH, INTRA ECHOCARDIOGRAPHY: HCPCS

## 2023-04-06 PROCEDURE — 93656 COMPRE EP EVAL ABLTJ ATR FIB: CPT

## 2023-04-06 PROCEDURE — 2580000003 HC RX 258: Performed by: ANESTHESIOLOGY

## 2023-04-06 PROCEDURE — C1732 CATH, EP, DIAG/ABL, 3D/VECT: HCPCS

## 2023-04-06 PROCEDURE — 6360000002 HC RX W HCPCS

## 2023-04-06 PROCEDURE — 2580000003 HC RX 258: Performed by: INTERNAL MEDICINE

## 2023-04-06 PROCEDURE — C1760 CLOSURE DEV, VASC: HCPCS

## 2023-04-06 PROCEDURE — C1894 INTRO/SHEATH, NON-LASER: HCPCS

## 2023-04-06 PROCEDURE — 93655 ICAR CATH ABLTJ DSCRT ARRHYT: CPT

## 2023-04-06 PROCEDURE — 86900 BLOOD TYPING SEROLOGIC ABO: CPT

## 2023-04-06 PROCEDURE — 7100000010 HC PHASE II RECOVERY - FIRST 15 MIN

## 2023-04-06 PROCEDURE — 93622 COMP EP EVAL L VENTR PAC&REC: CPT

## 2023-04-06 PROCEDURE — 7100000000 HC PACU RECOVERY - FIRST 15 MIN

## 2023-04-06 PROCEDURE — 3700000001 HC ADD 15 MINUTES (ANESTHESIA)

## 2023-04-06 PROCEDURE — 93005 ELECTROCARDIOGRAM TRACING: CPT | Performed by: INTERNAL MEDICINE

## 2023-04-06 PROCEDURE — 80053 COMPREHEN METABOLIC PANEL: CPT

## 2023-04-06 PROCEDURE — C1769 GUIDE WIRE: HCPCS

## 2023-04-06 PROCEDURE — 93320 DOPPLER ECHO COMPLETE: CPT

## 2023-04-06 PROCEDURE — 36415 COLL VENOUS BLD VENIPUNCTURE: CPT

## 2023-04-06 PROCEDURE — 7100000001 HC PACU RECOVERY - ADDTL 15 MIN

## 2023-04-06 PROCEDURE — 85025 COMPLETE CBC W/AUTO DIFF WBC: CPT

## 2023-04-06 PROCEDURE — 93312 ECHO TRANSESOPHAGEAL: CPT

## 2023-04-06 PROCEDURE — 7100000011 HC PHASE II RECOVERY - ADDTL 15 MIN

## 2023-04-06 PROCEDURE — 85347 COAGULATION TIME ACTIVATED: CPT

## 2023-04-06 PROCEDURE — 2500000003 HC RX 250 WO HCPCS: Performed by: NURSE ANESTHETIST, CERTIFIED REGISTERED

## 2023-04-06 PROCEDURE — 3700000000 HC ANESTHESIA ATTENDED CARE

## 2023-04-06 PROCEDURE — 6370000000 HC RX 637 (ALT 250 FOR IP): Performed by: INTERNAL MEDICINE

## 2023-04-06 RX ORDER — FUROSEMIDE 10 MG/ML
INJECTION INTRAMUSCULAR; INTRAVENOUS PRN
Status: DISCONTINUED | OUTPATIENT
Start: 2023-04-06 | End: 2023-04-06 | Stop reason: SDUPTHER

## 2023-04-06 RX ORDER — CEFAZOLIN SODIUM 1 G/3ML
INJECTION, POWDER, FOR SOLUTION INTRAMUSCULAR; INTRAVENOUS PRN
Status: DISCONTINUED | OUTPATIENT
Start: 2023-04-06 | End: 2023-04-06 | Stop reason: SDUPTHER

## 2023-04-06 RX ORDER — AMIODARONE HYDROCHLORIDE 200 MG/1
200 TABLET ORAL DAILY
Status: DISCONTINUED | OUTPATIENT
Start: 2023-04-07 | End: 2023-04-07 | Stop reason: HOSPADM

## 2023-04-06 RX ORDER — HEPARIN SODIUM 1000 [USP'U]/ML
INJECTION, SOLUTION INTRAVENOUS; SUBCUTANEOUS PRN
Status: DISCONTINUED | OUTPATIENT
Start: 2023-04-06 | End: 2023-04-06 | Stop reason: SDUPTHER

## 2023-04-06 RX ORDER — LIDOCAINE HYDROCHLORIDE 20 MG/ML
INJECTION, SOLUTION EPIDURAL; INFILTRATION; INTRACAUDAL; PERINEURAL PRN
Status: DISCONTINUED | OUTPATIENT
Start: 2023-04-06 | End: 2023-04-06 | Stop reason: SDUPTHER

## 2023-04-06 RX ORDER — PROTAMINE SULFATE 10 MG/ML
INJECTION, SOLUTION INTRAVENOUS PRN
Status: DISCONTINUED | OUTPATIENT
Start: 2023-04-06 | End: 2023-04-06 | Stop reason: SDUPTHER

## 2023-04-06 RX ORDER — LANOLIN ALCOHOL/MO/W.PET/CERES
800 CREAM (GRAM) TOPICAL DAILY
Status: DISCONTINUED | OUTPATIENT
Start: 2023-04-07 | End: 2023-04-07 | Stop reason: HOSPADM

## 2023-04-06 RX ORDER — NITROGLYCERIN 0.4 MG/1
0.4 TABLET SUBLINGUAL EVERY 5 MIN PRN
Status: DISCONTINUED | OUTPATIENT
Start: 2023-04-06 | End: 2023-04-07 | Stop reason: HOSPADM

## 2023-04-06 RX ORDER — PROPOFOL 10 MG/ML
INJECTION, EMULSION INTRAVENOUS PRN
Status: DISCONTINUED | OUTPATIENT
Start: 2023-04-06 | End: 2023-04-06 | Stop reason: SDUPTHER

## 2023-04-06 RX ORDER — SUCCINYLCHOLINE/SOD CL,ISO/PF 200MG/10ML
SYRINGE (ML) INTRAVENOUS PRN
Status: DISCONTINUED | OUTPATIENT
Start: 2023-04-06 | End: 2023-04-06 | Stop reason: SDUPTHER

## 2023-04-06 RX ORDER — PRIMIDONE 250 MG/1
250 TABLET ORAL NIGHTLY
Status: DISCONTINUED | OUTPATIENT
Start: 2023-04-06 | End: 2023-04-07 | Stop reason: HOSPADM

## 2023-04-06 RX ORDER — HYDROMORPHONE HCL 110MG/55ML
0.5 PATIENT CONTROLLED ANALGESIA SYRINGE INTRAVENOUS EVERY 5 MIN PRN
Status: DISCONTINUED | OUTPATIENT
Start: 2023-04-06 | End: 2023-04-07 | Stop reason: HOSPADM

## 2023-04-06 RX ORDER — SODIUM CHLORIDE 9 MG/ML
INJECTION, SOLUTION INTRAVENOUS CONTINUOUS PRN
Status: DISCONTINUED | OUTPATIENT
Start: 2023-04-06 | End: 2023-04-06 | Stop reason: SDUPTHER

## 2023-04-06 RX ORDER — CLOPIDOGREL BISULFATE 75 MG/1
75 TABLET ORAL DAILY
Status: DISCONTINUED | OUTPATIENT
Start: 2023-04-07 | End: 2023-04-07 | Stop reason: HOSPADM

## 2023-04-06 RX ORDER — DIAZEPAM 5 MG/1
5 TABLET ORAL 2 TIMES DAILY
Status: DISCONTINUED | OUTPATIENT
Start: 2023-04-06 | End: 2023-04-07 | Stop reason: HOSPADM

## 2023-04-06 RX ORDER — POTASSIUM CHLORIDE 20 MEQ/1
10 TABLET, EXTENDED RELEASE ORAL
Status: DISCONTINUED | OUTPATIENT
Start: 2023-04-07 | End: 2023-04-07 | Stop reason: HOSPADM

## 2023-04-06 RX ORDER — PHENYTOIN SODIUM 100 MG/1
100 CAPSULE, EXTENDED RELEASE ORAL 2 TIMES DAILY
Status: DISCONTINUED | OUTPATIENT
Start: 2023-04-06 | End: 2023-04-07 | Stop reason: HOSPADM

## 2023-04-06 RX ORDER — ROSUVASTATIN CALCIUM 40 MG/1
40 TABLET, COATED ORAL NIGHTLY
Status: DISCONTINUED | OUTPATIENT
Start: 2023-04-06 | End: 2023-04-07 | Stop reason: HOSPADM

## 2023-04-06 RX ORDER — ONDANSETRON 2 MG/ML
4 INJECTION INTRAMUSCULAR; INTRAVENOUS
Status: ACTIVE | OUTPATIENT
Start: 2023-04-06 | End: 2023-04-07

## 2023-04-06 RX ORDER — SODIUM CHLORIDE 0.9 % (FLUSH) 0.9 %
5-40 SYRINGE (ML) INJECTION EVERY 12 HOURS SCHEDULED
Status: DISCONTINUED | OUTPATIENT
Start: 2023-04-06 | End: 2023-04-07 | Stop reason: HOSPADM

## 2023-04-06 RX ORDER — SPIRONOLACTONE 25 MG/1
12.5 TABLET ORAL DAILY
Status: DISCONTINUED | OUTPATIENT
Start: 2023-04-07 | End: 2023-04-07 | Stop reason: HOSPADM

## 2023-04-06 RX ORDER — FENTANYL CITRATE 50 UG/ML
INJECTION, SOLUTION INTRAMUSCULAR; INTRAVENOUS PRN
Status: DISCONTINUED | OUTPATIENT
Start: 2023-04-06 | End: 2023-04-06 | Stop reason: SDUPTHER

## 2023-04-06 RX ORDER — FUROSEMIDE 40 MG/1
40 TABLET ORAL 2 TIMES DAILY
Status: DISCONTINUED | OUTPATIENT
Start: 2023-04-06 | End: 2023-04-07 | Stop reason: HOSPADM

## 2023-04-06 RX ORDER — PANTOPRAZOLE SODIUM 40 MG/1
40 TABLET, DELAYED RELEASE ORAL
Status: DISCONTINUED | OUTPATIENT
Start: 2023-04-07 | End: 2023-04-07 | Stop reason: HOSPADM

## 2023-04-06 RX ORDER — DEXAMETHASONE SODIUM PHOSPHATE 4 MG/ML
INJECTION, SOLUTION INTRA-ARTICULAR; INTRALESIONAL; INTRAMUSCULAR; INTRAVENOUS; SOFT TISSUE PRN
Status: DISCONTINUED | OUTPATIENT
Start: 2023-04-06 | End: 2023-04-06 | Stop reason: SDUPTHER

## 2023-04-06 RX ORDER — SODIUM CHLORIDE 9 MG/ML
INJECTION, SOLUTION INTRAVENOUS PRN
Status: DISCONTINUED | OUTPATIENT
Start: 2023-04-06 | End: 2023-04-07 | Stop reason: HOSPADM

## 2023-04-06 RX ORDER — SODIUM CHLORIDE 0.9 % (FLUSH) 0.9 %
5-40 SYRINGE (ML) INJECTION PRN
Status: DISCONTINUED | OUTPATIENT
Start: 2023-04-06 | End: 2023-04-07 | Stop reason: HOSPADM

## 2023-04-06 RX ORDER — EZETIMIBE 10 MG/1
10 TABLET ORAL EVERY EVENING
Status: DISCONTINUED | OUTPATIENT
Start: 2023-04-06 | End: 2023-04-07 | Stop reason: HOSPADM

## 2023-04-06 RX ORDER — ONDANSETRON 2 MG/ML
INJECTION INTRAMUSCULAR; INTRAVENOUS PRN
Status: DISCONTINUED | OUTPATIENT
Start: 2023-04-06 | End: 2023-04-06 | Stop reason: SDUPTHER

## 2023-04-06 RX ORDER — ACETAMINOPHEN 325 MG/1
650 TABLET ORAL EVERY 4 HOURS PRN
Status: DISCONTINUED | OUTPATIENT
Start: 2023-04-06 | End: 2023-04-07 | Stop reason: HOSPADM

## 2023-04-06 RX ORDER — METOPROLOL SUCCINATE 25 MG/1
25 TABLET, EXTENDED RELEASE ORAL DAILY
Status: DISCONTINUED | OUTPATIENT
Start: 2023-04-07 | End: 2023-04-07 | Stop reason: HOSPADM

## 2023-04-06 RX ORDER — MIDAZOLAM HYDROCHLORIDE 1 MG/ML
INJECTION INTRAMUSCULAR; INTRAVENOUS PRN
Status: DISCONTINUED | OUTPATIENT
Start: 2023-04-06 | End: 2023-04-06 | Stop reason: SDUPTHER

## 2023-04-06 RX ORDER — ROCURONIUM BROMIDE 10 MG/ML
INJECTION, SOLUTION INTRAVENOUS PRN
Status: DISCONTINUED | OUTPATIENT
Start: 2023-04-06 | End: 2023-04-06 | Stop reason: SDUPTHER

## 2023-04-06 RX ADMIN — ROSUVASTATIN CALCIUM 40 MG: 40 TABLET, COATED ORAL at 20:50

## 2023-04-06 RX ADMIN — Medication 10 ML: at 22:00

## 2023-04-06 RX ADMIN — HEPARIN SODIUM 1000 UNITS: 1000 INJECTION INTRAVENOUS; SUBCUTANEOUS at 08:41

## 2023-04-06 RX ADMIN — CEFAZOLIN 2 G: 1 INJECTION, POWDER, FOR SOLUTION INTRAMUSCULAR; INTRAVENOUS at 08:25

## 2023-04-06 RX ADMIN — SODIUM CHLORIDE, PRESERVATIVE FREE 10 ML: 5 INJECTION INTRAVENOUS at 20:52

## 2023-04-06 RX ADMIN — FUROSEMIDE 40 MG: 40 TABLET ORAL at 17:32

## 2023-04-06 RX ADMIN — HEPARIN SODIUM 3000 UNITS: 1000 INJECTION INTRAVENOUS; SUBCUTANEOUS at 08:59

## 2023-04-06 RX ADMIN — SODIUM CHLORIDE: 9 INJECTION, SOLUTION INTRAVENOUS at 07:23

## 2023-04-06 RX ADMIN — ROCURONIUM BROMIDE 10 MG: 10 INJECTION, SOLUTION INTRAVENOUS at 07:52

## 2023-04-06 RX ADMIN — HEPARIN SODIUM 6000 UNITS: 1000 INJECTION INTRAVENOUS; SUBCUTANEOUS at 08:26

## 2023-04-06 RX ADMIN — DEXAMETHASONE SODIUM PHOSPHATE 4 MG: 4 INJECTION, SOLUTION INTRAMUSCULAR; INTRAVENOUS at 07:56

## 2023-04-06 RX ADMIN — FENTANYL CITRATE 25 MCG: 50 INJECTION, SOLUTION INTRAMUSCULAR; INTRAVENOUS at 10:44

## 2023-04-06 RX ADMIN — PHENYLEPHRINE HYDROCHLORIDE 100 MCG/MIN: 10 INJECTION INTRAVENOUS at 07:54

## 2023-04-06 RX ADMIN — PHENYTOIN SODIUM 100 MG: 100 CAPSULE ORAL at 20:50

## 2023-04-06 RX ADMIN — FENTANYL CITRATE 25 MCG: 50 INJECTION, SOLUTION INTRAMUSCULAR; INTRAVENOUS at 10:27

## 2023-04-06 RX ADMIN — Medication 120 MG: at 07:52

## 2023-04-06 RX ADMIN — FENTANYL CITRATE 25 MCG: 50 INJECTION, SOLUTION INTRAMUSCULAR; INTRAVENOUS at 10:50

## 2023-04-06 RX ADMIN — MIDAZOLAM 1 MG: 1 INJECTION INTRAMUSCULAR; INTRAVENOUS at 07:47

## 2023-04-06 RX ADMIN — ISOPROTERENOL HYDROCHLORIDE 2 MCG/MIN: 0.2 INJECTION, SOLUTION INTRAMUSCULAR; INTRAVENOUS at 10:05

## 2023-04-06 RX ADMIN — MIDAZOLAM 1 MG: 1 INJECTION INTRAMUSCULAR; INTRAVENOUS at 07:43

## 2023-04-06 RX ADMIN — PROPOFOL 150 MG: 10 INJECTION, EMULSION INTRAVENOUS at 07:52

## 2023-04-06 RX ADMIN — FENTANYL CITRATE 25 MCG: 50 INJECTION, SOLUTION INTRAMUSCULAR; INTRAVENOUS at 07:47

## 2023-04-06 RX ADMIN — PROTAMINE SULFATE 20 MG: 10 INJECTION, SOLUTION INTRAVENOUS at 10:25

## 2023-04-06 RX ADMIN — ONDANSETRON 4 MG: 2 INJECTION INTRAMUSCULAR; INTRAVENOUS at 10:29

## 2023-04-06 RX ADMIN — FUROSEMIDE 20 MG: 10 INJECTION, SOLUTION INTRAMUSCULAR; INTRAVENOUS at 10:27

## 2023-04-06 RX ADMIN — PRIMIDONE 250 MG: 250 TABLET ORAL at 20:50

## 2023-04-06 RX ADMIN — LIDOCAINE HYDROCHLORIDE 100 MG: 20 INJECTION, SOLUTION EPIDURAL; INFILTRATION; INTRACAUDAL; PERINEURAL at 07:49

## 2023-04-06 ASSESSMENT — PAIN SCALES - GENERAL
PAINLEVEL_OUTOF10: 0
PAINLEVEL_OUTOF10: 0
PAINLEVEL_OUTOF10: 2
PAINLEVEL_OUTOF10: 0

## 2023-04-06 ASSESSMENT — ENCOUNTER SYMPTOMS: SHORTNESS OF BREATH: 1

## 2023-04-06 ASSESSMENT — PAIN DESCRIPTION - ORIENTATION: ORIENTATION: RIGHT

## 2023-04-06 ASSESSMENT — PAIN DESCRIPTION - LOCATION: LOCATION: GROIN

## 2023-04-06 ASSESSMENT — PAIN DESCRIPTION - DESCRIPTORS: DESCRIPTORS: SORE

## 2023-04-06 NOTE — H&P
[phone]  Fax: [phone]      H&P Update    I have reviewed the history and physical and examined the patient and updated with relevant changes. Consent: I have discussed with the patient and/or the patient representative the indication, alternatives, and the possible risks and/or complications of the planned procedure and the anesthesia methods. The patient and/or patient representative appear to understand and agree to proceed. Vitals:    04/06/23 0718   BP: 116/72   Pulse: 62   Resp: 18   Temp: 98 °F (36.7 °C)     Prior to Admission medications    Medication Sig Start Date End Date Taking?  Authorizing Provider   empagliflozin (JARDIANCE) 10 MG tablet Take 1 tablet by mouth daily 3/30/23   BETO Dennis - CNS   sacubitril-valsartan (ENTRESTO) 24-26 MG per tablet Take 1 tablet by mouth 2 times daily  Patient taking differently: Take 0.5 tablets by mouth 2 times daily 3/9/23   Alexandra Guidry, DO   metoprolol succinate (TOPROL XL) 100 MG extended release tablet TAKE 1 TABLET IN THE MORNING or as directed 3/9/23   Alexandra Guidry, DO   ezetimibe (ZETIA) 10 MG tablet Take 1 tablet by mouth every evening 3/6/23   Alexandra Guidry, DO   magnesium oxide (MAG-OX) 400 (240 Mg) MG tablet Take 2 tablets by mouth daily 1/26/23   Alexandra Guidry, DO   warfarin (COUMADIN) 2.5 MG tablet Hold until ok with GI  Patient taking differently: Every day except Friday 1/25/23   Dawood Shankar PA-C   clopidogrel (PLAVIX) 75 MG tablet Take 1 tablet by mouth daily Hold until ok with GI 1/25/23   Dawood Shankar PA-C   amiodarone (CORDARONE) 200 MG tablet TAKE 1 TABLET BID  Patient taking differently: Take 1 tablet by mouth daily TAKE 1 TABLET BID 1/6/23   Angelina Reed MD   spironolactone (ALDACTONE) 25 MG tablet Take 0.5 tablets by mouth daily 12/23/22   Alexandra Guidry,    KLOR-CON M20 20 MEQ extended release tablet TAKE 1 TABLET AS NEEDED WITH LASIX FOR SWELLING 11/29/22   Alexandra Guidry, DO   pantoprazole (PROTONIX) 40 MG

## 2023-04-06 NOTE — PROGRESS NOTES
Pt resting quietly in bed, awake, denies pain. VSS, O2 sats 98% on room air. Dressing to right groin dry and intact, groin soft. Right pedal and posterior tibial pulses palpable, feet warm. Luz remains in place draining pink tinged urine. Pt seen by anesthesia, phase 1 criteria met.

## 2023-04-06 NOTE — DISCHARGE INSTRUCTIONS
ABLATION DISCHARGE INSTRUCTIONS    Care of your puncture site:  Remove bandage 24 hours after the procedure. May shower in 24 hours but do not sit in a bathtub/pool of water for 3 days or until the wound is healed. Inspect the site daily and gently clean using soap and water while standing in the shower. Dry thoroughly and apply a Band-Aid that covers the entire site. Do not apply powder or lotion. Normal Observations:  Soreness or tenderness which may last one week. Mild oozing from the incision site. Possible bruising that could last 2 weeks. Activity:  You may resume driving 24 hours following the procedure. You may resume normal activity in 3 days or after the wound heals. Avoid lifting more than 10 pounds for 3 days or until the wound heals. Avoid strenuous exercise or activity for 1 week. Nutrition:  Regular diet       Call your doctor immediately if your condition worsens, for any other concerns, for a follow-up appointment or if you experience any of the following:  Significant bleeding that does not stop after 10 minutes of applying firm pressure on the puncture site. Increased swelling on the groin or leg. Unusual pain, numbness, or tingling of the groin or down the leg. Any signs of infection such as: redness, yellow drainage at the site, swelling or pain. ANESTHESIA DISCHARGE INSTRUCTIONS    Wear your seatbelt home. You are under the influence of drugs-do not drink alcohol, drive, operate machinery, make any important decisions or sign any legal documents for 24 hours. Children should not ride bikes, Andrew or play on gym sets for 24 hours after surgery. A responsible adult needs to be with you for 24 hours. You may experience lightheadedness, dizziness, or sleepiness following surgery. Rest at home today- increase activity as tolerated. Progress slowly to a regular diet unless your physician has instructed you otherwise. Drink plenty of water.   If persistent nausea

## 2023-04-06 NOTE — PROCEDURES
the right femoral vein using modified Seldinger technique and ultrasound guidance. Then a CS cathter was placed inside the coronary sinus under fluoroscopy for recording and mapping of the left atrium. Using ICE we delineated the left pulmonary vein, left atrial appendage,  mitral valve, and right superior and right inferior pulmonary veins. Prior to full heparinization, ICE images did not reveal any significant pericardial effusion. Patient received a bolus of heparin prior transseptal puncture followed by additional heparin bolus / heparin drip with continuous monitoring of the ACT during the procedure to keep the ACT more than 350 seconds. Transseptal punctures through intact septum were done using ICE, and pressure monitoring and using VersaCross system. Using BuyItRideIt 7 and Carto navigation system a three dimensional electro anatomical mapping of the left atrium, in addition to right and left sided pulmonary vein was created. Using Octaray catheter voltage mapping of the left atrium was created. Left atrium was very large. There were patchy scar and low voltage fractionated scar on the posterior inferior wall, and roof form prior Maze surgery. No WESTON since he has history of WESTON ligation. An esophageal temperature probe in addition to Esophastar catheter was advanced into the esophagus for mapping of the esophagus and careful monitoring of the esophageal temperature during ablation. Ablation stopped if the temperature was rising for more than ~ 0.5 C. All pulmonary veins had PV fascicles, the triggers for the atrial fibrillation. Then wide area circumferential ablation for right and left sided pulmonary veins were performed. Linear RF lesions was placed around both superior and inferior pulmonary vein in right and left side well outside the pulmonary vein ostium till all four pulmonary veins were isolated.  On the posterior wall careful monitoring of esophageal temperature was

## 2023-04-06 NOTE — PROGRESS NOTES
Patients hargrove removed. Patient up to bathroom 10 min later. Urine bright red with many small clots. Call placed to EP.

## 2023-04-06 NOTE — ANESTHESIA PRE PROCEDURE
Department of Anesthesiology  Preprocedure Note       Name:  Monica Aiden   Age:  78 y.o.  :  1945                                          MRN:  [id number]         Date:  2023      Surgeon: * Surgery not found *    Procedure:     Medications prior to admission:   Prior to Admission medications    Medication Sig Start Date End Date Taking? Authorizing Provider   empagliflozin (JARDIANCE) 10 MG tablet Take 1 tablet by mouth daily 3/30/23   Maribel Lou, APRN - CNS   sacubitril-valsartan (ENTRESTO) 24-26 MG per tablet Take 1 tablet by mouth 2 times daily  Patient taking differently: Take 0.5 tablets by mouth 2 times daily 3/9/23   Blade Serene, DO   metoprolol succinate (TOPROL XL) 100 MG extended release tablet TAKE 1 TABLET IN THE MORNING or as directed 3/9/23   [facility], DO   ezetimibe (ZETIA) 10 MG tablet Take 1 tablet by mouth every evening 3/6/23   [facility], DO   magnesium oxide (MAG-OX) 400 (240 Mg) MG tablet Take 2 tablets by mouth daily 23   Blade Serene, DO   warfarin (COUMADIN) 2.5 MG tablet Hold until ok with GI  Patient taking differently: Every day except 23   Frida Rodriguez PA-C   clopidogrel (PLAVIX) 75 MG tablet Take 1 tablet by mouth daily Hold until ok with GI 23   Frida Rodriguez PA-C   amiodarone (CORDARONE) 200 MG tablet TAKE 1 TABLET BID  Patient taking differently: Take 200 mg by mouth daily TAKE 1 TABLET BID 23   Hanny Horta MD   spironolactone (ALDACTONE) 25 MG tablet Take 0.5 tablets by mouth daily 22   Blade Serene, DO   KLOR-CON M20 20 MEQ extended release tablet TAKE 1 TABLET AS NEEDED WITH LASIX FOR SWELLING 22   Blade Serene, DO   pantoprazole (PROTONIX) 40 MG tablet Take 1 tablet by mouth every morning (before breakfast) 22   Blade Serene, DO   furosemide (LASIX) 40 MG tablet Take 1 tablet by mouth in the morning and 1 tablet in the evening.  Taking prn for fluid overload/swelling. 22   Blade Stern, DO

## 2023-04-06 NOTE — ANESTHESIA POSTPROCEDURE EVALUATION
Department of Anesthesiology  Postprocedure Note    Patient: Tavo Stagger Day  MRN: [id number]  YOB: 1945  Date of evaluation: 4/6/2023      Procedure Summary     Date: 04/06/23 Room / Location: [facility] Cath Lab; [facility] Echocardiography    Anesthesia Start: 0736 Anesthesia Stop:     Procedure: ECHO/ABLATION WITH ANESTHESIA Diagnosis: Paroxysmal atrial fibrillation    Scheduled Providers:  Responsible Provider: Jermaine Cruz MD    Anesthesia Type: general ASA Status: 3          Anesthesia Type: No value filed.     Brad Phase I:      Brad Phase II:        Anesthesia Post Evaluation    Patient location during evaluation: PACU  Patient participation: complete - patient participated  Level of consciousness: awake  Airway patency: patent  Nausea & Vomiting: no vomiting and no nausea  Complications: no  Cardiovascular status: hemodynamically stable  Respiratory status: acceptable  Hydration status: stable  Multimodal analgesia pain management approach

## 2023-04-06 NOTE — PROGRESS NOTES
Pt arrived from cath lab to PACU, awakens to voice. VSS, O2 sats 98% on 3 L NC. Dressing to right groin dry and intact, groin soft. Right pedal and posterior tibial pulses palpable, foot warm. Luz in place draining pink tinged urine. Will monitor.

## 2023-04-06 NOTE — PROGRESS NOTES
Patient hargrove has been removed and he has had bleeding.      He has history of kidney stone and bleeding in the past. Will have him seen by urology and observe in the hospital.     Diana Aceves MD, MPH  [facility]   Office: [phone]  Fax: [phone]

## 2023-04-06 NOTE — PROGRESS NOTES
Transfer of care. Italo Lou stated MD ok with patient going home with current BP as long as patient not symptomatic. Patient provided with po fluid and food family brought back to room.

## 2023-04-07 VITALS
RESPIRATION RATE: 18 BRPM | SYSTOLIC BLOOD PRESSURE: 102 MMHG | DIASTOLIC BLOOD PRESSURE: 67 MMHG | OXYGEN SATURATION: 97 % | HEART RATE: 60 BPM | WEIGHT: 155.8 LBS | HEIGHT: 70 IN | BODY MASS INDEX: 22.3 KG/M2 | TEMPERATURE: 97.9 F

## 2023-04-07 LAB
ANION GAP SERPL CALCULATED.3IONS-SCNC: 7 MMOL/L (ref 3–16)
BUN SERPL-MCNC: 20 MG/DL (ref 7–20)
CALCIUM SERPL-MCNC: 8.6 MG/DL (ref 8.3–10.6)
CHLORIDE SERPL-SCNC: 107 MMOL/L (ref 99–110)
CO2 SERPL-SCNC: 27 MMOL/L (ref 21–32)
CREAT SERPL-MCNC: 1.1 MG/DL (ref 0.8–1.3)
DEPRECATED RDW RBC AUTO: 14 % (ref 12.4–15.4)
GFR SERPLBLD CREATININE-BSD FMLA CKD-EPI: >60 ML/MIN/{1.73_M2}
GLUCOSE SERPL-MCNC: 94 MG/DL (ref 70–99)
HCT VFR BLD AUTO: 36.6 % (ref 40.5–52.5)
HGB BLD-MCNC: 12.1 G/DL (ref 13.5–17.5)
MCH RBC QN AUTO: 31.1 PG (ref 26–34)
MCHC RBC AUTO-ENTMCNC: 33 G/DL (ref 31–36)
MCV RBC AUTO: 94.3 FL (ref 80–100)
PLATELET # BLD AUTO: 325 K/UL (ref 135–450)
PMV BLD AUTO: 7.3 FL (ref 5–10.5)
POTASSIUM SERPL-SCNC: 4.2 MMOL/L (ref 3.5–5.1)
RBC # BLD AUTO: 3.88 M/UL (ref 4.2–5.9)
SODIUM SERPL-SCNC: 141 MMOL/L (ref 136–145)
WBC # BLD AUTO: 10.7 K/UL (ref 4–11)

## 2023-04-07 PROCEDURE — 6370000000 HC RX 637 (ALT 250 FOR IP): Performed by: INTERNAL MEDICINE

## 2023-04-07 PROCEDURE — 85027 COMPLETE CBC AUTOMATED: CPT

## 2023-04-07 PROCEDURE — 2580000003 HC RX 258: Performed by: INTERNAL MEDICINE

## 2023-04-07 PROCEDURE — 80048 BASIC METABOLIC PNL TOTAL CA: CPT

## 2023-04-07 PROCEDURE — 36415 COLL VENOUS BLD VENIPUNCTURE: CPT

## 2023-04-07 RX ORDER — AMIODARONE HYDROCHLORIDE 200 MG/1
200 TABLET ORAL DAILY
Qty: 90 TABLET | Refills: 3 | Status: SHIPPED
Start: 2023-04-07

## 2023-04-07 RX ORDER — FINASTERIDE 5 MG/1
5 TABLET, FILM COATED ORAL DAILY
Status: DISCONTINUED | OUTPATIENT
Start: 2023-04-07 | End: 2023-04-07

## 2023-04-07 RX ORDER — METOPROLOL SUCCINATE 100 MG/1
50 TABLET, EXTENDED RELEASE ORAL 2 TIMES DAILY
Qty: 100 TABLET | Refills: 3 | Status: SHIPPED
Start: 2023-04-07

## 2023-04-07 RX ADMIN — SACUBITRIL AND VALSARTAN 0.5 TABLET: 24; 26 TABLET, FILM COATED ORAL at 08:44

## 2023-04-07 RX ADMIN — Medication 10 ML: at 08:56

## 2023-04-07 RX ADMIN — POTASSIUM CHLORIDE 10 MEQ: 1500 TABLET, EXTENDED RELEASE ORAL at 10:15

## 2023-04-07 RX ADMIN — SPIRONOLACTONE 12.5 MG: 25 TABLET ORAL at 08:44

## 2023-04-07 RX ADMIN — PANTOPRAZOLE SODIUM 40 MG: 40 TABLET, DELAYED RELEASE ORAL at 06:08

## 2023-04-07 RX ADMIN — DIAZEPAM 5 MG: 5 TABLET ORAL at 08:44

## 2023-04-07 RX ADMIN — AMIODARONE HYDROCHLORIDE 200 MG: 200 TABLET ORAL at 08:43

## 2023-04-07 RX ADMIN — FUROSEMIDE 40 MG: 40 TABLET ORAL at 10:15

## 2023-04-07 RX ADMIN — PHENYTOIN SODIUM 100 MG: 100 CAPSULE ORAL at 08:45

## 2023-04-07 RX ADMIN — METOPROLOL SUCCINATE 25 MG: 25 TABLET, EXTENDED RELEASE ORAL at 08:45

## 2023-04-07 RX ADMIN — EMPAGLIFLOZIN 10 MG: 10 TABLET, FILM COATED ORAL at 08:44

## 2023-04-07 RX ADMIN — Medication 400 MG: at 08:45

## 2023-04-07 RX ADMIN — CLOPIDOGREL BISULFATE 75 MG: 75 TABLET ORAL at 08:44

## 2023-04-07 ASSESSMENT — PAIN DESCRIPTION - DESCRIPTORS
DESCRIPTORS: DISCOMFORT
DESCRIPTORS: DISCOMFORT
DESCRIPTORS: TENDER;DISCOMFORT

## 2023-04-07 ASSESSMENT — PAIN DESCRIPTION - PAIN TYPE
TYPE: SURGICAL PAIN
TYPE: SURGICAL PAIN

## 2023-04-07 ASSESSMENT — PAIN SCALES - GENERAL
PAINLEVEL_OUTOF10: 2
PAINLEVEL_OUTOF10: 2
PAINLEVEL_OUTOF10: 0
PAINLEVEL_OUTOF10: 2

## 2023-04-07 ASSESSMENT — PAIN DESCRIPTION - FREQUENCY
FREQUENCY: CONTINUOUS
FREQUENCY: CONTINUOUS

## 2023-04-07 ASSESSMENT — PAIN DESCRIPTION - ORIENTATION
ORIENTATION: RIGHT

## 2023-04-07 ASSESSMENT — PAIN DESCRIPTION - LOCATION
LOCATION: GROIN

## 2023-04-07 ASSESSMENT — PAIN - FUNCTIONAL ASSESSMENT
PAIN_FUNCTIONAL_ASSESSMENT: ACTIVITIES ARE NOT PREVENTED
PAIN_FUNCTIONAL_ASSESSMENT: ACTIVITIES ARE NOT PREVENTED

## 2023-04-07 ASSESSMENT — PAIN DESCRIPTION - ONSET
ONSET: ON-GOING
ONSET: ON-GOING

## 2023-04-07 NOTE — PLAN OF CARE
Problem: Chronic Conditions and Co-morbidities  Goal: Patient's chronic conditions and co-morbidity symptoms are monitored and maintained or improved  4/7/2023 0211 by Chelsea Walden RN  Outcome: Progressing       Problem: Discharge Planning  Goal: Discharge to home or other facility with appropriate resources  4/7/2023 0211 by Chelsea Walden RN  Outcome: Progressing       Problem: Safety - Adult  Goal: Free from fall injury  4/7/2023 0211 by Chelsea Walden RN  Outcome: Progressing       Problem: ABCDS Injury Assessment  Goal: Absence of physical injury  4/7/2023 0211 by Chelsea Walden RN  Outcome: Progressing       Problem: Pain  Goal: Verbalizes/displays adequate comfort level or baseline comfort level  4/7/2023 0211 by Chelsea Walden RN  Outcome: Progressing

## 2023-04-07 NOTE — CONSULTS
includes hernia repair (Right); Coronary angioplasty with stent (1997); [place] tooth extraction (04/2012); pacemaker placement (12/18/2017); Coronary artery bypass graft (01/2019); Cardiac surgery; Cystoscopy (N/A, 11/13/2019); Cardioversion; Coronary stent placement (03/30/2022); and Colonoscopy (N/A, 1/27/2023). Social History:  He reports that he quit smoking about 34 years ago. His smoking use included cigarettes. He has a 30.00 pack-year smoking history. He has never used smokeless tobacco. He reports that he does not drink alcohol and does not use drugs. Family History:  family history includes Heart Failure in his mother. Allergies:  No Known Allergies    Medications:  Scheduled Meds:   sodium chloride flush  5-40 mL IntraVENous 2 times per day    amiodarone  200 mg Oral Daily    clopidogrel  75 mg Oral Daily    diazePAM  5 mg Oral BID    empagliflozin  10 mg Oral Daily    ezetimibe  10 mg Oral QPM    furosemide  40 mg Oral BID    potassium chloride  10 mEq Oral Daily with breakfast    magnesium oxide  800 mg Oral Daily    metoprolol succinate  25 mg Oral Daily    pantoprazole  40 mg Oral QAM AC    phenytoin  100 mg Oral BID    primidone  250 mg Oral Nightly    rosuvastatin  40 mg Oral Nightly    sacubitril-valsartan  0.5 tablet Oral BID    spironolactone  12.5 mg Oral Daily    sodium chloride flush  5-40 mL IntraVENous 2 times per day     Continuous Infusions:   sodium chloride      sodium chloride       PRN Meds:sodium chloride flush, sodium chloride, HYDROmorphone, ondansetron, nitroGLYCERIN, sodium chloride flush, sodium chloride, acetaminophen    Review of Systems:  Pertinent positives/negatives reviewed in HPI. All other systems reviewed and negative, unless noted below.     Constitutional: Negative  Genitourinary: see HPI  HEENT: Negative   Cardiovascular: Negative   Respiratory: Negative   Gastrointestinal: Negative   Musculoskeletal: Negative   Neurological: Negative   Psychiatric:

## 2023-04-07 NOTE — DISCHARGE SUMMARY
EP Discharge Summary  [address]    Patient :Brandee Bigger  Room/Bed: [id number]-4149/4132-91  Medical Record: [id number]  YOB: 1945    Admit date: 4/6/2023  Discharge date: 04/07/23     Admitting Physician: Silva Orlando MD   Discharge NP: Lila Goldstein, APRN - CNP , CNP    Admission Diagnoses: Paroxysmal atrial fibrillation [I48.0]  PAF (paroxysmal atrial fibrillation) ([facility] [I48.0]  Discharge Diagnoses: PAF    Discharged Condition: good    Hospital Course: The patient is a 78 y.o. male with a past medical history of HTN, HLD, CAD s/p CABG (2019), bradycardia, and AF. Hx of WESTON ligation and MAZE (1/8/2019). Interval HX: Patient presented for elective cardiac ablation. S/p RFCA with PVI, roof line, inferior-posterior line with PWI (4/6/23). He had some hematuria post procedure that has resolved. Urology consult pending. Right groin ablation site soft, no hematoma/ooozing/swelling noted. Up in chair, ambulating in room without issue. Patient seen and examined. Notes, labs, and recent testing reviewed. Telemetry reviewed, pt in NSR  No new complaints today and no major events overnight. Denies having chest pain, palpitations, shortness of breath, edema or dizziness at the time of this visit. Consults: urology    Objective:   BP 96/60   Pulse 60   Temp 97.9 °F (36.6 °C) (Oral)   Resp 18   Ht 5' 10\" (1.778 m)   Wt 155 lb 12.8 oz (70.7 kg)   SpO2 97%   BMI 22.35 kg/m²      Intake/Output Summary (Last 24 hours) at 4/7/2023 0841  Last data filed at 4/7/2023 0405  Gross per 24 hour   Intake 1250 ml   Output 1050 ml   Net 200 ml       Physical Exam:  Telemetry: NSR  General: Alert & Oriented x4 in no distress  Skin: Warm and dry, no cyanosis or bruising  Neck: JVD <8, no bruit  Respiratory: Respirations symmetric and unlabored. Lungs clear to auscultation bilaterally, no wheezing, rhonchi, or crackles  Cardiovascular: Regular rate and rhythm.  S1/S2 present without murmurs, rubs, or

## 2023-04-07 NOTE — PROGRESS NOTES
Data- discharge order received, pt verbalized agreement to discharge, disposition to previous residence, no needs for HHC/DME. Action- discharge instructions prepared and given to pt, pt verbalized understanding. Medication information packet given r/t NEW and/or CHANGED prescriptions emphasizing name/purpose/side effects, pt verbalized understanding. Discharge instruction summary: Diet- regular, Activity- per post ablation dc instructions, Primary Care Physician as followsGamal DO Jacobo [phone] f/u appointment in 1 week, immunizations reviewed and n/a, prescription medications filled n/a. Inpatient surgical procedure precautions reviewed: post ablation dc instructions CHF Education reviewed. Pt/ Family has had a total of 60 minutes CHF education this admission encounter. 1. WEIGHT: Admit Weight: 151 lb (68.5 kg) (04/06/23 0718)        Today  Weight: 155 lb 12.8 oz (70.7 kg) (04/07/23 0400)       2. O2 SAT.: SpO2: 97 % (04/07/23 0823)    Response- Pt belongings gathered, IV removed. Disposition is home (no HHC/DME needs), transported with family, taken to lobby via w/c w/ nursing student, no complications.

## 2023-04-18 DIAGNOSIS — I25.5 ISCHEMIC CARDIOMYOPATHY: Chronic | ICD-10-CM

## 2023-04-18 DIAGNOSIS — I48.0 PAF (PAROXYSMAL ATRIAL FIBRILLATION) (HCC): Chronic | ICD-10-CM

## 2023-04-19 ENCOUNTER — TELEPHONE (OUTPATIENT)
Dept: CARDIOLOGY CLINIC | Age: 78
End: 2023-04-19

## 2023-04-19 RX ORDER — AMIODARONE HYDROCHLORIDE 200 MG/1
TABLET ORAL
Qty: 90 TABLET | Refills: 7 | OUTPATIENT
Start: 2023-04-19

## 2023-04-19 NOTE — TELEPHONE ENCOUNTER
Exported his remote transmission from last week into chart. Called and spoke with Patient. He stated he just wanted to be able to see them in [place]. Advised him to call back if he needed anything else. He VU and was thankful for the call.

## 2023-04-19 NOTE — TELEPHONE ENCOUNTER
We received remote transmission from patient's monitor at home. Transmission shows normal sensing and pacing function. Since 4/6/2023  1 AT/AF episodes noted on 4/6/2023 lasting 35 minutes. PVC Runs (2-4 beats)  6.8 per hour  PVC Singles 416.3 per hour  OptiVol at baseline. EP will review. See interrogation under cardiology tab in the [address] [place] [address] [address] field for more details.

## 2023-04-19 NOTE — TELEPHONE ENCOUNTER
Pt called to speak w/Louise Hernandez regarding getting copy of his interrogation report from last week. Please call to discuss.   Please advise

## 2023-04-23 ASSESSMENT — ENCOUNTER SYMPTOMS
PHOTOPHOBIA: 0
ABDOMINAL PAIN: 0
BLOOD IN STOOL: 0
SHORTNESS OF BREATH: 1
COUGH: 0
CHEST TIGHTNESS: 0
NAUSEA: 0
ABDOMINAL DISTENTION: 0

## 2023-04-24 ENCOUNTER — HOSPITAL ENCOUNTER (OUTPATIENT)
Age: 78
Discharge: HOME | End: 2023-04-24
Payer: MEDICARE

## 2023-04-24 ENCOUNTER — OFFICE VISIT (OUTPATIENT)
Dept: CARDIOLOGY CLINIC | Age: 78
End: 2023-04-24
Payer: MEDICARE

## 2023-04-24 ENCOUNTER — TELEPHONE (OUTPATIENT)
Dept: CARDIOLOGY CLINIC | Age: 78
End: 2023-04-24

## 2023-04-24 VITALS
SYSTOLIC BLOOD PRESSURE: 96 MMHG | HEIGHT: 70 IN | BODY MASS INDEX: 22.05 KG/M2 | DIASTOLIC BLOOD PRESSURE: 58 MMHG | HEART RATE: 62 BPM | OXYGEN SATURATION: 99 % | WEIGHT: 154 LBS

## 2023-04-24 DIAGNOSIS — I25.5 ISCHEMIC CARDIOMYOPATHY: Primary | ICD-10-CM

## 2023-04-24 DIAGNOSIS — I48.0 PAROXYSMAL ATRIAL FIBRILLATION (HCC): ICD-10-CM

## 2023-04-24 DIAGNOSIS — I50.22 CHRONIC SYSTOLIC HEART FAILURE (HCC): ICD-10-CM

## 2023-04-24 DIAGNOSIS — R60.0 LEG EDEMA, RIGHT: ICD-10-CM

## 2023-04-24 DIAGNOSIS — I47.20 VT (VENTRICULAR TACHYCARDIA) (HCC): ICD-10-CM

## 2023-04-24 DIAGNOSIS — I82.401 DEEP VEIN THROMBOSIS (DVT) OF RIGHT LOWER EXTREMITY, UNSPECIFIED CHRONICITY, UNSPECIFIED VEIN (HCC): ICD-10-CM

## 2023-04-24 DIAGNOSIS — I48.0 PAF (PAROXYSMAL ATRIAL FIBRILLATION) (HCC): ICD-10-CM

## 2023-04-24 DIAGNOSIS — I10 ESSENTIAL HYPERTENSION: ICD-10-CM

## 2023-04-24 LAB
ALBUMIN SERPL-MCNC: 3.9 G/DL (ref 3.4–5)
ANION GAP SERPL CALCULATED.3IONS-SCNC: 13 MMOL/L (ref 3–16)
BASOPHILS # BLD: 0 K/UL (ref 0–0.2)
BASOPHILS NFR BLD: 0.5 %
BUN SERPL-MCNC: 15 MG/DL (ref 7–20)
CALCIUM SERPL-MCNC: 9.1 MG/DL (ref 8.3–10.6)
CHLORIDE SERPL-SCNC: 98 MMOL/L (ref 99–110)
CO2 SERPL-SCNC: 24 MMOL/L (ref 21–32)
CREAT SERPL-MCNC: 1.3 MG/DL (ref 0.8–1.3)
DEPRECATED RDW RBC AUTO: 14.2 % (ref 12.4–15.4)
EOSINOPHIL # BLD: 0.1 K/UL (ref 0–0.6)
EOSINOPHIL NFR BLD: 1.2 %
GFR SERPLBLD CREATININE-BSD FMLA CKD-EPI: 57 ML/MIN/{1.73_M2}
GLUCOSE SERPL-MCNC: 84 MG/DL (ref 70–99)
HCT VFR BLD AUTO: 40.2 % (ref 40.5–52.5)
HGB BLD-MCNC: 13.3 G/DL (ref 13.5–17.5)
INR PPP: 4.21 (ref 0.84–1.16)
LYMPHOCYTES # BLD: 1.4 K/UL (ref 1–5.1)
LYMPHOCYTES NFR BLD: 14.2 %
MCH RBC QN AUTO: 31.8 PG (ref 26–34)
MCHC RBC AUTO-ENTMCNC: 33.1 G/DL (ref 31–36)
MCV RBC AUTO: 96 FL (ref 80–100)
MONOCYTES # BLD: 1.2 K/UL (ref 0–1.3)
MONOCYTES NFR BLD: 12 %
NEUTROPHILS # BLD: 7.3 K/UL (ref 1.7–7.7)
NEUTROPHILS NFR BLD: 72.1 %
PHOSPHATE SERPL-MCNC: 3.5 MG/DL (ref 2.5–4.9)
PLATELET # BLD AUTO: 328 K/UL (ref 135–450)
PMV BLD AUTO: 8.8 FL (ref 5–10.5)
POTASSIUM SERPL-SCNC: 4.6 MMOL/L (ref 3.5–5.1)
PROTHROMBIN TIME: 40.2 SEC (ref 11.5–14.8)
RBC # BLD AUTO: 4.19 M/UL (ref 4.2–5.9)
SODIUM SERPL-SCNC: 135 MMOL/L (ref 136–145)
WBC # BLD AUTO: 10.2 K/UL (ref 4–11)

## 2023-04-24 PROCEDURE — 85610 PROTHROMBIN TIME: CPT

## 2023-04-24 PROCEDURE — 1036F TOBACCO NON-USER: CPT | Performed by: INTERNAL MEDICINE

## 2023-04-24 PROCEDURE — G8420 CALC BMI NORM PARAMETERS: HCPCS | Performed by: INTERNAL MEDICINE

## 2023-04-24 PROCEDURE — 1123F ACP DISCUSS/DSCN MKR DOCD: CPT | Performed by: INTERNAL MEDICINE

## 2023-04-24 PROCEDURE — 3078F DIAST BP <80 MM HG: CPT | Performed by: INTERNAL MEDICINE

## 2023-04-24 PROCEDURE — 36415 COLL VENOUS BLD VENIPUNCTURE: CPT

## 2023-04-24 PROCEDURE — G8427 DOCREV CUR MEDS BY ELIG CLIN: HCPCS | Performed by: INTERNAL MEDICINE

## 2023-04-24 PROCEDURE — 80069 RENAL FUNCTION PANEL: CPT

## 2023-04-24 PROCEDURE — 85025 COMPLETE CBC W/AUTO DIFF WBC: CPT

## 2023-04-24 PROCEDURE — 3017F COLORECTAL CA SCREEN DOC REV: CPT | Performed by: INTERNAL MEDICINE

## 2023-04-24 PROCEDURE — 3074F SYST BP LT 130 MM HG: CPT | Performed by: INTERNAL MEDICINE

## 2023-04-24 PROCEDURE — 99214 OFFICE O/P EST MOD 30 MIN: CPT | Performed by: INTERNAL MEDICINE

## 2023-04-24 RX ORDER — ENOXAPARIN SODIUM 100 MG/ML
70 INJECTION SUBCUTANEOUS 2 TIMES DAILY
Status: SHIPPED | OUTPATIENT
Start: 2023-04-24

## 2023-04-25 ENCOUNTER — HOSPITAL ENCOUNTER (OUTPATIENT)
Dept: VASCULAR LAB | Age: 78
Discharge: HOME | End: 2023-04-25
Payer: MEDICARE

## 2023-04-25 DIAGNOSIS — R60.0 LEG EDEMA, RIGHT: ICD-10-CM

## 2023-04-25 DIAGNOSIS — I48.0 PAF (PAROXYSMAL ATRIAL FIBRILLATION) (HCC): ICD-10-CM

## 2023-04-25 DIAGNOSIS — I82.401 DEEP VEIN THROMBOSIS (DVT) OF RIGHT LOWER EXTREMITY, UNSPECIFIED CHRONICITY, UNSPECIFIED VEIN (HCC): ICD-10-CM

## 2023-04-25 PROCEDURE — 93971 EXTREMITY STUDY: CPT

## 2023-04-26 ENCOUNTER — TELEPHONE (OUTPATIENT)
Dept: CARDIOLOGY CLINIC | Age: 78
End: 2023-04-26

## 2023-04-26 ENCOUNTER — HOSPITAL ENCOUNTER (OUTPATIENT)
Age: 78
Discharge: HOME | End: 2023-04-26
Payer: MEDICARE

## 2023-04-26 ENCOUNTER — ANTI-COAG VISIT (OUTPATIENT)
Dept: PHARMACY | Age: 78
End: 2023-04-26

## 2023-04-26 DIAGNOSIS — I48.0 PAROXYSMAL ATRIAL FIBRILLATION (HCC): Chronic | ICD-10-CM

## 2023-04-26 DIAGNOSIS — I48.0 PAROXYSMAL ATRIAL FIBRILLATION (HCC): ICD-10-CM

## 2023-04-26 DIAGNOSIS — I82.A19 ACUTE DEEP VEIN THROMBOSIS (DVT) OF AXILLARY VEIN, UNSPECIFIED LATERALITY (HCC): Primary | ICD-10-CM

## 2023-04-26 DIAGNOSIS — I48.91 ATRIAL FIBRILLATION, UNSPECIFIED TYPE (HCC): Primary | Chronic | ICD-10-CM

## 2023-04-26 LAB
INR PPP: 3.18 (ref 0.84–1.16)
PROTHROMBIN TIME: 32.3 SEC (ref 11.5–14.8)

## 2023-04-26 PROCEDURE — 85610 PROTHROMBIN TIME: CPT

## 2023-04-26 NOTE — PROGRESS NOTES
Mr. Dariusz Valencia Day is a 78 y.o. y/o male with history of Afib who presents today for anticoagulation monitoring and adjustment. Pertinent PMH:    Patient Reported Findings:  Yes     No  [x]   []       Patient verifies current dosing regimen as listed- takes 5mg Sun and Wed and 2.5mg AOD---> confirms --> verified increase in weekly dose to 5 mg on Thurs, fri and Sun ---> confirmed   []   [x]       S/S bleeding/bruising/swelling/SOB- denies ---> was having clot symptoms on Sat so went to see doc on Mon and did doppler  []   [x]       Blood in urine or stool- denies   []   [x]       Procedures scheduled in the future at this time 11/4 colonoscopy, needs holding instructions --> cardiology would like pt to hold off on procedures until 3 months --> colonoscopy was cancelled, r/s for December 29, hold 5 days and bridge. Was instructed to get INR on Mon 12/26 to determine INR, start lovenox --> colonoscopy r/s for 1/27 ---> ECHO/ablation in April   []   [x]       Missed Dose -denies  []   [x]       Extra Dose- denies   [x]   []       Change in medications- states that MD had been adjusting phenytoin increasing 4-5 weeks ago but plans to return to normal phenytoin today  --->back to normal dose --> starting amiodarone 400 mg x 2 weeks today--> was on 5 day pred course, done now  --> takes amiodarone 200 mg qd, has been at this dose for a few weeks---> sometimes take extra amiodarone at night, states doctor told him he could do this, asked to clarify, adjusting lasix   []   [x]       Change in health/diet/appetite -states normally eats a lot of vegetable soup, but has not had any greens recently ---> no greens --> no changes   []   [x]       Change in alcohol use- doesn't drink or smoke   []   [x]       Change in activity  []   [x]       Hospital admission- Had ablation 4/6, INR was 2.5. Confirmed dose of 5 mg Tues, Thurs, Fri, Sun and 2.5 mg all other days of the week. Patient will recheck 4/13.   []   [x]       Emergency

## 2023-05-01 ENCOUNTER — TELEPHONE (OUTPATIENT)
Dept: CARDIOLOGY CLINIC | Age: 78
End: 2023-05-01

## 2023-05-01 DIAGNOSIS — I10 ESSENTIAL HYPERTENSION: Chronic | ICD-10-CM

## 2023-05-01 DIAGNOSIS — I25.83 CORONARY ARTERY DISEASE DUE TO LIPID RICH PLAQUE: ICD-10-CM

## 2023-05-01 DIAGNOSIS — I25.5 ISCHEMIC CARDIOMYOPATHY: Chronic | ICD-10-CM

## 2023-05-01 DIAGNOSIS — I25.10 CORONARY ARTERY DISEASE DUE TO LIPID RICH PLAQUE: ICD-10-CM

## 2023-05-01 DIAGNOSIS — I82.A19 ACUTE DEEP VEIN THROMBOSIS (DVT) OF AXILLARY VEIN, UNSPECIFIED LATERALITY (HCC): Primary | ICD-10-CM

## 2023-05-01 DIAGNOSIS — I48.0 PAF (PAROXYSMAL ATRIAL FIBRILLATION) (HCC): Chronic | ICD-10-CM

## 2023-05-03 ENCOUNTER — TELEPHONE (OUTPATIENT)
Dept: CARDIOLOGY CLINIC | Age: 78
End: 2023-05-03

## 2023-05-03 ENCOUNTER — HOSPITAL ENCOUNTER (OUTPATIENT)
Age: 78
Discharge: HOME | End: 2023-05-03
Payer: MEDICARE

## 2023-05-03 ENCOUNTER — ANTI-COAG VISIT (OUTPATIENT)
Dept: PHARMACY | Age: 78
End: 2023-05-03

## 2023-05-03 DIAGNOSIS — I48.91 ATRIAL FIBRILLATION, UNSPECIFIED TYPE (HCC): Primary | Chronic | ICD-10-CM

## 2023-05-03 LAB
INR PPP: 2.75 (ref 0.84–1.16)
PROTHROMBIN TIME: 28.9 SEC (ref 11.5–14.8)

## 2023-05-03 PROCEDURE — 36415 COLL VENOUS BLD VENIPUNCTURE: CPT

## 2023-05-03 PROCEDURE — 85610 PROTHROMBIN TIME: CPT

## 2023-05-03 NOTE — PROGRESS NOTES
Mr. Eliott Romberg Day is a 78 y.o. y/o male with history of Afib who presents today for anticoagulation monitoring and adjustment. Pertinent PMH:    Patient Reported Findings:  Yes     No  [x]   []       Patient verifies current dosing regimen as listed- takes 5mg Sun and Wed and 2.5mg AOD---> confirms --> verified increase in weekly dose to 5 mg on Thurs, fri and Sun ---> confirmed   []   [x]       S/S bleeding/bruising/swelling/SOB- denies ---> was having clot symptoms on Sat so went to see doc on Mon and did doppler---> denies   []   [x]       Blood in urine or stool- denies   []   [x]       Procedures scheduled in the future at this time 11/4 colonoscopy, needs holding instructions --> cardiology would like pt to hold off on procedures until 3 months --> colonoscopy was cancelled, r/s for December 29, hold 5 days and bridge. Was instructed to get INR on Mon 12/26 to determine INR, start lovenox --> colonoscopy r/s for 1/27 ---> ECHO/ablation in April   []   [x]       Missed Dose -denies  []   [x]       Extra Dose- denies   [x]   []       Change in medications- states that MD had been adjusting phenytoin increasing 4-5 weeks ago but plans to return to normal phenytoin today  --->back to normal dose --> starting amiodarone 400 mg x 2 weeks today--> was on 5 day pred course, done now  --> takes amiodarone 200 mg qd, has been at this dose for a few weeks---> sometimes take extra amiodarone at night, states doctor told him he could do this, asked to clarify, adjusting lasix ---> adjusting lasix, states Amiodarone still every morning, sometimes takes extra at night but hasn't recently   []   [x]       Change in health/diet/appetite -states normally eats a lot of vegetable soup, but has not had any greens recently ---> no greens --> no changes   []   [x]       Change in alcohol use- doesn't drink or smoke   []   [x]       Change in activity  []   [x]       Hospital admission- Had ablation 4/6, INR was 2.5.  Confirmed dose

## 2023-05-03 NOTE — TELEPHONE ENCOUNTER
Received refill request for furosemide (LASIX) 40 MG tablet, amiodarone (CORDARONE) 200 MG tablet and KLOR-CON M20 20 MEQ extended release tablet from [address].      Last OV: 4- DKW    Next OV: 6-2-2023 DKW     Last Labs: 4- RFP    Last Filled:  8- Furosemide                      4-7-2023 Amiodarone                      11- Klor Con

## 2023-05-04 RX ORDER — FUROSEMIDE 40 MG/1
TABLET ORAL
Qty: 90 TABLET | Refills: 7 | Status: SHIPPED | OUTPATIENT
Start: 2023-05-04

## 2023-05-04 RX ORDER — AMIODARONE HYDROCHLORIDE 200 MG/1
200 TABLET ORAL DAILY
Qty: 90 TABLET | Refills: 1 | Status: SHIPPED | OUTPATIENT
Start: 2023-05-04

## 2023-05-04 RX ORDER — POTASSIUM CHLORIDE 1500 MG/1
TABLET, EXTENDED RELEASE ORAL
Qty: 90 TABLET | Refills: 3 | Status: SHIPPED | OUTPATIENT
Start: 2023-05-04

## 2023-05-08 ENCOUNTER — NURSE ONLY (OUTPATIENT)
Dept: CARDIOLOGY CLINIC | Age: 78
End: 2023-05-08
Payer: MEDICARE

## 2023-05-08 DIAGNOSIS — I50.22 CHRONIC SYSTOLIC HEART FAILURE (HCC): ICD-10-CM

## 2023-05-08 DIAGNOSIS — Z95.810 ICD (IMPLANTABLE CARDIOVERTER-DEFIBRILLATOR) IN PLACE: ICD-10-CM

## 2023-05-08 DIAGNOSIS — I42.9 CARDIOMYOPATHY, UNSPECIFIED TYPE (HCC): Primary | ICD-10-CM

## 2023-05-08 PROCEDURE — G2066 INTER DEVC REMOTE 30D: HCPCS | Performed by: CLINICAL NURSE SPECIALIST

## 2023-05-08 PROCEDURE — 93297 REM INTERROG DEV EVAL ICPMS: CPT | Performed by: CLINICAL NURSE SPECIALIST

## 2023-05-08 NOTE — PROGRESS NOTES
Remote transmission received from patient's dual chamber ICD home monitor. Transmission shows normal sensing and pacing function. No new arrhythmias/ or events. PVC burden 530.3 p/hour. Optivol is WNL. TriageHF Heart Failure Risk Status on 08-May-2023 is Medium    NP will review. See interrogation under cardiology tab in the [address] [place] [address] [address] field for more details. Will continue to monitor remotely. (End of 31-day monitoring period 5/8/23).

## 2023-05-09 NOTE — TELEPHONE ENCOUNTER
DR Nay Snow will  not sign off on the Home amparo. I spoke with pt and advised the he can discuss with [facility] or see if  PCP will do the Home test or do his INR checks there.

## 2023-05-13 DIAGNOSIS — E78.2 MIXED HYPERLIPIDEMIA: Chronic | ICD-10-CM

## 2023-05-13 DIAGNOSIS — I25.10 CORONARY ARTERY DISEASE DUE TO LIPID RICH PLAQUE: ICD-10-CM

## 2023-05-13 DIAGNOSIS — I25.83 CORONARY ARTERY DISEASE DUE TO LIPID RICH PLAQUE: ICD-10-CM

## 2023-05-15 RX ORDER — CLOPIDOGREL BISULFATE 75 MG/1
TABLET ORAL
Qty: 90 TABLET | Refills: 3 | Status: SHIPPED | OUTPATIENT
Start: 2023-05-15

## 2023-05-15 RX ORDER — ROSUVASTATIN CALCIUM 40 MG/1
TABLET, COATED ORAL
Qty: 90 TABLET | Refills: 3 | Status: SHIPPED | OUTPATIENT
Start: 2023-05-15

## 2023-05-15 NOTE — TELEPHONE ENCOUNTER
Received refill request for clopidogrel (PLAVIX) 75 MG tablet and rosuvastatin (CRESTOR) 40 MG tablet from [address].      Last OV: 4- DKW    Next OV: 6-2-2023 DKW    Last Labs: 4- CBC                   1- Lipid    Last Filled: 1- Clopidogrel - JULITA Hightower                     8- Rosuvastatin - DKW

## 2023-05-16 ENCOUNTER — ANTI-COAG VISIT (OUTPATIENT)
Dept: PHARMACY | Age: 78
End: 2023-05-16

## 2023-05-16 ENCOUNTER — HOSPITAL ENCOUNTER (OUTPATIENT)
Age: 78
Discharge: HOME | End: 2023-05-16
Payer: MEDICARE

## 2023-05-16 DIAGNOSIS — I48.91 ATRIAL FIBRILLATION, UNSPECIFIED TYPE (HCC): Primary | Chronic | ICD-10-CM

## 2023-05-16 DIAGNOSIS — I48.0 PAROXYSMAL ATRIAL FIBRILLATION (HCC): Chronic | ICD-10-CM

## 2023-05-16 LAB
INR PPP: 3.39 (ref 0.84–1.16)
PROTHROMBIN TIME: 34 SEC (ref 11.5–14.8)

## 2023-05-16 PROCEDURE — 36415 COLL VENOUS BLD VENIPUNCTURE: CPT

## 2023-05-16 PROCEDURE — 85610 PROTHROMBIN TIME: CPT

## 2023-05-16 NOTE — PROGRESS NOTES
Mr. Ai Mathis is a 78 y.o. y/o male with history of Afib who presents today for anticoagulation monitoring and adjustment. Pertinent PMH:    Patient Reported Findings:  Yes     No  [x]   []       Patient verifies current dosing regimen as listed- takes 5mg Sun and Wed and 2.5mg AOD---> confirms --> verified increase in weekly dose to 5 mg on Thurs, fri and Sun ---> confirmed   []   [x]       S/S bleeding/bruising/swelling/SOB- denies ---> was having clot symptoms on Sat so went to see doc on Mon and did doppler---> denies   []   [x]       Blood in urine or stool- denies   []   [x]       Procedures scheduled in the future at this time 11/4 colonoscopy, needs holding instructions --> cardiology would like pt to hold off on procedures until 3 months --> colonoscopy was cancelled, r/s for December 29, hold 5 days and bridge.  Was instructed to get INR on Mon 12/26 to determine INR, start lovenox --> colonoscopy r/s for 1/27 ---> ECHO/ablation in April   []   [x]       Missed Dose -denies  []   [x]       Extra Dose- denies   [x]   []       Change in medications- states that MD had been adjusting phenytoin increasing 4-5 weeks ago but plans to return to normal phenytoin today  --->back to normal dose --> starting amiodarone 400 mg x 2 weeks today--> was on 5 day pred course, done now  --> takes amiodarone 200 mg qd, has been at this dose for a few weeks---> sometimes take extra amiodarone at night, states doctor told him he could do this, asked to clarify, adjusting lasix ---> adjusting lasix, states Amiodarone still every morning, sometimes takes extra at night but hasn't recently ---> states no changes   []   [x]       Change in health/diet/appetite -states normally eats a lot of vegetable soup, but has not had any greens recently ---> no greens --> no changes   []   [x]       Change in alcohol use- doesn't drink or smoke   []   [x]       Change in activity  []   [x]       Hospital admission- Had ablation 4/6, INR

## 2023-05-31 ASSESSMENT — ENCOUNTER SYMPTOMS
SHORTNESS OF BREATH: 1
PHOTOPHOBIA: 0
ABDOMINAL PAIN: 0
CHEST TIGHTNESS: 0
BLOOD IN STOOL: 0
COUGH: 0
ABDOMINAL DISTENTION: 0
NAUSEA: 0

## 2023-06-02 ENCOUNTER — HOSPITAL ENCOUNTER (OUTPATIENT)
Age: 78
Discharge: HOME | End: 2023-06-02
Payer: MEDICARE

## 2023-06-02 ENCOUNTER — OFFICE VISIT (OUTPATIENT)
Dept: CARDIOLOGY CLINIC | Age: 78
End: 2023-06-02

## 2023-06-02 VITALS
OXYGEN SATURATION: 98 % | SYSTOLIC BLOOD PRESSURE: 90 MMHG | DIASTOLIC BLOOD PRESSURE: 64 MMHG | WEIGHT: 163 LBS | HEART RATE: 60 BPM | HEIGHT: 70 IN | BODY MASS INDEX: 23.34 KG/M2

## 2023-06-02 DIAGNOSIS — I48.0 PAROXYSMAL ATRIAL FIBRILLATION (HCC): Chronic | ICD-10-CM

## 2023-06-02 DIAGNOSIS — I25.83 CORONARY ARTERY DISEASE DUE TO LIPID RICH PLAQUE: Primary | ICD-10-CM

## 2023-06-02 DIAGNOSIS — I50.22 CHRONIC SYSTOLIC HEART FAILURE (HCC): ICD-10-CM

## 2023-06-02 DIAGNOSIS — E78.2 MIXED HYPERLIPIDEMIA: ICD-10-CM

## 2023-06-02 DIAGNOSIS — I25.5 ISCHEMIC CARDIOMYOPATHY: ICD-10-CM

## 2023-06-02 DIAGNOSIS — I47.20 VT (VENTRICULAR TACHYCARDIA) (HCC): ICD-10-CM

## 2023-06-02 DIAGNOSIS — I10 ESSENTIAL HYPERTENSION: ICD-10-CM

## 2023-06-02 DIAGNOSIS — Z95.810 ICD (IMPLANTABLE CARDIOVERTER-DEFIBRILLATOR) IN PLACE: ICD-10-CM

## 2023-06-02 DIAGNOSIS — I82.A19 ACUTE DEEP VEIN THROMBOSIS (DVT) OF AXILLARY VEIN, UNSPECIFIED LATERALITY (HCC): ICD-10-CM

## 2023-06-02 DIAGNOSIS — I25.10 CORONARY ARTERY DISEASE DUE TO LIPID RICH PLAQUE: Primary | ICD-10-CM

## 2023-06-02 DIAGNOSIS — I48.0 PAF (PAROXYSMAL ATRIAL FIBRILLATION) (HCC): ICD-10-CM

## 2023-06-02 LAB
INR PPP: 3.8 (ref 0.84–1.16)
PROTHROMBIN TIME: 37.1 SEC (ref 11.5–14.8)

## 2023-06-02 PROCEDURE — 85610 PROTHROMBIN TIME: CPT

## 2023-06-02 PROCEDURE — 36415 COLL VENOUS BLD VENIPUNCTURE: CPT

## 2023-06-06 ENCOUNTER — OFFICE VISIT (OUTPATIENT)
Dept: VASCULAR SURGERY | Age: 78
End: 2023-06-06
Payer: MEDICARE

## 2023-06-06 VITALS
SYSTOLIC BLOOD PRESSURE: 118 MMHG | BODY MASS INDEX: 23.48 KG/M2 | HEIGHT: 70 IN | WEIGHT: 164 LBS | DIASTOLIC BLOOD PRESSURE: 75 MMHG

## 2023-06-06 DIAGNOSIS — I80.9 PHLEBITIS: Primary | ICD-10-CM

## 2023-06-06 PROCEDURE — 3017F COLORECTAL CA SCREEN DOC REV: CPT | Performed by: SURGERY

## 2023-06-06 PROCEDURE — G8427 DOCREV CUR MEDS BY ELIG CLIN: HCPCS | Performed by: SURGERY

## 2023-06-06 PROCEDURE — G8420 CALC BMI NORM PARAMETERS: HCPCS | Performed by: SURGERY

## 2023-06-06 PROCEDURE — 1123F ACP DISCUSS/DSCN MKR DOCD: CPT | Performed by: SURGERY

## 2023-06-06 PROCEDURE — 3074F SYST BP LT 130 MM HG: CPT | Performed by: SURGERY

## 2023-06-06 PROCEDURE — 3078F DIAST BP <80 MM HG: CPT | Performed by: SURGERY

## 2023-06-06 PROCEDURE — 1036F TOBACCO NON-USER: CPT | Performed by: SURGERY

## 2023-06-06 PROCEDURE — 99203 OFFICE O/P NEW LOW 30 MIN: CPT | Performed by: SURGERY

## 2023-06-06 NOTE — PROGRESS NOTES
[facility]  Consultation Note    Chief Complaint / Reason for Consultation  Right leg tenderness    History of Present Illness  Patient is a 78 y.o. male with history of coronary artery disease status post CABG in 2019, A-fib on anticoagulation, hypertension, hyperlipidemia, previous right leg DVT (Right SFV) referred today by his cardiologist Dr. Delicia Ruiz for right leg pain. Patient recently underwent right lower extremity venous duplex April 25, 2023 showing chronic phlebitic changes of the right small saphenous vein. Had intermittent discomfort around his right ankle when he has had episodes of clot. Otherwise active with no complaints    Review of Systems     Denies fevers, chills, chest pain, shortness of breath, nausea, vomiting, hematemesis, diarrhea, constipation, melena, hematochezia, wt changes, vision problems, blindness, hearing problems, facial droop, slurred speech, extremity weakness, extremity numbness, dysuria. Past Medical History:   has a past medical history of Arthritis, Atrial fib/flut, CAD (coronary artery disease), CHF (congestive heart failure) ([address] DVT (deep venous thrombosis) ([address] Epilepsia ([address] Hx of blood clots, Hx of CABG, Hyperlipidemia, Hypertension, Kidney stones, MI (myocardial infarction) ([address] Pacemaker, Seizures ([address] Sinus bradycardia, Sleep apnea, and V tach ([address] Past Surgical History:   has a past surgical history that includes hernia repair (Right); Coronary angioplasty with stent (1997); [place] tooth extraction (04/2012); pacemaker placement (12/18/2017); Coronary artery bypass graft (01/2019); Cardiac surgery; Cystoscopy (N/A, 11/13/2019); Cardioversion; Coronary stent placement (03/30/2022); and Colonoscopy (N/A, 1/27/2023).      Medications:  Current Outpatient Medications on File Prior to Visit   Medication Sig Dispense Refill    clopidogrel (PLAVIX) 75 MG tablet TAKE 1 TABLET IN THE MORNING 90 tablet 3    rosuvastatin

## 2023-06-07 PROBLEM — R11.0 NAUSEA: Status: RESOLVED | Noted: 2022-03-25 | Resolved: 2023-06-07

## 2023-06-19 ENCOUNTER — NURSE ONLY (OUTPATIENT)
Dept: CARDIOLOGY CLINIC | Age: 78
End: 2023-06-19
Payer: MEDICARE

## 2023-06-19 DIAGNOSIS — I50.22 CHRONIC SYSTOLIC HEART FAILURE (HCC): ICD-10-CM

## 2023-06-19 DIAGNOSIS — Z95.810 ICD (IMPLANTABLE CARDIOVERTER-DEFIBRILLATOR) IN PLACE: ICD-10-CM

## 2023-06-19 DIAGNOSIS — I42.9 CARDIOMYOPATHY, UNSPECIFIED TYPE (HCC): Primary | ICD-10-CM

## 2023-06-19 PROCEDURE — 93297 REM INTERROG DEV EVAL ICPMS: CPT | Performed by: CLINICAL NURSE SPECIALIST

## 2023-06-19 PROCEDURE — G2066 INTER DEVC REMOTE 30D: HCPCS | Performed by: CLINICAL NURSE SPECIALIST

## 2023-06-19 NOTE — PROGRESS NOTES
Remote transmission received from patient's dual chamber ICD home monitor. Transmission shows normal sensing and pacing function. 0.3% AT/ AF burden (amiodarone, Toprol, coumadin). PVC burden 404.8 p/hour. Possible Optivol fluid accumulation 6/12/23 --- ongoing. Currently elevated around 60, at threshold, with correlating TI trend at reference line. TriageHF Heart Failure Risk Status on 19-Jun-2023 is Medium    NP will review. See interrogation under cardiology tab in the [address] [place] [address] [address] field for more details. Will continue to monitor remotely.     (End of 31-day monitoring period 6/19/23) Cheek Interpolation Flap Division And Inset Text: Division and inset of the cheek interpolation flap was performed to achieve optimal aesthetic result, restore normal anatomic appearance and avoid distortion of normal anatomy, expedite and facilitate wound healing, achieve optimal functional result and because linear closure either not possible or would produce suboptimal result. The patient was prepped and draped in the usual manner. The pedicle was infiltrated with local anesthesia. The pedicle was sectioned with a #15 blade. The pedicle was de-bulked and trimmed to match the shape of the defect. Hemostasis was achieved. The flap donor site and free margin of the flap were secured with deep buried sutures and the wound edges were re-approximated.

## 2023-06-23 ENCOUNTER — OFFICE VISIT (OUTPATIENT)
Dept: CARDIOLOGY CLINIC | Age: 78
End: 2023-06-23

## 2023-06-23 VITALS
BODY MASS INDEX: 22.48 KG/M2 | DIASTOLIC BLOOD PRESSURE: 60 MMHG | WEIGHT: 157 LBS | HEART RATE: 64 BPM | SYSTOLIC BLOOD PRESSURE: 114 MMHG | HEIGHT: 70 IN | OXYGEN SATURATION: 99 %

## 2023-06-23 DIAGNOSIS — I49.01 VF (VENTRICULAR FIBRILLATION) (HCC): ICD-10-CM

## 2023-06-23 DIAGNOSIS — I47.20 VT (VENTRICULAR TACHYCARDIA) (HCC): Primary | ICD-10-CM

## 2023-06-23 DIAGNOSIS — I25.5 ISCHEMIC CARDIOMYOPATHY: Chronic | ICD-10-CM

## 2023-06-23 DIAGNOSIS — I10 ESSENTIAL HYPERTENSION: Chronic | ICD-10-CM

## 2023-06-23 DIAGNOSIS — I25.83 CORONARY ARTERY DISEASE DUE TO LIPID RICH PLAQUE: ICD-10-CM

## 2023-06-23 DIAGNOSIS — G47.33 OSA ON CPAP: ICD-10-CM

## 2023-06-23 DIAGNOSIS — Z79.899 ON AMIODARONE THERAPY: ICD-10-CM

## 2023-06-23 DIAGNOSIS — Z95.810 ICD (IMPLANTABLE CARDIOVERTER-DEFIBRILLATOR) IN PLACE: ICD-10-CM

## 2023-06-23 DIAGNOSIS — Z99.89 OSA ON CPAP: ICD-10-CM

## 2023-06-23 DIAGNOSIS — I49.3 PVC (PREMATURE VENTRICULAR CONTRACTION): ICD-10-CM

## 2023-06-23 DIAGNOSIS — I48.0 PAF (PAROXYSMAL ATRIAL FIBRILLATION) (HCC): Chronic | ICD-10-CM

## 2023-06-23 DIAGNOSIS — I47.29 NSVT (NONSUSTAINED VENTRICULAR TACHYCARDIA) (HCC): ICD-10-CM

## 2023-06-23 DIAGNOSIS — I25.10 CORONARY ARTERY DISEASE DUE TO LIPID RICH PLAQUE: ICD-10-CM

## 2023-06-23 DIAGNOSIS — I25.110 CORONARY ARTERY DISEASE INVOLVING NATIVE CORONARY ARTERY OF NATIVE HEART WITH UNSTABLE ANGINA PECTORIS (HCC): ICD-10-CM

## 2023-06-23 PROBLEM — I20.1 VARIANT ANGINA (HCC): Status: RESOLVED | Noted: 2019-08-08 | Resolved: 2023-06-23

## 2023-06-23 PROBLEM — I82.A19 ACUTE DEEP VEIN THROMBOSIS (DVT) OF AXILLARY VEIN (HCC): Status: RESOLVED | Noted: 2022-11-28 | Resolved: 2023-06-23

## 2023-06-23 PROBLEM — R07.9 CHEST PAIN: Status: RESOLVED | Noted: 2022-09-16 | Resolved: 2023-06-23

## 2023-06-23 RX ORDER — METOPROLOL SUCCINATE 100 MG/1
TABLET, EXTENDED RELEASE ORAL
Qty: 135 TABLET | Refills: 1 | Status: SHIPPED | OUTPATIENT
Start: 2023-06-23

## 2023-06-26 RX ORDER — WARFARIN SODIUM 2.5 MG/1
TABLET ORAL
Qty: 180 TABLET | Refills: 3 | Status: SHIPPED | OUTPATIENT
Start: 2023-06-26

## 2023-06-26 RX ORDER — WARFARIN SODIUM 2.5 MG/1
5 TABLET ORAL DAILY
Qty: 180 TABLET | Refills: 3 | Status: SHIPPED | OUTPATIENT
Start: 2023-06-26 | End: 2023-09-24

## 2023-06-26 RX ORDER — PANTOPRAZOLE SODIUM 40 MG/1
TABLET, DELAYED RELEASE ORAL
Qty: 90 TABLET | Refills: 3 | Status: SHIPPED | OUTPATIENT
Start: 2023-06-26

## 2023-06-29 ENCOUNTER — HOSPITAL ENCOUNTER (OUTPATIENT)
Age: 78
Discharge: HOME | End: 2023-06-29
Payer: MEDICARE

## 2023-06-29 ENCOUNTER — ANTI-COAG VISIT (OUTPATIENT)
Dept: PHARMACY | Age: 78
End: 2023-06-29

## 2023-06-29 DIAGNOSIS — I48.91 ATRIAL FIBRILLATION, UNSPECIFIED TYPE (HCC): Primary | Chronic | ICD-10-CM

## 2023-06-29 LAB
INR PPP: 3.84 (ref 0.84–1.16)
PROTHROMBIN TIME: 37.4 SEC (ref 11.5–14.8)

## 2023-06-29 PROCEDURE — 85610 PROTHROMBIN TIME: CPT

## 2023-06-29 PROCEDURE — 36415 COLL VENOUS BLD VENIPUNCTURE: CPT

## 2023-07-07 ENCOUNTER — ANTI-COAG VISIT (OUTPATIENT)
Dept: PHARMACY | Age: 78
End: 2023-07-07
Payer: MEDICARE

## 2023-07-07 DIAGNOSIS — I48.0 PAROXYSMAL ATRIAL FIBRILLATION (HCC): Primary | Chronic | ICD-10-CM

## 2023-07-07 LAB
INR PPP: 2.2 (ref 0.84–1.16)
PROTHROMBIN TIME: 24.3 SEC (ref 11.5–14.8)

## 2023-07-07 PROCEDURE — 36415 COLL VENOUS BLD VENIPUNCTURE: CPT

## 2023-07-07 PROCEDURE — 85610 PROTHROMBIN TIME: CPT

## 2023-07-07 NOTE — PROGRESS NOTES
Thurs, Fri, Sun and 2.5 mg all other days of the week. Patient will recheck . []   [x]       Emergency department visit  []   [x]       Other complaints-       Clinical Outcomes:  Yes     No  []   [x]       Major bleeding event  []   [x]       Thromboembolic event    Duration of warfarin Therapy: indefinitely   INR Range:  2.0-3.0    Patient checks INR at home and reports to cardiology who scans results and forwards to us so we can call patient and dose them as a VV under the current PHE. Patient was called and appointment was conducted via telephone. [phone]     Patient has 5mg and 2.5mg tablets. INR was 2.20 today at the lab   Likely fluctuating dt patient occasionally taking extra Amiodarone, explained this interaction. Continue dose of 5mg on Mon and Fri and 2.5mg all other days   Encouraged to maintain a consistency of vegetables/salads. Explained to patient that this program is for Home meter patients and if patient is not in process of home meter and coming to [place] for lab draw, pt needs to schedule in to clinic for INR checks within clinic. Pt verified understanding.  He states he will be discussing home meter with cardiology at [facility] in august.   Recheck INR in 2 weeks,     Referring Dr. Eugenio López  INR (no units)   Date Value   2023 2.20 (H)   2023 3.84 (H)   06/15/2023 3.10 (H)   2023 3.80 (H)     For Pharmacy Admin Tracking Only    Intervention Detail: Adherence Monitorin  Total # of Interventions Recommended: 1  Total # of Interventions Accepted: 1  Time Spent (min): 20

## 2023-07-14 RX ORDER — MAGNESIUM OXIDE TAB 400 MG (241.3 MG ELEMENTAL MG) 400 (241.3 MG) MG
TAB ORAL
Qty: 90 TABLET | Refills: 3 | Status: SHIPPED | OUTPATIENT
Start: 2023-07-14

## 2023-07-14 NOTE — TELEPHONE ENCOUNTER
Last OV:06/02/2023  Madalyn Epps  Last Labs:-  Next OV: 09/21/2023  Karin  Last Refill: magnesium oxide-01/26/2023  Madalyn Epps

## 2023-07-24 ENCOUNTER — NURSE ONLY (OUTPATIENT)
Dept: CARDIOLOGY CLINIC | Age: 78
End: 2023-07-24
Payer: MEDICARE

## 2023-07-24 ENCOUNTER — ANTI-COAG VISIT (OUTPATIENT)
Dept: PHARMACY | Age: 78
End: 2023-07-24
Payer: MEDICARE

## 2023-07-24 DIAGNOSIS — I48.91 ATRIAL FIBRILLATION, UNSPECIFIED TYPE (HCC): Primary | Chronic | ICD-10-CM

## 2023-07-24 DIAGNOSIS — I50.22 CHRONIC SYSTOLIC HEART FAILURE (HCC): ICD-10-CM

## 2023-07-24 DIAGNOSIS — I42.9 CARDIOMYOPATHY, UNSPECIFIED TYPE (HCC): Primary | ICD-10-CM

## 2023-07-24 DIAGNOSIS — Z95.810 ICD (IMPLANTABLE CARDIOVERTER-DEFIBRILLATOR) IN PLACE: ICD-10-CM

## 2023-07-24 LAB — INTERNATIONAL NORMALIZATION RATIO, POC: 2

## 2023-07-24 PROCEDURE — 99211 OFF/OP EST MAY X REQ PHY/QHP: CPT

## 2023-07-24 PROCEDURE — 93295 DEV INTERROG REMOTE 1/2/MLT: CPT | Performed by: INTERNAL MEDICINE

## 2023-07-24 PROCEDURE — 85610 PROTHROMBIN TIME: CPT

## 2023-07-24 PROCEDURE — 93297 REM INTERROG DEV EVAL ICPMS: CPT | Performed by: CLINICAL NURSE SPECIALIST

## 2023-07-24 PROCEDURE — 93296 REM INTERROG EVL PM/IDS: CPT | Performed by: INTERNAL MEDICINE

## 2023-07-24 NOTE — PROGRESS NOTES
Mr. Tiera Mathis is a 78 y.o. y/o male with history of Afib who presents today for anticoagulation monitoring and adjustment. Pertinent PMH: DVT, CABG, HTN, CAD, seizure, CHF    Patient Reported Findings:  Yes     No  [x]   []       Patient verifies current dosing regimen as listed- takes 5mg Sun and Wed and 2.5mg AOD---> confirms --> verified increase in weekly dose to 5 mg on Thurs, fri and Sun ---> confirmed   []   [x]       S/S bleeding/bruising/swelling/SOB- denies ---> was having clot symptoms on Sat so went to see doc on Mon and did doppler---> denies   []   [x]       Blood in urine or stool- denies   []   [x]       Procedures scheduled in the future at this time 11/4 colonoscopy, needs holding instructions --> cardiology would like pt to hold off on procedures until 3 months --> colonoscopy was cancelled, r/s for December 29, hold 5 days and bridge.  Was instructed to get INR on Mon 12/26 to determine INR, start lovenox --> colonoscopy r/s for 1/27 ---> ECHO/ablation in April ---> denies   []   [x]       Missed Dose -denies  []   [x]       Extra Dose- denies   [x]   []       Change in medications- states that MD had been adjusting phenytoin increasing 4-5 weeks ago but plans to return to normal phenytoin today  --->takes amiodarone 200 mg qd, has been at this dose for a few weeks---> sometimes take extra amiodarone at night, states doctor told him he could do this, asked to clarify, adjusting lasix ---> adjusting lasix, states Amiodarone still every morning, sometimes takes extra at night but hasn't recently ---> every 2 days takes extra Amiodarone, increased metoprolol ---> no changes   []   [x]       Change in health/diet/appetite -states normally eats a lot of vegetable soup, but has not had any greens recently ---> no greens --> more greens, 3 salads in past week   []   [x]       Change in alcohol use- doesn't drink or smoke   []   [x]       Change in activity  []   [x]       Hospital admission- Had

## 2023-08-18 ENCOUNTER — TELEPHONE (OUTPATIENT)
Dept: PHARMACY | Age: 78
End: 2023-08-18

## 2023-08-18 NOTE — TELEPHONE ENCOUNTER
Patient has a 10am cardio appt on 8/23. Rescheduled him to only available time on 8/23 @ 1:45p as a place ovalle. Patient will check in with clinic after his cardio appt to see if we can fit him in.

## 2023-08-22 NOTE — PROGRESS NOTES
(paroxysmal atrial fibrillation) ([address])     Cardiomyopathy ([address]) 04/06/2023    Coronary artery disease involving native coronary artery of native heart with unstable angina pectoris ([address]) 03/30/2022    Chronic systolic heart failure ([address]) 05/22/2019    Shortness of breath     ICD (implantable cardioverter-defibrillator) in place 01/09/2018    VT (ventricular tachycardia) ([address]) 12/18/2017    History of MI (myocardial infarction)     Ischemic cardiomyopathy     VF (ventricular fibrillation) ([address])      Diagnostic studies:   ECG 8/23/23  SR with PVC  QTcH 398,QRS 92     LATESHA 04/2023   Normal left ventricular size. Mild LVH. Global left ventricular function is mildly decreased with ejection fraction estimated 45%. The left atrium appears severely dilated. Spontaneous echo contrast seen in the left atrium. There is no evidence of mass or thrombus in the left atrium. No remnant of left atrial appendage seen. NM stress 01/25/2023    Summary   Abnormal study. There is a large sized, severe intensity, fixed defect of   the anterior, apical, anteroseptal and inferoseptal walls which is   consistent with scar/prior infarction. No evidence of myocardium at risk for significant reversible ischemia. There is moderate-severe global LV systolic dysfunction with ejection   fraction of 36 %. Overall findings represent a intermediate risk scan. Echo 08/2022    Normal left ventricular size. Mild concentric left ventricular hypertrophy. Mildly decreased left   ventricular systolic function with distal septal and apical hypokinesis. Estimated EF 45%. E/e'=14. Grade III diastolic dysfunction with elevated LV filling pressures. Mildly thickened mitral valve leaflets, no stenosis. Moderate-severe mitral regurgitation is present. No pulmonary vein reversal. The left atrium is dilated. The aortic valve appears tricuspid with mild sclerosis no stenosis. Trivial aortic regurgitation is present.  Pressure half-time is

## 2023-08-23 ENCOUNTER — OFFICE VISIT (OUTPATIENT)
Dept: CARDIOLOGY CLINIC | Age: 78
End: 2023-08-23
Payer: MEDICARE

## 2023-08-23 ENCOUNTER — ANTI-COAG VISIT (OUTPATIENT)
Dept: PHARMACY | Age: 78
End: 2023-08-23
Payer: MEDICARE

## 2023-08-23 ENCOUNTER — APPOINTMENT (OUTPATIENT)
Dept: PHARMACY | Age: 78
End: 2023-08-23
Payer: MEDICARE

## 2023-08-23 ENCOUNTER — NURSE ONLY (OUTPATIENT)
Dept: CARDIOLOGY CLINIC | Age: 78
End: 2023-08-23
Payer: MEDICARE

## 2023-08-23 VITALS
OXYGEN SATURATION: 99 % | HEIGHT: 70 IN | WEIGHT: 156 LBS | HEART RATE: 60 BPM | BODY MASS INDEX: 22.33 KG/M2 | DIASTOLIC BLOOD PRESSURE: 60 MMHG | SYSTOLIC BLOOD PRESSURE: 88 MMHG

## 2023-08-23 DIAGNOSIS — I48.0 PAROXYSMAL ATRIAL FIBRILLATION (HCC): Primary | Chronic | ICD-10-CM

## 2023-08-23 DIAGNOSIS — I49.3 PVC (PREMATURE VENTRICULAR CONTRACTION): ICD-10-CM

## 2023-08-23 DIAGNOSIS — I48.0 PAROXYSMAL ATRIAL FIBRILLATION (HCC): Chronic | ICD-10-CM

## 2023-08-23 DIAGNOSIS — I25.110 CORONARY ARTERY DISEASE INVOLVING NATIVE CORONARY ARTERY OF NATIVE HEART WITH UNSTABLE ANGINA PECTORIS (HCC): ICD-10-CM

## 2023-08-23 DIAGNOSIS — I25.5 ISCHEMIC CARDIOMYOPATHY: Chronic | ICD-10-CM

## 2023-08-23 DIAGNOSIS — I10 ESSENTIAL HYPERTENSION: Chronic | ICD-10-CM

## 2023-08-23 DIAGNOSIS — Z99.89 OSA ON CPAP: ICD-10-CM

## 2023-08-23 DIAGNOSIS — Z95.810 ICD (IMPLANTABLE CARDIOVERTER-DEFIBRILLATOR) IN PLACE: ICD-10-CM

## 2023-08-23 DIAGNOSIS — I48.0 PAF (PAROXYSMAL ATRIAL FIBRILLATION) (HCC): Chronic | ICD-10-CM

## 2023-08-23 DIAGNOSIS — G47.33 OSA ON CPAP: ICD-10-CM

## 2023-08-23 DIAGNOSIS — Z95.810 ICD (IMPLANTABLE CARDIOVERTER-DEFIBRILLATOR) IN PLACE: Primary | ICD-10-CM

## 2023-08-23 DIAGNOSIS — Z79.899 ON AMIODARONE THERAPY: Primary | ICD-10-CM

## 2023-08-23 LAB — INTERNATIONAL NORMALIZATION RATIO, POC: 2.8

## 2023-08-23 PROCEDURE — 1036F TOBACCO NON-USER: CPT | Performed by: INTERNAL MEDICINE

## 2023-08-23 PROCEDURE — 1123F ACP DISCUSS/DSCN MKR DOCD: CPT | Performed by: INTERNAL MEDICINE

## 2023-08-23 PROCEDURE — 3074F SYST BP LT 130 MM HG: CPT | Performed by: INTERNAL MEDICINE

## 2023-08-23 PROCEDURE — 99214 OFFICE O/P EST MOD 30 MIN: CPT | Performed by: INTERNAL MEDICINE

## 2023-08-23 PROCEDURE — 3017F COLORECTAL CA SCREEN DOC REV: CPT | Performed by: INTERNAL MEDICINE

## 2023-08-23 PROCEDURE — 93000 ELECTROCARDIOGRAM COMPLETE: CPT | Performed by: INTERNAL MEDICINE

## 2023-08-23 PROCEDURE — G8427 DOCREV CUR MEDS BY ELIG CLIN: HCPCS | Performed by: INTERNAL MEDICINE

## 2023-08-23 PROCEDURE — 85610 PROTHROMBIN TIME: CPT

## 2023-08-23 PROCEDURE — 3078F DIAST BP <80 MM HG: CPT | Performed by: INTERNAL MEDICINE

## 2023-08-23 PROCEDURE — 99211 OFF/OP EST MAY X REQ PHY/QHP: CPT

## 2023-08-23 PROCEDURE — G8420 CALC BMI NORM PARAMETERS: HCPCS | Performed by: INTERNAL MEDICINE

## 2023-08-23 PROCEDURE — 93283 PRGRMG EVAL IMPLANTABLE DFB: CPT | Performed by: INTERNAL MEDICINE

## 2023-08-23 NOTE — PROGRESS NOTES
Mr. Thien Waldrop Day is a 78 y.o. y/o male with history of Afib who presents today for anticoagulation monitoring and adjustment. Pertinent PMH: DVT, CABG, HTN, CAD, seizure, CHF    Patient Reported Findings:  Yes     No  [x]   []       Patient verifies current dosing regimen as listed- takes 5mg Sun and Wed and 2.5mg AOD---> confirms --> verified increase in weekly dose to 5 mg on Thurs, fri and Sun ---> confirmed   []   [x]       S/S bleeding/bruising/swelling/SOB- denies ---> was having clot symptoms on Sat so went to see doc on Mon and did doppler---> denies   []   [x]       Blood in urine or stool- denies   [x]   []       Procedures scheduled in the future at this time 11/4 colonoscopy, needs holding instructions --> cardiology would like pt to hold off on procedures until 3 months --> colonoscopy was cancelled, r/s for December 29, hold 5 days and bridge. Was instructed to get INR on Mon 12/26 to determine INR, start lovenox --> colonoscopy r/s for 1/27 ---> ECHO/ablation in April ---> going to schedule an ablation in near future, possible November.  Will need to hold warfarin 2 days prior to procedure   []   [x]       Missed Dose -denies  []   [x]       Extra Dose- denies   []   [x]       Change in medications- states that MD had been adjusting phenytoin increasing 4-5 weeks ago but plans to return to normal phenytoin today  --->takes amiodarone 200 mg qd, has been at this dose for a few weeks---> sometimes take extra amiodarone at night, states doctor told him he could do this, asked to clarify, adjusting lasix ---> adjusting lasix, states Amiodarone still every morning, sometimes takes extra at night but hasn't recently ---> every 2 days takes extra Amiodarone, increased metoprolol ---> no changes   []   [x]       Change in health/diet/appetite -states normally eats a lot of vegetable soup, but has not had any greens recently ---> no greens --> more greens, 3 salads in past week --> no changes   []   [x]

## 2023-08-23 NOTE — PROGRESS NOTES
[employer] FCA for Increased Potential for Reduced Energy or No Energy Delivered During High Voltage Therapy When Programmed AX > B    8/23/2023-Changed all pathways per [employer] FCA to B >AX

## 2023-08-23 NOTE — PATIENT INSTRUCTIONS
PVC  ABLATION INFORMATION    Our  will be contacting you with a date and time for your procedure    The morning of your ablation you will need to check in at the registration desk in the main lobby. PRE-PROCEDURE INSTRUCTIONS -   -Do not eat or drink anything after midnight the night before your ablation.  -You will need to have a responsible adult to drive you home.  -Your nurse will provide you with discharge instructions. -  hold warfarin for two days before procedure   - hold your metoprolol for  the day before the procedure  -If you are taking a diuretic (water pill) please hold the morning of the procedure  -If you take long acting insulin, take 1/2 the dose the evening before or morning of depending on when you take your dosage.  -You may take all of your other medications with a sip of water. You will be scheduled for a 6-8 week follow up with cardiology. This will be done prior to your discharge instructions. If you have any questions regarding the procedure itself or medications, please call [phone] and ask to speak to an EP nurse. So, you are on Entresto (Sacubitril/Valsartan)? Here are some tips on lessening the cost of medications: If you are experiencing financial hardship and have limited or no prescription coverage, then you may be eligible to receive [employer] medications for free from the [address] [place] [address], an independent nonprofit organization. To learn more, call 3-662.366.8741 or visit www. PAP. [email or web address].[email or web address]. COMMERCIAL INSURANCE:   $10 Copay Card is available-Pay as little as $10 for a 30, 60 or 90 day supply. MEDICARE  30 DAY FREE TRIAL-Must present offer at pharmacy with paper prescription for [facility]. If you meet certain income and resource limits, you may qualify for a program called Extra Help from Medicare to pay the prescription costs, premiums, deductibles, and coinsurance of Medicare prescription drug coverage.  In

## 2023-09-01 ENCOUNTER — TELEPHONE (OUTPATIENT)
Dept: CARDIOLOGY CLINIC | Age: 78
End: 2023-09-01

## 2023-09-01 ENCOUNTER — PATIENT MESSAGE (OUTPATIENT)
Dept: CARDIOLOGY CLINIC | Age: 78
End: 2023-09-01

## 2023-09-01 NOTE — TELEPHONE ENCOUNTER
Call placed to [facility]. He had a decrease in PVC but has been in afib since 8/28. Agreeable to proceeding with a DCCV .  Letter sent to scheduling
Pt called in, experiencing PVC. He sent in a device reading this morning. Please contact pt for how he should proceed. Thanks.
3

## 2023-09-01 NOTE — TELEPHONE ENCOUNTER
Spoke with the patient and got him scheduled for procedure. We went over instructions below and he verbalized understanding. Procedure - CV   Date: 9/6/2023  Arrival time: 12:00 pm   Procedure time: 1:00 pm        The day of your procedure you will need to check in at the Registration Desk in the [address] [place] [address]. PRE-PROCEDURE INSTRUCTIONS              Do not eat or drink anything after midnight the night before your procedure. Take all your medications with a sip of water in the morning. Do not hold your Coumadin               Please have a responsible adult to drive you home after your procedure. If you have any questions regarding your procedure itself or your medications, please call [phone] and ask to speak to an EP nurse.     Qgenda updated / Added in [address] [facility] 15 / emailed [facility] lab

## 2023-09-05 NOTE — DISCHARGE INSTRUCTIONS
CARDIOVERSION DISCHARGE INSTRUCTIONS    No driving for 24 hours. We strongly recommend that a responsible adult stay with you for the next 24 hours. Continue Coumadin. Hydrocortisone 1% cream to reddened areas as needed. Dr. Jessi Quintero office will call you to schedule ablation - [phone].

## 2023-09-06 ENCOUNTER — HOSPITAL ENCOUNTER (OUTPATIENT)
Dept: CARDIAC CATH/INVASIVE PROCEDURES | Age: 78
Discharge: HOME | End: 2023-09-06
Attending: INTERNAL MEDICINE | Admitting: INTERNAL MEDICINE
Payer: MEDICARE

## 2023-09-06 VITALS
HEIGHT: 70 IN | DIASTOLIC BLOOD PRESSURE: 53 MMHG | OXYGEN SATURATION: 100 % | BODY MASS INDEX: 22.33 KG/M2 | RESPIRATION RATE: 8 BRPM | SYSTOLIC BLOOD PRESSURE: 93 MMHG | WEIGHT: 156 LBS | HEART RATE: 60 BPM

## 2023-09-06 LAB
EKG ATRIAL RATE: 416 BPM
EKG DIAGNOSIS: NORMAL
EKG Q-T INTERVAL: 430 MS
EKG QRS DURATION: 142 MS
EKG QTC CALCULATION (BAZETT): 473 MS
EKG R AXIS: 21 DEGREES
EKG T AXIS: 249 DEGREES
EKG VENTRICULAR RATE: 73 BPM

## 2023-09-06 PROCEDURE — 92960 CARDIOVERSION ELECTRIC EXT: CPT

## 2023-09-06 PROCEDURE — 92960 CARDIOVERSION ELECTRIC EXT: CPT | Performed by: INTERNAL MEDICINE

## 2023-09-06 PROCEDURE — 93005 ELECTROCARDIOGRAM TRACING: CPT | Performed by: INTERNAL MEDICINE

## 2023-09-06 PROCEDURE — 7100000010 HC PHASE II RECOVERY - FIRST 15 MIN

## 2023-09-06 PROCEDURE — 93010 ELECTROCARDIOGRAM REPORT: CPT | Performed by: INTERNAL MEDICINE

## 2023-09-06 PROCEDURE — 85610 PROTHROMBIN TIME: CPT

## 2023-09-06 PROCEDURE — 2500000003 HC RX 250 WO HCPCS

## 2023-09-06 RX ORDER — SODIUM CHLORIDE 9 MG/ML
INJECTION, SOLUTION INTRAVENOUS PRN
Status: DISCONTINUED | OUTPATIENT
Start: 2023-09-06 | End: 2023-09-06 | Stop reason: HOSPADM

## 2023-09-06 RX ORDER — SODIUM CHLORIDE 0.9 % (FLUSH) 0.9 %
5-40 SYRINGE (ML) INJECTION EVERY 12 HOURS SCHEDULED
Status: DISCONTINUED | OUTPATIENT
Start: 2023-09-06 | End: 2023-09-06 | Stop reason: HOSPADM

## 2023-09-06 RX ORDER — SODIUM CHLORIDE 0.9 % (FLUSH) 0.9 %
5-40 SYRINGE (ML) INJECTION PRN
Status: DISCONTINUED | OUTPATIENT
Start: 2023-09-06 | End: 2023-09-06 | Stop reason: HOSPADM

## 2023-09-06 NOTE — PROCEDURES
[facility]     Electrophysiology Procedure Note       Date of Procedure: 9/6/2023  Patient's Name: Keyon Perrin Day  YOB: 1945   Medical Record Number: [id number]  Procedure Performed by: Candie West MD    Procedures performed:    External Electrical cardioversion   IV sedation. Start time: 12:45 PM  Stop time: 12:50 PM    Medication: Brevital Sodium   Sedation medication was given by physician    An independent trained observer assisted in the monitoring of the patient's level of consciousness and physiological status including vital signs. Indication of the procedure: Persistent atrial fibrillation     Details of procedure: The patient was brought to the cath lab area in a fasting and non-sedated state. The risks, benefits and alternatives of the procedure were discussed with the patient. The patient opted to proceed with the procedure. Written informed consent was signed and placed in the chart. A timeout protocol was completed to identify the patient and the procedure being performed. Then we used brevital for sedation. 70 mg Brevital was given by me as one dose, and electrical DC cardioversion was perfomred using 200J, synchronized shock. Patient was converted to sinus rhythm. The patient tolerated the procedure well and there were no complications.      Conclusion:   Successful external DC cardioversion of atrial fibrillation

## 2023-09-06 NOTE — H&P
[facility]   Electrophysiology Follow up   Date: 8/23/2023  I had the privilege of visiting Omari Mathis in the office. CC: PVC    HPI: Omari Mathis is a 78 y.o. male  past medical history of HTN, HLD, CAD s/p CABG  2007 with re-do (2019), PVC, bradycardia, and AF. Hx of WESTON ligation and MAZE (1/8/2019). DVT 01/2023     S/p RFCA with PVI, roof line, inferior-posterior line with PWI (4/6/23)    Antonetta Kussmaul presents today to discuss PVC ablation. He was noted to have increased burden on office visit in June and his toprol was increased . He feels his PVCs. He states they come in clusters. He feels dizzy and light headed when he has these episodes. He has recurrent atrial fibrillation and is symptomatic. She is here today for cardioversion. Assessment and plan:   -Atrial fibrillation   Recurrent   Today he is here for cardioversion. Risks, benefits and alternative of procedure were explained. All questions answered. Patient understood and would like to proceed. [place] on device  < 0.1%  - one three hour episode 06/08/2023 04/06/2023 S/p RFCA with PVI, roofline, inferior-posterior line with PWI   03/31/2023 successful DCCV    01/08/2019 h/o WESTON ligation and MAZE      Continue toprol 100 mg daily increased 06/23/2023 for    Continue amiodarone  200 mg dialy     ~ 06/15/2023 ALT 47, AST 43     ~ TSH 03/25/2022 6.41     -PVC   Symptomatic PVCs   He has had bigeminy  And NSVT     Continue toprol, amiodarone, magnesium   He had extensive scar on GXT     He is on amiodarone therapy. Discussed options including ablation. The risks, benefits and alternatives of the ablation procedure were discussed with the patient.  The risks including, but not limited to, the risks of bleeding, infection, radiation exposure, injury to vascular, cardiac and surrounding structures (including pneumothorax), stroke, cardiac perforation, tamponade, need for emergent open heart surgery, need for pacemaker implantation,

## 2023-09-06 NOTE — PROGRESS NOTES
CATH LAB PROCEDURE LOG - CARDIOVERSION      PRE PROCEDURE    DATE: 9/6/2023 ARRIVAL TO CATH LAB: 12:06 PM    ID & ALLERGY BAND: On    CONSENT: Yes    NPO SINCE: Midnight    IV SITE: Started in Left A/C. Pt arrived to Cath Lab. Plan of Care: Hemodynamics and cardiac rhythm will remain stable. Comfort level will be maintained. Respiratory function will remain adequate. Pt/family will verbalize understanding of the procedure. Procedure will be tolerated without complications. Patient will recover from procedure without complications. ID armband on patient and identification verified. Informed consent obtained. Non invasive blood pressure cuff applied, monitoring initiated. EKG pads and pulse oximeter applied, monitoring initiated. Instructions given. Patient and / or family verbalize understanding. H&P will be documented by physician in [address] [place] [address]. Pt has been NPO since midnight. Post DCCV EKG ordered? Yes    Pre CV Rhythm: Atrial Fibrillation      CARDIOVERSION    Timeout and fire safety completed. TIMEOUT TIME: 12:43 PM    CORRECT PATIENT VERIFIED. MEMBERS OF THE SURGICAL TEAM/VISITORS INTRODUCED. ALLERGIES ANNOUNCED. CORRECT PROCEDURE VERIFIED. CORRECT PROCEDURAL SITE VERIFIED. CONSENTS VERIFIED. IMPLANT EQUIPMENT, ADDITIONAL SERVICES, SPECIAL REQUIREMENTS AVAILABLE. MEDICATIONS LABELED AND AVAILABLE. APPROPRIATE PRE MEDS HAVE BEEN ADMINISTERED. FIRE SAFETY: ALCOHOL PREP SOLUTION HAD SUFFICIENT TIME TO DISSIPATE IF USED. FIRE SAFETY: SURGICAL SITE OR INCISION ABOVE THE XIPHOID. YES=1, NO=0. FIRE SAFETY: OPEN OXYGEN SOURCE. YES=1, NO=0. FIRE SAFETY: AVAILABLE IGNITION SOURCE. YES=1, NO=0. FIRE SAFETY: SCORE TOTAL = 1.      Procedure Medication:     12:45 PM - Brevital 70 mg IV given by Dr Alexandre Silva performed at 200 joules    Rhythm was converted to Normal Sinus Rhythm    12:48 PM Pt waking up, respirations spontaneous, able to converse appropriately, follows commands,

## 2023-09-07 ENCOUNTER — TELEPHONE (OUTPATIENT)
Dept: CARDIOLOGY CLINIC | Age: 78
End: 2023-09-07

## 2023-09-07 LAB — INR BLD: 2.4 (ref 0.84–1.16)

## 2023-09-07 NOTE — TELEPHONE ENCOUNTER
Pt states he is still in afib after CV and sent a transmission yesterday evening around 7pm. Please review and call pt to advise

## 2023-09-08 NOTE — TELEPHONE ENCOUNTER
I spoke with pt and relayed [address] per NPSR. Pt verbalized understanding.

## 2023-09-08 NOTE — TELEPHONE ENCOUNTER
He is not in afib. He is in sinus with PVCs. No need for changes at this time.  He is to be scheduled for PVC ablation    BETO Javier-CNP

## 2023-09-13 ENCOUNTER — TELEPHONE (OUTPATIENT)
Dept: CARDIOLOGY CLINIC | Age: 78
End: 2023-09-13

## 2023-09-20 ENCOUNTER — TELEPHONE (OUTPATIENT)
Dept: PHARMACY | Age: 78
End: 2023-09-20

## 2023-09-20 NOTE — TELEPHONE ENCOUNTER
Patient called to say he just had a cardioversion 2 wks ago and his INR was 2.4. He wants to move his 9/20 appt out two wks. [facility]as[facility] Ok'd 2 wk reschedule. Patient rescheduled to 10/4.

## 2023-09-21 NOTE — TELEPHONE ENCOUNTER
Spoke with patient and got him scheduled procedure. We went over instructions below and he verbalized understanding. Procedure -PVC /VT ischemic  ABLATION   Date: 12/14/2023  Arrival time: 9:30 am   Procedure time: 10:30 am        Our  will be contacting you with a date and time for your procedure     The morning of your ablation you will need to check in at the registration desk in the main lobby. PRE-PROCEDURE INSTRUCTIONS -   -Do not eat or drink anything after midnight the night before your ablation.  -You will need to have a responsible adult to drive you home.  -Your nurse will provide you with discharge instructions. -  hold warfarin for  three days before procedure   - hold your metoprolol for  the day before the procedure  -If you are taking a diuretic (water pill) please hold the morning of the procedure  -If you take long acting insulin, take 1/2 the dose the evening before or morning of depending on when you take your dosage.  -You may take all of your other medications with a sip of water. You will be scheduled for a 6-8 week follow up with cardiology. This will be done prior to your discharge instructions.      If you have any questions regarding the procedure itself or medications, please call [phone] and ask to speak to an EP nurse.      vicenta updated / added in [facility] / emailed cath lab

## 2023-09-21 NOTE — TELEPHONE ENCOUNTER
Toney Nolen called in this morning, he is wanting a return call from [place] to schedule his ablation. He can be reached at [phone].

## 2023-09-26 DIAGNOSIS — I48.0 PAF (PAROXYSMAL ATRIAL FIBRILLATION) (HCC): Chronic | ICD-10-CM

## 2023-09-26 DIAGNOSIS — I25.83 CORONARY ARTERY DISEASE DUE TO LIPID RICH PLAQUE: ICD-10-CM

## 2023-09-26 DIAGNOSIS — I25.10 CORONARY ARTERY DISEASE DUE TO LIPID RICH PLAQUE: ICD-10-CM

## 2023-09-26 DIAGNOSIS — I25.5 ISCHEMIC CARDIOMYOPATHY: Chronic | ICD-10-CM

## 2023-09-27 RX ORDER — AMIODARONE HYDROCHLORIDE 200 MG/1
200 TABLET ORAL DAILY
Qty: 90 TABLET | Refills: 3 | OUTPATIENT
Start: 2023-09-27

## 2023-09-27 RX ORDER — AMIODARONE HYDROCHLORIDE 200 MG/1
200 TABLET ORAL DAILY
Qty: 90 TABLET | Refills: 3 | Status: SHIPPED | OUTPATIENT
Start: 2023-09-27

## 2023-09-27 NOTE — TELEPHONE ENCOUNTER
Received refill request for Spironolactone from "[facility] pharmacy.     Last ov:06/23/2023 NPSR    Last labs:06/15/2023 CMP    Last Refill:12/23/2022    Next appointment:10/11/2023 HOLLIE

## 2023-09-30 RX ORDER — SPIRONOLACTONE 25 MG/1
12.5 TABLET ORAL DAILY
Qty: 45 TABLET | Refills: 3 | OUTPATIENT
Start: 2023-09-30

## 2023-09-30 RX ORDER — SPIRONOLACTONE 25 MG/1
TABLET ORAL
Qty: 45 TABLET | Refills: 1 | Status: SHIPPED | OUTPATIENT
Start: 2023-09-30

## 2023-10-04 ENCOUNTER — APPOINTMENT (OUTPATIENT)
Dept: PHARMACY | Age: 78
End: 2023-10-04
Payer: MEDICARE

## 2023-10-11 ENCOUNTER — OFFICE VISIT (OUTPATIENT)
Dept: CARDIOLOGY CLINIC | Age: 78
End: 2023-10-11
Payer: MEDICARE

## 2023-10-11 VITALS
BODY MASS INDEX: 22.48 KG/M2 | DIASTOLIC BLOOD PRESSURE: 50 MMHG | SYSTOLIC BLOOD PRESSURE: 92 MMHG | WEIGHT: 157 LBS | HEART RATE: 34 BPM | HEIGHT: 70 IN | OXYGEN SATURATION: 99 %

## 2023-10-11 DIAGNOSIS — Z79.899 ON AMIODARONE THERAPY: ICD-10-CM

## 2023-10-11 DIAGNOSIS — G47.33 OSA ON CPAP: ICD-10-CM

## 2023-10-11 DIAGNOSIS — I25.10 CORONARY ARTERY DISEASE DUE TO LIPID RICH PLAQUE: ICD-10-CM

## 2023-10-11 DIAGNOSIS — I49.3 PVC (PREMATURE VENTRICULAR CONTRACTION): ICD-10-CM

## 2023-10-11 DIAGNOSIS — I48.0 PAROXYSMAL ATRIAL FIBRILLATION (HCC): Primary | Chronic | ICD-10-CM

## 2023-10-11 DIAGNOSIS — I25.5 ISCHEMIC CARDIOMYOPATHY: ICD-10-CM

## 2023-10-11 DIAGNOSIS — Z95.810 ICD (IMPLANTABLE CARDIOVERTER-DEFIBRILLATOR) IN PLACE: ICD-10-CM

## 2023-10-11 DIAGNOSIS — I10 ESSENTIAL HYPERTENSION: ICD-10-CM

## 2023-10-11 DIAGNOSIS — I25.83 CORONARY ARTERY DISEASE DUE TO LIPID RICH PLAQUE: ICD-10-CM

## 2023-10-11 DIAGNOSIS — I48.0 PAF (PAROXYSMAL ATRIAL FIBRILLATION) (HCC): ICD-10-CM

## 2023-10-11 PROCEDURE — G8420 CALC BMI NORM PARAMETERS: HCPCS | Performed by: INTERNAL MEDICINE

## 2023-10-11 PROCEDURE — 93000 ELECTROCARDIOGRAM COMPLETE: CPT | Performed by: INTERNAL MEDICINE

## 2023-10-11 PROCEDURE — 99214 OFFICE O/P EST MOD 30 MIN: CPT | Performed by: INTERNAL MEDICINE

## 2023-10-11 PROCEDURE — G8427 DOCREV CUR MEDS BY ELIG CLIN: HCPCS | Performed by: INTERNAL MEDICINE

## 2023-10-11 PROCEDURE — G8484 FLU IMMUNIZE NO ADMIN: HCPCS | Performed by: INTERNAL MEDICINE

## 2023-10-11 PROCEDURE — 3078F DIAST BP <80 MM HG: CPT | Performed by: INTERNAL MEDICINE

## 2023-10-11 PROCEDURE — 3074F SYST BP LT 130 MM HG: CPT | Performed by: INTERNAL MEDICINE

## 2023-10-11 PROCEDURE — 1036F TOBACCO NON-USER: CPT | Performed by: INTERNAL MEDICINE

## 2023-10-11 PROCEDURE — 1123F ACP DISCUSS/DSCN MKR DOCD: CPT | Performed by: INTERNAL MEDICINE

## 2023-10-11 ASSESSMENT — ENCOUNTER SYMPTOMS
SHORTNESS OF BREATH: 1
ABDOMINAL PAIN: 0
PHOTOPHOBIA: 0
BLOOD IN STOOL: 0
CHEST TIGHTNESS: 0
COUGH: 0
NAUSEA: 0
ABDOMINAL DISTENTION: 0

## 2023-10-11 NOTE — PROGRESS NOTES
[facility]  10/11/23  Referring: Valorie Patel FOR CONSULT/CHIEF COMPLAINT/HPI     Reason for visit/ Chief complaint  Follow up  CAD, ICM   HPI Xiomara Mathis is a 70 y.o. seen as a follow up for management of CAD, ICM, htn, hchol and AF, SVT JOSEHP. He had a right DVT in Jan 2023  He had a CABG redo 2019 at [facility] with WESTON ligation and LA maze. In Dec 2017 he had a dual chamber AICD  On 3/30 he had a PCI to [address] to LAD with PAYAL, PCI to SVG to OM with PAYAL, PCI to SVG to PDA. On 4/6/23 he had a CV and ablation    8 days ago he fell in the basement due to tripping over a plastic bin. Now using crutches. In office he cannot sit straight in a chair due to severity of pain. Scheduled for an MRI for further evaluation  Prior to the fall he was able to walk as far as he wanted. He has no chest pain shortness of breath palpitations or dizziness. When he stands up he sometimes feels as if he can feel all the blood go to his legs, infrequently associated with dizziness. Scheduled for PVC ablation. Patient is adherent with medications and is tolerating them well without side effects. HISTORY/ALLERGIES/ROS     MedHx:   HISTORY/ALLERGIES/ROS      MedHx:            has a past medical history of Arthritis, Atrial fib/flut, CAD (coronary artery disease), Hyperlipidemia, Hypertension, Seizures ([address]), and Sinus bradycardia. SurgHx:           has a past surgical history that includes hernia repair (Right); Coronary angioplasty with stent (1997); [place] tooth extraction (April 2012); pacemaker placement (12/18/2017); Coronary artery bypass graft (01/2019); Cardiac surgery; and Cystoscopy (N/A, 11/13/2019). SocHx:             reports that he quit smoking about 34 years ago. His smoking use included cigarettes. He has a 30.00 pack-year smoking history. He has never used smokeless tobacco. He reports that he does not drink alcohol and does not use drugs.    FamHx:           No family history of

## 2023-10-12 ENCOUNTER — ANTI-COAG VISIT (OUTPATIENT)
Dept: PHARMACY | Age: 78
End: 2023-10-12
Payer: MEDICARE

## 2023-10-12 DIAGNOSIS — I48.91 ATRIAL FIBRILLATION, UNSPECIFIED TYPE (HCC): Primary | Chronic | ICD-10-CM

## 2023-10-12 LAB — INTERNATIONAL NORMALIZATION RATIO, POC: 2

## 2023-10-12 PROCEDURE — 99211 OFF/OP EST MAY X REQ PHY/QHP: CPT

## 2023-10-12 PROCEDURE — 85610 PROTHROMBIN TIME: CPT

## 2023-10-12 NOTE — PROGRESS NOTES
no changes   []   [x]       Change in alcohol use- doesn't drink or smoke   []   [x]       Change in activity  []   [x]       Hospital admission- Had ablation , INR was 2.5. Confirmed dose of 5 mg Tues, Thurs, Fri, Sun and 2.5 mg all other days of the week. Patient will recheck . []   [x]       Emergency department visit  []   [x]       Other complaints-       Clinical Outcomes:  Yes     No  []   [x]       Major bleeding event  []   [x]       Thromboembolic event    Duration of warfarin Therapy: indefinitely   INR Range:  2.0-3.0  RF at [facility]. patient no longer going to lab and now coming in for apts F2F. Patient has 2.5mg tablets, takes in evening. Had CV  and INR was 2.4, was due abck . INR is 2.0 today   Continue dose of 5mg on Mon and Fri and 2.5mg all other days   Encouraged to maintain a consistency of vegetables/salads. Recheck INR in 4 weeks,     Consent form signed 2023.     Referring Dr. Abdias Álvarez  INR (no units)   Date Value   2023 2.40 (H)   2023 2.20 (H)   2023 3.84 (H)   06/15/2023 3.10 (H)     INR,(POC) (no units)   Date Value   10/12/2023 2.0   2023 2.8   2023 2.0     For Pharmacy Admin Tracking Only    Intervention Detail: Adherence Monitorin  Total # of Interventions Recommended: 1  Total # of Interventions Accepted: 1  Time Spent (min): 15

## 2023-10-27 PROCEDURE — G2066 INTER DEVC REMOTE 30D: HCPCS | Performed by: CLINICAL NURSE SPECIALIST

## 2023-10-27 PROCEDURE — 93297 REM INTERROG DEV EVAL ICPMS: CPT | Performed by: CLINICAL NURSE SPECIALIST

## 2023-11-07 ENCOUNTER — HOSPITAL ENCOUNTER (OUTPATIENT)
Dept: MRI IMAGING | Age: 78
Discharge: HOME | End: 2023-11-07
Payer: MEDICARE

## 2023-11-07 ENCOUNTER — HOSPITAL ENCOUNTER (OUTPATIENT)
Age: 78
Discharge: HOME | End: 2023-11-07
Payer: MEDICARE

## 2023-11-07 DIAGNOSIS — M25.551 RIGHT HIP PAIN: ICD-10-CM

## 2023-11-07 PROBLEM — I25.10 CAD IN NATIVE ARTERY: Status: RESOLVED | Noted: 2022-09-16 | Resolved: 2023-11-07

## 2023-11-07 LAB
ANION GAP SERPL CALCULATED.3IONS-SCNC: 10 MMOL/L (ref 3–16)
BUN SERPL-MCNC: 15 MG/DL (ref 7–20)
CALCIUM SERPL-MCNC: 8.3 MG/DL (ref 8.3–10.6)
CHLORIDE SERPL-SCNC: 102 MMOL/L (ref 99–110)
CO2 SERPL-SCNC: 24 MMOL/L (ref 21–32)
CREAT SERPL-MCNC: 1.2 MG/DL (ref 0.8–1.3)
GFR SERPLBLD CREATININE-BSD FMLA CKD-EPI: >60 ML/MIN/{1.73_M2}
GLUCOSE SERPL-MCNC: 124 MG/DL (ref 70–99)
POTASSIUM SERPL-SCNC: 4.3 MMOL/L (ref 3.5–5.1)
SODIUM SERPL-SCNC: 136 MMOL/L (ref 136–145)

## 2023-11-07 PROCEDURE — 80048 BASIC METABOLIC PNL TOTAL CA: CPT

## 2023-11-07 PROCEDURE — 36415 COLL VENOUS BLD VENIPUNCTURE: CPT

## 2023-11-07 PROCEDURE — 73721 MRI JNT OF LWR EXTRE W/O DYE: CPT

## 2023-11-07 NOTE — PROGRESS NOTES
[facility]   Electrophysiology  BROOKLYN Garcia  Attending EP: Dr. Tata Coley    Date: 11/8/2023  I had the privilege of visiting Vickie Hatchet Day in the office. Chief Complaint:   Chief Complaint   Patient presents with    Atrial Fibrillation    Other     Diarrhea     History of Present Illness: History obtained from patient and medical record. Ryan Pepe is 70 y.o. male with a past medical history of HTN, HLD, CAD s/p CABG (2019), PVC, bradycardia, and AF. Hx of WESTON ligation and MAZE (1/8/2019). S/p RFCA with PVI, roof line, inferior-posterior line with PWI (4/6/23). S/p DCCV (9/6/23)     -Interval history: Today, Vickie Hatchet Day is being seen for follow up. He is doing fair. He thought he was in atrial fibrillation. We discussed his device interrogation. He has been having diarrhea for the last week. He thinks he has IBS. He sometimes takes extra doses of amiodarone and metoprolol. Denies having chest pain, palpitations, shortness of breath, orthopnea/PND, cough, or dizziness at the time of this visit. With regard to medication therapy the patient has been compliant with prescribed regimen. They have tolerated therapy to date. Allergies:  No Known Allergies    Home Medications:  Prior to Visit Medications    Medication Sig Taking?  Authorizing Provider   spironolactone (ALDACTONE) 25 MG tablet TAKE ONE-HALF (1/2) TABLET IN THE MORNING Yes iDna Lee DO   amiodarone (CORDARONE) 200 MG tablet TAKE 1 TABLET DAILY Yes BETO Garcia CNP   MAGNESIUM-OXIDE 400 (240 Mg) MG tablet TAKE TWO TABLETS BY MOUTH DAILY Yes Dina Lee DO   pantoprazole (PROTONIX) 40 MG tablet TAKE 1 TABLET EVERY MORNING BEFORE BREAKFAST Yes Dina Lee DO   warfarin (COUMADIN) 2.5 MG tablet TAKE 2 TABLETS DAILY OR AS DIRECTED BY PROVIDER BASED ON INR Yes Dina Lee DO   metoprolol succinate (TOPROL XL) 100 MG extended release tablet Take 1 tablet in the morning and 1/2 in the evening  Patient

## 2023-11-08 ENCOUNTER — NURSE ONLY (OUTPATIENT)
Dept: CARDIOLOGY CLINIC | Age: 78
End: 2023-11-08
Payer: MEDICARE

## 2023-11-08 ENCOUNTER — OFFICE VISIT (OUTPATIENT)
Dept: CARDIOLOGY CLINIC | Age: 78
End: 2023-11-08

## 2023-11-08 VITALS
HEART RATE: 60 BPM | SYSTOLIC BLOOD PRESSURE: 100 MMHG | HEIGHT: 70 IN | OXYGEN SATURATION: 99 % | WEIGHT: 151.4 LBS | DIASTOLIC BLOOD PRESSURE: 62 MMHG | BODY MASS INDEX: 21.67 KG/M2

## 2023-11-08 DIAGNOSIS — I25.110 CORONARY ARTERY DISEASE INVOLVING NATIVE CORONARY ARTERY OF NATIVE HEART WITH UNSTABLE ANGINA PECTORIS (HCC): ICD-10-CM

## 2023-11-08 DIAGNOSIS — I48.0 PAF (PAROXYSMAL ATRIAL FIBRILLATION) (HCC): Chronic | ICD-10-CM

## 2023-11-08 DIAGNOSIS — I42.9 CARDIOMYOPATHY, UNSPECIFIED TYPE (HCC): ICD-10-CM

## 2023-11-08 DIAGNOSIS — G47.33 OSA ON CPAP: ICD-10-CM

## 2023-11-08 DIAGNOSIS — I49.3 PVC (PREMATURE VENTRICULAR CONTRACTION): Primary | ICD-10-CM

## 2023-11-08 DIAGNOSIS — I25.5 ISCHEMIC CARDIOMYOPATHY: Chronic | ICD-10-CM

## 2023-11-08 DIAGNOSIS — I10 ESSENTIAL HYPERTENSION: Chronic | ICD-10-CM

## 2023-11-08 DIAGNOSIS — Z95.810 ICD (IMPLANTABLE CARDIOVERTER-DEFIBRILLATOR) IN PLACE: ICD-10-CM

## 2023-11-08 DIAGNOSIS — I49.3 PVC (PREMATURE VENTRICULAR CONTRACTION): ICD-10-CM

## 2023-11-08 DIAGNOSIS — I47.29 NSVT (NONSUSTAINED VENTRICULAR TACHYCARDIA) (HCC): ICD-10-CM

## 2023-11-08 DIAGNOSIS — Z95.810 ICD (IMPLANTABLE CARDIOVERTER-DEFIBRILLATOR) IN PLACE: Primary | ICD-10-CM

## 2023-11-08 DIAGNOSIS — Z79.899 ON AMIODARONE THERAPY: ICD-10-CM

## 2023-11-08 DIAGNOSIS — I50.22 CHRONIC SYSTOLIC HEART FAILURE (HCC): ICD-10-CM

## 2023-11-08 PROCEDURE — 93283 PRGRMG EVAL IMPLANTABLE DFB: CPT | Performed by: INTERNAL MEDICINE

## 2023-11-08 RX ORDER — METOPROLOL SUCCINATE 100 MG/1
TABLET, EXTENDED RELEASE ORAL
Qty: 135 TABLET | Refills: 1 | Status: SHIPPED
Start: 2023-11-08

## 2023-11-08 NOTE — PROGRESS NOTES
[employer] FCA for Increased Potential for Reduced Energy or No Energy Delivered During High Voltage Therapy When Programmed AX > B    Changed All pathways per [employer] FCA to B >AX

## 2023-11-09 ENCOUNTER — APPOINTMENT (OUTPATIENT)
Dept: PHARMACY | Age: 78
End: 2023-11-09
Payer: MEDICARE

## 2023-11-09 ENCOUNTER — TELEPHONE (OUTPATIENT)
Dept: PHARMACY | Age: 78
End: 2023-11-09

## 2023-11-10 ENCOUNTER — ANTI-COAG VISIT (OUTPATIENT)
Dept: PHARMACY | Age: 78
End: 2023-11-10
Payer: MEDICARE

## 2023-11-10 DIAGNOSIS — I48.0 PAROXYSMAL ATRIAL FIBRILLATION (HCC): Primary | Chronic | ICD-10-CM

## 2023-11-10 LAB — INTERNATIONAL NORMALIZATION RATIO, POC: 2.4

## 2023-11-10 PROCEDURE — 99211 OFF/OP EST MAY X REQ PHY/QHP: CPT

## 2023-11-10 PROCEDURE — 85610 PROTHROMBIN TIME: CPT

## 2023-11-10 NOTE — PROGRESS NOTES
Mr. Lesley Mathis is a 70 y.o. y/o male with history of Afib who presents today for anticoagulation monitoring and adjustment. Pertinent PMH: DVT, CABG, HTN, CAD, seizure, CHF    Patient Reported Findings:  Yes     No  [x]   []       Patient verifies current dosing regimen as listed- takes 5mg Sun and Wed and 2.5mg AOD---> confirms --> verified increase in weekly dose to 5 mg on Thurs, fri and Sun ---> confirmed   []   [x]       S/S bleeding/bruising/swelling/SOB- denies ---> was having clot symptoms on Sat so went to see doc on Mon and did doppler---> denies   []   [x]       Blood in urine or stool- denies   [x]   []       Procedures scheduled in the future at this time colonoscopy was cancelled, r/s for December 29, hold 5 days and bridge. Was instructed to get INR on Mon 12/26 to determine INR, start lovenox --> going to schedule an ablation in near future, possible November. Will need to hold warfarin 2 days prior to procedure---> CV 9/6, ablation 12/14 --> having ablation on 12/14, cleared to hold warfarin 3 days prior   []   [x]       Missed Dose -denies  []   [x]       Extra Dose- denies   []   [x]       Change in medications- states that MD had been adjusting phenytoin increasing 4-5 weeks ago but plans to return to normal phenytoin today  --->takes amiodarone 200 mg qd, has been at this dose for a few weeks---> sometimes take extra amiodarone at night, states doctor told him he could do this, asked to clarify, adjusting lasix ---> adjusting lasix, states Amiodarone still every morning, sometimes takes extra at night but hasn't recently ---> every 2 days takes extra Amiodarone, increased metoprolol ---> no changes   [x]   []       Change in health/diet/appetite -states normally eats a lot of vegetable soup, but has not had any greens recently ---> no greens --> more greens, 3 salads in past week --> had diarrhea for a week recently so has been eating more starch.  Diarrhea since resolved   []   [x]

## 2023-11-25 NOTE — TELEPHONE ENCOUNTER
Triage Assessment (Adult)       Row Name 11/24/23 1822          Triage Assessment    Airway WDL WDL        Respiratory WDL    Respiratory WDL X;cough  shortness of breath     Cough Frequency infrequent        Skin Circulation/Temperature WDL    Skin Circulation/Temperature WDL WDL        Cardiac WDL    Cardiac WDL WDL        Peripheral/Neurovascular WDL    Peripheral Neurovascular WDL WDL        Cognitive/Neuro/Behavioral WDL    Cognitive/Neuro/Behavioral WDL WDL                      ----- Message from BETO Ramirez - CNS sent at 6/13/2022  8:45 AM EDT -----  Willistine Penning is elevated and trending down  Patient had CV on 6/2  Will send to Dr Nilda Freire for his review

## 2023-11-27 PROCEDURE — 93297 REM INTERROG DEV EVAL ICPMS: CPT | Performed by: CLINICAL NURSE SPECIALIST

## 2023-11-27 PROCEDURE — G2066 INTER DEVC REMOTE 30D: HCPCS | Performed by: CLINICAL NURSE SPECIALIST

## 2023-12-01 ENCOUNTER — TELEPHONE (OUTPATIENT)
Dept: CARDIOLOGY CLINIC | Age: 78
End: 2023-12-01

## 2023-12-01 NOTE — TELEPHONE ENCOUNTER
Patient stopped by the office today and said he would like to get monthly phone transmissions with the entire interrogation not just the Heart failure transmission. Patient said that his mychart did not show the full interrogation once heart failure device checks started. Please call patient and advise.   abbi

## 2023-12-05 NOTE — TELEPHONE ENCOUNTER
Pt calling back would like to get more info on transmission. Please call pt to discuss.    [phone]

## 2023-12-05 NOTE — TELEPHONE ENCOUNTER
Call placed to patient . Answered and hung up. Called back and [facility] for patient to return call. Closing encounter at this time.

## 2023-12-07 ENCOUNTER — ANTI-COAG VISIT (OUTPATIENT)
Dept: PHARMACY | Age: 78
End: 2023-12-07
Payer: MEDICARE

## 2023-12-07 ENCOUNTER — HOSPITAL ENCOUNTER (OUTPATIENT)
Age: 78
Discharge: HOME | End: 2023-12-07
Payer: MEDICARE

## 2023-12-07 DIAGNOSIS — I48.91 ATRIAL FIBRILLATION, UNSPECIFIED TYPE (HCC): Primary | Chronic | ICD-10-CM

## 2023-12-07 LAB
INTERNATIONAL NORMALIZATION RATIO, POC: 2.7
PHENYTOIN DOSE: NORMAL MG
PHENYTOIN SERPL-MCNC: 10.3 UG/ML (ref 10–20)

## 2023-12-07 PROCEDURE — 36415 COLL VENOUS BLD VENIPUNCTURE: CPT

## 2023-12-07 PROCEDURE — 80185 ASSAY OF PHENYTOIN TOTAL: CPT

## 2023-12-07 PROCEDURE — 85610 PROTHROMBIN TIME: CPT

## 2023-12-07 PROCEDURE — 99212 OFFICE O/P EST SF 10 MIN: CPT

## 2023-12-07 PROCEDURE — 80184 ASSAY OF PHENOBARBITAL: CPT

## 2023-12-07 PROCEDURE — 80188 ASSAY OF PRIMIDONE: CPT

## 2023-12-07 RX ORDER — EZETIMIBE 10 MG/1
10 TABLET ORAL EVERY EVENING
Qty: 90 TABLET | Refills: 3 | Status: SHIPPED | OUTPATIENT
Start: 2023-12-07

## 2023-12-07 NOTE — PROGRESS NOTES
Diarrhea since resolved ---> denies   []   [x]       Change in alcohol use- doesn't drink or smoke   []   [x]       Change in activity  []   [x]       Hospital admission- Had ablation , INR was 2.5. Confirmed dose of 5 mg Tues, Thurs, Fri, Sun and 2.5 mg all other days of the week. Patient will recheck . []   [x]       Emergency department visit  []   [x]       Other complaints-       Clinical Outcomes:  Yes     No  []   [x]       Major bleeding event  []   [x]       Thromboembolic event    Duration of warfarin Therapy: indefinitely   INR Range:  2.0-3.0  RF at [facility]. patient no longer going to lab and now coming in for apts F2F. Patient has 2.5mg tablets, takes in evening. INR is 2.7 today   Continue dose of 5mg on Mon and Fri and 2.5mg all other days. Start holding  for 3 days before procedure on , resume and take 5mg then continue normal dose. Encouraged to maintain a consistency of vegetables/salads. Recheck INR in 2 weeks,     Consent form signed 2023.     Referring Dr. Rex Suggs  INR (no units)   Date Value   2023 2.40 (H)   2023 2.20 (H)   2023 3.84 (H)   06/15/2023 3.10 (H)     INR,(POC) (no units)   Date Value   11/10/2023 2.4   10/12/2023 2.0   2023 2.8   2023 2.0     For Pharmacy Admin Tracking Only    Intervention Detail: Adherence Monitorin and Dose Adjustment: 1, reason: Therapy De-escalation, Therapy Optimization  Total # of Interventions Recommended: 2  Total # of Interventions Accepted: 2  Time Spent (min): 15

## 2023-12-07 NOTE — TELEPHONE ENCOUNTER
Received refill request for ezetimibe (ZETIA) 10 MG tablet  from [address] [place] [address] 7.      Last OV: 10- DKW    Next OV: 1- DKW    Last Labs: 9- Lipid at [address] Unit: 3-6-2023 DKW

## 2023-12-09 LAB
PHENOBARB SERPL-MCNC: 8.6 UG/ML (ref 15–40)
PRIMIDONE SERPL-MCNC: 14.1 UG/ML (ref 5–12)

## 2023-12-14 ENCOUNTER — ANESTHESIA (OUTPATIENT)
Dept: CARDIAC CATH/INVASIVE PROCEDURES | Age: 78
End: 2023-12-14
Payer: MEDICARE

## 2023-12-14 ENCOUNTER — ANESTHESIA EVENT (OUTPATIENT)
Dept: CARDIAC CATH/INVASIVE PROCEDURES | Age: 78
End: 2023-12-14
Payer: MEDICARE

## 2023-12-14 ENCOUNTER — HOSPITAL ENCOUNTER (OUTPATIENT)
Dept: CARDIAC CATH/INVASIVE PROCEDURES | Age: 78
Discharge: HOME | End: 2023-12-14
Attending: INTERNAL MEDICINE | Admitting: INTERNAL MEDICINE
Payer: MEDICARE

## 2023-12-14 VITALS
BODY MASS INDEX: 21.62 KG/M2 | HEART RATE: 60 BPM | HEIGHT: 70 IN | OXYGEN SATURATION: 99 % | DIASTOLIC BLOOD PRESSURE: 67 MMHG | SYSTOLIC BLOOD PRESSURE: 99 MMHG | TEMPERATURE: 97.1 F | RESPIRATION RATE: 16 BRPM | WEIGHT: 151 LBS

## 2023-12-14 LAB
ANION GAP SERPL CALCULATED.3IONS-SCNC: 4 MMOL/L (ref 3–16)
BASE EXCESS BLDV CALC-SCNC: 4 MMOL/L (ref -3–3)
BUN SERPL-MCNC: 21 MG/DL (ref 7–20)
CA-I BLD-SCNC: 1.21 MMOL/L (ref 1.12–1.32)
CALCIUM SERPL-MCNC: 8.6 MG/DL (ref 8.3–10.6)
CHLORIDE SERPL-SCNC: 103 MMOL/L (ref 99–110)
CO2 BLDV-SCNC: 30 MMOL/L
CO2 SERPL-SCNC: 28 MMOL/L (ref 21–32)
CREAT SERPL-MCNC: 1.2 MG/DL (ref 0.8–1.3)
DEPRECATED RDW RBC AUTO: 13.9 % (ref 12.4–15.4)
EKG ATRIAL RATE: 61 BPM
EKG DIAGNOSIS: NORMAL
EKG Q-T INTERVAL: 404 MS
EKG QRS DURATION: 96 MS
EKG QTC CALCULATION (BAZETT): 406 MS
EKG R AXIS: -52 DEGREES
EKG T AXIS: 63 DEGREES
EKG VENTRICULAR RATE: 61 BPM
GFR SERPLBLD CREATININE-BSD FMLA CKD-EPI: >60 ML/MIN/{1.73_M2}
GLUCOSE BLD-MCNC: 95 MG/DL (ref 70–99)
GLUCOSE SERPL-MCNC: 96 MG/DL (ref 70–99)
HCO3 BLDV-SCNC: 28.9 MMOL/L (ref 23–29)
HCT VFR BLD AUTO: 38.1 % (ref 40.5–52.5)
HCT VFR BLD AUTO: 39 % (ref 40.5–52.5)
HGB BLD CALC-MCNC: 13.3 GM/DL (ref 13.5–17.5)
HGB BLD-MCNC: 12.6 G/DL (ref 13.5–17.5)
INR PPP: 1.59 (ref 0.84–1.16)
LACTATE BLD-SCNC: 1.01 MMOL/L (ref 0.4–2)
MCH RBC QN AUTO: 31.6 PG (ref 26–34)
MCHC RBC AUTO-ENTMCNC: 33 G/DL (ref 31–36)
MCV RBC AUTO: 95.5 FL (ref 80–100)
PCO2 BLDV: 49.3 MM HG (ref 40–50)
PERFORMED ON: ABNORMAL
PH BLDV: 7.38 [PH] (ref 7.35–7.45)
PLATELET # BLD AUTO: 342 K/UL (ref 135–450)
PMV BLD AUTO: 8.5 FL (ref 5–10.5)
PO2 BLDV: 34 MM HG
POC ACT LR: 304 SEC
POC ACT LR: 366 SEC
POC ACT LR: 375 SEC
POC SAMPLE TYPE: ABNORMAL
POTASSIUM BLD-SCNC: 4.6 MMOL/L (ref 3.5–5.1)
POTASSIUM SERPL-SCNC: 4.5 MMOL/L (ref 3.5–5.1)
POTASSIUM SERPL-SCNC: 5.6 MMOL/L (ref 3.5–5.1)
PROTHROMBIN TIME: 18.9 SEC (ref 11.5–14.8)
RBC # BLD AUTO: 3.99 M/UL (ref 4.2–5.9)
SAO2 % BLDV: 64 %
SODIUM BLD-SCNC: 142 MMOL/L (ref 136–145)
SODIUM SERPL-SCNC: 135 MMOL/L (ref 136–145)
WBC # BLD AUTO: 6.8 K/UL (ref 4–11)

## 2023-12-14 PROCEDURE — 93462 L HRT CATH TRNSPTL PUNCTURE: CPT

## 2023-12-14 PROCEDURE — 6360000002 HC RX W HCPCS

## 2023-12-14 PROCEDURE — C1769 GUIDE WIRE: HCPCS

## 2023-12-14 PROCEDURE — 2580000003 HC RX 258

## 2023-12-14 PROCEDURE — 3700000001 HC ADD 15 MINUTES (ANESTHESIA): Performed by: ANESTHESIOLOGY

## 2023-12-14 PROCEDURE — 85610 PROTHROMBIN TIME: CPT

## 2023-12-14 PROCEDURE — 7100000010 HC PHASE II RECOVERY - FIRST 15 MIN: Performed by: ANESTHESIOLOGY

## 2023-12-14 PROCEDURE — 7100000011 HC PHASE II RECOVERY - ADDTL 15 MIN: Performed by: ANESTHESIOLOGY

## 2023-12-14 PROCEDURE — 84295 ASSAY OF SERUM SODIUM: CPT

## 2023-12-14 PROCEDURE — 85014 HEMATOCRIT: CPT

## 2023-12-14 PROCEDURE — C1732 CATH, EP, DIAG/ABL, 3D/VECT: HCPCS

## 2023-12-14 PROCEDURE — 80048 BASIC METABOLIC PNL TOTAL CA: CPT

## 2023-12-14 PROCEDURE — 82330 ASSAY OF CALCIUM: CPT

## 2023-12-14 PROCEDURE — 82947 ASSAY GLUCOSE BLOOD QUANT: CPT

## 2023-12-14 PROCEDURE — 2580000003 HC RX 258: Performed by: ANESTHESIOLOGY

## 2023-12-14 PROCEDURE — C1893 INTRO/SHEATH, FIXED,NON-PEEL: HCPCS

## 2023-12-14 PROCEDURE — 85347 COAGULATION TIME ACTIVATED: CPT

## 2023-12-14 PROCEDURE — C1894 INTRO/SHEATH, NON-LASER: HCPCS

## 2023-12-14 PROCEDURE — 2500000003 HC RX 250 WO HCPCS

## 2023-12-14 PROCEDURE — 93662 INTRACARDIAC ECG (ICE): CPT

## 2023-12-14 PROCEDURE — C1766 INTRO/SHEATH,STRBLE,NON-PEEL: HCPCS

## 2023-12-14 PROCEDURE — 93654 COMPRE EP EVAL TX VT: CPT

## 2023-12-14 PROCEDURE — 36415 COLL VENOUS BLD VENIPUNCTURE: CPT

## 2023-12-14 PROCEDURE — 83605 ASSAY OF LACTIC ACID: CPT

## 2023-12-14 PROCEDURE — 6360000002 HC RX W HCPCS: Performed by: NURSE ANESTHETIST, CERTIFIED REGISTERED

## 2023-12-14 PROCEDURE — 3700000000 HC ANESTHESIA ATTENDED CARE: Performed by: ANESTHESIOLOGY

## 2023-12-14 PROCEDURE — 82803 BLOOD GASES ANY COMBINATION: CPT

## 2023-12-14 PROCEDURE — 93005 ELECTROCARDIOGRAM TRACING: CPT | Performed by: INTERNAL MEDICINE

## 2023-12-14 PROCEDURE — 7100000000 HC PACU RECOVERY - FIRST 15 MIN: Performed by: ANESTHESIOLOGY

## 2023-12-14 PROCEDURE — 2580000003 HC RX 258: Performed by: NURSE ANESTHETIST, CERTIFIED REGISTERED

## 2023-12-14 PROCEDURE — C1760 CLOSURE DEV, VASC: HCPCS

## 2023-12-14 PROCEDURE — 2500000003 HC RX 250 WO HCPCS: Performed by: NURSE ANESTHETIST, CERTIFIED REGISTERED

## 2023-12-14 PROCEDURE — 93623 PRGRMD STIMJ&PACG IV RX NFS: CPT

## 2023-12-14 PROCEDURE — 84132 ASSAY OF SERUM POTASSIUM: CPT

## 2023-12-14 PROCEDURE — 93655 ICAR CATH ABLTJ DSCRT ARRHYT: CPT

## 2023-12-14 PROCEDURE — 85027 COMPLETE CBC AUTOMATED: CPT

## 2023-12-14 PROCEDURE — C1759 CATH, INTRA ECHOCARDIOGRAPHY: HCPCS

## 2023-12-14 PROCEDURE — 7100000001 HC PACU RECOVERY - ADDTL 15 MIN: Performed by: ANESTHESIOLOGY

## 2023-12-14 RX ORDER — CALCIUM CHLORIDE 100 MG/ML
INJECTION INTRAVENOUS; INTRAVENTRICULAR PRN
Status: DISCONTINUED | OUTPATIENT
Start: 2023-12-14 | End: 2023-12-14 | Stop reason: SDUPTHER

## 2023-12-14 RX ORDER — EPHEDRINE SULFATE/0.9% NACL/PF 50 MG/5 ML
SYRINGE (ML) INTRAVENOUS PRN
Status: DISCONTINUED | OUTPATIENT
Start: 2023-12-14 | End: 2023-12-14 | Stop reason: SDUPTHER

## 2023-12-14 RX ORDER — HEPARIN SODIUM 1000 [USP'U]/ML
INJECTION, SOLUTION INTRAVENOUS; SUBCUTANEOUS PRN
Status: DISCONTINUED | OUTPATIENT
Start: 2023-12-14 | End: 2023-12-14 | Stop reason: SDUPTHER

## 2023-12-14 RX ORDER — SODIUM CHLORIDE 0.9 % (FLUSH) 0.9 %
5-40 SYRINGE (ML) INJECTION PRN
Status: DISCONTINUED | OUTPATIENT
Start: 2023-12-14 | End: 2023-12-14 | Stop reason: HOSPADM

## 2023-12-14 RX ORDER — DEXAMETHASONE SODIUM PHOSPHATE 4 MG/ML
INJECTION, SOLUTION INTRA-ARTICULAR; INTRALESIONAL; INTRAMUSCULAR; INTRAVENOUS; SOFT TISSUE PRN
Status: DISCONTINUED | OUTPATIENT
Start: 2023-12-14 | End: 2023-12-14 | Stop reason: SDUPTHER

## 2023-12-14 RX ORDER — SODIUM CHLORIDE 9 MG/ML
INJECTION, SOLUTION INTRAVENOUS PRN
Status: DISCONTINUED | OUTPATIENT
Start: 2023-12-14 | End: 2023-12-14 | Stop reason: HOSPADM

## 2023-12-14 RX ORDER — ONDANSETRON 2 MG/ML
4 INJECTION INTRAMUSCULAR; INTRAVENOUS
Status: DISCONTINUED | OUTPATIENT
Start: 2023-12-14 | End: 2023-12-14 | Stop reason: HOSPADM

## 2023-12-14 RX ORDER — LABETALOL HYDROCHLORIDE 5 MG/ML
5 INJECTION, SOLUTION INTRAVENOUS
Status: DISCONTINUED | OUTPATIENT
Start: 2023-12-14 | End: 2023-12-14 | Stop reason: HOSPADM

## 2023-12-14 RX ORDER — FENTANYL CITRATE 50 UG/ML
INJECTION, SOLUTION INTRAMUSCULAR; INTRAVENOUS PRN
Status: DISCONTINUED | OUTPATIENT
Start: 2023-12-14 | End: 2023-12-14

## 2023-12-14 RX ORDER — SODIUM CHLORIDE 0.9 % (FLUSH) 0.9 %
5-40 SYRINGE (ML) INJECTION EVERY 12 HOURS SCHEDULED
Status: DISCONTINUED | OUTPATIENT
Start: 2023-12-14 | End: 2023-12-14 | Stop reason: HOSPADM

## 2023-12-14 RX ORDER — VECURONIUM BROMIDE 1 MG/ML
INJECTION, POWDER, LYOPHILIZED, FOR SOLUTION INTRAVENOUS PRN
Status: DISCONTINUED | OUTPATIENT
Start: 2023-12-14 | End: 2023-12-14 | Stop reason: SDUPTHER

## 2023-12-14 RX ORDER — SODIUM CHLORIDE 9 MG/ML
INJECTION, SOLUTION INTRAVENOUS PRN
Status: CANCELLED | OUTPATIENT
Start: 2023-12-14

## 2023-12-14 RX ORDER — PROTAMINE SULFATE 10 MG/ML
INJECTION, SOLUTION INTRAVENOUS PRN
Status: DISCONTINUED | OUTPATIENT
Start: 2023-12-14 | End: 2023-12-14 | Stop reason: SDUPTHER

## 2023-12-14 RX ORDER — PROPOFOL 10 MG/ML
INJECTION, EMULSION INTRAVENOUS CONTINUOUS PRN
Status: DISCONTINUED | OUTPATIENT
Start: 2023-12-14 | End: 2023-12-14 | Stop reason: SDUPTHER

## 2023-12-14 RX ORDER — MIDAZOLAM HYDROCHLORIDE 1 MG/ML
INJECTION INTRAMUSCULAR; INTRAVENOUS PRN
Status: DISCONTINUED | OUTPATIENT
Start: 2023-12-14 | End: 2023-12-14 | Stop reason: SDUPTHER

## 2023-12-14 RX ORDER — SODIUM CHLORIDE 0.9 % (FLUSH) 0.9 %
5-40 SYRINGE (ML) INJECTION PRN
Status: CANCELLED | OUTPATIENT
Start: 2023-12-14

## 2023-12-14 RX ORDER — ACETAMINOPHEN 325 MG/1
650 TABLET ORAL EVERY 4 HOURS PRN
Status: CANCELLED | OUTPATIENT
Start: 2023-12-14

## 2023-12-14 RX ORDER — LIDOCAINE HYDROCHLORIDE 10 MG/ML
1 INJECTION, SOLUTION EPIDURAL; INFILTRATION; INTRACAUDAL; PERINEURAL
Status: DISCONTINUED | OUTPATIENT
Start: 2023-12-14 | End: 2023-12-14 | Stop reason: HOSPADM

## 2023-12-14 RX ORDER — SODIUM CHLORIDE 0.9 % (FLUSH) 0.9 %
5-40 SYRINGE (ML) INJECTION EVERY 12 HOURS SCHEDULED
Status: CANCELLED | OUTPATIENT
Start: 2023-12-14

## 2023-12-14 RX ORDER — CEFAZOLIN SODIUM 1 G/3ML
INJECTION, POWDER, FOR SOLUTION INTRAMUSCULAR; INTRAVENOUS PRN
Status: DISCONTINUED | OUTPATIENT
Start: 2023-12-14 | End: 2023-12-14 | Stop reason: SDUPTHER

## 2023-12-14 RX ORDER — FENTANYL CITRATE 50 UG/ML
INJECTION, SOLUTION INTRAMUSCULAR; INTRAVENOUS PRN
Status: DISCONTINUED | OUTPATIENT
Start: 2023-12-14 | End: 2023-12-14 | Stop reason: SDUPTHER

## 2023-12-14 RX ORDER — ONDANSETRON 2 MG/ML
INJECTION INTRAMUSCULAR; INTRAVENOUS PRN
Status: DISCONTINUED | OUTPATIENT
Start: 2023-12-14 | End: 2023-12-14 | Stop reason: SDUPTHER

## 2023-12-14 RX ORDER — HYDROMORPHONE HYDROCHLORIDE 2 MG/ML
0.5 INJECTION, SOLUTION INTRAMUSCULAR; INTRAVENOUS; SUBCUTANEOUS EVERY 5 MIN PRN
Status: DISCONTINUED | OUTPATIENT
Start: 2023-12-14 | End: 2023-12-14 | Stop reason: HOSPADM

## 2023-12-14 RX ORDER — SODIUM CHLORIDE 9 MG/ML
INJECTION, SOLUTION INTRAVENOUS CONTINUOUS
Status: DISCONTINUED | OUTPATIENT
Start: 2023-12-14 | End: 2023-12-14 | Stop reason: HOSPADM

## 2023-12-14 RX ORDER — SODIUM CHLORIDE 9 MG/ML
INJECTION, SOLUTION INTRAVENOUS CONTINUOUS PRN
Status: DISCONTINUED | OUTPATIENT
Start: 2023-12-14 | End: 2023-12-14 | Stop reason: SDUPTHER

## 2023-12-14 RX ORDER — HYDROMORPHONE HYDROCHLORIDE 2 MG/ML
0.25 INJECTION, SOLUTION INTRAMUSCULAR; INTRAVENOUS; SUBCUTANEOUS EVERY 5 MIN PRN
Status: DISCONTINUED | OUTPATIENT
Start: 2023-12-14 | End: 2023-12-14 | Stop reason: HOSPADM

## 2023-12-14 RX ADMIN — Medication 5 MG: at 14:14

## 2023-12-14 RX ADMIN — PHENYLEPHRINE HYDROCHLORIDE 100 MCG/MIN: 10 INJECTION INTRAVENOUS at 11:40

## 2023-12-14 RX ADMIN — HEPARIN SODIUM 1000 UNITS: 1000 INJECTION INTRAVENOUS; SUBCUTANEOUS at 13:42

## 2023-12-14 RX ADMIN — CEFAZOLIN 2 G: 1 INJECTION, POWDER, FOR SOLUTION INTRAVENOUS at 12:04

## 2023-12-14 RX ADMIN — HEPARIN SODIUM 3000 UNITS: 1000 INJECTION INTRAVENOUS; SUBCUTANEOUS at 12:52

## 2023-12-14 RX ADMIN — SUGAMMADEX 274 MG: 100 INJECTION, SOLUTION INTRAVENOUS at 14:47

## 2023-12-14 RX ADMIN — HEPARIN SODIUM 7000 UNITS: 1000 INJECTION INTRAVENOUS; SUBCUTANEOUS at 12:35

## 2023-12-14 RX ADMIN — PROTAMINE SULFATE 30 MG: 10 INJECTION, SOLUTION INTRAVENOUS at 14:35

## 2023-12-14 RX ADMIN — DEXAMETHASONE SODIUM PHOSPHATE 10 MG: 4 INJECTION, SOLUTION INTRAMUSCULAR; INTRAVENOUS at 11:47

## 2023-12-14 RX ADMIN — ISOPROTERENOL HYDROCHLORIDE 2 MCG/MIN: 0.2 INJECTION, SOLUTION INTRAMUSCULAR; INTRAVENOUS at 12:55

## 2023-12-14 RX ADMIN — VECURONIUM BROMIDE 4 MG: 1 INJECTION, POWDER, LYOPHILIZED, FOR SOLUTION INTRAVENOUS at 12:38

## 2023-12-14 RX ADMIN — PROPOFOL 100 MCG/KG/MIN: 10 INJECTION, EMULSION INTRAVENOUS at 11:39

## 2023-12-14 RX ADMIN — VECURONIUM BROMIDE 6 MG: 1 INJECTION, POWDER, LYOPHILIZED, FOR SOLUTION INTRAVENOUS at 11:55

## 2023-12-14 RX ADMIN — ONDANSETRON 4 MG: 2 INJECTION INTRAMUSCULAR; INTRAVENOUS at 14:40

## 2023-12-14 RX ADMIN — VECURONIUM BROMIDE 3 MG: 1 INJECTION, POWDER, LYOPHILIZED, FOR SOLUTION INTRAVENOUS at 13:10

## 2023-12-14 RX ADMIN — CALCIUM CHLORIDE 1 G: 100 INJECTION, SOLUTION INTRAVENOUS at 13:12

## 2023-12-14 RX ADMIN — FENTANYL CITRATE 50 MCG: 50 INJECTION, SOLUTION INTRAMUSCULAR; INTRAVENOUS at 11:39

## 2023-12-14 RX ADMIN — SODIUM CHLORIDE: 9 INJECTION, SOLUTION INTRAVENOUS at 11:03

## 2023-12-14 RX ADMIN — VECURONIUM BROMIDE 3 MG: 1 INJECTION, POWDER, LYOPHILIZED, FOR SOLUTION INTRAVENOUS at 12:05

## 2023-12-14 RX ADMIN — VECURONIUM BROMIDE 3 MG: 1 INJECTION, POWDER, LYOPHILIZED, FOR SOLUTION INTRAVENOUS at 14:32

## 2023-12-14 RX ADMIN — SODIUM CHLORIDE: 9 INJECTION, SOLUTION INTRAVENOUS at 12:18

## 2023-12-14 RX ADMIN — MIDAZOLAM 2 MG: 1 INJECTION INTRAMUSCULAR; INTRAVENOUS at 11:37

## 2023-12-14 RX ADMIN — HEPARIN SODIUM 1000 UNITS: 1000 INJECTION INTRAVENOUS; SUBCUTANEOUS at 13:23

## 2023-12-14 RX ADMIN — PROPOFOL 120 MG: 10 INJECTION, EMULSION INTRAVENOUS at 11:42

## 2023-12-14 RX ADMIN — VECURONIUM BROMIDE 2 MG: 1 INJECTION, POWDER, LYOPHILIZED, FOR SOLUTION INTRAVENOUS at 13:47

## 2023-12-14 RX ADMIN — FENTANYL CITRATE 50 MCG: 50 INJECTION, SOLUTION INTRAMUSCULAR; INTRAVENOUS at 13:12

## 2023-12-14 RX ADMIN — VECURONIUM BROMIDE 4 MG: 1 INJECTION, POWDER, LYOPHILIZED, FOR SOLUTION INTRAVENOUS at 11:41

## 2023-12-14 RX ADMIN — VECURONIUM BROMIDE 3 MG: 1 INJECTION, POWDER, LYOPHILIZED, FOR SOLUTION INTRAVENOUS at 14:16

## 2023-12-14 RX ADMIN — Medication 10 MG: at 13:54

## 2023-12-14 ASSESSMENT — PAIN DESCRIPTION - DESCRIPTORS
DESCRIPTORS: ACHING
DESCRIPTORS: ACHING

## 2023-12-14 ASSESSMENT — PAIN DESCRIPTION - ORIENTATION
ORIENTATION: RIGHT
ORIENTATION: RIGHT

## 2023-12-14 ASSESSMENT — PAIN SCALES - GENERAL
PAINLEVEL_OUTOF10: 3
PAINLEVEL_OUTOF10: 4

## 2023-12-14 ASSESSMENT — PAIN DESCRIPTION - LOCATION
LOCATION: GROIN
LOCATION: GROIN

## 2023-12-14 NOTE — ANESTHESIA POSTPROCEDURE EVALUATION
Department of Anesthesiology  Postprocedure Note    Patient: Giovanna Mathis  MRN: [id number]  YOB: 1945  Date of evaluation: 12/14/2023    Procedure Summary       Date: 12/14/23 Room / Location: [facility] Cath Lab    Anesthesia Start: 5731 Anesthesia Stop: 5247    Procedure: ABLATION Diagnosis: Ventricular premature depolarization    Scheduled Providers: Татьяна Barnett MD Responsible Provider: Татьяна Barnett MD    Anesthesia Type: general ASA Status: 3            Anesthesia Type: No value filed. Brad Phase I: Brad Score: 10    Brad Phase II:      Anesthesia Post Evaluation    Patient location during evaluation: PACU  Patient participation: complete - patient participated  Level of consciousness: awake  Airway patency: patent  Nausea & Vomiting: no vomiting  Cardiovascular status: hemodynamically stable  Respiratory status: acceptable  Hydration status: euvolemic  Multimodal analgesia pain management approach    No notable events documented.

## 2023-12-14 NOTE — PROGRESS NOTES
Pt to [facility] 4. PT awake, alert and oriented. PT groin site is clean dry and intact. Pt's pulse in RLE is palpable, cap refill less than 3, and warm. PT complains of mild pain at groin site. PT instructed to lay flat at this time. Will continue to monitor.

## 2023-12-14 NOTE — PROGRESS NOTES
PRE-PROCEDURE    DATE: 12/14/2023 ARRIVAL TO CATH LAB: 9:52 AM    ADMIT SOURCE: Outpatient    ID & ALLERGY BAND: On    CONSENT: Yes    NPO SINCE: Midnight    LABS/PREGNANCY TEST: N/A    PULSES: Right and Left DP 2  Right and Left PT 1    IV SITE : Left arm  with fluids infusing at kvo 9:52 AM     EKG RHYTHM:  Paced

## 2023-12-14 NOTE — PROCEDURES
[facility]     Electrophysiology Procedure Note       Date of Procedure: 12/14/2023  Patient's Name: Diana Mathis  YOB: 1945   Medical Record Number: [id number]  Procedure Performed by: Elizabeth Randolph MD    Procedure performed:  Electrophysiological study with ablation of multifocal PVCs  Attempted induction of arrhythmia after IV drug infusion. Three-dimensional electroanatomic mapping of the left ventricle   Intracardiac ultrasound   Transseptal puncture through intact septum     Indications for procedure: Recurrent symptomatic PVC  Diana Mathis is 70 y.o. male  history of HTN, HLD, CAD s/p CABG  2007 with re-do (2019), PVC, bradycardia, and AF. Hx of WESTON ligation and MAZE (1/8/2019). DVT 01/2023. He has had multifocal PVCs and is symptomatic with it despite antiarrhythmic therapy with amiodarone. Details of Procedure: The risks, benefits, alternatives of procedure were explained to the patient. The risks, benefits and alternatives of the ablation procedure were discussed with the patient. The risks including, but not limited to, the risks of bleeding, infection, radiation exposure, injury to vascular, cardiac and surrounding structures (including pneumothorax), stroke, cardiac perforation, tamponade, need for emergent open heart surgery, need for pacemaker implantation, myocardial infarction and death were discussed in detail. The patient opted to proceed with the ablation. Written informed consent was signed and placed in the chart. The patient was brought to the electrophysiology lab in a fasting nonsedated state. Patient underwent general anesthesia. AICD was interrogated and programmed with tachytherapies were turned off. Ultrasound was used for femoral venous access. The femoral vein was identified with real time visualization of needle passage to the venous lumen.      Two 8F and one long 8.5 sheath was using and were placed in the right femoral vein using

## 2023-12-14 NOTE — PROGRESS NOTES
1700: Patient out of bed to ambulate around the unit, voiding with no difficulties, no change in groin site post ambulation, states he is ready for discharge. 1720: Patient discharged home with all belongings via w/c to car with daughter.

## 2023-12-14 NOTE — ANESTHESIA PRE PROCEDURE
Department of Anesthesiology  Preprocedure Note       Name:  Sara Juares   Age:  70 y.o.  :  1945                                          MRN:  [id number]         Date:  2023      Surgeon: * Surgery not found *    Procedure:     Medications prior to admission:   Prior to Admission medications    Medication Sig Start Date End Date Taking? Authorizing Provider   ezetimibe (ZETIA) 10 MG tablet TAKE 1 TABLET EVERY EVENING 23   Giles Opitz, DO   metoprolol succinate (TOPROL XL) 100 MG extended release tablet Pt reports taking 1 tab daily. 23   BETO Roberto CNP   spironolactone (ALDACTONE) 25 MG tablet TAKE ONE-HALF (1/2) TABLET IN THE MORNING 23   Giles Opitz, DO   amiodarone (CORDARONE) 200 MG tablet TAKE 1 TABLET DAILY 23   BETO Roberto CNP   MAGNESIUM-OXIDE 400 (240 Mg) MG tablet TAKE TWO TABLETS BY MOUTH DAILY 23   Giles Opitz, DO   pantoprazole (PROTONIX) 40 MG tablet TAKE 1 TABLET EVERY MORNING BEFORE BREAKFAST 23   Giles Opitz, DO   warfarin (COUMADIN) 2.5 MG tablet TAKE 2 TABLETS DAILY OR AS DIRECTED BY PROVIDER BASED ON INR 23   Giles Opitz, DO   warfarin (COUMADIN) 2.5 MG tablet Take 2 tablets by mouth daily Dose adjusted via anticoagulation clinic 6/26/23 10/11/23  Giles Opitz, DO   clopidogrel (PLAVIX) 75 MG tablet TAKE 1 TABLET IN THE MORNING 5/15/23   Giles Opitz, DO   rosuvastatin (CRESTOR) 40 MG tablet TAKE 1 TABLET NIGHTLY 5/15/23   Giles Opitz, DO   furosemide (LASIX) 40 MG tablet TAKE 1 TABLET IN THE MORNING AND TAKE 1 TABLET IN THE EVENING.  TAKING AS NEEDED FOR FLUID OVERLOAD/SWELLING. 23   Giles Opitz, DO   KLOR-CON M20 20 MEQ extended release tablet TAKE 1 TABLET AS NEEDED WITH LASIX FOR SWELLING 23   Giles Opitz, DO   sacubitril-valsartan (ENTRESTO) 24-26 MG per tablet Take 0.5 tablets by mouth 2 times daily 23   BETO Roberto CNP   nitroGLYCERIN (NITROSTAT) 0.4 MG SL tablet

## 2023-12-14 NOTE — FLOWSHEET NOTE
12/14/23 1630   AVS Reviewed   AVS & discharge instructions reviewed with patient and/or representative? Yes   Reviewed instructions with Patient; Other (name and relationship in comment)  (daughter)   Level of Understanding Teach back completed;Questions answered;Verbalized understanding;Return demonstration

## 2023-12-14 NOTE — PROGRESS NOTES
PT awake, alert and oriented. Pt meets criteria for phase I completion. Groin site is clean dry and intact. Pulses on RLE is palpable, warm, cap refill less than 3. Pt pain is controlled. Family called to bedside. Will continue to monitor for phase II.

## 2023-12-14 NOTE — DISCHARGE INSTRUCTIONS
POST ABLATION    Care of your puncture site:  Remove bandage 24 hours after the procedure. May shower in 24 hours but do not sit in a bathtub/pool of water for 5 days or until the wound is healed. Inspect the site daily and gently clean using soap and water while standing in the shower. Dry thoroughly and apply a Band-Aid that covers the entire site. Do not apply powder or lotion. Normal Observations:  Soreness or tenderness which may last one week. Mild oozing from the incision site. Possible bruising that could last 2 weeks. Activity:  You may resume driving 24 hours following the procedure. You may resume normal activity in 3 days or after the wound heals. Avoid lifting more than 10 pounds for 3 days or until the wound heals. Avoid strenuous exercise or activity for 1 week. Nutrition:  Regular diet       Call your doctor immediately if your condition worsens, for any other concerns, for a follow-up appointment or if you experience any of the following:  Significant bleeding that does not stop after 10 minutes of applying firm pressure on the puncture site. Increased swelling on the groin or leg. Unusual pain, numbness, or tingling of the groin or down the leg. Any signs of infection such as: redness, yellow drainage at the site, swelling or pain. SEDATION DISCHARGE INSTRUCTIONS    12/14/2023     Wear your seatbelt home. You are under the influence of drugs. Do not drink alcohol, drive, operate machinery, or make any important decisions or sign any legal documents for 24 hours  A responsible adult needs to be with you for 24 hours. You may experience lightheadedness, dizziness, nausea, heightened emotions and/or sleepiness following surgery. Rest at home today- increase activity as tolerated. Progress slowly to a regular diet and drink plenty of fluids unless your physician has instructed you otherwise. If nausea becomes a problem, call your physician.   Coughing, sore throat, and muscle aches are other side effects of anesthesia and should improve with time. Do not drive or operate machinery while taking narcotics. ATTENTION FEMALE PATIENTS: if you use an oral contraceptive or birth control pill, you need to use an additional or alternative method for pregnancy prevention for the next thirty days. Medications given today may render your contraceptive ineffective for this cycle.

## 2023-12-15 LAB
POC ACT LR: >400 SEC

## 2023-12-27 ENCOUNTER — APPOINTMENT (OUTPATIENT)
Dept: GENERAL RADIOLOGY | Age: 78
End: 2023-12-27
Payer: MEDICARE

## 2023-12-27 ENCOUNTER — HOSPITAL ENCOUNTER (EMERGENCY)
Age: 78
Discharge: HOME | End: 2023-12-27
Payer: MEDICARE

## 2023-12-27 VITALS
TEMPERATURE: 98.1 F | HEART RATE: 61 BPM | WEIGHT: 164.3 LBS | HEIGHT: 70 IN | RESPIRATION RATE: 15 BRPM | BODY MASS INDEX: 23.52 KG/M2 | DIASTOLIC BLOOD PRESSURE: 68 MMHG | OXYGEN SATURATION: 98 % | SYSTOLIC BLOOD PRESSURE: 109 MMHG

## 2023-12-27 DIAGNOSIS — M25.531 RIGHT WRIST PAIN: Primary | ICD-10-CM

## 2023-12-27 PROCEDURE — 93971 EXTREMITY STUDY: CPT

## 2023-12-27 PROCEDURE — 73130 X-RAY EXAM OF HAND: CPT

## 2023-12-27 PROCEDURE — 99284 EMERGENCY DEPT VISIT MOD MDM: CPT

## 2023-12-27 PROCEDURE — 6370000000 HC RX 637 (ALT 250 FOR IP): Performed by: PHYSICIAN ASSISTANT

## 2023-12-27 RX ORDER — NAPROXEN 500 MG/1
500 TABLET ORAL 2 TIMES DAILY WITH MEALS
Qty: 30 TABLET | Refills: 0 | Status: SHIPPED | OUTPATIENT
Start: 2023-12-27

## 2023-12-27 RX ORDER — PREDNISONE 20 MG/1
60 TABLET ORAL ONCE
Status: COMPLETED | OUTPATIENT
Start: 2023-12-27 | End: 2023-12-27

## 2023-12-27 RX ORDER — PREDNISONE 10 MG/1
40 TABLET ORAL DAILY
Qty: 20 TABLET | Refills: 0 | Status: SHIPPED | OUTPATIENT
Start: 2023-12-27 | End: 2024-01-01

## 2023-12-27 RX ORDER — NAPROXEN 250 MG/1
500 TABLET ORAL ONCE
Status: COMPLETED | OUTPATIENT
Start: 2023-12-27 | End: 2023-12-27

## 2023-12-27 RX ADMIN — NAPROXEN 500 MG: 250 TABLET ORAL at 09:05

## 2023-12-27 RX ADMIN — PREDNISONE 60 MG: 20 TABLET ORAL at 09:05

## 2023-12-27 NOTE — ED PROVIDER NOTES
[facility]        Pt Name: Torin Mathis  MRN: [id number]  [address] [place] 1945  Date of evaluation: 12/27/2023  Provider: TONY Guerrero  PCP: Nieves De Santiago DO  Note Started: 9:39 AM EST 12/27/23      LISA. I have evaluated this patient. CHIEF COMPLAINT       Chief Complaint   Patient presents with    Joint Swelling     Pt reports swelling to right hand onset 2 days ago, no injury. Pain       HISTORY OF PRESENT ILLNESS: 1 or more Elements     History From: Patient  Limitations to history : None    Torin Mathis is a 70 y.o. male with past medical history of previous DVT anticoagulated on Coumadin, atrial fibrillation, hypertension, COPD, hyperlipidemia and CHF who presents ED with complaint of right wrist pain and swelling. States 2 days ago he woke up and he states his right wrist was bent. He states his wrist was sore. Since then he has had increasing swelling and pain to the right wrist.  He denies any injury or trauma. He reports he had a compound fracture to his right wrist back when he was 25. He believes he has some underlying arthritis. Denies history of gout. Denies erythema or warmth. [place] fever or chills. Denies any numbness or tingling. Reports decreased range of motion and strength secondary to pain. He is right-hand dominant. Denies taking any over-the-counter medication for symptom control. Denies any pain to the hand or elbow. Became concerned and came to the ED for further evaluation and treatment. States in the past he had similar presentation with his foot/ankle and was diagnosed with DVT. He is concerned he has a DVT at this time. Nursing Notes were all reviewed and agreed with or any disagreements were addressed in the HPI. REVIEW OF SYSTEMS :      Review of Systems   Constitutional:  Positive for activity change. Negative for appetite change, chills and fever. Respiratory: Negative.

## 2023-12-28 PROCEDURE — 93297 REM INTERROG DEV EVAL ICPMS: CPT | Performed by: CLINICAL NURSE SPECIALIST

## 2023-12-28 PROCEDURE — G2066 INTER DEVC REMOTE 30D: HCPCS | Performed by: CLINICAL NURSE SPECIALIST

## 2023-12-29 ENCOUNTER — TELEPHONE (OUTPATIENT)
Dept: PHARMACY | Age: 78
End: 2023-12-29

## 2023-12-29 NOTE — TELEPHONE ENCOUNTER
Pt was in ER  with wrist pain, got naproxen and pred for 5 days. Explained interaction. States doctor denied the interaction but he thought there was one because his INR jumped on prednisone in the past. Patient states he is still using the naproxen and doesn't want to stop yet. Has several days left of pred still. Did not check INR in ER. Will have patient take 2.5mg daily (instead of 5mg MF) while on interacting medications and RTC earlier than scheduled on 1/3.     Tj Pickens, PharmD, [address] [place] [address] Tracking Only    Intervention Detail: Adherence Monitorin and Dose Adjustment: 1, reason: Interaction  Total # of Interventions Recommended: 2  Total # of Interventions Accepted: 2  Time Spent (min): 15

## 2023-12-31 DIAGNOSIS — I25.5 ISCHEMIC CARDIOMYOPATHY: Chronic | ICD-10-CM

## 2023-12-31 DIAGNOSIS — I10 ESSENTIAL HYPERTENSION: Chronic | ICD-10-CM

## 2024-01-02 RX ORDER — METOPROLOL SUCCINATE 100 MG/1
TABLET, EXTENDED RELEASE ORAL
Qty: 135 TABLET | Refills: 3 | Status: SHIPPED | OUTPATIENT
Start: 2024-01-02

## 2024-01-02 NOTE — TELEPHONE ENCOUNTER
Last OV: 10/23/2023  Karin  Last Labs: EKG-12/14/2023  Darrius  Next OV:01/11/2024  Karin  Last Refill: Metoprolol-11/08/2023  Nancy Bloom

## 2024-01-03 ENCOUNTER — ANTI-COAG VISIT (OUTPATIENT)
Dept: PHARMACY | Age: 79
End: 2024-01-03
Payer: MEDICARE

## 2024-01-03 DIAGNOSIS — I48.91 ATRIAL FIBRILLATION, UNSPECIFIED TYPE (HCC): Primary | Chronic | ICD-10-CM

## 2024-01-03 LAB — INTERNATIONAL NORMALIZATION RATIO, POC: 1.9

## 2024-01-03 PROCEDURE — 85610 PROTHROMBIN TIME: CPT

## 2024-01-03 PROCEDURE — 99211 OFF/OP EST MAY X REQ PHY/QHP: CPT

## 2024-01-03 NOTE — PROGRESS NOTES
Mr. Juan A Mathis is a 78 y.o. y/o male with history of Afib who presents today for anticoagulation monitoring and adjustment.    Pertinent PMH: DVT, CABG, HTN, CAD, seizure, CHF    Patient Reported Findings:  Yes     No  [x]   []       Patient verifies current dosing regimen as listed- takes 5mg Sun and Wed and 2.5mg AOD---> confirms --> verified increase in weekly dose to 5 mg on Thurs, fri and Sun ---> confirmed   []   [x]       S/S bleeding/bruising/swelling/SOB- denies ---> was having clot symptoms on Sat so went to see doc on Mon and did doppler---> denies   []   [x]       Blood in urine or stool- denies   [x]   []       Procedures scheduled in the future at this time colonoscopy was cancelled, r/s for December 29, hold 5 days and bridge. Was instructed to get INR on Mon 12/26 to determine INR, start lovenox --> going to schedule an ablation in near future, possible November. Will need to hold warfarin 2 days prior to procedure---> CV 9/6, ablation 12/14 --> having ablation on 12/14, cleared to hold warfarin 3 days prior --> INR was 1.9 for ablation---> denies    []   [x]       Missed Dose -denies  []   [x]       Extra Dose- denies   [x]   []       Change in medications- states that MD had been adjusting phenytoin increasing 4-5 weeks ago but plans to return to normal phenytoin today  --->takes amiodarone 200 mg qd, has been at this dose for a few weeks---> sometimes take extra amiodarone at night, states doctor told him he could do this, asked to clarify, adjusting lasix ---> adjusting lasix, states Amiodarone still every morning, sometimes takes extra at night but hasn't recently ---> every 2 days takes extra Amiodarone, increased metoprolol ---> states fluctuates Phenytoin based on symptoms, unclear ---> has been taking extra lasix the past few days but is going to lower back down to every other day---> was on pred and naproxen, finished pred, has several days left of naproxen and still using    []   [x]

## 2024-01-10 ASSESSMENT — ENCOUNTER SYMPTOMS
PHOTOPHOBIA: 0
NAUSEA: 0
BLOOD IN STOOL: 0
CHEST TIGHTNESS: 0
ABDOMINAL DISTENTION: 0
ABDOMINAL PAIN: 0
COUGH: 0
SHORTNESS OF BREATH: 1

## 2024-01-11 ENCOUNTER — OFFICE VISIT (OUTPATIENT)
Dept: CARDIOLOGY CLINIC | Age: 79
End: 2024-01-11

## 2024-01-11 VITALS
HEART RATE: 61 BPM | HEIGHT: 70 IN | WEIGHT: 158.4 LBS | DIASTOLIC BLOOD PRESSURE: 60 MMHG | OXYGEN SATURATION: 99 % | SYSTOLIC BLOOD PRESSURE: 102 MMHG | BODY MASS INDEX: 22.68 KG/M2

## 2024-01-11 DIAGNOSIS — I10 ESSENTIAL HYPERTENSION: Primary | ICD-10-CM

## 2024-01-11 DIAGNOSIS — I48.0 PAF (PAROXYSMAL ATRIAL FIBRILLATION) (HCC): ICD-10-CM

## 2024-01-11 DIAGNOSIS — E78.2 MIXED HYPERLIPIDEMIA: Chronic | ICD-10-CM

## 2024-01-11 DIAGNOSIS — I47.29 NSVT (NONSUSTAINED VENTRICULAR TACHYCARDIA) (HCC): ICD-10-CM

## 2024-01-11 DIAGNOSIS — I42.9 CARDIOMYOPATHY, UNSPECIFIED TYPE (HCC): ICD-10-CM

## 2024-01-11 DIAGNOSIS — Z95.810 ICD (IMPLANTABLE CARDIOVERTER-DEFIBRILLATOR) IN PLACE: ICD-10-CM

## 2024-01-11 DIAGNOSIS — I49.3 PVC (PREMATURE VENTRICULAR CONTRACTION): ICD-10-CM

## 2024-01-11 DIAGNOSIS — I25.5 ISCHEMIC CARDIOMYOPATHY: ICD-10-CM

## 2024-01-11 DIAGNOSIS — I25.83 CORONARY ARTERY DISEASE DUE TO LIPID RICH PLAQUE: ICD-10-CM

## 2024-01-11 DIAGNOSIS — I25.10 CORONARY ARTERY DISEASE DUE TO LIPID RICH PLAQUE: ICD-10-CM

## 2024-01-11 DIAGNOSIS — G47.33 OSA ON CPAP: ICD-10-CM

## 2024-01-11 RX ORDER — POTASSIUM CHLORIDE 20 MEQ/1
20 TABLET, EXTENDED RELEASE ORAL SEE ADMIN INSTRUCTIONS
Qty: 36 TABLET | Refills: 3 | Status: SHIPPED | OUTPATIENT
Start: 2024-01-11

## 2024-01-11 RX ORDER — FUROSEMIDE 40 MG/1
40 TABLET ORAL SEE ADMIN INSTRUCTIONS
Qty: 36 TABLET | Refills: 3 | Status: SHIPPED | OUTPATIENT
Start: 2024-01-11

## 2024-01-11 RX ORDER — SPIRONOLACTONE 25 MG/1
12.5 TABLET ORAL DAILY
Qty: 45 TABLET | Refills: 3 | Status: SHIPPED | OUTPATIENT
Start: 2024-01-11

## 2024-01-11 RX ORDER — CLOPIDOGREL BISULFATE 75 MG/1
75 TABLET ORAL EVERY MORNING
Qty: 90 TABLET | Refills: 3 | Status: SHIPPED | OUTPATIENT
Start: 2024-01-11

## 2024-01-11 RX ORDER — METOPROLOL SUCCINATE 100 MG/1
TABLET, EXTENDED RELEASE ORAL
Qty: 135 TABLET | Refills: 3 | Status: SHIPPED | OUTPATIENT
Start: 2024-01-11 | End: 2024-01-12 | Stop reason: SDUPTHER

## 2024-01-11 RX ORDER — ROSUVASTATIN CALCIUM 40 MG/1
40 TABLET, COATED ORAL NIGHTLY
Qty: 90 TABLET | Refills: 3 | Status: SHIPPED | OUTPATIENT
Start: 2024-01-11

## 2024-01-11 RX ORDER — AMIODARONE HYDROCHLORIDE 200 MG/1
200 TABLET ORAL DAILY
Qty: 135 TABLET | Refills: 3 | Status: SHIPPED | OUTPATIENT
Start: 2024-01-11

## 2024-01-11 RX ORDER — EZETIMIBE 10 MG/1
10 TABLET ORAL EVERY EVENING
Qty: 90 TABLET | Refills: 3 | Status: SHIPPED | OUTPATIENT
Start: 2024-01-11

## 2024-01-11 NOTE — PATIENT INSTRUCTIONS
services. For more information, visit www.medicare.gov.  My Medicare Matters  The My Medicare Matters website is designed to help individuals learn more about Medicare prescription drug coverage. The website was developed using materials from the Centers for Medicare and Medicaid Services, as well as materials developed by the National [place] [employer] (NCOA), the Access to Benefits Coalition (ABC), and [employer]. For more information, visit www.mymedicarematters.org.  NeedyMeds  NeedyMeds is devoted to helping people in need find assistance programs to help them afford their medications and costs related to health care. The NeedyMeds website provides information on company patient assistance programs, free and low-cost clinics, government programs and other types of assistance programs. For more information, visit www.needymeds.org.  [facility]  [facility] is a fully licensed nonprofit mail order pharmacy.  It offers more than 1,000 medication strengths that cover most chronic diseases. [facility] is available to qualifying individuals and families.  Patients or their advocates can complete a simple enrollment process online, by phone, or with a paper application.  For more information, call [phone] ([phone]) or visit [email or web address]  RxAssist  RxAssist lets you search for information on patient assistance programs by company, brand name, generic name, or type of medicine. RxAssist provides information on ways to get free and low-cost medications. The site also provides a quick-reference chart that lists drug assistance programs by state. For more information, visit www.rxassist.org.  Rx4Blox  RxSonogenixe lets you search for patient assistance information. In addition, some companies allow physicians to submit applications electronically through this site. For more information, visit [email or web address].Zuldi.       [facility] Patient Assistance [employer]ations  Assistance may be available

## 2024-01-11 NOTE — PROGRESS NOTES
Authorizing Provider   metoprolol succinate (TOPROL XL) 100 MG extended release tablet TAKE 1 TABLET IN THE MORNING AND ONE-HALF (1/2) TABLET IN THE EVENING  Patient taking differently: TAKE 1 TABLET IN THE MORNING AND ONE-HALF (1/2) TABLET IN THE EVENING    3 days a week extra 1/2 tablet 1/2/24  Yes Rusty Bloom APRN - CNP   naproxen (NAPROSYN) 500 MG tablet Take 1 tablet by mouth 2 times daily (with meals) 12/27/23  Yes Guillaume Maravilla PA   ezetimibe (ZETIA) 10 MG tablet TAKE 1 TABLET EVERY EVENING 12/7/23  Yes Jaydon Frazier DO   spironolactone (ALDACTONE) 25 MG tablet TAKE ONE-HALF (1/2) TABLET IN THE MORNING 9/30/23  Yes Jaydon Frazier DO   amiodarone (CORDARONE) 200 MG tablet TAKE 1 TABLET DAILY  Patient taking differently: Take 1 tablet by mouth daily 3 days a week extra 1/2 tablet 9/27/23  Yes Rusty Bloom APRN - CNP   MAGNESIUM-OXIDE 400 (240 Mg) MG tablet TAKE TWO TABLETS BY MOUTH DAILY 7/14/23  Yes Jaydon Frazier DO   pantoprazole (PROTONIX) 40 MG tablet TAKE 1 TABLET EVERY MORNING BEFORE BREAKFAST 6/26/23  Yes Jaydon Frazier DO   warfarin (COUMADIN) 2.5 MG tablet TAKE 2 TABLETS DAILY OR AS DIRECTED BY PROVIDER BASED ON INR 6/26/23  Yes Jaydon Frazier DO   clopidogrel (PLAVIX) 75 MG tablet TAKE 1 TABLET IN THE MORNING 5/15/23  Yes Jaydon Frazier DO   rosuvastatin (CRESTOR) 40 MG tablet TAKE 1 TABLET NIGHTLY 5/15/23  Yes Jaydon Frazier DO   furosemide (LASIX) 40 MG tablet TAKE 1 TABLET IN THE MORNING AND TAKE 1 TABLET IN THE EVENING. TAKING AS NEEDED FOR FLUID OVERLOAD/SWELLING. 5/4/23  Yes Jaydon Frazier DO   KLOR-CON M20 20 MEQ extended release tablet TAKE 1 TABLET AS NEEDED WITH LASIX FOR SWELLING 5/4/23  Yes Jaydon Frazier DO   sacubitril-valsartan (ENTRESTO) 24-26 MG per tablet Take 0.5 tablets by mouth 2 times daily 4/7/23  Yes Bloom, Rusty, APRN - CNP   nitroGLYCERIN (NITROSTAT) 0.4 MG SL tablet Place 1 tablet under the tongue every 5 minutes as needed for Chest pain 2/24/22

## 2024-01-12 ENCOUNTER — TELEPHONE (OUTPATIENT)
Dept: CARDIOLOGY CLINIC | Age: 79
End: 2024-01-12

## 2024-01-12 DIAGNOSIS — I25.5 ISCHEMIC CARDIOMYOPATHY: ICD-10-CM

## 2024-01-12 DIAGNOSIS — I10 ESSENTIAL HYPERTENSION: ICD-10-CM

## 2024-01-12 RX ORDER — METOPROLOL SUCCINATE 100 MG/1
TABLET, EXTENDED RELEASE ORAL
Qty: 135 TABLET | Refills: 3 | Status: SHIPPED | OUTPATIENT
Start: 2024-01-12

## 2024-01-12 RX ORDER — PANTOPRAZOLE SODIUM 40 MG/1
40 TABLET, DELAYED RELEASE ORAL
Qty: 90 TABLET | Refills: 3 | Status: SHIPPED | OUTPATIENT
Start: 2024-01-12

## 2024-01-12 NOTE — TELEPHONE ENCOUNTER
Patient called he will not take the jardiance due to price. Talked to him at length regarding need to apply for patient assistance for jardiance.  He said he would call today and apply.  Resent same scripts as yesterday because patient states luis did not have them. [facility]

## 2024-01-12 NOTE — TELEPHONE ENCOUNTER
Pt calling requesting a call back from SULEMA birch regarding medications, pt may be working outside so okay to leave message if he's not available   Thank you

## 2024-01-12 NOTE — TELEPHONE ENCOUNTER
Warfarin prescription phoned into [facility] [phone] - [place] [id number] [facility] -  [phone] -  [phone] under [place]   Warfarin 2.5 mg tabs  Take 5 mg (2.5 mg x 2) every Mon, Fri; 2.5 mg (2.5 mg x 1) all other days   90 days   2 refills    Melissa Horta, PharmD, [facility]    For Pharmacy Admin Tracking Only    Intervention Detail: Refill(s) Provided  Total # of Interventions Recommended: 1  Total # of Interventions Accepted: 1  Time Spent (min): 15

## 2024-01-17 ENCOUNTER — ANTI-COAG VISIT (OUTPATIENT)
Dept: PHARMACY | Age: 79
End: 2024-01-17
Payer: MEDICARE

## 2024-01-17 DIAGNOSIS — I48.21 PERMANENT ATRIAL FIBRILLATION (HCC): Primary | Chronic | ICD-10-CM

## 2024-01-17 LAB — INTERNATIONAL NORMALIZATION RATIO, POC: 3.4

## 2024-01-17 PROCEDURE — 85610 PROTHROMBIN TIME: CPT

## 2024-01-17 PROCEDURE — 99211 OFF/OP EST MAY X REQ PHY/QHP: CPT

## 2024-01-17 NOTE — PROGRESS NOTES
Mr. Juan A Mathis is a 78 y.o. y/o male with history of Afib who presents today for anticoagulation monitoring and adjustment.    Pertinent PMH: DVT, CABG, HTN, CAD, seizure, CHF    Patient Reported Findings:  Yes     No  [x]   []       Patient verifies current dosing regimen as listed- takes 5mg Sun and Wed and 2.5mg AOD---> confirms --> verified increase in weekly dose to 5 mg on Thurs, fri and Sun ---> confirmed   []   [x]       S/S bleeding/bruising/swelling/SOB- denies ---> was having clot symptoms on Sat so went to see doc on Mon and did doppler---> denies   []   [x]       Blood in urine or stool- denies   [x]   []       Procedures scheduled in the future at this time colonoscopy was cancelled, r/s for December 29, hold 5 days and bridge. Was instructed to get INR on Mon 12/26 to determine INR, start lovenox --> going to schedule an ablation in near future, possible November. Will need to hold warfarin 2 days prior to procedure---> CV 9/6, ablation 12/14 --> having ablation on 12/14, cleared to hold warfarin 3 days prior --> INR was 1.9 for ablation---> denies    []   [x]       Missed Dose -denies  []   [x]       Extra Dose- denies   [x]   []       Change in medications- states that MD had been adjusting phenytoin increasing 4-5 weeks ago but plans to return to normal phenytoin today  --->takes amiodarone 200 mg qd, has been at this dose for a few weeks---> sometimes take extra amiodarone at night, states doctor told him he could do this, asked to clarify, adjusting lasix ---> adjusting lasix, states Amiodarone still every morning, sometimes takes extra at night but hasn't recently ---> every 2 days takes extra Amiodarone, increased metoprolol ---> states fluctuates Phenytoin based on symptoms, unclear ---> has been taking extra lasix the past few days but is going to lower back down to every other day---> was on pred and naproxen, finished pred, has several days left of naproxen and still using ---> Jardiance

## 2024-01-19 DIAGNOSIS — I25.5 ISCHEMIC CARDIOMYOPATHY: ICD-10-CM

## 2024-01-19 DIAGNOSIS — I10 ESSENTIAL HYPERTENSION: ICD-10-CM

## 2024-01-22 NOTE — TELEPHONE ENCOUNTER
Refill request metoprolol succinate (TOPROL XL) 100 MG extended release tablet     Last OV:24 DKW    Last EK23    Next Appt:3/20/24 RMM

## 2024-01-22 NOTE — TELEPHONE ENCOUNTER
Received refill request for MAGNESIUM-OXIDE 400 (240 Mg) MG from [facility] pharmacy.    Last ov:2024 DKW    Last labs:2023    Last EK2023    Last Refill:2023  90 tablet 3 refills  Sig: TAKE TWO TABLETS BY MOUTH DAILY     Next appointment:2024 TYRONE

## 2024-01-23 RX ORDER — LANOLIN ALCOHOL/MO/W.PET/CERES
800 CREAM (GRAM) TOPICAL DAILY
Qty: 180 TABLET | Refills: 3 | Status: SHIPPED | OUTPATIENT
Start: 2024-01-23

## 2024-01-23 RX ORDER — METOPROLOL SUCCINATE 100 MG/1
TABLET, EXTENDED RELEASE ORAL
Qty: 180 TABLET | Refills: 3 | Status: SHIPPED | OUTPATIENT
Start: 2024-01-23

## 2024-01-28 PROCEDURE — 93297 REM INTERROG DEV EVAL ICPMS: CPT | Performed by: CLINICAL NURSE SPECIALIST

## 2024-01-31 ENCOUNTER — TELEPHONE (OUTPATIENT)
Dept: CARDIOLOGY CLINIC | Age: 79
End: 2024-01-31

## 2024-01-31 ENCOUNTER — ANTI-COAG VISIT (OUTPATIENT)
Dept: PHARMACY | Age: 79
End: 2024-01-31
Payer: MEDICARE

## 2024-01-31 DIAGNOSIS — I48.91 ATRIAL FIBRILLATION, UNSPECIFIED TYPE (HCC): Primary | Chronic | ICD-10-CM

## 2024-01-31 LAB — INTERNATIONAL NORMALIZATION RATIO, POC: 2.8

## 2024-01-31 PROCEDURE — 99211 OFF/OP EST MAY X REQ PHY/QHP: CPT

## 2024-01-31 PROCEDURE — 85610 PROTHROMBIN TIME: CPT

## 2024-02-01 PROCEDURE — 93295 DEV INTERROG REMOTE 1/2/MLT: CPT | Performed by: INTERNAL MEDICINE

## 2024-02-01 PROCEDURE — 93296 REM INTERROG EVL PM/IDS: CPT | Performed by: INTERNAL MEDICINE

## 2024-02-15 ENCOUNTER — TELEPHONE (OUTPATIENT)
Dept: CARDIOLOGY CLINIC | Age: 79
End: 2024-02-15

## 2024-02-15 NOTE — TELEPHONE ENCOUNTER
LMOM for pt to c/b regarding form for medication. Pt filled out form for Ofev for Pulmonary Fibrosis, we do not see you have been diagnosed with that. What medication should the form be for?

## 2024-02-28 ENCOUNTER — ANTI-COAG VISIT (OUTPATIENT)
Dept: PHARMACY | Age: 79
End: 2024-02-28
Payer: MEDICARE

## 2024-02-28 DIAGNOSIS — I48.91 ATRIAL FIBRILLATION, UNSPECIFIED TYPE (HCC): Primary | Chronic | ICD-10-CM

## 2024-02-28 LAB — INTERNATIONAL NORMALIZATION RATIO, POC: 1.5

## 2024-02-28 PROCEDURE — 85610 PROTHROMBIN TIME: CPT

## 2024-02-28 PROCEDURE — 99211 OFF/OP EST MAY X REQ PHY/QHP: CPT

## 2024-03-14 ENCOUNTER — PROCEDURE VISIT (OUTPATIENT)
Dept: CARDIOLOGY CLINIC | Age: 79
End: 2024-03-14

## 2024-03-14 DIAGNOSIS — I25.5 ISCHEMIC CARDIOMYOPATHY: ICD-10-CM

## 2024-03-14 DIAGNOSIS — I10 ESSENTIAL HYPERTENSION: ICD-10-CM

## 2024-03-20 ENCOUNTER — OFFICE VISIT (OUTPATIENT)
Dept: CARDIOLOGY CLINIC | Age: 79
End: 2024-03-20
Payer: MEDICARE

## 2024-03-20 ENCOUNTER — NURSE ONLY (OUTPATIENT)
Dept: CARDIOLOGY CLINIC | Age: 79
End: 2024-03-20
Payer: MEDICARE

## 2024-03-20 ENCOUNTER — ANTI-COAG VISIT (OUTPATIENT)
Dept: PHARMACY | Age: 79
End: 2024-03-20
Payer: MEDICARE

## 2024-03-20 VITALS
SYSTOLIC BLOOD PRESSURE: 102 MMHG | HEART RATE: 68 BPM | OXYGEN SATURATION: 98 % | BODY MASS INDEX: 23.91 KG/M2 | DIASTOLIC BLOOD PRESSURE: 72 MMHG | HEIGHT: 70 IN | WEIGHT: 167 LBS

## 2024-03-20 DIAGNOSIS — I48.0 PAROXYSMAL ATRIAL FIBRILLATION (HCC): Chronic | ICD-10-CM

## 2024-03-20 DIAGNOSIS — R00.1 BRADYCARDIA: ICD-10-CM

## 2024-03-20 DIAGNOSIS — I48.0 PAROXYSMAL ATRIAL FIBRILLATION (HCC): Primary | Chronic | ICD-10-CM

## 2024-03-20 DIAGNOSIS — I10 ESSENTIAL HYPERTENSION: Chronic | ICD-10-CM

## 2024-03-20 DIAGNOSIS — I82.411 DVT OF DEEP FEMORAL VEIN, RIGHT (HCC): ICD-10-CM

## 2024-03-20 DIAGNOSIS — I50.22 CHRONIC SYSTOLIC HEART FAILURE (HCC): ICD-10-CM

## 2024-03-20 DIAGNOSIS — I42.9 CARDIOMYOPATHY, UNSPECIFIED TYPE (HCC): ICD-10-CM

## 2024-03-20 DIAGNOSIS — I25.5 ISCHEMIC CARDIOMYOPATHY: Chronic | ICD-10-CM

## 2024-03-20 DIAGNOSIS — Z95.810 ICD (IMPLANTABLE CARDIOVERTER-DEFIBRILLATOR) IN PLACE: Primary | ICD-10-CM

## 2024-03-20 DIAGNOSIS — Z95.810 ICD (IMPLANTABLE CARDIOVERTER-DEFIBRILLATOR) IN PLACE: ICD-10-CM

## 2024-03-20 DIAGNOSIS — I48.0 PAF (PAROXYSMAL ATRIAL FIBRILLATION) (HCC): Primary | Chronic | ICD-10-CM

## 2024-03-20 DIAGNOSIS — I25.810 CORONARY ARTERY DISEASE INVOLVING CORONARY BYPASS GRAFT OF NATIVE HEART WITHOUT ANGINA PECTORIS: ICD-10-CM

## 2024-03-20 DIAGNOSIS — I48.0 PAF (PAROXYSMAL ATRIAL FIBRILLATION) (HCC): Chronic | ICD-10-CM

## 2024-03-20 DIAGNOSIS — I49.3 PVC (PREMATURE VENTRICULAR CONTRACTION): ICD-10-CM

## 2024-03-20 LAB — INTERNATIONAL NORMALIZATION RATIO, POC: 2.2

## 2024-03-20 PROCEDURE — 85610 PROTHROMBIN TIME: CPT

## 2024-03-20 PROCEDURE — 99211 OFF/OP EST MAY X REQ PHY/QHP: CPT

## 2024-03-20 PROCEDURE — G8420 CALC BMI NORM PARAMETERS: HCPCS | Performed by: INTERNAL MEDICINE

## 2024-03-20 PROCEDURE — G8427 DOCREV CUR MEDS BY ELIG CLIN: HCPCS | Performed by: INTERNAL MEDICINE

## 2024-03-20 PROCEDURE — 3074F SYST BP LT 130 MM HG: CPT | Performed by: INTERNAL MEDICINE

## 2024-03-20 PROCEDURE — G8484 FLU IMMUNIZE NO ADMIN: HCPCS | Performed by: INTERNAL MEDICINE

## 2024-03-20 PROCEDURE — 1036F TOBACCO NON-USER: CPT | Performed by: INTERNAL MEDICINE

## 2024-03-20 PROCEDURE — 99214 OFFICE O/P EST MOD 30 MIN: CPT | Performed by: INTERNAL MEDICINE

## 2024-03-20 PROCEDURE — 93000 ELECTROCARDIOGRAM COMPLETE: CPT | Performed by: INTERNAL MEDICINE

## 2024-03-20 PROCEDURE — 3078F DIAST BP <80 MM HG: CPT | Performed by: INTERNAL MEDICINE

## 2024-03-20 PROCEDURE — 1123F ACP DISCUSS/DSCN MKR DOCD: CPT | Performed by: INTERNAL MEDICINE

## 2024-03-20 NOTE — PROGRESS NOTES
[facility]   Electrophysiology Follow up   Date: 3/20/2024  I had the privilege of visiting Juan A Mathis in the office.       CC: ***   HPI: Juan A Mathis is a 78 y.o. male history of HTN, HLD, CAD s/p CABG  2007 with re-do (2019), PVC, bradycardia, and AF. Hx of WESTON ligation and MAZE (1/8/2019). DVT 1/2023      S/p RFCA with PVI, roof line, inferior-posterior line with PWI (4/6/23)    DCCV 9/26/2023 12/13/2023 Ablation of multifocal PVCs     Assessment and plan:   -Symptomatic PVC's   ECG today shows ***  He has had bigeminy  And NSVT              Continue toprol, amiodarone, magnesium              He had extensive scar on GXT   12/13/2023 Ablation of multifocal PVCs     -PAF   ***% AT/AF burden per device interrogation today.   4/6/2023 S/p RFCA with PVI, roofline, inferior-posterior line with PWI              3/31/2023 successful DCCV. DCCV 9/26/2023 1/8/2019 h/o WESTON ligation and MAZE     -ICM/ chronic Systolic heart failure    Continue toprol, entresto, lasix,aldactone, Jardiance               Follows with Dr. Frazier      S/p Dual chamber ICD 12/18/2017   Interrogation today shows:*** years remaining, AP ***% ,  ***%,  ***% AT/AF burden per device interrogation today, Underlying ***, presenting ***    - CAD               01/2023 abnormal lexiscan  consistent with known disease               03/30/2022 PCI to IAN to LAD, SVG to OM and SVG to PDA               H/o CABG 2007, 2019              Continue BB, statin, plavix, zetia               Follows with Dr. Frazier      -HTN  Controlled/Not well controlled***  BP goal <130/80    -hx of DVT   8/2022-resolved   Continue coumadin     Ordered:     Relevant available labs, and cardiovascular diagnostics, documents reviewed.   ECG 3/20/24  ***SR/AFIB, QTcH ***,QRS ***    Echo 3/14/2024   Normal left ventricular size and wall thickness.   Overall left ventricular systolic function is mildly decreased with distal   septal and apical hypokinesis. 
SVG to PDA               H/o CABG 2007, 2019              Continue BB, statin, plavix, zetia               Follows with Dr. Frazier      -HTN  Controlled   BP goal <130/80  Continue toprol     -hx of DVT   8/2022-resolved   Continue coumadin     Ordered:   .one year     Relevant available labs, and cardiovascular diagnostics, documents reviewed.   Echo 3/14/2024   Normal left ventricular size and wall thickness.   Overall left ventricular systolic function is mildly decreased with distal   septal and apical hypokinesis. Estimated EF 40-45%.   GLS -13.4%.   E/'e=14. Indeterminate diastolic function.   Severe mitral regurgitation is present. ERO 40cm2   The left atrium is moderately dilated.   Mild aortic regurgitation is present. Pressure half-time is calculated at   440 msec.   Normal right ventricular size and function.   Moderate tricuspid regurgitation. RVSP 40mmHg.   Pacemaker / ICD lead is visualized in the right atrium.    LATESHA 4/2023   Normal left ventricular size. Mild LVH. Global left ventricular function is mildly decreased with ejection fraction estimated 45%.     The left atrium appears severely dilated. Spontaneous echo contrast seen in the left atrium. There is no evidence of mass or thrombus in the left atrium. No remnant of left atrial appendage seen.     NM stress 1/25/2023    Summary   Abnormal study. There is a large sized, severe intensity, fixed defect of   the anterior, apical, anteroseptal and inferoseptal walls which is   consistent with scar/prior infarction.   No evidence of myocardium at risk for significant reversible ischemia.   There is moderate-severe global LV systolic dysfunction with ejection   fraction of 36 %.   Overall findings represent a intermediate risk scan.     Echo 8/2022    Normal left ventricular size.   Mild concentric left ventricular hypertrophy. Mildly decreased left   ventricular systolic function with distal septal and apical hypokinesis. Estimated EF 45%. E/e'=14.

## 2024-03-21 PROCEDURE — 93283 PRGRMG EVAL IMPLANTABLE DFB: CPT | Performed by: INTERNAL MEDICINE

## 2024-04-03 ENCOUNTER — TELEPHONE (OUTPATIENT)
Dept: CARDIOLOGY CLINIC | Age: 79
End: 2024-04-03

## 2024-04-10 ENCOUNTER — ANTI-COAG VISIT (OUTPATIENT)
Dept: PHARMACY | Age: 79
End: 2024-04-10
Payer: MEDICARE

## 2024-04-10 DIAGNOSIS — I48.91 ATRIAL FIBRILLATION, UNSPECIFIED TYPE (HCC): Primary | Chronic | ICD-10-CM

## 2024-04-10 LAB — INTERNATIONAL NORMALIZATION RATIO, POC: 2.6

## 2024-04-10 PROCEDURE — 99211 OFF/OP EST MAY X REQ PHY/QHP: CPT

## 2024-04-10 PROCEDURE — 85610 PROTHROMBIN TIME: CPT

## 2024-04-10 NOTE — PROGRESS NOTES
02/28/2024 1.5   01/31/2024 2.8     For Pharmacy Admin Tracking Only  Total # of Interventions Recommended: 0  Total # of Interventions Accepted: 0  Time Spent (min): 15

## 2024-04-11 ASSESSMENT — ENCOUNTER SYMPTOMS
ABDOMINAL DISTENTION: 0
SHORTNESS OF BREATH: 1
BLOOD IN STOOL: 0
ABDOMINAL PAIN: 0
PHOTOPHOBIA: 0
CHEST TIGHTNESS: 0
NAUSEA: 0
COUGH: 0

## 2024-04-11 NOTE — PROGRESS NOTES
.   
exercise.      Dr. Jonnathan Frazier DO  Cardiologist [facility]    Scribe's attestation: This note was scribed in the presence of Dr. Karin DO by Felisha Camarena RN

## 2024-04-12 ENCOUNTER — TELEPHONE (OUTPATIENT)
Dept: PHARMACY | Age: 79
End: 2024-04-12

## 2024-04-12 ENCOUNTER — OFFICE VISIT (OUTPATIENT)
Dept: CARDIOLOGY CLINIC | Age: 79
End: 2024-04-12

## 2024-04-12 ENCOUNTER — TELEPHONE (OUTPATIENT)
Dept: CARDIOLOGY CLINIC | Age: 79
End: 2024-04-12

## 2024-04-12 VITALS
DIASTOLIC BLOOD PRESSURE: 60 MMHG | SYSTOLIC BLOOD PRESSURE: 96 MMHG | HEIGHT: 70 IN | HEART RATE: 60 BPM | BODY MASS INDEX: 22.05 KG/M2 | OXYGEN SATURATION: 99 % | WEIGHT: 154 LBS

## 2024-04-12 DIAGNOSIS — E78.2 MIXED HYPERLIPIDEMIA: ICD-10-CM

## 2024-04-12 DIAGNOSIS — I47.29 NSVT (NONSUSTAINED VENTRICULAR TACHYCARDIA) (HCC): ICD-10-CM

## 2024-04-12 DIAGNOSIS — G47.33 OSA ON CPAP: ICD-10-CM

## 2024-04-12 DIAGNOSIS — I25.10 CORONARY ARTERY DISEASE DUE TO LIPID RICH PLAQUE: Primary | ICD-10-CM

## 2024-04-12 DIAGNOSIS — I48.0 PAF (PAROXYSMAL ATRIAL FIBRILLATION) (HCC): Chronic | ICD-10-CM

## 2024-04-12 DIAGNOSIS — I49.3 PVC (PREMATURE VENTRICULAR CONTRACTION): ICD-10-CM

## 2024-04-12 DIAGNOSIS — R56.9 SEIZURE (HCC): ICD-10-CM

## 2024-04-12 DIAGNOSIS — I25.83 CORONARY ARTERY DISEASE DUE TO LIPID RICH PLAQUE: Primary | ICD-10-CM

## 2024-04-12 DIAGNOSIS — I25.5 ISCHEMIC CARDIOMYOPATHY: Chronic | ICD-10-CM

## 2024-04-12 DIAGNOSIS — I82.411 DVT OF DEEP FEMORAL VEIN, RIGHT (HCC): Primary | ICD-10-CM

## 2024-04-12 DIAGNOSIS — I10 ESSENTIAL HYPERTENSION: Chronic | ICD-10-CM

## 2024-04-12 DIAGNOSIS — I34.0 MITRAL VALVE INSUFFICIENCY, UNSPECIFIED ETIOLOGY: ICD-10-CM

## 2024-04-12 DIAGNOSIS — I48.0 PAROXYSMAL ATRIAL FIBRILLATION (HCC): Chronic | ICD-10-CM

## 2024-04-12 RX ORDER — FUROSEMIDE 40 MG/1
40 TABLET ORAL SEE ADMIN INSTRUCTIONS
Qty: 45 TABLET | Refills: 3 | Status: SHIPPED | OUTPATIENT
Start: 2024-04-12

## 2024-04-12 RX ORDER — POTASSIUM CHLORIDE 20 MEQ/1
20 TABLET, EXTENDED RELEASE ORAL SEE ADMIN INSTRUCTIONS
Qty: 36 TABLET | Refills: 3 | Status: SHIPPED | OUTPATIENT
Start: 2024-04-12

## 2024-04-12 RX ORDER — ENOXAPARIN SODIUM 100 MG/ML
70 INJECTION SUBCUTANEOUS 2 TIMES DAILY
Qty: 14.4 ML | Refills: 1 | Status: SHIPPED | OUTPATIENT
Start: 2024-04-12

## 2024-04-12 NOTE — TELEPHONE ENCOUNTER
Dr Frazier requesting LATESHA w/ anesthesia and LHC to be done on same day for pt. LATESHA prior to LHC. Dr Frazier can do LATESHA but wants interventional cardiologist to do LHC. Please call pt to schedule.

## 2024-04-12 NOTE — PATIENT INSTRUCTIONS
Jenna will be calling you to schedule transesophageal echo and left heart cath      Have labs drawn prior to procedure     Anticoagulation clinic will contact you regarding holding warfarin and bridging with lovenox

## 2024-04-12 NOTE — TELEPHONE ENCOUNTER
LATESHA:  Date of Procedure: Wednesday 5/1/24 @ [facility] with Dr. Frazier    Time of arrival: 12:00 pm     Procedure time: 1:00 pm     C:  Date of Procedure: Wednesday 5/1/24 @ [facility] with Dr. Jenkins     Time of arrival: Will already be there for the LATESHA    Procedure time: 2:00 pm     Called and spoke to Yossi and he is agreeable to date and time. Reviewed instructions and he verbalized understanding. Encouraged to call with any questions or concerns.     Published on Skycheck[employer], scheduled in Epic and e-mailed to cath lab.

## 2024-04-12 NOTE — TELEPHONE ENCOUNTER
Patient is scheduled for [facility] on 5/1. Was cleared by cardiology to hold warfarin 5 days prior and bridge with lovenox.     Called and spoke to patient. Verified above information. Pt asked for bridging instructions to be sent via [employer] and for lovenox script to be sent to [facility] in [place]. R/s pt to RTC on Mon 5/6 since bridging.     Day Date Warfarin Dose Lovenox Dose    5 days before 4/26/2024 Stop warfarin 8 AM: No Lovenox  8 PM: No Lovenox     4 days before 4/27/2024 No warfarin 8 AM: No Lovenox   8 PM: 70 mg injection    3 days before 4/28/2024 No warfarin 8 AM: 70 mg injection  8 PM: 70 mg injection    2 days before 4/29/2024 No warfarin 8 AM: 70 mg injection  8 PM: 70 mg injection    1 day before 4/30/2024 No Warfarin 8 AM: 70 mg injection  8 PM: NO LOVENOX    Day of procedure 5/1/2024 Take  7.5 mg of warfarin in the evening 8 AM: NO LOVENOX  8 PM: 70 mg injection    1 day after 5/2/2024 Take  7.5 mg of warfarin in the evening 8 AM: 70 mg injection  8 PM: 70 mg injection    2 days after 5/3/2024 Take 5 mg of warfarin in the evening 8 AM: 70 mg injection  8 PM: 70 mg injection    3 days after 5/4/2024 Take 2.5 mg of warfarin in the evening 8 AM: 70 mg injection  8 PM: 70 mg injection    4 days after 5/5/2024 Take 2.5 mg of warfarin in the evening 8 AM: 70 mg injection  8 PM: 70 mg injection    5 days after 5/6/2024 Take 5 mg of warfarin in the evening 8 AM: 70 mg injection  8 PM: 70 mg injection    Comments:  appointment in clinic to check INR on Mon 5/6/2024 at 11:00 am

## 2024-04-26 DIAGNOSIS — I10 ESSENTIAL HYPERTENSION: Chronic | ICD-10-CM

## 2024-04-26 DIAGNOSIS — I25.10 CORONARY ARTERY DISEASE DUE TO LIPID RICH PLAQUE: ICD-10-CM

## 2024-04-26 DIAGNOSIS — I25.83 CORONARY ARTERY DISEASE DUE TO LIPID RICH PLAQUE: ICD-10-CM

## 2024-04-26 RX ORDER — FUROSEMIDE 40 MG/1
40 TABLET ORAL SEE ADMIN INSTRUCTIONS
Qty: 45 TABLET | Refills: 3 | Status: SHIPPED | OUTPATIENT
Start: 2024-04-26

## 2024-04-26 RX ORDER — SODIUM CHLORIDE 0.9 % (FLUSH) 0.9 %
5-40 SYRINGE (ML) INJECTION PRN
Status: CANCELLED | OUTPATIENT
Start: 2024-04-26

## 2024-04-26 RX ORDER — SODIUM CHLORIDE 9 MG/ML
INJECTION, SOLUTION INTRAVENOUS PRN
Status: CANCELLED | OUTPATIENT
Start: 2024-04-26

## 2024-04-26 RX ORDER — SODIUM CHLORIDE 0.9 % (FLUSH) 0.9 %
5-40 SYRINGE (ML) INJECTION EVERY 12 HOURS SCHEDULED
Status: CANCELLED | OUTPATIENT
Start: 2024-04-26

## 2024-04-26 NOTE — TELEPHONE ENCOUNTER
Medication Refill    Medication needing refilled:    furosemide (LASIX) 40 MG     Dosage of the medication:    How are you taking this medication (QD, BID, TID, QID, PRN): pt states he is taking  1 1/2 tab at times and is requesting a new script for more tablets     30 or 90 day supply called in: 90 tablets     When will you run out of your medication:    Which Pharmacy are we sending the medication to?: [facility] [id number] [place] [id number] [facility] [phone] [facility] [phone]

## 2024-05-01 ENCOUNTER — HOSPITAL ENCOUNTER (OUTPATIENT)
Dept: CARDIAC CATH/INVASIVE PROCEDURES | Age: 79
Discharge: HOME | End: 2024-05-01
Attending: INTERNAL MEDICINE | Admitting: INTERNAL MEDICINE
Payer: MEDICARE

## 2024-05-01 ENCOUNTER — ANESTHESIA EVENT (OUTPATIENT)
Dept: CARDIAC CATH/INVASIVE PROCEDURES | Age: 79
End: 2024-05-01
Payer: MEDICARE

## 2024-05-01 ENCOUNTER — ANESTHESIA (OUTPATIENT)
Dept: CARDIAC CATH/INVASIVE PROCEDURES | Age: 79
End: 2024-05-01
Payer: MEDICARE

## 2024-05-01 VITALS — OXYGEN SATURATION: 100 %

## 2024-05-01 VITALS
BODY MASS INDEX: 22.05 KG/M2 | DIASTOLIC BLOOD PRESSURE: 66 MMHG | OXYGEN SATURATION: 100 % | HEIGHT: 70 IN | HEART RATE: 52 BPM | RESPIRATION RATE: 16 BRPM | WEIGHT: 154 LBS | SYSTOLIC BLOOD PRESSURE: 96 MMHG

## 2024-05-01 DIAGNOSIS — I25.83 CORONARY ARTERY DISEASE DUE TO LIPID RICH PLAQUE: ICD-10-CM

## 2024-05-01 DIAGNOSIS — I25.10 CORONARY ARTERY DISEASE DUE TO LIPID RICH PLAQUE: ICD-10-CM

## 2024-05-01 DIAGNOSIS — I10 ESSENTIAL HYPERTENSION: Chronic | ICD-10-CM

## 2024-05-01 PROBLEM — I34.0 NONRHEUMATIC MITRAL VALVE REGURGITATION: Status: ACTIVE | Noted: 2024-05-01

## 2024-05-01 LAB
ANION GAP SERPL CALCULATED.3IONS-SCNC: 8 MMOL/L (ref 3–16)
BUN SERPL-MCNC: 22 MG/DL (ref 7–20)
CALCIUM SERPL-MCNC: 8.8 MG/DL (ref 8.3–10.6)
CHLORIDE SERPL-SCNC: 103 MMOL/L (ref 99–110)
CO2 SERPL-SCNC: 27 MMOL/L (ref 21–32)
CREAT SERPL-MCNC: 1.2 MG/DL (ref 0.8–1.3)
DEPRECATED RDW RBC AUTO: 13.9 % (ref 12.4–15.4)
EKG ATRIAL RATE: 49 BPM
EKG DIAGNOSIS: NORMAL
EKG Q-T INTERVAL: 414 MS
EKG QRS DURATION: 138 MS
EKG QTC CALCULATION (BAZETT): 437 MS
EKG R AXIS: -70 DEGREES
EKG T AXIS: 238 DEGREES
EKG VENTRICULAR RATE: 67 BPM
GFR SERPLBLD CREATININE-BSD FMLA CKD-EPI: 62 ML/MIN/{1.73_M2}
GLUCOSE SERPL-MCNC: 94 MG/DL (ref 70–99)
HCT VFR BLD AUTO: 41 % (ref 40.5–52.5)
HGB BLD-MCNC: 13.5 G/DL (ref 13.5–17.5)
INR PPP: 1.22 (ref 0.85–1.15)
LEFT VENTRICULAR EJECTION FRACTION MODE: NORMAL
LV EF: 55 %
MCH RBC QN AUTO: 30.8 PG (ref 26–34)
MCHC RBC AUTO-ENTMCNC: 32.9 G/DL (ref 31–36)
MCV RBC AUTO: 93.7 FL (ref 80–100)
PLATELET # BLD AUTO: 315 K/UL (ref 135–450)
PMV BLD AUTO: 7.9 FL (ref 5–10.5)
POTASSIUM SERPL-SCNC: 4.4 MMOL/L (ref 3.5–5.1)
PROTHROMBIN TIME: 15.6 SEC (ref 11.9–14.9)
RBC # BLD AUTO: 4.37 M/UL (ref 4.2–5.9)
SODIUM SERPL-SCNC: 138 MMOL/L (ref 136–145)
WBC # BLD AUTO: 5.7 K/UL (ref 4–11)

## 2024-05-01 PROCEDURE — C1769 GUIDE WIRE: HCPCS

## 2024-05-01 PROCEDURE — 3700000001 HC ADD 15 MINUTES (ANESTHESIA)

## 2024-05-01 PROCEDURE — 93010 ELECTROCARDIOGRAM REPORT: CPT | Performed by: INTERNAL MEDICINE

## 2024-05-01 PROCEDURE — 85027 COMPLETE CBC AUTOMATED: CPT

## 2024-05-01 PROCEDURE — 80048 BASIC METABOLIC PNL TOTAL CA: CPT

## 2024-05-01 PROCEDURE — 93312 ECHO TRANSESOPHAGEAL: CPT

## 2024-05-01 PROCEDURE — 2709999900 HC NON-CHARGEABLE SUPPLY

## 2024-05-01 PROCEDURE — 2580000003 HC RX 258: Performed by: NURSE ANESTHETIST, CERTIFIED REGISTERED

## 2024-05-01 PROCEDURE — 3700000000 HC ANESTHESIA ATTENDED CARE

## 2024-05-01 PROCEDURE — C1894 INTRO/SHEATH, NON-LASER: HCPCS

## 2024-05-01 PROCEDURE — 2500000003 HC RX 250 WO HCPCS: Performed by: NURSE ANESTHETIST, CERTIFIED REGISTERED

## 2024-05-01 PROCEDURE — 99152 MOD SED SAME PHYS/QHP 5/>YRS: CPT

## 2024-05-01 PROCEDURE — 93319 3D ECHO IMG CGEN CAR ANOMAL: CPT

## 2024-05-01 PROCEDURE — 6360000004 HC RX CONTRAST MEDICATION: Performed by: INTERNAL MEDICINE

## 2024-05-01 PROCEDURE — 93459 L HRT ART/GRFT ANGIO: CPT | Performed by: INTERNAL MEDICINE

## 2024-05-01 PROCEDURE — 93459 L HRT ART/GRFT ANGIO: CPT

## 2024-05-01 PROCEDURE — 6360000002 HC RX W HCPCS: Performed by: NURSE ANESTHETIST, CERTIFIED REGISTERED

## 2024-05-01 PROCEDURE — 93005 ELECTROCARDIOGRAM TRACING: CPT | Performed by: INTERNAL MEDICINE

## 2024-05-01 PROCEDURE — 2500000003 HC RX 250 WO HCPCS

## 2024-05-01 PROCEDURE — 85610 PROTHROMBIN TIME: CPT

## 2024-05-01 PROCEDURE — 99152 MOD SED SAME PHYS/QHP 5/>YRS: CPT | Performed by: INTERNAL MEDICINE

## 2024-05-01 PROCEDURE — 6360000002 HC RX W HCPCS

## 2024-05-01 PROCEDURE — 99153 MOD SED SAME PHYS/QHP EA: CPT

## 2024-05-01 PROCEDURE — 36415 COLL VENOUS BLD VENIPUNCTURE: CPT

## 2024-05-01 RX ORDER — POTASSIUM CHLORIDE 20 MEQ/1
20 TABLET, EXTENDED RELEASE ORAL DAILY
Qty: 90 TABLET | Refills: 3 | Status: SHIPPED | OUTPATIENT
Start: 2024-05-01

## 2024-05-01 RX ORDER — SODIUM CHLORIDE 0.9 % (FLUSH) 0.9 %
5-40 SYRINGE (ML) INJECTION EVERY 12 HOURS SCHEDULED
Status: DISCONTINUED | OUTPATIENT
Start: 2024-05-01 | End: 2024-05-01 | Stop reason: HOSPADM

## 2024-05-01 RX ORDER — SODIUM CHLORIDE 9 MG/ML
INJECTION, SOLUTION INTRAVENOUS PRN
Status: DISCONTINUED | OUTPATIENT
Start: 2024-05-01 | End: 2024-05-01 | Stop reason: HOSPADM

## 2024-05-01 RX ORDER — SODIUM CHLORIDE 9 MG/ML
INJECTION, SOLUTION INTRAVENOUS CONTINUOUS
Status: DISCONTINUED | OUTPATIENT
Start: 2024-05-01 | End: 2024-05-01 | Stop reason: HOSPADM

## 2024-05-01 RX ORDER — SODIUM CHLORIDE 9 MG/ML
INJECTION, SOLUTION INTRAVENOUS CONTINUOUS PRN
Status: DISCONTINUED | OUTPATIENT
Start: 2024-05-01 | End: 2024-05-01 | Stop reason: SDUPTHER

## 2024-05-01 RX ORDER — LIDOCAINE HYDROCHLORIDE 20 MG/ML
INJECTION, SOLUTION INFILTRATION; PERINEURAL PRN
Status: DISCONTINUED | OUTPATIENT
Start: 2024-05-01 | End: 2024-05-01 | Stop reason: SDUPTHER

## 2024-05-01 RX ORDER — ACETAMINOPHEN 325 MG/1
650 TABLET ORAL EVERY 4 HOURS PRN
Status: DISCONTINUED | OUTPATIENT
Start: 2024-05-01 | End: 2024-05-01 | Stop reason: HOSPADM

## 2024-05-01 RX ORDER — SODIUM CHLORIDE 0.9 % (FLUSH) 0.9 %
5-40 SYRINGE (ML) INJECTION PRN
Status: DISCONTINUED | OUTPATIENT
Start: 2024-05-01 | End: 2024-05-01 | Stop reason: HOSPADM

## 2024-05-01 RX ORDER — FUROSEMIDE 40 MG/1
40 TABLET ORAL SEE ADMIN INSTRUCTIONS
Qty: 90 TABLET | Refills: 3 | Status: SHIPPED | OUTPATIENT
Start: 2024-05-01 | End: 2024-05-02 | Stop reason: SDUPTHER

## 2024-05-01 RX ORDER — PROPOFOL 10 MG/ML
INJECTION, EMULSION INTRAVENOUS PRN
Status: DISCONTINUED | OUTPATIENT
Start: 2024-05-01 | End: 2024-05-01 | Stop reason: SDUPTHER

## 2024-05-01 RX ORDER — ONDANSETRON 2 MG/ML
4 INJECTION INTRAMUSCULAR; INTRAVENOUS EVERY 6 HOURS PRN
Status: DISCONTINUED | OUTPATIENT
Start: 2024-05-01 | End: 2024-05-01 | Stop reason: HOSPADM

## 2024-05-01 RX ADMIN — LIDOCAINE HYDROCHLORIDE 60 MG: 20 INJECTION, SOLUTION INFILTRATION; PERINEURAL at 13:03

## 2024-05-01 RX ADMIN — PHENYLEPHRINE HYDROCHLORIDE 50 MCG: 10 INJECTION INTRAVENOUS at 13:07

## 2024-05-01 RX ADMIN — PROPOFOL 50 MG: 10 INJECTION, EMULSION INTRAVENOUS at 13:03

## 2024-05-01 RX ADMIN — PHENYLEPHRINE HYDROCHLORIDE 50 MCG: 10 INJECTION INTRAVENOUS at 13:16

## 2024-05-01 RX ADMIN — PROPOFOL 30 MG: 10 INJECTION, EMULSION INTRAVENOUS at 13:15

## 2024-05-01 RX ADMIN — IOPAMIDOL 73 ML: 755 INJECTION, SOLUTION INTRAVENOUS at 14:58

## 2024-05-01 RX ADMIN — SODIUM CHLORIDE: 9 INJECTION, SOLUTION INTRAVENOUS at 12:58

## 2024-05-01 RX ADMIN — PROPOFOL 50 MG: 10 INJECTION, EMULSION INTRAVENOUS at 13:07

## 2024-05-01 RX ADMIN — PROPOFOL 50 MG: 10 INJECTION, EMULSION INTRAVENOUS at 13:05

## 2024-05-01 RX ADMIN — PROPOFOL 30 MG: 10 INJECTION, EMULSION INTRAVENOUS at 13:11

## 2024-05-01 RX ADMIN — PHENYLEPHRINE HYDROCHLORIDE 50 MCG: 10 INJECTION INTRAVENOUS at 13:11

## 2024-05-01 ASSESSMENT — ENCOUNTER SYMPTOMS: SHORTNESS OF BREATH: 1

## 2024-05-01 NOTE — PROGRESS NOTES
CATH LAB PROCEDURE LOG - TRANSESOPHAGEAL ECHOCARDIOGRAM    PRE PROCEDURE    DATE: 5/1/2024 ARRIVAL TO CATH LAB: 12:45 PM    ID & ALLERGY BAND: On    CONSENT: Yes    NPO SINCE: Midnight    IV SITE: Started in left arm.     Pt arrived to Cath Lab.   Plan of Care: Hemodynamics and cardiac rhythm will remain stable. Comfort level will be maintained. Respiratory function will remain adequate. Pt/family will verbalize understanding of the procedure. Procedure will be tolerated without complications. Patient will recover from procedure without complications.   ID armband on patient and identification verified.   Informed consent obtained.   Non invasive blood pressure cuff applied, monitoring initiated.   EKG pads and pulse oximeter applied, monitoring initiated.   Instructions given. Patient and / or family verbalize understanding.   H&P will be documented by physician in Epic.   Pt has been NPO since midnight.       TRANSESOPHAGEAL ECHOCARDIOGRAM    Timeout and fire safety completed.     TIMEOUT TIME: 12:52 PM    CORRECT PATIENT VERIFIED. MEMBERS OF THE SURGICAL TEAM/VISITORS INTRODUCED. ALLERGIES ANNOUNCED. CORRECT PROCEDURE VERIFIED. CORRECT PROCEDURAL SITE VERIFIED. CONSENTS VERIFIED. IMPLANT EQUIPMENT, ADDITIONAL SERVICES, SPECIAL REQUIREMENTS AVAILABLE. MEDICATIONS LABELED AND AVAILABLE. APPROPRIATE PRE MEDS HAVE BEEN ADMINISTERED.    FIRE SAFETY: ALCOHOL PREP SOLUTION HAD SUFFICIENT TIME TO DISSIPATE IF USED. FIRE SAFETY: SURGICAL SITE OR INCISION ABOVE THE XIPHOID. YES=1, NO=0. FIRE SAFETY: OPEN OXYGEN SOURCE. YES=1, NO=0. FIRE SAFETY: AVAILABLE IGNITION SOURCE. YES=1, NO=0. FIRE SAFETY: SCORE TOTAL = 1.     Viscous Lidocaine administered per verbal order : No         Cetacaine spray administered per verbal order: Yes  1259      [facility] here to provide sedation/anesthesia.          LATESHA probe passed and images obtained. At 1302    Probe removed at 1318    1335  Pt waking up, respirations spontaneous, able to  converse appropriately, follows commands, resting quietly.     To cath lab for LHC with Dr Jenkins

## 2024-05-01 NOTE — ANESTHESIA POSTPROCEDURE EVALUATION
Department of Anesthesiology  Postprocedure Note    Patient: Juan A Mathis  MRN: [id number]  YOB: 1945  Date of evaluation: 5/1/2024    Procedure Summary       Date: 05/01/24 Room / Location: [facility] Cath Lab; [facility] Echocardiography    Anesthesia Start: 1258 Anesthesia Stop: 1330    Procedure: ECHOCARDIOGRAM TRANSESOPHAGEAL Diagnosis:       Mitral valve insufficiency, unspecified etiology      Nonrheumatic mitral (valve) insufficiency      (MR)    Scheduled Providers:  Responsible Provider: Kofi Lyons MD    Anesthesia Type: MAC ASA Status: 4            Anesthesia Type: No value filed.    Brad Phase I: Brad Score: 10    Brad Phase II:      Anesthesia Post Evaluation    Patient location during evaluation: PACU  Patient participation: complete - patient participated  Level of consciousness: awake and alert  Airway patency: patent  Nausea & Vomiting: no vomiting and no nausea  Cardiovascular status: hemodynamically stable  Respiratory status: acceptable  Hydration status: stable  Pain management: adequate    No notable events documented.

## 2024-05-01 NOTE — PRE SEDATION
awake  Oxygen Saturation (color):  2 - Able to maintain oxygen saturation >92% on room air    Sedation/Anesthesia Plan:  Guard the patient's safety and welfare.  Minimize physical discomfort and pain.  Minimize negative psychological responses to treatment by providing sedation and analgesia and maximize the potential amnesia.  Patient to meet pre-procedure discharge plan.    Medication Planned:  midazolam intravenously and fentanyl intravenously    Patient is an appropriate candidate for plan of sedation:   yes      Electronically signed by Alex Jenkins MD on 5/1/2024 at 1:43 PM

## 2024-05-01 NOTE — PROGRESS NOTES
PRE-PROCEDURE    DATE: 5/1/2024 ARRIVAL TO CATH LAB: 12:37 PM    ADMIT SOURCE: Outpatient    ID & ALLERGY BAND: On    CONSENT: Yes    NPO SINCE: Midnight    LABS/PREGNANCY TEST: N/A    PULSES:  Right Radial Artery 2+  Right DP 2  Left DP2    IV SITE : Started in left arm.  with fluids infusing at kvo 12:37 PM     SURGERIES PLANNED: No    BLEEDING PROBLEMS: No    BLEEDING RISK: Intermediate Risk >2%  or <6.5%    COMPLIANCE: Yes      PATIENT HISTORY    CHIEF COMPLAINT:  severe MR    EKG:  Yes    Pre CATH Rhythm:  paced rhythm    STRESS TEST PREFORMED:  NM 1/25/23  intermediate risk   EF36%    CARDIAC CTA PREFORMED:  No    AGATSTON CORONARY CALCIUM SCORE:   Assessed: No    ECHO: DATE:  Yes.  Most recent date: 3/14/24      EF:  45%    HYPERTENSION: Yes    DYSLIPIDEMIA: Yes    FAMILY HX OF CAD: No    PRIOR MI: Yes.  Most recent date: 2004    PRIOR PCI: Yes.  Most recent date: 3/30/22    PRIOR CABG: Yes.  Most recent date: 2019    CEREBRALVASCULAR DX: No    PERIPHERAL ARTERIAL DISEASE: No    CHRONIC LUNG DISEASE: No    TOBACCO: Former.   Quit Date: 1990    DIABETIC: No    CARDIAC ARREST: No    DIALYSIS: No    FRAILTY SCORE: 3 MANAGING WELL (medical problems are well controlled, not regularly active beyond routine walking)

## 2024-05-01 NOTE — ANESTHESIA PRE PROCEDURE
([facility]) I48.91   • Mixed hyperlipidemia E78.2   • Palpitations R00.2   • Bradycardia R00.1   • NSVT (nonsustained ventricular tachycardia) ([facility]) I47.29   • Coronary artery disease involving coronary bypass graft of native heart without angina pectoris I25.810   • Vertebrobasilar insufficiency G45.0   • JOSEPH on CPAP G47.33   • History of MI (myocardial infarction) I25.2   • Ischemic cardiomyopathy I25.5   • VT (ventricular tachycardia) ([facility]) I47.20   • VF (ventricular fibrillation) ([facility]) I49.01   • ICD (implantable cardioverter-defibrillator) in place Z95.810   • Shortness of breath R06.02   • Chronic systolic heart failure ([facility]) I50.22   • Coronary artery disease involving native coronary artery of native heart with unstable angina pectoris ([facility]) I25.110   • Irregular heartbeat I49.9   • DVT of deep femoral vein, right ([facility]) I82.411   • S/P CABG (coronary artery bypass graft) Z95.1   • PVC (premature ventricular contraction) I49.3   • Cardiomyopathy ([facility]) I42.9       Past Medical History:        Diagnosis Date   • Accidental fall 10/03/2023   • Arthritis     shoulders and knee   • Atrial fib/flut    • CAD (coronary artery disease)    • CHF (congestive heart failure) ([facility])    • DVT (deep venous thrombosis) ([facility]) 08/24/2022    RLE   • Epilepsia    • Hx of blood clots    • Hx of CABG    • Hyperlipidemia    • Hypertension    • Kidney stones    • MI (myocardial infarction) ([facility])    • Pacemaker    • Seizures ([facility])     last seizure 1985   • Sinus bradycardia    • Sleep apnea     uses CPAP   • V tach ([facility])        Past Surgical History:        Procedure Laterality Date   • CARDIAC SURGERY      , angioplasty 2007   • CARDIOVERSION      6/2022, 9/2022, 1/6/23- cardioversions   • COLONOSCOPY N/A 1/27/2023    COLONOSCOPY DIAGNOSTIC performed by Rusty Crisostomo MD at [facility] ENDOSCOPY   • CORONARY ANGIOPLASTY WITH STENT PLACEMENT  1997   • CORONARY ARTERY BYPASS GRAFT  01/2019    [facility]   • CORONARY STENT PLACEMENT

## 2024-05-01 NOTE — H&P
Department of Cardiology Pre-operative History and Physical        DIAGNOSIS:  Mitral regurgitation     INDICATION:  Mitral regurgitation    PROCEDURE:  LATESHA    CHIEF COMPLAINT:  shortness of breath    History Obtained From:  patient    HISTORY OF PRESENT ILLNESS:      The patient is a 78 y.o. male with significant past medical history of CAD s/p CABG who presents with shortness of breath. Echo with concern for severe MR.    Past Medical History:        Diagnosis Date    Accidental fall 10/03/2023    Arthritis     shoulders and knee    Atrial fib/flut     CAD (coronary artery disease)     CHF (congestive heart failure) ([facility])     DVT (deep venous thrombosis) ([facility]) 08/24/2022    RLE    Epilepsia     Hx of blood clots     Hx of CABG     Hyperlipidemia     Hypertension     Kidney stones     MI (myocardial infarction) ([facility])     Pacemaker     Seizures ([facility])     last seizure 1985    Sinus bradycardia     Sleep apnea     uses CPAP    V tach ([facility])      Past Surgical History:        Procedure Laterality Date    CARDIAC SURGERY      , angioplasty 2007    CARDIOVERSION      6/2022, 9/2022, 1/6/23- cardioversions    COLONOSCOPY N/A 1/27/2023    COLONOSCOPY DIAGNOSTIC performed by Rusty Crisostomo MD at [facility] ENDOSCOPY    CORONARY ANGIOPLASTY WITH STENT PLACEMENT  1997    CORONARY ARTERY BYPASS GRAFT  01/2019    [facility]    CORONARY STENT PLACEMENT  03/30/2022    3/30/22 Successful complex angioplasty and stenting of IAN to LAD, SVG to OM, SVG to PDA    CYSTOSCOPY N/A 11/13/2019    FLEXIBLE CYSTOSCOPY performed by Rusty Beth MD at [facility] OR    HERNIA REPAIR Right     done 65 sam ago    PACEMAKER PLACEMENT  12/18/2017    AICD    WISDOM TOOTH EXTRACTION  04/2012     Medications Prior to Admission:   Medications Prior to Admission: furosemide (LASIX) 40 MG tablet, Take 1 tablet by mouth See Admin Instructions Every other day  sacubitril-valsartan (ENTRESTO) 24-26 MG per tablet, Take 1 tablet by mouth 2 times  daily  potassium chloride (KLOR-CON M20) 20 MEQ extended release tablet, Take 1 tablet by mouth See Admin Instructions Three times a week  enoxaparin (LOVENOX) 80 MG/0.8ML, Inject 0.7 mLs into the skin 2 times daily  metoprolol succinate (TOPROL XL) 100 MG extended release tablet, TAKE 1 TABLET IN THE MORNING AND ONE-HALF (1/2) TABLET IN THE EVENING (Patient taking differently: TAKE 1 TABLET IN THE MORNING AND ONE-HALF (1/2) TABLET IN THE EVENING,  EXCEPT MWF TAKE 1 1/2 I THE AM AND 1/2 IN THE PM)  MAGnesium-Oxide 400 (240 Mg) MG tablet, Take 2 tablets by mouth daily  pantoprazole (PROTONIX) 40 MG tablet, Take 1 tablet by mouth every morning (before breakfast)  spironolactone (ALDACTONE) 25 MG tablet, Take 0.5 tablets by mouth daily  rosuvastatin (CRESTOR) 40 MG tablet, Take 1 tablet by mouth nightly  ezetimibe (ZETIA) 10 MG tablet, Take 1 tablet by mouth every evening  amiodarone (CORDARONE) 200 MG tablet, Take 1 tablet by mouth daily 3 days a week extra 1/2 tablet  clopidogrel (PLAVIX) 75 MG tablet, Take 1 tablet by mouth every morning  naproxen (NAPROSYN) 500 MG tablet, Take 1 tablet by mouth 2 times daily (with meals)  warfarin (COUMADIN) 2.5 MG tablet, TAKE 2 TABLETS DAILY OR AS DIRECTED BY PROVIDER BASED ON INR  warfarin (COUMADIN) 2.5 MG tablet, Take 2 tablets by mouth daily Dose adjusted via anticoagulation clinic  nitroGLYCERIN (NITROSTAT) 0.4 MG SL tablet, Place 1 tablet under the tongue every 5 minutes as needed for Chest pain  diazepam (VALIUM) 5 MG tablet, Take 1 tablet by mouth in the morning and 1 tablet in the evening.  phenytoin (DILANTIN) 100 MG ER capsule, Take 1 capsule by mouth 2 times daily. Resume home dose  primidone (MYSOLINE) 250 MG tablet, Take 0.5 tablets by mouth 3 times daily 1 tablet AM, half tablet PM  Allergies:  Patient has no known allergies.    Social History:    TOBACCO:   reports that he quit smoking about 35 years ago. His smoking use included cigarettes. He started smoking

## 2024-05-01 NOTE — OP NOTE
Patient:  Juan A ROSENTHAL Day   :   1945    Procedural Summary  ~Consent:   Obtained written and verbal consent      Risks/benefits explained in detail  ~Procedure:    LATESHA  ~Medications:    Procedural sedation with minimal conscious sedation  ~Complications:   None  ~Blood Loss:    0 cc  ~Specimens:    None obtained  ~Pre-sedation re-evaluation: Performed immediately prior to procedure.  ~Bleeding risk:  Low    Sedation per anesthesia  Sedation start: 1245  Sedation stop: 1318    Impression  ~Moderate mitral regurgitation with two jets  ~Trivial Aortic regurgitation  ~Mild tricuspid regurgitation    Recommendation  ~Continue medical management    JANIE TRAN DO, 2024 1:29 PM

## 2024-05-01 NOTE — PROCEDURES
Patient:  Juan A Mathis   :   1945    PROCEDURAL SUMMARY  ~Consent:   Obtained written and verbal consent      Risks/benefits explained in detail  ~Procedure:    Left Heart and bypass Catheterization   ~Medications:    Procedural sedation with minimal conscious sedation  ~Complications:   None  ~Blood Loss:    <10cc  ~Specimens:    None obtained  ~Pre-sedation re-evaluation: Performed immediately prior to procedure.  ~Performing Physician:         Alex Jenkins MD   ~Assistants:       None    INDICATIONS:  Pre-Procedure Diagnosis:  New Onset Angina <= 2 months, Worsening Angina, and Suspected CAD    Post-Procedure Diagnosis: same    PROCEDURES PERFORMED:   Left heart and bypass graft catheterization with    .    PROCEDURE DETAILS/TECHNIQUE:  Local anesthetic was given and access was obtained in the right Radial Artery using a micropuncture technique and a (5/6) Fr Slender Terumo Sheath was placed without difficulty. Catheters were advanced into the ascending aorta over a 0.35 wire under fluoroscopic guidance.  Left coronary angiography was done using a 5 Fr Radha L 3.5 diagnostic catheter.  Right coronary angiography was done using a 5 Fr Radha R4 diagnostic catheter. Saphenous Vein Graft angiography was done using a 5 Fr Amplatz diagnostic catheter. A 5Fr IM catheter was advanced over the 0.35 wire into the left subclavian artery. Selective LIMA bypass graft angiography was performed in multiple projections. Left ventriculography was done using a 5 Fr pigtail catheter. At the end of the procedure a TR band device was used for hemostasis.     ANGIOGRAM/CORONARY ARTERIOGRAM:     Left main artery Bifurcates into the left anterior descending artery and left circumflex artery nml   Left anterior descending artery Gives rise to 1 diagonal arteries Ostial occluded   Diagonals  nml   Left circumflex artery Non-dominant vessel that gives rise to 2 obtuse marginal arteries nml   Obtuse Marginals  OM1 mid 80%      Right coronary artery Dominant vessel that gives rise to the posterior descending artery and posterolateral branch Prox occluded   Posterior descending artery Posterior lateral branch  nml  nml     ANGIOGRAM/BYPASS GRAFT ARTERIOGRAM:  Saphenous Vein Graft SVG is a sequential bypass graft with distal anastomosis to the diag and om1. Stent patent, sequential graft is 99%   Saphenous Vein Graft SVG is a bypass graft with anastomosis to the PDA Stent patent   Free Right Internal Mammary Artery Bypass Graft IAN is a bypass graft to the distal LAD Prox stent patent       LEFT VENTRICULOGRAM:  Left ventricular angiogram was done in the 30° VIEIRA projection and revealed  LVEF- 55%  LVG- nml  LVEDP- 4  Aortic pressure- 73/49 There was no gradient between the left ventricle and aorta      MEDICATION AND PROCEDURAL RECONCILIATION:  An independent trained observer pushed medications at my direction. We monitored the patient's level of consciousness and vital signs/physiologic status throughout the procedure duration (see start and stop times below).  Sedation: 2 mg Versed, 75 mcg Fentanyl  Sedation start: 1353  Sedation stop: 1438  Contrast: 73 cc of Isovue   Flouro Time: 8.0 minutes of fluoroscopy     IMPRESSION/SUMMARY  ~Coronary and bypass graft Angiography w/ severe MVD and patent grafts  ~LVG with LVEF of 55 and no regional wall motion abnormalities    RECOMMENDATION:  ~Aggressive medical treatment and risk factor modification  ~1. Medications reviewed, no changes at this time.  2. Post cath IVF. Bedrest.  4. Patient has been advised on the importance of regular exercise of at least 20-30 minutes daily alternating with aerobic and isometric activities.   5. Patient counseled about and offered assistance for smoking cessation   6. No indication for cardiac rehab  7. Follow up in 2 months with cardiology      Alex Jenkins MD, MD   Interventional Cardiology  5/1/2024 3:02 PM

## 2024-05-02 ENCOUNTER — TELEPHONE (OUTPATIENT)
Dept: CARDIOLOGY CLINIC | Age: 79
End: 2024-05-02

## 2024-05-02 DIAGNOSIS — I10 ESSENTIAL HYPERTENSION: Chronic | ICD-10-CM

## 2024-05-02 DIAGNOSIS — I25.83 CORONARY ARTERY DISEASE DUE TO LIPID RICH PLAQUE: ICD-10-CM

## 2024-05-02 DIAGNOSIS — I25.10 CORONARY ARTERY DISEASE DUE TO LIPID RICH PLAQUE: ICD-10-CM

## 2024-05-02 PROCEDURE — 93295 DEV INTERROG REMOTE 1/2/MLT: CPT | Performed by: INTERNAL MEDICINE

## 2024-05-02 PROCEDURE — 93296 REM INTERROG EVL PM/IDS: CPT | Performed by: INTERNAL MEDICINE

## 2024-05-02 RX ORDER — FUROSEMIDE 40 MG/1
40 TABLET ORAL DAILY
Qty: 90 TABLET | Refills: 3 | Status: SHIPPED | OUTPATIENT
Start: 2024-05-02

## 2024-05-02 NOTE — TELEPHONE ENCOUNTER
Medication Question/Concern  What is the name of the medication you need to speak with someone about?  furosemide (LASIX)   Dosage of the medication:  40 MG tablet   How are you taking this medication:  Take 1 tablet by mouth See Admin Instructions   What issues/concerns are you having with this medication:  Swetha states she needs to know what \"see admin instructions\" mean.  Also, pt already picked up a Furosemide script with directions: 1 tablet every other day, 6 days ago.    Please advise.

## 2024-05-06 ENCOUNTER — ANTI-COAG VISIT (OUTPATIENT)
Dept: PHARMACY | Age: 79
End: 2024-05-06
Payer: MEDICARE

## 2024-05-06 DIAGNOSIS — I48.0 PAROXYSMAL ATRIAL FIBRILLATION (HCC): Primary | Chronic | ICD-10-CM

## 2024-05-06 LAB — INTERNATIONAL NORMALIZATION RATIO, POC: 1.7

## 2024-05-06 PROCEDURE — 99211 OFF/OP EST MAY X REQ PHY/QHP: CPT

## 2024-05-06 PROCEDURE — 85610 PROTHROMBIN TIME: CPT

## 2024-05-06 NOTE — PROGRESS NOTES
Mr. Juan A Mathis is a 78 y.o. y/o male with history of Afib who presents today for anticoagulation monitoring and adjustment.    Pertinent PMH: DVT, CABG, HTN, CAD, seizure, CHF    Patient Reported Findings:  Yes     No  [x]   []       Patient verifies current dosing regimen as listed- takes 5mg Sun and Wed and 2.5mg AOD---> confirms --> verified increase in weekly dose to 5 mg on Thurs, fri and Sun ---> confirmed   []   [x]       S/S bleeding/bruising/swelling/SOB- denies ---> was having clot symptoms on Sat so went to see doc on Mon and did doppler---> denies   []   [x]       Blood in urine or stool- denies   [x]   []       Procedures scheduled in the future at this time colonoscopy was cancelled, r/s for December 29, hold 5 days and bridge. Was instructed to get INR on Mon 12/26 to determine INR, start lovenox --> going to schedule an ablation in near future, possible November. Will need to hold warfarin 2 days prior to procedure---> CV 9/6, ablation 12/14 --> having ablation on 12/14, cleared to hold warfarin 3 days prior --> had procedure 5/1, held warfarin 5 days prior and bridged with lovenox. INR 1.2 at procedure   []   [x]       Missed Dose -denies  []   [x]       Extra Dose- denies   []   [x]       Change in medications- states that MD had been adjusting phenytoin increasing 4-5 weeks ago but plans to return to normal phenytoin today  --->takes amiodarone 200 mg qd, has been at this dose for a few weeks---> sometimes take extra amiodarone at night, states doctor told him he could do this, asked to clarify, adjusting lasix --->  every 2 days takes extra Amiodarone, increased metoprolol ---> states fluctuates Phenytoin based on symptoms, unclear ---> has been taking extra lasix the past few days but is going to lower back down to every other day---> primidone decreased 2/29 (could cause INR to inc) ---> no changes   []   [x]       Change in health/diet/appetite -states normally eats a lot of vegetable soup,

## 2024-05-13 ENCOUNTER — ANTI-COAG VISIT (OUTPATIENT)
Dept: PHARMACY | Age: 79
End: 2024-05-13
Payer: MEDICARE

## 2024-05-13 DIAGNOSIS — I48.0 PAROXYSMAL ATRIAL FIBRILLATION (HCC): Primary | Chronic | ICD-10-CM

## 2024-05-13 LAB — INTERNATIONAL NORMALIZATION RATIO, POC: 1.7

## 2024-05-13 PROCEDURE — 85610 PROTHROMBIN TIME: CPT

## 2024-05-13 PROCEDURE — 99212 OFFICE O/P EST SF 10 MIN: CPT

## 2024-05-13 NOTE — PROGRESS NOTES
Mr. Juan A Mathis is a 78 y.o. y/o male with history of Afib who presents today for anticoagulation monitoring and adjustment.    Pertinent PMH: DVT, CABG, HTN, CAD, seizure, CHF    Patient Reported Findings:  Yes     No  [x]   []       Patient verifies current dosing regimen as listed- takes 5mg Sun and Wed and 2.5mg AOD---> confirms --> verified increase in weekly dose to 5 mg on Thurs, fri and Sun ---> confirmed   []   [x]       S/S bleeding/bruising/swelling/SOB- denies ---> was having clot symptoms on Sat so went to see doc on Mon and did doppler---> denies   []   [x]       Blood in urine or stool- denies   [x]   []       Procedures scheduled in the future at this time colonoscopy was cancelled, r/s for December 29, hold 5 days and bridge. Was instructed to get INR on Mon 12/26 to determine INR, start lovenox --> going to schedule an ablation in near future, possible November. Will need to hold warfarin 2 days prior to procedure---> CV 9/6, ablation 12/14 --> having ablation on 12/14, cleared to hold warfarin 3 days prior --> had procedure 5/1, held warfarin 5 days prior and bridged with lovenox. INR 1.2 at procedure   [x]   []       Missed Dose -was unsure if took pills on Fri 5/10 so took 2.5 mg. Therefore either had 2.5 mg or 7.5 mg on Fri   []   [x]       Extra Dose- denies   []   [x]       Change in medications- states that MD had been adjusting phenytoin increasing 4-5 weeks ago but plans to return to normal phenytoin today  --->takes amiodarone 200 mg qd, has been at this dose for a few weeks---> sometimes take extra amiodarone at night, states doctor told him he could do this, asked to clarify, adjusting lasix --->  every 2 days takes extra Amiodarone, increased metoprolol ---> states fluctuates Phenytoin based on symptoms, unclear ---> has been taking extra lasix the past few days but is going to lower back down to every other day---> primidone decreased 2/29 (could cause INR to inc) ---> no changes   [] 
Fall with Harm Risk

## 2024-05-28 ENCOUNTER — ANTI-COAG VISIT (OUTPATIENT)
Dept: PHARMACY | Age: 79
End: 2024-05-28
Payer: MEDICARE

## 2024-05-28 DIAGNOSIS — I48.91 ATRIAL FIBRILLATION, UNSPECIFIED TYPE (HCC): Primary | Chronic | ICD-10-CM

## 2024-05-28 LAB — INTERNATIONAL NORMALIZATION RATIO, POC: 2

## 2024-05-28 PROCEDURE — 85610 PROTHROMBIN TIME: CPT

## 2024-05-28 PROCEDURE — 99211 OFF/OP EST MAY X REQ PHY/QHP: CPT

## 2024-05-28 NOTE — PROGRESS NOTES
inc) ---> no changes   []   [x]       Change in health/diet/appetite -states normally eats a lot of vegetable soup, but has not had any greens recently ---> no greens --> more greens, 3 salads in past week --> had diarrhea for a week recently so has been eating more starch. Diarrhea since resolved ---> denies changes, no vit k   []   [x]       Change in alcohol use- doesn't drink or smoke   []   [x]       Change in activity  []   [x]       Hospital admission- Had ablation 4/6, INR was 2.5. Confirmed dose of 5 mg Tues, Thurs, Fri, Sun and 2.5 mg all other days of the week. Patient will recheck 4/13.  []   [x]       Emergency department visit- ER 12/27 with wrist pain, got naproxen and pred for 5 days. Explained interaction. States doctor denied the interaction but he thought there was one because his INR jumped on prednisone in the past. Patient states he is still using the naproxen and doesn't want to stop yet. Has several days left of pred still. Did not check INR in ER. Will have patient take 2.5mg daily (instead of 5mg MF) while on interacting medications and RTC earlier than scheduled on 1/3.   []   [x]       Other complaints-       Clinical Outcomes:  Yes     No  []   [x]       Major bleeding event  []   [x]       Thromboembolic event    Duration of warfarin Therapy: indefinitely   INR Range:  2.0-3.0  RF at [facility].     Patient no longer going to lab and now coming in for apts F2F.     Patient has 2.5mg tablets, takes in evening.     INR is 2.0 today after dose increase  Take 5mg on Mon Wed and Fri and 2.5mg all other days   Encouraged to maintain a consistency of vegetables/salads.   Recheck INR in 3 weeks, 6/18  Consent form signed 1/3/2024    Referring Dr. Frazier  INR (no units)   Date Value   05/01/2024 1.22 (H)   12/14/2023 1.90 (H)   12/14/2023 1.59 (H)   09/06/2023 2.40 (H)     INR,(POC) (no units)   Date Value   05/28/2024 2.0   05/13/2024 1.7   05/06/2024 1.7   04/10/2024 2.6     For Pharmacy

## 2024-06-13 ENCOUNTER — TELEPHONE (OUTPATIENT)
Dept: CARDIOLOGY CLINIC | Age: 79
End: 2024-06-13

## 2024-06-13 DIAGNOSIS — I48.19 PERSISTENT ATRIAL FIBRILLATION (HCC): Primary | ICD-10-CM

## 2024-06-13 NOTE — TELEPHONE ENCOUNTER
Increase amiodarone to 200 mg BID through weekend to see if he converts. Will also arrange cardioversion next week with Dr. Rizo. No need for LATESHA despite low INR as his WESTON is ligated per LATESHA in 2021. He remains on coumadin for hx of DVT

## 2024-06-13 NOTE — TELEPHONE ENCOUNTER
We received remote transmission from patient's monitor at home. Transmission shows normal sensing and pacing function. '  Current ECG shows AF-in AF since 6/5/2024.  EP physician will review. See interrogation in [facility] or [facility] for more details.     Please advise on DCCV. Will place interrogation up from for Patient to

## 2024-06-13 NOTE — TELEPHONE ENCOUNTER
Pt called said he has been dealing with his Heat rate for 2 weeks now. He sent in a Transmission this morning and is asking if he can the scheduled soon to get his done. Pt also would like a copy of the Transmission Report. He asked that we call him when they are ready to tick up. Pt wants to schedule a Cardioversion. Please contact pt to notify when paperwork is ready an to discuss Cardioversion. PT number is [phone]

## 2024-06-14 NOTE — TELEPHONE ENCOUNTER
Spoke with patient. He already increased his amiodarone and is willing to schedule a DCCV for next week      You are scheduled for a cardioversion.    Our  will call you to discuss a date for your procedure.    The day of your procedure you will need to check in at the Registration Desk in the Main Lobby.    PRE-PROCEDURE INSTRUCTIONS   Do not eat or drink anything after midnight the night before your procedure.   Take all your medications with a sip of water in the morning.   Please have a responsible adult to drive you home after your procedure.    If you have any questions regarding your procedure itself or your medications, please call [phone] and ask to speak to an EP nurse.

## 2024-06-17 NOTE — TELEPHONE ENCOUNTER
Pt called back from missed call. Pt said anytime today would be fine to call back to schedule. [phone]

## 2024-06-17 NOTE — TELEPHONE ENCOUNTER
Spoke with the pt and got him scheduled. He only has a  avail this Wed for procedure. We went over instructions below and he verbalized understanding.     Procedure -cardioversion.  Date: 6/19/24  Arrival time: 12:00 pm   Procedure time:1:00 pm      Our  will call you to discuss a date for your procedure.     The day of your procedure you will need to check in at the Registration Desk in the Main Lobby.     PRE-PROCEDURE INSTRUCTIONS              Do not eat or drink anything after midnight the night before your procedure.              Take all your medications with a sip of water in the morning.              Please have a responsible adult to drive you home after your procedure.     If you have any questions regarding your procedure itself or your medications, please call [phone] and ask to speak to an EP nurse.    Qgenda updated/ updated epic / emailed cath lab

## 2024-06-18 ENCOUNTER — ANTI-COAG VISIT (OUTPATIENT)
Dept: PHARMACY | Age: 79
End: 2024-06-18
Payer: MEDICARE

## 2024-06-18 DIAGNOSIS — I48.0 PAROXYSMAL ATRIAL FIBRILLATION (HCC): Primary | Chronic | ICD-10-CM

## 2024-06-18 LAB — INTERNATIONAL NORMALIZATION RATIO, POC: 2.3

## 2024-06-18 PROCEDURE — 99211 OFF/OP EST MAY X REQ PHY/QHP: CPT

## 2024-06-18 PROCEDURE — 85610 PROTHROMBIN TIME: CPT

## 2024-06-18 NOTE — PROGRESS NOTES
Mr. Juan A Mathis is a 78 y.o. y/o male with history of Afib who presents today for anticoagulation monitoring and adjustment.    Pertinent PMH: DVT, CABG, HTN, CAD, seizure, CHF    Patient Reported Findings:  Yes     No  [x]   []       Patient verifies current dosing regimen as listed- takes 5mg Sun and Wed and 2.5mg AOD---> confirms --> verified increase in weekly dose to 5 mg on Thurs, fri and Sun ---> confirmed   []   [x]       S/S bleeding/bruising/swelling/SOB- denies ---> was having clot symptoms on Sat so went to see doc on Mon and did doppler---> denies   []   [x]       Blood in urine or stool- denies   [x]   []       Procedures scheduled in the future at this time colonoscopy was cancelled, r/s for December 29, hold 5 days and bridge. Was instructed to get INR on Mon 12/26 to determine INR, start lovenox --> going to schedule an ablation in near future, possible November. Will need to hold warfarin 2 days prior to procedure---> CV 9/6, ablation 12/14 --> having ablation on 12/14, cleared to hold warfarin 3 days prior --> had procedure 5/1, held warfarin 5 days prior and bridged with lovenox. INR 1.2 at procedure---> Cv tomorrow    []   [x]       Missed Dose -denies   []   [x]       Extra Dose- denies   [x]   []       Change in medications- states that MD had been adjusting phenytoin increasing 4-5 weeks ago but plans to return to normal phenytoin today  --->takes amiodarone 200 mg qd, has been at this dose for a few weeks---> sometimes take extra amiodarone at night, states doctor told him he could do this, asked to clarify, adjusting lasix --->  every 2 days takes extra Amiodarone, increased metoprolol ---> states fluctuates Phenytoin based on symptoms, unclear ---> has been taking extra lasix the past few days but is going to lower back down to every other day---> primidone decreased 2/29 (could cause INR to inc) ---> has been taking more amiodarone (1/2 tablet) qd for past 2 weeks since being in a fib

## 2024-06-19 ENCOUNTER — HOSPITAL ENCOUNTER (OUTPATIENT)
Age: 79
Discharge: HOME | End: 2024-06-21
Attending: INTERNAL MEDICINE
Payer: MEDICARE

## 2024-06-19 VITALS
RESPIRATION RATE: 11 BRPM | HEART RATE: 75 BPM | HEIGHT: 70 IN | SYSTOLIC BLOOD PRESSURE: 110 MMHG | TEMPERATURE: 98 F | WEIGHT: 154 LBS | BODY MASS INDEX: 22.05 KG/M2 | OXYGEN SATURATION: 100 % | DIASTOLIC BLOOD PRESSURE: 79 MMHG

## 2024-06-19 DIAGNOSIS — I48.19 PERSISTENT ATRIAL FIBRILLATION (HCC): ICD-10-CM

## 2024-06-19 PROCEDURE — 2500000003 HC RX 250 WO HCPCS: Performed by: INTERNAL MEDICINE

## 2024-06-19 PROCEDURE — 7100000011 HC PHASE II RECOVERY - ADDTL 15 MIN: Performed by: INTERNAL MEDICINE

## 2024-06-19 PROCEDURE — 85610 PROTHROMBIN TIME: CPT

## 2024-06-19 PROCEDURE — 7100000010 HC PHASE II RECOVERY - FIRST 15 MIN: Performed by: INTERNAL MEDICINE

## 2024-06-19 PROCEDURE — 92960 CARDIOVERSION ELECTRIC EXT: CPT | Performed by: INTERNAL MEDICINE

## 2024-06-19 RX ORDER — SODIUM CHLORIDE 0.9 % (FLUSH) 0.9 %
5-40 SYRINGE (ML) INJECTION PRN
Status: DISCONTINUED | OUTPATIENT
Start: 2024-06-19 | End: 2024-06-22 | Stop reason: HOSPADM

## 2024-06-19 RX ADMIN — METHOHEXITAL SODIUM 70 MG: 500 INJECTION, POWDER, LYOPHILIZED, FOR SOLUTION INTRAMUSCULAR; INTRAVENOUS; RECTAL at 12:31

## 2024-06-19 NOTE — PROCEDURES
[facility]     Electrophysiology Procedure Note       Date of Procedure: 6/19/2024  Patient's Name: Juan A Mathis  YOB: 1945   Medical Record Number: [id number]  Procedure Performed by: Darrius Jordan MD    Procedures performed:    External Electrical cardioversion   IV sedation.   An independent trained observer assisted in the monitoring of the patient's level of consciousness and physiological status including vital signs.     Indication of the procedure: Persistent atrial fibrillation     Details of procedure:   The patient was brought to the cath lab area in a fasting and non-sedated state. The risks, benefits and alternatives of the procedure were discussed with the patient. The patient opted to proceed with the procedure. Written informed consent was signed and placed in the chart.  A timeout protocol was completed to identify the patient and the procedure being performed.     Then we used brevital for sedation. 70 mg Brevital was given by me as one dose, and electrical DC cardioversion was perfomred using 200J, synchronized shock.     Patient was converted to sinus rhythm. The patient tolerated the procedure well and there were no complications.     Conclusion:   Successful external DC cardioversion of atrial fibrillation

## 2024-06-19 NOTE — H&P
Jaydon Frazier DO   pantoprazole (PROTONIX) 40 MG tablet Take 1 tablet by mouth every morning (before breakfast) 1/12/24   Jaydon Frazier DO   spironolactone (ALDACTONE) 25 MG tablet Take 0.5 tablets by mouth daily 1/11/24   Jaydon Frazier DO   rosuvastatin (CRESTOR) 40 MG tablet Take 1 tablet by mouth nightly 1/11/24   Jaydon Frazier DO   ezetimibe (ZETIA) 10 MG tablet Take 1 tablet by mouth every evening 1/11/24   Jadyon Frazier DO   amiodarone (CORDARONE) 200 MG tablet Take 1 tablet by mouth daily 3 days a week extra 1/2 tablet 1/11/24   Jaydon Frazier DO   clopidogrel (PLAVIX) 75 MG tablet Take 1 tablet by mouth every morning 1/11/24   Jaydon Frazier DO   naproxen (NAPROSYN) 500 MG tablet Take 1 tablet by mouth 2 times daily (with meals) 12/27/23   Guillaume Maravilla PA   warfarin (COUMADIN) 2.5 MG tablet TAKE 2 TABLETS DAILY OR AS DIRECTED BY PROVIDER BASED ON INR 6/26/23   Jaydon Frazier DO   warfarin (COUMADIN) 2.5 MG tablet Take 2 tablets by mouth daily Dose adjusted via anticoagulation clinic 6/26/23 10/11/23  Jaydon Frazier DO   nitroGLYCERIN (NITROSTAT) 0.4 MG SL tablet Place 1 tablet under the tongue every 5 minutes as needed for Chest pain 2/24/22   Jaydon Frazier DO   diazepam (VALIUM) 5 MG tablet Take 1 tablet by mouth in the morning and 1 tablet in the evening.    ProviderMoe MD   phenytoin (DILANTIN) 100 MG ER capsule Take 1 capsule by mouth 2 times daily. Resume home dose 12/21/12   Lisset Simmons, APRN - CNP   primidone (MYSOLINE) 250 MG tablet Take 0.5 tablets by mouth 3 times daily 1 tablet AM, half tablet PM    ProviderMoe MD     Past Medical History:   Diagnosis Date    Accidental fall 10/03/2023    Arthritis     shoulders and knee    Atrial fib/flut     CAD (coronary artery disease)     CHF (congestive heart failure) ([facility])     DVT (deep venous thrombosis) ([facility]) 08/24/2022    RLE    Epilepsia     Hx of blood clots     Hx of CABG     Hyperlipidemia

## 2024-06-20 LAB
EKG ATRIAL RATE: 357 BPM
EKG ATRIAL RATE: 43 BPM
EKG DIAGNOSIS: NORMAL
EKG DIAGNOSIS: NORMAL
EKG Q-T INTERVAL: 410 MS
EKG Q-T INTERVAL: 490 MS
EKG QRS DURATION: 136 MS
EKG QRS DURATION: 188 MS
EKG QTC CALCULATION (BAZETT): 433 MS
EKG QTC CALCULATION (BAZETT): 513 MS
EKG R AXIS: -60 DEGREES
EKG R AXIS: -75 DEGREES
EKG T AXIS: 112 DEGREES
EKG T AXIS: 92 DEGREES
EKG VENTRICULAR RATE: 66 BPM
EKG VENTRICULAR RATE: 67 BPM

## 2024-06-21 LAB — INR BLD: 2.3 (ref 0.84–1.16)

## 2024-06-21 NOTE — PROGRESS NOTES
mouth daily Dose adjusted via anticoagulation clinic 6/26/23 10/11/23  Jaydon Frazier DO   nitroGLYCERIN (NITROSTAT) 0.4 MG SL tablet Place 1 tablet under the tongue every 5 minutes as needed for Chest pain 2/24/22   Jaydon Frazier DO   diazepam (VALIUM) 5 MG tablet Take 1 tablet by mouth in the morning and 1 tablet in the evening.    Provider, MD Moe   phenytoin (DILANTIN) 100 MG ER capsule Take 1 capsule by mouth 2 times daily. Resume home dose 12/21/12   Lisset Simmons, APRN - CNP   primidone (MYSOLINE) 250 MG tablet Take 0.5 tablets by mouth 3 times daily 1 tablet AM, half tablet PM    Provider, MD Moe       PHYSICAL EXAM        There were no vitals filed for this visit.         Gen Alert, cooperative, no distress Heart  Regular rate and rhythm, 2/6 murmur murmur   Head Normocephalic, atraumatic, no abnormalities Abd  Soft, NT, +BS, no mass, no OM   Eyes PERRLA, conj/corn clear Ext  Ext nl, AT, no C/C,right leg swollen   Nose Nares normal, no drain age, Non-tender Pulse 2+ and symmetric   Throat Lips, mucosa, tongue normal Skin Color/text/turg nl, no rash/lesions   Neck S/S, TM, NT, no bruit Psych Nl mood and affect   Lung CTA-B, unlabored, no DTP     Ch wall NT, no deform       LABS and Imaging     Relevant and available CV data reviewed  Echo: 3/14/24-Normal left ventricular size and wall thickness. Overall left ventricular systolic function is mildly decreased with distal septal and apical hypokinesis. Estimated EF 40-45%. GLS -13.4%. E/'e=14. Indeterminate diastolic function. Severe mitral regurgitation is present. ERO 40cm2 The left atrium is moderately dilated. Mild aortic regurgitation is present. Pressure half-time is calculated at 440 msec. Normal right ventricular size and function. Moderate tricuspid regurgitation. RVSP 40mmHg. Pacemaker / ICD lead is visualized in the right atrium.    Echo 3/31/23-LATESHA-Normal left ventricular size. Mild LVH. Global left ventricular function is

## 2024-06-24 ENCOUNTER — OFFICE VISIT (OUTPATIENT)
Dept: CARDIOLOGY CLINIC | Age: 79
End: 2024-06-24
Payer: MEDICARE

## 2024-06-24 VITALS
WEIGHT: 158.7 LBS | HEIGHT: 70 IN | HEART RATE: 60 BPM | SYSTOLIC BLOOD PRESSURE: 98 MMHG | BODY MASS INDEX: 22.72 KG/M2 | OXYGEN SATURATION: 99 % | DIASTOLIC BLOOD PRESSURE: 62 MMHG

## 2024-06-24 DIAGNOSIS — I25.83 CORONARY ARTERY DISEASE DUE TO LIPID RICH PLAQUE: ICD-10-CM

## 2024-06-24 DIAGNOSIS — I48.0 PAF (PAROXYSMAL ATRIAL FIBRILLATION) (HCC): ICD-10-CM

## 2024-06-24 DIAGNOSIS — E78.2 MIXED HYPERLIPIDEMIA: ICD-10-CM

## 2024-06-24 DIAGNOSIS — I48.19 PERSISTENT ATRIAL FIBRILLATION (HCC): Primary | ICD-10-CM

## 2024-06-24 DIAGNOSIS — I25.10 CORONARY ARTERY DISEASE DUE TO LIPID RICH PLAQUE: ICD-10-CM

## 2024-06-24 DIAGNOSIS — I10 ESSENTIAL HYPERTENSION: ICD-10-CM

## 2024-06-24 DIAGNOSIS — I25.5 ISCHEMIC CARDIOMYOPATHY: ICD-10-CM

## 2024-06-24 PROCEDURE — 1036F TOBACCO NON-USER: CPT | Performed by: INTERNAL MEDICINE

## 2024-06-24 PROCEDURE — G8427 DOCREV CUR MEDS BY ELIG CLIN: HCPCS | Performed by: INTERNAL MEDICINE

## 2024-06-24 PROCEDURE — 3078F DIAST BP <80 MM HG: CPT | Performed by: INTERNAL MEDICINE

## 2024-06-24 PROCEDURE — 99214 OFFICE O/P EST MOD 30 MIN: CPT | Performed by: INTERNAL MEDICINE

## 2024-06-24 PROCEDURE — 93000 ELECTROCARDIOGRAM COMPLETE: CPT | Performed by: INTERNAL MEDICINE

## 2024-06-24 PROCEDURE — G8420 CALC BMI NORM PARAMETERS: HCPCS | Performed by: INTERNAL MEDICINE

## 2024-06-24 PROCEDURE — 3074F SYST BP LT 130 MM HG: CPT | Performed by: INTERNAL MEDICINE

## 2024-06-24 PROCEDURE — 1123F ACP DISCUSS/DSCN MKR DOCD: CPT | Performed by: INTERNAL MEDICINE

## 2024-06-24 RX ORDER — LANOLIN ALCOHOL/MO/W.PET/CERES
800 CREAM (GRAM) TOPICAL DAILY
Qty: 180 TABLET | Refills: 3 | Status: SHIPPED | OUTPATIENT
Start: 2024-06-24

## 2024-06-24 RX ORDER — ROSUVASTATIN CALCIUM 40 MG/1
40 TABLET, COATED ORAL NIGHTLY
Qty: 90 TABLET | Refills: 3 | Status: SHIPPED | OUTPATIENT
Start: 2024-06-24

## 2024-06-24 RX ORDER — POTASSIUM CHLORIDE 20 MEQ/1
20 TABLET, EXTENDED RELEASE ORAL DAILY
Qty: 90 TABLET | Refills: 3 | Status: SHIPPED | OUTPATIENT
Start: 2024-06-24

## 2024-06-24 RX ORDER — EZETIMIBE 10 MG/1
10 TABLET ORAL EVERY EVENING
Qty: 90 TABLET | Refills: 3 | Status: SHIPPED | OUTPATIENT
Start: 2024-06-24

## 2024-06-24 RX ORDER — CLOPIDOGREL BISULFATE 75 MG/1
75 TABLET ORAL EVERY MORNING
Qty: 90 TABLET | Refills: 3 | Status: SHIPPED | OUTPATIENT
Start: 2024-06-24

## 2024-06-24 RX ORDER — METOPROLOL SUCCINATE 100 MG/1
TABLET, EXTENDED RELEASE ORAL
Qty: 180 TABLET | Refills: 3 | Status: SHIPPED | OUTPATIENT
Start: 2024-06-24

## 2024-06-24 RX ORDER — SPIRONOLACTONE 25 MG/1
12.5 TABLET ORAL DAILY
Qty: 45 TABLET | Refills: 3 | Status: SHIPPED | OUTPATIENT
Start: 2024-06-24

## 2024-06-24 RX ORDER — AMIODARONE HYDROCHLORIDE 200 MG/1
200 TABLET ORAL DAILY
Qty: 135 TABLET | Refills: 3 | Status: SHIPPED | OUTPATIENT
Start: 2024-06-24

## 2024-06-24 RX ORDER — FUROSEMIDE 40 MG/1
40 TABLET ORAL DAILY
Qty: 90 TABLET | Refills: 3 | Status: SHIPPED | OUTPATIENT
Start: 2024-06-24

## 2024-06-24 ASSESSMENT — ENCOUNTER SYMPTOMS
CHEST TIGHTNESS: 0
SHORTNESS OF BREATH: 1
NAUSEA: 0
ABDOMINAL PAIN: 0
PHOTOPHOBIA: 0
COUGH: 0
ABDOMINAL DISTENTION: 0
BLOOD IN STOOL: 0

## 2024-06-24 NOTE — PROGRESS NOTES
Scale: 72 kg (158 lb 11.2 oz)     Gen Alert, cooperative, no distress Heart  Regular rate and rhythm, 2/6 murmur murmur   Head Normocephalic, atraumatic, no abnormalities Abd  Soft, NT, +BS, no mass, no OM   Eyes PERRLA, conj/corn clear Ext  Ext nl, AT, no C/C,right leg swollen   Nose Nares normal, no drain age, Non-tender Pulse 2+ and symmetric   Throat Lips, mucosa, tongue normal Skin Color/text/turg nl, no rash/lesions   Neck S/S, TM, NT, no bruit Psych Nl mood and affect   Lung CTA-B, unlabored, no DTP     Ch wall NT, no deform       LABS and Imaging     Relevant and available CV data reviewed  Echo: 3/14/24-Normal left ventricular size and wall thickness. Overall left ventricular systolic function is mildly decreased with distal septal and apical hypokinesis. Estimated EF 40-45%. GLS -13.4%. E/'e=14. Indeterminate diastolic function. Severe mitral regurgitation is present. ERO 40cm2 The left atrium is moderately dilated. Mild aortic regurgitation is present. Pressure half-time is calculated at 440 msec. Normal right ventricular size and function. Moderate tricuspid regurgitation. RVSP 40mmHg. Pacemaker / ICD lead is visualized in the right atrium.   LATESHA 5/1/24-~Moderate mitral regurgitation with two jets  ~Trivial Aortic regurgitation  ~Mild tricuspid regurgitation     Cath: s/p CABG (x4 '07 LIMA to LAD, SVG to RCA, SVG to D2, SVG to OM3),   1/8/19 redo CABG at [facility]  (free IAN-LAD, SVG-Diag, SVG-OM, SVG-PDA  3/30/22 Successful complex angioplasty and stenting of IAN to LAD, SVG to OM, SVG to PDA     EKG: sinus bradycardia, prior anterior infarct, pvc  4/25/23 Venous doppler -No evidence of acute deep vein or superficial thrombosis involving the right   lower extremity and the contralateral proximal common femoral vein.   A chronic phlebitic process is noted in the right small saphenous vein.  1/24/23- lexiscan-Abnormal study. There is a large sized, severe intensity, fixed defect of   the

## 2024-07-16 ENCOUNTER — ANTI-COAG VISIT (OUTPATIENT)
Dept: PHARMACY | Age: 79
End: 2024-07-16
Payer: MEDICARE

## 2024-07-16 DIAGNOSIS — I48.91 ATRIAL FIBRILLATION, UNSPECIFIED TYPE (HCC): Primary | Chronic | ICD-10-CM

## 2024-07-16 LAB
INTERNATIONAL NORMALIZATION RATIO, POC: 3.4
PROTHROMBIN TIME, POC: NORMAL

## 2024-07-16 PROCEDURE — 99212 OFFICE O/P EST SF 10 MIN: CPT

## 2024-07-16 PROCEDURE — 85610 PROTHROMBIN TIME: CPT

## 2024-07-16 NOTE — PROGRESS NOTES
Mr. Juan A Mathis is a 78 y.o. y/o male with history of Afib who presents today for anticoagulation monitoring and adjustment.    Pertinent PMH: DVT, CABG, HTN, CAD, seizure, CHF    Patient Reported Findings:  Yes     No  [x]   []       Patient verifies current dosing regimen as listed- takes 5mg Sun and Wed and 2.5mg AOD---> confirms --> verified increase in weekly dose to 5 mg on Thurs, fri and Sun ---> confirmed   []   [x]       S/S bleeding/bruising/swelling/SOB- denies ---> was having clot symptoms on Sat so went to see doc on Mon and did doppler---> denies   []   [x]       Blood in urine or stool- denies   [x]   []       Procedures scheduled in the future at this time colonoscopy was cancelled, r/s for December 29, hold 5 days and bridge. Was instructed to get INR on Mon 12/26 to determine INR, start lovenox --> going to schedule an ablation in near future, possible November. Will need to hold warfarin 2 days prior to procedure---> CV 9/6, ablation 12/14 --> having ablation on 12/14, cleared to hold warfarin 3 days prior --> had procedure 5/1, held warfarin 5 days prior and bridged with lovenox. INR 1.2 at procedure---> Cv tomorrow    []   [x]       Missed Dose -denies   []   [x]       Extra Dose- denies   [x]   []       Change in medications- states that MD had been adjusting phenytoin increasing 4-5 weeks ago but plans to return to normal phenytoin today  --->takes amiodarone 200 mg qd, has been at this dose for a few weeks---> sometimes take extra amiodarone at night, states doctor told him he could do this, asked to clarify, adjusting lasix --->  every 2 days takes extra Amiodarone, increased metoprolol ---> states fluctuates Phenytoin based on symptoms, unclear ---> has been taking extra lasix the past few days but is going to lower back down to every other day---> primidone decreased 2/29 (could cause INR to inc) ---> has been taking more amiodarone (1/2 tablet) qd for past 2 weeks since being in a fib-->

## 2024-08-07 PROCEDURE — 93297 REM INTERROG DEV EVAL ICPMS: CPT | Performed by: CLINICAL NURSE SPECIALIST

## 2024-08-07 PROCEDURE — 93295 DEV INTERROG REMOTE 1/2/MLT: CPT | Performed by: INTERNAL MEDICINE

## 2024-08-07 PROCEDURE — 93296 REM INTERROG EVL PM/IDS: CPT | Performed by: INTERNAL MEDICINE

## 2024-08-13 ENCOUNTER — ANTI-COAG VISIT (OUTPATIENT)
Dept: PHARMACY | Age: 79
End: 2024-08-13
Payer: MEDICARE

## 2024-08-13 DIAGNOSIS — I48.0 PAROXYSMAL ATRIAL FIBRILLATION (HCC): Primary | Chronic | ICD-10-CM

## 2024-08-13 LAB
INTERNATIONAL NORMALIZATION RATIO, POC: 3
PROTHROMBIN TIME, POC: 0

## 2024-08-13 PROCEDURE — 85610 PROTHROMBIN TIME: CPT | Performed by: PHYSICIAN ASSISTANT

## 2024-08-13 PROCEDURE — 99211 OFF/OP EST MAY X REQ PHY/QHP: CPT | Performed by: PHYSICIAN ASSISTANT

## 2024-08-13 NOTE — PROGRESS NOTES
06/18/2024 2.3   05/28/2024 2.0   05/13/2024 1.7     For Pharmacy Admin Tracking Only    Total # of Interventions Recommended: 0  Total # of Interventions Accepted: 0  Time Spent (min): 15

## 2024-08-14 ENCOUNTER — TELEPHONE (OUTPATIENT)
Dept: PHARMACY | Age: 79
End: 2024-08-14

## 2024-08-14 NOTE — TELEPHONE ENCOUNTER
Patient called, states he was doing his pillbox and realized he could have missed . He was here yesterday, , with an INR of 3.0 and wanted to know what to do. Advised to continue normal dose and RS for  to ensure INR stays in range.    Melissa Horta, PharmD, [facility]  For Pharmacy Admin Tracking Only    Intervention Detail: Adherence Monitorin  Total # of Interventions Recommended: 1  Total # of Interventions Accepted: 1  Time Spent (min): 15

## 2024-08-27 ENCOUNTER — ANTI-COAG VISIT (OUTPATIENT)
Dept: PHARMACY | Age: 79
End: 2024-08-27
Payer: MEDICARE

## 2024-08-27 DIAGNOSIS — I48.0 PAROXYSMAL ATRIAL FIBRILLATION (HCC): Primary | Chronic | ICD-10-CM

## 2024-08-27 LAB
INTERNATIONAL NORMALIZATION RATIO, POC: 3
PROTHROMBIN TIME, POC: 0

## 2024-08-27 PROCEDURE — 99211 OFF/OP EST MAY X REQ PHY/QHP: CPT | Performed by: PHYSICIAN ASSISTANT

## 2024-08-27 PROCEDURE — 85610 PROTHROMBIN TIME: CPT | Performed by: PHYSICIAN ASSISTANT

## 2024-08-27 NOTE — PROGRESS NOTES
Mr. Juan A Mathis is a 78 y.o. y/o male with history of Afib who presents today for anticoagulation monitoring and adjustment.    Pertinent PMH: DVT, CABG, HTN, CAD, seizure, CHF    Patient Reported Findings:  Yes     No  [x]   []       Patient verifies current dosing regimen as listed- confirmed   []   [x]       S/S bleeding/bruising/swelling/SOB- denies ---> was having clot symptoms on Sat so went to see doc on Mon and did doppler---> denies   []   [x]       Blood in urine or stool- denies   []   [x]       Procedures scheduled in the future at this time colonoscopy was cancelled, r/s for December 29, hold 5 days and bridge. Was instructed to get INR on Mon 12/26 to determine INR, start lovenox --> going to schedule an ablation in near future, possible November. Will need to hold warfarin 2 days prior to procedure---> CV 9/6, ablation 12/14 --> having ablation on 12/14, cleared to hold warfarin 3 days prior --> had procedure 5/1, held warfarin 5 days prior and bridged with lovenox. INR 1.2 at procedure---> Cv tomorrow  --> none upcoming   [x]   []       Missed Dose -denies --> might have missed dose on 8/12. None since then   []   [x]       Extra Dose- denies   []   [x]       Change in medications- states that MD had been adjusting phenytoin increasing 4-5 weeks ago but plans to return to normal phenytoin today  --->takes amiodarone 200 mg qd, has been at this dose for a few weeks---> sometimes take extra amiodarone at night, states doctor told him he could do this, asked to clarify, adjusting lasix --->  every 2 days takes extra Amiodarone, increased metoprolol ---> states fluctuates Phenytoin based on symptoms, unclear ---> has been taking extra lasix the past few days but is going to lower back down to every other day---> primidone decreased 2/29 (could cause INR to inc) ---> has been taking more amiodarone (1/2 tablet) qd for past 2 weeks since being in a fib--> Increasing metoprolol, amiodarone 200 mg qam and  100 mg 4x week in pm --> no changes   [x]   []       Change in health/diet/appetite -states normally eats a lot of vegetable soup, but has not had any greens recently ---> no greens --> more greens, 3 salads in past week --> had diarrhea for a week recently so has been eating more starch. Diarrhea since resolved ---> denies changes, no vit k --> has returned to vit k a few times   []   [x]       Change in alcohol use- doesn't drink or smoke   []   [x]       Change in activity  []   [x]       Hospital admission- Had ablation 4/6, INR was 2.5. Confirmed dose of 5 mg Tues, Thurs, Fri, Sun and 2.5 mg all other days of the week. Patient will recheck 4/13.  []   [x]       Emergency department visit- ER 12/27 with wrist pain, got naproxen and pred for 5 days. Explained interaction. States doctor denied the interaction but he thought there was one because his INR jumped on prednisone in the past. Patient states he is still using the naproxen and doesn't want to stop yet. Has several days left of pred still. Did not check INR in ER. Will have patient take 2.5mg daily (instead of 5mg MF) while on interacting medications and RTC earlier than scheduled on 1/3.   []   [x]       Other complaints-       Clinical Outcomes:  Yes     No  []   [x]       Major bleeding event  []   [x]       Thromboembolic event  Patient no longer going to lab and now coming in for apts F2F.   Patient has 2.5mg tablets, takes in evening.   Duration of warfarin Therapy: indefinitely   INR Range:  2.0-3.0  RF at [facility].     INR is 3 again today   Continue to take 5mg on Mon Wed and Fri and 2.5mg all other days   Encouraged to maintain a consistency of vegetables/salads.   Recheck INR in 4 weeks, 9/24    Consent form signed 1/3/2024    Referring Dr. Frazier  INR (no units)   Date Value   06/19/2024 2.30 (H)   05/01/2024 1.22 (H)   12/14/2023 1.90 (H)   12/14/2023 1.59 (H)     INR,(POC) (no units)   Date Value   08/13/2024 3.0   07/16/2024 3.4

## 2024-09-18 RX ORDER — WARFARIN SODIUM 2.5 MG/1
TABLET ORAL
Qty: 180 TABLET | Refills: 3 | Status: SHIPPED | OUTPATIENT
Start: 2024-09-18

## 2024-09-24 ENCOUNTER — ANTI-COAG VISIT (OUTPATIENT)
Dept: PHARMACY | Age: 79
End: 2024-09-24
Payer: MEDICARE

## 2024-09-24 DIAGNOSIS — I48.0 PAROXYSMAL ATRIAL FIBRILLATION (HCC): Primary | Chronic | ICD-10-CM

## 2024-09-24 LAB
INTERNATIONAL NORMALIZATION RATIO, POC: 3.8
PROTHROMBIN TIME, POC: 0

## 2024-09-24 PROCEDURE — 85610 PROTHROMBIN TIME: CPT | Performed by: PHYSICIAN ASSISTANT

## 2024-09-24 PROCEDURE — 99212 OFFICE O/P EST SF 10 MIN: CPT | Performed by: PHYSICIAN ASSISTANT

## 2024-10-22 ENCOUNTER — APPOINTMENT (OUTPATIENT)
Dept: PHARMACY | Age: 79
End: 2024-10-22
Payer: MEDICARE

## 2024-10-29 ENCOUNTER — ANTI-COAG VISIT (OUTPATIENT)
Dept: PHARMACY | Age: 79
End: 2024-10-29
Payer: MEDICARE

## 2024-10-29 DIAGNOSIS — I48.0 PAROXYSMAL ATRIAL FIBRILLATION (HCC): Primary | Chronic | ICD-10-CM

## 2024-10-29 LAB
INTERNATIONAL NORMALIZATION RATIO, POC: 2.4
PROTHROMBIN TIME, POC: 0

## 2024-10-29 PROCEDURE — 99211 OFF/OP EST MAY X REQ PHY/QHP: CPT | Performed by: PHYSICIAN ASSISTANT

## 2024-10-29 PROCEDURE — 85610 PROTHROMBIN TIME: CPT | Performed by: PHYSICIAN ASSISTANT

## 2024-10-29 NOTE — PROGRESS NOTES
100 mg 4x week in pm --> no changes   []   [x]       Change in health/diet/appetite -states normally eats a lot of vegetable soup, but has not had any greens recently ---> no greens --> more greens, 3 salads in past week --> had diarrhea for a week recently so has been eating more starch. Diarrhea since resolved ---> denies changes, no vit k --> has returned to vit k a few times --> no changes   []   [x]       Change in alcohol use- doesn't drink or smoke   []   [x]       Change in activity  []   [x]       Hospital admission- Had ablation 4/6, INR was 2.5. Confirmed dose of 5 mg Tues, Thurs, Fri, Sun and 2.5 mg all other days of the week. Patient will recheck 4/13.  []   [x]       Emergency department visit- ER 12/27 with wrist pain, got naproxen and pred for 5 days. Explained interaction. States doctor denied the interaction but he thought there was one because his INR jumped on prednisone in the past. Patient states he is still using the naproxen and doesn't want to stop yet. Has several days left of pred still. Did not check INR in ER. Will have patient take 2.5mg daily (instead of 5mg MF) while on interacting medications and RTC earlier than scheduled on 1/3.   []   [x]       Other complaints-       Clinical Outcomes:  Yes     No  []   [x]       Major bleeding event  []   [x]       Thromboembolic event  Patient no longer going to lab and now coming in for apts F2F.   Patient has 2.5mg tablets, takes in evening.   Duration of warfarin Therapy: indefinitely   INR Range:  2.0-3.0  RF at [facility].     INR is 2.4 today   Continue weekly dose of 5mg on Mon and Fri and 2.5mg all other days   Encouraged to maintain a consistency of vegetables/salads.   Recheck INR in 4 weeks, 11/25    Consent form signed 1/3/2024    Referring Dr. Frazeir  INR (no units)   Date Value   06/19/2024 2.30 (H)   05/01/2024 1.22 (H)   12/14/2023 1.90 (H)   12/14/2023 1.59 (H)     INR,(POC) (no units)   Date Value   09/24/2024 3.8

## 2024-11-04 NOTE — TELEPHONE ENCOUNTER
Call and see how he is feeling
Called patient and he is feeling better, less sob, scheduled for follow up with dkw and an ablation
Called patient, he has had worsening shortness of breath over the past two nights. Sunday night woke up with legs and arms shaking, very sob.  150/90 heart rate 50-60's associated with extreme sob and dizziness when he turned his head. Pulse ox 98%. He continued to wake up every 1/2 hour with sob. This occurred last night also. ( Wife has a five day history of congestion-on 5 different pills and inhalers and worsening) patient denies fever chills or cough. Notified patient we cannot prescribe oxygen at night for him. Patient recently off of coumadin with lovenox bridging for colonoscopy.  Referred to the Er to be evaluated for acute sob
Pt called to request that DKW give him prescriibe him oxygen. Per Pt at night he can not breath.   Please advise
4 = No assist / stand by assistance

## 2024-11-06 ENCOUNTER — NURSE ONLY (OUTPATIENT)
Dept: CARDIOLOGY CLINIC | Age: 79
End: 2024-11-06

## 2024-11-06 ENCOUNTER — OFFICE VISIT (OUTPATIENT)
Dept: CARDIOLOGY CLINIC | Age: 79
End: 2024-11-06

## 2024-11-06 VITALS
HEART RATE: 60 BPM | DIASTOLIC BLOOD PRESSURE: 66 MMHG | OXYGEN SATURATION: 98 % | HEIGHT: 70 IN | WEIGHT: 162.8 LBS | BODY MASS INDEX: 23.31 KG/M2 | SYSTOLIC BLOOD PRESSURE: 100 MMHG

## 2024-11-06 DIAGNOSIS — I25.5 ISCHEMIC CARDIOMYOPATHY: Chronic | ICD-10-CM

## 2024-11-06 DIAGNOSIS — I49.9 IRREGULAR HEARTBEAT: ICD-10-CM

## 2024-11-06 DIAGNOSIS — I82.411 DVT OF DEEP FEMORAL VEIN, RIGHT (HCC): ICD-10-CM

## 2024-11-06 DIAGNOSIS — I48.0 PAF (PAROXYSMAL ATRIAL FIBRILLATION) (HCC): Chronic | ICD-10-CM

## 2024-11-06 DIAGNOSIS — R00.1 BRADYCARDIA: ICD-10-CM

## 2024-11-06 DIAGNOSIS — I49.3 PVC (PREMATURE VENTRICULAR CONTRACTION): ICD-10-CM

## 2024-11-06 DIAGNOSIS — I48.0 PAROXYSMAL ATRIAL FIBRILLATION (HCC): Chronic | ICD-10-CM

## 2024-11-06 DIAGNOSIS — I42.9 CARDIOMYOPATHY, UNSPECIFIED TYPE (HCC): ICD-10-CM

## 2024-11-06 DIAGNOSIS — I10 ESSENTIAL HYPERTENSION: Chronic | ICD-10-CM

## 2024-11-06 DIAGNOSIS — I25.83 CORONARY ARTERY DISEASE DUE TO LIPID RICH PLAQUE: ICD-10-CM

## 2024-11-06 DIAGNOSIS — Z95.810 ICD (IMPLANTABLE CARDIOVERTER-DEFIBRILLATOR) IN PLACE: Primary | ICD-10-CM

## 2024-11-06 DIAGNOSIS — I25.10 CORONARY ARTERY DISEASE DUE TO LIPID RICH PLAQUE: ICD-10-CM

## 2024-11-06 DIAGNOSIS — Z79.899 ON AMIODARONE THERAPY: Primary | ICD-10-CM

## 2024-11-06 NOTE — PROGRESS NOTES
[facility]   Electrophysiology Follow up   Date: 11/6/2024  I had the privilege of visiting Juan A Mathis in the office.       CC: AF   HPI: Juan A Mathis is a 79 y.o. male history of HTN, HLD, CAD s/p CABG  2007 with re-do (2019), PVC, bradycardia, and AF. Hx of WESTON ligation and MAZE (1/8/2019). DVT 1/2023      S/p RFCA with PVI, roof line, inferior-posterior line with PWI (4/6/23)    DCCV 9/26/2023     He had recurrent symptomatic PVCs, and bigeminy. .   12/13/2023 Ablation of multifocal PVCs     DCCV 6/14/24     Juan A presents today for follow up. He states he is feeling ok from a cardiac standpoint and has not had recurrence of afib since DCCV in June, 2024. He states over the last few days he has noticed a \"hard heart beat\" and legs feel wobbly. Denies any other cardiac symptoms.     Assessment and plan:   -PAF   ECG today shows SB      6.1% AT/AF burden per device interrogation today, 3 AT/AF episodes (last on 6/7/24) lasting greater than 24 hours    High risk [id number] score 5 (age x2, CHF, HTN, CAD). On warfarin.      4/6/2023 S/p RFCA with PVI, roofline, inferior-posterior line with PWI              1/8/2019 h/o WESTON ligation and MAZE  - no remnant per LATESHA 04/2023       Continue Toprol 100 mg BID    Amiodarone 200 mg daily, I have discussed with patient side effects of amiodarone including but not limited to thyroid disease, discoloration of skin and cornea and pulmonary fibrosis. Patient would like to continue with it. Routine lab monitoring with AST 43, ALT 34 (10/2/2024), TSH 6.14 (3/25/2022)  TSH ordered.     -PVC's   ECG today shows no PVCs   173.6/hr PVC burden per device interrogation today                12/13/2023 Ablation of multifocal PVCs   He had bigeminy and NSVT               He had three forms of PVCs during ablation, and the clinical PVCs with bigeminy was ablated.    Continue Toprol  mg daily    -ICM/ chronic Systolic heart failure   S/p Dual chamber ICD 12/18/2017 
He has a 30 pack-year smoking history. He has never used smokeless tobacco. He reports that he does not drink alcohol and does not use drugs.     Family History:  Reviewed. Reviewed. No family history of SCD.      ***   - The patient is counseled to follow a low salt diet to assure blood pressure remains controlled for cardiovascular risk factor modification.   - The patient is counseled to avoid excess caffeine, and energy drinks as this may exacerbated ectopy and arrhythmia.   - The patient is counseled to get regular exercise 3-5 times per week to control cardiovascular risk factors.   - The patient is counseled to lose weigt to control cardiovascular risk factors.   - The patient is counseled to avoid tobacco use.       All questions and concerns were addressed to the patient/family. Alternatives to my treatment were discussed. I have discussed the above stated plan and the patient verbalized understanding and agreed with the plan.    ***    Physician Attestation: I, Dr. Darrius funez, confirm that the scribe's documentation has been prepared under my direction and personally reviewed by me in its entirety.  I also confirm that the note above accurately reflects all work, treatment, procedures, and medical decision making performed by me.     NOTE: This report was transcribed using voice recognition software. Every effort was made to ensure accuracy, however, inadvertent computerized transcription errors may be present.     Darrius Funez MD, MPH  [facility]   Office: [phone]  Fax: [phone]

## 2024-11-11 NOTE — PATIENT INSTRUCTIONS
No medication changes today     Recommend increasing regular exercise with biking.  Exercise is good for mind, heart and sleep to name a few.    Recommend to increase steps per day to 7,000-10,000 for optimal heart health.    Return next week for 48 hour monitor placement  Follow up with Dr. Frazier in 6 months

## 2024-11-11 NOTE — PROGRESS NOTES
[facility] [place]  11/26/24  Referring:     REASON FOR CONSULT/CHIEF COMPLAINT/HPI     Reason for visit/ Chief complaint  Follow Up  CAD, ICM   HPI Juan A Mathis is a 79 y.o. seen as a follow up for management of CAD, ICM, htn, hchol and AF, SVT JOSEPH. He had a right DVT in Jan 2023  He had a CABG redo 2019 at [facility] with WESTON ligation and LA maze. In Dec 2017 he had a dual chamber AICD  On 3/30 he had a PCI to IAN to LAD with PAYAL, PCI to SVG to OM with PAYAL, PCI to SVG to PDA. On 4/6/23 he had a CV and ablation  On 12/14/23-Electrophysiological study with ablation of multifocal PVCs( Darrius) PVC # 3 exit site was on the septal and basal location, could not be eliminated  completely    He underwent LHC 5/1/24> severe MVD and patent grafts. LATESHA showed moderate MR.He underwent successful DCCV 6/19/24 with Dr. Jordan.    Today, Yossi is here for a 6 month follow up.  Reports that he is doing good.  He is going to his Daughter's house for Thanksgiving.  States that he has been walking and doing ordinary work.  His watch shows about 3-4,000 steps per day.  States that he feels palpitations about every other beat.  Depending on how many palpitations that he is having that day will depend on how he feels.  States that he checks his pulse on his wrist for 5 minutes and times out how many palpitations he is having.  States that it used to be worse at night but now it is all of the time.  He is able to feel the palpitations on the back of his head if he lays it back on the recliner.  Reports that they are more frequent and worse since his ablation.  He feels weakness in the legs when they are really bad.  States that he does not work like he used to and has slowed down on the amount of work that he does.  Reports feeling a squeezing sensation when he is rushing around trying to get things done.  He does cough up some white stuff.  No swelling in his legs.  He has sleep apnea and uses his CPAP.  He has not

## 2024-11-25 ENCOUNTER — ANTI-COAG VISIT (OUTPATIENT)
Dept: PHARMACY | Age: 79
End: 2024-11-25
Payer: MEDICARE

## 2024-11-25 ENCOUNTER — HOSPITAL ENCOUNTER (OUTPATIENT)
Age: 79
Discharge: HOME | End: 2024-11-25
Payer: MEDICARE

## 2024-11-25 ENCOUNTER — TELEPHONE (OUTPATIENT)
Dept: CARDIOLOGY CLINIC | Age: 79
End: 2024-11-25

## 2024-11-25 DIAGNOSIS — I48.0 PAF (PAROXYSMAL ATRIAL FIBRILLATION) (HCC): Chronic | ICD-10-CM

## 2024-11-25 DIAGNOSIS — I48.0 PAROXYSMAL ATRIAL FIBRILLATION (HCC): Primary | Chronic | ICD-10-CM

## 2024-11-25 DIAGNOSIS — Z79.899 ON AMIODARONE THERAPY: ICD-10-CM

## 2024-11-25 LAB
INTERNATIONAL NORMALIZATION RATIO, POC: 2.4
PROTHROMBIN TIME, POC: 0
T4 FREE SERPL-MCNC: 0.5 NG/DL (ref 0.9–1.8)
TSH SERPL DL<=0.005 MIU/L-ACNC: 17.5 UIU/ML (ref 0.27–4.2)

## 2024-11-25 PROCEDURE — 85610 PROTHROMBIN TIME: CPT | Performed by: PHYSICIAN ASSISTANT

## 2024-11-25 PROCEDURE — 99211 OFF/OP EST MAY X REQ PHY/QHP: CPT | Performed by: PHYSICIAN ASSISTANT

## 2024-11-25 PROCEDURE — 84443 ASSAY THYROID STIM HORMONE: CPT

## 2024-11-25 PROCEDURE — 84439 ASSAY OF FREE THYROXINE: CPT

## 2024-11-25 PROCEDURE — 36415 COLL VENOUS BLD VENIPUNCTURE: CPT

## 2024-11-25 NOTE — TELEPHONE ENCOUNTER
----- Message from BETO Richards CNP sent at 11/25/2024  4:12 PM EST -----  Please let him know his TSH is markedly elevated likely secondary to amiodarone. He either needs to wean down on amiodarone or be started on synthroid (thyroid replacement) by his PCP

## 2024-11-25 NOTE — PROGRESS NOTES
Mr. Juan A Mathis is a 79 y.o. y/o male with history of Afib who presents today for anticoagulation monitoring and adjustment.    Pertinent PMH: DVT, CABG, HTN, CAD, seizure, CHF    Patient Reported Findings:  Yes     No  [x]   []       Patient verifies current dosing regimen as listed- confirmed   []   [x]       S/S bleeding/bruising/swelling/SOB- denies ---> was having clot symptoms on Sat so went to see doc on Mon and did doppler---> had large bruise on leg for a few weeks but has resolved now --> denies   []   [x]       Blood in urine or stool- denies   []   [x]       Procedures scheduled in the future at this time colonoscopy was cancelled, r/s for December 29, hold 5 days and bridge. Was instructed to get INR on Mon 12/26 to determine INR, start lovenox --> going to schedule an ablation in near future, possible November. Will need to hold warfarin 2 days prior to procedure---> CV 9/6, ablation 12/14 --> having ablation on 12/14, cleared to hold warfarin 3 days prior --> had procedure 5/1, held warfarin 5 days prior and bridged with lovenox. INR 1.2 at procedure---> Cv tomorrow  --> none upcoming   []   [x]       Missed Dose -denies  []   [x]       Extra Dose- denies   []   [x]       Change in medications- states that MD had been adjusting phenytoin increasing 4-5 weeks ago but plans to return to normal phenytoin today  --->takes amiodarone 200 mg qd, has been at this dose for a few weeks---> sometimes take extra amiodarone at night, states doctor told him he could do this, asked to clarify, adjusting lasix --->  every 2 days takes extra Amiodarone, increased metoprolol ---> states fluctuates Phenytoin based on symptoms, unclear ---> primidone decreased 2/29 (could cause INR to inc) ---> has been taking more amiodarone (1/2 tablet) qd for past 2 weeks since being in a fib--> Increasing metoprolol, amiodarone 200 mg qam and 100 mg 4x week in pm --> no changes   []   [x]       Change in health/diet/appetite -states

## 2024-11-25 NOTE — TELEPHONE ENCOUNTER
I spoke with pt and relayed lab results per NPSR. Pt verbalized understanding. He stated that he will discuss with PCP.

## 2024-11-26 ENCOUNTER — OFFICE VISIT (OUTPATIENT)
Dept: CARDIOLOGY CLINIC | Age: 79
End: 2024-11-26
Payer: MEDICARE

## 2024-11-26 VITALS
BODY MASS INDEX: 23.31 KG/M2 | OXYGEN SATURATION: 98 % | HEIGHT: 70 IN | HEART RATE: 66 BPM | SYSTOLIC BLOOD PRESSURE: 108 MMHG | WEIGHT: 162.8 LBS | DIASTOLIC BLOOD PRESSURE: 70 MMHG

## 2024-11-26 DIAGNOSIS — I25.5 ISCHEMIC CARDIOMYOPATHY: Chronic | ICD-10-CM

## 2024-11-26 DIAGNOSIS — I25.83 CORONARY ARTERY DISEASE DUE TO LIPID RICH PLAQUE: Primary | ICD-10-CM

## 2024-11-26 DIAGNOSIS — I25.10 CORONARY ARTERY DISEASE DUE TO LIPID RICH PLAQUE: Primary | ICD-10-CM

## 2024-11-26 DIAGNOSIS — I47.29 NSVT (NONSUSTAINED VENTRICULAR TACHYCARDIA) (HCC): ICD-10-CM

## 2024-11-26 DIAGNOSIS — I34.0 MITRAL VALVE INSUFFICIENCY, UNSPECIFIED ETIOLOGY: ICD-10-CM

## 2024-11-26 DIAGNOSIS — I49.3 PVC (PREMATURE VENTRICULAR CONTRACTION): ICD-10-CM

## 2024-11-26 DIAGNOSIS — R00.2 PALPITATIONS: ICD-10-CM

## 2024-11-26 DIAGNOSIS — I10 ESSENTIAL HYPERTENSION: Chronic | ICD-10-CM

## 2024-11-26 DIAGNOSIS — I48.0 PAF (PAROXYSMAL ATRIAL FIBRILLATION) (HCC): Chronic | ICD-10-CM

## 2024-11-26 DIAGNOSIS — E78.2 MIXED HYPERLIPIDEMIA: ICD-10-CM

## 2024-11-26 DIAGNOSIS — G47.33 OSA ON CPAP: ICD-10-CM

## 2024-11-26 PROCEDURE — 1159F MED LIST DOCD IN RCRD: CPT | Performed by: INTERNAL MEDICINE

## 2024-11-26 PROCEDURE — G8484 FLU IMMUNIZE NO ADMIN: HCPCS | Performed by: INTERNAL MEDICINE

## 2024-11-26 PROCEDURE — 3074F SYST BP LT 130 MM HG: CPT | Performed by: INTERNAL MEDICINE

## 2024-11-26 PROCEDURE — G8427 DOCREV CUR MEDS BY ELIG CLIN: HCPCS | Performed by: INTERNAL MEDICINE

## 2024-11-26 PROCEDURE — 1036F TOBACCO NON-USER: CPT | Performed by: INTERNAL MEDICINE

## 2024-11-26 PROCEDURE — 3078F DIAST BP <80 MM HG: CPT | Performed by: INTERNAL MEDICINE

## 2024-11-26 PROCEDURE — 99214 OFFICE O/P EST MOD 30 MIN: CPT | Performed by: INTERNAL MEDICINE

## 2024-11-26 PROCEDURE — G8420 CALC BMI NORM PARAMETERS: HCPCS | Performed by: INTERNAL MEDICINE

## 2024-11-26 PROCEDURE — 1123F ACP DISCUSS/DSCN MKR DOCD: CPT | Performed by: INTERNAL MEDICINE

## 2024-12-23 ENCOUNTER — ANTI-COAG VISIT (OUTPATIENT)
Dept: PHARMACY | Age: 79
End: 2024-12-23
Payer: MEDICARE

## 2024-12-23 DIAGNOSIS — I48.91 ATRIAL FIBRILLATION, UNSPECIFIED TYPE (HCC): Primary | Chronic | ICD-10-CM

## 2024-12-23 LAB
INTERNATIONAL NORMALIZATION RATIO, POC: 2.5
PROTHROMBIN TIME, POC: 0

## 2024-12-23 PROCEDURE — 85610 PROTHROMBIN TIME: CPT

## 2024-12-23 PROCEDURE — 99211 OFF/OP EST MAY X REQ PHY/QHP: CPT

## 2024-12-23 NOTE — PROGRESS NOTES
Mr. Juan A Mathis is a 79 y.o. y/o male with history of Afib who presents today for anticoagulation monitoring and adjustment.    Pertinent PMH: DVT, CABG, HTN, CAD, seizure, CHF    Patient Reported Findings:  Yes     No  [x]   []       Patient verifies current dosing regimen as listed- confirmed   []   [x]       S/S bleeding/bruising/swelling/SOB- denies ---> was having clot symptoms on Sat so went to see doc on Mon and did doppler---> had large bruise on leg for a few weeks but has resolved now --> denies   []   [x]       Blood in urine or stool- denies   []   [x]       Procedures scheduled in the future at this time colonoscopy was cancelled, r/s for December 29, hold 5 days and bridge. Was instructed to get INR on Mon 12/26 to determine INR, start lovenox --> going to schedule an ablation in near future, possible November. Will need to hold warfarin 2 days prior to procedure---> CV 9/6, ablation 12/14 --> having ablation on 12/14, cleared to hold warfarin 3 days prior --> had procedure 5/1, held warfarin 5 days prior and bridged with lovenox. INR 1.2 at procedure---> Cv tomorrow  --> none upcoming   []   [x]       Missed Dose -denies  []   [x]       Extra Dose- denies   []   [x]       Change in medications- states that MD had been adjusting phenytoin increasing 4-5 weeks ago but plans to return to normal phenytoin today  --->takes amiodarone 200 mg qd, has been at this dose for a few weeks---> sometimes take extra amiodarone at night, states doctor told him he could do this, asked to clarify, adjusting lasix --->  every 2 days takes extra Amiodarone, increased metoprolol ---> states fluctuates Phenytoin based on symptoms, unclear ---> primidone decreased 2/29 (could cause INR to inc) ---> has been taking more amiodarone (1/2 tablet) qd for past 2 weeks since being in a fib--> Increasing metoprolol, amiodarone 200 mg qam and 100 mg 4x week in pm --> inc metoprolol, started levothyroxine   []   [x]       Change in

## 2024-12-26 ENCOUNTER — TELEPHONE (OUTPATIENT)
Dept: CARDIOLOGY CLINIC | Age: 79
End: 2024-12-26

## 2024-12-26 NOTE — TELEPHONE ENCOUNTER
Weight today: 163 lbs, does not weigh daily at home. Last  lbs 12.8 oz.   Last BP on Monday 12/23: 110/81.     Increased swelling in legs and trunk for 1 week, had difficulty zipping pants. Feels SOB when lying flat, has been sleeping in his recliner with head elevated more than usual. Productive cough- cloudy phlegm. Attempted to use his incentive spirometer and noticed his efforts have decreased by about 25%.     He was self titrating lasix between 40-60 mg and taking every other day, but has been taking daily for the past week due to swelling. Yesterday he took Lasix 80 mg and today 60 mg.     Reports improvement in swelling today, but remains swollen around his waist/rib cage. He is able to ambulate stairs (14) without stopping or SOB. Denies chest pain, dizziness or lightheadedness. Medication and low sodium diet compliance reviewed.

## 2024-12-26 NOTE — TELEPHONE ENCOUNTER
Called pt back, said his weight was 163 this morning. He is coughing and has trouble laying flat. Left side is swollen. Leg was swollen the other day but not so much now.

## 2024-12-26 NOTE — TELEPHONE ENCOUNTER
Pt called in letting Dkw know that he is holding water and it is causing some SOB while laying down. Pt denies any other symptoms. Please  call and advise next steps   Thank you

## 2024-12-26 NOTE — TELEPHONE ENCOUNTER
Reviewed with DKW: Repeat weight tomorrow, call with results. Reviewed s/s SOB when to seek emergent care, if sputum becomes discolored/fever call PCP/urgent care.     Spoke with patient instructions reviewed. Verbalized understanding.

## 2024-12-27 NOTE — TELEPHONE ENCOUNTER
Pt calling in weight of 163. Pt states he is still having some of same symptoms as yesterday   Please advise

## 2024-12-27 NOTE — TELEPHONE ENCOUNTER
Called patient, advised him to take lasix 80 mg today, then back to regular dose, recommend if no better by the end of weekend to go to ER, patient verbalized understanding

## 2024-12-30 ENCOUNTER — TELEPHONE (OUTPATIENT)
Dept: CARDIOLOGY CLINIC | Age: 79
End: 2024-12-30

## 2024-12-30 DIAGNOSIS — I25.83 CORONARY ARTERY DISEASE DUE TO LIPID RICH PLAQUE: ICD-10-CM

## 2024-12-30 DIAGNOSIS — I10 ESSENTIAL HYPERTENSION: Primary | ICD-10-CM

## 2024-12-30 DIAGNOSIS — I25.10 CORONARY ARTERY DISEASE DUE TO LIPID RICH PLAQUE: ICD-10-CM

## 2024-12-30 DIAGNOSIS — I10 ESSENTIAL HYPERTENSION: ICD-10-CM

## 2024-12-30 RX ORDER — FUROSEMIDE 40 MG/1
40 TABLET ORAL 2 TIMES DAILY
Qty: 180 TABLET | Refills: 1 | Status: SHIPPED | OUTPATIENT
Start: 2024-12-30 | End: 2025-01-03 | Stop reason: DRUGHIGH

## 2024-12-30 RX ORDER — FUROSEMIDE 40 MG/1
40 TABLET ORAL DAILY
Qty: 90 TABLET | Refills: 1 | Status: SHIPPED | OUTPATIENT
Start: 2024-12-30 | End: 2024-12-30

## 2024-12-30 RX ORDER — POTASSIUM CHLORIDE 1500 MG/1
20 TABLET, EXTENDED RELEASE ORAL DAILY
Qty: 90 TABLET | Refills: 1 | Status: SHIPPED | OUTPATIENT
Start: 2024-12-30

## 2024-12-30 NOTE — TELEPHONE ENCOUNTER
Please let him know to get a renal panel and that Dr Frazier sent in his lasix that says \"40 mg twice a day\"  he can then take 1/2 of 20s on days that he needs 60 mg

## 2024-12-30 NOTE — TELEPHONE ENCOUNTER
Pt states he is returning Charleen's call. States this morning's weight is 161.7 and his swelling has decreased. Any other questions please call.

## 2024-12-30 NOTE — TELEPHONE ENCOUNTER
Called patient and he has been taking lasix 60 mg daily, some days it works better than others, he will run out of his lasix 40 mg tablets faster and will need more pills.   No orthopnea, no edema feels back to baseline

## 2024-12-31 ENCOUNTER — TELEPHONE (OUTPATIENT)
Dept: CARDIOLOGY CLINIC | Age: 79
End: 2024-12-31

## 2024-12-31 NOTE — TELEPHONE ENCOUNTER
Medication Refill    Medication needing refilled:  KLOR-CON M20     Dosage of the medication:   20meq    How are you taking this medication (QD, BID, TID, QID, PRN):   Take 1 tablet by mouth daily     30 or 90 day supply called in:   135    When will you run out of your medication:    Which Pharmacy are we sending the medication to?:     [facility] [phone] [address]  Phone: [phone]  Fax: [phone]

## 2024-12-31 NOTE — TELEPHONE ENCOUNTER
Called and spoke with patient- patient states that he is taking 60meq of the potassium supplement. Needing an updated script sent to [facility].

## 2025-01-02 ENCOUNTER — TELEPHONE (OUTPATIENT)
Dept: CARDIOLOGY CLINIC | Age: 80
End: 2025-01-02

## 2025-01-02 DIAGNOSIS — I10 ESSENTIAL HYPERTENSION: ICD-10-CM

## 2025-01-02 LAB
ALBUMIN SERPL-MCNC: 4 G/DL (ref 3.4–5)
ANION GAP SERPL CALCULATED.3IONS-SCNC: 10 MMOL/L (ref 3–16)
BUN SERPL-MCNC: 41 MG/DL (ref 7–20)
CALCIUM SERPL-MCNC: 9.2 MG/DL (ref 8.3–10.6)
CHLORIDE SERPL-SCNC: 105 MMOL/L (ref 99–110)
CO2 SERPL-SCNC: 27 MMOL/L (ref 21–32)
CREAT SERPL-MCNC: 1.4 MG/DL (ref 0.8–1.3)
GFR SERPLBLD CREATININE-BSD FMLA CKD-EPI: 52 ML/MIN/{1.73_M2}
GLUCOSE SERPL-MCNC: 84 MG/DL (ref 70–99)
PHOSPHATE SERPL-MCNC: 3.3 MG/DL (ref 2.5–4.9)
POTASSIUM SERPL-SCNC: 5.2 MMOL/L (ref 3.5–5.1)
SODIUM SERPL-SCNC: 142 MMOL/L (ref 136–145)

## 2025-01-02 NOTE — TELEPHONE ENCOUNTER
Spoke to Pt, he advised he was increasing dose of potassium when he increased dose of furosemide. I advised DKW recommendation and to get renal panel complete. Pt verbalized understanding and would get blood work done at [place].

## 2025-01-02 NOTE — TELEPHONE ENCOUNTER
Dr Frazier recommend he only take potassium 20, repeat  renal panel as recommended, he should not be taking 60 meq,thanks

## 2025-01-02 NOTE — PROGRESS NOTES
with clear sclera. Mucus membranes pink and moist. Teeth intact. Thyroid smooth without nodules or goiter  Respiratory: Respirations symmetric and unlabored. Lungs clear to auscultation bilaterally, no wheezing, rhonchi, or crackles  Cardiovascular:  Regular rate and irregular rhythm. S1/S2 present without murmurs, rubs, or gallops. Peripheral pulses 2+, capillary refill < 3 seconds. No elevation of JVP. No peripheral edema  Gastrointestinal: Abdomen soft and round. Bowel sounds normoactive in all quadrants without tenderness or masses.  Musculoskeletal: Bilateral upper and lower extremity strength 5/5 with full ROM.  Neurological/Psych: Awake and orientated to person, place and time. Calm affect, appropriate mood.     Pertinent labs, diagnostic, device, and imaging results reviewed as a part of this visit    LABS    CBC:   Lab Results   Component Value Date    WBC 5.7 2024    HGB 13.5 2024    HCT 41.0 2024    MCV 93.7 2024     2024     BMP:   Lab Results   Component Value Date    CREATININE 1.4 (H) 2025    BUN 41 (H) 2025     2025    K 5.2 (H) 2025     2025    CO2 27 2025     Estimated Creatinine Clearance: 46 mL/min (A) (based on SCr of 1.4 mg/dL (H)).     Thyroid:   Lab Results   Component Value Date    TSH 17.50 (H) 2024     Lipid Panel:   Lab Results   Component Value Date/Time    CHOL 140 2023 04:44 AM    HDL 41 2023 04:44 AM    HDL 47 2011 07:40 AM    TRIG 71 2023 04:44 AM     LFTs:  Lab Results   Component Value Date    ALT 47 (H) 06/15/2023    AST 43 (H) 06/15/2023    ALKPHOS 115 06/15/2023    BILITOT <0.2 06/15/2023     Coags:   Lab Results   Component Value Date    PROTIME 0.0 2024    INR 2.5 2024    APTT 39.1 (H) 2023       EC/3/2025 (Personally Interpreted)  - Atrial fibrillation    Echo: 3/24   Normal left ventricular size and wall thickness.   Overall left

## 2025-01-02 NOTE — TELEPHONE ENCOUNTER
Pt called to inform RMM that he would like to schedule a cardioversion.  Please call to discuss.  Thank you

## 2025-01-02 NOTE — TELEPHONE ENCOUNTER
Spoke with patient and recommend office visit with NPSR. His last DCCV was 6/2024. He is on amiodarone 200 mg daily and TSH from 11/2024 was 17.50. PCP started him on levothyroxine. Appointment scheduled for 1/3/2025.

## 2025-01-03 ENCOUNTER — NURSE ONLY (OUTPATIENT)
Dept: CARDIOLOGY CLINIC | Age: 80
End: 2025-01-03

## 2025-01-03 ENCOUNTER — OFFICE VISIT (OUTPATIENT)
Dept: CARDIOLOGY CLINIC | Age: 80
End: 2025-01-03

## 2025-01-03 VITALS
WEIGHT: 165.4 LBS | DIASTOLIC BLOOD PRESSURE: 64 MMHG | SYSTOLIC BLOOD PRESSURE: 98 MMHG | HEIGHT: 70 IN | HEART RATE: 78 BPM | BODY MASS INDEX: 23.68 KG/M2

## 2025-01-03 DIAGNOSIS — Z95.810 ICD (IMPLANTABLE CARDIOVERTER-DEFIBRILLATOR) IN PLACE: ICD-10-CM

## 2025-01-03 DIAGNOSIS — I25.5 ISCHEMIC CARDIOMYOPATHY: ICD-10-CM

## 2025-01-03 DIAGNOSIS — Z79.899 ON AMIODARONE THERAPY: ICD-10-CM

## 2025-01-03 DIAGNOSIS — I47.29 NSVT (NONSUSTAINED VENTRICULAR TACHYCARDIA) (HCC): ICD-10-CM

## 2025-01-03 DIAGNOSIS — I48.0 PAF (PAROXYSMAL ATRIAL FIBRILLATION) (HCC): ICD-10-CM

## 2025-01-03 DIAGNOSIS — I48.0 PAF (PAROXYSMAL ATRIAL FIBRILLATION) (HCC): Chronic | ICD-10-CM

## 2025-01-03 DIAGNOSIS — I48.0 PAROXYSMAL ATRIAL FIBRILLATION (HCC): ICD-10-CM

## 2025-01-03 DIAGNOSIS — I47.20 VT (VENTRICULAR TACHYCARDIA) (HCC): ICD-10-CM

## 2025-01-03 DIAGNOSIS — I49.01 VF (VENTRICULAR FIBRILLATION): ICD-10-CM

## 2025-01-03 DIAGNOSIS — I25.5 ISCHEMIC CARDIOMYOPATHY: Chronic | ICD-10-CM

## 2025-01-03 DIAGNOSIS — Z95.810 ICD (IMPLANTABLE CARDIOVERTER-DEFIBRILLATOR) IN PLACE: Primary | ICD-10-CM

## 2025-01-03 DIAGNOSIS — I50.22 CHRONIC SYSTOLIC HEART FAILURE (HCC): ICD-10-CM

## 2025-01-03 DIAGNOSIS — I49.3 PVC (PREMATURE VENTRICULAR CONTRACTION): ICD-10-CM

## 2025-01-03 DIAGNOSIS — I47.20 VT (VENTRICULAR TACHYCARDIA) (HCC): Primary | ICD-10-CM

## 2025-01-03 DIAGNOSIS — R00.1 BRADYCARDIA: ICD-10-CM

## 2025-01-03 DIAGNOSIS — I25.810 CORONARY ARTERY DISEASE INVOLVING CORONARY BYPASS GRAFT OF NATIVE HEART WITHOUT ANGINA PECTORIS: ICD-10-CM

## 2025-01-03 DIAGNOSIS — I25.83 CORONARY ARTERY DISEASE DUE TO LIPID RICH PLAQUE: ICD-10-CM

## 2025-01-03 DIAGNOSIS — I25.10 CORONARY ARTERY DISEASE DUE TO LIPID RICH PLAQUE: ICD-10-CM

## 2025-01-03 DIAGNOSIS — I10 ESSENTIAL HYPERTENSION: Chronic | ICD-10-CM

## 2025-01-03 DIAGNOSIS — G47.33 OSA ON CPAP: ICD-10-CM

## 2025-01-03 RX ORDER — FUROSEMIDE 40 MG/1
60 TABLET ORAL EVERY OTHER DAY
Qty: 180 TABLET | Refills: 1 | Status: SHIPPED
Start: 2025-01-03

## 2025-01-07 ENCOUNTER — TELEPHONE (OUTPATIENT)
Dept: CARDIOLOGY CLINIC | Age: 80
End: 2025-01-07

## 2025-01-07 DIAGNOSIS — I49.3 PVC (PREMATURE VENTRICULAR CONTRACTION): Primary | ICD-10-CM

## 2025-01-07 NOTE — TELEPHONE ENCOUNTER
----- Message from RN Charleen ROSENTHAL RN sent at 1/7/2025  2:48 PM EST -----  Please call him and tell him to stop taking potassium because his levels were borderline high.  Repeat renal panel on Thursday and call us with weights

## 2025-01-07 NOTE — RESULT ENCOUNTER NOTE
Please call him and tell him to stop taking potassium because his levels were borderline high.  Repeat renal panel on Thursday and call us with weights

## 2025-01-08 ENCOUNTER — TELEPHONE (OUTPATIENT)
Dept: CARDIOLOGY CLINIC | Age: 80
End: 2025-01-08

## 2025-01-08 NOTE — TELEPHONE ENCOUNTER
I spoke with pt and relayed lab results and instructions per DKW. Pt verbalized understanding. Order in Epic

## 2025-01-09 DIAGNOSIS — I47.29 NSVT (NONSUSTAINED VENTRICULAR TACHYCARDIA) (HCC): ICD-10-CM

## 2025-01-09 DIAGNOSIS — I49.3 PVC (PREMATURE VENTRICULAR CONTRACTION): ICD-10-CM

## 2025-01-09 LAB
ALBUMIN SERPL-MCNC: 3.9 G/DL (ref 3.4–5)
ALBUMIN/GLOB SERPL: 1.6 {RATIO} (ref 1.1–2.2)
ALP SERPL-CCNC: 149 U/L (ref 40–129)
ALT SERPL-CCNC: 39 U/L (ref 10–40)
ANION GAP SERPL CALCULATED.3IONS-SCNC: 9 MMOL/L (ref 3–16)
AST SERPL-CCNC: 29 U/L (ref 15–37)
BASOPHILS # BLD: 0.1 K/UL (ref 0–0.2)
BASOPHILS NFR BLD: 1.2 %
BILIRUB SERPL-MCNC: 0.3 MG/DL (ref 0–1)
BUN SERPL-MCNC: 28 MG/DL (ref 7–20)
CALCIUM SERPL-MCNC: 8.9 MG/DL (ref 8.3–10.6)
CHLORIDE SERPL-SCNC: 102 MMOL/L (ref 99–110)
CO2 SERPL-SCNC: 26 MMOL/L (ref 21–32)
CREAT SERPL-MCNC: 1.5 MG/DL (ref 0.8–1.3)
DEPRECATED RDW RBC AUTO: 14 % (ref 12.4–15.4)
EOSINOPHIL # BLD: 0.3 K/UL (ref 0–0.6)
EOSINOPHIL NFR BLD: 3.8 %
GFR SERPLBLD CREATININE-BSD FMLA CKD-EPI: 48 ML/MIN/{1.73_M2}
GLUCOSE SERPL-MCNC: 101 MG/DL (ref 70–99)
HCT VFR BLD AUTO: 36.7 % (ref 40.5–52.5)
HGB BLD-MCNC: 12.4 G/DL (ref 13.5–17.5)
LYMPHOCYTES # BLD: 1.3 K/UL (ref 1–5.1)
LYMPHOCYTES NFR BLD: 17.9 %
MCH RBC QN AUTO: 31.2 PG (ref 26–34)
MCHC RBC AUTO-ENTMCNC: 33.8 G/DL (ref 31–36)
MCV RBC AUTO: 92.3 FL (ref 80–100)
MONOCYTES # BLD: 0.8 K/UL (ref 0–1.3)
MONOCYTES NFR BLD: 10.4 %
NEUTROPHILS # BLD: 4.9 K/UL (ref 1.7–7.7)
NEUTROPHILS NFR BLD: 66.7 %
PHOSPHATE SERPL-MCNC: 2.7 MG/DL (ref 2.5–4.9)
PLATELET # BLD AUTO: 374 K/UL (ref 135–450)
PMV BLD AUTO: 8.5 FL (ref 5–10.5)
POTASSIUM SERPL-SCNC: 4.6 MMOL/L (ref 3.5–5.1)
PROT SERPL-MCNC: 6.3 G/DL (ref 6.4–8.2)
RBC # BLD AUTO: 3.97 M/UL (ref 4.2–5.9)
SODIUM SERPL-SCNC: 137 MMOL/L (ref 136–145)
WBC # BLD AUTO: 7.3 K/UL (ref 4–11)

## 2025-01-10 ENCOUNTER — HOSPITAL ENCOUNTER (OUTPATIENT)
Age: 80
Discharge: HOME | End: 2025-01-12
Attending: INTERNAL MEDICINE
Payer: MEDICARE

## 2025-01-10 VITALS
BODY MASS INDEX: 23.62 KG/M2 | HEART RATE: 62 BPM | WEIGHT: 165 LBS | DIASTOLIC BLOOD PRESSURE: 70 MMHG | HEIGHT: 70 IN | RESPIRATION RATE: 19 BRPM | TEMPERATURE: 98.1 F | SYSTOLIC BLOOD PRESSURE: 96 MMHG | OXYGEN SATURATION: 99 %

## 2025-01-10 DIAGNOSIS — I48.19 PERSISTENT ATRIAL FIBRILLATION (HCC): ICD-10-CM

## 2025-01-10 LAB
ECHO BSA: 1.92 M2
EKG ATRIAL RATE: 52 BPM
EKG ATRIAL RATE: 78 BPM
EKG DIAGNOSIS: NORMAL
EKG DIAGNOSIS: NORMAL
EKG Q-T INTERVAL: 178 MS
EKG Q-T INTERVAL: 452 MS
EKG QRS DURATION: 150 MS
EKG QRS DURATION: 182 MS
EKG QTC CALCULATION (BAZETT): 202 MS
EKG QTC CALCULATION (BAZETT): 462 MS
EKG R AXIS: 23 DEGREES
EKG R AXIS: 270 DEGREES
EKG T AXIS: 0 DEGREES
EKG T AXIS: 238 DEGREES
EKG VENTRICULAR RATE: 63 BPM
EKG VENTRICULAR RATE: 78 BPM
INR PPP: 1.72 (ref 0.85–1.15)
PROTHROMBIN TIME: 20.2 SEC (ref 11.9–14.9)

## 2025-01-10 PROCEDURE — 92960 CARDIOVERSION ELECTRIC EXT: CPT | Performed by: INTERNAL MEDICINE

## 2025-01-10 PROCEDURE — 36415 COLL VENOUS BLD VENIPUNCTURE: CPT

## 2025-01-10 PROCEDURE — 85610 PROTHROMBIN TIME: CPT

## 2025-01-10 PROCEDURE — 7100000011 HC PHASE II RECOVERY - ADDTL 15 MIN: Performed by: INTERNAL MEDICINE

## 2025-01-10 PROCEDURE — 99152 MOD SED SAME PHYS/QHP 5/>YRS: CPT | Performed by: INTERNAL MEDICINE

## 2025-01-10 PROCEDURE — 2500000003 HC RX 250 WO HCPCS

## 2025-01-10 PROCEDURE — 2500000003 HC RX 250 WO HCPCS: Performed by: INTERNAL MEDICINE

## 2025-01-10 PROCEDURE — 92960 CARDIOVERSION ELECTRIC EXT: CPT

## 2025-01-10 PROCEDURE — 7100000010 HC PHASE II RECOVERY - FIRST 15 MIN: Performed by: INTERNAL MEDICINE

## 2025-01-10 RX ORDER — SODIUM CHLORIDE 9 MG/ML
INJECTION, SOLUTION INTRAVENOUS PRN
Status: DISCONTINUED | OUTPATIENT
Start: 2025-01-10 | End: 2025-01-13 | Stop reason: HOSPADM

## 2025-01-10 RX ORDER — SODIUM CHLORIDE 0.9 % (FLUSH) 0.9 %
5-40 SYRINGE (ML) INJECTION PRN
Status: DISCONTINUED | OUTPATIENT
Start: 2025-01-10 | End: 2025-01-13 | Stop reason: HOSPADM

## 2025-01-10 RX ORDER — SODIUM CHLORIDE 0.9 % (FLUSH) 0.9 %
5-40 SYRINGE (ML) INJECTION EVERY 12 HOURS SCHEDULED
Status: DISCONTINUED | OUTPATIENT
Start: 2025-01-10 | End: 2025-01-13 | Stop reason: HOSPADM

## 2025-01-10 RX ADMIN — METHOHEXITAL SODIUM 60 MG: 500 INJECTION, POWDER, LYOPHILIZED, FOR SOLUTION INTRAMUSCULAR; INTRAVENOUS; RECTAL at 07:50

## 2025-01-10 NOTE — PROCEDURES
[facility]     Electrophysiology Procedure Note       Date of Procedure: 1/10/2025  Patient's Name: Juan A Mathis  YOB: 1945   Medical Record Number: [id number]  Procedure Performed by: Darrius Jordan MD    Procedures performed:    External Electrical cardioversion   IV sedation.   Medication: Brevital Sodium   Sedation medication was given by physician    An independent trained observer assisted in the monitoring of the patient's level of consciousness and physiological status including vital signs.     Indication of the procedure: Persistent atrial fibrillation     Details of procedure:   The patient was brought to the cath lab area in a fasting and non-sedated state. The risks, benefits and alternatives of the procedure were discussed with the patient. The patient opted to proceed with the procedure. Written informed consent was signed and placed in the chart.  A timeout protocol was completed to identify the patient and the procedure being performed.     Then we used brevital for sedation. 50 mg Brevital was given by me as one dose, and electrical DC cardioversion was perfomred using 200J, synchronized shock.     Patient was converted to sinus rhythm. The patient tolerated the procedure well and there were no complications.     Conclusion:   Successful external DC cardioversion of atrial fibrillation

## 2025-01-10 NOTE — H&P
1/3/25  Yes Rusty Bloom APRN - CNP   potassium chloride (KLOR-CON M20) 20 MEQ extended release tablet Take 1 tablet by mouth daily 12/30/24  Yes Jaydon Frazier DO   LEVOTHYROXINE SODIUM PO Take 25 mcg by mouth daily 12/17/24  Yes ProviderMoe MD   warfarin (COUMADIN) 2.5 MG tablet TAKE 2 TABLETS DAILY OR AS DIRECTED BY PROVIDER BASED ON INR 9/18/24  Yes Jaydon Frazier DO   amiodarone (CORDARONE) 200 MG tablet Take 1 tablet by mouth daily 3 days a week extra 1/2 tablet 6/24/24  Yes Jaydon Frazier DO   clopidogrel (PLAVIX) 75 MG tablet Take 1 tablet by mouth every morning 6/24/24  Yes Jaydon Frazier DO   ezetimibe (ZETIA) 10 MG tablet Take 1 tablet by mouth every evening 6/24/24  Yes Jaydon Frazier DO   MAGnesium-Oxide 400 (240 Mg) MG tablet Take 2 tablets by mouth daily 6/24/24  Yes Jaydon Frazier DO   metoprolol succinate (TOPROL XL) 100 MG extended release tablet Take one bid 6/24/24  Yes Jaydon Frazier DO   rosuvastatin (CRESTOR) 40 MG tablet Take 1 tablet by mouth nightly 6/24/24  Yes Jaydon Frazier DO   sacubitril-valsartan (ENTRESTO) 24-26 MG per tablet Take 1 tablet by mouth 2 times daily 6/24/24  Yes Jaydon Frazier DO   spironolactone (ALDACTONE) 25 MG tablet Take 0.5 tablets by mouth daily 6/24/24  Yes Jaydon Frazier DO   pantoprazole (PROTONIX) 40 MG tablet Take 1 tablet by mouth every morning (before breakfast) 1/12/24  Yes Jaydon Frazier DO   naproxen (NAPROSYN) 500 MG tablet Take 1 tablet by mouth 2 times daily (with meals) 12/27/23  Yes Guillaume Maravilla PA   diazepam (VALIUM) 5 MG tablet Take 1 tablet by mouth in the morning and 1 tablet in the evening.   Yes Moe Dhaliwal MD   phenytoin (DILANTIN) 100 MG ER capsule Take 1 capsule by mouth 2 times daily. Resume home dose 12/21/12  Yes Winter, Lisset L, APRN - CNP   primidone (MYSOLINE) 250 MG tablet Take 0.5 tablets by mouth 3 times daily 1 tablet AM, half tablet PM   Yes Provider, MD Moe   nitroGLYCERIN

## 2025-01-15 NOTE — RESULT ENCOUNTER NOTE
Call and check on weight, if weight is down hold lasix for 2 days and then repeat renal panel in one week

## 2025-01-21 ENCOUNTER — HOSPITAL ENCOUNTER (EMERGENCY)
Age: 80
Discharge: HOME | End: 2025-01-21
Attending: EMERGENCY MEDICINE
Payer: MEDICARE

## 2025-01-21 ENCOUNTER — APPOINTMENT (OUTPATIENT)
Dept: ULTRASOUND IMAGING | Age: 80
End: 2025-01-21
Payer: MEDICARE

## 2025-01-21 ENCOUNTER — APPOINTMENT (OUTPATIENT)
Dept: GENERAL RADIOLOGY | Age: 80
End: 2025-01-21
Payer: MEDICARE

## 2025-01-21 ENCOUNTER — APPOINTMENT (OUTPATIENT)
Dept: CT IMAGING | Age: 80
End: 2025-01-21
Payer: MEDICARE

## 2025-01-21 VITALS
WEIGHT: 165 LBS | BODY MASS INDEX: 23.68 KG/M2 | SYSTOLIC BLOOD PRESSURE: 113 MMHG | DIASTOLIC BLOOD PRESSURE: 69 MMHG | OXYGEN SATURATION: 99 % | TEMPERATURE: 97.6 F | RESPIRATION RATE: 18 BRPM | HEART RATE: 81 BPM

## 2025-01-21 DIAGNOSIS — R10.9 ABDOMINAL PAIN IN MALE: ICD-10-CM

## 2025-01-21 DIAGNOSIS — K80.20 GALL STONES: Primary | ICD-10-CM

## 2025-01-21 LAB
ALBUMIN SERPL-MCNC: 4 G/DL (ref 3.4–5)
ALBUMIN/GLOB SERPL: 1.3 {RATIO} (ref 1.1–2.2)
ALP SERPL-CCNC: 137 U/L (ref 40–129)
ALT SERPL-CCNC: 34 U/L (ref 10–40)
ANION GAP SERPL CALCULATED.3IONS-SCNC: 11 MMOL/L (ref 3–16)
AST SERPL-CCNC: 31 U/L (ref 15–37)
BASOPHILS # BLD: 0.1 K/UL (ref 0–0.2)
BASOPHILS NFR BLD: 0.7 %
BILIRUB SERPL-MCNC: <0.2 MG/DL (ref 0–1)
BUN SERPL-MCNC: 36 MG/DL (ref 7–20)
CALCIUM SERPL-MCNC: 8.8 MG/DL (ref 8.3–10.6)
CHLORIDE SERPL-SCNC: 101 MMOL/L (ref 99–110)
CO2 SERPL-SCNC: 24 MMOL/L (ref 21–32)
CREAT SERPL-MCNC: 1.3 MG/DL (ref 0.8–1.3)
DEPRECATED RDW RBC AUTO: 14.1 % (ref 12.4–15.4)
EKG DIAGNOSIS: NORMAL
EKG Q-T INTERVAL: 428 MS
EKG QRS DURATION: 140 MS
EKG QTC CALCULATION (BAZETT): 448 MS
EKG R AXIS: 41 DEGREES
EKG T AXIS: 180 DEGREES
EKG VENTRICULAR RATE: 66 BPM
EOSINOPHIL # BLD: 0.2 K/UL (ref 0–0.6)
EOSINOPHIL NFR BLD: 1.6 %
GFR SERPLBLD CREATININE-BSD FMLA CKD-EPI: 56 ML/MIN/{1.73_M2}
GLUCOSE SERPL-MCNC: 140 MG/DL (ref 70–99)
HCT VFR BLD AUTO: 38.5 % (ref 40.5–52.5)
HGB BLD-MCNC: 12.7 G/DL (ref 13.5–17.5)
INR PPP: 2.31 (ref 0.85–1.15)
LIPASE SERPL-CCNC: 44 U/L (ref 13–60)
LYMPHOCYTES # BLD: 1.3 K/UL (ref 1–5.1)
LYMPHOCYTES NFR BLD: 9.8 %
MCH RBC QN AUTO: 30 PG (ref 26–34)
MCHC RBC AUTO-ENTMCNC: 33 G/DL (ref 31–36)
MCV RBC AUTO: 90.8 FL (ref 80–100)
MONOCYTES # BLD: 0.7 K/UL (ref 0–1.3)
MONOCYTES NFR BLD: 5.5 %
NEUTROPHILS # BLD: 11 K/UL (ref 1.7–7.7)
NEUTROPHILS NFR BLD: 82.4 %
PLATELET # BLD AUTO: 370 K/UL (ref 135–450)
PMV BLD AUTO: 7.6 FL (ref 5–10.5)
POTASSIUM SERPL-SCNC: 4.2 MMOL/L (ref 3.5–5.1)
PROT SERPL-MCNC: 7.1 G/DL (ref 6.4–8.2)
PROTHROMBIN TIME: 25.4 SEC (ref 11.9–14.9)
RBC # BLD AUTO: 4.24 M/UL (ref 4.2–5.9)
SODIUM SERPL-SCNC: 136 MMOL/L (ref 136–145)
TROPONIN, HIGH SENSITIVITY: 18 NG/L (ref 0–22)
TROPONIN, HIGH SENSITIVITY: 19 NG/L (ref 0–22)
WBC # BLD AUTO: 13.3 K/UL (ref 4–11)

## 2025-01-21 PROCEDURE — 6360000004 HC RX CONTRAST MEDICATION: Performed by: EMERGENCY MEDICINE

## 2025-01-21 PROCEDURE — 85025 COMPLETE CBC W/AUTO DIFF WBC: CPT

## 2025-01-21 PROCEDURE — 93010 ELECTROCARDIOGRAM REPORT: CPT | Performed by: INTERNAL MEDICINE

## 2025-01-21 PROCEDURE — 74177 CT ABD & PELVIS W/CONTRAST: CPT

## 2025-01-21 PROCEDURE — 99285 EMERGENCY DEPT VISIT HI MDM: CPT

## 2025-01-21 PROCEDURE — 84484 ASSAY OF TROPONIN QUANT: CPT

## 2025-01-21 PROCEDURE — 6360000002 HC RX W HCPCS: Performed by: EMERGENCY MEDICINE

## 2025-01-21 PROCEDURE — 93005 ELECTROCARDIOGRAM TRACING: CPT | Performed by: STUDENT IN AN ORGANIZED HEALTH CARE EDUCATION/TRAINING PROGRAM

## 2025-01-21 PROCEDURE — 96374 THER/PROPH/DIAG INJ IV PUSH: CPT

## 2025-01-21 PROCEDURE — 80053 COMPREHEN METABOLIC PANEL: CPT

## 2025-01-21 PROCEDURE — 76705 ECHO EXAM OF ABDOMEN: CPT

## 2025-01-21 PROCEDURE — 85610 PROTHROMBIN TIME: CPT

## 2025-01-21 PROCEDURE — 96375 TX/PRO/DX INJ NEW DRUG ADDON: CPT

## 2025-01-21 PROCEDURE — 71045 X-RAY EXAM CHEST 1 VIEW: CPT

## 2025-01-21 PROCEDURE — 83690 ASSAY OF LIPASE: CPT

## 2025-01-21 PROCEDURE — 6370000000 HC RX 637 (ALT 250 FOR IP): Performed by: EMERGENCY MEDICINE

## 2025-01-21 RX ORDER — ONDANSETRON 2 MG/ML
4 INJECTION INTRAMUSCULAR; INTRAVENOUS ONCE
Status: COMPLETED | OUTPATIENT
Start: 2025-01-21 | End: 2025-01-21

## 2025-01-21 RX ORDER — KETOROLAC TROMETHAMINE 15 MG/ML
15 INJECTION, SOLUTION INTRAMUSCULAR; INTRAVENOUS ONCE
Status: COMPLETED | OUTPATIENT
Start: 2025-01-21 | End: 2025-01-21

## 2025-01-21 RX ORDER — ONDANSETRON 4 MG/1
4 TABLET, ORALLY DISINTEGRATING ORAL 3 TIMES DAILY PRN
Qty: 8 TABLET | Refills: 0 | Status: ON HOLD | OUTPATIENT
Start: 2025-01-21

## 2025-01-21 RX ORDER — IOPAMIDOL 755 MG/ML
75 INJECTION, SOLUTION INTRAVASCULAR
Status: COMPLETED | OUTPATIENT
Start: 2025-01-21 | End: 2025-01-21

## 2025-01-21 RX ADMIN — KETOROLAC TROMETHAMINE 15 MG: 15 INJECTION, SOLUTION INTRAMUSCULAR; INTRAVENOUS at 06:46

## 2025-01-21 RX ADMIN — ONDANSETRON 4 MG: 2 INJECTION, SOLUTION INTRAMUSCULAR; INTRAVENOUS at 06:47

## 2025-01-21 RX ADMIN — IOPAMIDOL 75 ML: 755 INJECTION, SOLUTION INTRAVENOUS at 07:17

## 2025-01-21 RX ADMIN — AMOXICILLIN AND CLAVULANATE POTASSIUM 1 TABLET: 875; 125 TABLET, FILM COATED ORAL at 09:52

## 2025-01-21 ASSESSMENT — PAIN DESCRIPTION - LOCATION
LOCATION: ABDOMEN
LOCATION: ABDOMEN

## 2025-01-21 ASSESSMENT — PAIN SCALES - GENERAL
PAINLEVEL_OUTOF10: 3
PAINLEVEL_OUTOF10: 7
PAINLEVEL_OUTOF10: 7

## 2025-01-21 ASSESSMENT — PAIN - FUNCTIONAL ASSESSMENT: PAIN_FUNCTIONAL_ASSESSMENT: 0-10

## 2025-01-21 NOTE — DISCHARGE INSTRUCTIONS
Have your protime (INR) rechecked in several days while taking the antibiotic (can interact with the blood thinning effect of coumadin/warfarin).

## 2025-01-21 NOTE — ED PROVIDER NOTES
Abnormal; Notable for the following components:       Result Value    WBC 13.3 (*)     Hemoglobin 12.7 (*)     Hematocrit 38.5 (*)     Neutrophils Absolute 11.0 (*)     All other components within normal limits   COMPREHENSIVE METABOLIC PANEL W/ REFLEX TO MG FOR LOW K - Abnormal; Notable for the following components:    Glucose 140 (*)     BUN 36 (*)     Est, Glom Filt Rate 56 (*)     Alkaline Phosphatase 137 (*)     All other components within normal limits   PROTIME-INR - Abnormal; Notable for the following components:    Protime 25.4 (*)     INR 2.31 (*)     All other components within normal limits   TROPONIN   TROPONIN   LIPASE     EKG:  Read by me in the absence of a cardiologist shows: Underlying atrial fibrillation with frequent PVCs in bigeminy pattern, nonspecific ST-T wave abnormality, nonspecific intraventricular conduction delay, poor R wave progression, prior EKG was atrial paced with less ectopy    RADIOLOGY:  Plain x-rays were viewed by me:   US GALLBLADDER RUQ   Preliminary Result   Cholelithiasis with minimal gallbladder wall thickening and trace   pericholecystic fluid. Negative sonographic Mccormick sign. Findings may reflect   acute cholecystitis in the appropriate clinical setting.  Consider   correlation with HIDA scan.      Hepatic and right renal cysts.         XR CHEST PORTABLE   Final Result   1. No acute cardiopulmonary process.   2. No significant interval change, compared to the prior study..         CT ABDOMEN PELVIS W IV CONTRAST Additional Contrast? None   Final Result   1. Cholelithiasis.   2. Diverticulosis with constipation.           ED COURSE:    Medications   ketorolac (TORADOL) injection 15 mg (15 mg IntraVENous Given 1/21/25 0646)   ondansetron (ZOFRAN) injection 4 mg (4 mg IntraVENous Given 1/21/25 0647)   iopamidol (ISOVUE-370) 76 % injection 75 mL (75 mLs IntraVENous Given 1/21/25 0717)   amoxicillin-clavulanate (AUGMENTIN) 875-125 MG per tablet 1 tablet (1 tablet Oral Given

## 2025-01-22 ENCOUNTER — TELEPHONE (OUTPATIENT)
Dept: PHARMACY | Age: 80
End: 2025-01-22

## 2025-01-22 NOTE — TELEPHONE ENCOUNTER
Pt in ED on 1/21 for gallstones. INR 2.3. Was prescribed augmentin x 7 days which will increase INR.     Called and spoke to patient. Explained will need to adjust dose of warfarin slightly for abx. Advised for patient to take 2.5 mg on Fri (1/24) rather than 5 mg, then return to normal weekly dose of warfarin. R/s pt to RTC in 2 weeks, 2/5

## 2025-01-26 ENCOUNTER — APPOINTMENT (OUTPATIENT)
Dept: CT IMAGING | Age: 80
DRG: 871 | End: 2025-01-26
Payer: MEDICARE

## 2025-01-26 ENCOUNTER — HOSPITAL ENCOUNTER (INPATIENT)
Age: 80
LOS: 7 days | Discharge: HOME | DRG: 871 | End: 2025-02-02
Attending: EMERGENCY MEDICINE | Admitting: HOSPITALIST
Payer: MEDICARE

## 2025-01-26 DIAGNOSIS — I10 ESSENTIAL HYPERTENSION: ICD-10-CM

## 2025-01-26 DIAGNOSIS — I34.0 NONRHEUMATIC MITRAL VALVE REGURGITATION: ICD-10-CM

## 2025-01-26 DIAGNOSIS — R26.2 AMBULATORY DYSFUNCTION: ICD-10-CM

## 2025-01-26 DIAGNOSIS — I25.5 ISCHEMIC CARDIOMYOPATHY: ICD-10-CM

## 2025-01-26 DIAGNOSIS — R79.1 SUPRATHERAPEUTIC INR: ICD-10-CM

## 2025-01-26 DIAGNOSIS — I50.9 CONGESTIVE HEART FAILURE, UNSPECIFIED HF CHRONICITY, UNSPECIFIED HEART FAILURE TYPE (HCC): ICD-10-CM

## 2025-01-26 DIAGNOSIS — K81.0 ACUTE CHOLECYSTITIS: ICD-10-CM

## 2025-01-26 DIAGNOSIS — I25.83 CORONARY ARTERY DISEASE DUE TO LIPID RICH PLAQUE: ICD-10-CM

## 2025-01-26 DIAGNOSIS — R10.9 ACUTE ABDOMINAL PAIN: Primary | ICD-10-CM

## 2025-01-26 DIAGNOSIS — I25.10 CORONARY ARTERY DISEASE DUE TO LIPID RICH PLAQUE: ICD-10-CM

## 2025-01-26 DIAGNOSIS — I95.9 HYPOTENSION, UNSPECIFIED HYPOTENSION TYPE: ICD-10-CM

## 2025-01-26 DIAGNOSIS — E87.1 HYPONATREMIA: ICD-10-CM

## 2025-01-26 DIAGNOSIS — N17.9 AKI (ACUTE KIDNEY INJURY) (HCC): ICD-10-CM

## 2025-01-26 LAB
ALBUMIN SERPL-MCNC: 3 G/DL (ref 3.4–5)
ALBUMIN/GLOB SERPL: 1.1 {RATIO} (ref 1.1–2.2)
ALP SERPL-CCNC: 128 U/L (ref 40–129)
ALT SERPL-CCNC: 53 U/L (ref 10–40)
ANION GAP SERPL CALCULATED.3IONS-SCNC: 10 MMOL/L (ref 3–16)
ANION GAP SERPL CALCULATED.3IONS-SCNC: 11 MMOL/L (ref 3–16)
AST SERPL-CCNC: 41 U/L (ref 15–37)
BACTERIA URNS QL MICRO: ABNORMAL /HPF
BASOPHILS # BLD: 0 K/UL (ref 0–0.2)
BASOPHILS NFR BLD: 0.2 %
BILIRUB SERPL-MCNC: 0.5 MG/DL (ref 0–1)
BILIRUB UR QL STRIP.AUTO: NEGATIVE
BUN SERPL-MCNC: 69 MG/DL (ref 7–20)
BUN SERPL-MCNC: 77 MG/DL (ref 7–20)
CALCIUM SERPL-MCNC: 7.5 MG/DL (ref 8.3–10.6)
CALCIUM SERPL-MCNC: 7.9 MG/DL (ref 8.3–10.6)
CHLORIDE SERPL-SCNC: 91 MMOL/L (ref 99–110)
CHLORIDE SERPL-SCNC: 96 MMOL/L (ref 99–110)
CHLORIDE UR-SCNC: <20 MMOL/L
CLARITY UR: ABNORMAL
CO2 SERPL-SCNC: 21 MMOL/L (ref 21–32)
CO2 SERPL-SCNC: 22 MMOL/L (ref 21–32)
COLOR UR: YELLOW
CREAT SERPL-MCNC: 4.1 MG/DL (ref 0.8–1.3)
CREAT SERPL-MCNC: 4.7 MG/DL (ref 0.8–1.3)
CREAT UR-MCNC: 82.2 MG/DL (ref 39–259)
DEPRECATED RDW RBC AUTO: 14 % (ref 12.4–15.4)
EOSINOPHIL # BLD: 0.2 K/UL (ref 0–0.6)
EOSINOPHIL NFR BLD: 1.4 %
EPI CELLS #/AREA URNS AUTO: 3 /HPF (ref 0–5)
GFR SERPLBLD CREATININE-BSD FMLA CKD-EPI: 12 ML/MIN/{1.73_M2}
GFR SERPLBLD CREATININE-BSD FMLA CKD-EPI: 14 ML/MIN/{1.73_M2}
GLUCOSE SERPL-MCNC: 105 MG/DL (ref 70–99)
GLUCOSE SERPL-MCNC: 125 MG/DL (ref 70–99)
GLUCOSE UR STRIP.AUTO-MCNC: NEGATIVE MG/DL
GRANULAR CASTS: PRESENT
HCT VFR BLD AUTO: 33.8 % (ref 40.5–52.5)
HGB BLD-MCNC: 11.3 G/DL (ref 13.5–17.5)
HGB UR QL STRIP.AUTO: ABNORMAL
HYALINE CASTS #/AREA URNS AUTO: 11 /LPF (ref 0–8)
INR PPP: 4.72 (ref 0.85–1.15)
KETONES UR STRIP.AUTO-MCNC: NEGATIVE MG/DL
LACTATE BLDV-SCNC: 1.2 MMOL/L (ref 0.4–1.9)
LACTATE BLDV-SCNC: 1.4 MMOL/L (ref 0.4–2)
LEUKOCYTE ESTERASE UR QL STRIP.AUTO: NEGATIVE
LIPASE SERPL-CCNC: 45 U/L (ref 13–60)
LYMPHOCYTES # BLD: 0.4 K/UL (ref 1–5.1)
LYMPHOCYTES NFR BLD: 3.5 %
MCH RBC QN AUTO: 29.4 PG (ref 26–34)
MCHC RBC AUTO-ENTMCNC: 33.5 G/DL (ref 31–36)
MCV RBC AUTO: 87.8 FL (ref 80–100)
MONOCYTES # BLD: 0.8 K/UL (ref 0–1.3)
MONOCYTES NFR BLD: 7.3 %
NEUTROPHILS # BLD: 9.9 K/UL (ref 1.7–7.7)
NEUTROPHILS NFR BLD: 87.6 %
NITRITE UR QL STRIP.AUTO: NEGATIVE
OSMOLALITY SERPL: 295 MOSM/KG (ref 280–301)
PH UR STRIP.AUTO: 5 [PH] (ref 5–8)
PLATELET # BLD AUTO: 179 K/UL (ref 135–450)
PMV BLD AUTO: 9.1 FL (ref 5–10.5)
POTASSIUM SERPL-SCNC: 4.9 MMOL/L (ref 3.5–5.1)
POTASSIUM SERPL-SCNC: 5 MMOL/L (ref 3.5–5.1)
POTASSIUM UR-SCNC: 30.7 MMOL/L
PROT SERPL-MCNC: 5.7 G/DL (ref 6.4–8.2)
PROT UR STRIP.AUTO-MCNC: 30 MG/DL
PROTHROMBIN TIME: 43.8 SEC (ref 11.9–14.9)
RBC # BLD AUTO: 3.85 M/UL (ref 4.2–5.9)
RBC CLUMPS #/AREA URNS AUTO: 1 /HPF (ref 0–4)
SODIUM SERPL-SCNC: 124 MMOL/L (ref 136–145)
SODIUM SERPL-SCNC: 125 MMOL/L (ref 136–145)
SODIUM SERPL-SCNC: 127 MMOL/L (ref 136–145)
SODIUM UR-SCNC: <20 MMOL/L
SP GR UR STRIP.AUTO: 1.01 (ref 1–1.03)
UA COMPLETE W REFLEX CULTURE PNL UR: ABNORMAL
UA DIPSTICK W REFLEX MICRO PNL UR: YES
URATE SERPL-MCNC: 6 MG/DL (ref 3.5–7.2)
URN SPEC COLLECT METH UR: ABNORMAL
UROBILINOGEN UR STRIP-ACNC: 0.2 E.U./DL
WBC # BLD AUTO: 11.3 K/UL (ref 4–11)
WBC #/AREA URNS AUTO: 1 /HPF (ref 0–5)

## 2025-01-26 PROCEDURE — 84300 ASSAY OF URINE SODIUM: CPT

## 2025-01-26 PROCEDURE — 2580000003 HC RX 258: Performed by: EMERGENCY MEDICINE

## 2025-01-26 PROCEDURE — 87040 BLOOD CULTURE FOR BACTERIA: CPT

## 2025-01-26 PROCEDURE — 82570 ASSAY OF URINE CREATININE: CPT

## 2025-01-26 PROCEDURE — 93005 ELECTROCARDIOGRAM TRACING: CPT | Performed by: EMERGENCY MEDICINE

## 2025-01-26 PROCEDURE — 6370000000 HC RX 637 (ALT 250 FOR IP): Performed by: HOSPITALIST

## 2025-01-26 PROCEDURE — 2580000003 HC RX 258: Performed by: HOSPITALIST

## 2025-01-26 PROCEDURE — 81001 URINALYSIS AUTO W/SCOPE: CPT

## 2025-01-26 PROCEDURE — 80053 COMPREHEN METABOLIC PANEL: CPT

## 2025-01-26 PROCEDURE — 74176 CT ABD & PELVIS W/O CONTRAST: CPT

## 2025-01-26 PROCEDURE — 6370000000 HC RX 637 (ALT 250 FOR IP): Performed by: INTERNAL MEDICINE

## 2025-01-26 PROCEDURE — 84550 ASSAY OF BLOOD/URIC ACID: CPT

## 2025-01-26 PROCEDURE — 99291 CRITICAL CARE FIRST HOUR: CPT | Performed by: STUDENT IN AN ORGANIZED HEALTH CARE EDUCATION/TRAINING PROGRAM

## 2025-01-26 PROCEDURE — 99285 EMERGENCY DEPT VISIT HI MDM: CPT

## 2025-01-26 PROCEDURE — 83935 ASSAY OF URINE OSMOLALITY: CPT

## 2025-01-26 PROCEDURE — 83930 ASSAY OF BLOOD OSMOLALITY: CPT

## 2025-01-26 PROCEDURE — 6360000002 HC RX W HCPCS: Performed by: HOSPITALIST

## 2025-01-26 PROCEDURE — 2000000000 HC ICU R&B

## 2025-01-26 PROCEDURE — 2580000003 HC RX 258: Performed by: INTERNAL MEDICINE

## 2025-01-26 PROCEDURE — 6360000002 HC RX W HCPCS: Performed by: EMERGENCY MEDICINE

## 2025-01-26 PROCEDURE — 83690 ASSAY OF LIPASE: CPT

## 2025-01-26 PROCEDURE — 96375 TX/PRO/DX INJ NEW DRUG ADDON: CPT

## 2025-01-26 PROCEDURE — 99222 1ST HOSP IP/OBS MODERATE 55: CPT | Performed by: SURGERY

## 2025-01-26 PROCEDURE — 83605 ASSAY OF LACTIC ACID: CPT

## 2025-01-26 PROCEDURE — 84133 ASSAY OF URINE POTASSIUM: CPT

## 2025-01-26 PROCEDURE — 96361 HYDRATE IV INFUSION ADD-ON: CPT

## 2025-01-26 PROCEDURE — 36415 COLL VENOUS BLD VENIPUNCTURE: CPT

## 2025-01-26 PROCEDURE — 85025 COMPLETE CBC W/AUTO DIFF WBC: CPT

## 2025-01-26 PROCEDURE — 85610 PROTHROMBIN TIME: CPT

## 2025-01-26 PROCEDURE — 2500000003 HC RX 250 WO HCPCS: Performed by: HOSPITALIST

## 2025-01-26 PROCEDURE — 6360000004 HC RX CONTRAST MEDICATION: Performed by: EMERGENCY MEDICINE

## 2025-01-26 PROCEDURE — 82436 ASSAY OF URINE CHLORIDE: CPT

## 2025-01-26 PROCEDURE — 84295 ASSAY OF SERUM SODIUM: CPT

## 2025-01-26 PROCEDURE — 84540 ASSAY OF URINE/UREA-N: CPT

## 2025-01-26 PROCEDURE — 96365 THER/PROPH/DIAG IV INF INIT: CPT

## 2025-01-26 RX ORDER — POLYETHYLENE GLYCOL 3350 17 G/17G
17 POWDER, FOR SOLUTION ORAL DAILY PRN
Status: DISCONTINUED | OUTPATIENT
Start: 2025-01-26 | End: 2025-02-02 | Stop reason: HOSPADM

## 2025-01-26 RX ORDER — 0.9 % SODIUM CHLORIDE 0.9 %
1000 INTRAVENOUS SOLUTION INTRAVENOUS ONCE
Status: COMPLETED | OUTPATIENT
Start: 2025-01-26 | End: 2025-01-26

## 2025-01-26 RX ORDER — MIDODRINE HYDROCHLORIDE 5 MG/1
5 TABLET ORAL
Status: DISCONTINUED | OUTPATIENT
Start: 2025-01-26 | End: 2025-01-27

## 2025-01-26 RX ORDER — PANTOPRAZOLE SODIUM 40 MG/1
40 TABLET, DELAYED RELEASE ORAL
Status: DISCONTINUED | OUTPATIENT
Start: 2025-01-27 | End: 2025-02-02 | Stop reason: HOSPADM

## 2025-01-26 RX ORDER — ONDANSETRON 2 MG/ML
4 INJECTION INTRAMUSCULAR; INTRAVENOUS
Status: DISCONTINUED | OUTPATIENT
Start: 2025-01-26 | End: 2025-01-27 | Stop reason: SDUPTHER

## 2025-01-26 RX ORDER — PHENYTOIN SODIUM 100 MG/1
100 CAPSULE, EXTENDED RELEASE ORAL 2 TIMES DAILY
Status: DISCONTINUED | OUTPATIENT
Start: 2025-01-26 | End: 2025-02-02 | Stop reason: HOSPADM

## 2025-01-26 RX ORDER — FENTANYL CITRATE 50 UG/ML
50 INJECTION, SOLUTION INTRAMUSCULAR; INTRAVENOUS ONCE
Status: COMPLETED | OUTPATIENT
Start: 2025-01-26 | End: 2025-01-26

## 2025-01-26 RX ORDER — ONDANSETRON 4 MG/1
4 TABLET, ORALLY DISINTEGRATING ORAL EVERY 8 HOURS PRN
Status: DISCONTINUED | OUTPATIENT
Start: 2025-01-26 | End: 2025-02-02 | Stop reason: HOSPADM

## 2025-01-26 RX ORDER — HEPARIN SODIUM 5000 [USP'U]/ML
5000 INJECTION, SOLUTION INTRAVENOUS; SUBCUTANEOUS EVERY 8 HOURS SCHEDULED
Status: DISCONTINUED | OUTPATIENT
Start: 2025-01-26 | End: 2025-01-26

## 2025-01-26 RX ORDER — DIAZEPAM 5 MG/1
5 TABLET ORAL 2 TIMES DAILY
Status: DISCONTINUED | OUTPATIENT
Start: 2025-01-26 | End: 2025-02-02 | Stop reason: HOSPADM

## 2025-01-26 RX ORDER — ONDANSETRON 2 MG/ML
4 INJECTION INTRAMUSCULAR; INTRAVENOUS EVERY 6 HOURS PRN
Status: DISCONTINUED | OUTPATIENT
Start: 2025-01-26 | End: 2025-02-02 | Stop reason: HOSPADM

## 2025-01-26 RX ORDER — MORPHINE SULFATE 4 MG/ML
4 INJECTION, SOLUTION INTRAMUSCULAR; INTRAVENOUS
Status: DISCONTINUED | OUTPATIENT
Start: 2025-01-26 | End: 2025-01-26

## 2025-01-26 RX ORDER — LEVOTHYROXINE SODIUM 25 UG/1
25 TABLET ORAL DAILY
Status: DISCONTINUED | OUTPATIENT
Start: 2025-01-26 | End: 2025-02-02 | Stop reason: HOSPADM

## 2025-01-26 RX ORDER — 0.9 % SODIUM CHLORIDE 0.9 %
500 INTRAVENOUS SOLUTION INTRAVENOUS ONCE
Status: COMPLETED | OUTPATIENT
Start: 2025-01-26 | End: 2025-01-26

## 2025-01-26 RX ORDER — SODIUM CHLORIDE 9 MG/ML
INJECTION, SOLUTION INTRAVENOUS CONTINUOUS
Status: DISCONTINUED | OUTPATIENT
Start: 2025-01-26 | End: 2025-01-30

## 2025-01-26 RX ORDER — SODIUM CHLORIDE 0.9 % (FLUSH) 0.9 %
5-40 SYRINGE (ML) INJECTION PRN
Status: DISCONTINUED | OUTPATIENT
Start: 2025-01-26 | End: 2025-02-02 | Stop reason: HOSPADM

## 2025-01-26 RX ORDER — HEPARIN SODIUM 5000 [USP'U]/ML
5000 INJECTION, SOLUTION INTRAVENOUS; SUBCUTANEOUS 2 TIMES DAILY
Status: DISCONTINUED | OUTPATIENT
Start: 2025-01-26 | End: 2025-01-27

## 2025-01-26 RX ORDER — ACETAMINOPHEN 650 MG/1
650 SUPPOSITORY RECTAL EVERY 6 HOURS PRN
Status: DISCONTINUED | OUTPATIENT
Start: 2025-01-26 | End: 2025-02-02 | Stop reason: HOSPADM

## 2025-01-26 RX ORDER — SODIUM CHLORIDE 0.9 % (FLUSH) 0.9 %
5-40 SYRINGE (ML) INJECTION EVERY 12 HOURS SCHEDULED
Status: DISCONTINUED | OUTPATIENT
Start: 2025-01-26 | End: 2025-02-02 | Stop reason: HOSPADM

## 2025-01-26 RX ORDER — SODIUM CHLORIDE 9 MG/ML
INJECTION, SOLUTION INTRAVENOUS PRN
Status: DISCONTINUED | OUTPATIENT
Start: 2025-01-26 | End: 2025-02-02 | Stop reason: HOSPADM

## 2025-01-26 RX ORDER — MIDODRINE HYDROCHLORIDE 5 MG/1
5 TABLET ORAL ONCE
Status: COMPLETED | OUTPATIENT
Start: 2025-01-26 | End: 2025-01-26

## 2025-01-26 RX ORDER — ACETAMINOPHEN 325 MG/1
650 TABLET ORAL EVERY 6 HOURS PRN
Status: DISCONTINUED | OUTPATIENT
Start: 2025-01-26 | End: 2025-02-02 | Stop reason: HOSPADM

## 2025-01-26 RX ADMIN — SODIUM CHLORIDE: 9 INJECTION, SOLUTION INTRAVENOUS at 22:22

## 2025-01-26 RX ADMIN — SODIUM CHLORIDE 1000 ML: 9 INJECTION, SOLUTION INTRAVENOUS at 10:37

## 2025-01-26 RX ADMIN — LEVOTHYROXINE SODIUM 25 MCG: 0.03 TABLET ORAL at 14:51

## 2025-01-26 RX ADMIN — HEPARIN SODIUM 5000 UNITS: 5000 INJECTION INTRAVENOUS; SUBCUTANEOUS at 14:53

## 2025-01-26 RX ADMIN — MIDODRINE HYDROCHLORIDE 5 MG: 5 TABLET ORAL at 18:23

## 2025-01-26 RX ADMIN — SODIUM CHLORIDE: 9 INJECTION, SOLUTION INTRAVENOUS at 14:48

## 2025-01-26 RX ADMIN — PHYTONADIONE 5 MG: 10 INJECTION, EMULSION INTRAMUSCULAR; INTRAVENOUS; SUBCUTANEOUS at 16:06

## 2025-01-26 RX ADMIN — PIPERACILLIN AND TAZOBACTAM 3375 MG: 3; .375 INJECTION, POWDER, LYOPHILIZED, FOR SOLUTION INTRAVENOUS at 11:40

## 2025-01-26 RX ADMIN — MIDODRINE HYDROCHLORIDE 5 MG: 5 TABLET ORAL at 14:51

## 2025-01-26 RX ADMIN — SODIUM CHLORIDE 1000 ML: 9 INJECTION, SOLUTION INTRAVENOUS at 11:12

## 2025-01-26 RX ADMIN — FENTANYL CITRATE 50 MCG: 50 INJECTION INTRAMUSCULAR; INTRAVENOUS at 10:46

## 2025-01-26 RX ADMIN — DIAZEPAM 5 MG: 5 TABLET ORAL at 18:23

## 2025-01-26 RX ADMIN — SODIUM CHLORIDE 1000 ML: 9 INJECTION, SOLUTION INTRAVENOUS at 11:40

## 2025-01-26 RX ADMIN — SODIUM CHLORIDE 500 ML: 9 INJECTION, SOLUTION INTRAVENOUS at 20:52

## 2025-01-26 RX ADMIN — SODIUM CHLORIDE, PRESERVATIVE FREE 10 ML: 5 INJECTION INTRAVENOUS at 21:07

## 2025-01-26 RX ADMIN — MIDODRINE HYDROCHLORIDE 5 MG: 5 TABLET ORAL at 21:01

## 2025-01-26 RX ADMIN — ONDANSETRON 4 MG: 2 INJECTION, SOLUTION INTRAMUSCULAR; INTRAVENOUS at 10:38

## 2025-01-26 RX ADMIN — IOHEXOL 50 ML: 240 INJECTION, SOLUTION INTRATHECAL; INTRAVASCULAR; INTRAVENOUS; ORAL at 13:26

## 2025-01-26 RX ADMIN — HEPARIN SODIUM 5000 UNITS: 5000 INJECTION INTRAVENOUS; SUBCUTANEOUS at 22:18

## 2025-01-26 RX ADMIN — EXTENDED PHENYTOIN SODIUM 100 MG: 100 CAPSULE ORAL at 21:01

## 2025-01-26 ASSESSMENT — PAIN DESCRIPTION - LOCATION
LOCATION: ABDOMEN

## 2025-01-26 ASSESSMENT — PAIN SCALES - GENERAL
PAINLEVEL_OUTOF10: 5
PAINLEVEL_OUTOF10: 8
PAINLEVEL_OUTOF10: 0
PAINLEVEL_OUTOF10: 7

## 2025-01-26 ASSESSMENT — PAIN DESCRIPTION - DESCRIPTORS: DESCRIPTORS: SHARP

## 2025-01-26 ASSESSMENT — LIFESTYLE VARIABLES
HOW MANY STANDARD DRINKS CONTAINING ALCOHOL DO YOU HAVE ON A TYPICAL DAY: PATIENT DOES NOT DRINK
HOW OFTEN DO YOU HAVE A DRINK CONTAINING ALCOHOL: NEVER

## 2025-01-26 ASSESSMENT — PAIN DESCRIPTION - ORIENTATION: ORIENTATION: RIGHT;LOWER;MID

## 2025-01-26 ASSESSMENT — PAIN - FUNCTIONAL ASSESSMENT: PAIN_FUNCTIONAL_ASSESSMENT: 0-10

## 2025-01-26 NOTE — CONSULTS
MD Obey Marie MD Aldo Estella, DO              Office: [phone]                      Fax: [phone]               [employer]                     Nephrology consult received. Full consult report will follow.   Initial order placed    TYRESE on chronic kidney disease stage IIIa  Baseline lowest creatinine about 5 days ago before admission 1.3, with eGFR 50s  On admission creatinine 4.7  TYRESE most likely due to ATN in the setting of hypotension, hypovolemia with diuretic use and hemodynamic fluctuations with cholecystitis    Hyponatremia-likely chronically  On admission 124  Due to multiple risk factors as mentioned above  At risk of osmotic demyelination  Monitor sodium trend  Goal about a 6-8 point change in 24 hours    Initial management-  Check osmolality, urine lytes  NS at 150 cc/h  Monitor for fluid overload with history of chronic systolic heart failure with last EF 55% on 5-1-2024  Holding home diuretics, Aldactone, Lasix,  Naproxen listed in home med, avoid use  Antibiotics per primary team  Other management and follow-up with surgery and cardiology team    Discussed with Glenda Reddy MD   Thank you for allowing us to participate in this patient's care. Please do not hesitate to contact us anytime. We will follow along with you.       Obey Dahl MD  [facility]   [phone] or Via [employer].

## 2025-01-26 NOTE — ED PROVIDER NOTES
[facility] Emergency Department [facility]    I, Papa Miller MD, am the primary clinician of record.    CHIEF COMPLAINT  Chief Complaint   Patient presents with    Abdominal Pain     Pt arrives from home. Pt C/O R mid and lower abdominal pain since Friday morning. Pt states that he did not have a BM until Saturday. Pt rates his pain a 8 out of 10. Pt was seen here on Tuesday for gallstones.        HISTORY OF PRESENT ILLNESS  Juan A Mathis is a 79 y.o. male  who presents to the ED complaining of persistent and worsening right upper quadrant and right mid abdominal pain with some nausea and poor p.o. intake.  He is also feeling little constipated initially but then having diarrhea.  He is not having any fevers or jaundice.  He was seen here on January 21, 2025 and had an ultrasound and CAT scan that showed gallstones and possible cholecystitis.  He is anticoagulated on Coumadin.  He has been prescribed Augmentin and has been taking it but has gotten worse despite that.  Denies any chest pains cough or shortness of breath.    No other complaints, modifying factors or associated symptoms.     I have reviewed the following from the nursing documentation.    Past Medical History:   Diagnosis Date    Accidental fall 10/03/2023    Arthritis     shoulders and knee    Atrial fib/flut     CAD (coronary artery disease)     CHF (congestive heart failure) ([facility])     DVT (deep venous thrombosis) ([facility]) 08/24/2022    RLE    Epilepsia     Hx of blood clots     Hx of CABG     Hyperlipidemia     Hypertension     Kidney stones     MI (myocardial infarction) ([facility])     Pacemaker     Seizures ([facility])     last seizure 1985    Sinus bradycardia     Sleep apnea     uses CPAP    V tach ([facility])      Past Surgical History:   Procedure Laterality Date    CARDIAC SURGERY      , angioplasty 2007    CARDIOVERSION      6/2022, 9/2022, 1/6/23- cardioversions    COLONOSCOPY N/A 1/27/2023    COLONOSCOPY DIAGNOSTIC performed by Rusty Crisostomo MD

## 2025-01-26 NOTE — H&P
HOSPITALISTS HISTORY AND PHYSICAL    1/26/2025 12:10 PM    Patient Information:  JUAN A PENG is a 79 y.o. male [id number]  PCP:  Jaydon Barreto DO (Tel: [phone] )    Chief complaint:    Chief Complaint   Patient presents with    Abdominal Pain     Pt arrives from home. Pt C/O R mid and lower abdominal pain since Friday morning. Pt states that he did not have a BM until Saturday. Pt rates his pain a 8 out of 10. Pt was seen here on Tuesday for gallstones.        History of Present Illness:  Juan A Peng is a 79 y.o. male who presented with presents to ER with complaints of lower abdominal pain.  Ongoing since Friday.  Patient said rated pain 8 out of 10.  Stop stop on the right upper quadrant as well.  Patient denies any fevers.  Patient with known history of gallstone has been managed outpatient with oral Augmentin.  Patient was supposed to see general surgery in 2 days for possible need for definitive treatment.  Patient significant history of right inguinal hernia history of CABG and stent on Plavix and Coumadin for history of DVT.  On arrival patient was hypotensive.  Started about  REVIEW OF SYSTEMS:   Constitutional: Negative for fever,chills or night sweats  ENT: Negative for rhinorrhea, epistaxis, hoarseness, sore throat.  Respiratory: Negative for shortness of breath,wheezing  Cardiovascular: Negative for chest pain, palpitations   Gastrointestinal: Negative for nausea, vomiting, diarrhea  Genitourinary: Negative for polyuria, dysuria   Hematologic/Lymphatic: Negative for bleeding tendency, easy bruising  Musculoskeletal: Negative for myalgias and arthralgias  Neurologic: Negative for confusion,dysarthria.  Skin: Negative for itching,rash, good capillary refill.   Psychiatric: Negative for depression,anxiety, agitation.  Endocrine: Negative for

## 2025-01-26 NOTE — ED NOTES
Patient Name: Juan A Mathis  : 1945 79 y.o.  MRN: [id number]  ED Room #: ED-0011/11     Chief complaint:   Chief Complaint   Patient presents with    Abdominal Pain     Pt arrives from home. Pt C/O R mid and lower abdominal pain since Friday morning. Pt states that he did not have a BM until Saturday. Pt rates his pain a 8 out of 10. Pt was seen here on Tuesday for gallstones.     Hospital Problem/Diagnosis:   Hospital Problems             Last Modified POA    * (Principal) Acute cholecystitis 2025 Yes         O2 Flow Rate:O2 Device: None (Room air)   (if applicable)  Cardiac Rhythm:   (if applicable)  Active LDA's:   Peripheral IV 25 Right Antecubital (Active)       Peripheral IV 25 Left;Anterior Forearm (Active)   Site Assessment Clean, dry & intact 25 6117            How does patient ambulate? Low Fall Risk (Ambulates by themselves without support    2. How does patient take pills? Whole with Water    3. Is patient alert? Alert    4. Is patient oriented? To Person, To Place, To Time, and To Situation    5.   Patient arrived from:  home  Facility Name: ___________________________________________    6. If patient is disoriented or from a Skill Nursing Facility has family been notified of admission? No    7. Patient belongings? Belongings: Cell Phone and Clothing    Disposition of belongings? Kept with Patient     8. Any specific patient or family belongings/needs/dynamics?   a. none    9. Miscellaneous comments/pending orders?  a. none      If there are any additional questions please reach out to the Emergency Department.

## 2025-01-26 NOTE — CONSULTS
MD Obey Marie MD Aldo Estella, DO                 Office: [phone]                      Fax: [phone]             [employer]                   NEPHROLOGY INITIAL CONSULT NOTE:     PATIENT NAME: Juan A Mathis  : 1945  MRN: [id number]  REASON FOR CONSULT: For evaluation and management of Acute Kidney Injury. (My recommendations will be communicated by way of shared medical record.) - by Glenda Reddy MD       RECOMMENDATIONS:    At risk of osmotic demyelination  Monitor sodium trend  Goal about a 6-8 point change in 24 hours    Check osmolality, urine lytes    NS at 150 cc/h  Monitor for fluid overload with history of chronic systolic heart failure with last EF 55% on 2024    Follow-up echo  Clinically looks not fluid overloaded    Holding home diuretics, Aldactone, Lasix,  Naproxen listed in home med, avoid use    If okay with cardiology add midodrine  Levophed for shock needed to achieve systolic BP greater than 90s      Antibiotics per primary team  Other management and follow-up with surgery and cardiology team    monitor PVR w/ bladder scan for hargrove insertion need.    no need for dialysis,    at higher risk for decompensation, needing closer monitoring.    D/C plan from renal stand point:- likely ~3-4 days      Thank you for allowing me to participate in this patient's care. Please do not hesitate to contact me anytime. We will follow along with you.       Obey Dahl MD,  [facility]  Office: [phone] or [employer]  Fax: [phone]      =======================================================================================      IMPRESSION:       Admitted on:  2025  9:28 AM   For:    Hospital Problems             Last Modified POA    * (Principal) Acute cholecystitis 2025 Yes       TYRESE on chronic kidney disease stage IIIa  Nonoliguric  Baseline lowest creatinine about 5 days ago before admission 1.3, with eGFR

## 2025-01-26 NOTE — CONSULTS
[facility]     Consult                                                     Patient Name: Juan A Mathis  MRN: [id number]  YOB: 1945  Admission date: 1/26/2025  9:28 AM   Date of evaluation: 1/26/2025  Primary Care Physician: Jaydon Barreto DO  Reason for consult: Abdominal pain  History of Present Illness:    Mr. Mathis is a 79 y.o. male who presents with sharp right upper quadrant pain radiating to the back and mid abdomen. Some nausea and anorexia without emesis, fevers at home. Denies chest pain, dyspnea, dysuria, jaundice, change in weight or other complaints. Recently in ED 1/21/25 for similar symptoms but milder. Able to be sent home on PO antibiotics and was to see me in office 2 days ago but missed the appointment because of diarrhea and fevers. Abdominal surgical history includes remote right inguinal hernia repair as child and has had CABG and stents, on plavix and coumadin for history of DVT. Additional medical conditions include CHF, seizures. Last workup several days ago showed gallstones and repeat imaging pending. Workup in ED shows hypotension with acute/chronic component, leukocytosis, mild transaminitis, acute renal failure, supratherapeutic INR. Surgery consulted to address the gallbladder    Past Medical History:        Diagnosis Date    Accidental fall 10/03/2023    Arthritis     shoulders and knee    Atrial fib/flut     CAD (coronary artery disease)     CHF (congestive heart failure) ([facility])     DVT (deep venous thrombosis) ([facility]) 08/24/2022    RLE    Epilepsia     Hx of blood clots     Hx of CABG     Hyperlipidemia     Hypertension     Kidney stones     MI (myocardial infarction) ([facility])     Pacemaker     Seizures ([facility])     last seizure 1985    Sinus bradycardia     Sleep apnea     uses CPAP    V tach ([facility])        Past Surgical History:        Procedure Laterality Date    CARDIAC SURGERY      , angioplasty 2007    CARDIOVERSION      6/2022, 9/2022,

## 2025-01-26 NOTE — CONSULTS
Pulmonary and Critical Care Medicine    CC: Abd pain    Date: 1/26/2025  Admit Date:  1/26/2025  Reason for Consultation: hypotension   Consult Requesting Physician: Glenda Reddy MD     HPI     This is a 78 y/o M w/ a hx of Afib, CAD, CHF, DVT, CABG, HTN, HLD who presented to [facility] with Abd pain. Patient was admitted to the medical floor with acute cholecystitis, he was started on zosyn, he was also noted to be hyponatremic thus nephrology has also been consulted. At baseline patient does have soft BP today his BP was lower than baseline thus critical care medicine was consulted to help with the management on my exam patient was in room 3322 he was on RA, he complained of RUQ pain. Denies any dizziness, no chest pain. BP on my exam was 80s/50s, states baseline BP is low     ROS: negative except stated above    PMH:  has a past medical history of Accidental fall, Arthritis, Atrial fib/flut, CAD (coronary artery disease), CHF (congestive heart failure) ([facility]), DVT (deep venous thrombosis) ([facility]), Epilepsia, Hx of blood clots, Hx of CABG, Hyperlipidemia, Hypertension, Kidney stones, MI (myocardial infarction) ([facility]), Pacemaker, Seizures ([facility]), Sinus bradycardia, Sleep apnea, and V tach ([facility]).   PSH:  has a past surgical history that includes hernia repair (Right); Coronary angioplasty with stent (1997); [place] tooth extraction (04/2012); pacemaker placement (12/18/2017); Coronary artery bypass graft (01/2019); Cardiac surgery; Cystoscopy (N/A, 11/13/2019); Cardioversion; Coronary stent placement (03/30/2022); and Colonoscopy (N/A, 1/27/2023).    SH:  reports that he quit smoking about 36 years ago. His smoking use included cigarettes. He started smoking about 51 years ago. He has a 30 pack-year smoking history. He has never used smokeless tobacco. He reports that he does not drink alcohol and does not use drugs.   FH: family history includes Heart Failure in his mother.    Allergies: Patient has no known allergies.

## 2025-01-27 ENCOUNTER — APPOINTMENT (OUTPATIENT)
Age: 80
DRG: 871 | End: 2025-01-27
Attending: INTERNAL MEDICINE
Payer: MEDICARE

## 2025-01-27 ENCOUNTER — APPOINTMENT (OUTPATIENT)
Dept: NUCLEAR MEDICINE | Age: 80
DRG: 871 | End: 2025-01-27
Payer: MEDICARE

## 2025-01-27 PROBLEM — E87.1 HYPONATREMIA: Status: ACTIVE | Noted: 2025-01-27

## 2025-01-27 PROBLEM — K81.9 CHOLECYSTITIS: Status: ACTIVE | Noted: 2025-01-26

## 2025-01-27 PROBLEM — I50.9 CONGESTIVE HEART FAILURE (HCC): Status: ACTIVE | Noted: 2025-01-27

## 2025-01-27 PROBLEM — I95.9 HYPOTENSION: Status: ACTIVE | Noted: 2025-01-27

## 2025-01-27 PROBLEM — N17.9 AKI (ACUTE KIDNEY INJURY): Status: ACTIVE | Noted: 2025-01-27

## 2025-01-27 PROBLEM — R79.1 SUPRATHERAPEUTIC INR: Status: ACTIVE | Noted: 2025-01-27

## 2025-01-27 LAB
ALBUMIN SERPL-MCNC: 2.5 G/DL (ref 3.4–5)
ALBUMIN SERPL-MCNC: 2.6 G/DL (ref 3.4–5)
ALBUMIN SERPL-MCNC: 2.7 G/DL (ref 3.4–5)
ALBUMIN/GLOB SERPL: 1.1 {RATIO} (ref 1.1–2.2)
ALP SERPL-CCNC: 107 U/L (ref 40–129)
ALT SERPL-CCNC: 40 U/L (ref 10–40)
ANION GAP SERPL CALCULATED.3IONS-SCNC: 10 MMOL/L (ref 3–16)
ANION GAP SERPL CALCULATED.3IONS-SCNC: 10 MMOL/L (ref 3–16)
ANION GAP SERPL CALCULATED.3IONS-SCNC: 11 MMOL/L (ref 3–16)
ANION GAP SERPL CALCULATED.3IONS-SCNC: 9 MMOL/L (ref 3–16)
ANTI-XA UNFRAC HEPARIN: <0.1 IU/ML (ref 0.3–0.7)
ANTI-XA UNFRAC HEPARIN: <0.1 IU/ML (ref 0.3–0.7)
APTT BLD: 44.8 SEC (ref 22.1–36.4)
AST SERPL-CCNC: 25 U/L (ref 15–37)
BASOPHILS # BLD: 0 K/UL (ref 0–0.2)
BASOPHILS NFR BLD: 0.4 %
BILIRUB DIRECT SERPL-MCNC: 0.3 MG/DL (ref 0–0.3)
BILIRUB INDIRECT SERPL-MCNC: 0.1 MG/DL (ref 0–1)
BILIRUB SERPL-MCNC: 0.4 MG/DL (ref 0–1)
BUN SERPL-MCNC: 51 MG/DL (ref 7–20)
BUN SERPL-MCNC: 58 MG/DL (ref 7–20)
BUN SERPL-MCNC: 64 MG/DL (ref 7–20)
BUN SERPL-MCNC: 65 MG/DL (ref 7–20)
CALCIUM SERPL-MCNC: 7.1 MG/DL (ref 8.3–10.6)
CALCIUM SERPL-MCNC: 7.3 MG/DL (ref 8.3–10.6)
CALCIUM SERPL-MCNC: 7.5 MG/DL (ref 8.3–10.6)
CALCIUM SERPL-MCNC: 7.9 MG/DL (ref 8.3–10.6)
CHLORIDE SERPL-SCNC: 100 MMOL/L (ref 99–110)
CHLORIDE SERPL-SCNC: 101 MMOL/L (ref 99–110)
CHLORIDE SERPL-SCNC: 102 MMOL/L (ref 99–110)
CHLORIDE SERPL-SCNC: 104 MMOL/L (ref 99–110)
CO2 SERPL-SCNC: 18 MMOL/L (ref 21–32)
CO2 SERPL-SCNC: 20 MMOL/L (ref 21–32)
CO2 SERPL-SCNC: 20 MMOL/L (ref 21–32)
CO2 SERPL-SCNC: 21 MMOL/L (ref 21–32)
CREAT SERPL-MCNC: 3 MG/DL (ref 0.8–1.3)
CREAT SERPL-MCNC: 3.3 MG/DL (ref 0.8–1.3)
CREAT SERPL-MCNC: 3.6 MG/DL (ref 0.8–1.3)
CREAT SERPL-MCNC: 3.7 MG/DL (ref 0.8–1.3)
DEPRECATED RDW RBC AUTO: 14.5 % (ref 12.4–15.4)
ECHO AO ROOT DIAM: 3.3 CM
ECHO AO ROOT INDEX: 1.69 CM/M2
ECHO AR MAX VEL PISA: 3.2 M/S
ECHO AV AREA PEAK VELOCITY: 2.5 CM2
ECHO AV AREA VTI: 2.3 CM2
ECHO AV AREA/BSA PEAK VELOCITY: 1.3 CM2/M2
ECHO AV AREA/BSA VTI: 1.2 CM2/M2
ECHO AV MEAN GRADIENT: 8 MMHG
ECHO AV MEAN VELOCITY: 1.4 M/S
ECHO AV PEAK GRADIENT: 14 MMHG
ECHO AV PEAK VELOCITY: 1.9 M/S
ECHO AV REGURGITANT PHT: 552 MS
ECHO AV VELOCITY RATIO: 0.63
ECHO AV VTI: 40 CM
ECHO BSA: 1.95 M2
ECHO EST RA PRESSURE: 3 MMHG
ECHO LA AREA 2C: 35 CM2
ECHO LA AREA 4C: 39.5 CM2
ECHO LA DIAMETER INDEX: 2.82 CM/M2
ECHO LA DIAMETER: 5.5 CM
ECHO LA MAJOR AXIS: 8.4 CM
ECHO LA MINOR AXIS: 8.2 CM
ECHO LA TO AORTIC ROOT RATIO: 1.67
ECHO LA VOL BP: 136 ML (ref 18–58)
ECHO LA VOL MOD A2C: 121 ML (ref 18–58)
ECHO LA VOL MOD A4C: 149 ML (ref 18–58)
ECHO LA VOL/BSA BIPLANE: 70 ML/M2 (ref 16–34)
ECHO LA VOLUME INDEX MOD A2C: 62 ML/M2 (ref 16–34)
ECHO LA VOLUME INDEX MOD A4C: 76 ML/M2 (ref 16–34)
ECHO LV EDV A2C: 159 ML
ECHO LV EDV A4C: 176 ML
ECHO LV EDV INDEX A4C: 90 ML/M2
ECHO LV EDV NDEX A2C: 82 ML/M2
ECHO LV EF PHYSICIAN: 38 %
ECHO LV EJECTION FRACTION A2C: 45 %
ECHO LV EJECTION FRACTION A4C: 44 %
ECHO LV ESV A2C: 87 ML
ECHO LV ESV A4C: 99 ML
ECHO LV ESV INDEX A2C: 45 ML/M2
ECHO LV ESV INDEX A4C: 51 ML/M2
ECHO LV FRACTIONAL SHORTENING: 37 % (ref 28–44)
ECHO LV INTERNAL DIMENSION DIASTOLE INDEX: 2.92 CM/M2
ECHO LV INTERNAL DIMENSION DIASTOLIC: 5.7 CM (ref 4.2–5.9)
ECHO LV INTERNAL DIMENSION SYSTOLIC INDEX: 1.85 CM/M2
ECHO LV INTERNAL DIMENSION SYSTOLIC: 3.6 CM
ECHO LV IVSD: 1 CM (ref 0.6–1)
ECHO LV MASS 2D: 241.3 G (ref 88–224)
ECHO LV MASS INDEX 2D: 123.8 G/M2 (ref 49–115)
ECHO LV POSTERIOR WALL DIASTOLIC: 1.1 CM (ref 0.6–1)
ECHO LV RELATIVE WALL THICKNESS RATIO: 0.39
ECHO LVOT AREA: 3.8 CM2
ECHO LVOT AV VTI INDEX: 0.61
ECHO LVOT DIAM: 2.2 CM
ECHO LVOT MEAN GRADIENT: 3 MMHG
ECHO LVOT PEAK GRADIENT: 6 MMHG
ECHO LVOT PEAK VELOCITY: 1.2 M/S
ECHO LVOT STROKE VOLUME INDEX: 47.5 ML/M2
ECHO LVOT SV: 92.7 ML
ECHO LVOT VTI: 24.4 CM
ECHO MV E VELOCITY: 1 M/S
ECHO MV REGURGITANT ALIASING (NYQUIST) VELOCITY: 31 CM/S
ECHO MV REGURGITANT PEAK GRADIENT: 85 MMHG
ECHO MV REGURGITANT PEAK VELOCITY: 4.6 M/S
ECHO MV REGURGITANT RADIUS PISA: 0.7 CM
ECHO MV REGURGITANT VTIA: 150 CM
ECHO PULMONARY ARTERY END DIASTOLIC PRESSURE: 8 MMHG
ECHO PV MAX VELOCITY: 1.1 M/S
ECHO PV PEAK GRADIENT: 5 MMHG
ECHO PV REGURGITANT MAX VELOCITY: 1.4 M/S
ECHO RA AREA 4C: 33.7 CM2
ECHO RA END SYSTOLIC VOLUME APICAL 4 CHAMBER INDEX BSA: 59 ML/M2
ECHO RA VOLUME: 115 ML
ECHO RIGHT VENTRICULAR SYSTOLIC PRESSURE (RVSP): 43 MMHG
ECHO RV FREE WALL PEAK S': 11 CM/S
ECHO RV TAPSE: 1.7 CM (ref 1.7–?)
ECHO TV REGURGITANT MAX VELOCITY: 3.15 M/S
ECHO TV REGURGITANT PEAK GRADIENT: 40 MMHG
EKG ATRIAL RATE: 16 BPM
EKG DIAGNOSIS: NORMAL
EKG Q-T INTERVAL: 428 MS
EKG QRS DURATION: 94 MS
EKG QTC CALCULATION (BAZETT): 430 MS
EKG R AXIS: -48 DEGREES
EKG T AXIS: 49 DEGREES
EKG VENTRICULAR RATE: 61 BPM
EOSINOPHIL # BLD: 0.2 K/UL (ref 0–0.6)
EOSINOPHIL NFR BLD: 2.2 %
GFR SERPLBLD CREATININE-BSD FMLA CKD-EPI: 16 ML/MIN/{1.73_M2}
GFR SERPLBLD CREATININE-BSD FMLA CKD-EPI: 17 ML/MIN/{1.73_M2}
GFR SERPLBLD CREATININE-BSD FMLA CKD-EPI: 18 ML/MIN/{1.73_M2}
GFR SERPLBLD CREATININE-BSD FMLA CKD-EPI: 21 ML/MIN/{1.73_M2}
GLUCOSE SERPL-MCNC: 108 MG/DL (ref 70–99)
GLUCOSE SERPL-MCNC: 148 MG/DL (ref 70–99)
GLUCOSE SERPL-MCNC: 149 MG/DL (ref 70–99)
GLUCOSE SERPL-MCNC: 95 MG/DL (ref 70–99)
HCT VFR BLD AUTO: 31.2 % (ref 40.5–52.5)
HGB BLD-MCNC: 10.5 G/DL (ref 13.5–17.5)
INR PPP: 1.64 (ref 0.85–1.15)
LACTATE BLDV-SCNC: 0.6 MMOL/L (ref 0.4–2)
LIPASE SERPL-CCNC: 42 U/L (ref 13–60)
LYMPHOCYTES # BLD: 0.6 K/UL (ref 1–5.1)
LYMPHOCYTES NFR BLD: 6.6 %
MAGNESIUM SERPL-MCNC: 2.95 MG/DL (ref 1.8–2.4)
MCH RBC QN AUTO: 29.6 PG (ref 26–34)
MCHC RBC AUTO-ENTMCNC: 33.7 G/DL (ref 31–36)
MCV RBC AUTO: 87.8 FL (ref 80–100)
MONOCYTES # BLD: 0.8 K/UL (ref 0–1.3)
MONOCYTES NFR BLD: 9.4 %
NEUTROPHILS # BLD: 7 K/UL (ref 1.7–7.7)
NEUTROPHILS NFR BLD: 81.4 %
OSMOLALITY UR: 259 MOSM/KG (ref 390–1070)
PHOSPHATE SERPL-MCNC: 3.3 MG/DL (ref 2.5–4.9)
PHOSPHATE SERPL-MCNC: 3.4 MG/DL (ref 2.5–4.9)
PHOSPHATE SERPL-MCNC: 3.7 MG/DL (ref 2.5–4.9)
PLATELET # BLD AUTO: 160 K/UL (ref 135–450)
PMV BLD AUTO: 9.1 FL (ref 5–10.5)
POTASSIUM SERPL-SCNC: 4.1 MMOL/L (ref 3.5–5.1)
POTASSIUM SERPL-SCNC: 4.2 MMOL/L (ref 3.5–5.1)
POTASSIUM SERPL-SCNC: 4.9 MMOL/L (ref 3.5–5.1)
POTASSIUM SERPL-SCNC: 4.9 MMOL/L (ref 3.5–5.1)
PREALB SERPL-MCNC: 9.1 MG/DL (ref 20–40)
PROT SERPL-MCNC: 5 G/DL (ref 6.4–8.2)
PROTHROMBIN TIME: 19.5 SEC (ref 11.9–14.9)
RBC # BLD AUTO: 3.56 M/UL (ref 4.2–5.9)
SODIUM SERPL-SCNC: 129 MMOL/L (ref 136–145)
SODIUM SERPL-SCNC: 130 MMOL/L (ref 136–145)
SODIUM SERPL-SCNC: 133 MMOL/L (ref 136–145)
SODIUM SERPL-SCNC: 134 MMOL/L (ref 136–145)
TRANSFERRIN SERPL-MCNC: 127 MG/DL (ref 200–360)
UUN UR-MCNC: 444 MG/DL (ref 800–1666)
WBC # BLD AUTO: 8.6 K/UL (ref 4–11)

## 2025-01-27 PROCEDURE — C8929 TTE W OR WO FOL WCON,DOPPLER: HCPCS

## 2025-01-27 PROCEDURE — 2500000003 HC RX 250 WO HCPCS: Performed by: HOSPITALIST

## 2025-01-27 PROCEDURE — 99291 CRITICAL CARE FIRST HOUR: CPT | Performed by: INTERNAL MEDICINE

## 2025-01-27 PROCEDURE — 99232 SBSQ HOSP IP/OBS MODERATE 35: CPT | Performed by: SURGERY

## 2025-01-27 PROCEDURE — 6360000002 HC RX W HCPCS: Performed by: INTERNAL MEDICINE

## 2025-01-27 PROCEDURE — 83605 ASSAY OF LACTIC ACID: CPT

## 2025-01-27 PROCEDURE — 2000000000 HC ICU R&B

## 2025-01-27 PROCEDURE — 82248 BILIRUBIN DIRECT: CPT

## 2025-01-27 PROCEDURE — 84100 ASSAY OF PHOSPHORUS: CPT

## 2025-01-27 PROCEDURE — 6370000000 HC RX 637 (ALT 250 FOR IP): Performed by: INTERNAL MEDICINE

## 2025-01-27 PROCEDURE — 83690 ASSAY OF LIPASE: CPT

## 2025-01-27 PROCEDURE — 83735 ASSAY OF MAGNESIUM: CPT

## 2025-01-27 PROCEDURE — 84466 ASSAY OF TRANSFERRIN: CPT

## 2025-01-27 PROCEDURE — 93010 ELECTROCARDIOGRAM REPORT: CPT | Performed by: INTERNAL MEDICINE

## 2025-01-27 PROCEDURE — 2500000003 HC RX 250 WO HCPCS: Performed by: SURGERY

## 2025-01-27 PROCEDURE — 2580000003 HC RX 258: Performed by: HOSPITALIST

## 2025-01-27 PROCEDURE — 80053 COMPREHEN METABOLIC PANEL: CPT

## 2025-01-27 PROCEDURE — 85730 THROMBOPLASTIN TIME PARTIAL: CPT

## 2025-01-27 PROCEDURE — 85520 HEPARIN ASSAY: CPT

## 2025-01-27 PROCEDURE — 93306 TTE W/DOPPLER COMPLETE: CPT | Performed by: INTERNAL MEDICINE

## 2025-01-27 PROCEDURE — 85025 COMPLETE CBC W/AUTO DIFF WBC: CPT

## 2025-01-27 PROCEDURE — 6360000002 HC RX W HCPCS: Performed by: HOSPITALIST

## 2025-01-27 PROCEDURE — 84134 ASSAY OF PREALBUMIN: CPT

## 2025-01-27 PROCEDURE — APPSS15 APP SPLIT SHARED TIME 0-15 MINUTES: Performed by: NURSE PRACTITIONER

## 2025-01-27 PROCEDURE — 78226 HEPATOBILIARY SYSTEM IMAGING: CPT

## 2025-01-27 PROCEDURE — 6360000004 HC RX CONTRAST MEDICATION: Performed by: HOSPITALIST

## 2025-01-27 PROCEDURE — A9537 TC99M MEBROFENIN: HCPCS | Performed by: SURGERY

## 2025-01-27 PROCEDURE — 3430000000 HC RX DIAGNOSTIC RADIOPHARMACEUTICAL: Performed by: SURGERY

## 2025-01-27 PROCEDURE — 6370000000 HC RX 637 (ALT 250 FOR IP): Performed by: HOSPITALIST

## 2025-01-27 PROCEDURE — APPNB30 APP NON BILLABLE TIME 0-30 MINS: Performed by: NURSE PRACTITIONER

## 2025-01-27 PROCEDURE — 36415 COLL VENOUS BLD VENIPUNCTURE: CPT

## 2025-01-27 PROCEDURE — 85610 PROTHROMBIN TIME: CPT

## 2025-01-27 RX ORDER — HEPARIN SODIUM 1000 [USP'U]/ML
2000 INJECTION, SOLUTION INTRAVENOUS; SUBCUTANEOUS PRN
Status: DISCONTINUED | OUTPATIENT
Start: 2025-01-27 | End: 2025-02-02 | Stop reason: HOSPADM

## 2025-01-27 RX ORDER — NOREPINEPHRINE BITARTRATE 0.06 MG/ML
1-100 INJECTION, SOLUTION INTRAVENOUS CONTINUOUS
Status: DISCONTINUED | OUTPATIENT
Start: 2025-01-27 | End: 2025-01-28

## 2025-01-27 RX ORDER — HEPARIN SODIUM 1000 [USP'U]/ML
4000 INJECTION, SOLUTION INTRAVENOUS; SUBCUTANEOUS PRN
Status: DISCONTINUED | OUTPATIENT
Start: 2025-01-27 | End: 2025-02-02 | Stop reason: HOSPADM

## 2025-01-27 RX ORDER — MIDODRINE HYDROCHLORIDE 5 MG/1
2.5 TABLET ORAL
Status: DISCONTINUED | OUTPATIENT
Start: 2025-01-27 | End: 2025-01-28

## 2025-01-27 RX ORDER — MIDODRINE HYDROCHLORIDE 5 MG/1
5 TABLET ORAL 3 TIMES DAILY PRN
Status: DISCONTINUED | OUTPATIENT
Start: 2025-01-27 | End: 2025-02-02 | Stop reason: HOSPADM

## 2025-01-27 RX ORDER — HEPARIN SODIUM 10000 [USP'U]/100ML
5-30 INJECTION, SOLUTION INTRAVENOUS CONTINUOUS
Status: DISCONTINUED | OUTPATIENT
Start: 2025-01-27 | End: 2025-02-02 | Stop reason: HOSPADM

## 2025-01-27 RX ORDER — SODIUM CHLORIDE 0.9 % (FLUSH) 0.9 %
5-40 SYRINGE (ML) INJECTION PRN
Status: DISCONTINUED | OUTPATIENT
Start: 2025-01-27 | End: 2025-02-02 | Stop reason: HOSPADM

## 2025-01-27 RX ADMIN — PIPERACILLIN AND TAZOBACTAM 3375 MG: 3; .375 INJECTION, POWDER, LYOPHILIZED, FOR SOLUTION INTRAVENOUS at 14:12

## 2025-01-27 RX ADMIN — SODIUM CHLORIDE, PRESERVATIVE FREE 10 ML: 5 INJECTION INTRAVENOUS at 12:12

## 2025-01-27 RX ADMIN — Medication 4.93 MILLICURIE: at 08:13

## 2025-01-27 RX ADMIN — MIDODRINE HYDROCHLORIDE 2.5 MG: 5 TABLET ORAL at 15:26

## 2025-01-27 RX ADMIN — EXTENDED PHENYTOIN SODIUM 100 MG: 100 CAPSULE ORAL at 20:25

## 2025-01-27 RX ADMIN — MIDODRINE HYDROCHLORIDE 5 MG: 5 TABLET ORAL at 06:20

## 2025-01-27 RX ADMIN — SODIUM CHLORIDE: 9 INJECTION, SOLUTION INTRAVENOUS at 15:18

## 2025-01-27 RX ADMIN — PIPERACILLIN AND TAZOBACTAM 3375 MG: 3; .375 INJECTION, POWDER, LYOPHILIZED, FOR SOLUTION INTRAVENOUS at 00:06

## 2025-01-27 RX ADMIN — PIPERACILLIN AND TAZOBACTAM 3375 MG: 3; .375 INJECTION, POWDER, LYOPHILIZED, FOR SOLUTION INTRAVENOUS at 23:23

## 2025-01-27 RX ADMIN — MIDODRINE HYDROCHLORIDE 2.5 MG: 5 TABLET ORAL at 18:02

## 2025-01-27 RX ADMIN — PANTOPRAZOLE SODIUM 40 MG: 40 TABLET, DELAYED RELEASE ORAL at 06:20

## 2025-01-27 RX ADMIN — HEPARIN SODIUM 4000 UNITS: 1000 INJECTION INTRAVENOUS; SUBCUTANEOUS at 23:17

## 2025-01-27 RX ADMIN — DIAZEPAM 5 MG: 5 TABLET ORAL at 12:10

## 2025-01-27 RX ADMIN — HEPARIN SODIUM 12 UNITS/KG/HR: 10000 INJECTION, SOLUTION INTRAVENOUS at 15:25

## 2025-01-27 RX ADMIN — LEVOTHYROXINE SODIUM 25 MCG: 0.03 TABLET ORAL at 12:10

## 2025-01-27 RX ADMIN — SODIUM CHLORIDE: 9 INJECTION, SOLUTION INTRAVENOUS at 23:11

## 2025-01-27 RX ADMIN — SODIUM CHLORIDE, PRESERVATIVE FREE 10 ML: 5 INJECTION INTRAVENOUS at 20:25

## 2025-01-27 RX ADMIN — SULFUR HEXAFLUORIDE 2 ML: 60.7; .19; .19 INJECTION, POWDER, LYOPHILIZED, FOR SUSPENSION INTRAVENOUS; INTRAVESICAL at 10:44

## 2025-01-27 RX ADMIN — EXTENDED PHENYTOIN SODIUM 100 MG: 100 CAPSULE ORAL at 12:10

## 2025-01-27 RX ADMIN — SODIUM CHLORIDE, PRESERVATIVE FREE 10 ML: 5 INJECTION INTRAVENOUS at 08:14

## 2025-01-27 RX ADMIN — PANTOPRAZOLE SODIUM 40 MG: 40 TABLET, DELAYED RELEASE ORAL at 12:10

## 2025-01-27 RX ADMIN — DIAZEPAM 5 MG: 5 TABLET ORAL at 18:02

## 2025-01-27 ASSESSMENT — PAIN SCALES - GENERAL: PAINLEVEL_OUTOF10: 0

## 2025-01-28 ENCOUNTER — APPOINTMENT (OUTPATIENT)
Dept: GENERAL RADIOLOGY | Age: 80
DRG: 871 | End: 2025-01-28
Payer: MEDICARE

## 2025-01-28 ENCOUNTER — APPOINTMENT (OUTPATIENT)
Dept: INTERVENTIONAL RADIOLOGY/VASCULAR | Age: 80
DRG: 871 | End: 2025-01-28
Payer: MEDICARE

## 2025-01-28 ENCOUNTER — APPOINTMENT (OUTPATIENT)
Dept: CT IMAGING | Age: 80
DRG: 871 | End: 2025-01-28
Payer: MEDICARE

## 2025-01-28 PROBLEM — K81.0 ACUTE CHOLECYSTITIS: Status: ACTIVE | Noted: 2025-01-28

## 2025-01-28 LAB
ALBUMIN SERPL-MCNC: 2.6 G/DL (ref 3.4–5)
ALBUMIN/GLOB SERPL: 1 {RATIO} (ref 1.1–2.2)
ALP SERPL-CCNC: 198 U/L (ref 40–129)
ALT SERPL-CCNC: 46 U/L (ref 10–40)
ANION GAP SERPL CALCULATED.3IONS-SCNC: 10 MMOL/L (ref 3–16)
ANTI-XA UNFRAC HEPARIN: <0.1 IU/ML (ref 0.3–0.7)
AST SERPL-CCNC: 54 U/L (ref 15–37)
BASOPHILS # BLD: 0 K/UL (ref 0–0.2)
BASOPHILS NFR BLD: 0.4 %
BILIRUB SERPL-MCNC: 0.5 MG/DL (ref 0–1)
BUN SERPL-MCNC: 44 MG/DL (ref 7–20)
CALCIUM SERPL-MCNC: 7.5 MG/DL (ref 8.3–10.6)
CHLORIDE SERPL-SCNC: 107 MMOL/L (ref 99–110)
CO2 SERPL-SCNC: 20 MMOL/L (ref 21–32)
CREAT SERPL-MCNC: 2.6 MG/DL (ref 0.8–1.3)
DEPRECATED RDW RBC AUTO: 14.4 % (ref 12.4–15.4)
EOSINOPHIL # BLD: 0.1 K/UL (ref 0–0.6)
EOSINOPHIL NFR BLD: 1.3 %
GFR SERPLBLD CREATININE-BSD FMLA CKD-EPI: 25 ML/MIN/{1.73_M2}
GLUCOSE SERPL-MCNC: 112 MG/DL (ref 70–99)
HCT VFR BLD AUTO: 32.9 % (ref 40.5–52.5)
HGB BLD-MCNC: 10.8 G/DL (ref 13.5–17.5)
INR PPP: 1.37 (ref 0.85–1.15)
LYMPHOCYTES # BLD: 0.6 K/UL (ref 1–5.1)
LYMPHOCYTES NFR BLD: 6.9 %
MAGNESIUM SERPL-MCNC: 2.87 MG/DL (ref 1.8–2.4)
MCH RBC QN AUTO: 29.5 PG (ref 26–34)
MCHC RBC AUTO-ENTMCNC: 32.7 G/DL (ref 31–36)
MCV RBC AUTO: 90 FL (ref 80–100)
MONOCYTES # BLD: 0.9 K/UL (ref 0–1.3)
MONOCYTES NFR BLD: 9.8 %
NEUTROPHILS # BLD: 7.1 K/UL (ref 1.7–7.7)
NEUTROPHILS NFR BLD: 81.6 %
PHOSPHATE SERPL-MCNC: 3.4 MG/DL (ref 2.5–4.9)
PLATELET # BLD AUTO: 202 K/UL (ref 135–450)
PMV BLD AUTO: 8.7 FL (ref 5–10.5)
POTASSIUM SERPL-SCNC: 4.3 MMOL/L (ref 3.5–5.1)
PROT SERPL-MCNC: 5.3 G/DL (ref 6.4–8.2)
PROTHROMBIN TIME: 17 SEC (ref 11.9–14.9)
RBC # BLD AUTO: 3.65 M/UL (ref 4.2–5.9)
SODIUM SERPL-SCNC: 137 MMOL/L (ref 136–145)
WBC # BLD AUTO: 8.7 K/UL (ref 4–11)

## 2025-01-28 PROCEDURE — 99232 SBSQ HOSP IP/OBS MODERATE 35: CPT | Performed by: SURGERY

## 2025-01-28 PROCEDURE — 99152 MOD SED SAME PHYS/QHP 5/>YRS: CPT

## 2025-01-28 PROCEDURE — 6360000002 HC RX W HCPCS: Performed by: INTERNAL MEDICINE

## 2025-01-28 PROCEDURE — 80053 COMPREHEN METABOLIC PANEL: CPT

## 2025-01-28 PROCEDURE — 36415 COLL VENOUS BLD VENIPUNCTURE: CPT

## 2025-01-28 PROCEDURE — 6370000000 HC RX 637 (ALT 250 FOR IP): Performed by: INTERNAL MEDICINE

## 2025-01-28 PROCEDURE — 87205 SMEAR GRAM STAIN: CPT

## 2025-01-28 PROCEDURE — 87186 SC STD MICRODIL/AGAR DIL: CPT

## 2025-01-28 PROCEDURE — 2580000003 HC RX 258: Performed by: HOSPITALIST

## 2025-01-28 PROCEDURE — 6360000002 HC RX W HCPCS: Performed by: STUDENT IN AN ORGANIZED HEALTH CARE EDUCATION/TRAINING PROGRAM

## 2025-01-28 PROCEDURE — 87070 CULTURE OTHR SPECIMN AEROBIC: CPT

## 2025-01-28 PROCEDURE — APPSS15 APP SPLIT SHARED TIME 0-15 MINUTES: Performed by: NURSE PRACTITIONER

## 2025-01-28 PROCEDURE — 74176 CT ABD & PELVIS W/O CONTRAST: CPT

## 2025-01-28 PROCEDURE — 0F9430Z DRAINAGE OF GALLBLADDER WITH DRAINAGE DEVICE, PERCUTANEOUS APPROACH: ICD-10-PCS | Performed by: SURGERY

## 2025-01-28 PROCEDURE — 99233 SBSQ HOSP IP/OBS HIGH 50: CPT | Performed by: INTERNAL MEDICINE

## 2025-01-28 PROCEDURE — 2500000003 HC RX 250 WO HCPCS: Performed by: HOSPITALIST

## 2025-01-28 PROCEDURE — 6360000004 HC RX CONTRAST MEDICATION: Performed by: INTERNAL MEDICINE

## 2025-01-28 PROCEDURE — 87077 CULTURE AEROBIC IDENTIFY: CPT

## 2025-01-28 PROCEDURE — 2709999900 IR CHOLECYSTOSTOMY PERCUTANEOUS COMPLETE

## 2025-01-28 PROCEDURE — APPNB30 APP NON BILLABLE TIME 0-30 MINS: Performed by: NURSE PRACTITIONER

## 2025-01-28 PROCEDURE — 99291 CRITICAL CARE FIRST HOUR: CPT | Performed by: INTERNAL MEDICINE

## 2025-01-28 PROCEDURE — 2000000000 HC ICU R&B

## 2025-01-28 PROCEDURE — 71045 X-RAY EXAM CHEST 1 VIEW: CPT

## 2025-01-28 PROCEDURE — 87075 CULTR BACTERIA EXCEPT BLOOD: CPT

## 2025-01-28 PROCEDURE — 85520 HEPARIN ASSAY: CPT

## 2025-01-28 PROCEDURE — 47490 INCISION OF GALLBLADDER: CPT

## 2025-01-28 PROCEDURE — 85610 PROTHROMBIN TIME: CPT

## 2025-01-28 PROCEDURE — 83735 ASSAY OF MAGNESIUM: CPT

## 2025-01-28 PROCEDURE — 2580000003 HC RX 258: Performed by: STUDENT IN AN ORGANIZED HEALTH CARE EDUCATION/TRAINING PROGRAM

## 2025-01-28 PROCEDURE — 85025 COMPLETE CBC W/AUTO DIFF WBC: CPT

## 2025-01-28 PROCEDURE — 84100 ASSAY OF PHOSPHORUS: CPT

## 2025-01-28 PROCEDURE — 6370000000 HC RX 637 (ALT 250 FOR IP): Performed by: HOSPITALIST

## 2025-01-28 RX ORDER — LIDOCAINE HYDROCHLORIDE 10 MG/ML
10 INJECTION, SOLUTION EPIDURAL; INFILTRATION; INTRACAUDAL; PERINEURAL ONCE
Status: COMPLETED | OUTPATIENT
Start: 2025-01-28 | End: 2025-01-28

## 2025-01-28 RX ORDER — IOPAMIDOL 755 MG/ML
50 INJECTION, SOLUTION INTRAVASCULAR
Status: COMPLETED | OUTPATIENT
Start: 2025-01-28 | End: 2025-01-28

## 2025-01-28 RX ORDER — MORPHINE SULFATE 2 MG/ML
2 INJECTION, SOLUTION INTRAMUSCULAR; INTRAVENOUS EVERY 4 HOURS PRN
Status: DISCONTINUED | OUTPATIENT
Start: 2025-01-28 | End: 2025-02-02 | Stop reason: HOSPADM

## 2025-01-28 RX ORDER — MORPHINE SULFATE 2 MG/ML
1 INJECTION, SOLUTION INTRAMUSCULAR; INTRAVENOUS
Status: DISCONTINUED | OUTPATIENT
Start: 2025-01-28 | End: 2025-01-28

## 2025-01-28 RX ORDER — MIDODRINE HYDROCHLORIDE 5 MG/1
10 TABLET ORAL
Status: DISCONTINUED | OUTPATIENT
Start: 2025-01-28 | End: 2025-02-02 | Stop reason: HOSPADM

## 2025-01-28 RX ORDER — FUROSEMIDE 10 MG/ML
40 INJECTION INTRAMUSCULAR; INTRAVENOUS ONCE
Status: COMPLETED | OUTPATIENT
Start: 2025-01-28 | End: 2025-01-28

## 2025-01-28 RX ORDER — MIDODRINE HYDROCHLORIDE 5 MG/1
5 TABLET ORAL
Status: DISCONTINUED | OUTPATIENT
Start: 2025-01-28 | End: 2025-01-28

## 2025-01-28 RX ORDER — MIDAZOLAM HYDROCHLORIDE 2 MG/2ML
INJECTION, SOLUTION INTRAMUSCULAR; INTRAVENOUS PRN
Status: COMPLETED | OUTPATIENT
Start: 2025-01-28 | End: 2025-01-28

## 2025-01-28 RX ORDER — FENTANYL CITRATE 50 UG/ML
INJECTION, SOLUTION INTRAMUSCULAR; INTRAVENOUS PRN
Status: COMPLETED | OUTPATIENT
Start: 2025-01-28 | End: 2025-01-28

## 2025-01-28 RX ADMIN — HEPARIN SODIUM 4000 UNITS: 1000 INJECTION INTRAVENOUS; SUBCUTANEOUS at 04:22

## 2025-01-28 RX ADMIN — EXTENDED PHENYTOIN SODIUM 100 MG: 100 CAPSULE ORAL at 09:03

## 2025-01-28 RX ADMIN — FENTANYL CITRATE 25 MCG: 50 INJECTION, SOLUTION INTRAMUSCULAR; INTRAVENOUS at 14:48

## 2025-01-28 RX ADMIN — DIAZEPAM 5 MG: 5 TABLET ORAL at 18:52

## 2025-01-28 RX ADMIN — IOPAMIDOL 5 ML: 755 INJECTION, SOLUTION INTRAVENOUS at 15:02

## 2025-01-28 RX ADMIN — HEPARIN SODIUM 20 UNITS/KG/HR: 10000 INJECTION, SOLUTION INTRAVENOUS at 21:14

## 2025-01-28 RX ADMIN — PANTOPRAZOLE SODIUM 40 MG: 40 TABLET, DELAYED RELEASE ORAL at 05:12

## 2025-01-28 RX ADMIN — MORPHINE SULFATE 2 MG: 2 INJECTION, SOLUTION INTRAMUSCULAR; INTRAVENOUS at 21:04

## 2025-01-28 RX ADMIN — PIPERACILLIN AND TAZOBACTAM 3375 MG: 3; .375 INJECTION, POWDER, LYOPHILIZED, FOR SOLUTION INTRAVENOUS at 16:59

## 2025-01-28 RX ADMIN — PIPERACILLIN AND TAZOBACTAM 3375 MG: 3; .375 INJECTION, POWDER, LYOPHILIZED, FOR SOLUTION INTRAVENOUS at 09:09

## 2025-01-28 RX ADMIN — HEPARIN SODIUM 4000 UNITS: 1000 INJECTION INTRAVENOUS; SUBCUTANEOUS at 19:01

## 2025-01-28 RX ADMIN — SODIUM CHLORIDE: 9 INJECTION, SOLUTION INTRAVENOUS at 12:52

## 2025-01-28 RX ADMIN — FUROSEMIDE 40 MG: 10 INJECTION, SOLUTION INTRAMUSCULAR; INTRAVENOUS at 17:18

## 2025-01-28 RX ADMIN — DIAZEPAM 5 MG: 5 TABLET ORAL at 09:03

## 2025-01-28 RX ADMIN — SODIUM CHLORIDE, PRESERVATIVE FREE 10 ML: 5 INJECTION INTRAVENOUS at 09:09

## 2025-01-28 RX ADMIN — ACETAMINOPHEN 650 MG: 325 TABLET ORAL at 21:03

## 2025-01-28 RX ADMIN — EXTENDED PHENYTOIN SODIUM 100 MG: 100 CAPSULE ORAL at 21:04

## 2025-01-28 RX ADMIN — FENTANYL CITRATE 25 MCG: 50 INJECTION, SOLUTION INTRAMUSCULAR; INTRAVENOUS at 14:51

## 2025-01-28 RX ADMIN — MIDODRINE HYDROCHLORIDE 2.5 MG: 5 TABLET ORAL at 09:02

## 2025-01-28 RX ADMIN — SODIUM CHLORIDE: 9 INJECTION, SOLUTION INTRAVENOUS at 05:35

## 2025-01-28 RX ADMIN — MIDODRINE HYDROCHLORIDE 5 MG: 5 TABLET ORAL at 12:48

## 2025-01-28 RX ADMIN — LIDOCAINE HYDROCHLORIDE 10 ML: 10 INJECTION, SOLUTION EPIDURAL; INFILTRATION; INTRACAUDAL; PERINEURAL at 15:03

## 2025-01-28 RX ADMIN — LEVOTHYROXINE SODIUM 25 MCG: 0.03 TABLET ORAL at 09:03

## 2025-01-28 RX ADMIN — MIDAZOLAM HYDROCHLORIDE 0.5 MG: 1 INJECTION, SOLUTION INTRAMUSCULAR; INTRAVENOUS at 14:48

## 2025-01-28 RX ADMIN — MIDAZOLAM HYDROCHLORIDE 0.5 MG: 1 INJECTION, SOLUTION INTRAMUSCULAR; INTRAVENOUS at 14:51

## 2025-01-28 RX ADMIN — MIDODRINE HYDROCHLORIDE 5 MG: 5 TABLET ORAL at 00:58

## 2025-01-28 ASSESSMENT — PAIN DESCRIPTION - LOCATION: LOCATION: ABDOMEN

## 2025-01-28 ASSESSMENT — PAIN DESCRIPTION - DESCRIPTORS: DESCRIPTORS: PRESSURE

## 2025-01-28 ASSESSMENT — PAIN SCALES - WONG BAKER
WONGBAKER_NUMERICALRESPONSE: HURTS A LITTLE BIT
WONGBAKER_NUMERICALRESPONSE: HURTS A LITTLE BIT

## 2025-01-28 ASSESSMENT — PAIN SCALES - GENERAL
PAINLEVEL_OUTOF10: 2
PAINLEVEL_OUTOF10: 2
PAINLEVEL_OUTOF10: 8
PAINLEVEL_OUTOF10: 7

## 2025-01-28 ASSESSMENT — PAIN DESCRIPTION - ORIENTATION: ORIENTATION: RIGHT;LEFT;LOWER;MID;OUTER;UPPER

## 2025-01-28 NOTE — PRE SEDATION
Sedation Pre-Procedure Note    Patient Name: Juan A Mathis   YOB: 1945  Room/Bed: [facility]3906/3906-01  Medical Record Number: [id number]  Date: 1/28/2025   Time: 2:33 PM       Indication:  acute cholecystitis - perc ge placement    Consent: I have discussed with the patient and/or the patient representative the indication, alternatives, and the possible risks and/or complications of the planned procedure and the anesthesia methods. The patient and/or patient representative appear to understand and agree to proceed.    Vital Signs:   Vitals:    01/28/25 0600   BP: (!) 83/46   Pulse: 64   Resp:    Temp:    SpO2: 92%       Past Medical History:   has a past medical history of Accidental fall, Arthritis, Atrial fib/flut, CAD (coronary artery disease), CHF (congestive heart failure) ([facility]), DVT (deep venous thrombosis) ([facility]), Epilepsia, Hx of blood clots, Hx of CABG, Hyperlipidemia, Hypertension, Kidney stones, MI (myocardial infarction) ([facility]), Pacemaker, Seizures ([facility]), Sinus bradycardia, Sleep apnea, and V tach ([facility]).    Past Surgical History:   has a past surgical history that includes hernia repair (Right); Coronary angioplasty with stent (1997); [place] tooth extraction (04/2012); pacemaker placement (12/18/2017); Coronary artery bypass graft (01/2019); Cardiac surgery; Cystoscopy (N/A, 11/13/2019); Cardioversion; Coronary stent placement (03/30/2022); and Colonoscopy (N/A, 1/27/2023).    Medications:   Scheduled Meds:    piperacillin-tazobactam  3,375 mg IntraVENous Q8H    midodrine  10 mg Oral TID WC    diazePAM  5 mg Oral BID    levothyroxine  25 mcg Oral Daily    phenytoin  100 mg Oral BID    pantoprazole  40 mg Oral QAM AC    sodium chloride flush  5-40 mL IntraVENous 2 times per day     Continuous Infusions:    [Held by provider] heparin (PORCINE) Infusion Stopped (01/28/25 0842)    sodium chloride 150 mL/hr at 01/28/25 1252    sodium chloride       PRN Meds: morphine, sodium chloride flush, heparin

## 2025-01-28 NOTE — BRIEF OP NOTE
Brief Postoperative Note    Juan A Mathis  YOB: 1945  [id number]    Pre-operative Diagnosis: Acute cholecystitis    Post-operative Diagnosis: Same    Procedure: Percutaneous cholecystostomy tube placement    Anesthesia: Local and Moderate Sedation    Surgeons/Assistants: Celestine Martin M.D.    Estimated Blood Loss: less than 50     Complications: None    Specimens: Was Obtained: bilious fluid sample obtained, sent for culture    Findings: Successful ultrasound and fluoro guided placement of a 10F percutaneous cholecystostomy tube.    Electronically signed by Celestine Martin MD on 1/28/2025 at 3:04 PM

## 2025-01-29 PROBLEM — A41.9 SEPSIS (HCC): Status: ACTIVE | Noted: 2025-01-29

## 2025-01-29 PROBLEM — K81.9 CHOLECYSTITIS: Status: ACTIVE | Noted: 2025-01-28

## 2025-01-29 LAB
ALBUMIN SERPL-MCNC: 2.5 G/DL (ref 3.4–5)
ALBUMIN/GLOB SERPL: 1 {RATIO} (ref 1.1–2.2)
ALP SERPL-CCNC: 174 U/L (ref 40–129)
ALT SERPL-CCNC: 42 U/L (ref 10–40)
ANION GAP SERPL CALCULATED.3IONS-SCNC: 10 MMOL/L (ref 3–16)
ANTI-XA UNFRAC HEPARIN: 0.17 IU/ML (ref 0.3–0.7)
ANTI-XA UNFRAC HEPARIN: 0.23 IU/ML (ref 0.3–0.7)
ANTI-XA UNFRAC HEPARIN: 0.24 IU/ML (ref 0.3–0.7)
ANTI-XA UNFRAC HEPARIN: <0.1 IU/ML (ref 0.3–0.7)
AST SERPL-CCNC: 39 U/L (ref 15–37)
BASOPHILS # BLD: 0 K/UL (ref 0–0.2)
BASOPHILS NFR BLD: 0.2 %
BILIRUB SERPL-MCNC: 0.9 MG/DL (ref 0–1)
BUN SERPL-MCNC: 29 MG/DL (ref 7–20)
CALCIUM SERPL-MCNC: 7.5 MG/DL (ref 8.3–10.6)
CHLORIDE SERPL-SCNC: 109 MMOL/L (ref 99–110)
CO2 SERPL-SCNC: 22 MMOL/L (ref 21–32)
CREAT SERPL-MCNC: 2.1 MG/DL (ref 0.8–1.3)
DEPRECATED RDW RBC AUTO: 14.1 % (ref 12.4–15.4)
EOSINOPHIL # BLD: 0 K/UL (ref 0–0.6)
EOSINOPHIL NFR BLD: 0.3 %
GFR SERPLBLD CREATININE-BSD FMLA CKD-EPI: 32 ML/MIN/{1.73_M2}
GLUCOSE SERPL-MCNC: 127 MG/DL (ref 70–99)
HCT VFR BLD AUTO: 30.4 % (ref 40.5–52.5)
HGB BLD-MCNC: 10.3 G/DL (ref 13.5–17.5)
INR PPP: 1.89 (ref 0.85–1.15)
LYMPHOCYTES # BLD: 0.8 K/UL (ref 1–5.1)
LYMPHOCYTES NFR BLD: 8.8 %
MAGNESIUM SERPL-MCNC: 2.46 MG/DL (ref 1.8–2.4)
MCH RBC QN AUTO: 30.2 PG (ref 26–34)
MCHC RBC AUTO-ENTMCNC: 33.7 G/DL (ref 31–36)
MCV RBC AUTO: 89.5 FL (ref 80–100)
MONOCYTES # BLD: 1 K/UL (ref 0–1.3)
MONOCYTES NFR BLD: 11.5 %
NEUTROPHILS # BLD: 6.8 K/UL (ref 1.7–7.7)
NEUTROPHILS NFR BLD: 79.2 %
PHOSPHATE SERPL-MCNC: 3.1 MG/DL (ref 2.5–4.9)
PLATELET # BLD AUTO: 240 K/UL (ref 135–450)
PMV BLD AUTO: 8.2 FL (ref 5–10.5)
POTASSIUM SERPL-SCNC: 3.9 MMOL/L (ref 3.5–5.1)
PROT SERPL-MCNC: 5 G/DL (ref 6.4–8.2)
PROTHROMBIN TIME: 21.8 SEC (ref 11.9–14.9)
RBC # BLD AUTO: 3.4 M/UL (ref 4.2–5.9)
SODIUM SERPL-SCNC: 141 MMOL/L (ref 136–145)
WBC # BLD AUTO: 8.6 K/UL (ref 4–11)

## 2025-01-29 PROCEDURE — 2000000000 HC ICU R&B

## 2025-01-29 PROCEDURE — APPNB30 APP NON BILLABLE TIME 0-30 MINS: Performed by: NURSE PRACTITIONER

## 2025-01-29 PROCEDURE — 2580000003 HC RX 258: Performed by: STUDENT IN AN ORGANIZED HEALTH CARE EDUCATION/TRAINING PROGRAM

## 2025-01-29 PROCEDURE — 85610 PROTHROMBIN TIME: CPT

## 2025-01-29 PROCEDURE — 97530 THERAPEUTIC ACTIVITIES: CPT

## 2025-01-29 PROCEDURE — 99232 SBSQ HOSP IP/OBS MODERATE 35: CPT | Performed by: SURGERY

## 2025-01-29 PROCEDURE — 36415 COLL VENOUS BLD VENIPUNCTURE: CPT

## 2025-01-29 PROCEDURE — 2500000003 HC RX 250 WO HCPCS: Performed by: HOSPITALIST

## 2025-01-29 PROCEDURE — 99291 CRITICAL CARE FIRST HOUR: CPT | Performed by: INTERNAL MEDICINE

## 2025-01-29 PROCEDURE — 97165 OT EVAL LOW COMPLEX 30 MIN: CPT

## 2025-01-29 PROCEDURE — APPSS15 APP SPLIT SHARED TIME 0-15 MINUTES: Performed by: NURSE PRACTITIONER

## 2025-01-29 PROCEDURE — 97535 SELF CARE MNGMENT TRAINING: CPT

## 2025-01-29 PROCEDURE — 80053 COMPREHEN METABOLIC PANEL: CPT

## 2025-01-29 PROCEDURE — 85025 COMPLETE CBC W/AUTO DIFF WBC: CPT

## 2025-01-29 PROCEDURE — 6360000002 HC RX W HCPCS: Performed by: INTERNAL MEDICINE

## 2025-01-29 PROCEDURE — 6370000000 HC RX 637 (ALT 250 FOR IP): Performed by: HOSPITALIST

## 2025-01-29 PROCEDURE — 85520 HEPARIN ASSAY: CPT

## 2025-01-29 PROCEDURE — 6360000002 HC RX W HCPCS: Performed by: STUDENT IN AN ORGANIZED HEALTH CARE EDUCATION/TRAINING PROGRAM

## 2025-01-29 PROCEDURE — 97116 GAIT TRAINING THERAPY: CPT

## 2025-01-29 PROCEDURE — 2580000003 HC RX 258: Performed by: HOSPITALIST

## 2025-01-29 PROCEDURE — 97161 PT EVAL LOW COMPLEX 20 MIN: CPT

## 2025-01-29 PROCEDURE — 84100 ASSAY OF PHOSPHORUS: CPT

## 2025-01-29 PROCEDURE — 6370000000 HC RX 637 (ALT 250 FOR IP): Performed by: INTERNAL MEDICINE

## 2025-01-29 PROCEDURE — 83735 ASSAY OF MAGNESIUM: CPT

## 2025-01-29 RX ORDER — 0.9 % SODIUM CHLORIDE 0.9 %
250 INTRAVENOUS SOLUTION INTRAVENOUS ONCE
Status: COMPLETED | OUTPATIENT
Start: 2025-01-29 | End: 2025-01-29

## 2025-01-29 RX ADMIN — MIDODRINE HYDROCHLORIDE 10 MG: 5 TABLET ORAL at 17:54

## 2025-01-29 RX ADMIN — SODIUM CHLORIDE: 9 INJECTION, SOLUTION INTRAVENOUS at 04:45

## 2025-01-29 RX ADMIN — LEVOTHYROXINE SODIUM 25 MCG: 0.03 TABLET ORAL at 08:40

## 2025-01-29 RX ADMIN — PIPERACILLIN AND TAZOBACTAM 3375 MG: 3; .375 INJECTION, POWDER, LYOPHILIZED, FOR SOLUTION INTRAVENOUS at 08:44

## 2025-01-29 RX ADMIN — SODIUM CHLORIDE 250 ML: 9 INJECTION, SOLUTION INTRAVENOUS at 04:59

## 2025-01-29 RX ADMIN — SODIUM CHLORIDE, PRESERVATIVE FREE 10 ML: 5 INJECTION INTRAVENOUS at 20:14

## 2025-01-29 RX ADMIN — HEPARIN SODIUM 2000 UNITS: 1000 INJECTION INTRAVENOUS; SUBCUTANEOUS at 15:08

## 2025-01-29 RX ADMIN — EXTENDED PHENYTOIN SODIUM 100 MG: 100 CAPSULE ORAL at 20:14

## 2025-01-29 RX ADMIN — PIPERACILLIN AND TAZOBACTAM 3375 MG: 3; .375 INJECTION, POWDER, LYOPHILIZED, FOR SOLUTION INTRAVENOUS at 01:12

## 2025-01-29 RX ADMIN — PIPERACILLIN AND TAZOBACTAM 3375 MG: 3; .375 INJECTION, POWDER, LYOPHILIZED, FOR SOLUTION INTRAVENOUS at 16:15

## 2025-01-29 RX ADMIN — MIDODRINE HYDROCHLORIDE 5 MG: 5 TABLET ORAL at 01:16

## 2025-01-29 RX ADMIN — HEPARIN SODIUM 2000 UNITS: 1000 INJECTION INTRAVENOUS; SUBCUTANEOUS at 08:36

## 2025-01-29 RX ADMIN — HEPARIN SODIUM 4000 UNITS: 1000 INJECTION INTRAVENOUS; SUBCUTANEOUS at 02:24

## 2025-01-29 RX ADMIN — DIAZEPAM 5 MG: 5 TABLET ORAL at 08:40

## 2025-01-29 RX ADMIN — SODIUM CHLORIDE, PRESERVATIVE FREE 10 ML: 5 INJECTION INTRAVENOUS at 09:00

## 2025-01-29 RX ADMIN — PANTOPRAZOLE SODIUM 40 MG: 40 TABLET, DELAYED RELEASE ORAL at 06:10

## 2025-01-29 RX ADMIN — EXTENDED PHENYTOIN SODIUM 100 MG: 100 CAPSULE ORAL at 08:40

## 2025-01-29 RX ADMIN — DIAZEPAM 5 MG: 5 TABLET ORAL at 17:54

## 2025-01-29 RX ADMIN — HEPARIN SODIUM 26 UNITS/KG/HR: 10000 INJECTION, SOLUTION INTRAVENOUS at 13:42

## 2025-01-29 RX ADMIN — MIDODRINE HYDROCHLORIDE 10 MG: 5 TABLET ORAL at 12:10

## 2025-01-29 RX ADMIN — HEPARIN SODIUM 2000 UNITS: 1000 INJECTION INTRAVENOUS; SUBCUTANEOUS at 22:26

## 2025-01-29 RX ADMIN — MIDODRINE HYDROCHLORIDE 10 MG: 5 TABLET ORAL at 08:39

## 2025-01-29 RX ADMIN — MORPHINE SULFATE 2 MG: 2 INJECTION, SOLUTION INTRAMUSCULAR; INTRAVENOUS at 09:02

## 2025-01-29 ASSESSMENT — PAIN SCALES - GENERAL
PAINLEVEL_OUTOF10: 6
PAINLEVEL_OUTOF10: 8
PAINLEVEL_OUTOF10: 2
PAINLEVEL_OUTOF10: 8
PAINLEVEL_OUTOF10: 8
PAINLEVEL_OUTOF10: 6

## 2025-01-29 ASSESSMENT — PAIN DESCRIPTION - LOCATION
LOCATION: ABDOMEN

## 2025-01-29 ASSESSMENT — PAIN SCALES - WONG BAKER
WONGBAKER_NUMERICALRESPONSE: HURTS EVEN MORE
WONGBAKER_NUMERICALRESPONSE: HURTS A LITTLE BIT

## 2025-01-29 ASSESSMENT — PAIN DESCRIPTION - DESCRIPTORS
DESCRIPTORS: PRESSURE
DESCRIPTORS: PRESSURE
DESCRIPTORS: ACHING

## 2025-01-29 ASSESSMENT — PAIN DESCRIPTION - ORIENTATION
ORIENTATION: MID
ORIENTATION: MID
ORIENTATION: LOWER

## 2025-01-29 ASSESSMENT — PAIN DESCRIPTION - FREQUENCY: FREQUENCY: INTERMITTENT

## 2025-01-29 ASSESSMENT — PAIN DESCRIPTION - PAIN TYPE: TYPE: SURGICAL PAIN

## 2025-01-29 ASSESSMENT — PAIN DESCRIPTION - ONSET: ONSET: ON-GOING

## 2025-01-29 NOTE — CARE COORDINATION
Case Management Assessment  Initial Evaluation    Date/Time of Evaluation: 1/29/2025 1:56 PM  Assessment Completed by: Alma Ruth    If patient is discharged prior to next notation, then this note serves as note for discharge by case management.    Patient Name: Juan A Mathis                   YOB: 1945  Diagnosis: Acute cholecystitis [K81.0]  Acute abdominal pain [R10.9]  Hyponatremia [E87.1]  TYRESE (acute kidney injury) (HCC) [N17.9]  Supratherapeutic INR [R79.1]  Hypotension, unspecified hypotension type [I95.9]                   Date / Time: 1/26/2025  9:28 AM    Patient Admission Status: Inpatient   Readmission Risk (Low < 19, Mod (19-27), High > 27): Readmission Risk Score: 18.1    Current PCP: Jaydon Barreto, DO  PCP verified by CM? Yes    Chart Reviewed: Yes      History Provided by: Medical Record  Patient Orientation: Alert and Oriented, Person, Place, Situation, Self    Patient Cognition: Alert    Hospitalization in the last 30 days (Readmission):  No    If yes, Readmission Assessment in CM Navigator will be completed.    Advance Directives:      Code Status: Full Code   Patient's Primary Decision Maker is: Legal Next of Kin      Discharge Planning:    Patient lives with: Spouse/Significant Other Type of Home: House  Primary Care Giver: Self  Patient Support Systems include: Spouse/Significant Other, Children, Family Members   Current Financial resources: Medicare  Current community resources: None  Current services prior to admission: None            Current DME:              Type of Home Care services:  None    ADLS  Prior functional level: Independent in ADLs/IADLs, Bathing, Dressing, Toileting, Feeding, Cooking, Housework, Shopping, Mobility  Current functional level: Toileting, Dressing, Bathing, Assistance with the following:    PT AM-PAC: 18 /24  OT AM-PAC: 21 /24    Family can provide assistance at DC: Yes  Would you like Case Management to discuss the discharge plan with any other

## 2025-01-30 LAB
ALBUMIN SERPL-MCNC: 2.4 G/DL (ref 3.4–5)
ALBUMIN/GLOB SERPL: 0.9 {RATIO} (ref 1.1–2.2)
ALP SERPL-CCNC: 141 U/L (ref 40–129)
ALT SERPL-CCNC: 36 U/L (ref 10–40)
ANION GAP SERPL CALCULATED.3IONS-SCNC: 11 MMOL/L (ref 3–16)
ANTI-XA UNFRAC HEPARIN: 0.22 IU/ML (ref 0.3–0.7)
APTT BLD: 46.8 SEC (ref 22.1–36.4)
APTT BLD: >180 SEC (ref 22.1–36.4)
AST SERPL-CCNC: 24 U/L (ref 15–37)
BACTERIA BLD CULT ORG #2: NORMAL
BACTERIA BLD CULT: NORMAL
BASOPHILS # BLD: 0 K/UL (ref 0–0.2)
BASOPHILS NFR BLD: 0.4 %
BILIRUB SERPL-MCNC: 0.6 MG/DL (ref 0–1)
BUN SERPL-MCNC: 27 MG/DL (ref 7–20)
CALCIUM SERPL-MCNC: 7.6 MG/DL (ref 8.3–10.6)
CHLORIDE SERPL-SCNC: 107 MMOL/L (ref 99–110)
CO2 SERPL-SCNC: 21 MMOL/L (ref 21–32)
CREAT SERPL-MCNC: 2 MG/DL (ref 0.8–1.3)
DEPRECATED RDW RBC AUTO: 14.1 % (ref 12.4–15.4)
EOSINOPHIL # BLD: 0.2 K/UL (ref 0–0.6)
EOSINOPHIL NFR BLD: 2.7 %
GFR SERPLBLD CREATININE-BSD FMLA CKD-EPI: 34 ML/MIN/{1.73_M2}
GLUCOSE SERPL-MCNC: 117 MG/DL (ref 70–99)
HCT VFR BLD AUTO: 27 % (ref 40.5–52.5)
HGB BLD-MCNC: 9 G/DL (ref 13.5–17.5)
INR PPP: 2.18 (ref 0.85–1.15)
LYMPHOCYTES # BLD: 0.9 K/UL (ref 1–5.1)
LYMPHOCYTES NFR BLD: 11.6 %
MCH RBC QN AUTO: 29.9 PG (ref 26–34)
MCHC RBC AUTO-ENTMCNC: 33.6 G/DL (ref 31–36)
MCV RBC AUTO: 89 FL (ref 80–100)
MONOCYTES # BLD: 0.7 K/UL (ref 0–1.3)
MONOCYTES NFR BLD: 8.7 %
NEUTROPHILS # BLD: 6 K/UL (ref 1.7–7.7)
NEUTROPHILS NFR BLD: 76.6 %
PHOSPHATE SERPL-MCNC: 2.8 MG/DL (ref 2.5–4.9)
PLATELET # BLD AUTO: 286 K/UL (ref 135–450)
PMV BLD AUTO: 7.4 FL (ref 5–10.5)
POTASSIUM SERPL-SCNC: 3.8 MMOL/L (ref 3.5–5.1)
PROT SERPL-MCNC: 5 G/DL (ref 6.4–8.2)
PROTHROMBIN TIME: 24.3 SEC (ref 11.9–14.9)
RBC # BLD AUTO: 3.03 M/UL (ref 4.2–5.9)
SODIUM SERPL-SCNC: 139 MMOL/L (ref 136–145)
WBC # BLD AUTO: 7.9 K/UL (ref 4–11)

## 2025-01-30 PROCEDURE — 6370000000 HC RX 637 (ALT 250 FOR IP): Performed by: INTERNAL MEDICINE

## 2025-01-30 PROCEDURE — 36415 COLL VENOUS BLD VENIPUNCTURE: CPT

## 2025-01-30 PROCEDURE — P9045 ALBUMIN (HUMAN), 5%, 250 ML: HCPCS | Performed by: INTERNAL MEDICINE

## 2025-01-30 PROCEDURE — 6360000002 HC RX W HCPCS: Performed by: INTERNAL MEDICINE

## 2025-01-30 PROCEDURE — 84100 ASSAY OF PHOSPHORUS: CPT

## 2025-01-30 PROCEDURE — 2000000000 HC ICU R&B

## 2025-01-30 PROCEDURE — 85610 PROTHROMBIN TIME: CPT

## 2025-01-30 PROCEDURE — 99291 CRITICAL CARE FIRST HOUR: CPT | Performed by: INTERNAL MEDICINE

## 2025-01-30 PROCEDURE — 85025 COMPLETE CBC W/AUTO DIFF WBC: CPT

## 2025-01-30 PROCEDURE — 6370000000 HC RX 637 (ALT 250 FOR IP): Performed by: HOSPITALIST

## 2025-01-30 PROCEDURE — 80053 COMPREHEN METABOLIC PANEL: CPT

## 2025-01-30 PROCEDURE — 85730 THROMBOPLASTIN TIME PARTIAL: CPT

## 2025-01-30 PROCEDURE — 85520 HEPARIN ASSAY: CPT

## 2025-01-30 PROCEDURE — 6360000002 HC RX W HCPCS: Performed by: STUDENT IN AN ORGANIZED HEALTH CARE EDUCATION/TRAINING PROGRAM

## 2025-01-30 PROCEDURE — 2500000003 HC RX 250 WO HCPCS: Performed by: HOSPITALIST

## 2025-01-30 PROCEDURE — 99232 SBSQ HOSP IP/OBS MODERATE 35: CPT | Performed by: SURGERY

## 2025-01-30 PROCEDURE — 2580000003 HC RX 258: Performed by: STUDENT IN AN ORGANIZED HEALTH CARE EDUCATION/TRAINING PROGRAM

## 2025-01-30 RX ORDER — ALBUMIN HUMAN 50 G/1000ML
12.5 SOLUTION INTRAVENOUS EVERY 8 HOURS
Status: COMPLETED | OUTPATIENT
Start: 2025-01-30 | End: 2025-02-01

## 2025-01-30 RX ADMIN — HEPARIN SODIUM 30 UNITS/KG/HR: 10000 INJECTION, SOLUTION INTRAVENOUS at 01:18

## 2025-01-30 RX ADMIN — LEVOTHYROXINE SODIUM 25 MCG: 0.03 TABLET ORAL at 08:48

## 2025-01-30 RX ADMIN — ALBUMIN (HUMAN) 12.5 G: 12.5 INJECTION, SOLUTION INTRAVENOUS at 13:10

## 2025-01-30 RX ADMIN — PIPERACILLIN AND TAZOBACTAM 3375 MG: 3; .375 INJECTION, POWDER, LYOPHILIZED, FOR SOLUTION INTRAVENOUS at 08:46

## 2025-01-30 RX ADMIN — EXTENDED PHENYTOIN SODIUM 100 MG: 100 CAPSULE ORAL at 08:47

## 2025-01-30 RX ADMIN — HEPARIN SODIUM 2000 UNITS: 1000 INJECTION INTRAVENOUS; SUBCUTANEOUS at 04:18

## 2025-01-30 RX ADMIN — EXTENDED PHENYTOIN SODIUM 100 MG: 100 CAPSULE ORAL at 20:20

## 2025-01-30 RX ADMIN — DIAZEPAM 5 MG: 5 TABLET ORAL at 08:47

## 2025-01-30 RX ADMIN — MIDODRINE HYDROCHLORIDE 5 MG: 5 TABLET ORAL at 00:14

## 2025-01-30 RX ADMIN — PIPERACILLIN AND TAZOBACTAM 3375 MG: 3; .375 INJECTION, POWDER, LYOPHILIZED, FOR SOLUTION INTRAVENOUS at 16:40

## 2025-01-30 RX ADMIN — MIDODRINE HYDROCHLORIDE 10 MG: 5 TABLET ORAL at 08:47

## 2025-01-30 RX ADMIN — PANTOPRAZOLE SODIUM 40 MG: 40 TABLET, DELAYED RELEASE ORAL at 05:52

## 2025-01-30 RX ADMIN — MIDODRINE HYDROCHLORIDE 10 MG: 5 TABLET ORAL at 17:49

## 2025-01-30 RX ADMIN — ALBUMIN (HUMAN) 12.5 G: 12.5 INJECTION, SOLUTION INTRAVENOUS at 20:23

## 2025-01-30 RX ADMIN — MIDODRINE HYDROCHLORIDE 10 MG: 5 TABLET ORAL at 13:10

## 2025-01-30 RX ADMIN — SODIUM CHLORIDE, PRESERVATIVE FREE 10 ML: 5 INJECTION INTRAVENOUS at 20:27

## 2025-01-30 RX ADMIN — PIPERACILLIN AND TAZOBACTAM 3375 MG: 3; .375 INJECTION, POWDER, LYOPHILIZED, FOR SOLUTION INTRAVENOUS at 00:14

## 2025-01-30 ASSESSMENT — PAIN SCALES - GENERAL
PAINLEVEL_OUTOF10: 0
PAINLEVEL_OUTOF10: 4
PAINLEVEL_OUTOF10: 0
PAINLEVEL_OUTOF10: 0

## 2025-01-30 ASSESSMENT — PAIN DESCRIPTION - LOCATION: LOCATION: ABDOMEN

## 2025-01-30 NOTE — PLAN OF CARE
Problem: Pain  Goal: Verbalizes/displays adequate comfort level or baseline comfort level  Outcome: Progressing  Flowsheets  Verbalizes/displays adequate comfort level or baseline comfort level:   Assess pain using appropriate pain scale   Administer analgesics based on type and severity of pain and evaluate response   Implement non-pharmacological measures as appropriate and evaluate response   Consider cultural and social influences on pain and pain management   Notify Licensed Independent Practitioner if interventions unsuccessful or patient reports new pain  Verbalizes/displays adequate comfort level or baseline comfort level:   Encourage patient to monitor pain and request assistance   Assess pain using appropriate pain scale   Administer analgesics based on type and severity of pain and evaluate response   Implement non-pharmacological measures as appropriate and evaluate response   Consider cultural and social influences on pain and pain management   Notify Licensed Independent Practitioner if interventions unsuccessful or patient reports new pain     Problem: Chronic Conditions and Co-morbidities  Goal: Patient's chronic conditions and co-morbidity symptoms are monitored and maintained or improved  Outcome: Progressing     Problem: Discharge Planning  Goal: Discharge to home or other facility with appropriate resources  Outcome: Progressing     Problem: Safety - Adult  Goal: Free from fall injury  Outcome: Progressing     Problem: ABCDS Injury Assessment  Goal: Absence of physical injury  Outcome: Progressing

## 2025-01-31 ENCOUNTER — TELEPHONE (OUTPATIENT)
Dept: CARDIOLOGY CLINIC | Age: 80
End: 2025-01-31

## 2025-01-31 LAB
ALBUMIN SERPL-MCNC: 2.6 G/DL (ref 3.4–5)
ALBUMIN/GLOB SERPL: 1 {RATIO} (ref 1.1–2.2)
ALP SERPL-CCNC: 115 U/L (ref 40–129)
ALT SERPL-CCNC: 35 U/L (ref 10–40)
ANION GAP SERPL CALCULATED.3IONS-SCNC: 9 MMOL/L (ref 3–16)
AST SERPL-CCNC: 31 U/L (ref 15–37)
BACTERIA SPEC AEROBE CULT: ABNORMAL
BACTERIA SPEC ANAEROBE CULT: ABNORMAL
BACTERIA URNS QL MICRO: NORMAL /HPF
BASOPHILS # BLD: 0 K/UL (ref 0–0.2)
BASOPHILS NFR BLD: 0.3 %
BILIRUB SERPL-MCNC: 0.6 MG/DL (ref 0–1)
BILIRUB UR QL STRIP.AUTO: NEGATIVE
BUN SERPL-MCNC: 24 MG/DL (ref 7–20)
CALCIUM SERPL-MCNC: 8.1 MG/DL (ref 8.3–10.6)
CHLORIDE SERPL-SCNC: 107 MMOL/L (ref 99–110)
CLARITY UR: ABNORMAL
CO2 SERPL-SCNC: 22 MMOL/L (ref 21–32)
COLOR UR: ABNORMAL
CREAT SERPL-MCNC: 1.8 MG/DL (ref 0.8–1.3)
DEPRECATED RDW RBC AUTO: 13.9 % (ref 12.4–15.4)
EOSINOPHIL # BLD: 0.2 K/UL (ref 0–0.6)
EOSINOPHIL NFR BLD: 2.4 %
EPI CELLS #/AREA URNS AUTO: 3 /HPF (ref 0–5)
GFR SERPLBLD CREATININE-BSD FMLA CKD-EPI: 38 ML/MIN/{1.73_M2}
GLUCOSE SERPL-MCNC: 99 MG/DL (ref 70–99)
GLUCOSE UR STRIP.AUTO-MCNC: NEGATIVE MG/DL
GRAM STN SPEC: ABNORMAL
HCT VFR BLD AUTO: 26.1 % (ref 40.5–52.5)
HGB BLD-MCNC: 8.8 G/DL (ref 13.5–17.5)
HGB UR QL STRIP.AUTO: ABNORMAL
HYALINE CASTS #/AREA URNS AUTO: 8 /LPF (ref 0–8)
INR PPP: 1.99 (ref 0.85–1.15)
KETONES UR STRIP.AUTO-MCNC: ABNORMAL MG/DL
LEUKOCYTE ESTERASE UR QL STRIP.AUTO: ABNORMAL
LYMPHOCYTES # BLD: 0.9 K/UL (ref 1–5.1)
LYMPHOCYTES NFR BLD: 8.8 %
MAGNESIUM SERPL-MCNC: 2.46 MG/DL (ref 1.8–2.4)
MCH RBC QN AUTO: 29.9 PG (ref 26–34)
MCHC RBC AUTO-ENTMCNC: 33.7 G/DL (ref 31–36)
MCV RBC AUTO: 88.8 FL (ref 80–100)
MONOCYTES # BLD: 0.6 K/UL (ref 0–1.3)
MONOCYTES NFR BLD: 6.3 %
NEUTROPHILS # BLD: 8.3 K/UL (ref 1.7–7.7)
NEUTROPHILS NFR BLD: 82.2 %
NITRITE UR QL STRIP.AUTO: NEGATIVE
ORGANISM: ABNORMAL
PH UR STRIP.AUTO: 5 [PH] (ref 5–8)
PHOSPHATE SERPL-MCNC: 2.9 MG/DL (ref 2.5–4.9)
PLATELET # BLD AUTO: 318 K/UL (ref 135–450)
PMV BLD AUTO: 7.6 FL (ref 5–10.5)
POTASSIUM SERPL-SCNC: 3.9 MMOL/L (ref 3.5–5.1)
PROT SERPL-MCNC: 5.3 G/DL (ref 6.4–8.2)
PROT UR STRIP.AUTO-MCNC: 100 MG/DL
PROTHROMBIN TIME: 22.7 SEC (ref 11.9–14.9)
RBC # BLD AUTO: 2.94 M/UL (ref 4.2–5.9)
RBC CLUMPS #/AREA URNS AUTO: 3 /HPF (ref 0–4)
SODIUM SERPL-SCNC: 138 MMOL/L (ref 136–145)
SP GR UR STRIP.AUTO: 1.02 (ref 1–1.03)
UA DIPSTICK W REFLEX MICRO PNL UR: YES
URN SPEC COLLECT METH UR: ABNORMAL
UROBILINOGEN UR STRIP-ACNC: 1 E.U./DL
WBC # BLD AUTO: 10.1 K/UL (ref 4–11)
WBC #/AREA URNS AUTO: 3 /HPF (ref 0–5)

## 2025-01-31 PROCEDURE — 80053 COMPREHEN METABOLIC PANEL: CPT

## 2025-01-31 PROCEDURE — 2000000000 HC ICU R&B

## 2025-01-31 PROCEDURE — 84100 ASSAY OF PHOSPHORUS: CPT

## 2025-01-31 PROCEDURE — P9045 ALBUMIN (HUMAN), 5%, 250 ML: HCPCS | Performed by: INTERNAL MEDICINE

## 2025-01-31 PROCEDURE — 97535 SELF CARE MNGMENT TRAINING: CPT

## 2025-01-31 PROCEDURE — 6370000000 HC RX 637 (ALT 250 FOR IP): Performed by: HOSPITALIST

## 2025-01-31 PROCEDURE — 85610 PROTHROMBIN TIME: CPT

## 2025-01-31 PROCEDURE — 6360000002 HC RX W HCPCS: Performed by: INTERNAL MEDICINE

## 2025-01-31 PROCEDURE — 83735 ASSAY OF MAGNESIUM: CPT

## 2025-01-31 PROCEDURE — 97116 GAIT TRAINING THERAPY: CPT

## 2025-01-31 PROCEDURE — 2580000003 HC RX 258: Performed by: STUDENT IN AN ORGANIZED HEALTH CARE EDUCATION/TRAINING PROGRAM

## 2025-01-31 PROCEDURE — 36415 COLL VENOUS BLD VENIPUNCTURE: CPT

## 2025-01-31 PROCEDURE — 6360000002 HC RX W HCPCS: Performed by: STUDENT IN AN ORGANIZED HEALTH CARE EDUCATION/TRAINING PROGRAM

## 2025-01-31 PROCEDURE — 81001 URINALYSIS AUTO W/SCOPE: CPT

## 2025-01-31 PROCEDURE — 85025 COMPLETE CBC W/AUTO DIFF WBC: CPT

## 2025-01-31 PROCEDURE — 2500000003 HC RX 250 WO HCPCS: Performed by: HOSPITALIST

## 2025-01-31 PROCEDURE — 6370000000 HC RX 637 (ALT 250 FOR IP): Performed by: INTERNAL MEDICINE

## 2025-01-31 PROCEDURE — 99232 SBSQ HOSP IP/OBS MODERATE 35: CPT | Performed by: SURGERY

## 2025-01-31 PROCEDURE — 99291 CRITICAL CARE FIRST HOUR: CPT | Performed by: INTERNAL MEDICINE

## 2025-01-31 RX ORDER — WARFARIN SODIUM 1 MG/1
1 TABLET ORAL
Status: COMPLETED | OUTPATIENT
Start: 2025-01-31 | End: 2025-01-31

## 2025-01-31 RX ORDER — WARFARIN SODIUM 5 MG/1
5 TABLET ORAL DAILY
Status: DISCONTINUED | OUTPATIENT
Start: 2025-01-31 | End: 2025-01-31

## 2025-01-31 RX ADMIN — EXTENDED PHENYTOIN SODIUM 100 MG: 100 CAPSULE ORAL at 09:59

## 2025-01-31 RX ADMIN — ALBUMIN (HUMAN) 12.5 G: 12.5 INJECTION, SOLUTION INTRAVENOUS at 04:36

## 2025-01-31 RX ADMIN — DIAZEPAM 5 MG: 5 TABLET ORAL at 17:31

## 2025-01-31 RX ADMIN — PIPERACILLIN AND TAZOBACTAM 3375 MG: 3; .375 INJECTION, POWDER, LYOPHILIZED, FOR SOLUTION INTRAVENOUS at 00:23

## 2025-01-31 RX ADMIN — WARFARIN SODIUM 1 MG: 1 TABLET ORAL at 17:31

## 2025-01-31 RX ADMIN — SODIUM CHLORIDE, PRESERVATIVE FREE 10 ML: 5 INJECTION INTRAVENOUS at 20:28

## 2025-01-31 RX ADMIN — MIDODRINE HYDROCHLORIDE 10 MG: 5 TABLET ORAL at 10:00

## 2025-01-31 RX ADMIN — PIPERACILLIN AND TAZOBACTAM 3375 MG: 3; .375 INJECTION, POWDER, LYOPHILIZED, FOR SOLUTION INTRAVENOUS at 10:08

## 2025-01-31 RX ADMIN — ALBUMIN (HUMAN) 12.5 G: 12.5 INJECTION, SOLUTION INTRAVENOUS at 12:56

## 2025-01-31 RX ADMIN — PANTOPRAZOLE SODIUM 40 MG: 40 TABLET, DELAYED RELEASE ORAL at 10:02

## 2025-01-31 RX ADMIN — DIAZEPAM 5 MG: 5 TABLET ORAL at 10:00

## 2025-01-31 RX ADMIN — EXTENDED PHENYTOIN SODIUM 100 MG: 100 CAPSULE ORAL at 20:26

## 2025-01-31 RX ADMIN — MIDODRINE HYDROCHLORIDE 10 MG: 5 TABLET ORAL at 17:31

## 2025-01-31 RX ADMIN — ALBUMIN (HUMAN) 12.5 G: 12.5 INJECTION, SOLUTION INTRAVENOUS at 20:34

## 2025-01-31 RX ADMIN — LEVOTHYROXINE SODIUM 25 MCG: 0.03 TABLET ORAL at 10:00

## 2025-01-31 RX ADMIN — MIDODRINE HYDROCHLORIDE 10 MG: 5 TABLET ORAL at 12:57

## 2025-01-31 RX ADMIN — PIPERACILLIN AND TAZOBACTAM 3375 MG: 3; .375 INJECTION, POWDER, LYOPHILIZED, FOR SOLUTION INTRAVENOUS at 16:14

## 2025-01-31 ASSESSMENT — PAIN SCALES - GENERAL
PAINLEVEL_OUTOF10: 0
PAINLEVEL_OUTOF10: 0

## 2025-01-31 NOTE — PLAN OF CARE
Problem: Pain  Goal: Verbalizes/displays adequate comfort level or baseline comfort level  Outcome: Progressing     Problem: Chronic Conditions and Co-morbidities  Goal: Patient's chronic conditions and co-morbidity symptoms are monitored and maintained or improved  Outcome: Progressing     Problem: Discharge Planning  Goal: Discharge to home or other facility with appropriate resources  Outcome: Progressing     Problem: Safety - Adult  Goal: Free from fall injury  Outcome: Progressing     Problem: ABCDS Injury Assessment  Goal: Absence of physical injury  Outcome: Progressing

## 2025-01-31 NOTE — TELEPHONE ENCOUNTER
Pt states he has been in the hospital since Monday, he is holding water, and he does not understand all of what they want to do because the treatment plan keeps changing.  He states he has not had any of his heart medication since he has been in the hospital.    He is calling to talk to [facility].    He is in intensive care bay 6.    Please advise.

## 2025-01-31 NOTE — CONSULTS
Pharmacy to Dose Warfarin    Pharmacy consulted to dose warfarin for DVT, Afib (although has WESTON ligation).    INR Goal: 2-3    INR today: 1.99    Home dose: 2.5 mg daily except Mon/Fri  Patient received vitamin K 5 mg on 1/26 for potential surgery and admission INR of 4.72    Assessment/Plan:  - INR basically therapeutic, although INR is higher than expected  - will cautiously start with 1 mg of warfarin today  - no need for heparin bridge  - Possible concomitant drug-drug interactions include: levothyroxine, phenytoin      Pharmacy will continue to follow.    Rupinder Pak, PharmD, [facility]  [id number]  1/31/2025 1:26 PM

## 2025-01-31 NOTE — PLAN OF CARE
Problem: Pain  Goal: Verbalizes/displays adequate comfort level or baseline comfort level  1/31/2025 1037 by Mervat Manrique RN  Outcome: Progressing  Problem: Chronic Conditions and Co-morbidities  Goal: Patient's chronic conditions and co-morbidity symptoms are monitored and maintained or improved  1/31/2025 1037 by Mervat Manrique, RN  Outcome: Progressing     Problem: Discharge Planning  Goal: Discharge to home or other facility with appropriate resources  1/31/2025 1037 by Mervat Manrique RN  Outcome: Progressing     Problem: Safety - Adult  Goal: Free from fall injury  1/31/2025 1037 by Mervat Manrique RN  Outcome: Progressing     Problem: ABCDS Injury Assessment  Goal: Absence of physical injury  1/31/2025 1037 by Mervat Manrique RN  Outcome: Progressing

## 2025-02-01 LAB
ALBUMIN SERPL-MCNC: 2.7 G/DL (ref 3.4–5)
ALBUMIN/GLOB SERPL: 1 {RATIO} (ref 1.1–2.2)
ALP SERPL-CCNC: 95 U/L (ref 40–129)
ALT SERPL-CCNC: 43 U/L (ref 10–40)
ANION GAP SERPL CALCULATED.3IONS-SCNC: 9 MMOL/L (ref 3–16)
AST SERPL-CCNC: 53 U/L (ref 15–37)
BASOPHILS # BLD: 0 K/UL (ref 0–0.2)
BASOPHILS NFR BLD: 0.6 %
BILIRUB SERPL-MCNC: 0.4 MG/DL (ref 0–1)
BUN SERPL-MCNC: 22 MG/DL (ref 7–20)
CALCIUM SERPL-MCNC: 8 MG/DL (ref 8.3–10.6)
CHLORIDE SERPL-SCNC: 108 MMOL/L (ref 99–110)
CO2 SERPL-SCNC: 21 MMOL/L (ref 21–32)
CREAT SERPL-MCNC: 1.6 MG/DL (ref 0.8–1.3)
DEPRECATED RDW RBC AUTO: 14.9 % (ref 12.4–15.4)
EOSINOPHIL # BLD: 0.3 K/UL (ref 0–0.6)
EOSINOPHIL NFR BLD: 3.9 %
GFR SERPLBLD CREATININE-BSD FMLA CKD-EPI: 44 ML/MIN/{1.73_M2}
GLUCOSE SERPL-MCNC: 98 MG/DL (ref 70–99)
HCT VFR BLD AUTO: 23.4 % (ref 40.5–52.5)
HGB BLD-MCNC: 7.6 G/DL (ref 13.5–17.5)
INR PPP: 1.42 (ref 0.85–1.15)
LYMPHOCYTES # BLD: 0.9 K/UL (ref 1–5.1)
LYMPHOCYTES NFR BLD: 12.1 %
MAGNESIUM SERPL-MCNC: 2.25 MG/DL (ref 1.8–2.4)
MCH RBC QN AUTO: 30.1 PG (ref 26–34)
MCHC RBC AUTO-ENTMCNC: 32.6 G/DL (ref 31–36)
MCV RBC AUTO: 92.5 FL (ref 80–100)
MONOCYTES # BLD: 0.5 K/UL (ref 0–1.3)
MONOCYTES NFR BLD: 6.5 %
NEUTROPHILS # BLD: 5.7 K/UL (ref 1.7–7.7)
NEUTROPHILS NFR BLD: 76.9 %
PLATELET # BLD AUTO: 307 K/UL (ref 135–450)
PMV BLD AUTO: 7.3 FL (ref 5–10.5)
POTASSIUM SERPL-SCNC: 3.9 MMOL/L (ref 3.5–5.1)
PROT SERPL-MCNC: 5.3 G/DL (ref 6.4–8.2)
PROTHROMBIN TIME: 17.5 SEC (ref 11.9–14.9)
RBC # BLD AUTO: 2.53 M/UL (ref 4.2–5.9)
SODIUM SERPL-SCNC: 138 MMOL/L (ref 136–145)
WBC # BLD AUTO: 7.4 K/UL (ref 4–11)

## 2025-02-01 PROCEDURE — 36415 COLL VENOUS BLD VENIPUNCTURE: CPT

## 2025-02-01 PROCEDURE — 6370000000 HC RX 637 (ALT 250 FOR IP): Performed by: HOSPITALIST

## 2025-02-01 PROCEDURE — P9045 ALBUMIN (HUMAN), 5%, 250 ML: HCPCS | Performed by: INTERNAL MEDICINE

## 2025-02-01 PROCEDURE — 85025 COMPLETE CBC W/AUTO DIFF WBC: CPT

## 2025-02-01 PROCEDURE — 6360000002 HC RX W HCPCS: Performed by: INTERNAL MEDICINE

## 2025-02-01 PROCEDURE — 6360000002 HC RX W HCPCS: Performed by: STUDENT IN AN ORGANIZED HEALTH CARE EDUCATION/TRAINING PROGRAM

## 2025-02-01 PROCEDURE — 85610 PROTHROMBIN TIME: CPT

## 2025-02-01 PROCEDURE — 2580000003 HC RX 258: Performed by: STUDENT IN AN ORGANIZED HEALTH CARE EDUCATION/TRAINING PROGRAM

## 2025-02-01 PROCEDURE — 83735 ASSAY OF MAGNESIUM: CPT

## 2025-02-01 PROCEDURE — 6370000000 HC RX 637 (ALT 250 FOR IP): Performed by: INTERNAL MEDICINE

## 2025-02-01 PROCEDURE — 80053 COMPREHEN METABOLIC PANEL: CPT

## 2025-02-01 PROCEDURE — 2060000000 HC ICU INTERMEDIATE R&B

## 2025-02-01 RX ORDER — WARFARIN SODIUM 2.5 MG/1
2.5 TABLET ORAL
Status: COMPLETED | OUTPATIENT
Start: 2025-02-01 | End: 2025-02-01

## 2025-02-01 RX ORDER — FUROSEMIDE 20 MG/1
20 TABLET ORAL ONCE
Status: COMPLETED | OUTPATIENT
Start: 2025-02-01 | End: 2025-02-01

## 2025-02-01 RX ADMIN — MIDODRINE HYDROCHLORIDE 10 MG: 5 TABLET ORAL at 17:33

## 2025-02-01 RX ADMIN — PIPERACILLIN AND TAZOBACTAM 3375 MG: 3; .375 INJECTION, POWDER, LYOPHILIZED, FOR SOLUTION INTRAVENOUS at 09:07

## 2025-02-01 RX ADMIN — LEVOTHYROXINE SODIUM 25 MCG: 0.03 TABLET ORAL at 09:00

## 2025-02-01 RX ADMIN — PIPERACILLIN AND TAZOBACTAM 3375 MG: 3; .375 INJECTION, POWDER, LYOPHILIZED, FOR SOLUTION INTRAVENOUS at 17:32

## 2025-02-01 RX ADMIN — PIPERACILLIN AND TAZOBACTAM 3375 MG: 3; .375 INJECTION, POWDER, LYOPHILIZED, FOR SOLUTION INTRAVENOUS at 01:00

## 2025-02-01 RX ADMIN — PIPERACILLIN AND TAZOBACTAM 3375 MG: 3; .375 INJECTION, POWDER, LYOPHILIZED, FOR SOLUTION INTRAVENOUS at 23:27

## 2025-02-01 RX ADMIN — PANTOPRAZOLE SODIUM 40 MG: 40 TABLET, DELAYED RELEASE ORAL at 07:05

## 2025-02-01 RX ADMIN — ALBUMIN (HUMAN) 12.5 G: 12.5 INJECTION, SOLUTION INTRAVENOUS at 04:52

## 2025-02-01 RX ADMIN — EXTENDED PHENYTOIN SODIUM 100 MG: 100 CAPSULE ORAL at 20:00

## 2025-02-01 RX ADMIN — EXTENDED PHENYTOIN SODIUM 100 MG: 100 CAPSULE ORAL at 09:01

## 2025-02-01 RX ADMIN — FUROSEMIDE 20 MG: 20 TABLET ORAL at 09:04

## 2025-02-01 RX ADMIN — MIDODRINE HYDROCHLORIDE 10 MG: 5 TABLET ORAL at 12:18

## 2025-02-01 RX ADMIN — WARFARIN SODIUM 2.5 MG: 2.5 TABLET ORAL at 17:33

## 2025-02-01 RX ADMIN — MIDODRINE HYDROCHLORIDE 10 MG: 5 TABLET ORAL at 09:00

## 2025-02-01 RX ADMIN — DIAZEPAM 5 MG: 5 TABLET ORAL at 17:33

## 2025-02-01 RX ADMIN — DIAZEPAM 5 MG: 5 TABLET ORAL at 09:00

## 2025-02-01 ASSESSMENT — PAIN DESCRIPTION - ONSET: ONSET: ON-GOING

## 2025-02-01 ASSESSMENT — PAIN DESCRIPTION - LOCATION: LOCATION: ABDOMEN

## 2025-02-01 ASSESSMENT — PAIN SCALES - GENERAL
PAINLEVEL_OUTOF10: 0
PAINLEVEL_OUTOF10: 4

## 2025-02-01 ASSESSMENT — PAIN DESCRIPTION - DESCRIPTORS: DESCRIPTORS: ACHING

## 2025-02-01 ASSESSMENT — PAIN - FUNCTIONAL ASSESSMENT: PAIN_FUNCTIONAL_ASSESSMENT: ACTIVITIES ARE NOT PREVENTED

## 2025-02-01 ASSESSMENT — PAIN DESCRIPTION - FREQUENCY: FREQUENCY: INTERMITTENT

## 2025-02-01 ASSESSMENT — PAIN DESCRIPTION - PAIN TYPE: TYPE: SURGICAL PAIN

## 2025-02-01 ASSESSMENT — PAIN SCALES - WONG BAKER: WONGBAKER_NUMERICALRESPONSE: NO HURT

## 2025-02-01 ASSESSMENT — PAIN DESCRIPTION - ORIENTATION: ORIENTATION: LOWER

## 2025-02-01 NOTE — PLAN OF CARE
Problem: Pain  Goal: Verbalizes/displays adequate comfort level or baseline comfort level  Outcome: Progressing    Problem: Chronic Conditions and Co-morbidities  Goal: Patient's chronic conditions and co-morbidity symptoms are monitored and maintained or improved  Outcome: Progressing     Problem: Discharge Planning  Goal: Discharge to home or other facility with appropriate resources  Outcome: Progressing     Problem: Safety - Adult  Goal: Free from fall injury  Outcome: Progressing   Verbalizes/displays adequate comfort level or baseline comfort level:   Encourage patient to monitor pain and request assistance   Assess pain using appropriate pain scale   Administer analgesics based on type and severity of pain and evaluate response   Implement non-pharmacological measures as appropriate and evaluate response   Consider cultural and social influences on pain and pain management   Notify Licensed Independent Practitioner if interventions unsuccessful or patient reports new pain

## 2025-02-02 VITALS
WEIGHT: 164.24 LBS | BODY MASS INDEX: 23.51 KG/M2 | HEART RATE: 67 BPM | OXYGEN SATURATION: 93 % | TEMPERATURE: 97 F | SYSTOLIC BLOOD PRESSURE: 98 MMHG | HEIGHT: 70 IN | DIASTOLIC BLOOD PRESSURE: 63 MMHG | RESPIRATION RATE: 16 BRPM

## 2025-02-02 LAB
ACANTHOCYTES BLD QL SMEAR: ABNORMAL
ALBUMIN SERPL-MCNC: 2.7 G/DL (ref 3.4–5)
ALBUMIN/GLOB SERPL: 1 {RATIO} (ref 1.1–2.2)
ALP SERPL-CCNC: 89 U/L (ref 40–129)
ALT SERPL-CCNC: 50 U/L (ref 10–40)
ANION GAP SERPL CALCULATED.3IONS-SCNC: 9 MMOL/L (ref 3–16)
ANISOCYTOSIS BLD QL SMEAR: ABNORMAL
AST SERPL-CCNC: 57 U/L (ref 15–37)
BASOPHILS # BLD: 0 K/UL (ref 0–0.2)
BASOPHILS NFR BLD: 0 %
BILIRUB SERPL-MCNC: 0.4 MG/DL (ref 0–1)
BUN SERPL-MCNC: 22 MG/DL (ref 7–20)
CALCIUM SERPL-MCNC: 8.1 MG/DL (ref 8.3–10.6)
CHLORIDE SERPL-SCNC: 110 MMOL/L (ref 99–110)
CO2 SERPL-SCNC: 22 MMOL/L (ref 21–32)
CREAT SERPL-MCNC: 1.4 MG/DL (ref 0.8–1.3)
DEPRECATED RDW RBC AUTO: 14.1 % (ref 12.4–15.4)
EOSINOPHIL # BLD: 0.1 K/UL (ref 0–0.6)
EOSINOPHIL NFR BLD: 1 %
GFR SERPLBLD CREATININE-BSD FMLA CKD-EPI: 52 ML/MIN/{1.73_M2}
GLUCOSE SERPL-MCNC: 98 MG/DL (ref 70–99)
HCT VFR BLD AUTO: 28.6 % (ref 40.5–52.5)
HGB BLD-MCNC: 9.3 G/DL (ref 13.5–17.5)
INR PPP: 1.36 (ref 0.85–1.15)
LYMPHOCYTES # BLD: 1.8 K/UL (ref 1–5.1)
LYMPHOCYTES NFR BLD: 22 %
MAGNESIUM SERPL-MCNC: 2.2 MG/DL (ref 1.8–2.4)
MCH RBC QN AUTO: 29.4 PG (ref 26–34)
MCHC RBC AUTO-ENTMCNC: 32.6 G/DL (ref 31–36)
MCV RBC AUTO: 89.9 FL (ref 80–100)
MONOCYTES # BLD: 0.5 K/UL (ref 0–1.3)
MONOCYTES NFR BLD: 6 %
NEUTROPHILS # BLD: 6 K/UL (ref 1.7–7.7)
NEUTROPHILS NFR BLD: 71 %
OVALOCYTES BLD QL SMEAR: ABNORMAL
PHOSPHATE SERPL-MCNC: 3.5 MG/DL (ref 2.5–4.9)
PLATELET # BLD AUTO: 419 K/UL (ref 135–450)
PLATELET BLD QL SMEAR: ADEQUATE
PMV BLD AUTO: 6.9 FL (ref 5–10.5)
POTASSIUM SERPL-SCNC: 3.9 MMOL/L (ref 3.5–5.1)
PROT SERPL-MCNC: 5.3 G/DL (ref 6.4–8.2)
PROTHROMBIN TIME: 17 SEC (ref 11.9–14.9)
RBC # BLD AUTO: 3.18 M/UL (ref 4.2–5.9)
SLIDE REVIEW: ABNORMAL
SODIUM SERPL-SCNC: 141 MMOL/L (ref 136–145)
WBC # BLD AUTO: 8.4 K/UL (ref 4–11)

## 2025-02-02 PROCEDURE — 6370000000 HC RX 637 (ALT 250 FOR IP): Performed by: INTERNAL MEDICINE

## 2025-02-02 PROCEDURE — 36415 COLL VENOUS BLD VENIPUNCTURE: CPT

## 2025-02-02 PROCEDURE — 2580000003 HC RX 258: Performed by: STUDENT IN AN ORGANIZED HEALTH CARE EDUCATION/TRAINING PROGRAM

## 2025-02-02 PROCEDURE — 85610 PROTHROMBIN TIME: CPT

## 2025-02-02 PROCEDURE — 6360000002 HC RX W HCPCS: Performed by: STUDENT IN AN ORGANIZED HEALTH CARE EDUCATION/TRAINING PROGRAM

## 2025-02-02 PROCEDURE — 2500000003 HC RX 250 WO HCPCS: Performed by: HOSPITALIST

## 2025-02-02 PROCEDURE — 6370000000 HC RX 637 (ALT 250 FOR IP): Performed by: HOSPITALIST

## 2025-02-02 PROCEDURE — 85025 COMPLETE CBC W/AUTO DIFF WBC: CPT

## 2025-02-02 PROCEDURE — 84100 ASSAY OF PHOSPHORUS: CPT

## 2025-02-02 PROCEDURE — 83735 ASSAY OF MAGNESIUM: CPT

## 2025-02-02 PROCEDURE — 80053 COMPREHEN METABOLIC PANEL: CPT

## 2025-02-02 RX ORDER — MIDODRINE HYDROCHLORIDE 10 MG/1
10 TABLET ORAL
Qty: 90 TABLET | Refills: 3 | Status: SHIPPED | OUTPATIENT
Start: 2025-02-02

## 2025-02-02 RX ORDER — WARFARIN SODIUM 5 MG/1
5 TABLET ORAL
Status: DISCONTINUED | OUTPATIENT
Start: 2025-02-02 | End: 2025-02-02 | Stop reason: HOSPADM

## 2025-02-02 RX ORDER — FUROSEMIDE 40 MG/1
20 TABLET ORAL EVERY OTHER DAY
Qty: 180 TABLET | Refills: 1 | Status: SHIPPED | OUTPATIENT
Start: 2025-02-02 | End: 2025-02-04

## 2025-02-02 RX ADMIN — LEVOTHYROXINE SODIUM 25 MCG: 0.03 TABLET ORAL at 08:50

## 2025-02-02 RX ADMIN — PANTOPRAZOLE SODIUM 40 MG: 40 TABLET, DELAYED RELEASE ORAL at 05:54

## 2025-02-02 RX ADMIN — EXTENDED PHENYTOIN SODIUM 100 MG: 100 CAPSULE ORAL at 08:49

## 2025-02-02 RX ADMIN — SODIUM CHLORIDE, PRESERVATIVE FREE 10 ML: 5 INJECTION INTRAVENOUS at 08:57

## 2025-02-02 RX ADMIN — MIDODRINE HYDROCHLORIDE 10 MG: 5 TABLET ORAL at 13:30

## 2025-02-02 RX ADMIN — MIDODRINE HYDROCHLORIDE 10 MG: 5 TABLET ORAL at 08:49

## 2025-02-02 RX ADMIN — PIPERACILLIN AND TAZOBACTAM 3375 MG: 3; .375 INJECTION, POWDER, LYOPHILIZED, FOR SOLUTION INTRAVENOUS at 08:51

## 2025-02-02 RX ADMIN — DIAZEPAM 5 MG: 5 TABLET ORAL at 08:49

## 2025-02-02 ASSESSMENT — PAIN SCALES - GENERAL
PAINLEVEL_OUTOF10: 0
PAINLEVEL_OUTOF10: 0

## 2025-02-02 NOTE — CARE COORDINATION
02/02/25 1028   IMM Letter   IMM Letter given to Patient/Family/Significant other/Guardian/POA/by:  Ro explained the IMM letter to the patient. The patient was in agreement with discharge and signed the IMM letter.   IMM Letter date given: 02/02/25   IMM Letter time given: 1008           Electronically signed by FLETCHER Momin on 2/2/2025 at 10:28 AM

## 2025-02-02 NOTE — CARE COORDINATION
Case Management -  Discharge Note      Patient Name: Juan A Mathis                   YOB: 1945  Room: [facility]-[address] [place] [address]            Readmission Risk (Low < 19, Mod (19-27), High > 27): Readmission Risk Score: 17.6    Current PCP: Jaydon Barreto DO      (IMM) Important Message from Medicare:    Has pt received appropriate compliance notices before being discharged if required: yes  Compliance doc:  [x] 2nd IMM; [] Code 44 [] Martin  Date Given: 2/2/25 Given By: Ro Ahuja    PT AM-PAC: 19 /24  OT AM-PAC: 21 /24        CM educated the patient on the recommendation of HHC. The patient declined home healthcare.    CM provided the patient with a rolling walker.            Financial    Payor: MEDICARE / Plan: MEDICARE PART A AND B / Product Type: *No Product type* /     Pharmacy:  Potential assistance Purchasing Medications: No  Meds-to-Beds request: Yes      [facility] [address] [place] [address] -  [phone] -  [phone]  [address] [place] [address]  Phone: [phone] Fax: [phone]      Notes:    Additional Case Management Notes:     Patient's daughter present in room and to transport the patient home.    All CM needs have been met.        Electronically signed by FLETCHER Momin on 2/2/2025 at 10:27 AM

## 2025-02-02 NOTE — DISCHARGE INSTR - COC
Continuity of Care Form    Patient Name: Juan A Peng   :  1945  MRN:  [id number]    Admit date:  2025  Discharge date:  ***    Code Status Order: Full Code   Advance Directives:   Advance Care Flowsheet Documentation             Admitting Physician:  Penny Santamaria MD  PCP: Jaydon Barreto DO    Discharging Nurse: ***  Discharging Hospital Unit/Room#: ICU-3906/3906-01  Discharging Unit Phone Number: ***    Emergency Contact:   Extended Emergency Contact Information  Primary Emergency Contact: FINESSE PENG           [facility]  Home Phone: [phone]  Mobile Phone: [phone]  Relation: Child  Secondary Emergency Contact: RYAN TEE           [facility]  Home Phone: [phone]  Mobile Phone: [phone]  Relation: Child    Past Surgical History:  Past Surgical History:   Procedure Laterality Date    CARDIAC SURGERY      , angioplasty     CARDIOVERSION      2022, 2022, 23- cardioversions    COLONOSCOPY N/A 2023    COLONOSCOPY DIAGNOSTIC performed by Rusty Crisostomo MD at [facility] ASC ENDOSCOPY    CORONARY ANGIOPLASTY WITH STENT PLACEMENT  1997    CORONARY ARTERY BYPASS GRAFT  2019    [facility]    CORONARY STENT PLACEMENT  2022    3/30/22 Successful complex angioplasty and stenting of IAN to LAD, SVG to OM, SVG to PDA    CYSTOSCOPY N/A 2019    FLEXIBLE CYSTOSCOPY performed by Rusty Beth MD at [facility] OR    HERNIA REPAIR Right     done 65 sam ago    IR CHOLECYSTOSTOMY PERCUTANEOUS COMPLETE  2025    IR CHOLECYSTOSTOMY PERCUTANEOUS COMPLETE 2025 [facility] SPECIAL PROCEDURES    PACEMAKER PLACEMENT  2017    AICD    WISDOM TOOTH EXTRACTION  2012       Immunization History:   Immunization History   Administered Date(s) Administered    COVID-19, MODERNA BLUE border, Primary or Immunocompromised, (age 12y+), IM, 100 mcg/0.5mL 2021, 2021, 2021    Influenza Virus Vaccine 10/18/2012

## 2025-02-02 NOTE — DISCHARGE SUMMARY
Hospital Medicine Discharge Summary    Patient ID: Juan A Mathis      Patient's PCP: Jaydon Barreto DO    Admit Date: 1/26/2025     Discharge Date:   2/2/2025     Admitting Provider: Penny Santamaria MD     Discharge Provider: Penny Santamaria MD     Discharge Diagnoses:       Active Hospital Problems    Diagnosis     Sepsis (HCC) [A41.9]     Cholecystitis [K81.9]     TYRESE (acute kidney injury) (HCC) [N17.9]     Congestive heart failure (HCC) [I50.9]     Hyponatremia [E87.1]     Hypotension [I95.9]     Supratherapeutic INR [R79.1]     Acute abdominal pain [R10.9]        The patient was seen and examined on day of discharge and this discharge summary is in conjunction with any daily progress note from day of discharge.    Hospital Course:   The patient is a       79-year-old gentleman with history of stage III CKD with baseline creatinine of 1.2, history of CHF, history of atrial flutter status post left atrial appendage procedure, history of coronary artery disease status post CABG with ischemic cardiomyopathy, history of CVA and thrombus who presented with right upper quadrant abdominal pain found to have acute cholecystitis complicated by hyponatremia with sodium of 124 and acute on chronic kidney injury with creatinine peaked at 4.7, hypertension  HIDA scan reviewed consistent with acute cholecystitis.  Status post cholecystostomy tube 1/28/2025      Acute cholecystitis: Continues to complain of bloating and fullness.  Noted good diuresis with Lasix the other day.  Tenderness in the right upper quadrant.  No nausea or vomiting and tolerating clear liquids.  Passing flatus and stools. Discussed with general surgery.  Cholecystostomy tube to remain for the next 6 to 8 weeks.  Will continue on Augmentin for 1 more week.  Outpatient follow-up with general surgery in 1 to 2 weeks       Hypervolemia: Improved with diuretics.  Holding GDMT for heart failure with low BP and renal failure.  Restarted

## 2025-02-03 ENCOUNTER — TELEPHONE (OUTPATIENT)
Dept: PHARMACY | Age: 80
End: 2025-02-03

## 2025-02-03 ENCOUNTER — TELEPHONE (OUTPATIENT)
Dept: CARDIOLOGY CLINIC | Age: 80
End: 2025-02-03

## 2025-02-03 NOTE — TELEPHONE ENCOUNTER
Patient called to say he has an appt with clinic on 2/5 and he just wants to get his INR drawn @ [facility], \"that's easier for me\".  He was recently hospitalized @ [facility].  Patient has an appt with Dr. Frazier on 2/10 @ 1:45p.  Rescheduled patient for clinic on 2/10 @ 1:30pm.

## 2025-02-03 NOTE — PROGRESS NOTES
diazepam (VALIUM) 5 MG tablet Take 1 tablet by mouth in the morning and 1 tablet in the evening.   Yes Provider, MD Moe   phenytoin (DILANTIN) 100 MG ER capsule Take 1 capsule by mouth 2 times daily. Resume home dose 12/21/12  Yes Lisset Simmons APRN - CNP   primidone (MYSOLINE) 250 MG tablet Take 0.5 tablets by mouth 3 times daily 1 tablet AM, half tablet PM   Yes Provider, MD Moe   ondansetron (ZOFRAN-ODT) 4 MG disintegrating tablet Take 1 tablet by mouth 3 times daily as needed for Nausea or Vomiting 1/21/25   Evita Cintron MD       PHYSICAL EXAM        Vitals:    02/10/25 1353   BP: 126/70   Pulse: 60   SpO2: 97%        Weight - Scale: 75.8 kg (167 lb)     Gen Alert, cooperative, no distress Heart  Regular rate and rhythm, 2/6 murmur murmur   Head Normocephalic, atraumatic, no abnormalities Abd  Soft, NT, +BS, no mass, no OM   Eyes PERRLA, conj/corn clear Ext  Ext nl, AT, no C/C,right leg swollen   Nose Nares normal, no drain age, Non-tender Pulse 2+ and symmetric   Throat Lips, mucosa, tongue normal Skin Color/text/turg nl, no rash/lesions   Neck S/S, TM, NT, no bruit Psych Nl mood and affect   Lung CTA-B, unlabored, no DTP     Ch wall NT, no deform       LABS and Imaging     Relevant and available CV data reviewed  Echo: 1/27/25-  echo-   Left Ventricle: Mildly reduced left ventricular systolic function with a visually estimated EF of 40 - 45%. Left ventricle is mildly dilated. Normal wall thickness. See diagram for wall motion findings. Indeterminate diastolic function due to arrhythmia.Right Ventricle: Right ventricle is dilated. Lead present in the right ventricle. Normal systolic function. Aortic Valve: Thickened cusps. Calcified cusps. Mild regurgitation. Unable to assess stenosis. AV Mean Gradient is 8 mmHg. AV Peak Velocity is 1.9 m/s.Mitral Valve: Moderate to severe regurgitation.Tricuspid Valve: Moderate to severe regurgitation. The estimated RVSP is 43 mmHg. Est. RA

## 2025-02-03 NOTE — TELEPHONE ENCOUNTER
Called patient  115/62 weight 175  patient is concerned that his weight is up and abd swelling. No sob orthopnea edema. Took 20 mg of lasix yesterday, has appt to see DKW on 2/10  Advised patient to take medications as prescribed, call tomorrow with weights and blood pressures, verbalized understanding

## 2025-02-04 DIAGNOSIS — I10 ESSENTIAL HYPERTENSION: ICD-10-CM

## 2025-02-04 DIAGNOSIS — I25.5 ISCHEMIC CARDIOMYOPATHY: ICD-10-CM

## 2025-02-04 RX ORDER — FUROSEMIDE 40 MG/1
40 TABLET ORAL DAILY
Qty: 90 TABLET | Refills: 1 | Status: SHIPPED | OUTPATIENT
Start: 2025-02-04 | End: 2025-02-04 | Stop reason: SDUPTHER

## 2025-02-04 RX ORDER — FUROSEMIDE 40 MG/1
40 TABLET ORAL DAILY
Qty: 90 TABLET | Refills: 1 | Status: SHIPPED | OUTPATIENT
Start: 2025-02-04

## 2025-02-04 RX ORDER — METOPROLOL SUCCINATE 100 MG/1
100 TABLET, EXTENDED RELEASE ORAL DAILY
Qty: 90 TABLET | Refills: 3 | Status: SHIPPED | OUTPATIENT
Start: 2025-02-04

## 2025-02-04 NOTE — TELEPHONE ENCOUNTER
Notified patient to take lasix 40 mg everyday, restart metoprolol succinate 100 mg daily and call on Thursday with weights

## 2025-02-04 NOTE — TELEPHONE ENCOUNTER
Patient is calling in as instructed yesterday by HOLLY,  his weight today is 176 lbs and his BP this morning is 119/64.  Please call to discuss #[phone]

## 2025-02-04 NOTE — TELEPHONE ENCOUNTER
Take lasix 40 mg daily, restart 100 metoprolol succinate, call on Thursday with weights.  Called and left message on recorder

## 2025-02-05 NOTE — PROGRESS NOTES
[facility]        Progress Note    Patient Name: Juan A Mathis  MRN: [id number]  YOB: 1945  Date of Evaluation: 2025    Chief Complaint: Abdominal pain    Subjective:  No acute events overnight  Abdominal pain improving  Still with some bloating, diarrhea improving  Minimal ambulation    Vital Signs:  Patient Vitals for the past 24 hrs:   BP Temp Temp src Pulse Resp SpO2 Weight   25 0800 -- 97.6 °F (36.4 °C) Temporal -- 16 -- --   25 0600 (!) 104/57 -- -- 69 -- 99 % --   25 0500 (!) 89/47 -- -- 60 -- (!) 84 % --   25 0400 (!) 145/72 97.2 °F (36.2 °C) Temporal 70 -- 98 % 73.8 kg (162 lb 11.2 oz)   25 0300 (!) 87/49 -- -- 60 -- -- --   25 0200 (!) 84/49 -- -- 60 -- 97 % --   25 0100 (!) 85/48 -- -- 60 -- 95 % --   25 0000 (!) 79/42 97.8 °F (36.6 °C) Temporal 60 -- 90 % --   25 2300 (!) 88/51 -- -- 60 -- 98 % --   25 2200 (!) 94/55 -- -- 63 -- 91 % --   25 2100 (!) 93/50 -- -- 63 -- 92 % --   25 97/83 97.4 °F (36.3 °C) Temporal 62 18 95 % --   25 1800 (!) 82/48 -- -- 60 -- 97 % --   25 1700 (!) 85/42 -- -- 59 -- 92 % --   25 1600 (!) 81/45 -- -- 67 20 97 % --   25 1500 (!) 80/43 -- -- 60 -- 97 % --   25 1400 (!) 75/43 -- -- 60 -- 100 % --   25 1300 (!) 78/43 -- -- 63 -- 100 % --   25 1200 (!) 92/55 97.7 °F (36.5 °C) Temporal 73 20 98 % --   25 1100 (!) 101/55 -- -- 64 -- 100 % --      TEMPERATURE HISTORY 24H: Temp (24hrs), Av.5 °F (36.4 °C), Min:97.2 °F (36.2 °C), Max:97.8 °F (36.6 °C)    BLOOD PRESSURE HISTORY: Systolic (36hrs), Av , Min:71 , Max:145    Diastolic (36hrs), Av, Min:42, Max:83      Intake/Output:  I/O last 3 completed shifts:  In: 1802 [I.V.:1575.4; IV Piggyback:226.6]  Out: 2570 [Urine:2450; Drains:120]  No intake/output data recorded.  Drain/tube Output:  Closed/Suction Drain Right RLQ Bulb-Output (ml): 120 
               [facility]        Progress Note    Patient Name: Juan A Mathis  MRN: [id number]  YOB: 1945  Date of Evaluation: 1/27/2025    Chief Complaint: Abdominal pain    Subjective:  No acute events overnight  Pain improved, controlled  No nausea or vomiting  Passing flatus and diarrhea  Resting in bed at this time      Vital Signs:  Patient Vitals for the past 24 hrs:   BP Temp Temp src Pulse Resp SpO2 Height Weight   01/27/25 1042 (!) 87/56 -- -- -- -- -- 1.778 m (5' 10\") 77.1 kg (170 lb)   01/27/25 0937 (!) 87/56 96.8 °F (36 °C) -- 65 -- -- -- --   01/27/25 0600 (!) 78/53 97 °F (36.1 °C) Temporal 58 -- 96 % -- --   01/27/25 0553 -- -- -- -- -- -- -- 77.3 kg (170 lb 6.7 oz)   01/27/25 0530 (!) 81/56 -- -- 64 -- 98 % -- --   01/27/25 0500 (!) 73/50 -- -- 62 -- 95 % -- --   01/27/25 0430 (!) 78/54 -- -- 60 -- 93 % -- --   01/27/25 0400 (!) 76/51 -- -- 60 -- 95 % -- --   01/27/25 0330 (!) 83/52 -- -- 60 -- 99 % -- --   01/27/25 0300 (!) 75/49 -- -- 60 -- 96 % -- --   01/27/25 0230 (!) 73/53 -- -- 62 -- 97 % -- --   01/27/25 0200 (!) 80/52 -- -- 60 -- 97 % -- --   01/27/25 0130 (!) 75/54 -- -- 60 -- 97 % -- --   01/27/25 0100 (!) 78/52 -- -- 62 -- 98 % -- --   01/27/25 0030 (!) 77/50 -- -- 58 -- 97 % -- --   01/27/25 0000 92/61 97.1 °F (36.2 °C) Temporal 59 -- 100 % -- --   01/26/25 2330 (!) 77/53 -- -- 62 -- 98 % -- --   01/26/25 2300 (!) 76/54 -- -- 62 -- 100 % -- --   01/26/25 2245 -- 97 °F (36.1 °C) Temporal -- -- -- -- --   01/26/25 2230 (!) 81/55 -- -- 60 -- (!) 88 % -- --   01/26/25 2200 (!) 86/44 -- -- 60 -- 93 % -- --   01/26/25 2130 (!) 89/64 -- -- 57 -- 98 % -- --   01/26/25 2115 (!) 83/49 -- -- 60 -- 93 % -- --   01/26/25 2100 (!) 84/62 -- -- 60 -- 98 % -- --   01/26/25 2045 (!) 66/51 -- -- 61 -- 97 % -- --   01/26/25 2043 -- (!) 96.2 °F (35.7 °C) Oral -- -- -- -- --   01/26/25 2030 -- -- -- 62 -- (!) 84 % -- --   01/26/25 2015 -- -- -- 60 -- 100 % -- -- 
               [facility]        Progress Note    Patient Name: Juan A Mathis  MRN: [id number]  YOB: 1945  Date of Evaluation: 2025    Chief Complaint: Abdominal pain    Subjective:  No acute events overnight  Minimal abdominal pain  Tolerating diet better  Diarrhea improving  Up to chair this morning and feels well    Vital Signs:  Patient Vitals for the past 24 hrs:   BP Temp Temp src Pulse Resp SpO2 Weight   25 0500 (!) 84/57 -- -- 60 -- 99 % 74.8 kg (164 lb 14.5 oz)   25 0400 (!) 90/53 98.4 °F (36.9 °C) Temporal 60 -- 99 % --   25 0300 (!) 95/54 -- -- 62 -- 100 % --   25 0200 (!) 92/50 -- -- 60 -- 91 % --   25 0100 (!) 79/42 -- -- 60 -- (!) 81 % --   25 0000 (!) 83/38 98.6 °F (37 °C) Temporal 60 20 99 % --   25 2300 (!) 81/36 -- -- 60 -- 98 % --   25 2200 (!) 94/51 -- -- 60 -- 98 % --   25 2100 (!) 92/47 -- -- 70 -- 98 % --   25 2000 (!) 82/36 98.4 °F (36.9 °C) Temporal 69 20 98 % --   25 1900 (!) 90/43 -- -- 70 -- 96 % --   25 1800 (!) 87/52 -- -- 67 -- 94 % --   25 1700 (!) 89/51 -- -- 65 -- 95 % --   25 1600 (!) 94/46 97.8 °F (36.6 °C) Temporal 65 18 97 % --   25 1500 (!) 101/36 -- -- 68 -- -- --   25 1400 (!) 78/35 -- -- 70 -- 97 % --   25 1300 (!) 111/48 -- -- 68 -- 95 % --   25 1200 (!) 88/50 -- -- 66 -- 97 % --   25 1100 (!) 91/44 -- -- 63 -- 97 % --      TEMPERATURE HISTORY 24H: Temp (24hrs), Av.3 °F (36.8 °C), Min:97.8 °F (36.6 °C), Max:98.6 °F (37 °C)    BLOOD PRESSURE HISTORY: Systolic (36hrs), Av , Min:78 , Max:145    Diastolic (36hrs), Av, Min:35, Max:72      Intake/Output:  I/O last 3 completed shifts:  In: 200 [P.O.:200]  Out: 1675 [Urine:1550; Drains:125]  No intake/output data recorded.  Drain/tube Output:  Closed/Suction Drain Right RLQ Bulb-Output (ml): 25 ml    Physical Exam:  General: awake, alert, no acute 
       PULMONARY AND CRITICAL CARE MEDICINE PROGRESS NOTE      SUBJECTIVE: Patient lying in bed getting his transthoracic echocardiogram done.  Reports that abdominal pain is significantly improved.  No nausea or vomiting reported.  Blood pressure remains soft.  As per patient and his wife this is a chronic issue for the patient.    REVIEW OF SYSTEMS:   8 point ROS is negative beside mentioned in subjective section.     MEDICATIONS:      midodrine  2.5 mg Oral TID WC    diazePAM  5 mg Oral BID    levothyroxine  25 mcg Oral Daily    phenytoin  100 mg Oral BID    pantoprazole  40 mg Oral QAM AC    piperacillin-tazobactam  3,375 mg IntraVENous Q12H    sodium chloride flush  5-40 mL IntraVENous 2 times per day      heparin (PORCINE) Infusion      norepinephrine      VASOpressin 20 Units in sodium chloride 0.9 % 100 mL infusion      sodium chloride 50 mL/hr at 01/27/25 0208    sodium chloride       sodium chloride flush, heparin (porcine), heparin (porcine), midodrine, sodium chloride flush, sodium chloride, ondansetron **OR** ondansetron, polyethylene glycol, acetaminophen **OR** acetaminophen     ALLERGIES:   Allergies as of 01/26/2025    (No Known Allergies)        OBJECTIVE:   height is 1.778 m (5' 10\") and weight is 77.1 kg (170 lb). His temperature is 96.8 °F (36 °C). His blood pressure is 87/56 (abnormal) and his pulse is 59. His respiration is 14 and oxygen saturation is 96%.   I/O this shift:  In: -   Out: 475 [Urine:475]     PHYSICAL EXAM:    CONSTITUTIONAL: He is a 79 y.o.-year-old who appears his stated age. He is alert and oriented x 3 and in no acute distress.   CARDIOVASCULAR: S1 S2 RRR. Without murmer  RESPIRATORY & CHEST: Lungs are clear to auscultation and percussion. No wheezing, no crackles. Good air movement  GASTROINTESTINAL & ABDOMEN: Soft, nontender, positive bowel sounds in all quadrants, non-distended, without hepatosplenomegaly.   GENITOURINARY: Deferred.   MUSCULOSKELETAL: No tenderness to 
       PULMONARY AND CRITICAL CARE MEDICINE PROGRESS NOTE      SUBJECTIVE: Patient sitting in bed in no apparent respiratory distress.  Mild abdominal pain present.  Slight elevation of AST and ALT.  HIDA scan showing acute cholecystitis.  Hemodynamically stable.   Blood pressure remains soft.  As per patient and his wife this is a chronic issue for the patient.    REVIEW OF SYSTEMS:   8 point ROS is negative beside mentioned in subjective section.     MEDICATIONS:      piperacillin-tazobactam  3,375 mg IntraVENous Q8H    midodrine  5 mg Oral TID WC    diazePAM  5 mg Oral BID    levothyroxine  25 mcg Oral Daily    phenytoin  100 mg Oral BID    pantoprazole  40 mg Oral QAM AC    sodium chloride flush  5-40 mL IntraVENous 2 times per day      [Held by provider] heparin (PORCINE) Infusion Stopped (01/28/25 0842)    sodium chloride 150 mL/hr at 01/28/25 1252    sodium chloride       morphine, sodium chloride flush, heparin (porcine), heparin (porcine), midodrine, sodium chloride flush, sodium chloride, ondansetron **OR** ondansetron, polyethylene glycol, acetaminophen **OR** acetaminophen     ALLERGIES:   Allergies as of 01/26/2025    (No Known Allergies)        OBJECTIVE:   height is 1.778 m (5' 10\") and weight is 78.2 kg (172 lb 6.4 oz). His temporal temperature is 96.8 °F (36 °C). His blood pressure is 83/46 (abnormal) and his pulse is 64. His respiration is 18 and oxygen saturation is 92%.   No intake/output data recorded.     PHYSICAL EXAM:    CONSTITUTIONAL: He is a 79 y.o.-year-old who appears his stated age. He is alert and oriented x 3 and in no acute distress.   CARDIOVASCULAR: S1 S2 RRR. Without murmer  RESPIRATORY & CHEST: Bilateral air entry is equal.  No wheezing or crackles heard.  GASTROINTESTINAL & ABDOMEN: Soft, nontender, positive bowel sounds in all quadrants, non-distended, without hepatosplenomegaly.   GENITOURINARY: Deferred.   MUSCULOSKELETAL: No tenderness to palpation of the axial skeleton. 
      Hospitalist Progress Note      PCP: Jaydon Barreto DO    Date of Admission: 1/26/2025    LOS: 3    Chief Complaint:   Chief Complaint   Patient presents with    Abdominal Pain     Pt arrives from home. Pt C/O R mid and lower abdominal pain since Friday morning. Pt states that he did not have a BM until Saturday. Pt rates his pain a 8 out of 10. Pt was seen here on Tuesday for gallstones.       Case Summary:   79-year-old gentleman with history of stage III CKD with baseline creatinine of 1.2, history of CHF, history of atrial flutter status post left atrial appendage procedure, history of coronary artery disease status post CABG with ischemic cardiomyopathy, history of CVA and thrombus who presented with right upper quadrant abdominal pain found to have acute cholecystitis complicated by hyponatremia with sodium of 124 and acute on chronic kidney injury with creatinine peaked at 4.7, hypertension  HIDA scan reviewed consistent with acute cholecystitis.  Status post cholecystostomy tube 1/28/2025    Active Hospital Problems    Diagnosis Date Noted    Sepsis (HCC) [A41.9] 01/29/2025    Acute cholecystitis [K81.0] 01/28/2025    TYRESE (acute kidney injury) (HCC) [N17.9] 01/27/2025    Congestive heart failure (HCC) [I50.9] 01/27/2025    Hyponatremia [E87.1] 01/27/2025    Hypotension [I95.9] 01/27/2025    Supratherapeutic INR [R79.1] 01/27/2025    Acute abdominal pain [R10.9] 01/26/2025         Principal Problem:    Acute cholecystitis: Mild tenderness to right upper quadrant.  Remains afebrile with no leukocytosis.  Status post cholecystostomy tube yesterday  - Continue to monitor output from cholecystostomy tube  - General Surgery following with recommendations  - Will advance diet as surgery agreeable  - Continue antibiotics      Hypervolemia: Chest imaging noted with vascular congestion.  Patient complained of shortness of breath yesterday.  Received Lasix with good diuresis and symptom improvement.  Of note 
      Hospitalist Progress Note      PCP: Jaydon Barreto DO    Date of Admission: 1/26/2025    LOS: 4    Chief Complaint:   Chief Complaint   Patient presents with    Abdominal Pain     Pt arrives from home. Pt C/O R mid and lower abdominal pain since Friday morning. Pt states that he did not have a BM until Saturday. Pt rates his pain a 8 out of 10. Pt was seen here on Tuesday for gallstones.       Case Summary:   79-year-old gentleman with history of stage III CKD with baseline creatinine of 1.2, history of CHF, history of atrial flutter status post left atrial appendage procedure, history of coronary artery disease status post CABG with ischemic cardiomyopathy, history of CVA and thrombus who presented with right upper quadrant abdominal pain found to have acute cholecystitis complicated by hyponatremia with sodium of 124 and acute on chronic kidney injury with creatinine peaked at 4.7, hypertension  HIDA scan reviewed consistent with acute cholecystitis.  Status post cholecystostomy tube 1/28/2025    Active Hospital Problems    Diagnosis Date Noted    Sepsis (HCC) [A41.9] 01/29/2025    Cholecystitis [K81.9] 01/28/2025    TYRESE (acute kidney injury) (HCC) [N17.9] 01/27/2025    Congestive heart failure (HCC) [I50.9] 01/27/2025    Hyponatremia [E87.1] 01/27/2025    Hypotension [I95.9] 01/27/2025    Supratherapeutic INR [R79.1] 01/27/2025    Acute abdominal pain [R10.9] 01/26/2025         Principal Problem:    Acute cholecystitis: Continues to complain of bloating and fullness.  Noted good diuresis with Lasix the other day.  Tenderness in the right upper quadrant.  No nausea or vomiting and tolerating clear liquids.  Passing flatus and stools.  Noted cholecystostomy drain with collection  - Monitor drain output  - General Surgery following with recommendation  - Diet as per general surgery  - Continue antibiotics      Hypervolemia: Improved with diuretics.  Holding GDMT for heart failure with low BP and renal 
    Pharmacy to Dose Warfarin    Pharmacy consulted to dose warfarin for DVT, Afib (although has WESTON ligation).    INR Goal: 2-3    INR today: 1.39    Home dose: 5 mg (2.5 mg x 2) every Mon, Fri; 2.5 mg (2.5 mg x 1) all other days  Patient received vitamin K 5 mg on 1/26 for potential surgery and admission INR of 4.72    Assessment/Plan:  - INR subtherapeutic  - Will  give 5 mg of warfarin today.   - Patient was previously therapeutic without taking warfarin for >5 days, therefore he was dosed cautiously, but his INR has since dropped.  - No need for heparin bridge per MD  - Possible concomitant drug-drug interactions include: levothyroxine, phenytoin      Pharmacy will continue to follow.    Susu Cordova, PharmD  PGY-1 Pharmacy Resident          
    Pharmacy to Dose Warfarin    Pharmacy consulted to dose warfarin for DVT, Afib (although has WESTON ligation).    INR Goal: 2-3    INR today: 1.42    Home dose: 5 mg (2.5 mg x 2) every Mon, Fri; 2.5 mg (2.5 mg x 1) all other days  Patient received vitamin K 5 mg on 1/26 for potential surgery and admission INR of 4.72    Assessment/Plan:  - INR subtherapeutic  - Will  give 2.5 mg of warfarin today cautiously. Patient was previously therapeutic without taking warfarin for >5 days.  - No need for heparin bridge  - Possible concomitant drug-drug interactions include: levothyroxine, phenytoin      Pharmacy will continue to follow.    Susu Cordova, PharmD  PGY-1 Pharmacy Resident        
    V2.0    [facility] Progress Note      Name:  Juan A Mathis /Age/Sex: 1945  (79 y.o. male)   MRN & CSN:  [id number] & [id number] Encounter Date/Time: 2025 11:37 AM EST   Location:  [facility]3906/3906-01 PCP: Jaydon Barreto DO     Attending:Glenda Reddy MD       Hospital Day: 2    Assessment and Recommendations   Juan A Mathis is a 79 y.o. male who presents with Acute cholecystitis      Plan:   Acute cholecystitis.  With sepsis with hypotension.  Improvement send continue IV Zosyn.  Plan for HIDA scan today.  Blood pressure always runs in the 80s.  He is status post 2 L.  Continue to monitor closely.    Acute on chronic kidney disease.  Creatinine as high as 4.2 normally is around 1.2 down to 3.6  Continue IV fluid nephrology following hold Lasix spirolactone.    Chronic CHF appears compensated with ischemic cardiomyopathy dual chamber pacemaker.  Cardiology consult for clearance if surgery is needed.    A flutter.  Coumadin was supratherapeutic patient on Coumadin is on hold.  INR trending down.  Did give one-time dose of vitamin K in the event patient needs to go to surgery.    Hyponatremia sodium of 124.  Slowly improving goal correction is less than 6 in 24 hours.  It is 127 today      Diet ADULT DIET; Clear Liquid  ADULT ORAL NUTRITION SUPPLEMENT; Breakfast, Lunch, Dinner; Clear Liquid Oral Supplement   DVT Prophylaxis    Code Status Full Code   Disposition          Personally reviewed Lab Studies and Imaging         Subjective:     Chief Complaint:     Juan A Mathis is a 79 y.o. male who presents with feeling much better blood pressure better.      Review of Systems:      Pertinent positives and negatives discussed in HPI    Objective:     Intake/Output Summary (Last 24 hours) at 2025 1137  Last data filed at 2025 0708  Gross per 24 hour   Intake 240 ml   Output 1900 ml   Net -1660 ml      Vitals:   Vitals:    25 0553 25 0600 25 0937 25 1042   BP:  (!) 78/53 (!) 87/56 (!) 87/56 
   MD Obey Marie MD Aldo Estella,                  Office: [phone]                      Fax: [phone]             [email or web address]                   NEPHROLOGY INPATIENT PROGRESS NOTE:     PATIENT NAME: Juan A Mathis  : 1945  MRN: [id number]       RECOMMENDATIONS:    Na around 13 points over 48 hours,  Next goal about mid-high 130s  At risk of osmotic demyelination  Monitor sodium trend    UO around 2.5L/last 24 hrs  Keep NS at 150 cc/h  Monitor for fluid overload with history of chronic systolic heart failure with last EF 55% on 2024    Follow-up echo -pending   Clinically looks not fluid overloaded    Added midodrine : 5mg TID -> increase 10mg TID   PRN Levophed for shock needed to achieve systolic BP greater than 90s      Holding home diuretics, Aldactone, Lasix,  Naproxen listed in home med, avoid use    Antibiotics per primary team  Other management and follow-up with surgery and cardiology team    monitor PVR w/ bladder scan for hargrove insertion need.    no need for dialysis,    at higher risk for decompensation, needing closer monitoring.    D/C plan from renal stand point:- likely ~later this week  +  No previous outpatient renal fup so-: follow up w/ us at  [facility] OFFICE  in 2-3  weeks after d/c. (I updated our information in discharge follow up providers' list.)       Thank you for allowing me to participate in this patient's care. Please do not hesitate to contact me anytime. We will follow along with you.       Obey Dahl MD,  [facility]  Office: [phone] or [address]  Fax: [phone]      =======================================================================================      IMPRESSION:       Admitted on:  2025  9:28 AM   For:    Hospital Problems             Last Modified POA    * (Principal) Cholecystitis 2025 Yes    Acute abdominal pain 2025 Yes    TYRESE (acute kidney injury) (HCC) 
   MD Obey Marie MD Aldo Estella, DO                 Office: [phone]                      Fax: [phone]             [employer]                   NEPHROLOGY INPATIENT PROGRESS NOTE:     PATIENT NAME: Juan A Mathis  : 1945  MRN: [id number]       RECOMMENDATIONS:      -Cr close to baseline. Stable from renal, will f/u outpatient.  -s/p IV lasix today noted.        BP - better  After trial of IV albumin    on midodrine 10mg TID  - keep      Monitor for fluid overload with history of chronic systolic heart failure with last EF 55% on 2024       Use Lasix cautiously with hypotension, as needed   : Monitor daily BP, weight;  to Take Lasix 20 mg : if weight gain more than 2-3lbs over 1-2 days +/- worsening leg swelling / BP > 140s/90s / shortness of breath.   : Discuss with your cardiologist as well.       Holding home Aldactone, ,  Naproxen listed in home med, avoid use    Antibiotics per primary team  Other management and follow-up with surgery and cardiology team    monitor PVR w/ bladder scan for hargrove insertion need.    no need for dialysis,    at higher risk for decompensation, needing closer monitoring.       D/C plan from renal stand point:- improving - on d/c above recs  +  No previous outpatient renal fup so-: follow up w/ us at  [facility] OFFICE  in 2-3  weeks after d/c. (I updated our information in discharge follow up providers' list.)       Thank you for allowing me to participate in this patient's care. Please do not hesitate to contact me anytime. We will follow along with you.       Rajiv Gee DO,  [facility]  Office: [phone] or [address]  Fax: [phone]      =======================================================================================      IMPRESSION:       Admitted on:  2025  9:28 AM   For:    Hospital Problems             Last Modified POA    * (Principal) Cholecystitis 2025 Yes    Acute 
  [facility] In[facility] Department   Phone: [phone]    Physical Therapy    [x] Initial Evaluation            [] Daily Treatment Note         [] Discharge Summary      Patient: Juan A Mathis   : 1945   MRN: [id number]   Date of Service:  2025  Admitting Diagnosis: Cholecystitis  Current Admission Summary: 79-year-old gentleman with history of stage III CKD with baseline creatinine of 1.2, history of CHF, history of atrial flutter status post left atrial appendage procedure, history of coronary artery disease status post CABG with ischemic cardiomyopathy, history of CVA and thrombus who presented with right upper quadrant abdominal pain found to have acute cholecystitis complicated by hyponatremia with sodium of 124 and acute on chronic kidney injury with creatinine peaked at 4.7, hypertension  HIDA scan reviewed consistent with acute cholecystitis.  Status post cholecystostomy tube 2025  Past Medical History:  has a past medical history of Accidental fall, Arthritis, Atrial fib/flut, CAD (coronary artery disease), CHF (congestive heart failure) ([facility]), DVT (deep venous thrombosis) ([facility]), Epilepsia, Hx of blood clots, Hx of CABG, Hyperlipidemia, Hypertension, Kidney stones, MI (myocardial infarction) ([facility]), Pacemaker, Seizures ([facility]), Sinus bradycardia, Sleep apnea, and V tach ([facility]).  Past Surgical History:  has a past surgical history that includes hernia repair (Right); Coronary angioplasty with stent (); [place] tooth extraction (2012); pacemaker placement (2017); Coronary artery bypass graft (2019); Cardiac surgery; Cystoscopy (N/A, 2019); Cardioversion; Coronary stent placement (2022); Colonoscopy (N/A, 2023); and IR CHOLECYSTOSTOMY PERCUTANEOUS COMPLETE (2025).  Discharge Recommendations: Juan A Mathis scored a 18/24 on the AM-PAC short mobility form. Current research shows that an AM-PAC score of 18 or greater is typically associated 
  [facility] - Inpatient Rehabilitation Department   Phone: [phone]    Physical Therapy    [] Initial Evaluation            [x] Daily Treatment Note         [] Discharge Summary      Patient: Juan A Mathis   : 1945   MRN: [id number]   Date of Service:  2025  Admitting Diagnosis: Cholecystitis  Current Admission Summary: 79-year-old gentleman with history of stage III CKD with baseline creatinine of 1.2, history of CHF, history of atrial flutter status post left atrial appendage procedure, history of coronary artery disease status post CABG with ischemic cardiomyopathy, history of CVA and thrombus who presented with right upper quadrant abdominal pain found to have acute cholecystitis complicated by hyponatremia with sodium of 124 and acute on chronic kidney injury with creatinine peaked at 4.7, hypertension  HIDA scan reviewed consistent with acute cholecystitis.  Status post cholecystostomy tube 2025  Past Medical History:  has a past medical history of Accidental fall, Arthritis, Atrial fib/flut, CAD (coronary artery disease), CHF (congestive heart failure) ([facility]), DVT (deep venous thrombosis) ([facility]), Epilepsia, Hx of blood clots, Hx of CABG, Hyperlipidemia, Hypertension, Kidney stones, MI (myocardial infarction) ([facility]), Pacemaker, Seizures ([facility]), Sinus bradycardia, Sleep apnea, and V tach ([facility]).  Past Surgical History:  has a past surgical history that includes hernia repair (Right); Coronary angioplasty with stent (); [place] tooth extraction (2012); pacemaker placement (2017); Coronary artery bypass graft (2019); Cardiac surgery; Cystoscopy (N/A, 2019); Cardioversion; Coronary stent placement (2022); Colonoscopy (N/A, 2023); and IR CHOLECYSTOSTOMY PERCUTANEOUS COMPLETE (2025).  Discharge Recommendations: Juan A Mathis scored a  on the AM-PAC short mobility form.  At this time, no further PT is recommended upon discharge due to pt returning near 
  [facility] - Inpatient Rehabilitation Department   Phone: [phone]    Occupational Therapy    [x] Initial Evaluation            [] Daily Treatment Note         [] Discharge Summary      Patient: Juan A Mathis   : 1945   MRN: [id number]   Date of Service:  2025    Admitting Diagnosis:  Cholecystitis  Current Admission Summary: 79-year-old gentleman with history of stage III CKD with baseline creatinine of 1.2, history of CHF, history of atrial flutter status post left atrial appendage procedure, history of coronary artery disease status post CABG with ischemic cardiomyopathy, history of CVA and thrombus who presented with right upper quadrant abdominal pain found to have acute cholecystitis complicated by hyponatremia with sodium of 124 and acute on chronic kidney injury with creatinine peaked at 4.7, hypertension  HIDA scan reviewed consistent with acute cholecystitis.  Status post cholecystostomy tube 2025  Past Medical History:  has a past medical history of Accidental fall, Arthritis, Atrial fib/flut, CAD (coronary artery disease), CHF (congestive heart failure) ([facility]), DVT (deep venous thrombosis) ([facility]), Epilepsia, Hx of blood clots, Hx of CABG, Hyperlipidemia, Hypertension, Kidney stones, MI (myocardial infarction) ([facility]), Pacemaker, Seizures ([facility]), Sinus bradycardia, Sleep apnea, and V tach ([facility]).  Past Surgical History:  has a past surgical history that includes hernia repair (Right); Coronary angioplasty with stent (); [place] tooth extraction (2012); pacemaker placement (2017); Coronary artery bypass graft (2019); Cardiac surgery; Cystoscopy (N/A, 2019); Cardioversion; Coronary stent placement (2022); Colonoscopy (N/A, 2023); and IR CHOLECYSTOSTOMY PERCUTANEOUS COMPLETE (2025).    Discharge Recommendations: Juan A Mathis scored a 21/24 on the AM-PAC ADL Inpatient form. Current research shows that an AM-PAC score of 18 or greater is typically 
  Physician Progress Note      PATIENT:               BOB PENG  CSN #:                  [id number]  :                       1945  ADMIT DATE:       2025 9:28 AM  DISCH DATE:  RESPONDING  PROVIDER #:        Penny Santamaria MD          QUERY TEXT:    Internal Medicine,    Patient admitted with sepsis, cholecystitis and has conflicting documentation   regarding the CHF acuity.. If possible, please clarify  in progress notes and   discharge summary the CHF acuity.:    The medical record reflects the following:  Risk Factors: sepsis  Clinical Indicators: Internal Medicine documents: \"Hypervolemia: Improved with   diuretics.  Holding GDMT for heart failure with low BP and renal failure.\"  Nephrology documents: \"Clinically looks not fluid overloaded. Likely acute on   chronic systolic heart failure.\" Cardiology documents PMH CHF  Treatment: labs, imaging, Cardiology, and Nephrology consults, diuresis,   medical management    Thank you  Options provided:  -- Chronic systolic CHF confirmed  -- Acute on chronic systolic CHF confirmed  -- Other - I will add my own diagnosis  -- Disagree - Not applicable / Not valid  -- Disagree - Clinically unable to determine / Unknown  -- Refer to Clinical Documentation Reviewer    PROVIDER RESPONSE TEXT:    After study chronic systolic CHF confirmed    Query created by: Jennifer Horn on 2025 4:34 PM      Electronically signed by:  Penny Santamaria MD 2025 7:10 AM          
  Physician Progress Note      PATIENT:               BOB PENG  CSN #:                  [id number]  :                       1945  ADMIT DATE:       2025 9:28 AM  DISCH DATE:        2025 2:57 PM  RESPONDING  PROVIDER #:        Alli GERMAN MD          QUERY TEXT:    General Surgery,    Patient admitted with sepsis, cholecystitis and noted documentation of symptom   of malnutrition documented. Please document if the diagnosis of  malnutrition   has been ruled out after further study.    The medical record reflects the following:  Risk Factors: chronic illness  Clinical Indicators: Tolerating diet, encourage PO intake, with signs and   symptoms of moderate to severe malnutrition  Treatment: labs, I&O, weights, medical management    Thank you  Options provided:  -- Severe malnutrition confirmed present as evidenced by, Please document   Aspen criteria support-evidence.  -- Moderate malnutrition confirmed present as evidenced by, Please document   Aspen criteria support-evidence.  -- Malnutrition was ruled out  -- Other - I will add my own diagnosis  -- Disagree - Not applicable / Not valid  -- Disagree - Clinically unable to determine / Unknown  -- Refer to Clinical Documentation Reviewer    PROVIDER RESPONSE TEXT:    Severe malnutrition is present as evidenced by Prealbumin 9.1, Albumin 2.7    Query created by: Jennifer Horn on 2025 5:31 PM      Electronically signed by:  Alli GERMAN MD 2025 8:03 AM          
  Physician Progress Note      PATIENT:               BOB PENG #:                  [id number]  :                       1945  ADMIT DATE:       2025 9:28 AM  DISCH DATE:  RESPONDING  PROVIDER #:        Penny Santamaria MD          QUERY TEXT:    Internal Medicine,    Pt admitted with cholecystitis, sepsis is documented my Internal Medicine,   septic shock is documented by Cardiology.  If possible, please document in   progress notes and discharge summary:    The medical record reflects the following:  Risk Factors: cholecystitis  Clinical Indicators: sepsis documented by Internal Medicine, septic shock   documented by Cardiology, Shock documented by Nephrology, acute on chronic   hypotension documented, hypovolemia documented  -SIRS criteria: temp 96.2-96.7, HR 54-69, RR less than 20, WBC 11.3, SBP   68/45, 65/49 73/51  Treatment: labs, imaging, IVF, Levophed, medical management and supportive   care    Thank you  Options provided:  -- Sepsis with septic shock confirmed present on admission  -- Sepsis without septic shock confirmed present on admission  -- Sepsis with septic shock confirmed not present on admission  -- Sepsis without septic shock confirmed not present on admission  -- Other - I will add my own diagnosis  -- Disagree - Not applicable / Not valid  -- Disagree - Clinically unable to determine / Unknown  -- Refer to Clinical Documentation Reviewer    PROVIDER RESPONSE TEXT:    The diagnosis of sepsis without septic shock confirmed present on admission    Query created by: Jennifer Horn on 2025 12:22 PM      Electronically signed by:  Penny Santamaria MD 2025 8:28 AM          
  [facility] - Inpatient Rehabilitation Department   Phone: [phone]    Occupational Therapy    [] Initial Evaluation            [x] Daily Treatment Note         [] Discharge Summary      Patient: Juan A Mathis   : 1945   MRN: [id number]   Date of Service:  2025    Admitting Diagnosis:  Cholecystitis  Current Admission Summary: 79-year-old gentleman with history of stage III CKD with baseline creatinine of 1.2, history of CHF, history of atrial flutter status post left atrial appendage procedure, history of coronary artery disease status post CABG with ischemic cardiomyopathy, history of CVA and thrombus who presented with right upper quadrant abdominal pain found to have acute cholecystitis complicated by hyponatremia with sodium of 124 and acute on chronic kidney injury with creatinine peaked at 4.7, hypertension  HIDA scan reviewed consistent with acute cholecystitis.  Status post cholecystostomy tube 2025  Past Medical History:  has a past medical history of Accidental fall, Arthritis, Atrial fib/flut, CAD (coronary artery disease), CHF (congestive heart failure) ([facility]), DVT (deep venous thrombosis) ([facility]), Epilepsia, Hx of blood clots, Hx of CABG, Hyperlipidemia, Hypertension, Kidney stones, MI (myocardial infarction) ([facility]), Pacemaker, Seizures ([facility]), Sinus bradycardia, Sleep apnea, and V tach ([facility]).  Past Surgical History:  has a past surgical history that includes hernia repair (Right); Coronary angioplasty with stent (); [place] tooth extraction (2012); pacemaker placement (2017); Coronary artery bypass graft (2019); Cardiac surgery; Cystoscopy (N/A, 2019); Cardioversion; Coronary stent placement (2022); Colonoscopy (N/A, 2023); and IR CHOLECYSTOSTOMY PERCUTANEOUS COMPLETE (2025).    Discharge Recommendations: Juan A Mathis scored a 21/24 on the AM-PAC ADL Inpatient form. Current research shows that an AM-PAC score of 18 or greater is typically 
[facility]  Cardiology Note  [phone]    Chief Complaint   Patient presents with    Abdominal Pain     Pt arrives from home. Pt C/O R mid and lower abdominal pain since Friday morning. Pt states that he did not have a BM until Saturday. Pt rates his pain a 8 out of 10. Pt was seen here on Tuesday for gallstones.      History of Present Illness:  Juan A Mathis is a 79 y.o. patient PMHx CAD s/p CABG and WESTON ligation, ICM s/p AICD, AF s/p ablation, DCCV 1/2025, PVC ablation  who presented to the hospital for LATESHA found to have severe MR, underwent LHC . I have been asked to provide consultation regarding further management and testing.    BP remains low on midodrine for support. Perc ge placed yesterday by IR. Remains in ICU. Scr improving    Past Medical History:   has a past medical history of Accidental fall, Arthritis, Atrial fib/flut, CAD (coronary artery disease), CHF (congestive heart failure) ([facility]), DVT (deep venous thrombosis) ([facility]), Epilepsia, Hx of blood clots, Hx of CABG, Hyperlipidemia, Hypertension, Kidney stones, MI (myocardial infarction) ([facility]), Pacemaker, Seizures ([facility]), Sinus bradycardia, Sleep apnea, and V tach ([facility]).    Surgical History:   has a past surgical history that includes hernia repair (Right); Coronary angioplasty with stent (1997); [place] tooth extraction (04/2012); pacemaker placement (12/18/2017); Coronary artery bypass graft (01/2019); Cardiac surgery; Cystoscopy (N/A, 11/13/2019); Cardioversion; Coronary stent placement (03/30/2022); Colonoscopy (N/A, 1/27/2023); and IR CHOLECYSTOSTOMY PERCUTANEOUS COMPLETE (1/28/2025).     Social History:   reports that he quit smoking about 36 years ago. His smoking use included cigarettes. He started smoking about 51 years ago. He has a 30 pack-year smoking history. He has never used smokeless tobacco. He reports that he does not drink alcohol and does not use drugs.     Family History:  Family History   Problem Relation Age of Onset 
Occupational Therapy/Physical Therapy  Juan A Mathis    OT/PT treatment attempted. Pt currently refusing to get OOB, states no rationale to decline therapy besides feeling \"bloated.\"  Multiple therapeutic activities offered with pt continuing to decline. Will hold at this time and follow up as time/schedule allows.    Thank you,  Celestine Duff, OT  Jenna Mcgill, PT, DPT [id number]    
Patient has arrived to unit, vitals obtained. Patient is awake, alert and oriented. Respirations are easy and unlabored. Patient does not appear to be in distress. Patient oriented to room and call light. Plan of care discussed with patient and daughters at bedside, all agreeable. Call light within reach. Fall precautions in place.     
Patient voided 250mL of yellow urine during Hida Scan    [facility], RT(MR)  Nuclear Medicine  
[facility]  Cardiology Note  [phone]    Chief Complaint   Patient presents with    Abdominal Pain     Pt arrives from home. Pt C/O R mid and lower abdominal pain since Friday morning. Pt states that he did not have a BM until Saturday. Pt rates his pain a 8 out of 10. Pt was seen here on Tuesday for gallstones.      History of Present Illness:  Juan A Mathis is a 79 y.o. patient PMHx CAD s/p CABG and WESTON ligation, ICM s/p AICD, AF s/p ablation, DCCV 1/2025, PVC ablation  who presented to the hospital for LATESHA found to have severe MR, underwent LHC . I have been asked to provide consultation regarding further management and testing.    Feels the same as yesterday. Little abd pain at rest but worsens with eating/drinking.     Past Medical History:   has a past medical history of Accidental fall, Arthritis, Atrial fib/flut, CAD (coronary artery disease), CHF (congestive heart failure) ([facility]), DVT (deep venous thrombosis) ([facility]), Epilepsia, Hx of blood clots, Hx of CABG, Hyperlipidemia, Hypertension, Kidney stones, MI (myocardial infarction) ([facility]), Pacemaker, Seizures ([facility]), Sinus bradycardia, Sleep apnea, and V tach ([facility]).    Surgical History:   has a past surgical history that includes hernia repair (Right); Coronary angioplasty with stent (1997); [place] tooth extraction (04/2012); pacemaker placement (12/18/2017); Coronary artery bypass graft (01/2019); Cardiac surgery; Cystoscopy (N/A, 11/13/2019); Cardioversion; Coronary stent placement (03/30/2022); Colonoscopy (N/A, 1/27/2023); and IR CHOLECYSTOSTOMY PERCUTANEOUS COMPLETE (1/28/2025).     Social History:   reports that he quit smoking about 36 years ago. His smoking use included cigarettes. He started smoking about 51 years ago. He has a 30 pack-year smoking history. He has never used smokeless tobacco. He reports that he does not drink alcohol and does not use drugs.     Family History:  Family History   Problem Relation Age of Onset    Heart Failure 
[facility]  Cardiology Note  [phone]    Chief Complaint   Patient presents with    Abdominal Pain     Pt arrives from home. Pt C/O R mid and lower abdominal pain since Friday morning. Pt states that he did not have a BM until Saturday. Pt rates his pain a 8 out of 10. Pt was seen here on Tuesday for gallstones.      History of Present Illness:  Juan A Mathis is a 79 y.o. patient PMHx CAD s/p CABG and [place]ST[place] ligation, ICM s/p AICD, AF s/p ablation, DCCV 1/2025, PVC ablation  who presented to the hospital for LATESHA found to have severe MR, underwent LHC . I have been asked to provide consultation regarding further management and testing.    BP remains low on midodrine for support. MAPs improving but remains on midodrine  Abdominal pain today is improved at rest but worsens with oral intake. Had one protein shake yesterday and lots of water.     Past Medical History:   has a past medical history of Accidental fall, Arthritis, Atrial fib/flut, CAD (coronary artery disease), CHF (congestive heart failure) ([facility]), DVT (deep venous thrombosis) ([facility]), Epilepsia, Hx of blood clots, Hx of CABG, Hyperlipidemia, Hypertension, Kidney stones, MI (myocardial infarction) ([facility]), Pacemaker, Seizures ([facility]), Sinus bradycardia, Sleep apnea, and V tach ([facility]).    Surgical History:   has a past surgical history that includes hernia repair (Right); Coronary angioplasty with stent (1997); [place] tooth extraction (04/2012); pacemaker placement (12/18/2017); Coronary artery bypass graft (01/2019); Cardiac surgery; Cystoscopy (N/A, 11/13/2019); Cardioversion; Coronary stent placement (03/30/2022); Colonoscopy (N/A, 1/27/2023); and IR CHOLECYSTOSTOMY PERCUTANEOUS COMPLETE (1/28/2025).     Social History:   reports that he quit smoking about 36 years ago. His smoking use included cigarettes. He started smoking about 51 years ago. He has a 30 pack-year smoking history. He has never used smokeless tobacco. He reports that he does not drink 
Pt BP continues to stay low with recent 78/52. Hospitalist made aware. No new orders received.   
Pt blood pressure sustaining on systolic of low 70's with of low 60's. Pt denies any complain of dizziness. He states his blood pressure is always low. MD made aware. Order received for 500 ml NS bolus, stat lactic, temp check and one time 5 mg of midodrine.   
Pt d/cd per order, d/c instruction  reviewed with pt and daughter, follow up appointment and medications  reviewed with pt and daughter, both verbalize understanding. This RN will transport pt to private car.   
Pt has a critical value of aPTT of >180, perfectserved Carlos Lala if he will want us to pause the heparin drip. Waiting for a response  
Pt received to ICU room 3906 due to hypotension. VSS. Monitor A-paced. Afebrile 96.6. Pt alert and oriented. Ambulated from wheelchair to bed upon arrival. Denies pain, nausea, SOB. O2 sat 100% on room air. Call light reinforced for assistance.  
Pt stated that he is pain at his abd, he rates pain 8/10. Pt BP is low, currently 82/50 with a map of 61. About to give patient Midodrine, perfectserved Madi-Marker Gayle if she can please put in pain medicine for the patient. Waiting for a response  
Pt transferred to ICU with belongings. Family taken to waiting room.   
Pt was given Midodrine some few hours ago due low BP, his BP is currently 76/44 with a map of 55. Perfectserved Lala Parker if she can put in  a bolus of fluid for the pt. Waiting for a response.   
Talked  to nephrologist about pt recent sodium level. Order received to decrease NS from 150 to 50 ml/hr.   
      Home Medications:  Were reviewed and are listed in nursing record. and/or listed below  Prior to Admission medications    Medication Sig Start Date End Date Taking? Authorizing Provider   amoxicillin-clavulanate (AUGMENTIN) 875-125 MG per tablet Take 1 tablet by mouth 2 times daily for 7 days 1/21/25 1/28/25 Yes Evita Cintron MD   ondansetron (ZOFRAN-ODT) 4 MG disintegrating tablet Take 1 tablet by mouth 3 times daily as needed for Nausea or Vomiting 1/21/25  Yes Evita Cintron MD   furosemide (LASIX) 40 MG tablet Take 1.5 tablets by mouth every other day 1/3/25  Yes Rusty Bloom APRN - CNP   LEVOTHYROXINE SODIUM PO Take 25 mcg by mouth daily 12/17/24  Yes Provider, MD Moe   warfarin (COUMADIN) 2.5 MG tablet TAKE 2 TABLETS DAILY OR AS DIRECTED BY PROVIDER BASED ON INR 9/18/24  Yes Jaydon Frazier DO   amiodarone (CORDARONE) 200 MG tablet Take 1 tablet by mouth daily 3 days a week extra 1/2 tablet 6/24/24  Yes Jaydon Frazier DO   clopidogrel (PLAVIX) 75 MG tablet Take 1 tablet by mouth every morning 6/24/24  Yes Jaydon Frazier DO   ezetimibe (ZETIA) 10 MG tablet Take 1 tablet by mouth every evening 6/24/24  Yes Jaydon Frazier DO   MAGnesium-Oxide 400 (240 Mg) MG tablet Take 2 tablets by mouth daily 6/24/24  Yes Jaydon Frazier DO   metoprolol succinate (TOPROL XL) 100 MG extended release tablet Take one bid 6/24/24  Yes Jaydon Frazier DO   rosuvastatin (CRESTOR) 40 MG tablet Take 1 tablet by mouth nightly 6/24/24  Yes Jaydon Frazier DO   sacubitril-valsartan (ENTRESTO) 24-26 MG per tablet Take 1 tablet by mouth 2 times daily 6/24/24  Yes Jaydon Frazier DO   spironolactone (ALDACTONE) 25 MG tablet Take 0.5 tablets by mouth daily 6/24/24  Yes Jaydon Frazier DO   pantoprazole (PROTONIX) 40 MG tablet Take 1 tablet by mouth every morning (before breakfast) 1/12/24  Yes Jaydon Frazier DO   naproxen (NAPROSYN) 500 MG tablet Take 1 tablet by mouth 2 times daily (with meals) 
gm/day)  Code Status: Full Code    Dispo: Anticipate discharge in the next 2 days      ____________________________________________________________________________    Subjective:   Overnight Events:   Uneventful overnight  No new complaints this morning  Still with tenuous blood pressure    Physical Exam:  BP 97/61   Pulse 71   Temp 97.2 °F (36.2 °C) (Temporal)   Resp 18   Ht 1.778 m (5' 10\")   Wt 74.2 kg (163 lb 9.3 oz)   SpO2 97%   BMI 23.47 kg/m²   General appearance: No apparent distress, appears stated age and cooperative.  HEENT: Normocephalic, atraumatic, MMM, No sclera icterus/conjuctival palor  Neck: Supple, no thyromegally. No jugular venous distention.   Respiratory:  Normal respiratory effort. Clear to auscultation, no Rales/Wheezes/Rhonchi.  Cardiovascular: S1/S2 without murmurs, rubs or gallops. RRR  Abdomen: Soft, non-tender, non-distended, bowel sounds present.  Cholecystostomy tube in place  Musculoskeletal: No clubbing, cyanosis or edema bilaterally.    Skin: Skin color, texture, turgor normal.  No rashes or lesions.  Neurologic:  Cranial nerves: II-XII intact, BACILIO, No focal sensory/motor deficits      Intake/Output Summary (Last 24 hours) at 2/1/2025 1201  Last data filed at 2/1/2025 0700  Gross per 24 hour   Intake --   Output 985 ml   Net -985 ml       Labs:   Recent Labs     01/30/25  0302 01/31/25  0436 02/01/25  0445   WBC 7.9 10.1 7.4   HGB 9.0* 8.8* 7.6*   HCT 27.0* 26.1* 23.4*    318 307   INR 2.18* 1.99* 1.42*      Recent Labs     01/30/25  0302 01/31/25  0436 02/01/25  0445    138 138   K 3.8 3.9 3.9    107 108   CO2 21 22 21   BUN 27* 24* 22*   CREATININE 2.0* 1.8* 1.6*   CALCIUM 7.6* 8.1* 8.0*   PHOS 2.8 2.9  --    AST 24 31 53*   ALT 36 35 43*   BILITOT 0.6 0.6 0.4   ALKPHOS 141* 115 95     No results for input(s): \"CKTOTAL\", \"TROPONINI\" in the last 72 hours.    Urinalysis:    Lab Results   Component Value Date/Time    NITRU Negative 01/31/2025 12:30 AM    
overnight  Noted for soft BP this morning.  Mentating well  Improving renal function  Plan for cholecystostomy tube placement today  Seen with daughter at bedside.  Plan of care explained and questions addressed      Physical Exam:  BP (!) 83/46   Pulse 64   Temp 96.8 °F (36 °C) (Temporal)   Resp 18   Ht 1.778 m (5' 10\")   Wt 78.2 kg (172 lb 6.4 oz)   SpO2 92%   BMI 24.74 kg/m²   General appearance: No apparent distress, appears stated age and cooperative.  HEENT: Normocephalic, atraumatic, MMM, No sclera icterus/conjuctival palor  Neck: Supple, no thyromegally. No jugular venous distention.   Respiratory:  Normal respiratory effort. Clear to auscultation, no Rales/Wheezes/Rhonchi.  Cardiovascular: S1/S2 without murmurs, rubs or gallops. RRR  Abdomen: Soft, non-tender, non-distended, bowel sounds present.  Musculoskeletal: No clubbing, cyanosis or edema bilaterally.    Skin: Skin color, texture, turgor normal.  No rashes or lesions.  Neurologic:  Cranial nerves: II-XII intact, BACILIO, No focal sensory/motor deficits  Psychiatric: Alert and oriented, thought content appropriate  Capillary Refill: Brisk,< 3 seconds   Peripheral Pulses: +2 palpable, equal bilaterally       Intake/Output Summary (Last 24 hours) at 1/28/2025 1303  Last data filed at 1/28/2025 0500  Gross per 24 hour   Intake --   Output 2100 ml   Net -2100 ml       Labs:   Recent Labs     01/26/25  1031 01/27/25  0317 01/28/25 0317   WBC 11.3* 8.6 8.7   HGB 11.3* 10.5* 10.8*   HCT 33.8* 31.2* 32.9*    160 202   INR 4.72* 1.64* 1.37*      Recent Labs     01/26/25  1031 01/26/25  1427 01/27/25  0317 01/27/25  1405 01/27/25  2109 01/28/25 0317   *   < > 130* 133* 134* 137   K 5.0   < > 4.9 4.2 4.1 4.3   CL 91*   < > 101 102 104 107   CO2 22   < > 20* 21 20* 20*   BUN 77*   < > 64* 58* 51* 44*   CREATININE 4.7*   < > 3.6* 3.3* 3.0* 2.6*   CALCIUM 7.9*   < > 7.3* 7.9* 7.5* 7.5*   PHOS  --    < > 3.7 3.4 3.3 3.4   AST 41*  --  25  --   --  
last 72 hours.    Urinalysis:    Lab Results   Component Value Date/Time    NITRU Negative 01/31/2025 12:30 AM    WBCUA 3 01/31/2025 12:30 AM    BACTERIA None Seen 01/31/2025 12:30 AM    RBCUA 3 01/31/2025 12:30 AM    BLOODU SMALL 01/31/2025 12:30 AM    GLUCOSEU Negative 01/31/2025 12:30 AM       Radiology:  CT ABDOMEN PELVIS WO CONTRAST Additional Contrast? None   Final Result   1. Interval placement of a cholecystostomy drain.   2. Prominent cecal wall thickening which may be related to colitis or poor   distension.  Unremarkable appendix.   3. Small volume intra-abdominal and pelvic ascites.  Diffuse mesenteric   edema, favored to represent probable 3rd spacing.  Given this finding,   enteritis, pancreatitis or acute peritonitis are difficult to exclude.   4. Small bilateral pleural effusions with bibasilar consolidative change   which may represent atelectasis or pneumonia.   5. Anasarca.   6. Colonic diverticulosis.   7. Prostate enlargement.         XR CHEST PORTABLE   Final Result   Patchy multifocal right lung airspace disease and increased density in the   left lung base medially. Findings are concerning for multifocal pneumonia.         IR CHOLECYSTOSTOMY PERCUTANEOUS COMPLETE   Final Result   Successful ultrasound and fluoroscopic guided placement of 10 [place] locking   pigtail cholecystostomy tube.         NM HEPATOBILIARY   Final Result   Acute cholecystitis.      Minimal enterogastric bile reflux.         CT ABDOMEN PELVIS WO CONTRAST Additional Contrast? Oral   Final Result   1. Small bilateral pleural effusions and bibasilar consolidations at the lung   bases, which may reflect pneumonia or atelectasis.   2. Moderate abdominal ascites most notable in the right abdomen and layering   in the right lower quadrant.  Generalized soft tissue edema.   3. Favor chronic left renal sinus cysts, less likely chronic hydronephrosis.   Findings are not definitively changed from prior exams.  No renal or ureteral 
release tablet Take 1 tablet by mouth daily (Patient not taking: Reported on 1/26/2025) 90 tablet 1    nitroGLYCERIN (NITROSTAT) 0.4 MG SL tablet Place 1 tablet under the tongue every 5 minutes as needed for Chest pain (Patient not taking: Reported on 1/26/2025) 25 tablet 3         Current Facility-Administered Medications:     piperacillin-tazobactam (ZOSYN) 3,375 mg in sodium chloride 0.9 % 50 mL IVPB (mini-bag), 3,375 mg, IntraVENous, Q8H, Edi Mcclure MD, Last Rate: 12.5 mL/hr at 01/30/25 0846, 3,375 mg at 01/30/25 0846    midodrine (PROAMATINE) tablet 10 mg, 10 mg, Oral, TID WC, Obey Dahl MD, 10 mg at 01/30/25 0847    morphine (PF) injection 2 mg, 2 mg, IntraVENous, Q4H PRN, Penny Santamaria MD, 2 mg at 01/29/25 0902    sodium chloride flush 0.9 % injection 5-40 mL, 5-40 mL, IntraVENous, PRN, Alli Miller MD, 10 mL at 01/27/25 0814    heparin (porcine) injection 4,000 Units, 4,000 Units, IntraVENous, PRN, Tyrone Logan MD, 4,000 Units at 01/29/25 0224    heparin (porcine) injection 2,000 Units, 2,000 Units, IntraVENous, PRN, Tyrone Logan MD, 2,000 Units at 01/30/25 0418    heparin 25,000 units in dextrose 5% 250 mL (premix) infusion, 5-30 Units/kg/hr, IntraVENous, Continuous, Penny Santamaria MD, Paused at 01/30/25 0655    midodrine (PROAMATINE) tablet 5 mg, 5 mg, Oral, TID PRN, Obey Dahl MD, 5 mg at 01/30/25 0014    diazePAM (VALIUM) tablet 5 mg, 5 mg, Oral, BID, Glenda Reddy MD, 5 mg at 01/30/25 0847    levothyroxine (SYNTHROID) tablet 25 mcg, 25 mcg, Oral, Daily, Glenda Reddy MD, 25 mcg at 01/30/25 0848    phenytoin (DILANTIN) ER capsule 100 mg, 100 mg, Oral, BID, Glenda Reddy MD, 100 mg at 01/30/25 0847    pantoprazole (PROTONIX) tablet 40 mg, 40 mg, Oral, QAM AC, Glenda Reddy MD, 40 mg at 01/30/25 0552    0.9 % sodium chloride infusion, , IntraVENous, Continuous, Glenda Reddy MD, Last Rate: 150 mL/hr at 01/29/25 0445, New Bag at 01/29/25 0445    sodium 
Cardiomyopathy (HCC)    Mitral valve insufficiency    Acute cholecystitis    Acute abdominal pain           I had the opportunity to review the clinical symptoms and presentation of Juan A Mathis.     ICM  CAD s/p CABG  AF s/p WESTON ligation but with Hx of CVA/thrombus  Acute cholecystitis   Septic shock  ARF on CKD  - TTE pending to eval EF and MR  - Heparin gtt as bridge with thrombus history  - prefer continuation of an antiplatelet agent throughout surgery given degree of complex CAD.   - If plavix must be discontinued, start ASA 81 and continue throughout surgery if possible.   - If no antiplatelet agent is acceptable plavix may be held and restarted ASAP following surgery  - holding GDMT for HF with low BP, ARF, hyponatremia  - stop midodrine, agree with volume resuscitation, nephrology following for low GFR/renal; status  - if pressors needed prefer vasopressin in low EF patients with addition of levophed if needed    Due to clinically significant life threating conditions a total critical care time of >35 minutes was used. This includes but is not limited to data, imaging, records review and direct patient care excluding time spent performing procedures.     Old notes reviewed  Telemetry reviewd  EKG personally reviewed  CXR personally reviewed  Echo, cath, and medications and labs reviewed  High complexity/medical decision making due to extensive data review, extensive history review, independent review of data  High risk due to acute illness, evaluation of drug-drug interactions, medication management and diagnostic interventions    All questions and concerns were addressed to the patient/family. Alternatives to my treatment were discussed. The note was completed using EMR. Every effort was made to ensure accuracy; however, inadvertent computerized transcription errors may be present.  Amado Greco MD, MD 1/27/2025 8:39 AM    
percutaneous cholecystostomy tube placement hopefully today  NPO for procedure  Anticoagulation, hold heparin gtt pending scheduling of IR procedure, may continue aspirin, holding coumadin, holding plavix and will hopefully be able to restart after procedure if improves symptoms  IVF, electrolytes per nephrology, cardiology given acute renal failure and CHF  Antibiotics  Pain medication and antiemetics as needed with caution as may mask worsening exam  Activity as able  Defer management of remainder of medical comorbidities to primary and consulting teams    Alli Miller MD, FACS  1/28/2025  2:29 PM    
able  Defer management of remainder of medical comorbidities to primary and consulting teams    Alli Miller MD, FACS  1/29/2025  12:19 PM    
COMPARISON: 9 February 2023 HISTORY: ORDERING SYSTEM PROVIDED HISTORY: CP TECHNOLOGIST PROVIDED HISTORY: Reason for exam:->CP Reason for Exam: CP FINDINGS: Two views of the chest.  Median sternotomy wires, left atrial appendage clip, left-sided pacer with atrial and ventricular leads are unchanged.  Stable mild cardiomediastinal silhouette vascular prominence.  Negative for lobar infiltrate effusion or pneumothorax.     1. No acute cardiopulmonary process. 2. No significant interval change, compared to the prior study..     CT ABDOMEN PELVIS W IV CONTRAST Additional Contrast? None    Result Date: 1/21/2025  EXAMINATION: CT OF THE ABDOMEN AND PELVIS WITH CONTRAST 1/21/2025 7:17 am TECHNIQUE: CT of the abdomen and pelvis was performed with the administration of intravenous contrast. Multiplanar reformatted images are provided for review. Automated exposure control, iterative reconstruction, and/or weight based adjustment of the mA/kV was utilized to reduce the radiation dose to as low as reasonably achievable. COMPARISON: 01/28/2020 HISTORY: ORDERING SYSTEM PROVIDED HISTORY: abd pain TECHNOLOGIST PROVIDED HISTORY: Additional Contrast?->None Reason for exam:->abd pain Decision Support Exception - unselect if not a suspected or confirmed emergency medical condition->Emergency Medical Condition (MA) Reason for Exam: abd pain FINDINGS: Lower Chest: Linear scarring/atelectasis is present within the posterior costophrenic angles.  There is no pleural effusion. Organs: The liver, pancreas, spleen, kidneys and adrenals are stable in appearance.  Calcified gallstones layer dependently within the gallbladder. GI/Bowel: There is no bowel dilatation, wall thickening or obstruction.  A large amount of stool is noted throughout the colon along with extensive left-sided diverticular changes.  The appendix is normal. Pelvis: The bladder and prostate are unremarkable. Peritoneum/Retroperitoneum: There is no free air, free fluid or 
left-sided pacer with atrial and ventricular leads are unchanged.  Stable mild cardiomediastinal silhouette vascular prominence.  Negative for lobar infiltrate effusion or pneumothorax.     1. No acute cardiopulmonary process. 2. No significant interval change, compared to the prior study..     CT ABDOMEN PELVIS W IV CONTRAST Additional Contrast? None    Result Date: 1/21/2025  EXAMINATION: CT OF THE ABDOMEN AND PELVIS WITH CONTRAST 1/21/2025 7:17 am TECHNIQUE: CT of the abdomen and pelvis was performed with the administration of intravenous contrast. Multiplanar reformatted images are provided for review. Automated exposure control, iterative reconstruction, and/or weight based adjustment of the mA/kV was utilized to reduce the radiation dose to as low as reasonably achievable. COMPARISON: 01/28/2020 HISTORY: ORDERING SYSTEM PROVIDED HISTORY: abd pain TECHNOLOGIST PROVIDED HISTORY: Additional Contrast?->None Reason for exam:->abd pain Decision Support Exception - unselect if not a suspected or confirmed emergency medical condition->Emergency Medical Condition (MA) Reason for Exam: abd pain FINDINGS: Lower Chest: Linear scarring/atelectasis is present within the posterior costophrenic angles.  There is no pleural effusion. Organs: The liver, pancreas, spleen, kidneys and adrenals are stable in appearance.  Calcified gallstones layer dependently within the gallbladder. GI/Bowel: There is no bowel dilatation, wall thickening or obstruction.  A large amount of stool is noted throughout the colon along with extensive left-sided diverticular changes.  The appendix is normal. Pelvis: The bladder and prostate are unremarkable. Peritoneum/Retroperitoneum: There is no free air, free fluid or intraperitoneal inflammatory change.  There is no adenopathy. Bones/Soft Tissues: There is no acute fracture or aggressive osseous lesion.     1. Cholelithiasis. 2. Diverticulosis with constipation.     Cardioversion external    Result 
right renal cysts.     XR CHEST PORTABLE    Result Date: 1/21/2025  EXAMINATION: ONE XRAY VIEW OF THE CHEST 1/21/2025 7:08 am COMPARISON: 9 February 2023 HISTORY: ORDERING SYSTEM PROVIDED HISTORY: CP TECHNOLOGIST PROVIDED HISTORY: Reason for exam:->CP Reason for Exam: CP FINDINGS: Two views of the chest.  Median sternotomy wires, left atrial appendage clip, left-sided pacer with atrial and ventricular leads are unchanged.  Stable mild cardiomediastinal silhouette vascular prominence.  Negative for lobar infiltrate effusion or pneumothorax.     1. No acute cardiopulmonary process. 2. No significant interval change, compared to the prior study..     CT ABDOMEN PELVIS W IV CONTRAST Additional Contrast? None    Result Date: 1/21/2025  EXAMINATION: CT OF THE ABDOMEN AND PELVIS WITH CONTRAST 1/21/2025 7:17 am TECHNIQUE: CT of the abdomen and pelvis was performed with the administration of intravenous contrast. Multiplanar reformatted images are provided for review. Automated exposure control, iterative reconstruction, and/or weight based adjustment of the mA/kV was utilized to reduce the radiation dose to as low as reasonably achievable. COMPARISON: 01/28/2020 HISTORY: ORDERING SYSTEM PROVIDED HISTORY: abd pain TECHNOLOGIST PROVIDED HISTORY: Additional Contrast?->None Reason for exam:->abd pain Decision Support Exception - unselect if not a suspected or confirmed emergency medical condition->Emergency Medical Condition (MA) Reason for Exam: abd pain FINDINGS: Lower Chest: Linear scarring/atelectasis is present within the posterior costophrenic angles.  There is no pleural effusion. Organs: The liver, pancreas, spleen, kidneys and adrenals are stable in appearance.  Calcified gallstones layer dependently within the gallbladder. GI/Bowel: There is no bowel dilatation, wall thickening or obstruction.  A large amount of stool is noted throughout the colon along with extensive left-sided diverticular changes.  The appendix is

## 2025-02-06 ENCOUNTER — TELEPHONE (OUTPATIENT)
Dept: CARDIOLOGY CLINIC | Age: 80
End: 2025-02-06

## 2025-02-06 NOTE — TELEPHONE ENCOUNTER
Patient called today 2/6/25:    Pt calling Charleen with weight and BP      Weight 174  /67 HR 60     States BP last night went up to 140/68

## 2025-02-06 NOTE — TELEPHONE ENCOUNTER
Pt calling Charleen with weight and BP     Weight 174  /67 HR 60    States BP last night went up to 140/68

## 2025-02-07 DIAGNOSIS — I48.0 PAF (PAROXYSMAL ATRIAL FIBRILLATION) (HCC): Primary | ICD-10-CM

## 2025-02-09 PROCEDURE — 93297 REM INTERROG DEV EVAL ICPMS: CPT | Performed by: CLINICAL NURSE SPECIALIST

## 2025-02-10 ENCOUNTER — OFFICE VISIT (OUTPATIENT)
Dept: CARDIOLOGY CLINIC | Age: 80
End: 2025-02-10
Payer: MEDICARE

## 2025-02-10 ENCOUNTER — HOSPITAL ENCOUNTER (OUTPATIENT)
Age: 80
Discharge: HOME | End: 2025-02-10
Payer: MEDICARE

## 2025-02-10 ENCOUNTER — ANTI-COAG VISIT (OUTPATIENT)
Dept: PHARMACY | Age: 80
End: 2025-02-10
Payer: MEDICARE

## 2025-02-10 VITALS
OXYGEN SATURATION: 97 % | HEART RATE: 60 BPM | WEIGHT: 167 LBS | SYSTOLIC BLOOD PRESSURE: 126 MMHG | DIASTOLIC BLOOD PRESSURE: 70 MMHG | BODY MASS INDEX: 23.91 KG/M2 | HEIGHT: 70 IN

## 2025-02-10 DIAGNOSIS — I25.110 CORONARY ARTERY DISEASE INVOLVING NATIVE CORONARY ARTERY OF NATIVE HEART WITH UNSTABLE ANGINA PECTORIS (HCC): Primary | ICD-10-CM

## 2025-02-10 DIAGNOSIS — I48.0 PAROXYSMAL ATRIAL FIBRILLATION (HCC): Primary | Chronic | ICD-10-CM

## 2025-02-10 DIAGNOSIS — N18.9 CHRONIC KIDNEY DISEASE (CKD), ACTIVE MEDICAL MANAGEMENT WITHOUT DIALYSIS, UNSPECIFIED STAGE: ICD-10-CM

## 2025-02-10 DIAGNOSIS — Z95.1 S/P CABG (CORONARY ARTERY BYPASS GRAFT): ICD-10-CM

## 2025-02-10 DIAGNOSIS — I49.3 PVC (PREMATURE VENTRICULAR CONTRACTION): ICD-10-CM

## 2025-02-10 DIAGNOSIS — I48.0 PAF (PAROXYSMAL ATRIAL FIBRILLATION) (HCC): ICD-10-CM

## 2025-02-10 DIAGNOSIS — N18.9 CHRONIC KIDNEY DISEASE, UNSPECIFIED CKD STAGE: ICD-10-CM

## 2025-02-10 DIAGNOSIS — E78.2 MIXED HYPERLIPIDEMIA: ICD-10-CM

## 2025-02-10 DIAGNOSIS — I42.9 CARDIOMYOPATHY, UNSPECIFIED TYPE (HCC): ICD-10-CM

## 2025-02-10 DIAGNOSIS — I34.0 MITRAL VALVE INSUFFICIENCY, UNSPECIFIED ETIOLOGY: ICD-10-CM

## 2025-02-10 DIAGNOSIS — G47.33 OSA ON CPAP: ICD-10-CM

## 2025-02-10 DIAGNOSIS — I48.0 PAF (PAROXYSMAL ATRIAL FIBRILLATION) (HCC): Chronic | ICD-10-CM

## 2025-02-10 DIAGNOSIS — Z79.899 ON AMIODARONE THERAPY: ICD-10-CM

## 2025-02-10 LAB
ALBUMIN SERPL-MCNC: 3.7 G/DL (ref 3.4–5)
ALBUMIN SERPL-MCNC: 3.8 G/DL (ref 3.4–5)
ALP SERPL-CCNC: 109 U/L (ref 40–129)
ALT SERPL-CCNC: 25 U/L (ref 10–40)
ANION GAP SERPL CALCULATED.3IONS-SCNC: 8 MMOL/L (ref 3–16)
AST SERPL-CCNC: 33 U/L (ref 15–37)
BACTERIA URNS QL MICRO: ABNORMAL /HPF
BILIRUB DIRECT SERPL-MCNC: 0.2 MG/DL (ref 0–0.3)
BILIRUB INDIRECT SERPL-MCNC: 0.1 MG/DL (ref 0–1)
BILIRUB SERPL-MCNC: 0.3 MG/DL (ref 0–1)
BILIRUB UR QL STRIP.AUTO: NEGATIVE
BUN SERPL-MCNC: 19 MG/DL (ref 7–20)
CALCIUM SERPL-MCNC: 8.9 MG/DL (ref 8.3–10.6)
CHLORIDE SERPL-SCNC: 103 MMOL/L (ref 99–110)
CLARITY UR: CLEAR
CO2 SERPL-SCNC: 27 MMOL/L (ref 21–32)
COLOR UR: YELLOW
CREAT SERPL-MCNC: 1.5 MG/DL (ref 0.8–1.3)
CREAT UR-MCNC: 171 MG/DL (ref 39–259)
DEPRECATED RDW RBC AUTO: 14.8 % (ref 12.4–15.4)
EPI CELLS #/AREA URNS AUTO: 1 /HPF (ref 0–5)
GFR SERPLBLD CREATININE-BSD FMLA CKD-EPI: 48 ML/MIN/{1.73_M2}
GLUCOSE SERPL-MCNC: 103 MG/DL (ref 70–99)
GLUCOSE UR STRIP.AUTO-MCNC: NEGATIVE MG/DL
HCT VFR BLD AUTO: 35.5 % (ref 40.5–52.5)
HGB BLD-MCNC: 12 G/DL (ref 13.5–17.5)
HGB UR QL STRIP.AUTO: NEGATIVE
HYALINE CASTS #/AREA URNS AUTO: 2 /LPF (ref 0–8)
INTERNATIONAL NORMALIZATION RATIO, POC: 1.8
KETONES UR STRIP.AUTO-MCNC: NEGATIVE MG/DL
LEUKOCYTE ESTERASE UR QL STRIP.AUTO: ABNORMAL
MCH RBC QN AUTO: 30.5 PG (ref 26–34)
MCHC RBC AUTO-ENTMCNC: 33.9 G/DL (ref 31–36)
MCV RBC AUTO: 89.9 FL (ref 80–100)
MICROALBUMIN UR DL<=1MG/L-MCNC: 1.26 MG/DL
MICROALBUMIN/CREAT UR: 7.4 MG/G (ref 0–30)
NITRITE UR QL STRIP.AUTO: NEGATIVE
PH UR STRIP.AUTO: 6.5 [PH] (ref 5–8)
PHOSPHATE SERPL-MCNC: 2.5 MG/DL (ref 2.5–4.9)
PLATELET # BLD AUTO: 709 K/UL (ref 135–450)
PMV BLD AUTO: 7.7 FL (ref 5–10.5)
POTASSIUM SERPL-SCNC: 4.4 MMOL/L (ref 3.5–5.1)
PROT SERPL-MCNC: 6.4 G/DL (ref 6.4–8.2)
PROT UR STRIP.AUTO-MCNC: NEGATIVE MG/DL
PROT UR-MCNC: 24.1 MG/DL
PROT/CREAT UR-RTO: 0.1 MG/DL
PROTHROMBIN TIME, POC: 0
RBC # BLD AUTO: 3.95 M/UL (ref 4.2–5.9)
RBC CLUMPS #/AREA URNS AUTO: 4 /HPF (ref 0–4)
SODIUM SERPL-SCNC: 138 MMOL/L (ref 136–145)
SP GR UR STRIP.AUTO: 1.01 (ref 1–1.03)
T4 FREE SERPL-MCNC: 0.8 NG/DL (ref 0.9–1.8)
TSH SERPL DL<=0.005 MIU/L-ACNC: 17.4 UIU/ML (ref 0.27–4.2)
UA DIPSTICK W REFLEX MICRO PNL UR: YES
URN SPEC COLLECT METH UR: ABNORMAL
UROBILINOGEN UR STRIP-ACNC: 0.2 E.U./DL
WBC # BLD AUTO: 9.2 K/UL (ref 4–11)
WBC #/AREA URNS AUTO: 3 /HPF (ref 0–5)

## 2025-02-10 PROCEDURE — 85610 PROTHROMBIN TIME: CPT

## 2025-02-10 PROCEDURE — 99211 OFF/OP EST MAY X REQ PHY/QHP: CPT

## 2025-02-10 PROCEDURE — 1036F TOBACCO NON-USER: CPT | Performed by: INTERNAL MEDICINE

## 2025-02-10 PROCEDURE — 81001 URINALYSIS AUTO W/SCOPE: CPT

## 2025-02-10 PROCEDURE — 1111F DSCHRG MED/CURRENT MED MERGE: CPT | Performed by: INTERNAL MEDICINE

## 2025-02-10 PROCEDURE — 80069 RENAL FUNCTION PANEL: CPT

## 2025-02-10 PROCEDURE — 80076 HEPATIC FUNCTION PANEL: CPT

## 2025-02-10 PROCEDURE — 3078F DIAST BP <80 MM HG: CPT | Performed by: INTERNAL MEDICINE

## 2025-02-10 PROCEDURE — G8420 CALC BMI NORM PARAMETERS: HCPCS | Performed by: INTERNAL MEDICINE

## 2025-02-10 PROCEDURE — 36415 COLL VENOUS BLD VENIPUNCTURE: CPT

## 2025-02-10 PROCEDURE — 99214 OFFICE O/P EST MOD 30 MIN: CPT | Performed by: INTERNAL MEDICINE

## 2025-02-10 PROCEDURE — 84156 ASSAY OF PROTEIN URINE: CPT

## 2025-02-10 PROCEDURE — 84443 ASSAY THYROID STIM HORMONE: CPT

## 2025-02-10 PROCEDURE — 3074F SYST BP LT 130 MM HG: CPT | Performed by: INTERNAL MEDICINE

## 2025-02-10 PROCEDURE — 84439 ASSAY OF FREE THYROXINE: CPT

## 2025-02-10 PROCEDURE — 82043 UR ALBUMIN QUANTITATIVE: CPT

## 2025-02-10 PROCEDURE — 1123F ACP DISCUSS/DSCN MKR DOCD: CPT | Performed by: INTERNAL MEDICINE

## 2025-02-10 PROCEDURE — 85027 COMPLETE CBC AUTOMATED: CPT

## 2025-02-10 PROCEDURE — 82570 ASSAY OF URINE CREATININE: CPT

## 2025-02-10 PROCEDURE — G8427 DOCREV CUR MEDS BY ELIG CLIN: HCPCS | Performed by: INTERNAL MEDICINE

## 2025-02-10 PROCEDURE — 1159F MED LIST DOCD IN RCRD: CPT | Performed by: INTERNAL MEDICINE

## 2025-02-10 NOTE — PROGRESS NOTES
Mr. Juan A Mathis is a 79 y.o. y/o male with history of Afib who presents today for anticoagulation monitoring and adjustment.    Pertinent PMH: DVT, CABG, HTN, CAD, seizure, CHF    Patient Reported Findings:  Yes     No  [x]   []       Patient verifies current dosing regimen as listed- confirmed   []   [x]       S/S bleeding/bruising/swelling/SOB- denies ---> was having clot symptoms on Sat so went to see doc on Mon and did doppler---> had large bruise on leg for a few weeks but has resolved now --> denies   []   [x]       Blood in urine or stool- denies   []   [x]       Procedures scheduled in the future at this time colonoscopy was cancelled, r/s for December 29, hold 5 days and bridge. Was instructed to get INR on Mon 12/26 to determine INR, start lovenox --> going to schedule an ablation in near future, possible November. Will need to hold warfarin 2 days prior to procedure---> CV 9/6, ablation 12/14 --> having ablation on 12/14, cleared to hold warfarin 3 days prior --> had procedure 5/1, held warfarin 5 days prior and bridged with lovenox. INR 1.2 at procedure---> Cv tomorrow  --> none upcoming   [x]   []       Missed Dose -Missed several doses while in hospital  []   [x]       Extra Dose- denies   []   [x]       Change in medications- states that MD had been adjusting phenytoin increasing 4-5 weeks ago but plans to return to normal phenytoin today  --->takes amiodarone 200 mg qd, has been at this dose for a few weeks---> sometimes take extra amiodarone at night, states doctor told him he could do this, asked to clarify, adjusting lasix --->  every 2 days takes extra Amiodarone, increased metoprolol ---> states fluctuates Phenytoin based on symptoms, unclear ---> primidone decreased 2/29 (could cause INR to inc) ---> has been taking more amiodarone (1/2 tablet) qd for past 2 weeks since being in a fib--> Increasing metoprolol, amiodarone 200 mg qam and 100 mg 4x week in pm --> inc metoprolol, started

## 2025-02-10 NOTE — PATIENT INSTRUCTIONS
Continue current medications  Let us know when the gall bladder removal is scheduled  Follow up in 4 weeks

## 2025-02-16 ENCOUNTER — HOSPITAL ENCOUNTER (EMERGENCY)
Age: 80
Discharge: HOME | End: 2025-02-16
Payer: MEDICARE

## 2025-02-16 VITALS
BODY MASS INDEX: 22.9 KG/M2 | HEIGHT: 70 IN | DIASTOLIC BLOOD PRESSURE: 61 MMHG | RESPIRATION RATE: 16 BRPM | TEMPERATURE: 98.2 F | WEIGHT: 160 LBS | SYSTOLIC BLOOD PRESSURE: 124 MMHG | HEART RATE: 50 BPM | OXYGEN SATURATION: 97 %

## 2025-02-16 DIAGNOSIS — T85.518A CHOLECYSTOSTOMY TUBE DYSFUNCTION, INITIAL ENCOUNTER: Primary | ICD-10-CM

## 2025-02-16 PROCEDURE — 99282 EMERGENCY DEPT VISIT SF MDM: CPT

## 2025-02-16 ASSESSMENT — PAIN SCALES - GENERAL: PAINLEVEL_OUTOF10: 2

## 2025-02-16 ASSESSMENT — PAIN DESCRIPTION - PAIN TYPE: TYPE: ACUTE PAIN

## 2025-02-16 ASSESSMENT — PAIN - FUNCTIONAL ASSESSMENT: PAIN_FUNCTIONAL_ASSESSMENT: 0-10

## 2025-02-17 ENCOUNTER — TELEPHONE (OUTPATIENT)
Dept: SURGERY | Age: 80
End: 2025-02-17

## 2025-02-17 NOTE — ED PROVIDER NOTES
Resp: 16   Temp: 98.2 °F (36.8 °C)   TempSrc: Oral   SpO2: 97%   Weight: 72.6 kg (160 lb)   Height: 1.778 m (5' 10\")       Patient was given the following medications:  Medications - No data to display          Is this patient to be included in the SEP-1 Core Measure due to severe sepsis or septic shock?   No   Exclusion criteria - the patient is NOT to be included for SEP-1 Core Measure due to:  Infection is not suspected    CONSULTS: (Who and What was discussed)  None  Discussion with Other Profesionals : None    Social Determinants : None    Records Reviewed : None    CC/HPI Summary, DDx, ED Course, and Reassessment: 79-year-old male presents emerged part due to leaking around his cholecystectomy tube that he noticed earlier today.  He states that this was placed on 1/28/2025 and supposed to have it in place for the next 6 to 8 weeks.  Denies any fevers chills nausea vomiting or abdominal pain associate with this.  He is only here today because he noticed leaking.  States that he has an appointment on Tuesday for follow-up for this tube placement.    On exam cholecystectomy tube is in place right upper quadrant there is no surrounding erythema.  There is no tenderness to palpation.  He is afebrile nontachycardic.  Dressing with some yellowish discharge noted.  Dressing was changed today and the tube was adjusted and no longer draining any fluid around the tube site.  Patient was observed here in the emergency department and did not have any more drainage coming from the site.  The cholecystectomy to was functioning appropriately.  Patient will be discharged to follow-up with his provider on Tuesday as scheduled for recheck of this cholecystectomy tube.    I did consider additional laboratory testing today but patient was not having any pain fevers chills nausea vomiting there is no signs of infection at the site of the cholecystectomy tube.    Appropriate for outpatient management        I am the Primary

## 2025-02-17 NOTE — TELEPHONE ENCOUNTER
Pt went to ER because his tube was leaking green fluid. Suture came loose and it is not draining. Is this normal? Please call and advise [phone]

## 2025-02-17 NOTE — ED NOTES
Old dressing removed. Area cleaned with sterile water. Bulky dressing applied and secured by Tegaderm.

## 2025-02-17 NOTE — DISCHARGE INSTRUCTIONS
Follow up with your primary care provider in one week for a recheck    Follow-up with your surgeon for recheck and further evaluation of your cholecystostomy tube.    Return to the ED if you have any worsening symptoms.

## 2025-02-18 ENCOUNTER — OFFICE VISIT (OUTPATIENT)
Dept: SURGERY | Age: 80
End: 2025-02-18
Payer: MEDICARE

## 2025-02-18 VITALS — WEIGHT: 160 LBS | DIASTOLIC BLOOD PRESSURE: 68 MMHG | BODY MASS INDEX: 22.96 KG/M2 | SYSTOLIC BLOOD PRESSURE: 124 MMHG

## 2025-02-18 DIAGNOSIS — K81.9 CHOLECYSTITIS: Primary | ICD-10-CM

## 2025-02-18 PROCEDURE — G8420 CALC BMI NORM PARAMETERS: HCPCS | Performed by: SURGERY

## 2025-02-18 PROCEDURE — 1159F MED LIST DOCD IN RCRD: CPT | Performed by: SURGERY

## 2025-02-18 PROCEDURE — 1123F ACP DISCUSS/DSCN MKR DOCD: CPT | Performed by: SURGERY

## 2025-02-18 PROCEDURE — 3078F DIAST BP <80 MM HG: CPT | Performed by: SURGERY

## 2025-02-18 PROCEDURE — 99213 OFFICE O/P EST LOW 20 MIN: CPT | Performed by: SURGERY

## 2025-02-18 PROCEDURE — 3074F SYST BP LT 130 MM HG: CPT | Performed by: SURGERY

## 2025-02-18 PROCEDURE — 1036F TOBACCO NON-USER: CPT | Performed by: SURGERY

## 2025-02-18 PROCEDURE — 1125F AMNT PAIN NOTED PAIN PRSNT: CPT | Performed by: SURGERY

## 2025-02-18 PROCEDURE — G8427 DOCREV CUR MEDS BY ELIG CLIN: HCPCS | Performed by: SURGERY

## 2025-02-18 PROCEDURE — 1111F DSCHRG MED/CURRENT MED MERGE: CPT | Performed by: SURGERY

## 2025-02-18 NOTE — PATIENT INSTRUCTIONS
Percutaneous cholecystostomy placed 1/28/25, will keep drain in place 6-8 weeks (3/11-3/25). Will return to office in 2 weeks to discuss progress, schedule cholangiogram through tube and this will help guide necessity of elective cholecystectomy  Tolerating diet, avoid higher fat foods which are known to cause biliary colic  Continue anticoagulation with plavix and coumadin per cardiology, will need lovenox bridge in perioperative period if surgery undertaken  Cardiology followup to give realistic risk assessment for perioperative cardiovascular complications  Return to office 2 weeks to discuss progress, results, further options, likely schedule cholangiogram

## 2025-02-18 NOTE — PROGRESS NOTES
[facility]  SUBJECTIVE:    Chief Complaint: cholecystitis    David M Day   1945   79 y.o. male presents for followup after recent hospital admission for cholecystitis. Underwent percutaneous cholecystostomy tube placement on 1/28/25. Improved but still has some intermittent biliary leakage around the tube itself. Feels better since discharge, minimal pain, tolerating diet and with improving energy. Abdominal surgical history includes remote right inguinal hernia repair as child and has had CABG and stents, on plavix and coumadin for history of DVT. Additional medical conditions include CHF, seizures.     Past Medical History:   Diagnosis Date    Accidental fall 10/03/2023    Arthritis     shoulders and knee    Atrial fib/flut     CAD (coronary artery disease)     CHF (congestive heart failure) ([facility])     DVT (deep venous thrombosis) ([facility]) 08/24/2022    RLE    Epilepsia     Hx of blood clots     Hx of CABG     Hyperlipidemia     Hypertension     Kidney stones     MI (myocardial infarction) ([facility])     Pacemaker     Seizures ([facility])     last seizure 1985    Sinus bradycardia     Sleep apnea     uses CPAP    V tach ([facility])      Past Surgical History:   Procedure Laterality Date    CARDIAC SURGERY      , angioplasty 2007    CARDIOVERSION      6/2022, 9/2022, 1/6/23- cardioversions    COLONOSCOPY N/A 1/27/2023    COLONOSCOPY DIAGNOSTIC performed by Rusty Crisostomo MD at [facility] ENDOSCOPY    CORONARY ANGIOPLASTY WITH STENT PLACEMENT  1997    CORONARY ARTERY BYPASS GRAFT  01/2019    [facility]    CORONARY STENT PLACEMENT  03/30/2022    3/30/22 Successful complex angioplasty and stenting of IAN to LAD, SVG to OM, SVG to PDA    CYSTOSCOPY N/A 11/13/2019    FLEXIBLE CYSTOSCOPY performed by Rusty Beth MD at [facility] OR    HERNIA REPAIR Right     done 65 sam ago    IR CHOLECYSTOSTOMY PERCUTANEOUS COMPLETE  1/28/2025    IR CHOLECYSTOSTOMY PERCUTANEOUS COMPLETE 1/28/2025

## 2025-02-19 ENCOUNTER — TELEPHONE (OUTPATIENT)
Dept: CARDIOLOGY CLINIC | Age: 80
End: 2025-02-19

## 2025-02-19 NOTE — TELEPHONE ENCOUNTER
Pt called to speak w/jen Villaseñor re: he went to a surgeon and they are needing more info.  Charleen taken the call.

## 2025-02-19 NOTE — TELEPHONE ENCOUNTER
Discussed with patient, Dr Miller needs a letter of  realistic risk stratification sent to Dr Miller for perioperative cardiovascular complications for choleycystectomy  and if patient needs lovenox bridging perioperatively  Surgery has not been scheduled  Patient will be seeing Dr Miller 3/4 to schedule cholangiogram through tube / guide necessity of elective cholecystectomy

## 2025-02-20 ENCOUNTER — ANTI-COAG VISIT (OUTPATIENT)
Dept: PHARMACY | Age: 80
End: 2025-02-20
Payer: MEDICARE

## 2025-02-20 DIAGNOSIS — I48.0 PAROXYSMAL ATRIAL FIBRILLATION (HCC): Primary | Chronic | ICD-10-CM

## 2025-02-20 LAB
INTERNATIONAL NORMALIZATION RATIO, POC: 1.6
PROTHROMBIN TIME, POC: 0

## 2025-02-20 PROCEDURE — 99212 OFFICE O/P EST SF 10 MIN: CPT | Performed by: PHARMACIST

## 2025-02-20 PROCEDURE — 85610 PROTHROMBIN TIME: CPT | Performed by: PHARMACIST

## 2025-02-20 NOTE — PROGRESS NOTES
Mr. Juan A Mathis is a 79 y.o. y/o male with history of Afib who presents today for anticoagulation monitoring and adjustment.    Pertinent PMH: DVT, CABG, HTN, CAD, seizure, CHF    Patient Reported Findings:  Yes     No  [x]   []       Patient verifies current dosing regimen as listed- confirmed   []   [x]       S/S bleeding/bruising/swelling/SOB- denies ---> was having clot symptoms on Sat so went to see doc on Mon and did doppler---> had large bruise on leg for a few weeks but has resolved now --> denies   []   [x]       Blood in urine or stool- denies   []   [x]       Procedures scheduled in the future at this time colonoscopy was cancelled, r/s for December 29, hold 5 days and bridge. Was instructed to get INR on Mon 12/26 to determine INR, start lovenox --> going to schedule an ablation in near future, possible November. Will need to hold warfarin 2 days prior to procedure---> CV 9/6, ablation 12/14 --> having ablation on 12/14, cleared to hold warfarin 3 days prior --> had procedure 5/1, held warfarin 5 days prior and bridged with lovenox. INR 1.2 at procedure---> Cv tomorrow  --> none upcoming   []   [x]       Missed Dose -Missed several doses while in hospital  []   [x]       Extra Dose- denies   []   [x]       Change in medications- states that MD had been adjusting phenytoin increasing 4-5 weeks ago but plans to return to normal phenytoin today  --->takes amiodarone 200 mg qd, has been at this dose for a few weeks---> sometimes take extra amiodarone at night, states doctor told him he could do this, asked to clarify, adjusting lasix --->  every 2 days takes extra Amiodarone, increased metoprolol ---> states fluctuates Phenytoin based on symptoms, unclear ---> primidone decreased 2/29 (could cause INR to inc) ---> has been taking more amiodarone (1/2 tablet) qd for past 2 weeks since being in a fib--> Increasing metoprolol, amiodarone 200 mg qam and 100 mg 4x week in pm --> inc metoprolol, started

## 2025-02-21 NOTE — TELEPHONE ENCOUNTER
Notified patient to call us after he sees Dr Miller, and, when surgery is scheduled, He will need bridging and we will address cardiac risk stratification- patient verbalized understanding

## 2025-02-28 ENCOUNTER — TELEPHONE (OUTPATIENT)
Dept: CARDIOLOGY CLINIC | Age: 80
End: 2025-02-28

## 2025-02-28 NOTE — TELEPHONE ENCOUNTER
Optival elevated since beginning of feb and starting to trend down but still above threshold  Will send to Dr Frazier as patient follows with him

## 2025-03-04 ENCOUNTER — ANTI-COAG VISIT (OUTPATIENT)
Dept: PHARMACY | Age: 80
End: 2025-03-04
Payer: MEDICARE

## 2025-03-04 ENCOUNTER — OFFICE VISIT (OUTPATIENT)
Dept: SURGERY | Age: 80
End: 2025-03-04
Payer: MEDICARE

## 2025-03-04 VITALS — WEIGHT: 155 LBS | SYSTOLIC BLOOD PRESSURE: 118 MMHG | BODY MASS INDEX: 22.24 KG/M2 | DIASTOLIC BLOOD PRESSURE: 70 MMHG

## 2025-03-04 DIAGNOSIS — I48.0 PAROXYSMAL ATRIAL FIBRILLATION (HCC): Primary | Chronic | ICD-10-CM

## 2025-03-04 DIAGNOSIS — K81.9 CHOLECYSTITIS: Primary | ICD-10-CM

## 2025-03-04 LAB
INTERNATIONAL NORMALIZATION RATIO, POC: 2.4
PROTHROMBIN TIME, POC: 0

## 2025-03-04 PROCEDURE — 3074F SYST BP LT 130 MM HG: CPT | Performed by: SURGERY

## 2025-03-04 PROCEDURE — 1159F MED LIST DOCD IN RCRD: CPT | Performed by: SURGERY

## 2025-03-04 PROCEDURE — 3078F DIAST BP <80 MM HG: CPT | Performed by: SURGERY

## 2025-03-04 PROCEDURE — 99213 OFFICE O/P EST LOW 20 MIN: CPT | Performed by: SURGERY

## 2025-03-04 PROCEDURE — G8420 CALC BMI NORM PARAMETERS: HCPCS | Performed by: SURGERY

## 2025-03-04 PROCEDURE — 99211 OFF/OP EST MAY X REQ PHY/QHP: CPT

## 2025-03-04 PROCEDURE — 1036F TOBACCO NON-USER: CPT | Performed by: SURGERY

## 2025-03-04 PROCEDURE — G8427 DOCREV CUR MEDS BY ELIG CLIN: HCPCS | Performed by: SURGERY

## 2025-03-04 PROCEDURE — 1111F DSCHRG MED/CURRENT MED MERGE: CPT | Performed by: SURGERY

## 2025-03-04 PROCEDURE — 85610 PROTHROMBIN TIME: CPT

## 2025-03-04 PROCEDURE — 1123F ACP DISCUSS/DSCN MKR DOCD: CPT | Performed by: SURGERY

## 2025-03-04 PROCEDURE — 1125F AMNT PAIN NOTED PAIN PRSNT: CPT | Performed by: SURGERY

## 2025-03-04 NOTE — PATIENT INSTRUCTIONS
Percutaneous cholecystostomy placed 1/28/25, will order cholangiogram through tube to assess cystic duct patency, help guide necessity of elective cholecystectomy  Tolerating diet, avoid higher fat foods which are known to cause biliary colic  Continue anticoagulation with plavix and coumadin per cardiology, will need lovenox bridge in perioperative period if surgery undertaken  Cardiology followup to give realistic risk assessment for perioperative cardiovascular complications  Return to office 2 weeks to discuss progress, results, further options

## 2025-03-04 NOTE — PROGRESS NOTES
Mr. Juan A Mathis is a 79 y.o. y/o male with history of Afib who presents today for anticoagulation monitoring and adjustment.    Pertinent PMH: DVT, CABG, HTN, CAD, seizure, CHF    Patient Reported Findings:  Yes     No  [x]   []       Patient verifies current dosing regimen as listed- confirmed   []   [x]       S/S bleeding/bruising/swelling/SOB- denies ---> was having clot symptoms on Sat so went to see doc on Mon and did doppler---> had large bruise on leg for a few weeks but has resolved now --> denies   []   [x]       Blood in urine or stool- denies   []   [x]       Procedures scheduled in the future at this time colonoscopy was cancelled, r/s for December 29, hold 5 days and bridge. Was instructed to get INR on Mon 12/26 to determine INR, start lovenox --> going to schedule an ablation in near future, possible November. Will need to hold warfarin 2 days prior to procedure---> CV 9/6, ablation 12/14 --> having ablation on 12/14, cleared to hold warfarin 3 days prior --> had procedure 5/1, held warfarin 5 days prior and bridged with lovenox. INR 1.2 at procedure---> Cv tomorrow  --> none upcoming --> might have gallbladder taken out. Not yet scheduled.   []   [x]       Missed Dose -Missed several doses while in hospital  []   [x]       Extra Dose- denies   []   [x]       Change in medications- states that MD had been adjusting phenytoin increasing 4-5 weeks ago but plans to return to normal phenytoin today  --->takes amiodarone 200 mg qd, has been at this dose for a few weeks---> sometimes take extra amiodarone at night, states doctor told him he could do this, asked to clarify, adjusting lasix --->  every 2 days takes extra Amiodarone, increased metoprolol ---> states fluctuates Phenytoin based on symptoms, unclear ---> primidone decreased 2/29 (could cause INR to inc) ---> has been taking more amiodarone (1/2 tablet) qd for past 2 weeks since being in a fib--> Increasing metoprolol, amiodarone 200 mg qam and

## 2025-03-04 NOTE — PROGRESS NOTES
[facility]  SUBJECTIVE:    Chief Complaint: cholecystitis    David M Day   1945   79 y.o. male presents for followup after recent hospital admission for cholecystitis. Underwent percutaneous cholecystostomy tube placement on 1/28/25. Improved but still has some intermittent biliary leakage around the tube itself but unchanged. Feeling well, intermittent tenderness with eating but otherwise asymptomatic. Abdominal surgical history includes remote right inguinal hernia repair as child and has had CABG and stents, on plavix and coumadin for history of DVT. Additional medical conditions include CHF, seizures. Not yet had followup with cardiology    Past Medical History:   Diagnosis Date    Accidental fall 10/03/2023    Arthritis     shoulders and knee    Atrial fib/flut     CAD (coronary artery disease)     CHF (congestive heart failure) ([facility])     DVT (deep venous thrombosis) ([facility]) 08/24/2022    RLE    Epilepsia     Hx of blood clots     Hx of CABG     Hyperlipidemia     Hypertension     Kidney stones     MI (myocardial infarction) ([facility])     Pacemaker     Seizures ([facility])     last seizure 1985    Sinus bradycardia     Sleep apnea     uses CPAP    V tach ([facility])      Past Surgical History:   Procedure Laterality Date    CARDIAC SURGERY      , angioplasty 2007    CARDIOVERSION      6/2022, 9/2022, 1/6/23- cardioversions    COLONOSCOPY N/A 1/27/2023    COLONOSCOPY DIAGNOSTIC performed by Rusty Crisostomo MD at [facility] ENDOSCOPY    CORONARY ANGIOPLASTY WITH STENT PLACEMENT  1997    CORONARY ARTERY BYPASS GRAFT  01/2019    [facility]    CORONARY STENT PLACEMENT  03/30/2022    3/30/22 Successful complex angioplasty and stenting of IAN to LAD, SVG to OM, SVG to PDA    CYSTOSCOPY N/A 11/13/2019    FLEXIBLE CYSTOSCOPY performed by Rusty Beth MD at [facility] OR    HERNIA REPAIR Right     done 65 sam ago    IR CHOLECYSTOSTOMY PERCUTANEOUS COMPLETE  1/28/2025    IR

## 2025-03-11 ENCOUNTER — TELEPHONE (OUTPATIENT)
Dept: CARDIOLOGY CLINIC | Age: 80
End: 2025-03-11

## 2025-03-11 NOTE — TELEPHONE ENCOUNTER
Clearance letter sent to Dr Miller,notified patient who will also call us when surgery is scheduled, will need bridging- notified patient and verbalized understanding  
no irregular menses

## 2025-03-12 ENCOUNTER — HOSPITAL ENCOUNTER (OUTPATIENT)
Dept: INTERVENTIONAL RADIOLOGY/VASCULAR | Age: 80
Discharge: HOME | End: 2025-03-12
Payer: MEDICARE

## 2025-03-12 DIAGNOSIS — K81.9 CHOLECYSTITIS: ICD-10-CM

## 2025-03-12 PROCEDURE — 47531 INJECTION FOR CHOLANGIOGRAM: CPT

## 2025-03-12 PROCEDURE — 6360000004 HC RX CONTRAST MEDICATION: Performed by: INTERNAL MEDICINE

## 2025-03-12 RX ORDER — IOPAMIDOL 755 MG/ML
100 INJECTION, SOLUTION INTRAVASCULAR
Status: COMPLETED | OUTPATIENT
Start: 2025-03-12 | End: 2025-03-12

## 2025-03-12 RX ADMIN — IOPAMIDOL 10 ML: 755 INJECTION, SOLUTION INTRAVENOUS at 08:55

## 2025-03-13 NOTE — PATIENT INSTRUCTIONS
Take metoprolol amiodarone and midodrine morning of surgery  Call for any questions  Labs today

## 2025-03-13 NOTE — PROGRESS NOTES
[facility]  3/24/25  Referring:     REASON FOR CONSULT/CHIEF COMPLAINT/HPI     Reason for visit/ Chief complaint  Follow Up  CAD, ICM   HPI Juan A Mathis is a 79 y.o. seen as a follow up for management of CAD )(2019 CABG redo [facility] with WESTON ligation and maze), ICM( 2017 dual chamber AICD), htn, hchol and AF, SVT JOSEPH 12/14/23  PVC ablation ( [facility])  On 3/30 he had a PCI to IAN to LAD with PAYAL, PCI to SVG to OM with PAYAL, PCI to SVG to PDA. On 4/6/23 he had a CV and ablation  On 12/14/23-Electrophysiological study with ablation of multifocal PVCs( [facility]) PVC # 3 exit site was on the septal and basal location, could not be eliminated  completely    He underwent LHC 5/1/24> severe MVD and patent grafts. LATESHA showed moderate MR.He underwent successful DCCV 6/19/24 with Dr. Jordan.  1/26/25 admitted with acute choleycystitis complicated by hyponatremia, acute on CKD( creat 4.7)  1/28 had  Cholecystostomy tube ( plan on for 6-8 weeks)  Entresto stopped due to hypotension and started midodrine.   Echo showed moderate to severe MR, moderate to severe TR. Discharged 2/2/25 with augmentin.     3/12 he had cholangiogram through tube-- cystic duct patent. He was cleared at moderate but acceptable risk for high risk surgery    Today, Yossi  states he feels \"normal\"  Walked in from the parking lot without stopping. Walks up and down a flight of stairs daily without stopping. No exertional chest pain shortness of breath palpitations or dizziness. No edema               Patient is somewhat adherent with medications and is tolerating them well without side effects.     HISTORY/ALLERGIES/ROS     MedHx:   HISTORY/ALLERGIES/ROS      MedHx:            has a past medical history of Arthritis, Atrial fib/flut, CAD (coronary artery disease), Hyperlipidemia, Hypertension, Seizures (HCC), and Sinus bradycardia.  SurgHx:           has a past surgical history that includes hernia repair (Right); Coronary angioplasty

## 2025-03-19 ENCOUNTER — TELEPHONE (OUTPATIENT)
Dept: CARDIOLOGY CLINIC | Age: 80
End: 2025-03-19

## 2025-03-19 ENCOUNTER — TELEPHONE (OUTPATIENT)
Dept: PHARMACY | Age: 80
End: 2025-03-19

## 2025-03-19 ENCOUNTER — PREP FOR PROCEDURE (OUTPATIENT)
Dept: SURGERY | Age: 80
End: 2025-03-19

## 2025-03-19 ENCOUNTER — OFFICE VISIT (OUTPATIENT)
Dept: SURGERY | Age: 80
End: 2025-03-19
Payer: MEDICARE

## 2025-03-19 VITALS — SYSTOLIC BLOOD PRESSURE: 116 MMHG | BODY MASS INDEX: 22.38 KG/M2 | WEIGHT: 156 LBS | DIASTOLIC BLOOD PRESSURE: 64 MMHG

## 2025-03-19 DIAGNOSIS — K81.9 CHOLECYSTITIS: Primary | ICD-10-CM

## 2025-03-19 PROCEDURE — 3078F DIAST BP <80 MM HG: CPT | Performed by: SURGERY

## 2025-03-19 PROCEDURE — 99213 OFFICE O/P EST LOW 20 MIN: CPT | Performed by: SURGERY

## 2025-03-19 PROCEDURE — G8427 DOCREV CUR MEDS BY ELIG CLIN: HCPCS | Performed by: SURGERY

## 2025-03-19 PROCEDURE — 1123F ACP DISCUSS/DSCN MKR DOCD: CPT | Performed by: SURGERY

## 2025-03-19 PROCEDURE — 1159F MED LIST DOCD IN RCRD: CPT | Performed by: SURGERY

## 2025-03-19 PROCEDURE — 1036F TOBACCO NON-USER: CPT | Performed by: SURGERY

## 2025-03-19 PROCEDURE — 1125F AMNT PAIN NOTED PAIN PRSNT: CPT | Performed by: SURGERY

## 2025-03-19 PROCEDURE — 3074F SYST BP LT 130 MM HG: CPT | Performed by: SURGERY

## 2025-03-19 PROCEDURE — G8420 CALC BMI NORM PARAMETERS: HCPCS | Performed by: SURGERY

## 2025-03-19 RX ORDER — SODIUM CHLORIDE 0.9 % (FLUSH) 0.9 %
5-40 SYRINGE (ML) INJECTION PRN
OUTPATIENT
Start: 2025-03-19

## 2025-03-19 RX ORDER — INDOCYANINE GREEN AND WATER 25 MG
5 KIT INJECTION ONCE
OUTPATIENT
Start: 2025-03-19 | End: 2025-03-19

## 2025-03-19 RX ORDER — SODIUM CHLORIDE 9 MG/ML
INJECTION, SOLUTION INTRAVENOUS PRN
OUTPATIENT
Start: 2025-03-19

## 2025-03-19 RX ORDER — SODIUM CHLORIDE 0.9 % (FLUSH) 0.9 %
5-40 SYRINGE (ML) INJECTION EVERY 12 HOURS SCHEDULED
OUTPATIENT
Start: 2025-03-19

## 2025-03-19 NOTE — PATIENT INSTRUCTIONS
We discussed the risks, benefits and alternatives of a minimally invasive cholecystectomy with cholangiogram as well as open cholecystectomy. Specific risks discussed include bleeding, infection, injury to surrounding structures, possible requirement of additional procedures, and conversion to open, as well as the perioperative risks associated with general anesthesia. Benefits include relief from current abdominal symptoms. Alternatives include observation with medical management. Details of the procedure were discussed and all questions answered. The patient understands, agrees, and wishes to proceed with the minimally invasive procedure.  In the interim, the patient is to avoid high fat foods, which are known triggers for biliary colic  Will schedule for robot assisted laparoscopic cholecystectomy with cholangiogram ([id number]) with general anesthesia as outpatient procedure   Will need cardiology guidance regarding anticoagulation, plavix and coumadin before surgery. Anticipate holding plavix 5 days and can restart the day after. Anticipate bridging coumadin with lovenox to stop the day before and restart the day after  Leave drain in place and will remove during surgery

## 2025-03-19 NOTE — TELEPHONE ENCOUNTER
Magalie called to say patient is scheduled for a procedure on 3/27 and will need to be off warfarin.  Explained clinic could dose/bridge but, Dr. Frazier would need to be contacted to get clearance to hold.  Magalie will follow-up with Dr. Jose's office. Patient is scheduled for clinic on 3/24.  We may need to have him reschedule sooner to get bridging information.

## 2025-03-19 NOTE — TELEPHONE ENCOUNTER
CARDIAC CLEARANCE     What type of procedure are you having?  Cholecystectomy    Which physician is performing your procedure?  Dr. Alli Miller    When is your procedure scheduled for?   03/27    Where are you having this procedure?  [facility]    Are you taking Blood Thinners?  Yes   If so what? (Name/dose/frequesncy)  Warfarin 2.5mg, Plavix 75mg,     Does the surgeon want you to stop your blood thinner?  If so for how long?  Yes - 5 days prior    Phone Number and Contact Name for Physicians office:  [phone]  Fax number to send information:  [phone]

## 2025-03-19 NOTE — PROGRESS NOTES
benefits and alternatives of a minimally invasive cholecystectomy with cholangiogram as well as open cholecystectomy. Specific risks discussed include bleeding, infection, injury to surrounding structures, possible requirement of additional procedures, and conversion to open, as well as the perioperative risks associated with general anesthesia. Benefits include relief from current abdominal symptoms. Alternatives include observation with medical management. Details of the procedure were discussed and all questions answered. The patient understands, agrees, and wishes to proceed with the minimally invasive procedure.  In the interim, the patient is to avoid high fat foods, which are known triggers for biliary colic  Will schedule for robot assisted laparoscopic cholecystectomy with cholangiogram ([id number]) with general anesthesia as outpatient procedure   Will need cardiology guidance regarding anticoagulation, plavix and coumadin before surgery. Anticipate holding plavix 5 days and can restart the day after. Anticipate bridging coumadin with lovenox to stop the day before and restart the day after  Leave drain in place and will remove during surgery    Alli Miller MD, FACS  3/19/2025  1:36 PM

## 2025-03-20 ENCOUNTER — TELEPHONE (OUTPATIENT)
Dept: PHARMACY | Age: 80
End: 2025-03-20

## 2025-03-20 RX ORDER — ENOXAPARIN SODIUM 100 MG/ML
70 INJECTION SUBCUTANEOUS 2 TIMES DAILY
Qty: 16 ML | Refills: 1 | Status: SHIPPED | OUTPATIENT
Start: 2025-03-20

## 2025-03-20 NOTE — PROGRESS NOTES
Preop instructions were sent to the patient via [employer]    Opioid Counseling: I discussed with the patient the potential side effects of opioids including but not limited to addiction, altered mental status, and depression. I stressed avoiding alcohol, benzodiazepines, muscle relaxants and sleep aids unless specifically okayed by a physician. The patient verbalized understanding of the proper use and possible adverse effects of opioids. All of the patient's questions and concerns were addressed. They were instructed to flush the remaining pills down the toilet if they did not need them for pain.

## 2025-03-20 NOTE — TELEPHONE ENCOUNTER
----- Message from Peyton GARCIA sent at 3/20/2025  9:25 AM EDT -----  Regarding: needs bridging schedule  Contact: patient  Please call patient back regarding 3/27 procedure.  He LVM requesting a bridging schedule. [phone]

## 2025-03-20 NOTE — TELEPHONE ENCOUNTER
Per chart, pt has cardiac clearance letter to hold warfarin for procedures and bridge with lovenox.     Called and spoke to pt. Requested lovenox bridging schedule be sent to him via [employer]. Pt remembers how to use lovenox injections. Requested lovenox injections to be sent to [facility] on [facility]. E-scribed lovenox to [facility] pharmacy     Sent bridging schedule (see below) to patient via [employer]     Day Date Warfarin Dose Lovenox Dose   5 days before 3/22/2025 Stop warfarin 8 AM: No Lovenox  8 PM: No Lovenox    4 days before 3/23/2025 No warfarin 8 AM: No Lovenox  8 PM: No Lovenox    3 days before 3/24/2025 No warfarin 8 AM: 70 mg injection  8 PM: 70 mg injection   2 days before 3/25/2025 No warfarin 8 AM: 70 mg injection  8 PM: 70 mg injection   1 day before 3/26/2025 No Warfarin 8 AM: 70 mg injection  8 PM: NO LOVENOX   Day of procedure 3/27/2025 Take  5 mg of warfarin in the evening 8 AM: NO LOVENOX  8 PM: No Lovenox   1 day after 3/28/2025 Take  5 mg of warfarin in the evening 8 AM: 70 mg injection  8 PM: 70 mg injection   2 days after 3/29/2025 Take 5 mg of warfarin in the evening 8 AM: 70 mg injection  8 PM: 70 mg injection   3 days after 3/30/2025 Take 2.5 mg of warfarin in the evening 8 AM: 70 mg injection  8 PM: 70 mg injection   4 days after 3/31/2025 Take 5 mg of warfarin in the evening 8 AM: 70 mg injection  8 PM: 70 mg injection   5 days after 4/1/2025 Take 5 mg of warfarin in the evening 8 AM: 70 mg injection  8 PM: 70 mg injection

## 2025-03-21 ENCOUNTER — TELEPHONE (OUTPATIENT)
Dept: CARDIOLOGY CLINIC | Age: 80
End: 2025-03-21

## 2025-03-21 ENCOUNTER — TELEPHONE (OUTPATIENT)
Dept: SURGERY | Age: 80
End: 2025-03-21

## 2025-03-21 NOTE — TELEPHONE ENCOUNTER
Spoke to pt, advised pt of note from [facility]:    7 days prior to surgery switch plavix to asa 81 mg daily  When coming off of coumadin will need lovenox bridging that will be managed by coumadin clinic    Spoke to  for Dr Miller's office, she confirmed they could see the letter and would advise Dr Miller's office clearance was received.

## 2025-03-21 NOTE — TELEPHONE ENCOUNTER
Please call patient and Dr Moore office with instructions below     A letter of clearance has been sent through [employer]  7 days prior to surgery switch plavix to asa 81 mg daily  When coming off of coumadin will need lovenox bridging that will be managed by coumadin clinic

## 2025-03-21 NOTE — TELEPHONE ENCOUNTER
[facility] CARDIOLOGY[address] [address]  Dept: [phone]  Dept Fax: [phone]      2025    Patient:Juan A Mathis  : 1945  DOS: 3/21/2025    To Whom it May Concern:    Juan A Mathis has been evaluated for cardiac clearance.Juan A Mathis is considered at a moderate but acceptable  risk for high risk surgery. 7 days prior to surgery switch plavix to asa 81 mg daily.  When coming off of coumadin will need lovenox bridging that will be managed by coumadin clinic  resume all preop medications when surgically safe to resum. There is no further cardiac testing that could be done to lower the risk. Please let my office know if you have any questions or concerns.          Dr Frazier  DO                          [facility] [employer] serving [place] and [place]

## 2025-03-21 NOTE — TELEPHONE ENCOUNTER
I  Cardiology called and states pt did receive clearance. They called pt to let him know he has been cleared for surgery. Letter attached below.

## 2025-03-21 NOTE — TELEPHONE ENCOUNTER
Advised pt per Dr Frazier he should stop Plavix 7 days prior to surgery date and take ASA 81mg daily.  Pt is aware when he stops coumadin he needs to bridge with lovenox.  He stated the bridging information is in his mychart.

## 2025-03-24 ENCOUNTER — APPOINTMENT (OUTPATIENT)
Dept: PHARMACY | Age: 80
End: 2025-03-24
Payer: MEDICARE

## 2025-03-24 ENCOUNTER — OFFICE VISIT (OUTPATIENT)
Dept: CARDIOLOGY CLINIC | Age: 80
End: 2025-03-24

## 2025-03-24 ENCOUNTER — HOSPITAL ENCOUNTER (OUTPATIENT)
Age: 80
Discharge: HOME | End: 2025-03-24
Payer: MEDICARE

## 2025-03-24 VITALS
WEIGHT: 158.2 LBS | OXYGEN SATURATION: 98 % | HEART RATE: 59 BPM | HEIGHT: 70 IN | DIASTOLIC BLOOD PRESSURE: 64 MMHG | BODY MASS INDEX: 22.65 KG/M2 | SYSTOLIC BLOOD PRESSURE: 126 MMHG

## 2025-03-24 DIAGNOSIS — I25.5 ISCHEMIC CARDIOMYOPATHY: ICD-10-CM

## 2025-03-24 DIAGNOSIS — Z01.810 PREOP CARDIOVASCULAR EXAM: Primary | ICD-10-CM

## 2025-03-24 DIAGNOSIS — I42.9 CARDIOMYOPATHY, UNSPECIFIED TYPE (HCC): ICD-10-CM

## 2025-03-24 DIAGNOSIS — I10 ESSENTIAL HYPERTENSION: ICD-10-CM

## 2025-03-24 DIAGNOSIS — Z95.1 S/P CABG (CORONARY ARTERY BYPASS GRAFT): ICD-10-CM

## 2025-03-24 DIAGNOSIS — I48.0 PAF (PAROXYSMAL ATRIAL FIBRILLATION) (HCC): ICD-10-CM

## 2025-03-24 DIAGNOSIS — I34.0 MITRAL VALVE INSUFFICIENCY, UNSPECIFIED ETIOLOGY: ICD-10-CM

## 2025-03-24 DIAGNOSIS — N18.9 CHRONIC KIDNEY DISEASE, UNSPECIFIED CKD STAGE: ICD-10-CM

## 2025-03-24 DIAGNOSIS — K81.9 CHOLECYSTITIS: ICD-10-CM

## 2025-03-24 DIAGNOSIS — I49.3 PVC (PREMATURE VENTRICULAR CONTRACTION): ICD-10-CM

## 2025-03-24 DIAGNOSIS — Z95.810 ICD (IMPLANTABLE CARDIOVERTER-DEFIBRILLATOR) IN PLACE: ICD-10-CM

## 2025-03-24 DIAGNOSIS — I25.110 CORONARY ARTERY DISEASE INVOLVING NATIVE CORONARY ARTERY OF NATIVE HEART WITH UNSTABLE ANGINA PECTORIS (HCC): ICD-10-CM

## 2025-03-24 DIAGNOSIS — E78.2 MIXED HYPERLIPIDEMIA: ICD-10-CM

## 2025-03-24 DIAGNOSIS — G47.33 OSA ON CPAP: ICD-10-CM

## 2025-03-24 LAB
ALBUMIN SERPL-MCNC: 4 G/DL (ref 3.4–5)
ANION GAP SERPL CALCULATED.3IONS-SCNC: 9 MMOL/L (ref 3–16)
BUN SERPL-MCNC: 33 MG/DL (ref 7–20)
CALCIUM SERPL-MCNC: 8.7 MG/DL (ref 8.3–10.6)
CHLORIDE SERPL-SCNC: 105 MMOL/L (ref 99–110)
CO2 SERPL-SCNC: 25 MMOL/L (ref 21–32)
CREAT SERPL-MCNC: 1.2 MG/DL (ref 0.8–1.3)
GFR SERPLBLD CREATININE-BSD FMLA CKD-EPI: 62 ML/MIN/{1.73_M2}
GLUCOSE SERPL-MCNC: 144 MG/DL (ref 70–99)
PHOSPHATE SERPL-MCNC: 2.9 MG/DL (ref 2.5–4.9)
POTASSIUM SERPL-SCNC: 4.7 MMOL/L (ref 3.5–5.1)
SODIUM SERPL-SCNC: 139 MMOL/L (ref 136–145)
T4 FREE SERPL-MCNC: 0.7 NG/DL (ref 0.9–1.8)
TSH SERPL DL<=0.005 MIU/L-ACNC: 12.6 UIU/ML (ref 0.27–4.2)

## 2025-03-24 PROCEDURE — 84443 ASSAY THYROID STIM HORMONE: CPT

## 2025-03-24 PROCEDURE — 80069 RENAL FUNCTION PANEL: CPT

## 2025-03-24 PROCEDURE — 84439 ASSAY OF FREE THYROXINE: CPT

## 2025-03-24 PROCEDURE — 36415 COLL VENOUS BLD VENIPUNCTURE: CPT

## 2025-03-27 ENCOUNTER — APPOINTMENT (OUTPATIENT)
Dept: GENERAL RADIOLOGY | Age: 80
End: 2025-03-27
Attending: SURGERY
Payer: MEDICARE

## 2025-03-27 ENCOUNTER — ANESTHESIA (OUTPATIENT)
Dept: OPERATING ROOM | Age: 80
End: 2025-03-27
Payer: MEDICARE

## 2025-03-27 ENCOUNTER — ANESTHESIA EVENT (OUTPATIENT)
Dept: OPERATING ROOM | Age: 80
End: 2025-03-27
Payer: MEDICARE

## 2025-03-27 ENCOUNTER — HOSPITAL ENCOUNTER (OUTPATIENT)
Age: 80
Setting detail: OUTPATIENT SURGERY
Discharge: HOME | End: 2025-03-27
Attending: SURGERY | Admitting: SURGERY
Payer: MEDICARE

## 2025-03-27 VITALS
DIASTOLIC BLOOD PRESSURE: 56 MMHG | HEIGHT: 70 IN | RESPIRATION RATE: 14 BRPM | SYSTOLIC BLOOD PRESSURE: 90 MMHG | HEART RATE: 62 BPM | BODY MASS INDEX: 22.62 KG/M2 | WEIGHT: 158 LBS | TEMPERATURE: 97.5 F | OXYGEN SATURATION: 97 %

## 2025-03-27 DIAGNOSIS — K81.9 CHOLECYSTITIS: Primary | ICD-10-CM

## 2025-03-27 LAB
APTT BLD: 34.6 SEC (ref 22.1–36.4)
INR PPP: 1.29 (ref 0.85–1.15)
PROTHROMBIN TIME: 16.3 SEC (ref 11.9–14.9)

## 2025-03-27 PROCEDURE — 47563 LAPARO CHOLECYSTECTOMY/GRAPH: CPT | Performed by: SURGERY

## 2025-03-27 PROCEDURE — C1889 IMPLANT/INSERT DEVICE, NOC: HCPCS | Performed by: SURGERY

## 2025-03-27 PROCEDURE — 7100000010 HC PHASE II RECOVERY - FIRST 15 MIN: Performed by: SURGERY

## 2025-03-27 PROCEDURE — 2500000003 HC RX 250 WO HCPCS: Performed by: SURGERY

## 2025-03-27 PROCEDURE — 88304 TISSUE EXAM BY PATHOLOGIST: CPT

## 2025-03-27 PROCEDURE — C1894 INTRO/SHEATH, NON-LASER: HCPCS | Performed by: SURGERY

## 2025-03-27 PROCEDURE — 85730 THROMBOPLASTIN TIME PARTIAL: CPT

## 2025-03-27 PROCEDURE — 85610 PROTHROMBIN TIME: CPT

## 2025-03-27 PROCEDURE — 2580000003 HC RX 258: Performed by: SURGERY

## 2025-03-27 PROCEDURE — 6360000002 HC RX W HCPCS: Performed by: SURGERY

## 2025-03-27 PROCEDURE — 74300 X-RAY BILE DUCTS/PANCREAS: CPT

## 2025-03-27 PROCEDURE — 6360000004 HC RX CONTRAST MEDICATION: Performed by: SURGERY

## 2025-03-27 PROCEDURE — 2709999900 HC NON-CHARGEABLE SUPPLY: Performed by: SURGERY

## 2025-03-27 PROCEDURE — 36415 COLL VENOUS BLD VENIPUNCTURE: CPT

## 2025-03-27 PROCEDURE — 3700000001 HC ADD 15 MINUTES (ANESTHESIA): Performed by: SURGERY

## 2025-03-27 PROCEDURE — 2500000003 HC RX 250 WO HCPCS: Performed by: NURSE ANESTHETIST, CERTIFIED REGISTERED

## 2025-03-27 PROCEDURE — 3700000000 HC ANESTHESIA ATTENDED CARE: Performed by: SURGERY

## 2025-03-27 PROCEDURE — 6360000002 HC RX W HCPCS: Performed by: NURSE ANESTHETIST, CERTIFIED REGISTERED

## 2025-03-27 PROCEDURE — S2900 ROBOTIC SURGICAL SYSTEM: HCPCS | Performed by: SURGERY

## 2025-03-27 PROCEDURE — 3600000019 HC SURGERY ROBOT ADDTL 15MIN: Performed by: SURGERY

## 2025-03-27 PROCEDURE — 3600000009 HC SURGERY ROBOT BASE: Performed by: SURGERY

## 2025-03-27 PROCEDURE — 7100000011 HC PHASE II RECOVERY - ADDTL 15 MIN: Performed by: SURGERY

## 2025-03-27 PROCEDURE — 7100000000 HC PACU RECOVERY - FIRST 15 MIN: Performed by: SURGERY

## 2025-03-27 PROCEDURE — 7100000001 HC PACU RECOVERY - ADDTL 15 MIN: Performed by: SURGERY

## 2025-03-27 DEVICE — CLIP INT M L POLYMER LOK LIG HEM O LOK: Type: IMPLANTABLE DEVICE | Site: ABDOMEN | Status: FUNCTIONAL

## 2025-03-27 RX ORDER — OXYCODONE HYDROCHLORIDE 5 MG/1
5 TABLET ORAL EVERY 4 HOURS PRN
Qty: 20 TABLET | Refills: 0 | Status: SHIPPED | OUTPATIENT
Start: 2025-03-27 | End: 2025-04-03

## 2025-03-27 RX ORDER — LIDOCAINE HYDROCHLORIDE 20 MG/ML
INJECTION, SOLUTION EPIDURAL; INFILTRATION; INTRACAUDAL; PERINEURAL
Status: DISCONTINUED | OUTPATIENT
Start: 2025-03-27 | End: 2025-03-27 | Stop reason: SDUPTHER

## 2025-03-27 RX ORDER — FENTANYL CITRATE 50 UG/ML
50 INJECTION, SOLUTION INTRAMUSCULAR; INTRAVENOUS EVERY 5 MIN PRN
Refills: 0 | Status: CANCELLED | OUTPATIENT
Start: 2025-03-27

## 2025-03-27 RX ORDER — HYDROMORPHONE HYDROCHLORIDE 2 MG/ML
0.25 INJECTION, SOLUTION INTRAMUSCULAR; INTRAVENOUS; SUBCUTANEOUS EVERY 5 MIN PRN
Refills: 0 | Status: CANCELLED | OUTPATIENT
Start: 2025-03-27

## 2025-03-27 RX ORDER — PROPOFOL 10 MG/ML
INJECTION, EMULSION INTRAVENOUS
Status: DISCONTINUED | OUTPATIENT
Start: 2025-03-27 | End: 2025-03-27 | Stop reason: SDUPTHER

## 2025-03-27 RX ORDER — SODIUM CHLORIDE 0.9 % (FLUSH) 0.9 %
5-40 SYRINGE (ML) INJECTION PRN
Status: DISCONTINUED | OUTPATIENT
Start: 2025-03-27 | End: 2025-03-27 | Stop reason: HOSPADM

## 2025-03-27 RX ORDER — OXYCODONE HYDROCHLORIDE 5 MG/1
5 TABLET ORAL PRN
Refills: 0 | Status: CANCELLED | OUTPATIENT
Start: 2025-03-27 | End: 2025-03-27

## 2025-03-27 RX ORDER — FUROSEMIDE 40 MG/1
40 TABLET ORAL DAILY
COMMUNITY

## 2025-03-27 RX ORDER — SODIUM CHLORIDE 9 MG/ML
INJECTION, SOLUTION INTRAVENOUS PRN
Status: CANCELLED | OUTPATIENT
Start: 2025-03-27

## 2025-03-27 RX ORDER — SODIUM CHLORIDE 0.9 % (FLUSH) 0.9 %
5-40 SYRINGE (ML) INJECTION EVERY 12 HOURS SCHEDULED
Status: DISCONTINUED | OUTPATIENT
Start: 2025-03-27 | End: 2025-03-27 | Stop reason: HOSPADM

## 2025-03-27 RX ORDER — ROCURONIUM BROMIDE 10 MG/ML
INJECTION, SOLUTION INTRAVENOUS
Status: DISCONTINUED | OUTPATIENT
Start: 2025-03-27 | End: 2025-03-27 | Stop reason: SDUPTHER

## 2025-03-27 RX ORDER — SODIUM CHLORIDE 9 MG/ML
INJECTION, SOLUTION INTRAVENOUS PRN
Status: DISCONTINUED | OUTPATIENT
Start: 2025-03-27 | End: 2025-03-27 | Stop reason: HOSPADM

## 2025-03-27 RX ORDER — FENTANYL CITRATE 50 UG/ML
INJECTION, SOLUTION INTRAMUSCULAR; INTRAVENOUS
Status: DISCONTINUED | OUTPATIENT
Start: 2025-03-27 | End: 2025-03-27 | Stop reason: SDUPTHER

## 2025-03-27 RX ORDER — MAGNESIUM HYDROXIDE 1200 MG/15ML
LIQUID ORAL CONTINUOUS PRN
Status: COMPLETED | OUTPATIENT
Start: 2025-03-27 | End: 2025-03-27

## 2025-03-27 RX ORDER — SUCCINYLCHOLINE/SOD CL,ISO/PF 200MG/10ML
SYRINGE (ML) INTRAVENOUS
Status: DISCONTINUED | OUTPATIENT
Start: 2025-03-27 | End: 2025-03-27 | Stop reason: SDUPTHER

## 2025-03-27 RX ORDER — ONDANSETRON 2 MG/ML
INJECTION INTRAMUSCULAR; INTRAVENOUS
Status: DISCONTINUED | OUTPATIENT
Start: 2025-03-27 | End: 2025-03-27 | Stop reason: SDUPTHER

## 2025-03-27 RX ORDER — OXYCODONE HYDROCHLORIDE 5 MG/1
10 TABLET ORAL PRN
Refills: 0 | Status: CANCELLED | OUTPATIENT
Start: 2025-03-27 | End: 2025-03-27

## 2025-03-27 RX ORDER — BUPIVACAINE HYDROCHLORIDE AND EPINEPHRINE 5; 5 MG/ML; UG/ML
INJECTION, SOLUTION EPIDURAL; INTRACAUDAL; PERINEURAL
Status: DISCONTINUED | OUTPATIENT
Start: 2025-03-27 | End: 2025-03-27 | Stop reason: ALTCHOICE

## 2025-03-27 RX ORDER — SODIUM CHLORIDE 0.9 % (FLUSH) 0.9 %
5-40 SYRINGE (ML) INJECTION EVERY 12 HOURS SCHEDULED
Status: CANCELLED | OUTPATIENT
Start: 2025-03-27

## 2025-03-27 RX ORDER — PHENYLEPHRINE HCL IN 0.9% NACL 1 MG/10 ML
SYRINGE (ML) INTRAVENOUS
Status: DISCONTINUED | OUTPATIENT
Start: 2025-03-27 | End: 2025-03-27 | Stop reason: SDUPTHER

## 2025-03-27 RX ORDER — DEXAMETHASONE SODIUM PHOSPHATE 4 MG/ML
INJECTION, SOLUTION INTRA-ARTICULAR; INTRALESIONAL; INTRAMUSCULAR; INTRAVENOUS; SOFT TISSUE
Status: DISCONTINUED | OUTPATIENT
Start: 2025-03-27 | End: 2025-03-27 | Stop reason: SDUPTHER

## 2025-03-27 RX ORDER — SODIUM CHLORIDE 0.9 % (FLUSH) 0.9 %
5-40 SYRINGE (ML) INJECTION PRN
Status: CANCELLED | OUTPATIENT
Start: 2025-03-27

## 2025-03-27 RX ORDER — INDOCYANINE GREEN AND WATER 25 MG
5 KIT INJECTION ONCE
Status: COMPLETED | OUTPATIENT
Start: 2025-03-27 | End: 2025-03-27

## 2025-03-27 RX ORDER — NALOXONE HYDROCHLORIDE 0.4 MG/ML
INJECTION, SOLUTION INTRAMUSCULAR; INTRAVENOUS; SUBCUTANEOUS PRN
Status: CANCELLED | OUTPATIENT
Start: 2025-03-27

## 2025-03-27 RX ORDER — GLYCOPYRROLATE 0.2 MG/ML
INJECTION INTRAMUSCULAR; INTRAVENOUS
Status: DISCONTINUED | OUTPATIENT
Start: 2025-03-27 | End: 2025-03-27 | Stop reason: SDUPTHER

## 2025-03-27 RX ADMIN — SODIUM CHLORIDE: 9 INJECTION, SOLUTION INTRAVENOUS at 08:28

## 2025-03-27 RX ADMIN — SUGAMMADEX 100 MG: 100 INJECTION, SOLUTION INTRAVENOUS at 09:01

## 2025-03-27 RX ADMIN — ROCURONIUM BROMIDE 10 MG: 10 INJECTION INTRAVENOUS at 08:25

## 2025-03-27 RX ADMIN — ROCURONIUM BROMIDE 25 MG: 10 INJECTION INTRAVENOUS at 08:04

## 2025-03-27 RX ADMIN — SODIUM CHLORIDE: 9 INJECTION, SOLUTION INTRAVENOUS at 06:37

## 2025-03-27 RX ADMIN — ONDANSETRON 4 MG: 2 INJECTION, SOLUTION INTRAMUSCULAR; INTRAVENOUS at 08:59

## 2025-03-27 RX ADMIN — LIDOCAINE HYDROCHLORIDE 50 MG: 20 INJECTION, SOLUTION EPIDURAL; INFILTRATION; INTRACAUDAL; PERINEURAL at 07:45

## 2025-03-27 RX ADMIN — Medication 160 MG: at 07:45

## 2025-03-27 RX ADMIN — ROCURONIUM BROMIDE 40 MG: 10 INJECTION INTRAVENOUS at 07:53

## 2025-03-27 RX ADMIN — Medication 100 MCG: at 08:23

## 2025-03-27 RX ADMIN — CEFAZOLIN 2000 MG: 2 INJECTION, POWDER, FOR SOLUTION INTRAMUSCULAR; INTRAVENOUS at 07:49

## 2025-03-27 RX ADMIN — Medication 200 MCG: at 07:56

## 2025-03-27 RX ADMIN — INDOCYANINE GREEN AND WATER 5 MG: KIT at 06:36

## 2025-03-27 RX ADMIN — Medication 200 MCG: at 08:37

## 2025-03-27 RX ADMIN — Medication 200 MCG: at 08:55

## 2025-03-27 RX ADMIN — SUGAMMADEX 100 MG: 100 INJECTION, SOLUTION INTRAVENOUS at 08:57

## 2025-03-27 RX ADMIN — FENTANYL CITRATE 100 MCG: 50 INJECTION, SOLUTION INTRAMUSCULAR; INTRAVENOUS at 07:45

## 2025-03-27 RX ADMIN — ROCURONIUM BROMIDE 15 MG: 10 INJECTION INTRAVENOUS at 08:51

## 2025-03-27 RX ADMIN — PROPOFOL 150 MG: 10 INJECTION, EMULSION INTRAVENOUS at 07:45

## 2025-03-27 RX ADMIN — GLYCOPYRROLATE 0.2 MG: 0.2 INJECTION INTRAMUSCULAR; INTRAVENOUS at 07:52

## 2025-03-27 RX ADMIN — ROCURONIUM BROMIDE 10 MG: 10 INJECTION INTRAVENOUS at 07:45

## 2025-03-27 RX ADMIN — DEXAMETHASONE SODIUM PHOSPHATE 10 MG: 4 INJECTION, SOLUTION INTRAMUSCULAR; INTRAVENOUS at 07:54

## 2025-03-27 ASSESSMENT — PAIN - FUNCTIONAL ASSESSMENT: PAIN_FUNCTIONAL_ASSESSMENT: NONE - DENIES PAIN

## 2025-03-27 ASSESSMENT — ENCOUNTER SYMPTOMS: SHORTNESS OF BREATH: 1

## 2025-03-27 NOTE — PROGRESS NOTES
Pt on unit from pauc report received at bedside. Pt denies any pain  VSS pressure soft surgical sites x5 to ABD CDI ice in place. Pt tolerating snacks and fluids well call light in reach daughter picked up script from OP

## 2025-03-27 NOTE — PROGRESS NOTES
Pt VSS. O2 95% on room air. Pt denies pain at this time. Surgical site to abdomen x5 plus 1 puncture, closed with glue,CDI, soft to palpation, color and temp appropriate, ice applied. Pt seen by anesthesia. Phase I criteria met, transferring to phase II.

## 2025-03-27 NOTE — PROGRESS NOTES
Discharge instructions reviewed with pt and daughter all questions answered. VSS pt off unit at this time.

## 2025-03-27 NOTE — ANESTHESIA POSTPROCEDURE EVALUATION
Department of Anesthesiology  Postprocedure Note    Patient: Juan A Mathis  MRN: [id number]  YOB: 1945  Date of evaluation: 3/27/2025    Procedure Summary       Date: 03/27/25 Room / Location: [address]    Anesthesia Start: 0742 Anesthesia Stop: 0923    Procedure: ROBOT ASSISTED LAPAROSCOPIC CHOLECYSTECTOMY WITH CHOLANGIOGRAM (Abdomen) Diagnosis:       Cholecystitis      (Cholecystitis [K81.9])    Surgeons: Alli Miller MD Responsible Provider: Alessandro Hwang DO    Anesthesia Type: general ASA Status: 4            Anesthesia Type: No value filed.    Brad Phase I: Brad Score: 8    Brad Phase II:      Anesthesia Post Evaluation    Patient location during evaluation: PACU  Patient participation: complete - patient participated  Level of consciousness: awake  Pain score: 0  Airway patency: patent  Nausea & Vomiting: no nausea and no vomiting  Cardiovascular status: blood pressure returned to baseline  Respiratory status: acceptable  Hydration status: euvolemic  Multimodal analgesia pain management approach  Pain management: adequate    No notable events documented.

## 2025-03-27 NOTE — OP NOTE
level of the umbilicus under direct vision. The patient was then positioned and robot docked. A very large amount of omental adhesions were noted to the anterior abdominal wall and gallbladder. This was carefully peeled down with careful sharp dissection, blunt dissection and cautery. Bowel was visualized and protected. The drain was seen clearly entering the gallbladder and left in place at this point. A raytec sponge was used to elevated the medial aspect of the gallbladder. The neck of the gallbladder was grasped and retracted laterally exposing Calot's triangle. The cystic duct, cystic artery and neck of the gallbladder were all dissected circumferentially exposing the critical view of safety. The firefly filter on the Xi robot was used to visualize the cystic duct, common bile duct, and common hepatic duct further clarifying the anatomy performing a cholangiogram using firefly and robotic assistance. A clip was placed high and lateral on the cystic duct and just medial incised with scissors. The cholangiogram catheter was placed through a stab incision in the right upper quadrant through the abdominal wall and into the cystic duct. This was confirmed with easy flushing of the catheter. Using fluoroscopic guidance, contrast was injected through the cystic duct into the common duct and into the duodenum without obstruction. Dye was then forcefully injected allowing retrograde filling of the common hepatic duct and biliary radicles. The cholangiogram was normal and catheter removed. The cystic duct was then clipped medially and divided. The cystic artery was also clipped medially and laterally and then divided. The drain was then cut at skin level and brought into the abdomen. The peritoneal attachments between the liver and gallbladder were taken down with cautery. The gallbladder and attached drain was  from its surrounding tissue, placed into an endobag, removed through one of the trocars, and passed

## 2025-03-27 NOTE — DISCHARGE INSTRUCTIONS
is needed, recommend the following over the counter medications as needed, Colace 100mg by mouth twice daily, and Miralax 17 gm by mouth 1-3 times daily with glass of water  Wound Care  You have clear surgical glue closing your incisions and this will peel away in 1-2 weeks. You may shower tomorrow. Avoid hot tubs, pools, open water until seen in office.  If incisional bleeding noted, hold hard pressure for 15-20 minutes. If not controlled, either contact the office or present to nearest ED  It is common to have bruising or mild redness around the wounds within the first few days after surgery. If it worsens, or starts draining, do not hesitate to contact the office  Prior Medications  May resume anticoagulation (Plavix, Xarelto, Eliquis, etc.) tomorrow and May resume all other normal daily medications today as usual  Cautions  Watch for signs of infection: Fever over 100.5, excessive warmth or redness around incisions, bloody or cloudy drainage from incisions  Watch for signs of complication: uncontrolled pain, nausea, bloating, sudden lightheadness  Serious problems may arise after surgery that need urgent or immediate care. Do not hesitate to contact the office with any questions  Followup  Call the office to schedule your post-operative appointment with your surgeon for 2 weeks.              ANESTHESIA DISCHARGE INSTRUCTIONS    Wear your seatbelt home.  You are under the influence of drugs-do not drink alcohol, drive, operate machinery, make any important decisions or sign any legal documents for 24 hours.  Children should not ride bikes, skate boards or play on gym sets for 24 hours after surgery.  A responsible adult needs to be with you for 24 hours.  You may experience lightheadedness, dizziness, or sleepiness following surgery.  Rest at home today- increase activity as tolerated.  Progress slowly to a regular diet unless your physician has instructed you otherwise. Drink plenty of water.  If persistent

## 2025-03-27 NOTE — H&P
[facility]    I have reviewed the history and physical and examined the patient and find no relevant changes.    I have reviewed with the patient and/or family the risks, benefits, and alternatives to the procedure.    Alli Miller MD, FACS  3/27/2025  7:20 AM

## 2025-03-27 NOTE — PROGRESS NOTES
Pt arrived to PACU from OR with VSS, O2 100% on simple mask 6L. Pt awakens to voice. Surgical site to abdomen x5 plus 1 puncture, closed with glue, CDI, soft to palpation, color and temp appropriate, ice applied. Will cont to monitor.

## 2025-03-28 ENCOUNTER — TELEPHONE (OUTPATIENT)
Dept: SURGERY | Age: 80
End: 2025-03-28

## 2025-03-28 ENCOUNTER — RESULTS FOLLOW-UP (OUTPATIENT)
Dept: CARDIOLOGY CLINIC | Age: 80
End: 2025-03-28

## 2025-03-28 NOTE — RESULT ENCOUNTER NOTE
Labs reviewed, improved. Yesterday had choly as outpatient. Since he has felt like he can't urinate because he is empty and trying to drink more.  Denies pain when he urinates or burning   Today weight up to 161. Since surgery yesterday he only urinated 16 ounces, took in 100 ounces, has swelling in his stomach area.  Last night temp was 100 today 98.8, no chills.

## 2025-03-28 NOTE — TELEPHONE ENCOUNTER
PO 3/27- ROBOT ASSISTED LAPAROSCOPIC CHOLECYSTECTOMY WITH CHOLANGIOGRAM     Pt states he is having trouble urinating. Pt states drinking lots of water and feels bladder is full but urine output is not \"what it should be\". Pt is concerned may be a kidney issue.   Please call pt and advise.

## 2025-03-31 ENCOUNTER — TELEPHONE (OUTPATIENT)
Dept: CARDIOLOGY CLINIC | Age: 80
End: 2025-03-31

## 2025-03-31 ENCOUNTER — HOSPITAL ENCOUNTER (OUTPATIENT)
Age: 80
Discharge: HOME | End: 2025-03-31
Payer: MEDICARE

## 2025-03-31 ENCOUNTER — ANTI-COAG VISIT (OUTPATIENT)
Dept: PHARMACY | Age: 80
End: 2025-03-31
Payer: MEDICARE

## 2025-03-31 DIAGNOSIS — I50.22 CHRONIC SYSTOLIC HEART FAILURE (HCC): ICD-10-CM

## 2025-03-31 DIAGNOSIS — E87.1 HYPONATREMIA: ICD-10-CM

## 2025-03-31 DIAGNOSIS — N17.9 ACUTE KIDNEY INJURY SUPERIMPOSED ON CKD: ICD-10-CM

## 2025-03-31 DIAGNOSIS — N18.9 ACUTE KIDNEY INJURY SUPERIMPOSED ON CKD: ICD-10-CM

## 2025-03-31 DIAGNOSIS — I95.9 HYPOTENSION, UNSPECIFIED HYPOTENSION TYPE: ICD-10-CM

## 2025-03-31 DIAGNOSIS — N18.31 CHRONIC KIDNEY DISEASE, STAGE 3A (HCC): ICD-10-CM

## 2025-03-31 DIAGNOSIS — I48.0 PAROXYSMAL ATRIAL FIBRILLATION (HCC): Primary | Chronic | ICD-10-CM

## 2025-03-31 DIAGNOSIS — Z09 HOSPITAL DISCHARGE FOLLOW-UP: ICD-10-CM

## 2025-03-31 DIAGNOSIS — I48.0 PAF (PAROXYSMAL ATRIAL FIBRILLATION) (HCC): Chronic | ICD-10-CM

## 2025-03-31 LAB
ALBUMIN SERPL-MCNC: 3.8 G/DL (ref 3.4–5)
ANION GAP SERPL CALCULATED.3IONS-SCNC: 12 MMOL/L (ref 3–16)
BACTERIA URNS QL MICRO: ABNORMAL /HPF
BILIRUB UR QL STRIP.AUTO: NEGATIVE
BUN SERPL-MCNC: 24 MG/DL (ref 7–20)
CALCIUM SERPL-MCNC: 9.1 MG/DL (ref 8.3–10.6)
CHARACTER UR: ABNORMAL
CHLORIDE SERPL-SCNC: 101 MMOL/L (ref 99–110)
CLARITY UR: CLEAR
CO2 SERPL-SCNC: 23 MMOL/L (ref 21–32)
COLOR UR: YELLOW
CREAT SERPL-MCNC: 1.2 MG/DL (ref 0.8–1.3)
CREAT UR-MCNC: 110 MG/DL (ref 39–259)
DEPRECATED RDW RBC AUTO: 16.9 % (ref 12.4–15.4)
EPI CELLS #/AREA URNS AUTO: 0 /HPF (ref 0–5)
GFR SERPLBLD CREATININE-BSD FMLA CKD-EPI: 62 ML/MIN/{1.73_M2}
GLUCOSE SERPL-MCNC: 97 MG/DL (ref 70–99)
GLUCOSE UR STRIP.AUTO-MCNC: NEGATIVE MG/DL
HCT VFR BLD AUTO: 34.3 % (ref 40.5–52.5)
HGB BLD-MCNC: 11.5 G/DL (ref 13.5–17.5)
HGB UR QL STRIP.AUTO: NEGATIVE
HYALINE CASTS #/AREA URNS AUTO: 2 /LPF (ref 0–8)
INTERNATIONAL NORMALIZATION RATIO, POC: 1.5
KETONES UR STRIP.AUTO-MCNC: ABNORMAL MG/DL
LEUKOCYTE ESTERASE UR QL STRIP.AUTO: ABNORMAL
MAGNESIUM SERPL-MCNC: 2.4 MG/DL (ref 1.8–2.4)
MCH RBC QN AUTO: 30.7 PG (ref 26–34)
MCHC RBC AUTO-ENTMCNC: 33.5 G/DL (ref 31–36)
MCV RBC AUTO: 91.7 FL (ref 80–100)
NITRITE UR QL STRIP.AUTO: NEGATIVE
OSMOLALITY SERPL: 297 MOSM/KG (ref 280–301)
OSMOLALITY UR: 554 MOSM/KG (ref 390–1070)
PH UR STRIP.AUTO: 7.5 [PH] (ref 5–8)
PHOSPHATE SERPL-MCNC: 2.4 MG/DL (ref 2.5–4.9)
PLATELET # BLD AUTO: 352 K/UL (ref 135–450)
PMV BLD AUTO: 8.7 FL (ref 5–10.5)
POTASSIUM SERPL-SCNC: 4.2 MMOL/L (ref 3.5–5.1)
PROT UR STRIP.AUTO-MCNC: 30 MG/DL
PROT UR-MCNC: 42.1 MG/DL
PROT/CREAT UR-RTO: 0.4 MG/DL
PROTHROMBIN TIME, POC: 0
RBC # BLD AUTO: 3.74 M/UL (ref 4.2–5.9)
RBC #/AREA URNS HPF: ABNORMAL /HPF (ref 0–4)
SODIUM SERPL-SCNC: 136 MMOL/L (ref 136–145)
SODIUM UR-SCNC: 66 MMOL/L
SP GR UR STRIP.AUTO: 1.02 (ref 1–1.03)
UA DIPSTICK W REFLEX MICRO PNL UR: YES
URN SPEC COLLECT METH UR: ABNORMAL
UROBILINOGEN UR STRIP-ACNC: 1 E.U./DL
WBC # BLD AUTO: 7.5 K/UL (ref 4–11)
WBC #/AREA URNS AUTO: 1 /HPF (ref 0–5)

## 2025-03-31 PROCEDURE — 82570 ASSAY OF URINE CREATININE: CPT

## 2025-03-31 PROCEDURE — 36415 COLL VENOUS BLD VENIPUNCTURE: CPT

## 2025-03-31 PROCEDURE — 85610 PROTHROMBIN TIME: CPT | Performed by: PHYSICIAN ASSISTANT

## 2025-03-31 PROCEDURE — 84156 ASSAY OF PROTEIN URINE: CPT

## 2025-03-31 PROCEDURE — 84300 ASSAY OF URINE SODIUM: CPT

## 2025-03-31 PROCEDURE — 83935 ASSAY OF URINE OSMOLALITY: CPT

## 2025-03-31 PROCEDURE — 81001 URINALYSIS AUTO W/SCOPE: CPT

## 2025-03-31 PROCEDURE — 99212 OFFICE O/P EST SF 10 MIN: CPT | Performed by: PHYSICIAN ASSISTANT

## 2025-03-31 PROCEDURE — 85027 COMPLETE CBC AUTOMATED: CPT

## 2025-03-31 PROCEDURE — 83735 ASSAY OF MAGNESIUM: CPT

## 2025-03-31 PROCEDURE — 80069 RENAL FUNCTION PANEL: CPT

## 2025-03-31 PROCEDURE — 83930 ASSAY OF BLOOD OSMOLALITY: CPT

## 2025-03-31 NOTE — TELEPHONE ENCOUNTER
[facility]- KV    Please review Optivol status in [facility]UR[facility]. Thanks.     
Optivol elevated since stopping entresto, pt seen in office last week. Continue to monitor. KJV  
04:00

## 2025-03-31 NOTE — PROGRESS NOTES
Mr. Juan A Mahtis is a 79 y.o. y/o male with history of Afib who presents today for anticoagulation monitoring and adjustment.    Pertinent PMH: DVT, CABG, HTN, CAD, seizure, CHF    Patient Reported Findings:  Yes     No  [x]   []       Patient verifies current dosing regimen as listed- confirmed   []   [x]       S/S bleeding/bruising/swelling/SOB- denies ---> was having clot symptoms on Sat so went to see doc on Mon and did doppler---> had large bruise on leg for a few weeks but has resolved now --> denies   []   [x]       Blood in urine or stool- denies   [x]   []       Procedures scheduled in the future at this time colonoscopy was cancelled, r/s for December 29, hold 5 days and bridge. Was instructed to get INR on Mon 12/26 to determine INR, start lovenox --> CV 9/6, ablation 12/14 --> having ablation on 12/14, cleared to hold warfarin 3 days prior --> had procedure 5/1, held warfarin 5 days prior and bridged with lovenox. INR 1.2 at procedure---> had procedure on 3/27 held warfarin 5 d and bridged with lovenox (see below)  []   [x]       Missed Dose -Missed several doses while in hospital  []   [x]       Extra Dose- denies   []   [x]       Change in medications- states that MD had been adjusting phenytoin increasing 4-5 weeks ago but plans to return to normal phenytoin today  --->takes amiodarone 200 mg qd---> states fluctuates Phenytoin based on symptoms, unclear ---> primidone decreased 2/29 (could cause INR to inc) ---> has been taking more amiodarone (1/2 tablet) qd for past 2 weeks since being in a fib--> Increasing metoprolol, amiodarone 200 mg qam and 100 mg 4x week in pm --> Just finished amoxicillin yesterday --> denies changes   []   [x]       Change in health/diet/appetite -states normally eats a lot of vegetable soup, but has not had any greens recently ---> no greens --> more greens, 3 salads in past week --> had diarrhea for a week recently so has been eating more starch. Diarrhea since resolved

## 2025-04-01 ENCOUNTER — APPOINTMENT (OUTPATIENT)
Dept: PHARMACY | Age: 80
End: 2025-04-01
Payer: MEDICARE

## 2025-04-04 ENCOUNTER — ANTI-COAG VISIT (OUTPATIENT)
Dept: PHARMACY | Age: 80
End: 2025-04-04
Payer: MEDICARE

## 2025-04-04 DIAGNOSIS — I48.0 PAROXYSMAL ATRIAL FIBRILLATION (HCC): Primary | Chronic | ICD-10-CM

## 2025-04-04 LAB
INTERNATIONAL NORMALIZATION RATIO, POC: 1.5
PROTHROMBIN TIME, POC: 0

## 2025-04-04 PROCEDURE — 85610 PROTHROMBIN TIME: CPT | Performed by: PHARMACIST

## 2025-04-04 PROCEDURE — 99212 OFFICE O/P EST SF 10 MIN: CPT | Performed by: PHARMACIST

## 2025-04-04 NOTE — PROGRESS NOTES
Mr. Juan A Mathis is a 79 y.o. y/o male with history of Afib who presents today for anticoagulation monitoring and adjustment.    Pertinent PMH: DVT, CABG, HTN, CAD, seizure, CHF    Patient Reported Findings:  Yes     No  [x]   []       Patient verifies current dosing regimen as listed- confirmed   []   [x]       S/S bleeding/bruising/swelling/SOB- denies ---> was having clot symptoms on Sat so went to see doc on Mon and did doppler---> had large bruise on leg for a few weeks but has resolved now --> denies   []   [x]       Blood in urine or stool- denies   []   [x]       Procedures scheduled in the future at this time colonoscopy was cancelled, r/s for December 29, hold 5 days and bridge. Was instructed to get INR on Mon 12/26 to determine INR, start lovenox --> CV 9/6, ablation 12/14 --> having ablation on 12/14, cleared to hold warfarin 3 days prior --> had procedure 5/1, held warfarin 5 days prior and bridged with lovenox. INR 1.2 at procedure---> had procedure on 3/27 held warfarin 5 d and bridged with lovenox (see below)  []   [x]       Missed Dose -Missed several doses while in hospital  []   [x]       Extra Dose- denies   []   [x]       Change in medications- states that MD had been adjusting phenytoin increasing 4-5 weeks ago but plans to return to normal phenytoin today  --->takes amiodarone 200 mg qd---> states fluctuates Phenytoin based on symptoms, unclear ---> primidone decreased 2/29 (could cause INR to inc) ---> has been taking more amiodarone (1/2 tablet) qd for past 2 weeks since being in a fib--> Increasing metoprolol, amiodarone 200 mg qam and 100 mg 4x week in pm --> Just finished amoxicillin yesterday --> denies changes   []   [x]       Change in health/diet/appetite -states normally eats a lot of vegetable soup, but has not had any greens recently ---> no greens --> more greens, 3 salads in past week --> had diarrhea for a week recently so has been eating more starch. Diarrhea since resolved

## 2025-04-11 ENCOUNTER — ANTI-COAG VISIT (OUTPATIENT)
Dept: PHARMACY | Age: 80
End: 2025-04-11
Payer: MEDICARE

## 2025-04-11 ENCOUNTER — OFFICE VISIT (OUTPATIENT)
Dept: SURGERY | Age: 80
End: 2025-04-11

## 2025-04-11 VITALS — DIASTOLIC BLOOD PRESSURE: 68 MMHG | SYSTOLIC BLOOD PRESSURE: 106 MMHG | BODY MASS INDEX: 22.1 KG/M2 | WEIGHT: 154 LBS

## 2025-04-11 DIAGNOSIS — Z09 SURGERY FOLLOW-UP: Primary | ICD-10-CM

## 2025-04-11 DIAGNOSIS — I48.0 PAROXYSMAL ATRIAL FIBRILLATION (HCC): Primary | Chronic | ICD-10-CM

## 2025-04-11 LAB
INTERNATIONAL NORMALIZATION RATIO, POC: 2.4
PROTHROMBIN TIME, POC: 0

## 2025-04-11 PROCEDURE — 85610 PROTHROMBIN TIME: CPT | Performed by: PHYSICIAN ASSISTANT

## 2025-04-11 PROCEDURE — 99024 POSTOP FOLLOW-UP VISIT: CPT | Performed by: SURGERY

## 2025-04-11 PROCEDURE — 99211 OFF/OP EST MAY X REQ PHY/QHP: CPT | Performed by: PHYSICIAN ASSISTANT

## 2025-04-11 NOTE — PROGRESS NOTES
03/27/2025 1.29 (H)   02/02/2025 1.36 (H)   02/01/2025 1.42 (H)   01/31/2025 1.99 (H)     INR,(POC) (no units)   Date Value   04/04/2025 1.5   03/31/2025 1.5   03/04/2025 2.4   02/20/2025 1.6     For Pharmacy Admin Tracking Only    Intervention Detail: Dose Adjustment: 1, reason: Therapy Optimization  Total # of Interventions Recommended: 1  Total # of Interventions Accepted: 1  Time Spent (min): 15

## 2025-04-11 NOTE — PROGRESS NOTES
Albumin 03/24/2025 4.0  3.4 - 5.0 g/dL Final    TSH 03/24/2025 12.60 (H)  0.27 - 4.20 uIU/mL Final    T4 Free 03/24/2025 0.7 (L)  0.9 - 1.8 ng/dL Final   Anti-coag visit on 03/04/2025   Component Date Value Ref Range Status    Prothrombin time,(POC) 03/04/2025 0.0   Final    INR,(POC) 03/04/2025 2.4   Final       FL CHOLANGIOGRAM OR  Result Date: 3/27/2025  EXAMINATION: SPOT IMAGES FROM AN INTRAOPERATIVE CHOLANGIOGRAM 3/27/2025 8:43 am COMPARISON: None. HISTORY: ORDERING SYSTEM PROVIDED HISTORY: pain TECHNOLOGIST PROVIDED HISTORY: Reason for exam:->pain FLUOROSCOPY DOSE AND TYPE: Radiation Exposure Index: Kerma mGy, 2.98 FINDINGS: Intraoperative spot images of the abdomen demonstrate intraoperative cholangiogram.  Status post cholecystectomy with cannulization of the cystic duct.  There is normal filling of the remnant cystic duct and common bile duct without evidence of intraluminal filling defect.  No choledocholithiasis.  Normal emptying into the proximal small bowel.     Intraoperative cholangiogram demonstrating no evidence of choledocholithiasis.  Status post cholecystectomy.  No evidence of leak.       Pathology:  FINAL DIAGNOSIS:     Gallbladder, cholecystectomy:   - Cholelithiasis.   - Chronic cholecystitis.   - One lymph node.   - Negative for malignancy.       JINMI/JINMI    Assessment:  robot-assisted laparoscopic cholecystectomy with cholangiogram for chronic cholecystitis and drain removal on 3/27/25    Plan:  No heavy lifting over 20lbs for 6 weeks total postop  Diet and activity as tolerated otherwise  Follow up with general surgery office as needed    Alli Miller MD, FACS  4/11/2025  11:10 AM

## 2025-04-11 NOTE — PATIENT INSTRUCTIONS
No heavy lifting over 20lbs for 6 weeks total postop  Diet and activity as tolerated otherwise  Follow up with general surgery office as needed

## 2025-04-23 PROBLEM — Z01.810 PREOP CARDIOVASCULAR EXAM: Status: RESOLVED | Noted: 2022-08-26 | Resolved: 2025-04-23

## 2025-04-23 RX ORDER — CALCIUM CARBONATE 300MG(750)
2 TABLET,CHEWABLE ORAL DAILY
Qty: 540 TABLET | Refills: 3 | Status: SHIPPED | OUTPATIENT
Start: 2025-04-23

## 2025-04-25 ENCOUNTER — ANTI-COAG VISIT (OUTPATIENT)
Dept: PHARMACY | Age: 80
End: 2025-04-25
Payer: MEDICARE

## 2025-04-25 DIAGNOSIS — I48.0 PAROXYSMAL ATRIAL FIBRILLATION (HCC): Primary | Chronic | ICD-10-CM

## 2025-04-25 LAB
INTERNATIONAL NORMALIZATION RATIO, POC: 2.4
PROTHROMBIN TIME, POC: 0

## 2025-04-25 PROCEDURE — 85610 PROTHROMBIN TIME: CPT | Performed by: PHARMACIST

## 2025-04-25 PROCEDURE — 99211 OFF/OP EST MAY X REQ PHY/QHP: CPT | Performed by: PHARMACIST

## 2025-04-25 NOTE — PROGRESS NOTES
Mr. Juan A Mathis is a 79 y.o. y/o male with history of Afib who presents today for anticoagulation monitoring and adjustment.    Pertinent PMH: DVT, CABG, HTN, CAD, seizure, CHF    Patient Reported Findings:  Yes     No  [x]   []       Patient verifies current dosing regimen as listed- confirmed --> used AVS to take doses   []   [x]       S/S bleeding/bruising/swelling/SOB- denies ---> was having clot symptoms on Sat so went to see doc on Mon and did doppler---> had large bruise on leg for a few weeks but has resolved now --> denies   []   [x]       Blood in urine or stool- denies   []   [x]       Procedures scheduled in the future at this time colonoscopy was cancelled, r/s for December 29, hold 5 days and bridge. Was instructed to get INR on Mon 12/26 to determine INR, start lovenox --> CV 9/6, ablation 12/14 --> having ablation on 12/14, cleared to hold warfarin 3 days prior --> had procedure 5/1, held warfarin 5 days prior and bridged with lovenox. INR 1.2 at procedure---> had procedure on 3/27 held warfarin 5 d and bridged with lovenox (see below) --> None upcoming   []   [x]       Missed Dose -denies   []   [x]       Extra Dose- denies   []   [x]       Change in medications- states that MD had been adjusting phenytoin increasing 4-5 weeks ago but plans to return to normal phenytoin today  --->takes amiodarone 200 mg qd---> states fluctuates Phenytoin based on symptoms, unclear ---> primidone decreased 2/29 (could cause INR to inc) ---> has been taking more amiodarone (1/2 tablet) qd for past 2 weeks since being in a fib--> Increasing metoprolol, amiodarone 200 mg qam and 100 mg 4x week in pm --> Just finished amoxicillin yesterday --> denies changes   []   [x]       Change in health/diet/appetite -states normally eats a lot of vegetable soup, but has not had any greens recently ---> no greens --> more greens, 3 salads in past week --> had diarrhea for a week recently so has been eating more starch. Diarrhea

## 2025-04-29 NOTE — Clinical Note
Discharge Plan[de-identified] Other/[facility])  Telemetry/Cardiac Monitoring Required?: Yes
negative

## 2025-05-07 NOTE — PATIENT INSTRUCTIONS
Stop Midodrine for about a week - monitor blood pressure and call office on Friday with your blood pressures and how you are feeling.      Follow up with Dr. Frazier in 6 months

## 2025-05-07 NOTE — PROGRESS NOTES
[facility]  5/14/25  Referring:     REASON FOR CONSULT/CHIEF COMPLAINT/HPI     Reason for visit/ Chief complaint  Follow Up  CAD, ICM   HPI Juan A Mathis is a 79 y.o. seen as a follow up for management of CAD )(2019 CABG redo [facility] with [place] ligation and maze), ICM( 2017 dual chamber AICD), htn, hchol and AF, SVT JOSEPH 12/14/23  PVC ablation ( [facility])  On 3/30 he had a PCI to IAN to LAD with PAYAL, PCI to SVG to OM with PAYAL, PCI to SVG to PDA. On 4/6/23 he had a CV and ablation  On 12/14/23-Electrophysiological study with ablation of multifocal PVCs( [facility]) PVC # 3 exit site was on the septal and basal location, could not be eliminated  completely    He underwent LHC 5/1/24> severe MVD and patent grafts. LATESHA showed moderate MR.He underwent successful DCCV 6/19/24 with Dr. Jordan.  1/26/25 admitted with acute choleycystitis complicated by hyponatremia, acute on CKD( creat 4.7)  1/28 had  Cholecystostomy tube ( plan on for 6-8 weeks)  Entresto stopped due to hypotension and started midodrine.   Echo showed moderate to severe MR, moderate to severe TR. Discharged 2/2/25 with augmentin.     3/12 he had cholangiogram through tube-- cystic duct patent. He was cleared at moderate but acceptable risk for high risk surgery       Today Yossi is here for a follow up.  He reports that he is feeling better. No chest pain or pressure.         Patient is somewhat adherent with medications and is tolerating them well without side effects.     HISTORY/ALLERGIES/ROS     MedHx:   HISTORY/ALLERGIES/ROS      MedHx:            has a past medical history of Arthritis, Atrial fib/flut, CAD (coronary artery disease), Hyperlipidemia, Hypertension, Seizures (HCC), and Sinus bradycardia.  SurgHx:           has a past surgical history that includes hernia repair (Right); Coronary angioplasty with stent (1997); [place] tooth extraction (April 2012); pacemaker placement (12/18/2017); Coronary artery bypass graft (01/2019);

## 2025-05-14 ENCOUNTER — OFFICE VISIT (OUTPATIENT)
Dept: CARDIOLOGY CLINIC | Age: 80
End: 2025-05-14
Payer: MEDICARE

## 2025-05-14 VITALS
SYSTOLIC BLOOD PRESSURE: 134 MMHG | WEIGHT: 157 LBS | HEART RATE: 60 BPM | DIASTOLIC BLOOD PRESSURE: 78 MMHG | OXYGEN SATURATION: 99 % | BODY MASS INDEX: 22.48 KG/M2 | HEIGHT: 70 IN

## 2025-05-14 DIAGNOSIS — I48.0 PAF (PAROXYSMAL ATRIAL FIBRILLATION) (HCC): Chronic | ICD-10-CM

## 2025-05-14 DIAGNOSIS — E78.2 MIXED HYPERLIPIDEMIA: ICD-10-CM

## 2025-05-14 DIAGNOSIS — I34.0 MITRAL VALVE INSUFFICIENCY, UNSPECIFIED ETIOLOGY: ICD-10-CM

## 2025-05-14 DIAGNOSIS — I10 ESSENTIAL HYPERTENSION: Chronic | ICD-10-CM

## 2025-05-14 DIAGNOSIS — I25.110 CORONARY ARTERY DISEASE INVOLVING NATIVE CORONARY ARTERY OF NATIVE HEART WITH UNSTABLE ANGINA PECTORIS (HCC): Primary | ICD-10-CM

## 2025-05-14 DIAGNOSIS — I25.5 ISCHEMIC CARDIOMYOPATHY: Chronic | ICD-10-CM

## 2025-05-14 DIAGNOSIS — I49.3 PVC (PREMATURE VENTRICULAR CONTRACTION): ICD-10-CM

## 2025-05-14 DIAGNOSIS — I95.9 HYPOTENSION, UNSPECIFIED HYPOTENSION TYPE: ICD-10-CM

## 2025-05-14 DIAGNOSIS — Z95.1 S/P CABG (CORONARY ARTERY BYPASS GRAFT): ICD-10-CM

## 2025-05-14 DIAGNOSIS — Z95.810 ICD (IMPLANTABLE CARDIOVERTER-DEFIBRILLATOR) IN PLACE: ICD-10-CM

## 2025-05-14 DIAGNOSIS — G47.33 OSA ON CPAP: ICD-10-CM

## 2025-05-14 DIAGNOSIS — N18.9 CHRONIC KIDNEY DISEASE, UNSPECIFIED CKD STAGE: ICD-10-CM

## 2025-05-14 PROCEDURE — 1159F MED LIST DOCD IN RCRD: CPT | Performed by: INTERNAL MEDICINE

## 2025-05-14 PROCEDURE — 3075F SYST BP GE 130 - 139MM HG: CPT | Performed by: INTERNAL MEDICINE

## 2025-05-14 PROCEDURE — 99214 OFFICE O/P EST MOD 30 MIN: CPT | Performed by: INTERNAL MEDICINE

## 2025-05-14 PROCEDURE — G8420 CALC BMI NORM PARAMETERS: HCPCS | Performed by: INTERNAL MEDICINE

## 2025-05-14 PROCEDURE — G8427 DOCREV CUR MEDS BY ELIG CLIN: HCPCS | Performed by: INTERNAL MEDICINE

## 2025-05-14 PROCEDURE — 1036F TOBACCO NON-USER: CPT | Performed by: INTERNAL MEDICINE

## 2025-05-14 PROCEDURE — 1123F ACP DISCUSS/DSCN MKR DOCD: CPT | Performed by: INTERNAL MEDICINE

## 2025-05-14 PROCEDURE — 3078F DIAST BP <80 MM HG: CPT | Performed by: INTERNAL MEDICINE

## 2025-05-16 ENCOUNTER — TELEPHONE (OUTPATIENT)
Dept: CARDIOLOGY CLINIC | Age: 80
End: 2025-05-16

## 2025-05-16 NOTE — TELEPHONE ENCOUNTER
Recommend he start half of an entresto tablet once a day. Call us with his blood pressures next week. We will slowly work him back up to the dose he was previously taking.

## 2025-05-16 NOTE — TELEPHONE ENCOUNTER
Pt called to report his bp:    108/58       60    05/15    3:00pm  107/55       60    05/16    9:00am

## 2025-06-05 ENCOUNTER — TELEPHONE (OUTPATIENT)
Dept: CARDIOLOGY CLINIC | Age: 80
End: 2025-06-05

## 2025-06-05 ENCOUNTER — TELEPHONE (OUTPATIENT)
Dept: PHARMACY | Age: 80
End: 2025-06-05

## 2025-06-05 DIAGNOSIS — I48.0 PAF (PAROXYSMAL ATRIAL FIBRILLATION) (HCC): Primary | ICD-10-CM

## 2025-06-05 NOTE — TELEPHONE ENCOUNTER
Patient called and rescheduled for 6/20.  He is in [place] and won't be back for two more weeks.  He plans on contacting cardiology to get a lab order faxed to [facility] to do an INR for him b/4 he gets home.

## 2025-06-05 NOTE — TELEPHONE ENCOUNTER
Pt calling in requesting that we send INR labs to [facility] in [place] as he is on vacation   Fax [phone]  Please advise  Thank you

## 2025-06-06 ENCOUNTER — APPOINTMENT (OUTPATIENT)
Dept: PHARMACY | Age: 80
End: 2025-06-06
Payer: MEDICARE

## 2025-06-06 NOTE — TELEPHONE ENCOUNTER
[facility] called in need of the order for the INR for the patient.   New order was faxed to   [phone]    Awaiting confirmation.

## 2025-06-09 ENCOUNTER — TELEPHONE (OUTPATIENT)
Dept: CARDIOLOGY CLINIC | Age: 80
End: 2025-06-09

## 2025-06-09 NOTE — TELEPHONE ENCOUNTER
Pt called stating they have had 10 lbs, since last visit     Current wt is 166, denies  CP SOB does feel fluid full in the stomach area.    Pt is sending remote transmission over.    Pls advise

## 2025-06-09 NOTE — TELEPHONE ENCOUNTER
Patient feels as if his waist is swollen, legs \"thicker\" he will double lasix and call us  tomorrow with weights

## 2025-06-10 ENCOUNTER — ANTI-COAG VISIT (OUTPATIENT)
Dept: PHARMACY | Age: 80
End: 2025-06-10
Payer: MEDICARE

## 2025-06-10 DIAGNOSIS — I48.91 ATRIAL FIBRILLATION, UNSPECIFIED TYPE (HCC): Primary | Chronic | ICD-10-CM

## 2025-06-10 LAB
INTERNATIONAL NORMALIZATION RATIO, POC: 4.1
PROTHROMBIN TIME, POC: 0

## 2025-06-10 PROCEDURE — 85610 PROTHROMBIN TIME: CPT

## 2025-06-10 PROCEDURE — 99212 OFFICE O/P EST SF 10 MIN: CPT

## 2025-06-10 NOTE — PROGRESS NOTES
Mr. Juan A Mathis is a 79 y.o. y/o male with history of Afib who presents today for anticoagulation monitoring and adjustment.    Pertinent PMH: DVT, CABG, HTN, CAD, seizure, CHF    Patient Reported Findings:  Yes     No  [x]   []       Patient verifies current dosing regimen as listed- confirmed --> used AVS to take doses ---> states does 5mg Sun, Tues, Thurs and Sat, takes 2.5mg MWF  []   [x]       S/S bleeding/bruising/swelling/SOB- denies ---> was having clot symptoms on Sat so went to see doc on Mon and did doppler---> had large bruise on leg for a few weeks but has resolved now --> denies   []   [x]       Blood in urine or stool- denies   []   [x]       Procedures scheduled in the future at this time colonoscopy was cancelled, r/s for December 29, hold 5 days and bridge. Was instructed to get INR on Mon 12/26 to determine INR, start lovenox --> CV 9/6, ablation 12/14 --> having ablation on 12/14, cleared to hold warfarin 3 days prior --> had procedure 5/1, held warfarin 5 days prior and bridged with lovenox. INR 1.2 at procedure---> had procedure on 3/27 held warfarin 5 d and bridged with lovenox (see below) --> None upcoming   []   [x]       Missed Dose -denies   []   [x]       Extra Dose- denies   []   [x]       Change in medications- states that MD had been adjusting phenytoin increasing 4-5 weeks ago but plans to return to normal phenytoin today  --->takes amiodarone 200 mg qd---> states fluctuates Phenytoin based on symptoms, unclear ---> primidone decreased 2/29 (could cause INR to inc) ---> has been taking more amiodarone (1/2 tablet) qd for past 2 weeks since being in a fib--> Increasing metoprolol, amiodarone 200 mg qam and 100 mg 4x week in pm --> Just finished amoxicillin yesterday --> denies changes   []   [x]       Change in health/diet/appetite -states normally eats a lot of vegetable soup, but has not had any greens recently ---> no greens --> more greens, 3 salads in past week --> had diarrhea

## 2025-06-12 DIAGNOSIS — I48.0 PAF (PAROXYSMAL ATRIAL FIBRILLATION) (HCC): ICD-10-CM

## 2025-06-12 DIAGNOSIS — I25.5 ISCHEMIC CARDIOMYOPATHY: ICD-10-CM

## 2025-06-13 NOTE — TELEPHONE ENCOUNTER
Called patient, much less swelling, weight is down 6pounds to 159, he will resume lasix 60 mg every other day  Reviewed with nazariow

## 2025-06-13 NOTE — TELEPHONE ENCOUNTER
Last ov:05/14/25 DKW  Next ov:11/05/25 DKW  Last EKG:3/24/25  Last labs:3/31/25  Last filled:   Disp Refills Start End    amiodarone (CORDARONE) 200 MG tablet 135 tablet 3 6/24/2024 --    Sig - Route: Take 1 tablet by mouth daily 3 days a week extra 1/2 tablet - Oral    Patient taking differently: Take 1.5 tablets by mouth daily    Sent to pharmacy as: Amiodarone HCl 200 MG Oral Tablet (CORDARONE)    Cosign for Ordering: Required by HIM DEFAULT ASSIGNED POOL    E-Prescribing Status: Receipt confirmed by pharmacy (6/24/2024  1:54 PM EDT)

## 2025-06-13 NOTE — TELEPHONE ENCOUNTER
Requested Prescriptions     Pending Prescriptions Disp Refills    amiodarone (CORDARONE) 200 MG tablet 135 tablet 3     Sig: Take 1 tablet by mouth daily 3 days a week extra 1/2 tablet      Last OV:  2025 DKW    Next OV: 2025 DKW    Last EK2025     Last Filled: 4DKW

## 2025-06-14 RX ORDER — AMIODARONE HYDROCHLORIDE 200 MG/1
200 TABLET ORAL DAILY
Qty: 135 TABLET | Refills: 1 | Status: SHIPPED | OUTPATIENT
Start: 2025-06-14

## 2025-06-16 RX ORDER — AMIODARONE HYDROCHLORIDE 200 MG/1
TABLET ORAL
Qty: 405 TABLET | Refills: 0 | OUTPATIENT
Start: 2025-06-16

## 2025-06-24 ENCOUNTER — ANTI-COAG VISIT (OUTPATIENT)
Dept: PHARMACY | Age: 80
End: 2025-06-24
Payer: MEDICARE

## 2025-06-24 DIAGNOSIS — I48.0 PAROXYSMAL ATRIAL FIBRILLATION (HCC): Primary | Chronic | ICD-10-CM

## 2025-06-24 LAB
INTERNATIONAL NORMALIZATION RATIO, POC: 3.4
PROTHROMBIN TIME, POC: 0

## 2025-06-24 PROCEDURE — 99212 OFFICE O/P EST SF 10 MIN: CPT | Performed by: PHYSICIAN ASSISTANT

## 2025-06-24 PROCEDURE — 85610 PROTHROMBIN TIME: CPT | Performed by: PHYSICIAN ASSISTANT

## 2025-06-24 NOTE — PROGRESS NOTES
Mr. Juan A Mathis is a 79 y.o. y/o male with history of Afib who presents today for anticoagulation monitoring and adjustment.    Pertinent PMH: DVT, CABG, HTN, CAD, seizure, CHF    Patient Reported Findings:  Yes     No  [x]   []       Patient verifies current dosing regimen as listed- confirmed --> used AVS to take doses ---> states does 5mg Sun, Tues, Thurs and Sat, takes 2.5mg MWF  []   [x]       S/S bleeding/bruising/swelling/SOB- denies ---> was having clot symptoms on Sat so went to see doc on Mon and did doppler---> had large bruise on leg for a few weeks but has resolved now --> denies   []   [x]       Blood in urine or stool- denies   []   [x]       Procedures scheduled in the future at this time colonoscopy was cancelled, r/s for December 29, hold 5 days and bridge. Was instructed to get INR on Mon 12/26 to determine INR, start lovenox --> CV 9/6, ablation 12/14 --> having ablation on 12/14, cleared to hold warfarin 3 days prior --> had procedure 5/1, held warfarin 5 days prior and bridged with lovenox. INR 1.2 at procedure---> had procedure on 3/27 held warfarin 5 d and bridged with lovenox (see below) --> None upcoming   []   [x]       Missed Dose -denies   []   [x]       Extra Dose- denies   [x]   []       Change in medications- states that MD had been adjusting phenytoin increasing 4-5 weeks ago but plans to return to normal phenytoin today  --->takes amiodarone 200 mg qd---> states fluctuates Phenytoin based on symptoms, unclear ---> primidone decreased 2/29 (could cause INR to inc) ---> has been taking more amiodarone (1/2 tablet) qd for past 2 weeks since being in a fib--> Increasing metoprolol, amiodarone 200 mg qam and 100 mg 4x week in pm --> Just finished amoxicillin yesterday --> levothyroxine 75 -> 88, no other changes  [x]   []       Change in health/diet/appetite -states normally eats a lot of vegetable soup, but has not had any greens recently ---> no greens --> more greens, 3 salads in past

## 2025-06-26 NOTE — TELEPHONE ENCOUNTER
Received refill request for pantoprazole (PROTONIX) 40 MG tablet  from [facility] pharmacy.     Last OV: 5/14/25    Next OV: 11/5/25    Last Labs:     Last Filled: 1/12/24

## 2025-06-30 PROCEDURE — 93297 REM INTERROG DEV EVAL ICPMS: CPT | Performed by: CLINICAL NURSE SPECIALIST

## 2025-06-30 RX ORDER — PANTOPRAZOLE SODIUM 40 MG/1
40 TABLET, DELAYED RELEASE ORAL
Qty: 90 TABLET | Refills: 3 | OUTPATIENT
Start: 2025-06-30

## 2025-06-30 RX ORDER — PANTOPRAZOLE SODIUM 40 MG/1
40 TABLET, DELAYED RELEASE ORAL
Qty: 90 TABLET | Refills: 0 | Status: SHIPPED | OUTPATIENT
Start: 2025-06-30

## 2025-07-08 ENCOUNTER — ANTI-COAG VISIT (OUTPATIENT)
Dept: PHARMACY | Age: 80
End: 2025-07-08
Payer: MEDICARE

## 2025-07-08 DIAGNOSIS — I48.91 ATRIAL FIBRILLATION, UNSPECIFIED TYPE (HCC): Primary | Chronic | ICD-10-CM

## 2025-07-08 LAB
INTERNATIONAL NORMALIZATION RATIO, POC: 2.9
PROTHROMBIN TIME, POC: 0

## 2025-07-08 PROCEDURE — 99211 OFF/OP EST MAY X REQ PHY/QHP: CPT

## 2025-07-08 PROCEDURE — 85610 PROTHROMBIN TIME: CPT

## 2025-07-08 NOTE — PROGRESS NOTES
maintain a consistency of vegetables/salads.   Recheck INR in 4 weeks,  per request   Consent form signed 24    Referring Dr. Frazier  INR (no units)   Date Value   2025 1.29 (H)   2025 1.36 (H)   2025 1.42 (H)   2025 1.99 (H)     INR,(POC) (no units)   Date Value   2025 3.4   06/10/2025 4.1   2025 2.4   2025 2.4     For Pharmacy Admin Tracking Only    Intervention Detail: Adherence Monitorin  Total # of Interventions Recommended: 1  Total # of Interventions Accepted: 1  Time Spent (min): 15

## 2025-08-05 ENCOUNTER — ANTI-COAG VISIT (OUTPATIENT)
Dept: PHARMACY | Age: 80
End: 2025-08-05
Payer: MEDICARE

## 2025-08-05 DIAGNOSIS — I48.0 PAROXYSMAL ATRIAL FIBRILLATION (HCC): Primary | Chronic | ICD-10-CM

## 2025-08-05 LAB
INTERNATIONAL NORMALIZATION RATIO, POC: 1.8
PROTHROMBIN TIME, POC: 0

## 2025-08-05 PROCEDURE — 99211 OFF/OP EST MAY X REQ PHY/QHP: CPT | Performed by: PHYSICIAN ASSISTANT

## 2025-08-05 PROCEDURE — 85610 PROTHROMBIN TIME: CPT | Performed by: PHYSICIAN ASSISTANT

## 2025-08-21 ENCOUNTER — HOSPITAL ENCOUNTER (OUTPATIENT)
Age: 80
Discharge: HOME | End: 2025-08-21
Payer: MEDICARE

## 2025-08-21 DIAGNOSIS — N18.2 CHRONIC KIDNEY DISEASE, STAGE 2 (MILD): ICD-10-CM

## 2025-08-21 DIAGNOSIS — I50.22 CHRONIC SYSTOLIC HEART FAILURE (HCC): ICD-10-CM

## 2025-08-21 DIAGNOSIS — E83.39 HYPOPHOSPHATEMIA: ICD-10-CM

## 2025-08-21 DIAGNOSIS — I48.0 PAF (PAROXYSMAL ATRIAL FIBRILLATION) (HCC): ICD-10-CM

## 2025-08-21 DIAGNOSIS — I95.9 HYPOTENSION, UNSPECIFIED HYPOTENSION TYPE: ICD-10-CM

## 2025-08-21 DIAGNOSIS — E87.1 HYPONATREMIA: ICD-10-CM

## 2025-08-21 LAB
25(OH)D3 SERPL-MCNC: 29.1 NG/ML
ALBUMIN SERPL-MCNC: 3.9 G/DL (ref 3.4–5)
ANION GAP SERPL CALCULATED.3IONS-SCNC: 9 MMOL/L (ref 3–16)
BACTERIA URNS QL MICRO: NORMAL /HPF
BILIRUB UR QL STRIP.AUTO: NEGATIVE
BUN SERPL-MCNC: 34 MG/DL (ref 7–20)
CALCIUM SERPL-MCNC: 8.7 MG/DL (ref 8.3–10.6)
CHLORIDE SERPL-SCNC: 105 MMOL/L (ref 99–110)
CLARITY UR: CLEAR
CO2 SERPL-SCNC: 26 MMOL/L (ref 21–32)
COLOR UR: YELLOW
CREAT SERPL-MCNC: 1.8 MG/DL (ref 0.8–1.3)
CREAT UR-MCNC: 36.7 MG/DL (ref 39–259)
DEPRECATED RDW RBC AUTO: 13.9 % (ref 12.4–15.4)
EPI CELLS #/AREA URNS AUTO: 0 /HPF (ref 0–5)
GFR SERPLBLD CREATININE-BSD FMLA CKD-EPI: 38 ML/MIN/{1.73_M2}
GLUCOSE SERPL-MCNC: 74 MG/DL (ref 70–99)
GLUCOSE UR STRIP.AUTO-MCNC: NEGATIVE MG/DL
HCT VFR BLD AUTO: 34.9 % (ref 40.5–52.5)
HGB BLD-MCNC: 11.7 G/DL (ref 13.5–17.5)
HGB UR QL STRIP.AUTO: NEGATIVE
HYALINE CASTS #/AREA URNS AUTO: 1 /LPF (ref 0–8)
KETONES UR STRIP.AUTO-MCNC: NEGATIVE MG/DL
LEUKOCYTE ESTERASE UR QL STRIP.AUTO: NEGATIVE
MAGNESIUM SERPL-MCNC: 2.43 MG/DL (ref 1.8–2.4)
MCH RBC QN AUTO: 30.8 PG (ref 26–34)
MCHC RBC AUTO-ENTMCNC: 33.4 G/DL (ref 31–36)
MCV RBC AUTO: 92.1 FL (ref 80–100)
NITRITE UR QL STRIP.AUTO: NEGATIVE
PH UR STRIP.AUTO: 6.5 [PH] (ref 5–8)
PHOSPHATE SERPL-MCNC: 3.5 MG/DL (ref 2.5–4.9)
PLATELET # BLD AUTO: 336 K/UL (ref 135–450)
PMV BLD AUTO: 8.2 FL (ref 5–10.5)
POTASSIUM SERPL-SCNC: 5 MMOL/L (ref 3.5–5.1)
PROT UR STRIP.AUTO-MCNC: NEGATIVE MG/DL
PROT UR-MCNC: 6 MG/DL
PROT/CREAT UR-RTO: 0.2 MG/DL
PTH-INTACT SERPL-MCNC: 78.3 PG/ML (ref 14–72)
RBC # BLD AUTO: 3.79 M/UL (ref 4.2–5.9)
RBC CLUMPS #/AREA URNS AUTO: 2 /HPF (ref 0–4)
SODIUM SERPL-SCNC: 140 MMOL/L (ref 136–145)
SP GR UR STRIP.AUTO: 1.01 (ref 1–1.03)
UA DIPSTICK W REFLEX MICRO PNL UR: NORMAL
URN SPEC COLLECT METH UR: NORMAL
UROBILINOGEN UR STRIP-ACNC: 0.2 E.U./DL
WBC # BLD AUTO: 6.6 K/UL (ref 4–11)
WBC #/AREA URNS AUTO: 0 /HPF (ref 0–5)

## 2025-08-21 PROCEDURE — 83735 ASSAY OF MAGNESIUM: CPT

## 2025-08-21 PROCEDURE — 83970 ASSAY OF PARATHORMONE: CPT

## 2025-08-21 PROCEDURE — 82570 ASSAY OF URINE CREATININE: CPT

## 2025-08-21 PROCEDURE — 84156 ASSAY OF PROTEIN URINE: CPT

## 2025-08-21 PROCEDURE — 85027 COMPLETE CBC AUTOMATED: CPT

## 2025-08-21 PROCEDURE — 36415 COLL VENOUS BLD VENIPUNCTURE: CPT

## 2025-08-21 PROCEDURE — 81001 URINALYSIS AUTO W/SCOPE: CPT

## 2025-08-21 PROCEDURE — 82306 VITAMIN D 25 HYDROXY: CPT

## 2025-08-21 PROCEDURE — 80069 RENAL FUNCTION PANEL: CPT

## 2025-08-22 ENCOUNTER — RESULTS FOLLOW-UP (OUTPATIENT)
Dept: CARDIOLOGY CLINIC | Age: 80
End: 2025-08-22

## 2025-08-29 ENCOUNTER — TELEPHONE (OUTPATIENT)
Dept: PHARMACY | Age: 80
End: 2025-08-29

## 2025-09-03 ENCOUNTER — ANTI-COAG VISIT (OUTPATIENT)
Dept: PHARMACY | Age: 80
End: 2025-09-03
Payer: MEDICARE

## 2025-09-03 DIAGNOSIS — I48.0 PAROXYSMAL ATRIAL FIBRILLATION (HCC): Primary | Chronic | ICD-10-CM

## 2025-09-03 LAB
INTERNATIONAL NORMALIZATION RATIO, POC: 2.5
PROTHROMBIN TIME, POC: NORMAL

## 2025-09-03 PROCEDURE — 85610 PROTHROMBIN TIME: CPT | Performed by: PHYSICIAN ASSISTANT

## 2025-09-03 PROCEDURE — 99211 OFF/OP EST MAY X REQ PHY/QHP: CPT | Performed by: PHYSICIAN ASSISTANT

## (undated) DEVICE — Device

## (undated) DEVICE — BAG DRNGE L6FT 20L PREFIL ABSRB POLYMER EXP TBNG DISP FOR

## (undated) DEVICE — TRAY PREP DRY W/ PREM GLV 2 APPL 6 SPNG 2 UNDPD 1 OVERWRAP

## (undated) DEVICE — COVER,TABLE,77X90,STERILE: Brand: MEDLINE

## (undated) DEVICE — GENERAL ROBOT: Brand: MEDLINE INDUSTRIES, INC.

## (undated) DEVICE — PAD PT POS 36 IN SURGYPAD DISP

## (undated) DEVICE — GOWN,AURORA,NONREINF,RAGLAN,XXL,STERILE: Brand: MEDLINE

## (undated) DEVICE — 30978 SEE SHARP - ENHANCED INTRAOPERATIVE LAPAROSCOPE CLEANING & DEFOGGING: Brand: 30978 SEE SHARP - ENHANCED INTRAOPERATIVE LAPAROSCOPE CLEANING & DEFOGGING

## (undated) DEVICE — COVER LT HNDL BLU PLAS

## (undated) DEVICE — SYRINGE,10ML,TR/FR,MLL,W/RES: Brand: NAMIC

## (undated) DEVICE — BW-412T DISP COMBO CLEANING BRUSH: Brand: SINGLE USE COMBINATION CLEANING BRUSH

## (undated) DEVICE — SOLUTION IV IRRIG WATER 1000ML POUR BRL 2F7114

## (undated) DEVICE — DRAPE C ARM W/ POLY STRP W42XL72IN FOR MOB XR

## (undated) DEVICE — BAG RETRIEVAL SPECIMEN SUPERBAG 5 SMALL NYLON ITRODUCER

## (undated) DEVICE — CATHETER CHOLGM 4.5FR L18IN W/ MTL SUPP TB

## (undated) DEVICE — SOLUTION IV IRRIG WATER 500ML POUR BRL ST 2F7113

## (undated) DEVICE — SUTURE MONOCRYL + SZ 4-0 L27IN ABSRB UD L19MM PS-2 3/8 CIR MCP426H

## (undated) DEVICE — CYSTO/BLADDER IRRIGATION SET, REGULATING CLAMP

## (undated) DEVICE — Device: Brand: MEDEX

## (undated) DEVICE — Z DUP USE 2522782 SOLUTION IRRIG 1000ML STRL H2O PLAS CONTAINER UROMATIC

## (undated) DEVICE — ARM DRAPE

## (undated) DEVICE — AIR/WATER CLEANING ADAPTER FOR OLYMPUS® GI ENDOSCOPE: Brand: BULLDOG®

## (undated) DEVICE — ENDOSCOPIC KIT 6X3/16 FT COLON W/ 1.1 OZ 2 GWN W/O BRSH

## (undated) DEVICE — SET EXTN PRIMING 4.9ML L30IN INCL SLDE CLMP SPIN M LUERLOCK

## (undated) DEVICE — BLANKET WRM W29.9XL79.1IN UP BODY FORC AIR MISTRAL-AIR

## (undated) DEVICE — PMI DISPOSABLE PUNCTURE CLOSURE DEVICE / SUTURE GRASPER: Brand: PMI

## (undated) DEVICE — CYSTO PACK: Brand: MEDLINE INDUSTRIES, INC.

## (undated) DEVICE — INSUFFLATION NEEDLE TO ESTABLISH PNEUMOPERITONEUM.: Brand: INSUFFLATION NEEDLE

## (undated) DEVICE — SYRINGE MED 30ML STD CLR PLAS LUERLOCK TIP N CTRL DISP

## (undated) DEVICE — LIQUIBAND RAPID ADHESIVE 36/CS 0.8ML: Brand: MEDLINE

## (undated) DEVICE — CATHETER CHOLANGIOGRAM 4.5 FRX3 IN W/ 20018M55 TAUT INTRO

## (undated) DEVICE — SYRINGE MED 10ML LUERLOCK TIP W/O SFTY DISP

## (undated) DEVICE — BLADELESS OBTURATOR: Brand: WECK VISTA

## (undated) DEVICE — VALVE SUCTION AIR H2O SET ORCA POD + DISP

## (undated) DEVICE — SEAL

## (undated) DEVICE — GLOVE ORANGE PI 7   MSG9070

## (undated) DEVICE — SYRINGE MED 10ML SLIP TIP BLNT FILL AND LUERLOCK DISP